# Patient Record
Sex: MALE | Race: WHITE | NOT HISPANIC OR LATINO
[De-identification: names, ages, dates, MRNs, and addresses within clinical notes are randomized per-mention and may not be internally consistent; named-entity substitution may affect disease eponyms.]

---

## 2017-02-15 ENCOUNTER — APPOINTMENT (OUTPATIENT)
Dept: VASCULAR SURGERY | Facility: CLINIC | Age: 62
End: 2017-02-15

## 2017-07-12 ENCOUNTER — APPOINTMENT (OUTPATIENT)
Dept: VASCULAR SURGERY | Facility: CLINIC | Age: 62
End: 2017-07-12

## 2017-07-12 VITALS — SYSTOLIC BLOOD PRESSURE: 125 MMHG | OXYGEN SATURATION: 93 % | HEART RATE: 81 BPM | DIASTOLIC BLOOD PRESSURE: 81 MMHG

## 2018-01-31 ENCOUNTER — APPOINTMENT (OUTPATIENT)
Dept: VASCULAR SURGERY | Facility: CLINIC | Age: 63
End: 2018-01-31
Payer: COMMERCIAL

## 2018-01-31 VITALS — SYSTOLIC BLOOD PRESSURE: 160 MMHG | OXYGEN SATURATION: 97 % | DIASTOLIC BLOOD PRESSURE: 87 MMHG | HEART RATE: 81 BPM

## 2018-01-31 PROCEDURE — 99214 OFFICE O/P EST MOD 30 MIN: CPT | Mod: 25

## 2018-01-31 PROCEDURE — 93926 LOWER EXTREMITY STUDY: CPT

## 2018-06-27 ENCOUNTER — APPOINTMENT (OUTPATIENT)
Dept: VASCULAR SURGERY | Facility: CLINIC | Age: 63
End: 2018-06-27
Payer: COMMERCIAL

## 2018-06-27 VITALS — HEART RATE: 61 BPM | TEMPERATURE: 97.4 F | DIASTOLIC BLOOD PRESSURE: 82 MMHG | SYSTOLIC BLOOD PRESSURE: 136 MMHG

## 2018-06-27 PROCEDURE — 99214 OFFICE O/P EST MOD 30 MIN: CPT | Mod: 25

## 2018-06-27 PROCEDURE — 93926 LOWER EXTREMITY STUDY: CPT

## 2018-07-05 ENCOUNTER — OTHER (OUTPATIENT)
Age: 63
End: 2018-07-05

## 2018-07-18 ENCOUNTER — RX RENEWAL (OUTPATIENT)
Age: 63
End: 2018-07-18

## 2018-07-24 ENCOUNTER — APPOINTMENT (OUTPATIENT)
Dept: VASCULAR SURGERY | Facility: HOSPITAL | Age: 63
End: 2018-07-24

## 2018-07-24 ENCOUNTER — OUTPATIENT (OUTPATIENT)
Dept: OUTPATIENT SERVICES | Facility: HOSPITAL | Age: 63
LOS: 1 days | Discharge: NURSING, REHAB OR LONG-TERM CARE | End: 2018-07-24
Payer: COMMERCIAL

## 2018-07-24 LAB
ANION GAP SERPL CALC-SCNC: 11 MMOL/L — SIGNIFICANT CHANGE UP (ref 5–17)
BUN SERPL-MCNC: 18 MG/DL — SIGNIFICANT CHANGE UP (ref 7–23)
CALCIUM SERPL-MCNC: 9.9 MG/DL — SIGNIFICANT CHANGE UP (ref 8.4–10.5)
CHLORIDE SERPL-SCNC: 103 MMOL/L — SIGNIFICANT CHANGE UP (ref 96–108)
CO2 SERPL-SCNC: 27 MMOL/L — SIGNIFICANT CHANGE UP (ref 22–31)
CREAT SERPL-MCNC: 0.87 MG/DL — SIGNIFICANT CHANGE UP (ref 0.5–1.3)
GLUCOSE SERPL-MCNC: 96 MG/DL — SIGNIFICANT CHANGE UP (ref 70–99)
POTASSIUM SERPL-MCNC: 4.7 MMOL/L — SIGNIFICANT CHANGE UP (ref 3.5–5.3)
POTASSIUM SERPL-SCNC: 4.7 MMOL/L — SIGNIFICANT CHANGE UP (ref 3.5–5.3)
SODIUM SERPL-SCNC: 141 MMOL/L — SIGNIFICANT CHANGE UP (ref 135–145)

## 2018-07-24 PROCEDURE — 36247 INS CATH ABD/L-EXT ART 3RD: CPT | Mod: RT

## 2018-07-24 PROCEDURE — 36247 INS CATH ABD/L-EXT ART 3RD: CPT | Mod: RT,GC

## 2018-07-24 PROCEDURE — C1894: CPT

## 2018-07-24 PROCEDURE — C1887: CPT

## 2018-07-24 PROCEDURE — 75710 ARTERY X-RAYS ARM/LEG: CPT | Mod: 26,GC

## 2018-07-24 PROCEDURE — 75625 CONTRAST EXAM ABDOMINL AORTA: CPT | Mod: 26,GC

## 2018-07-24 PROCEDURE — C1769: CPT

## 2018-07-24 PROCEDURE — 80048 BASIC METABOLIC PNL TOTAL CA: CPT

## 2018-07-24 RX ORDER — ONDANSETRON 8 MG/1
4 TABLET, FILM COATED ORAL ONCE
Qty: 0 | Refills: 0 | Status: DISCONTINUED | OUTPATIENT
Start: 2018-07-24 | End: 2018-07-24

## 2018-07-24 RX ORDER — SODIUM CHLORIDE 9 MG/ML
1000 INJECTION INTRAMUSCULAR; INTRAVENOUS; SUBCUTANEOUS
Qty: 0 | Refills: 0 | Status: DISCONTINUED | OUTPATIENT
Start: 2018-07-24 | End: 2018-07-24

## 2018-07-24 RX ORDER — CHLORHEXIDINE GLUCONATE 213 G/1000ML
1 SOLUTION TOPICAL ONCE
Qty: 0 | Refills: 0 | Status: DISCONTINUED | OUTPATIENT
Start: 2018-07-24 | End: 2018-07-24

## 2018-07-24 RX ORDER — OXYCODONE AND ACETAMINOPHEN 5; 325 MG/1; MG/1
1 TABLET ORAL ONCE
Qty: 0 | Refills: 0 | Status: DISCONTINUED | OUTPATIENT
Start: 2018-07-24 | End: 2018-07-24

## 2018-07-24 RX ORDER — ACETAMINOPHEN 500 MG
650 TABLET ORAL ONCE
Qty: 0 | Refills: 0 | Status: DISCONTINUED | OUTPATIENT
Start: 2018-07-24 | End: 2018-07-24

## 2018-07-24 NOTE — BRIEF OPERATIVE NOTE - POST-OP DX
PAD (peripheral artery disease)  07/24/2018  RLE h/o acute arterial occlusion  Active  Beverly Knight

## 2018-07-24 NOTE — BRIEF OPERATIVE NOTE - OPERATION/FINDINGS
No Aorto-iliac disease. Patent CFA/ PFA/SFA. Patent SFA-PT bypass with evidence of thrombus in the distal bypass and focal severe stenosis at the distal bypass.   PT outflow

## 2018-07-26 ENCOUNTER — RX RENEWAL (OUTPATIENT)
Age: 63
End: 2018-07-26

## 2018-07-27 VITALS
HEIGHT: 73 IN | DIASTOLIC BLOOD PRESSURE: 72 MMHG | WEIGHT: 171.52 LBS | HEART RATE: 49 BPM | OXYGEN SATURATION: 98 % | SYSTOLIC BLOOD PRESSURE: 119 MMHG | RESPIRATION RATE: 16 BRPM | TEMPERATURE: 96 F

## 2018-07-27 NOTE — PATIENT PROFILE ADULT. - PMH
Chronic sinusitis    Lyme disease    PVD (peripheral vascular disease) Lyme disease    PVD (peripheral vascular disease)

## 2018-07-27 NOTE — PATIENT PROFILE ADULT. - TEACHING/LEARNING LEARNING PREFERENCES
verbal instruction/individual instruction written material/individual instruction/verbal instruction

## 2018-07-30 ENCOUNTER — INPATIENT (INPATIENT)
Facility: HOSPITAL | Age: 63
LOS: 1 days | Discharge: HOME | DRG: 254 | End: 2018-08-01
Attending: SURGERY | Admitting: SURGERY
Payer: COMMERCIAL

## 2018-07-30 ENCOUNTER — APPOINTMENT (OUTPATIENT)
Dept: VASCULAR SURGERY | Facility: HOSPITAL | Age: 63
End: 2018-07-30

## 2018-07-30 DIAGNOSIS — Z41.9 ENCOUNTER FOR PROCEDURE FOR PURPOSES OTHER THAN REMEDYING HEALTH STATE, UNSPECIFIED: Chronic | ICD-10-CM

## 2018-07-30 DIAGNOSIS — Z86.79 PERSONAL HISTORY OF OTHER DISEASES OF THE CIRCULATORY SYSTEM: Chronic | ICD-10-CM

## 2018-07-30 LAB
ANION GAP SERPL CALC-SCNC: 11 MMOL/L — SIGNIFICANT CHANGE UP (ref 5–17)
APTT BLD: 158.2 SEC — CRITICAL HIGH (ref 27.5–37.4)
APTT BLD: 36.7 SEC — SIGNIFICANT CHANGE UP (ref 27.5–37.4)
APTT BLD: 49.5 SEC — HIGH (ref 27.5–37.4)
BASOPHILS NFR BLD AUTO: 0.2 % — SIGNIFICANT CHANGE UP (ref 0–2)
BUN SERPL-MCNC: 17 MG/DL — SIGNIFICANT CHANGE UP (ref 7–23)
CALCIUM SERPL-MCNC: 8.8 MG/DL — SIGNIFICANT CHANGE UP (ref 8.4–10.5)
CHLORIDE SERPL-SCNC: 103 MMOL/L — SIGNIFICANT CHANGE UP (ref 96–108)
CO2 SERPL-SCNC: 24 MMOL/L — SIGNIFICANT CHANGE UP (ref 22–31)
CREAT SERPL-MCNC: 0.85 MG/DL — SIGNIFICANT CHANGE UP (ref 0.5–1.3)
EOSINOPHIL NFR BLD AUTO: 0.8 % — SIGNIFICANT CHANGE UP (ref 0–6)
GLUCOSE SERPL-MCNC: 110 MG/DL — HIGH (ref 70–99)
HCT VFR BLD CALC: 38 % — LOW (ref 39–50)
HGB BLD-MCNC: 12.8 G/DL — LOW (ref 13–17)
INR BLD: 1.04 — SIGNIFICANT CHANGE UP (ref 0.88–1.16)
LYMPHOCYTES # BLD AUTO: 8.8 % — LOW (ref 13–44)
MAGNESIUM SERPL-MCNC: 1.9 MG/DL — SIGNIFICANT CHANGE UP (ref 1.6–2.6)
MCHC RBC-ENTMCNC: 30.5 PG — SIGNIFICANT CHANGE UP (ref 27–34)
MCHC RBC-ENTMCNC: 33.7 G/DL — SIGNIFICANT CHANGE UP (ref 32–36)
MCV RBC AUTO: 90.7 FL — SIGNIFICANT CHANGE UP (ref 80–100)
MONOCYTES NFR BLD AUTO: 2.4 % — SIGNIFICANT CHANGE UP (ref 2–14)
NEUTROPHILS NFR BLD AUTO: 87.8 % — HIGH (ref 43–77)
PHOSPHATE SERPL-MCNC: 3.1 MG/DL — SIGNIFICANT CHANGE UP (ref 2.5–4.5)
PLATELET # BLD AUTO: 221 K/UL — SIGNIFICANT CHANGE UP (ref 150–400)
POTASSIUM SERPL-MCNC: 4.3 MMOL/L — SIGNIFICANT CHANGE UP (ref 3.5–5.3)
POTASSIUM SERPL-SCNC: 4.3 MMOL/L — SIGNIFICANT CHANGE UP (ref 3.5–5.3)
PROTHROM AB SERPL-ACNC: 11.6 SEC — SIGNIFICANT CHANGE UP (ref 9.8–12.7)
RBC # BLD: 4.19 M/UL — LOW (ref 4.2–5.8)
RBC # FLD: 13.4 % — SIGNIFICANT CHANGE UP (ref 10.3–16.9)
SODIUM SERPL-SCNC: 138 MMOL/L — SIGNIFICANT CHANGE UP (ref 135–145)
WBC # BLD: 12.7 K/UL — HIGH (ref 3.8–10.5)
WBC # FLD AUTO: 12.7 K/UL — HIGH (ref 3.8–10.5)

## 2018-07-30 PROCEDURE — 36140 INTRO NDL ICATH UPR/LXTR ART: CPT | Mod: 59,GC

## 2018-07-30 PROCEDURE — 35305 RECHANNELING OF ARTERY: CPT | Mod: GC,RT

## 2018-07-30 PROCEDURE — 75710 ARTERY X-RAYS ARM/LEG: CPT | Mod: 26,59

## 2018-07-30 RX ORDER — HEPARIN SODIUM 5000 [USP'U]/ML
500 INJECTION INTRAVENOUS; SUBCUTANEOUS
Qty: 25000 | Refills: 0 | Status: DISCONTINUED | OUTPATIENT
Start: 2018-07-30 | End: 2018-07-31

## 2018-07-30 RX ORDER — ASPIRIN/CALCIUM CARB/MAGNESIUM 324 MG
81 TABLET ORAL DAILY
Qty: 0 | Refills: 0 | Status: DISCONTINUED | OUTPATIENT
Start: 2018-07-30 | End: 2018-07-30

## 2018-07-30 RX ORDER — ONDANSETRON 8 MG/1
4 TABLET, FILM COATED ORAL EVERY 6 HOURS
Qty: 0 | Refills: 0 | Status: DISCONTINUED | OUTPATIENT
Start: 2018-07-30 | End: 2018-08-01

## 2018-07-30 RX ORDER — METOCLOPRAMIDE HCL 10 MG
10 TABLET ORAL ONCE
Qty: 0 | Refills: 0 | Status: DISCONTINUED | OUTPATIENT
Start: 2018-07-30 | End: 2018-08-01

## 2018-07-30 RX ORDER — DOCUSATE SODIUM 100 MG
100 CAPSULE ORAL THREE TIMES A DAY
Qty: 0 | Refills: 0 | Status: DISCONTINUED | OUTPATIENT
Start: 2018-07-30 | End: 2018-08-01

## 2018-07-30 RX ORDER — MORPHINE SULFATE 50 MG/1
4 CAPSULE, EXTENDED RELEASE ORAL EVERY 4 HOURS
Qty: 0 | Refills: 0 | Status: DISCONTINUED | OUTPATIENT
Start: 2018-07-30 | End: 2018-07-31

## 2018-07-30 RX ORDER — WARFARIN SODIUM 2.5 MG/1
7.5 TABLET ORAL ONCE
Qty: 0 | Refills: 0 | Status: COMPLETED | OUTPATIENT
Start: 2018-07-30 | End: 2018-07-30

## 2018-07-30 RX ORDER — CEFAZOLIN SODIUM 1 G
2000 VIAL (EA) INJECTION EVERY 8 HOURS
Qty: 0 | Refills: 0 | Status: COMPLETED | OUTPATIENT
Start: 2018-07-30 | End: 2018-07-31

## 2018-07-30 RX ORDER — SENNA PLUS 8.6 MG/1
2 TABLET ORAL DAILY
Qty: 0 | Refills: 0 | Status: DISCONTINUED | OUTPATIENT
Start: 2018-07-30 | End: 2018-08-01

## 2018-07-30 RX ORDER — MORPHINE SULFATE 50 MG/1
2 CAPSULE, EXTENDED RELEASE ORAL EVERY 4 HOURS
Qty: 0 | Refills: 0 | Status: DISCONTINUED | OUTPATIENT
Start: 2018-07-30 | End: 2018-07-31

## 2018-07-30 RX ORDER — SODIUM CHLORIDE 9 MG/ML
1000 INJECTION INTRAMUSCULAR; INTRAVENOUS; SUBCUTANEOUS
Qty: 0 | Refills: 0 | Status: DISCONTINUED | OUTPATIENT
Start: 2018-07-30 | End: 2018-07-31

## 2018-07-30 RX ORDER — ASPIRIN/CALCIUM CARB/MAGNESIUM 324 MG
81 TABLET ORAL DAILY
Qty: 0 | Refills: 0 | Status: DISCONTINUED | OUTPATIENT
Start: 2018-07-30 | End: 2018-08-01

## 2018-07-30 RX ORDER — WARFARIN SODIUM 2.5 MG/1
7.5 TABLET ORAL ONCE
Qty: 0 | Refills: 0 | Status: DISCONTINUED | OUTPATIENT
Start: 2018-07-30 | End: 2018-07-30

## 2018-07-30 RX ADMIN — Medication 100 MILLIGRAM(S): at 21:07

## 2018-07-30 RX ADMIN — SODIUM CHLORIDE 65 MILLILITER(S): 9 INJECTION INTRAMUSCULAR; INTRAVENOUS; SUBCUTANEOUS at 18:13

## 2018-07-30 RX ADMIN — WARFARIN SODIUM 7.5 MILLIGRAM(S): 2.5 TABLET ORAL at 21:07

## 2018-07-30 RX ADMIN — Medication 100 MILLIGRAM(S): at 21:36

## 2018-07-30 RX ADMIN — HEPARIN SODIUM 5 UNIT(S)/HR: 5000 INJECTION INTRAVENOUS; SUBCUTANEOUS at 15:55

## 2018-07-30 NOTE — PROGRESS NOTE ADULT - SUBJECTIVE AND OBJECTIVE BOX
POST-OPERATIVE NOTE    Procedure: open revision of distal RLE bypass with graft excision, patch angionplasty, and angiongram    Diagnosis/Indication: RLE bypass graft stenosis    Surgeon:    S: Pt has no complaints and reports that his pain in controlled. Denies CP, SOB, AGUILERA, calf tenderness. Denies nausea, vomiting.    O:  T(C): 36.3 (07-30-18 @ 15:20), Max: 36.3 (07-30-18 @ 15:20)  T(F): 97.3 (07-30-18 @ 15:20), Max: 97.3 (07-30-18 @ 15:20)  HR: 46 (07-30-18 @ 17:20) (46 - 64)  BP: 107/66 (07-30-18 @ 17:20) (102/65 - 118/67)  RR: 19 (07-30-18 @ 17:20) (13 - 22)  SpO2: 99% (07-30-18 @ 17:20) (95% - 99%)  Wt(kg): --                        12.8   12.7  )-----------( 221      ( 30 Jul 2018 15:37 )             38.0     07-30    138  |  103  |  17  ----------------------------<  110<H>  4.3   |  24  |  0.85    Ca    8.8      30 Jul 2018 15:38  Phos  3.1     07-30  Mg     1.9     07-30        Gen: NAD, resting comfortably in bed  C/V: NSR  Pulm: Nonlabored breathing, no respiratory distress  Abd: soft, NT/ND  Extrem: WWP, no calf edema or tenderness. incision clean, dry ,and intact. JPx1 serosanguinous   Pulses: Right: Fem 2+, PT triphasic, DP triphasic

## 2018-07-30 NOTE — H&P PST ADULT - ASSESSMENT
63 yo M with threatened fem-PT bypass:   - Admit to vascular surgery (SDA) for revision of LLE bypass.   - Pre-op labs/ NPO.IVF  - Continue lovenox and ASA perioperatively

## 2018-07-30 NOTE — BRIEF OPERATIVE NOTE - PROCEDURE
<<-----Click on this checkbox to enter Procedure Vascular surgery procedure  07/30/2018  RLE revision of bypass (distal anastomosis) with patch angioplasty and angiogram  Active  MVISMER

## 2018-07-30 NOTE — H&P PST ADULT - HISTORY OF PRESENT ILLNESS
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 He underwent an angiogram 1 week ago again confirming presence of severe stenosis in the graft at the distal anastomosis. Today the pt presented for an open revision of the distal portion of the bypass graft.     He is very active. Travels a lot, bikes and hikes.     Was transitioned to lovenox from coumadin. Last dose yesterday.

## 2018-07-30 NOTE — H&P PST ADULT - PSH
History of femoral angiogram  LLE angiogram  Surgery, elective  Right Femoral-PT bypass with cephalic vein

## 2018-07-31 LAB
ANION GAP SERPL CALC-SCNC: 14 MMOL/L — SIGNIFICANT CHANGE UP (ref 5–17)
APTT BLD: 30.8 SEC — SIGNIFICANT CHANGE UP (ref 27.5–37.4)
BUN SERPL-MCNC: 19 MG/DL — SIGNIFICANT CHANGE UP (ref 7–23)
CALCIUM SERPL-MCNC: 9.4 MG/DL — SIGNIFICANT CHANGE UP (ref 8.4–10.5)
CHLORIDE SERPL-SCNC: 101 MMOL/L — SIGNIFICANT CHANGE UP (ref 96–108)
CO2 SERPL-SCNC: 22 MMOL/L — SIGNIFICANT CHANGE UP (ref 22–31)
CREAT SERPL-MCNC: 0.87 MG/DL — SIGNIFICANT CHANGE UP (ref 0.5–1.3)
GLUCOSE SERPL-MCNC: 119 MG/DL — HIGH (ref 70–99)
HCT VFR BLD CALC: 39.6 % — SIGNIFICANT CHANGE UP (ref 39–50)
HGB BLD-MCNC: 13.2 G/DL — SIGNIFICANT CHANGE UP (ref 13–17)
INR BLD: 0.99 — SIGNIFICANT CHANGE UP (ref 0.88–1.16)
MAGNESIUM SERPL-MCNC: 1.9 MG/DL — SIGNIFICANT CHANGE UP (ref 1.6–2.6)
MCHC RBC-ENTMCNC: 30.1 PG — SIGNIFICANT CHANGE UP (ref 27–34)
MCHC RBC-ENTMCNC: 33.3 G/DL — SIGNIFICANT CHANGE UP (ref 32–36)
MCV RBC AUTO: 90.4 FL — SIGNIFICANT CHANGE UP (ref 80–100)
PHOSPHATE SERPL-MCNC: 3.2 MG/DL — SIGNIFICANT CHANGE UP (ref 2.5–4.5)
PLATELET # BLD AUTO: 231 K/UL — SIGNIFICANT CHANGE UP (ref 150–400)
POTASSIUM SERPL-MCNC: 4.3 MMOL/L — SIGNIFICANT CHANGE UP (ref 3.5–5.3)
POTASSIUM SERPL-SCNC: 4.3 MMOL/L — SIGNIFICANT CHANGE UP (ref 3.5–5.3)
PROTHROM AB SERPL-ACNC: 11 SEC — SIGNIFICANT CHANGE UP (ref 9.8–12.7)
RBC # BLD: 4.38 M/UL — SIGNIFICANT CHANGE UP (ref 4.2–5.8)
RBC # FLD: 13.4 % — SIGNIFICANT CHANGE UP (ref 10.3–16.9)
SODIUM SERPL-SCNC: 137 MMOL/L — SIGNIFICANT CHANGE UP (ref 135–145)
WBC # BLD: 16.8 K/UL — HIGH (ref 3.8–10.5)
WBC # FLD AUTO: 16.8 K/UL — HIGH (ref 3.8–10.5)

## 2018-07-31 RX ORDER — ACETAMINOPHEN 500 MG
650 TABLET ORAL EVERY 6 HOURS
Qty: 0 | Refills: 0 | Status: DISCONTINUED | OUTPATIENT
Start: 2018-07-31 | End: 2018-08-01

## 2018-07-31 RX ORDER — HYDROGEN PEROXIDE 0.3 KG/100L
1 LIQUID TOPICAL
Qty: 0 | Refills: 0 | Status: DISCONTINUED | OUTPATIENT
Start: 2018-07-31 | End: 2018-08-01

## 2018-07-31 RX ORDER — ENOXAPARIN SODIUM 100 MG/ML
115 INJECTION SUBCUTANEOUS DAILY
Qty: 0 | Refills: 0 | Status: DISCONTINUED | OUTPATIENT
Start: 2018-07-31 | End: 2018-08-01

## 2018-07-31 RX ORDER — OXYCODONE AND ACETAMINOPHEN 5; 325 MG/1; MG/1
1 TABLET ORAL EVERY 6 HOURS
Qty: 0 | Refills: 0 | Status: DISCONTINUED | OUTPATIENT
Start: 2018-07-31 | End: 2018-08-01

## 2018-07-31 RX ORDER — ENOXAPARIN SODIUM 100 MG/ML
100 INJECTION SUBCUTANEOUS DAILY
Qty: 0 | Refills: 0 | Status: DISCONTINUED | OUTPATIENT
Start: 2018-07-31 | End: 2018-07-31

## 2018-07-31 RX ADMIN — Medication 100 MILLIGRAM(S): at 13:17

## 2018-07-31 RX ADMIN — ENOXAPARIN SODIUM 115 MILLIGRAM(S): 100 INJECTION SUBCUTANEOUS at 13:17

## 2018-07-31 RX ADMIN — Medication 81 MILLIGRAM(S): at 11:40

## 2018-07-31 RX ADMIN — Medication 100 MILLIGRAM(S): at 21:03

## 2018-07-31 RX ADMIN — Medication 100 MILLIGRAM(S): at 05:15

## 2018-07-31 RX ADMIN — Medication 100 MILLIGRAM(S): at 05:16

## 2018-07-31 NOTE — PROVIDER CONTACT NOTE (OTHER) - RECOMMENDATIONS
LASHAWN Decker to assess at bedside and change dressing if needed.
continue to monitor, continue current rate

## 2018-07-31 NOTE — PROGRESS NOTE ADULT - SUBJECTIVE AND OBJECTIVE BOX
24hr Events:  O/N: 10pm PTT 36.7, rate left unchanged, received coumadin 7.5mg  7/30: s/p open revision of distal portio of RLE bypass with patch angioplasty and angiogram. 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 Drain output 5cc,  JPremoved. OOB ambulate. Heparin and coumadin discontinued, on Lovenox SQ, will discharge tomorrow  O/N: 10pm PTT 36.7, rate left unchanged, received coumadin 7.5mg    Patient has no complaints.    ---------------------------------------------------------------    Vital Signs Last 24 Hrs  T(C): 36.2 (31 Jul 2018 10:26), Max: 36.3 (30 Jul 2018 15:20)  T(F): 97.2 (31 Jul 2018 10:26), Max: 97.3 (30 Jul 2018 15:20)  HR: 72 (31 Jul 2018 13:21) (46 - 72)  BP: 107/58 (31 Jul 2018 13:21) (97/59 - 132/80)  BP(mean): 90 (30 Jul 2018 17:50) (78 - 90)  RR: 16 (31 Jul 2018 13:21) (13 - 22)  SpO2: 99% (31 Jul 2018 13:21) (95% - 100%)    I&O's Summary    30 Jul 2018 07:01  -  31 Jul 2018 07:00  --------------------------------------------------------  IN: 1635 mL / OUT: 1904 mL / NET: -269 mL    31 Jul 2018 07:01  -  31 Jul 2018 13:23  --------------------------------------------------------  IN: 485 mL / OUT: 750 mL / NET: -265 mL        ----------------------------------------------------------------    Physical Exam:    Gen: NAD, resting comfortably in bed  C/V: NSR  Pulm: Nonlabored breathing, no respiratory distress  Abd: soft, NT/ND  Extrem: WWP, no calf edema or tenderness. incision clean, dry ,and intact. JPx1 5cc serosanguinous o/n, removed    -------------------------------------------------------------------    LABS:                        13.2   16.8  )-----------( 231      ( 31 Jul 2018 07:48 )             39.6     07-31    137  |  101  |  19  ----------------------------<  119<H>  4.3   |  22  |  0.87    Ca    9.4      31 Jul 2018 07:48  Phos  3.2     07-31  Mg     1.9     07-31      PT/INR - ( 31 Jul 2018 07:48 )   PT: 11.0 sec;   INR: 0.99          PTT - ( 31 Jul 2018 07:48 )  PTT:30.8 sec      RADIOLOGY & ADDITIONAL STUDIES:    24hr Events:  O/N: 10pm PTT 36.7, rate left unchanged, received coumadin 7.5mg  7/30: s/p open revision of distal portio of RLE bypass with patch angioplasty and angiogram. 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

## 2018-07-31 NOTE — PROGRESS NOTE ADULT - ASSESSMENT
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

## 2018-07-31 NOTE — PROVIDER CONTACT NOTE (OTHER) - ACTION/TREATMENT ORDERED:
no changes to therapy, continue Heparin gtt at current rate of 5cc/hr
LASHAWN Castro at bedside. Per PA, dressing to be left until AM and continue to monitor.

## 2018-07-31 NOTE — PROVIDER CONTACT NOTE (OTHER) - ASSESSMENT
Pt AOx4, NSR on monitor. RLE gauze soiled with serosanguineous drainage. Pt denies pain at this time.
no s/s of bleeding

## 2018-08-01 ENCOUNTER — TRANSCRIPTION ENCOUNTER (OUTPATIENT)
Age: 63
End: 2018-08-01

## 2018-08-01 VITALS
RESPIRATION RATE: 18 BRPM | SYSTOLIC BLOOD PRESSURE: 157 MMHG | TEMPERATURE: 97 F | HEART RATE: 70 BPM | DIASTOLIC BLOOD PRESSURE: 92 MMHG | OXYGEN SATURATION: 98 %

## 2018-08-01 LAB
INR BLD: 1.08 — SIGNIFICANT CHANGE UP (ref 0.88–1.16)
PROTHROM AB SERPL-ACNC: 12 SEC — SIGNIFICANT CHANGE UP (ref 9.8–12.7)

## 2018-08-01 PROCEDURE — 80048 BASIC METABOLIC PNL TOTAL CA: CPT

## 2018-08-01 PROCEDURE — 86900 BLOOD TYPING SEROLOGIC ABO: CPT

## 2018-08-01 PROCEDURE — 83735 ASSAY OF MAGNESIUM: CPT

## 2018-08-01 PROCEDURE — 85027 COMPLETE CBC AUTOMATED: CPT

## 2018-08-01 PROCEDURE — 86901 BLOOD TYPING SEROLOGIC RH(D): CPT

## 2018-08-01 PROCEDURE — C1889: CPT

## 2018-08-01 PROCEDURE — 86923 COMPATIBILITY TEST ELECTRIC: CPT

## 2018-08-01 PROCEDURE — 86850 RBC ANTIBODY SCREEN: CPT

## 2018-08-01 PROCEDURE — 85730 THROMBOPLASTIN TIME PARTIAL: CPT

## 2018-08-01 PROCEDURE — 84100 ASSAY OF PHOSPHORUS: CPT

## 2018-08-01 PROCEDURE — 85025 COMPLETE CBC W/AUTO DIFF WBC: CPT

## 2018-08-01 PROCEDURE — 85610 PROTHROMBIN TIME: CPT

## 2018-08-01 PROCEDURE — 76000 FLUOROSCOPY <1 HR PHYS/QHP: CPT

## 2018-08-01 PROCEDURE — 36415 COLL VENOUS BLD VENIPUNCTURE: CPT

## 2018-08-01 RX ORDER — ENOXAPARIN SODIUM 100 MG/ML
115 INJECTION SUBCUTANEOUS
Qty: 0 | Refills: 0 | DISCHARGE
Start: 2018-08-01 | End: 2018-08-06

## 2018-08-01 RX ORDER — ENOXAPARIN SODIUM 100 MG/ML
115 INJECTION SUBCUTANEOUS
Qty: 115 | Refills: 0
Start: 2018-08-01 | End: 2018-08-05

## 2018-08-01 RX ADMIN — Medication 100 MILLIGRAM(S): at 06:25

## 2018-08-01 RX ADMIN — Medication 81 MILLIGRAM(S): at 06:25

## 2018-08-01 RX ADMIN — ENOXAPARIN SODIUM 115 MILLIGRAM(S): 100 INJECTION SUBCUTANEOUS at 12:22

## 2018-08-01 NOTE — DISCHARGE NOTE ADULT - PATIENT PORTAL LINK FT
You can access the [facility], offered by [facility], by registering with the following website: [email or web address]

## 2018-08-01 NOTE — DISCHARGE NOTE ADULT - MEDICATION SUMMARY - MEDICATIONS TO TAKE
I will START or STAY ON the medications listed below when I get home from the hospital:    aspirin 81 mg oral tablet  -- 1 tab(s) by mouth once a day  -- Indication: For Circulation    Lovenox 120 mg/0.8 mL injectable solution  -- 115 milligram(s) subcutaneously once a day   -- It is very important that you take or use this exactly as directed.  Do not skip doses or discontinue unless directed by your doctor.    -- Indication: For GRAFT STENOSIS (new prescription)    enoxaparin  -- 115 milligram(s) subcutaneous once a day  -- Indication: For GRAFT STENOSIS     Coumadin 7.5 mg oral tablet  -- 1 tab(s) by mouth once a day  -- Indication: For GRAFT STENOSIS

## 2018-08-01 NOTE — PROGRESS NOTE ADULT - SUBJECTIVE AND OBJECTIVE BOX
24hr Events:  O/N: STEPHANIE, VSS  7/31. Drain output 5cc,  JPremoved. OOB ambulate. Heparin and coumadin discontinued, on Lovenox SQ, changed to PO pain meds, will discharge tomorrow.      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 Drain output 5cc,  JPremoved. OOB ambulate. Heparin and coumadin discontinued, on Lovenox SQ, changed to PO pain meds, will discharge tomorrow.    INTERVAL HPI/OVERNIGHT EVENTS:    STATUS POST:      POST OPERATIVE DAY #:     SUBJECTIVE:  Flatus: [ ] YES [ ] NO             Bowel Movement: [ ] YES [ ] NO  Pain (0-10):            Pain Control Adequate: [ ] YES [ ] NO  Nausea: [ ] YES [ ] NO            Vomiting: [ ] YES [ ] NO  Diarrhea: [ ] YES [ ] NO         Constipation: [ ] YES [ ] NO     Chest Pain: [ ] YES [ ] NO    SOB:  [ ] YES [ ] NO    MEDICATIONS  (STANDING):  aspirin enteric coated 81 milliGRAM(s) Oral daily  docusate sodium 100 milliGRAM(s) Oral three times a day  enoxaparin Injectable 115 milliGRAM(s) SubCutaneous daily  hydrogen peroxide 3% Solution 1 Application(s) Topical two times a day    MEDICATIONS  (PRN):  acetaminophen   Tablet. 650 milliGRAM(s) Oral every 6 hours PRN Moderate Pain (4 - 6)  metoclopramide Injectable 10 milliGRAM(s) IV Push once PRN Nausea and/or Vomiting  ondansetron Injectable 4 milliGRAM(s) IV Push every 6 hours PRN Nausea  oxyCODONE    5 mG/acetaminophen 325 mG 1 Tablet(s) Oral every 6 hours PRN Severe Pain (7 - 10)  senna 2 Tablet(s) Oral daily PRN Constipation      Vital Signs Last 24 Hrs  T(C): 35.9 (01 Aug 2018 08:30), Max: 36.6 (31 Jul 2018 13:54)  T(F): 96.7 (01 Aug 2018 08:30), Max: 97.9 (31 Jul 2018 13:54)  HR: 56 (01 Aug 2018 08:30) (52 - 72)  BP: 157/92 (01 Aug 2018 08:30) (105/62 - 157/92)  BP(mean): --  RR: 18 (01 Aug 2018 08:30) (16 - 18)  SpO2: 98% (01 Aug 2018 08:30) (98% - 99%)    PHYSICAL EXAM:      Constitutional: A&Ox3    Respiratory: non labored breathing, no respiratory distress    Cardiovascular: NSR, RRR    Gastrointestinal: soft, nontender, nondistended    Extremities: (-) edema, no calf edema or tenderness. incision clean, dry ,and intact. JPx1 with serosanguinous o/n, removed        I&O's Detail    31 Jul 2018 07:01  -  01 Aug 2018 07:00  --------------------------------------------------------  IN:    heparin Infusion: 5 mL    Oral Fluid: 900 mL  Total IN: 905 mL    OUT:    Voided: 2950 mL  Total OUT: 2950 mL    Total NET: -2045 mL      01 Aug 2018 07:01  -  01 Aug 2018 09:43  --------------------------------------------------------  IN:    Oral Fluid: 180 mL  Total IN: 180 mL    OUT:  Total OUT: 0 mL    Total NET: 180 mL          LABS:                        13.2   16.8  )-----------( 231      ( 31 Jul 2018 07:48 )             39.6     07-31    137  |  101  |  19  ----------------------------<  119<H>  4.3   |  22  |  0.87    Ca    9.4      31 Jul 2018 07:48  Phos  3.2     07-31  Mg     1.9     07-31      PT/INR - ( 31 Jul 2018 07:48 )   PT: 11.0 sec;   INR: 0.99          PTT - ( 31 Jul 2018 07:48 )  PTT:30.8 sec      RADIOLOGY & ADDITIONAL STUDIES:      Assessment/Plan:  Patient is a 61 yo M former smoker with 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

## 2018-08-01 NOTE — DISCHARGE NOTE ADULT - CARE PROVIDER_API CALL
Seamus Herrera), Vascular Surgery  [address]  [address]  [place], [place] [address]  Phone: [phone]  Fax: [phone]

## 2018-08-01 NOTE — DISCHARGE NOTE ADULT - HOSPITAL COURSE
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 He underwent an angiogram 1 week ago again confirming presence of severe stenosis in the graft at the distal anastomosis. Presented for an open revision of the distal portion of the bypass graft. On 7/30/18 patient underwent open revision of right leg distal bypass with patch angionplasty and angiongram. Post operative course unremarkable, patient remained with stable vital signs. On POD#2 patient OOB and ambulating, incisions C/D/I, tolerating diet and pain is controlled on oral medication. Patient to be discharged on Lovenox to coumadin bridge with outpatient follow up in the office in 1 week,

## 2018-08-01 NOTE — DISCHARGE NOTE ADULT - PLAN OF CARE
s/p right distal bypass revision, goal: incision healing, return to normal activity Follow up with Dr. Herrera next week for an US at the office. Call the office at  to schedule your appointment. You may shower; soap and water over incision site. Do not scrub. Pat dry when done.  Clean the incisions site with hydrogen peroxide daily.  Ambulate as tolerated, but no heavy lifting (>10lbs) or strenuous exercise. You may resume regular diet.   Call the office if you experience increasing pain, redness, swelling or drainage from incision sites/wounds, or temperature >101.4F.  Please see your PCP on Friday and get an INR checked. Once INR >2.5 you may stop taking the lovenox injections. Follow up with Dr. Herrera next week for an US at the office. Call the office at  to schedule your appointment. You may shower; soap and water over incision site. Do not scrub. Pat dry when done.  Clean the incisions site with hydrogen peroxide daily.  Ambulate as tolerated, but no heavy lifting (>10lbs) or strenuous exercise. You may resume regular diet.   Call the office if you experience increasing pain, redness, swelling or drainage from incision sites/wounds, or temperature >101.4F.  Please see your PCP on Friday and get an INR checked.  Once INR >2.5 you may stop taking the lovenox injections.

## 2018-08-01 NOTE — DISCHARGE NOTE ADULT - CARE PLAN
Principal Discharge DX:	PVD (peripheral vascular disease)  Goal:	s/p right distal bypass revision, goal: incision healing, return to normal activity  Assessment and plan of treatment:	Follow up with Dr. Herrera next week for an US at the office. Call the office at  to schedule your appointment. You may shower; soap and water over incision site. Do not scrub. Pat dry when done.  Clean the incisions site with hydrogen peroxide daily.  Ambulate as tolerated, but no heavy lifting (>10lbs) or strenuous exercise. You may resume regular diet.   Call the office if you experience increasing pain, redness, swelling or drainage from incision sites/wounds, or temperature >101.4F.  Please see your PCP on Friday and get an INR checked. Once INR >2.5 you may stop taking the lovenox injections. Principal Discharge DX:	PVD (peripheral vascular disease)  Goal:	s/p right distal bypass revision, goal: incision healing, return to normal activity  Assessment and plan of treatment:	Follow up with Dr. Herrera next week for an US at the office. Call the office at  to schedule your appointment. You may shower; soap and water over incision site. Do not scrub. Pat dry when done.  Clean the incisions site with hydrogen peroxide daily.  Ambulate as tolerated, but no heavy lifting (>10lbs) or strenuous exercise. You may resume regular diet.   Call the office if you experience increasing pain, redness, swelling or drainage from incision sites/wounds, or temperature >101.4F.  Please see your PCP on Friday and get an INR checked.  Once INR >2.5 you may stop taking the lovenox injections.

## 2018-08-02 PROBLEM — A69.20 LYME DISEASE, UNSPECIFIED: Chronic | Status: ACTIVE | Noted: 2018-07-27

## 2018-08-09 DIAGNOSIS — Z79.82 LONG TERM (CURRENT) USE OF ASPIRIN: ICD-10-CM

## 2018-08-09 DIAGNOSIS — Z79.01 LONG TERM (CURRENT) USE OF ANTICOAGULANTS: ICD-10-CM

## 2018-08-09 DIAGNOSIS — T85.858A STENOSIS DUE TO OTHER INTERNAL PROSTHETIC DEVICES, IMPLANTS AND GRAFTS, INITIAL ENCOUNTER: ICD-10-CM

## 2018-08-09 DIAGNOSIS — T82.858A STENOSIS OF OTHER VASCULAR PROSTHETIC DEVICES, IMPLANTS AND GRAFTS, INITIAL ENCOUNTER: ICD-10-CM

## 2018-08-09 DIAGNOSIS — I70.391: ICD-10-CM
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
## 2018-08-09 DIAGNOSIS — I25.2 OLD MYOCARDIAL INFARCTION: ICD-10-CM

## 2018-08-09 DIAGNOSIS — Z87.891 PERSONAL HISTORY OF NICOTINE DEPENDENCE: ICD-10-CM
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
## 2018-08-10 ENCOUNTER — APPOINTMENT (OUTPATIENT)
Dept: VASCULAR SURGERY | Facility: CLINIC | Age: 63
End: 2018-08-10
Payer: COMMERCIAL

## 2018-08-10 VITALS — DIASTOLIC BLOOD PRESSURE: 83 MMHG | HEART RATE: 71 BPM | SYSTOLIC BLOOD PRESSURE: 113 MMHG | OXYGEN SATURATION: 97 %

## 2018-08-10 PROCEDURE — 93926 LOWER EXTREMITY STUDY: CPT | Mod: 79

## 2018-08-10 PROCEDURE — 99024 POSTOP FOLLOW-UP VISIT: CPT

## 2018-08-16 ENCOUNTER — APPOINTMENT (OUTPATIENT)
Dept: VASCULAR SURGERY | Facility: CLINIC | Age: 63
End: 2018-08-16
Payer: COMMERCIAL

## 2018-08-16 PROCEDURE — 99024 POSTOP FOLLOW-UP VISIT: CPT

## 2019-02-13 ENCOUNTER — APPOINTMENT (OUTPATIENT)
Dept: VASCULAR SURGERY | Facility: CLINIC | Age: 64
End: 2019-02-13
Payer: COMMERCIAL

## 2019-02-13 PROCEDURE — 93926 LOWER EXTREMITY STUDY: CPT

## 2019-02-13 PROCEDURE — 99214 OFFICE O/P EST MOD 30 MIN: CPT

## 2019-02-18 NOTE — HISTORY OF PRESENT ILLNESS
[FreeTextEntry1] : 62 y/o M s/p right leg bypass revision on 7/30/18 here for follow-up. He is doing well. Denies any leg pain or swelling. States he is very active, playing basketball and skiing. he does report that he recently bumped his right thigh on a table and developed a bruise, now healed, no ulcer. \par \par INR is well controlled. \par \par Patient is actually going skiing later today for the weekend with his grandchildren.

## 2019-02-18 NOTE — END OF VISIT
[FreeTextEntry3] : All medical record entries made by the Scribe were at my, Dr. Herrera's direction and personally dictated by me on 2/13/2019. I have reviewed the chart and agree that the record accurately reflects my personal performance of the history, physical exam, assessment and plan. I have also personally directed, reviewed, and agreed with the chart.

## 2019-02-18 NOTE — PHYSICAL EXAM
[Respiratory Effort] : normal respiratory effort [2+] : left 2+ [No Rash or Lesion] : No rash or lesion [Alert] : alert [Calm] : calm [JVD] : no jugular venous distention  [Ankle Swelling (On Exam)] : not present [Varicose Veins Of Lower Extremities] : not present [] : not present [de-identified] : Well appearing, NAD [de-identified] : NCAT [de-identified] : FROM

## 2019-02-18 NOTE — ADDENDUM
[FreeTextEntry1] : Documented by Juan Luis Uriostegui acting as a scribe for Dr. Seamus Herrera on 2/13/2019

## 2019-02-18 NOTE — CONSULT LETTER
[Dear  ___] : Dear  [unfilled], [Sincerely,] : Sincerely, [FreeTextEntry2] : Ross Hamm M.D.\par [address] [place] [address]\[place] [address] [FreeTextEntry1] : I saw Mr Mccall back in my office today.   As you may recall, most recently he is s/p revision of the right superficial femoral to peroneal bypass graft at a valve cusp site in July 2018.  Since then, he is doing well, denies any pain or swelling. He remains very active, playing basketball and skiing with his grandkids. He reports his INR has been well controlled. \par \par On exam he has palpable pedal pulses bilaterally, no swelling or skin wounds/ulcers. \par \par Ultrasound demonstrates a patent bypass graft with no evidence of the distal gaft stenosis that had been present at the vein valve site.     \par \par Overall, I am very pleased with his progress. I will see him again in 6 months.  \par \par My complete EMR office note is below for your records.   [FreeTextEntry3] : Seamus Herrera M.D. \par , Surgical Services [facility]\par , Department of Surgery [facility]\par Professor of Surgery, [employer] at [facility]

## 2019-02-18 NOTE — ASSESSMENT
[FreeTextEntry1] : 62 y/o M s/p right leg bypass revision on 7/30/18. Doing well. Very active. Asymptomatic. F/u here in 6 months.

## 2019-07-17 ENCOUNTER — APPOINTMENT (OUTPATIENT)
Dept: VASCULAR SURGERY | Facility: CLINIC | Age: 64
End: 2019-07-17
Payer: COMMERCIAL

## 2019-07-17 VITALS — DIASTOLIC BLOOD PRESSURE: 82 MMHG | HEART RATE: 66 BPM | SYSTOLIC BLOOD PRESSURE: 129 MMHG

## 2019-07-17 PROCEDURE — 99214 OFFICE O/P EST MOD 30 MIN: CPT

## 2019-07-17 PROCEDURE — 93926 LOWER EXTREMITY STUDY: CPT

## 2019-07-25 NOTE — CONSULT LETTER
[Dear  ___] : Dear  [unfilled], [FreeTextEntry2] : Ross Hamm M.D.\par [address] [place] [address]\[place] [address]  [FreeTextEntry1] : I saw Mr Shaggy Mccall again.  I follow him regularly for the right superficial femoral to peroneal artery bypass graft.  He is doing well and remains very active. He is on Coumadin for a hypercoagulabe condition.  \par \par On exam, he has palpable pedal pulses bilaterally. No edema. Skin is intact.  \par \par Arterial ultrasound shows the bypass graft.  The vein is dilated, not unexpected for a cephalic vein bypass.    \par \par I am very pleased with Mr Mccall's status.   I will continue to see him at 6 months intervals and keep you posted.    \par \par My complete EMR office note is below for your records. [FreeTextEntry3] : Sincerely, \par \par Seamus Herrera M.D. \par , Surgical Services [facility]\par , Department of Surgery [facility]\par Professor of Surgery, [employer] at [facility]

## 2019-07-25 NOTE — END OF VISIT
[FreeTextEntry3] : All medical record entries made by the Scribe were at my, Dr. Herrera's direction and personally dictated by me on 07/17/2019 I have reviewed the chart and agree that the record accurately reflects my personal performance of the history, physical exam, assessment and plan. I have also personally directed, reviewed, and agreed with the chart.\par

## 2019-07-25 NOTE — HISTORY OF PRESENT ILLNESS
[FreeTextEntry1] : 64 y/o M PAD w/ hx of R SFA to peroneal bypass graft s/p revision on 7/30/18 here for follow-up. He is doing well. He states that he is still staying active with walking and playing basketball. His last skiing trip was in April. Currently on anticoagulant (Coumadin); he states that the INR is good and well controlled, under 3.5. Denies any leg pain, rest pain, claudication, swelling or ulcerations. \par \par s/p right fem post tib bypass with arm vein in 1993.  Re-do in 2011.  \par \par He has plans to fly to [place] to visit his daughter in the near future and will be driving to [place] this weekend to visit his grandchildren.

## 2019-07-25 NOTE — PROCEDURE
[FreeTextEntry1] : RLE doppler completed today notes some thrombosis in the bypass on right calf. Normal surrounding velocities.

## 2019-07-25 NOTE — ASSESSMENT
[FreeTextEntry1] : 62 y/o M s/p R SFA to peroneal bypass revision on 7/30/18. Patient is doing well. Advised to remain active. US today shows small thrombus in calf bypass with normal velocities.  Given that patient is currently asymptomatic, no vascular intervention is required at this time. Will elect to have patient return here in 6 months to routinely monitored with repeat US. Patient is cleared to fly to [place] and should remain on Coumadin.

## 2019-07-25 NOTE — ADDENDUM
[FreeTextEntry1] : Documented by Norma Lizarraga acting as a scribe for Dr. Seamus Herrera on 07/17/2019\par

## 2019-07-25 NOTE — PHYSICAL EXAM
[Respiratory Effort] : normal respiratory effort [No Rash or Lesion] : No rash or lesion [Alert] : alert [Calm] : calm [2+] : left 2+ [Oriented to Place] : oriented to place [Oriented to Person] : oriented to person [Oriented to Time] : oriented to time [JVD] : no jugular venous distention  [Ankle Swelling (On Exam)] : not present [Varicose Veins Of Lower Extremities] : not present [] : not present [de-identified] : Well appearing, NAD [de-identified] : NCAT [de-identified] : FROM

## 2020-01-15 ENCOUNTER — APPOINTMENT (OUTPATIENT)
Dept: VASCULAR SURGERY | Facility: CLINIC | Age: 65
End: 2020-01-15
Payer: COMMERCIAL

## 2020-01-15 PROCEDURE — 93926 LOWER EXTREMITY STUDY: CPT

## 2020-01-15 PROCEDURE — 99214 OFFICE O/P EST MOD 30 MIN: CPT

## 2020-01-31 VITALS
HEIGHT: 73 IN | DIASTOLIC BLOOD PRESSURE: 76 MMHG | WEIGHT: 177.03 LBS | HEART RATE: 58 BPM | OXYGEN SATURATION: 97 % | RESPIRATION RATE: 16 BRPM | SYSTOLIC BLOOD PRESSURE: 127 MMHG | TEMPERATURE: 98 F

## 2020-01-31 NOTE — ASU PATIENT PROFILE, ADULT - NS PRO AD PATIENT TYPE
Health Care Proxy (HCP) Health Care Proxy (HCP)/roseanna cook wife [phone] roseanna cook wife [phone]/Health Care Proxy (HCP)/Do Not Resuscitate (DNR)

## 2020-01-31 NOTE — ASU PATIENT PROFILE, ADULT - PSH
History of femoral angiogram  LLE angiogram  Surgery, elective  Right Femoral-PT bypass with cephalic vein History of femoral angiogram  RLE angiogram  S/P CABG x 1    Surgery, elective  Right Femoral-PT bypass with cephalic vein History of femoral angiogram  RLE angiogram  Surgery, elective  Right Femoral-PT bypass with cephalic vein

## 2020-01-31 NOTE — ASU PATIENT PROFILE, ADULT - PMH
Lyme disease    PVD (peripheral vascular disease) DVT (deep venous thrombosis)  femoral artery LLE  Lyme disease    MI (myocardial infarction)  93  PVD (peripheral vascular disease)

## 2020-02-03 ENCOUNTER — OUTPATIENT (OUTPATIENT)
Dept: OUTPATIENT SERVICES | Facility: HOSPITAL | Age: 65
LOS: 1 days | Discharge: HOME | End: 2020-02-03
Payer: COMMERCIAL

## 2020-02-03 ENCOUNTER — APPOINTMENT (OUTPATIENT)
Dept: VASCULAR SURGERY | Facility: HOSPITAL | Age: 65
End: 2020-02-03

## 2020-02-03 VITALS
OXYGEN SATURATION: 97 % | HEART RATE: 65 BPM | RESPIRATION RATE: 20 BRPM | DIASTOLIC BLOOD PRESSURE: 70 MMHG | SYSTOLIC BLOOD PRESSURE: 116 MMHG

## 2020-02-03 DIAGNOSIS — Z86.79 PERSONAL HISTORY OF OTHER DISEASES OF THE CIRCULATORY SYSTEM: Chronic | ICD-10-CM

## 2020-02-03 DIAGNOSIS — Z95.1 PRESENCE OF AORTOCORONARY BYPASS GRAFT: Chronic | ICD-10-CM

## 2020-02-03 DIAGNOSIS — I82.409 ACUTE EMBOLISM AND THROMBOSIS OF UNSPECIFIED DEEP VEINS OF UNSPECIFIED LOWER EXTREMITY: ICD-10-CM

## 2020-02-03 DIAGNOSIS — Z41.9 ENCOUNTER FOR PROCEDURE FOR PURPOSES OTHER THAN REMEDYING HEALTH STATE, UNSPECIFIED: Chronic | ICD-10-CM

## 2020-02-03 LAB
APTT BLD: 39.2 SEC — HIGH (ref 27.5–36.3)
INR BLD: 1.1 — SIGNIFICANT CHANGE UP (ref 0.88–1.16)
PROTHROM AB SERPL-ACNC: 12.6 SEC — SIGNIFICANT CHANGE UP (ref 10–12.9)

## 2020-02-03 PROCEDURE — 75710 ARTERY X-RAYS ARM/LEG: CPT | Mod: 26,GC,59

## 2020-02-03 PROCEDURE — 36247 INS CATH ABD/L-EXT ART 3RD: CPT | Mod: GC,59

## 2020-02-03 PROCEDURE — 36415 COLL VENOUS BLD VENIPUNCTURE: CPT

## 2020-02-03 PROCEDURE — 36245 INS CATH ABD/L-EXT ART 1ST: CPT | Mod: GC,59

## 2020-02-03 PROCEDURE — 37224: CPT | Mod: 82

## 2020-02-03 PROCEDURE — C1769: CPT

## 2020-02-03 PROCEDURE — 36246 INS CATH ABD/L-EXT ART 2ND: CPT | Mod: GC,59

## 2020-02-03 PROCEDURE — 99204 OFFICE O/P NEW MOD 45 MIN: CPT

## 2020-02-03 PROCEDURE — C1760: CPT

## 2020-02-03 PROCEDURE — 85730 THROMBOPLASTIN TIME PARTIAL: CPT

## 2020-02-03 PROCEDURE — 37228: CPT | Mod: RT

## 2020-02-03 PROCEDURE — 37224: CPT | Mod: GC

## 2020-02-03 PROCEDURE — 37224: CPT | Mod: RT

## 2020-02-03 PROCEDURE — 76000 FLUOROSCOPY <1 HR PHYS/QHP: CPT

## 2020-02-03 PROCEDURE — 37228: CPT | Mod: GC,RT

## 2020-02-03 PROCEDURE — C1889: CPT

## 2020-02-03 PROCEDURE — 85610 PROTHROMBIN TIME: CPT

## 2020-02-03 PROCEDURE — 37228: CPT | Mod: 82,RT

## 2020-02-03 PROCEDURE — C1725: CPT

## 2020-02-03 PROCEDURE — C1887: CPT

## 2020-02-03 PROCEDURE — C1894: CPT

## 2020-02-03 RX ORDER — ENOXAPARIN SODIUM 100 MG/ML
80 INJECTION SUBCUTANEOUS ONCE
Refills: 0 | Status: COMPLETED | OUTPATIENT
Start: 2020-02-03 | End: 2020-02-03

## 2020-02-03 RX ORDER — SODIUM CHLORIDE 9 MG/ML
1000 INJECTION INTRAMUSCULAR; INTRAVENOUS; SUBCUTANEOUS
Refills: 0 | Status: DISCONTINUED | OUTPATIENT
Start: 2020-02-03 | End: 2020-02-03

## 2020-02-03 RX ORDER — ACETAMINOPHEN 500 MG
650 TABLET ORAL ONCE
Refills: 0 | Status: DISCONTINUED | OUTPATIENT
Start: 2020-02-03 | End: 2020-02-03

## 2020-02-03 RX ORDER — WARFARIN SODIUM 2.5 MG/1
7.5 TABLET ORAL ONCE
Refills: 0 | Status: DISCONTINUED | OUTPATIENT
Start: 2020-02-03 | End: 2020-02-03

## 2020-02-03 RX ORDER — ASPIRIN/CALCIUM CARB/MAGNESIUM 324 MG
81 TABLET ORAL ONCE
Refills: 0 | Status: COMPLETED | OUTPATIENT
Start: 2020-02-03 | End: 2020-02-03

## 2020-02-03 RX ADMIN — Medication 81 MILLIGRAM(S): at 17:21

## 2020-02-03 RX ADMIN — ENOXAPARIN SODIUM 80 MILLIGRAM(S): 100 INJECTION SUBCUTANEOUS at 16:22

## 2020-02-03 NOTE — BRIEF OPERATIVE NOTE - NSICDXBRIEFPROCEDURE_GEN_ALL_CORE_FT
PROCEDURES:  Angiogram, lower extremity, left 03-Feb-2020 11:01:18 with angioplasty of previous bypass Khris Bowman

## 2020-02-03 NOTE — BRIEF OPERATIVE NOTE - OPERATION/FINDINGS
Angiogram shows patent infrarenal aorta and bilateral iliac arteries. Patent R CFA, profunda, and proximal SFA. SFA-peroneal bypass with multiple focal stenoses including high-grade stenosis at distal anastomosis. Distal stenosis treated with 6x20 mm and 8x40 mm [place] balloon angioplasty. Mid and proximal stenoses treated with 8x40 mm balloon angioplasty. Completion angiogram shows resolution of stenoses and improved flow to an incomplete pedal arch via peroneal collaterals. L CFA access site successfully closed with Proglide closure device x1.

## 2020-02-03 NOTE — CONSULT NOTE ADULT - SUBJECTIVE AND OBJECTIVE BOX
HPI:      PAST MEDICAL & SURGICAL HISTORY:  DVT (deep venous thrombosis): femoral artery LLE  MI (myocardial infarction): 93  Lyme disease  PVD (peripheral vascular disease)  History of femoral angiogram: RLE angiogram  Surgery, elective: Right Femoral-PT bypass with cephalic vein       Review of Systems:  · General	negative  · Skin/Breast	negative  · Ophthalmologic	negative  · ENMT	negative  · Respiratory and Thorax	negative  · Cardiovascular	negative  · Gastrointestinal	negative  · Genitourinary	negative  · Musculoskeletal Comments	negative  · Neurological	negative      MEDICATIONS  (STANDING):  aspirin enteric coated 81 milliGRAM(s) Oral once  enoxaparin Injectable 80 milliGRAM(s) SubCutaneous once  sodium chloride 0.9%. 1000 milliLiter(s) (80 mL/Hr) IV Continuous <Continuous>    MEDICATIONS  (PRN):  acetaminophen   Tablet .. 650 milliGRAM(s) Oral once PRN Mild Pain (1 - 3), Moderate Pain (4 - 6)      Allergies    No Known Allergies    Intolerances    Dilaudid (Headache; Nausea)      SOCIAL HISTORY:    FAMILY HISTORY:      Vital Signs Last 24 Hrs  T(C): 36.4 (03 Feb 2020 10:52), Max: 36.4 (03 Feb 2020 10:52)  T(F): 97.6 (03 Feb 2020 10:52), Max: 97.6 (03 Feb 2020 10:52)  HR: 58 (03 Feb 2020 12:47) (58 - 70)  BP: 116/65 (03 Feb 2020 12:47) (103/62 - 135/74)  BP(mean): 86 (03 Feb 2020 11:47) (83 - 97)  RR: 16 (03 Feb 2020 12:47) (12 - 16)  SpO2: 97% (03 Feb 2020 12:47) (96% - 98%)     Physical Exam:  · Constitutional	detailed exam  · Constitutional Details	well-developed; well-groomed  · Eyes	EOMI; PERRL; no drainage or redness  · ENMT Comments	dry mucous membranes  · Respiratory	detailed exam  · Respiratory Comments	normal breath sounds at the lung bases bilaterally  · Cardiovascular	Regular rate & rhythm, normal S1, S2; no murmurs, gallops or rubs; no S3, S4  · Abd-Soft non tender  ·Ext-no edema, clubbing or cyanosis      LABS:          PT/INR - ( 03 Feb 2020 08:38 )   PT: 12.6 sec;   INR: 1.10          PTT - ( 03 Feb 2020 08:38 )  PTT:39.2 sec      RADIOLOGY & ADDITIONAL STUDIES:

## 2020-02-03 NOTE — CONSULT NOTE ADULT - ASSESSMENT
called by Dr. Herrera to see this pt with venous and arterial thrombosis...Pt might have had a hypercoagulable w/u but results are not available...Pt has been on Coumadin , with INR adjustments by PCP...    Most recently pt's  grandson had a stroke followed by bleed and pt is now very interested in pursuing a hypercoagulable w/u...

## 2020-02-03 NOTE — PACU DISCHARGE NOTE - COMMENTS
Vital signs stable. Surgical site soft, clean dry and intact.   Palpable pulse left foot. Verbalized adequate pain management. Educated on medications, safety and follow up. Pt and spouse verbalized understanding. dIischarged home stable. Pt left unit accompanied by spouse. and attendant

## 2020-02-04 PROBLEM — I21.9 ACUTE MYOCARDIAL INFARCTION, UNSPECIFIED: Chronic | Status: ACTIVE | Noted: 2020-01-31

## 2020-02-04 PROBLEM — I82.409 ACUTE EMBOLISM AND THROMBOSIS OF UNSPECIFIED DEEP VEINS OF UNSPECIFIED LOWER EXTREMITY: Chronic | Status: ACTIVE | Noted: 2020-01-31

## 2020-02-05 NOTE — PROCEDURE
[FreeTextEntry1] : RLE arterial duplex completed today notes significant decrease in velocities since previous study. Some chronic thrombus formation noted in the proximal SFA to peroneal graft ~50%, and increase in velocities at the distal anastomosis site >75%

## 2020-02-05 NOTE — ASSESSMENT
[Arterial/Venous Disease] : arterial/venous disease [FreeTextEntry1] : 65 y/o M w/ PAD and R SFA to peroneal bypass s/p revision on 7/30/18. Patient is doing well. The RLE arterial duplex today shows significant changes in velocities compared to previous study in 7/17/2019 with non occlusive chronic thrombus formation in the graft proximally, and high grade stenosis at the distal anastomotic site 2/2 flow restrictive lesion.  Given the finding, there is likely scarring. I recommended the patient to undergo an angiogram to further investigate the areas of concern, possible angioplasty/stenting needed. The surgical approach, risks, benefits, and alternatives to the proposed procedure were discussed with the patient and all questions were answered to their satisfaction. Pre and Post-operative instructions were provided to the patient. Patient understands and agrees to undergo the proposed procedure. Will schedule for this on 2/3/2020.

## 2020-02-05 NOTE — HISTORY OF PRESENT ILLNESS
[FreeTextEntry1] : 63 y/o M PAD s/p right fem post tib bypass with arm vein in 1993, redone in 2011, now s/p R SFA to peroneal bypass graft s/p revision on 7/30/18 here for follow-up. He reports here feeling well. He has no complaints today. He continues to stay active by playing basketball. Currently on Coumadin. He states that the INR is at 3.6, checked 2 weeks ago. Denies any leg pain, rest pain, claudication, swelling or ulcerations. \par \par He states that he will be going [facility] for a week to ski, and then will  be traveling to [facility] for another week.

## 2020-02-05 NOTE — END OF VISIT
[FreeTextEntry3] : All medical record entries made by the Scribe were at my, Dr. Herrera's direction and personally dictated by me on 01/15/2020 I have reviewed the chart and agree that the record accurately reflects my personal performance of the history, physical exam, assessment and plan. I have also personally directed, reviewed, and agreed with the chart.\par

## 2020-02-05 NOTE — ADDENDUM
[FreeTextEntry1] : Documented by Norma Lizarraga acting as a scribe for Dr. Seamus Herrera on 01/15/2020\par

## 2020-02-05 NOTE — PHYSICAL EXAM
[Respiratory Effort] : normal respiratory effort [No Rash or Lesion] : No rash or lesion [Alert] : alert [Oriented to Person] : oriented to person [Oriented to Place] : oriented to place [Oriented to Time] : oriented to time [Calm] : calm [JVD] : no jugular venous distention  [Ankle Swelling (On Exam)] : not present [Varicose Veins Of Lower Extremities] : not present [de-identified] : Well appearing, NAD [] : not present [de-identified] : FROM [de-identified] : [facility]

## 2020-02-06 LAB
DNA PLOIDY SPEC FC-IMP: SIGNIFICANT CHANGE UP
MTHFR GENE INTERPRETATION: SIGNIFICANT CHANGE UP
PTR INTERPRETATION: SIGNIFICANT CHANGE UP

## 2020-02-12 ENCOUNTER — APPOINTMENT (OUTPATIENT)
Dept: VASCULAR SURGERY | Facility: CLINIC | Age: 65
End: 2020-02-12
Payer: COMMERCIAL

## 2020-02-12 ENCOUNTER — LABORATORY RESULT (OUTPATIENT)
Age: 65
End: 2020-02-12

## 2020-02-12 ENCOUNTER — APPOINTMENT (OUTPATIENT)
Dept: HEMATOLOGY ONCOLOGY | Facility: CLINIC | Age: 65
End: 2020-02-12
Payer: COMMERCIAL

## 2020-02-12 VITALS
HEIGHT: 73 IN | SYSTOLIC BLOOD PRESSURE: 133 MMHG | OXYGEN SATURATION: 95 % | WEIGHT: 176 LBS | DIASTOLIC BLOOD PRESSURE: 78 MMHG | TEMPERATURE: 98.1 F | HEART RATE: 72 BPM | BODY MASS INDEX: 23.33 KG/M2

## 2020-02-12 DIAGNOSIS — I73.9 PERIPHERAL VASCULAR DISEASE, UNSPECIFIED: ICD-10-CM

## 2020-02-12 PROCEDURE — 36415 COLL VENOUS BLD VENIPUNCTURE: CPT

## 2020-02-12 PROCEDURE — 99214 OFFICE O/P EST MOD 30 MIN: CPT

## 2020-02-12 PROCEDURE — 93926 LOWER EXTREMITY STUDY: CPT

## 2020-02-12 PROCEDURE — 99214 OFFICE O/P EST MOD 30 MIN: CPT | Mod: 25

## 2020-02-12 RX ORDER — ENOXAPARIN SODIUM 150 MG/ML
120 INJECTION SUBCUTANEOUS
Qty: 10 | Refills: 0 | Status: COMPLETED | COMMUNITY
Start: 2018-07-18 | End: 2020-02-12

## 2020-02-16 LAB
APTT 2H P 1:4 NP PPP: 36.4 SEC
APTT 2H P INC PPP: 36.7 SEC
APTT IMM NP/PRE NP PPP: 35.9 SEC
APTT INV RATIO PPP: 51.5 SEC
AT III PPP CHRO-ACNC: 128 %
B2 GLYCOPROT1 IGA SERPL IA-ACNC: <5 SAU
B2 GLYCOPROT1 IGG SER-ACNC: <5 SGU
B2 GLYCOPROT1 IGM SER-ACNC: <5 SMU
CARDIOLIPIN AB SER IA-ACNC: NEGATIVE
CONFIRM: 51.7 SEC
DRVVT IMM 1:2 NP PPP: ABNORMAL
DRVVT SCREEN TO CONFIRM RATIO: 1.23 RATIO
FACT VIII ACT/NOR PPP: 100 %
HCYS SERPL-MCNC: 11.2 UMOL/L
NPP NORMAL POOLED PLASMA: 31.9 SECS
PROT C PPP CHRO-ACNC: 55 %
PROT S AG ACT/NOR PPP IA: 22 %
SCREEN DRVVT: 70.1 SEC
SILICA CLOTTING TIME INTERPRETATION: NORMAL
SILICA CLOTTING TIME S/C: 0.94 RATIO

## 2020-02-16 NOTE — ASSESSMENT
[FreeTextEntry1] : I completed the hypercoagulable work-up for patient and found him to have low level of Lupus anticoagulants, which can explain his hypercoagulability.\par \par This of course is an acquired condition, and not likely to explain his granddaughter's condition.\par \par Will discuss with pt.

## 2020-02-16 NOTE — CONSULT LETTER
[Consult Letter:] : I had the pleasure of evaluating your patient, [unfilled]. [Dear  ___] : Dear  [unfilled], [Please see my note below.] : Please see my note below. [Consult Closing:] : Thank you very much for allowing me to participate in the care of this patient.  If you have any questions, please do not hesitate to contact me. [Sincerely,] : Sincerely, [FreeTextEntry3] : Marisol Soria MD\par

## 2020-02-16 NOTE — HISTORY OF PRESENT ILLNESS
[de-identified] : 64 years old male with recurrent femoral bypass stenosis.  Most recently patient's granddaughter had an intracerebral clot followed by hemorrhage and therefore patient wanted a hypercoagulable work-up.\par \par Partial hypercoagulable work-up was done when patient was in the hospital... He was found to have MTHFR gene mutation homozygous... Patient was given a copy of his results, and the results were discussed with him extensively.

## 2020-02-17 NOTE — ADDENDUM
[FreeTextEntry1] : Documented by Norma Lizarraga acting as a scribe for Dr. Seamus Herrera on 02/12/2020\par

## 2020-02-17 NOTE — ASSESSMENT
[Arterial/Venous Disease] : arterial/venous disease [FreeTextEntry1] : 63 y/o M w/ PAD w/ hx of R SFA to peroneal bypass graft s/p revision on 7/30/18, and recently found to have recurrent restenosis of graft, now s/p angiogram with angioplasty of R tibial artery graft, femoral artery graft, R ARNOL, R EIA, and R CFA 02/03/2020. Patient overall is doing well and on exam has palpable pedal pulses. Groin intact with no hematoma or ecchymosis. RLE arterial duplex today shows patent fem-peroneal bypass s/p plasty and one area with mild thrombus formation. He is advised to continue staying active and continue Coumadin as prescribed with Lovenox bridge until INR goal reached. He is to follow up here in 3 months.

## 2020-02-17 NOTE — END OF VISIT
[FreeTextEntry3] : All medical record entries made by the Scribe were at my, Dr. Herrera's direction and personally dictated by me on 02/12/2020 I have reviewed the chart and agree that the record accurately reflects my personal performance of the history, physical exam, assessment and plan. I have also personally directed, reviewed, and agreed with the chart.\par

## 2020-02-17 NOTE — HISTORY OF PRESENT ILLNESS
[FreeTextEntry1] : 65 y/o M w/ PAD s/p right femPT bypass with arm vein in 1993, redone in 2011, s/p R SFA to peroneal bypass graft s/p revision on 7/30/18, and recently found to have recurrent restenosis of graft, now s/p angiogram with angioplasty of R tibial artery graft, femoral artery graft, R ARNOL, R EIA, and R CFA 02/03/2020, presents here for a postop visit. He has no complaints today. He continues to stay active and went hiking for 5 miles last Wednesday and Saturday without any issues. He does state that on Sunday while wood shopping, he states that his legs felt slightly numb but resolved right after. He is otherwise doing well. Currently on Coumadin and using Lovenox for bridge; INR is at 2.2 when he checked it 2 days ago on Monday (INR goal 2.5-3.5). Denies any leg pain, rest pain, claudication, swelling or ulcerations. \par \par Pt also has a follow-up appt with Dr Soria for hematological evaluation. Blood work was sent prior to discharge on day of surgery. \par \par Patient will be traveling over the country over the next few weekends.

## 2020-02-17 NOTE — PHYSICAL EXAM
[Respiratory Effort] : normal respiratory effort [No Rash or Lesion] : No rash or lesion [Alert] : alert [Oriented to Person] : oriented to person [Oriented to Place] : oriented to place [Oriented to Time] : oriented to time [Calm] : calm [Normal Breath Sounds] : Normal breath sounds [Normal Heart Sounds] : normal heart sounds [Normal Rate and Rhythm] : normal rate and rhythm [2+] : right 2+ [JVD] : no jugular venous distention  [Ankle Swelling (On Exam)] : not present [Varicose Veins Of Lower Extremities] : not present [] : not present [Abdomen Tenderness] : ~T ~M No abdominal tenderness [de-identified] : Well appearing, NAD [de-identified] : [facility] [FreeTextEntry1] : Groin intact, no hematoma, no ecchymosis.  [de-identified] : FROM

## 2020-02-17 NOTE — PROCEDURE
[FreeTextEntry1] : RLE arterial duplex completed today shows patent fem-peroneal bypass with no restrictive lesions. Dilation with thrombus formation at the knee level of the bypass graft with increased velocities, >50% stenosis. Distal anastomosis stenosis which was critical has responded to angioplasty quite well.   patent s/p plasty.

## 2020-05-21 ENCOUNTER — APPOINTMENT (OUTPATIENT)
Dept: HEMATOLOGY ONCOLOGY | Facility: CLINIC | Age: 65
End: 2020-05-21
Payer: COMMERCIAL

## 2020-05-21 DIAGNOSIS — E72.12 METHYLENETETRAHYDROFOLATE REDUCTASE DEFICIENCY: ICD-10-CM

## 2020-05-21 PROCEDURE — 99214 OFFICE O/P EST MOD 30 MIN: CPT

## 2020-05-21 NOTE — HISTORY OF PRESENT ILLNESS
[Home] : at home, [unfilled] , at the time of the visit. [Medical Office: ([facility])___] : at the medical office located in  [Verbal consent obtained from patient] : the patient, [unfilled] [Spouse] : spouse [de-identified] : 64 years old male with recurrent femoral bypass stenosis.  Most recently patient's granddaughter had an intracerebral clot followed by hemorrhage and therefore patient wanted a hypercoagulable work-up.\par \par Partial hypercoagulable work-up was done when patient was in the hospital... He was found to have MTHFR gene mutation homozygous... Patient was given a copy of his results, and the results were discussed with him extensively.

## 2020-05-21 NOTE — CONSULT LETTER
[Dear  ___] : Dear  [unfilled], [Consult Letter:] : I had the pleasure of evaluating your patient, [unfilled]. [Please see my note below.] : Please see my note below. [Consult Closing:] : Thank you very much for allowing me to participate in the care of this patient.  If you have any questions, please do not hesitate to contact me. [Sincerely,] : Sincerely, [FreeTextEntry3] : Marisol Soria MD\par

## 2020-05-21 NOTE — ASSESSMENT
[FreeTextEntry1] : I completed the hypercoagulable work-up for patient and found him to have low level of Lupus anticoagulants, which can explain his hypercoagulability.  This was discussed in detail with patient and his wife therapy and he will remain on lifelong anticoagulation with Coumadin\par \par I also discussed with patient the homozygous MTHFR gene mutation, which is a minimal risk factor for for venous thrombosis.\par \par

## 2020-06-24 ENCOUNTER — APPOINTMENT (OUTPATIENT)
Dept: VASCULAR SURGERY | Facility: CLINIC | Age: 65
End: 2020-06-24
Payer: COMMERCIAL

## 2020-06-24 PROCEDURE — 93926 LOWER EXTREMITY STUDY: CPT

## 2020-06-24 PROCEDURE — 99214 OFFICE O/P EST MOD 30 MIN: CPT

## 2020-07-07 NOTE — HISTORY OF PRESENT ILLNESS
[FreeTextEntry1] : 65 y/o M w/ PAD s/p right femPT bypass with arm vein in 1993, redone in 2011, s/p R SFA to peroneal bypass graft s/p revision on 7/30/18, and recently found to have recurrent restenosis of graft, now s/p angiogram with angioplasty of R tibial artery graft, femoral artery graft, R ARNOL, R EIA, and R CFA 02/03/2020, presents here for a follow-up visit. Also, he has MTHFR gene mutation homozygous for which he is followed by Dr Soria and continues oral anticoagulation. He has no complaints today. He continues to stay active, hiking, walking and working in the yard without any issues. He is on Coumadin and reports INR levels have been in range (INR goal 2.5-3.5). Denies any leg pain, rest pain, claudication, swelling or ulcerations. \par \par He will be moving to [place] in the next couple of weeks but will continue to come for follow-up visits.

## 2020-07-07 NOTE — PROCEDURE
[FreeTextEntry1] : RLE arterial duplex completed today shows patent fem-peroneal bypass. Small aneurysmal dilation with non occlusive thrombus at the knee level.

## 2020-07-07 NOTE — ASSESSMENT
[FreeTextEntry1] : 63 y/o M w/ PAD w/ hx of R SFA to peroneal bypass graft s/p revision on 7/30/18, and recently found to have recurrent restenosis of graft and +MTHFR gene mutation homozygous, now s/p angiogram with angioplasty of R tibial artery graft, femoral artery graft, R ANROL, R EIA, and R CFA 02/03/2020. On exam has palpable pedal pulses, no edema and skin intact. RLE arterial duplex today shows patent fem-peroneal bypass is patent with a small area of non occlusive thrombus formation; no significant change from previous. He is advised to continue staying active and continue Coumadin as prescribed. He will f/u in 6 months.

## 2020-07-07 NOTE — PHYSICAL EXAM
[Normal Rate and Rhythm] : normal rate and rhythm [2+] : left 2+ [No Rash or Lesion] : No rash or lesion [Oriented to Place] : oriented to place [Alert] : alert [Oriented to Person] : oriented to person [Oriented to Time] : oriented to time [Calm] : calm [JVD] : no jugular venous distention  [Carotid Bruits] : no carotid bruits [Normal Breath Sounds] : Normal breath sounds [Ankle Swelling (On Exam)] : not present [Varicose Veins Of Lower Extremities] : not present [de-identified] : Well appearing, friendly, talkative [Abdomen Tenderness] : ~T ~M No abdominal tenderness [] : not present [de-identified] : Clear [de-identified] : Clear [FreeTextEntry1] : Palpable pulsation over graft, feet warm to touch, skin intact, normal capillary refill [de-identified] : FROM

## 2020-09-29 NOTE — PATIENT PROFILE ADULT. - NSTOBACCONEVERSMOKERY/N_GEN_A
Alert and oriented to person, place, time/situation. normal mood and affect. no apparent risk to self or others. No Yes

## 2020-11-16 ENCOUNTER — TRANSCRIPTION ENCOUNTER (OUTPATIENT)
Age: 65
End: 2020-11-16

## 2020-11-17 ENCOUNTER — TRANSCRIPTION ENCOUNTER (OUTPATIENT)
Age: 65
End: 2020-11-17

## 2020-11-17 ENCOUNTER — INPATIENT (INPATIENT)
Facility: HOSPITAL | Age: 65
LOS: 5 days | Discharge: HOME | DRG: 271 | End: 2020-11-23
Attending: SURGERY | Admitting: SURGERY
Payer: COMMERCIAL

## 2020-11-17 ENCOUNTER — APPOINTMENT (OUTPATIENT)
Dept: VASCULAR SURGERY | Facility: CLINIC | Age: 65
End: 2020-11-17
Payer: COMMERCIAL

## 2020-11-17 VITALS
RESPIRATION RATE: 18 BRPM | OXYGEN SATURATION: 96 % | DIASTOLIC BLOOD PRESSURE: 98 MMHG | HEART RATE: 88 BPM | WEIGHT: 177.91 LBS | HEIGHT: 73 IN | SYSTOLIC BLOOD PRESSURE: 184 MMHG | TEMPERATURE: 98 F

## 2020-11-17 DIAGNOSIS — D62 ACUTE POSTHEMORRHAGIC ANEMIA: ICD-10-CM

## 2020-11-17 DIAGNOSIS — Z86.718 PERSONAL HISTORY OF OTHER VENOUS THROMBOSIS AND EMBOLISM: ICD-10-CM

## 2020-11-17 DIAGNOSIS — Z79.01 LONG TERM (CURRENT) USE OF ANTICOAGULANTS: ICD-10-CM

## 2020-11-17 DIAGNOSIS — I70.421: ICD-10-CM

## 2020-11-17 DIAGNOSIS — Z88.5 ALLERGY STATUS TO NARCOTIC AGENT: ICD-10-CM

## 2020-11-17 DIAGNOSIS — E72.12 METHYLENETETRAHYDROFOLATE REDUCTASE DEFICIENCY: ICD-10-CM

## 2020-11-17 DIAGNOSIS — Z87.891 PERSONAL HISTORY OF NICOTINE DEPENDENCE: ICD-10-CM

## 2020-11-17 DIAGNOSIS — T82.868A THROMBOSIS DUE TO VASCULAR PROSTHETIC DEVICES, IMPLANTS AND GRAFTS, INITIAL ENCOUNTER: ICD-10-CM

## 2020-11-17 DIAGNOSIS — I25.10 ATHEROSCLEROTIC HEART DISEASE OF NATIVE CORONARY ARTERY WITHOUT ANGINA PECTORIS: ICD-10-CM

## 2020-11-17 DIAGNOSIS — D68.62 LUPUS ANTICOAGULANT SYNDROME: ICD-10-CM

## 2020-11-17 DIAGNOSIS — Z79.82 LONG TERM (CURRENT) USE OF ASPIRIN: ICD-10-CM

## 2020-11-17 DIAGNOSIS — T82.858A STENOSIS OF OTHER VASCULAR PROSTHETIC DEVICES, IMPLANTS AND GRAFTS, INITIAL ENCOUNTER: ICD-10-CM
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
## 2020-11-17 DIAGNOSIS — Z86.19 PERSONAL HISTORY OF OTHER INFECTIOUS AND PARASITIC DISEASES: ICD-10-CM
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
## 2020-11-17 DIAGNOSIS — Z86.79 PERSONAL HISTORY OF OTHER DISEASES OF THE CIRCULATORY SYSTEM: Chronic | ICD-10-CM

## 2020-11-17 DIAGNOSIS — I74.3 EMBOLISM AND THROMBOSIS OF ARTERIES OF THE LOWER EXTREMITIES: ICD-10-CM
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
## 2020-11-17 DIAGNOSIS — I25.2 OLD MYOCARDIAL INFARCTION: ICD-10-CM

## 2020-11-17 DIAGNOSIS — L76.32 POSTPROCEDURAL HEMATOMA OF SKIN AND SUBCUTANEOUS TISSUE FOLLOWING OTHER PROCEDURE: ICD-10-CM

## 2020-11-17 DIAGNOSIS — Z41.9 ENCOUNTER FOR PROCEDURE FOR PURPOSES OTHER THAN REMEDYING HEALTH STATE, UNSPECIFIED: Chronic | ICD-10-CM

## 2020-11-17 LAB
ANION GAP SERPL CALC-SCNC: 11 MMOL/L — SIGNIFICANT CHANGE UP (ref 5–17)
ANION GAP SERPL CALC-SCNC: 9 MMOL/L — SIGNIFICANT CHANGE UP (ref 5–17)
APTT BLD: 184.4 SEC — CRITICAL HIGH (ref 27.5–35.5)
APTT BLD: 57.1 SEC — HIGH (ref 27.5–35.5)
BASOPHILS # BLD AUTO: 0.06 K/UL — SIGNIFICANT CHANGE UP (ref 0–0.2)
BASOPHILS NFR BLD AUTO: 0.4 % — SIGNIFICANT CHANGE UP (ref 0–2)
BLD GP AB SCN SERPL QL: NEGATIVE — SIGNIFICANT CHANGE UP
BUN SERPL-MCNC: 10 MG/DL — SIGNIFICANT CHANGE UP (ref 7–23)
BUN SERPL-MCNC: 12 MG/DL — SIGNIFICANT CHANGE UP (ref 7–23)
CALCIUM SERPL-MCNC: 9 MG/DL — SIGNIFICANT CHANGE UP (ref 8.4–10.5)
CALCIUM SERPL-MCNC: 9.9 MG/DL — SIGNIFICANT CHANGE UP (ref 8.4–10.5)
CHLORIDE SERPL-SCNC: 100 MMOL/L — SIGNIFICANT CHANGE UP (ref 96–108)
CHLORIDE SERPL-SCNC: 106 MMOL/L — SIGNIFICANT CHANGE UP (ref 96–108)
CO2 SERPL-SCNC: 24 MMOL/L — SIGNIFICANT CHANGE UP (ref 22–31)
CO2 SERPL-SCNC: 27 MMOL/L — SIGNIFICANT CHANGE UP (ref 22–31)
CREAT SERPL-MCNC: 0.72 MG/DL — SIGNIFICANT CHANGE UP (ref 0.5–1.3)
CREAT SERPL-MCNC: 0.84 MG/DL — SIGNIFICANT CHANGE UP (ref 0.5–1.3)
EOSINOPHIL # BLD AUTO: 0.09 K/UL — SIGNIFICANT CHANGE UP (ref 0–0.5)
EOSINOPHIL NFR BLD AUTO: 0.6 % — SIGNIFICANT CHANGE UP (ref 0–6)
FIBRINOGEN PPP-MCNC: 352 MG/DL — SIGNIFICANT CHANGE UP (ref 258–438)
FIBRINOGEN PPP-MCNC: 368 MG/DL — SIGNIFICANT CHANGE UP (ref 258–438)
GLUCOSE BLDC GLUCOMTR-MCNC: 131 MG/DL — HIGH (ref 70–99)
GLUCOSE BLDC GLUCOMTR-MCNC: 93 MG/DL — SIGNIFICANT CHANGE UP (ref 70–99)
GLUCOSE SERPL-MCNC: 92 MG/DL — SIGNIFICANT CHANGE UP (ref 70–99)
GLUCOSE SERPL-MCNC: 98 MG/DL — SIGNIFICANT CHANGE UP (ref 70–99)
HCT VFR BLD CALC: 40.8 % — SIGNIFICANT CHANGE UP (ref 39–50)
HCT VFR BLD CALC: 48.6 % — SIGNIFICANT CHANGE UP (ref 39–50)
HGB BLD-MCNC: 13.5 G/DL — SIGNIFICANT CHANGE UP (ref 13–17)
HGB BLD-MCNC: 15.8 G/DL — SIGNIFICANT CHANGE UP (ref 13–17)
IMM GRANULOCYTES NFR BLD AUTO: 0.3 % — SIGNIFICANT CHANGE UP (ref 0–1.5)
INR BLD: 2.65 — HIGH (ref 0.88–1.16)
INR BLD: 3.19 — HIGH (ref 0.88–1.16)
LYMPHOCYTES # BLD AUTO: 16.8 % — SIGNIFICANT CHANGE UP (ref 13–44)
LYMPHOCYTES # BLD AUTO: 2.44 K/UL — SIGNIFICANT CHANGE UP (ref 1–3.3)
MAGNESIUM SERPL-MCNC: 1.8 MG/DL — SIGNIFICANT CHANGE UP (ref 1.6–2.6)
MCHC RBC-ENTMCNC: 30 PG — SIGNIFICANT CHANGE UP (ref 27–34)
MCHC RBC-ENTMCNC: 30.2 PG — SIGNIFICANT CHANGE UP (ref 27–34)
MCHC RBC-ENTMCNC: 32.5 GM/DL — SIGNIFICANT CHANGE UP (ref 32–36)
MCHC RBC-ENTMCNC: 33.1 GM/DL — SIGNIFICANT CHANGE UP (ref 32–36)
MCV RBC AUTO: 90.7 FL — SIGNIFICANT CHANGE UP (ref 80–100)
MCV RBC AUTO: 92.7 FL — SIGNIFICANT CHANGE UP (ref 80–100)
MONOCYTES # BLD AUTO: 1.3 K/UL — HIGH (ref 0–0.9)
MONOCYTES NFR BLD AUTO: 8.9 % — SIGNIFICANT CHANGE UP (ref 2–14)
NEUTROPHILS # BLD AUTO: 10.62 K/UL — HIGH (ref 1.8–7.4)
NEUTROPHILS NFR BLD AUTO: 73 % — SIGNIFICANT CHANGE UP (ref 43–77)
NRBC # BLD: 0 /100 WBCS — SIGNIFICANT CHANGE UP (ref 0–0)
NRBC # BLD: 0 /100 WBCS — SIGNIFICANT CHANGE UP (ref 0–0)
PHOSPHATE SERPL-MCNC: 3 MG/DL — SIGNIFICANT CHANGE UP (ref 2.5–4.5)
PLATELET # BLD AUTO: 249 K/UL — SIGNIFICANT CHANGE UP (ref 150–400)
PLATELET # BLD AUTO: 295 K/UL — SIGNIFICANT CHANGE UP (ref 150–400)
POTASSIUM SERPL-MCNC: 3.7 MMOL/L — SIGNIFICANT CHANGE UP (ref 3.5–5.3)
POTASSIUM SERPL-MCNC: 4.5 MMOL/L — SIGNIFICANT CHANGE UP (ref 3.5–5.3)
POTASSIUM SERPL-SCNC: 3.7 MMOL/L — SIGNIFICANT CHANGE UP (ref 3.5–5.3)
POTASSIUM SERPL-SCNC: 4.5 MMOL/L — SIGNIFICANT CHANGE UP (ref 3.5–5.3)
PROTHROM AB SERPL-ACNC: 30.4 SEC — HIGH (ref 10.6–13.6)
PROTHROM AB SERPL-ACNC: 36.2 SEC — HIGH (ref 10.6–13.6)
RBC # BLD: 4.5 M/UL — SIGNIFICANT CHANGE UP (ref 4.2–5.8)
RBC # BLD: 5.24 M/UL — SIGNIFICANT CHANGE UP (ref 4.2–5.8)
RBC # FLD: 12.7 % — SIGNIFICANT CHANGE UP (ref 10.3–14.5)
RBC # FLD: 12.9 % — SIGNIFICANT CHANGE UP (ref 10.3–14.5)
RH IG SCN BLD-IMP: POSITIVE — SIGNIFICANT CHANGE UP
SARS-COV-2 RNA SPEC QL NAA+PROBE: SIGNIFICANT CHANGE UP
SODIUM SERPL-SCNC: 138 MMOL/L — SIGNIFICANT CHANGE UP (ref 135–145)
SODIUM SERPL-SCNC: 139 MMOL/L — SIGNIFICANT CHANGE UP (ref 135–145)
WBC # BLD: 12.5 K/UL — HIGH (ref 3.8–10.5)
WBC # BLD: 14.56 K/UL — HIGH (ref 3.8–10.5)
WBC # FLD AUTO: 12.5 K/UL — HIGH (ref 3.8–10.5)
WBC # FLD AUTO: 14.56 K/UL — HIGH (ref 3.8–10.5)

## 2020-11-17 PROCEDURE — 99214 OFFICE O/P EST MOD 30 MIN: CPT | Mod: 57

## 2020-11-17 PROCEDURE — 99072 ADDL SUPL MATRL&STAF TM PHE: CPT

## 2020-11-17 PROCEDURE — 99233 SBSQ HOSP IP/OBS HIGH 50: CPT | Mod: GC

## 2020-11-17 PROCEDURE — 99285 EMERGENCY DEPT VISIT HI MDM: CPT

## 2020-11-17 PROCEDURE — 75625 CONTRAST EXAM ABDOMINL AORTA: CPT | Mod: 26,59

## 2020-11-17 PROCEDURE — 93926 LOWER EXTREMITY STUDY: CPT

## 2020-11-17 PROCEDURE — 36247 INS CATH ABD/L-EXT ART 3RD: CPT

## 2020-11-17 PROCEDURE — 71045 X-RAY EXAM CHEST 1 VIEW: CPT | Mod: 26

## 2020-11-17 PROCEDURE — 37211 THROMBOLYTIC ART THERAPY: CPT

## 2020-11-17 PROCEDURE — 93010 ELECTROCARDIOGRAM REPORT: CPT | Mod: NC

## 2020-11-17 PROCEDURE — 75710 ARTERY X-RAYS ARM/LEG: CPT | Mod: 26,59

## 2020-11-17 RX ORDER — GLUCAGON INJECTION, SOLUTION 0.5 MG/.1ML
1 INJECTION, SOLUTION SUBCUTANEOUS ONCE
Refills: 0 | Status: DISCONTINUED | OUTPATIENT
Start: 2020-11-17 | End: 2020-11-17

## 2020-11-17 RX ORDER — DEXTROSE 50 % IN WATER 50 %
25 SYRINGE (ML) INTRAVENOUS ONCE
Refills: 0 | Status: DISCONTINUED | OUTPATIENT
Start: 2020-11-17 | End: 2020-11-19

## 2020-11-17 RX ORDER — SODIUM CHLORIDE 9 MG/ML
1000 INJECTION, SOLUTION INTRAVENOUS
Refills: 0 | Status: DISCONTINUED | OUTPATIENT
Start: 2020-11-17 | End: 2020-11-17

## 2020-11-17 RX ORDER — SODIUM CHLORIDE 9 MG/ML
1000 INJECTION INTRAMUSCULAR; INTRAVENOUS; SUBCUTANEOUS
Refills: 0 | Status: DISCONTINUED | OUTPATIENT
Start: 2020-11-17 | End: 2020-11-20

## 2020-11-17 RX ORDER — SODIUM CHLORIDE 9 MG/ML
1000 INJECTION, SOLUTION INTRAVENOUS
Refills: 0 | Status: DISCONTINUED | OUTPATIENT
Start: 2020-11-17 | End: 2020-11-19

## 2020-11-17 RX ORDER — INFLUENZA VIRUS VACCINE 15; 15; 15; 15 UG/.5ML; UG/.5ML; UG/.5ML; UG/.5ML
0.5 SUSPENSION INTRAMUSCULAR ONCE
Refills: 0 | Status: DISCONTINUED | OUTPATIENT
Start: 2020-11-17 | End: 2020-11-23

## 2020-11-17 RX ORDER — LANOLIN ALCOHOL/MO/W.PET/CERES
3 CREAM (GRAM) TOPICAL AT BEDTIME
Refills: 0 | Status: DISCONTINUED | OUTPATIENT
Start: 2020-11-17 | End: 2020-11-23

## 2020-11-17 RX ORDER — ALTEPLASE 100 MG
0.5 KIT INTRAVENOUS
Qty: 10 | Refills: 0 | Status: DISCONTINUED | OUTPATIENT
Start: 2020-11-17 | End: 2020-11-18

## 2020-11-17 RX ORDER — DEXTROSE 50 % IN WATER 50 %
15 SYRINGE (ML) INTRAVENOUS ONCE
Refills: 0 | Status: DISCONTINUED | OUTPATIENT
Start: 2020-11-17 | End: 2020-11-17

## 2020-11-17 RX ORDER — INSULIN LISPRO 100/ML
VIAL (ML) SUBCUTANEOUS
Refills: 0 | Status: DISCONTINUED | OUTPATIENT
Start: 2020-11-17 | End: 2020-11-17

## 2020-11-17 RX ORDER — ASPIRIN/CALCIUM CARB/MAGNESIUM 324 MG
81 TABLET ORAL DAILY
Refills: 0 | Status: DISCONTINUED | OUTPATIENT
Start: 2020-11-17 | End: 2020-11-23

## 2020-11-17 RX ORDER — POTASSIUM CHLORIDE 20 MEQ
20 PACKET (EA) ORAL ONCE
Refills: 0 | Status: COMPLETED | OUTPATIENT
Start: 2020-11-17 | End: 2020-11-17

## 2020-11-17 RX ORDER — HEPARIN SODIUM 5000 [USP'U]/ML
500 INJECTION INTRAVENOUS; SUBCUTANEOUS
Qty: 25000 | Refills: 0 | Status: DISCONTINUED | OUTPATIENT
Start: 2020-11-17 | End: 2020-11-19

## 2020-11-17 RX ORDER — ALTEPLASE 100 MG
1 KIT INTRAVENOUS
Qty: 10 | Refills: 0 | Status: DISCONTINUED | OUTPATIENT
Start: 2020-11-17 | End: 2020-11-17

## 2020-11-17 RX ORDER — HEPARIN SODIUM 5000 [USP'U]/ML
6500 INJECTION INTRAVENOUS; SUBCUTANEOUS ONCE
Refills: 0 | Status: COMPLETED | OUTPATIENT
Start: 2020-11-17 | End: 2020-11-17

## 2020-11-17 RX ORDER — INSULIN LISPRO 100/ML
VIAL (ML) SUBCUTANEOUS EVERY 6 HOURS
Refills: 0 | Status: DISCONTINUED | OUTPATIENT
Start: 2020-11-17 | End: 2020-11-19

## 2020-11-17 RX ORDER — DEXTROSE 50 % IN WATER 50 %
15 SYRINGE (ML) INTRAVENOUS ONCE
Refills: 0 | Status: DISCONTINUED | OUTPATIENT
Start: 2020-11-17 | End: 2020-11-19

## 2020-11-17 RX ORDER — ACETAMINOPHEN 500 MG
1000 TABLET ORAL EVERY 6 HOURS
Refills: 0 | Status: DISCONTINUED | OUTPATIENT
Start: 2020-11-17 | End: 2020-11-19

## 2020-11-17 RX ORDER — ACETAMINOPHEN 500 MG
650 TABLET ORAL EVERY 6 HOURS
Refills: 0 | Status: DISCONTINUED | OUTPATIENT
Start: 2020-11-17 | End: 2020-11-17

## 2020-11-17 RX ORDER — NICARDIPINE HYDROCHLORIDE 30 MG/1
5 CAPSULE, EXTENDED RELEASE ORAL
Qty: 40 | Refills: 0 | Status: DISCONTINUED | OUTPATIENT
Start: 2020-11-17 | End: 2020-11-18

## 2020-11-17 RX ORDER — ASPIRIN/CALCIUM CARB/MAGNESIUM 324 MG
81 TABLET ORAL DAILY
Refills: 0 | Status: DISCONTINUED | OUTPATIENT
Start: 2020-11-17 | End: 2020-11-17

## 2020-11-17 RX ORDER — NICARDIPINE HYDROCHLORIDE 30 MG/1
5 CAPSULE, EXTENDED RELEASE ORAL
Qty: 40 | Refills: 0 | Status: DISCONTINUED | OUTPATIENT
Start: 2020-11-17 | End: 2020-11-17

## 2020-11-17 RX ORDER — HEPARIN SODIUM 5000 [USP'U]/ML
1500 INJECTION INTRAVENOUS; SUBCUTANEOUS
Qty: 25000 | Refills: 0 | Status: DISCONTINUED | OUTPATIENT
Start: 2020-11-17 | End: 2020-11-17

## 2020-11-17 RX ORDER — DEXTROSE 50 % IN WATER 50 %
25 SYRINGE (ML) INTRAVENOUS ONCE
Refills: 0 | Status: DISCONTINUED | OUTPATIENT
Start: 2020-11-17 | End: 2020-11-17

## 2020-11-17 RX ORDER — CEFAZOLIN SODIUM 1 G
2000 VIAL (EA) INJECTION EVERY 8 HOURS
Refills: 0 | Status: COMPLETED | OUTPATIENT
Start: 2020-11-17 | End: 2020-11-18

## 2020-11-17 RX ORDER — GLUCAGON INJECTION, SOLUTION 0.5 MG/.1ML
1 INJECTION, SOLUTION SUBCUTANEOUS ONCE
Refills: 0 | Status: DISCONTINUED | OUTPATIENT
Start: 2020-11-17 | End: 2020-11-23

## 2020-11-17 RX ORDER — MAGNESIUM SULFATE 500 MG/ML
2 VIAL (ML) INJECTION ONCE
Refills: 0 | Status: COMPLETED | OUTPATIENT
Start: 2020-11-17 | End: 2020-11-17

## 2020-11-17 RX ADMIN — HEPARIN SODIUM 6500 UNIT(S): 5000 INJECTION INTRAVENOUS; SUBCUTANEOUS at 12:48

## 2020-11-17 RX ADMIN — HEPARIN SODIUM 5 UNIT(S)/HR: 5000 INJECTION INTRAVENOUS; SUBCUTANEOUS at 15:45

## 2020-11-17 RX ADMIN — Medication 50 GRAM(S): at 17:00

## 2020-11-17 RX ADMIN — SODIUM CHLORIDE 110 MILLILITER(S): 9 INJECTION, SOLUTION INTRAVENOUS at 12:44

## 2020-11-17 RX ADMIN — NICARDIPINE HYDROCHLORIDE 25 MG/HR: 30 CAPSULE, EXTENDED RELEASE ORAL at 17:01

## 2020-11-17 RX ADMIN — Medication 20 MILLIEQUIVALENT(S): at 17:01

## 2020-11-17 RX ADMIN — Medication 100 MILLIGRAM(S): at 20:42

## 2020-11-17 RX ADMIN — ALTEPLASE 5 MG/HR: KIT at 16:25

## 2020-11-17 RX ADMIN — SODIUM CHLORIDE 80 MILLILITER(S): 9 INJECTION INTRAMUSCULAR; INTRAVENOUS; SUBCUTANEOUS at 15:45

## 2020-11-17 RX ADMIN — Medication 81 MILLIGRAM(S): at 17:02

## 2020-11-17 RX ADMIN — Medication 1000 MILLIGRAM(S): at 23:32

## 2020-11-17 NOTE — ED ADULT NURSE NOTE - PMH
DVT (deep venous thrombosis)  femoral artery LLE  Lupus    Lyme disease    MI (myocardial infarction)  93  PVD (peripheral vascular disease)

## 2020-11-17 NOTE — ED ADULT NURSE NOTE - PSH
History of femoral angiogram  RLE angiogram  Surgery, elective  Right Femoral-PT bypass with cephalic vein

## 2020-11-17 NOTE — ED PROVIDER NOTE - MUSCULOSKELETAL, MLM
absent pulses to R lower extremity from popliteal down, unable to obtain via doppler, R toes slightly cool to touch and purple, delayed cap refill, full ROM to all extremities

## 2020-11-17 NOTE — ED ADULT TRIAGE NOTE - OTHER COMPLAINTS
R leg pain, cold, pale, numbness/tingling. pt reports hx of blockages in R femoral artery. treated by Dr Herrera

## 2020-11-17 NOTE — H&P ADULT - NSHPLABSRESULTS_GEN_ALL_CORE
15.8   14.56 )-----------( 295      ( 17 Nov 2020 10:21 )             48.6   11-17    138  |  100  |  12  ----------------------------<  98  4.5   |  27  |  0.84    Ca    9.9      17 Nov 2020 10:10    PT/INR - ( 17 Nov 2020 10:16 )   PT: 30.4 sec;   INR: 2.65          PTT - ( 17 Nov 2020 10:16 )  PTT:57.1 sec

## 2020-11-17 NOTE — H&P ADULT - NSICDXPASTSURGICALHX_GEN_ALL_CORE_FT
PAST SURGICAL HISTORY:  History of femoral angiogram RLE angiogram    Surgery, elective Right Femoral-PT bypass with cephalic vein

## 2020-11-17 NOTE — H&P ADULT - NSHPPHYSICALEXAM_GEN_ALL_CORE
Vital Signs Last 24 Hrs  T(C): 36.4 (17 Nov 2020 09:42), Max: 36.4 (17 Nov 2020 09:42)  T(F): 97.5 (17 Nov 2020 09:42), Max: 97.5 (17 Nov 2020 09:42)  HR: 88 (17 Nov 2020 09:42) (88 - 88)  BP: 128/77 (17 Nov 2020 10:11) (128/77 - 184/98)  BP(mean): --  RR: 18 (17 Nov 2020 09:42) (18 - 18)  SpO2: 96% (17 Nov 2020 09:42) (96% - 96%)    Physical Exam:  General: NAD, resting comfortably in the bed  Pulmonary: normal respiratory effort  Cardiovascular: NSR, S1, S2 present  Abdominal: soft, NT/ND, no rebound tenderness, guarding, rigidity  Extremities: WWP, normal strength, no clubbing/cyanosis, Altered sensation in R foot, preserved motor function 5/5 Prorioception and pressure sensations, sluggish cap refill, cool to touch  LLE is normal  Neuro: AAOx3, no focal deficits, sensation normal  Pulses:   Right:                                                                  FEM [ x]2+ [ ]1+ [ ]doppler  POP [ x]2+ [ ]1+ [ ]doppler  DP [ ] 2+ [ ]1+ [ ]doppler No signals  PT[ ]2+ [ ]1+ [ ]doppler No signals    Left:  FEM [x ]2+ [ ]1+ [ ]doppler    POP [x ]2+ [ ]1+ [ ]doppler   DP [x ]2+ [ ]1+ [ ]doppler  PT [ ]2+ [ ]1+ [x ]doppler - Biphasic

## 2020-11-17 NOTE — H&P ADULT - HISTORY OF PRESENT ILLNESS
This is a 64 y/o M, PMH of MTHFR mutation gene (/ Lupus? follows kolton Soria), MI  (no intervention done) Lyme disease , PAD, PSH s/p R fem PT bypass with arm vein in , redone in . S/p R SFA to peroneal bypass graft revised on 2018, had a recurrent stenosis of the graft then angiogram, angioplasty of R tibial artery graft, femoral artery graft, R ARNOL, R EIA and R CFA on 2020, saw Dr. Herrera today in the clinic as the patient woke up yesterday with cold R foot, admits to tingling sensation but denies pain, weakness or change in color.    Was sent to ED to be admitted for emergency angiogram, thrombolysis    PMH:  MTHFR mutation gene Prothrombotic (disorder) (follows kolton Soria), PADMI  (no intervention done) Lyme disease  PSH: s/p R fem PT bypass with arm vein in , redone in . S/p R SFA to peroneal bypass graft revised on 2018, had a recurrent stenosis of the graft then angiogram, angioplasty of R tibial artery graft, femoral artery graft, R ARNOL, R EIA and R CFA on 2020  FMH: father  of brain cancer, sister  of kidney cancer  SH: Quit smoking 30 years ago, smoked for 0 years less tan 1ppd, social drinking, denies recreational drugs  Allergies: Dilaudid intolerance

## 2020-11-17 NOTE — H&P ADULT - NSICDXPASTMEDICALHX_GEN_ALL_CORE_FT
PAST MEDICAL HISTORY:  DVT (deep venous thrombosis) femoral artery LLE    Lupus     Lyme disease     MI (myocardial infarction) 93    PVD (peripheral vascular disease)

## 2020-11-17 NOTE — CONSULT NOTE ADULT - SUBJECTIVE AND OBJECTIVE BOX
HPI: 64M PAD s/p right femPT bypass with arm vein in 1993, redone in 2011, s/p R SFA to peroneal bypass graft s/p revision on 7/30/18, and recently found to have recurrent restenosis of graft, now s/p angiogram with angioplasty of R tibial artery graft, femoral artery graft, R ARNOL, R EIA, and R CFA 02/03/2020, MTHFR gene mutation homozygous and lupus anticoagulant, for which he is followed by Dr Soria, on life long coumadin, presented to ED with right leg critical limb ischemia.     PMH: as above.     MEDS: ASA 81, coumadin.   NKDA, has headache and nausea with dilaudid      ICU Vital Signs Last 24 Hrs  T(F): 97.5 (11-17-20 @ 09:42), Max: 97.5 (11-17-20 @ 09:42)  HR: 88 (11-17-20 @ 09:42) (88 - 88)  BP: 128/77 (11-17-20 @ 10:11) (128/77 - 184/98)  BP(mean): --  ABP: --  RR: 18 (11-17-20 @ 09:42) (18 - 18)  SpO2: 96% (11-17-20 @ 09:42) (96% - 96%)    PHYSICAL EXAM:   Neurological: AAOx3, CNII-XII intact,  strength 5/5 b/l  Cardiovascular: RRR  Respiratory: CTA  Gastrointestinal: soft, NT, ND, BS+  Extremities: warm, no dependent edema  Vascular: no cyanosis/erythema  LABS:    11-17    138  |  100  |  12  ----------------------------<  98  4.5   |  27  |  0.84    Ca    9.9      17 Nov 2020 10:10                          15.8   14.56 )-----------( 295      ( 17 Nov 2020 10:21 )             48.6   PT/INR - ( 17 Nov 2020 10:16 )   PT: 30.4 sec;   INR: 2.65          PTT - ( 17 Nov 2020 10:16 )  PTT:57.1 sec  CAPILLARY BLOOD GLUCOSE        Addison:	  [ ] None	[ ] Daily Addison Order Placed	   Indication:	  [ ] Strict I and O's    [ ] Obstruction     [ ] Incontinence + Stage 3 or 4 Decubitus  Central Line:  [ ] None	   [ ]  Medication / TPN Administration     [ ] No Peripheral IV     A/P:    NEURO:  CV:  PULM:   GI/FEN:  :  ENDO: ISS  ID:  PPX: SCDs   LINES: PIVs,   WOUNDS/DRAINS:            HPI: 64M PAD s/p right femPT bypass with arm vein in 1993, redone in 2011, s/p R SFA to peroneal bypass graft s/p revision on 7/30/18, and recently found to have recurrent restenosis of graft, s/p angiogram with angioplasty of R tibial artery graft, femoral artery graft, R ARNOL, R EIA, and R CFA 02/03/2020, MTHFR gene mutation homozygous and lupus anticoagulant, for which he is followed by Dr Soria, on life long coumadin, presented to ED with right leg critical limb ischemia.     PMH: as above.     MEDS: ASA 81, coumadin.   NKDA, has headache and nausea with dilaudid      ICU Vital Signs Last 24 Hrs  T(F): 97.5 (11-17-20 @ 09:42), Max: 97.5 (11-17-20 @ 09:42)  HR: 88 (11-17-20 @ 09:42) (88 - 88)  BP: 128/77 (11-17-20 @ 10:11) (128/77 - 184/98)  BP(mean): --  ABP: --  RR: 18 (11-17-20 @ 09:42) (18 - 18)  SpO2: 96% (11-17-20 @ 09:42) (96% - 96%)    PHYSICAL EXAM:   Neurological: AAOx3, CNII-XII intact,  strength 5/5 b/l  Cardiovascular: RRR  Respiratory: CTA  Gastrointestinal: soft, NT, ND, BS+  Extremities: warm, no dependent edema  Vascular: no cyanosis/erythema  LABS:    11-17    138  |  100  |  12  ----------------------------<  98  4.5   |  27  |  0.84    Ca    9.9      17 Nov 2020 10:10                          15.8   14.56 )-----------( 295      ( 17 Nov 2020 10:21 )             48.6   PT/INR - ( 17 Nov 2020 10:16 )   PT: 30.4 sec;   INR: 2.65          PTT - ( 17 Nov 2020 10:16 )  PTT:57.1 sec  CAPILLARY BLOOD GLUCOSE        Addison:	  [ ] None	[ ] Daily Addison Order Placed	   Indication:	  [ ] Strict I and O's    [ ] Obstruction     [ ] Incontinence + Stage 3 or 4 Decubitus  Central Line:  [ ] None	   [ ]  Medication / TPN Administration     [ ] No Peripheral IV                    HPI: 64M PAD s/p right femPT bypass with arm vein in 1993, redone in 2011, s/p R SFA to peroneal bypass graft s/p revision on 7/30/18, and recently found to have recurrent restenosis of graft, s/p angiogram with angioplasty of R tibial artery graft, femoral artery graft, R ARNOL, R EIA, and R CFA 02/03/2020, MTHFR gene mutation homozygous and lupus anticoagulant, for which he is followed by Dr Soria, on life long coumadin, presented to ED with right cold leg up to mid tibia, no pulses to right DP and PT, but has palpable right pop and fem. He is being admitted for right leg critical limb ischemia.     PMH: as above.     MEDS: ASA 81, coumadin.   NKDA, has headache and nausea with dilaudid      ICU Vital Signs Last 24 Hrs  T(F): 97.5 (11-17-20 @ 09:42), Max: 97.5 (11-17-20 @ 09:42)  HR: 88 (11-17-20 @ 09:42) (88 - 88)  BP: 128/77 (11-17-20 @ 10:11) (128/77 - 184/98)  BP(mean): --  ABP: --  RR: 18 (11-17-20 @ 09:42) (18 - 18)  SpO2: 96% (11-17-20 @ 09:42) (96% - 96%)    PHYSICAL EXAM:   Neurological: AAOx3, CNII-XII intact,  strength 5/5 b/l  Cardiovascular: RRR  Respiratory: CTA  Gastrointestinal: soft, NT, ND, BS+  Extremities: warm, no dependent edema  Vascular: no cyanosis/erythema  LABS:    11-17    138  |  100  |  12  ----------------------------<  98  4.5   |  27  |  0.84    Ca    9.9      17 Nov 2020 10:10                          15.8   14.56 )-----------( 295      ( 17 Nov 2020 10:21 )             48.6   PT/INR - ( 17 Nov 2020 10:16 )   PT: 30.4 sec;   INR: 2.65          PTT - ( 17 Nov 2020 10:16 )  PTT:57.1 sec  CAPILLARY BLOOD GLUCOSE        Addison:	  [ ] None	[ ] Daily Addison Order Placed	   Indication:	  [ ] Strict I and O's    [ ] Obstruction     [ ] Incontinence + Stage 3 or 4 Decubitus  Central Line:  [ ] None	   [ ]  Medication / TPN Administration     [ ] No Peripheral IV                    HPI: 64M MI 1993, PAD s/p right femPT bypass with arm vein in 1993, redone in 2011, s/p R SFA to peroneal bypass graft s/p revision on 7/30/18, and recently found to have recurrent restenosis of graft, s/p angiogram with angioplasty of R tibial artery graft, femoral artery graft, R ARNOL, R EIA, and R CFA 02/03/2020, MTHFR gene mutation homozygous and lupus anticoagulant, for which he is followed by Dr Soria, on life long coumadin, presented to ED with right cold leg up to mid tibia, no pulses to right DP and PT, but has palpable right pop and fem. He is being admitted for right leg critical limb ischemia.     PMH: as above. and lyme disease.     MEDS: ASA 81, coumadin.   NKDA, has headache and nausea with dilaudid      ICU Vital Signs Last 24 Hrs  T(F): 97.5 (11-17-20 @ 09:42), Max: 97.5 (11-17-20 @ 09:42)  HR: 88 (11-17-20 @ 09:42) (88 - 88)  BP: 128/77 (11-17-20 @ 10:11) (128/77 - 184/98)  BP(mean): --  ABP: --  RR: 18 (11-17-20 @ 09:42) (18 - 18)  SpO2: 96% (11-17-20 @ 09:42) (96% - 96%)    PHYSICAL EXAM:   Neurological: AAOx3, CNII-XII intact,  strength 5/5 b/l  Cardiovascular: RRR  Respiratory: CTA  Gastrointestinal: soft, NT, ND, BS+  Extremities: no dependent edema  Vascular: right leg cold, with no DP/PT signal, but has palpable graft. left leg warm with palpable DP/PT. no cyanosis. left groin puncture site no hematoma, no bleeding, catheter/sheath intact.       LABS:    11-17    138  |  100  |  12  ----------------------------<  98  4.5   |  27  |  0.84    Ca    9.9      17 Nov 2020 10:10                          15.8   14.56 )-----------( 295      ( 17 Nov 2020 10:21 )             48.6   PT/INR - ( 17 Nov 2020 10:16 )   PT: 30.4 sec;   INR: 2.65          PTT - ( 17 Nov 2020 10:16 )  PTT:57.1 sec  CAPILLARY BLOOD GLUCOSE        Addison:	  [x ] None	[ ] Daily Addison Order Placed	   Indication:	  [ ] Strict I and O's    [ ] Obstruction     [ ] Incontinence + Stage 3 or 4 Decubitus  Central Line:  [ x] None	   [ ]  Medication / TPN Administration     [ ] No Peripheral IV                    HPI: 64M MI 1993, PAD s/p right femPT bypass with arm vein in 1993, redone in 2011, s/p R SFA to peroneal bypass graft s/p revision on 7/30/18, and recently found to have recurrent restenosis of graft, s/p angiogram with angioplasty of R tibial artery graft, femoral artery graft, R ARNOL, R EIA, and R CFA 02/03/2020, MTHFR gene mutation homozygous and lupus anticoagulant, for which he is followed by Dr Soria, on life long coumadin, presented to ED with right cold leg up to mid tibia, no pulses to right DP and PT, but has palpable right pop and fem. He is being admitted for right leg critical limb ischemia. pt taken to OR for angio, found SFA-peroneal bypass filled with thrombus, s/p tpa bolus and temporary tpa catheter placed for continuous local tpa infusion.     PMH: as above. and lyme disease.     MEDS: ASA 81, coumadin.   NKDA, has headache and nausea with dilaudid      ICU Vital Signs Last 24 Hrs  T(F): 97.5 (11-17-20 @ 09:42), Max: 97.5 (11-17-20 @ 09:42)  HR: 88 (11-17-20 @ 09:42) (88 - 88)  BP: 128/77 (11-17-20 @ 10:11) (128/77 - 184/98)  BP(mean): --  ABP: --  RR: 18 (11-17-20 @ 09:42) (18 - 18)  SpO2: 96% (11-17-20 @ 09:42) (96% - 96%)    PHYSICAL EXAM:   Neurological: AAOx3, CNII-XII intact,  strength 5/5 b/l  Cardiovascular: RRR  Respiratory: CTA  Gastrointestinal: soft, NT, ND, BS+  Extremities: no dependent edema  Vascular: right leg cold, with no DP/PT signal, but has palpable graft. left leg warm with palpable DP/PT. no cyanosis. left groin puncture site no hematoma, no bleeding, catheter/sheath intact.       LABS:    11-17    138  |  100  |  12  ----------------------------<  98  4.5   |  27  |  0.84    Ca    9.9      17 Nov 2020 10:10                          15.8   14.56 )-----------( 295      ( 17 Nov 2020 10:21 )             48.6   PT/INR - ( 17 Nov 2020 10:16 )   PT: 30.4 sec;   INR: 2.65          PTT - ( 17 Nov 2020 10:16 )  PTT:57.1 sec  CAPILLARY BLOOD GLUCOSE        Addison:	  [x ] None	[ ] Daily Addison Order Placed	   Indication:	  [ ] Strict I and O's    [ ] Obstruction     [ ] Incontinence + Stage 3 or 4 Decubitus  Central Line:  [ x] None	   [ ]  Medication / TPN Administration     [ ] No Peripheral IV

## 2020-11-17 NOTE — ED PROVIDER NOTE - CLINICAL SUMMARY MEDICAL DECISION MAKING FREE TEXT BOX
64 y/o M w/ significant hx of PAD presents w/ 24 hours of R extremity coolness and crampy pain w/ ambulation found to have decreased pulses. Plan to check basic labs, consult vascular and assess need for revascularization procedure.

## 2020-11-17 NOTE — ED ADULT NURSE NOTE - OTHER COMPLAINTS
ANnual physical exam    History    Hardik Amin is a 73 year old male who presents for an annual exam.  The last time that I saw the patient was 2017 for evaluation of cough. Was treated symptomatically with improvement.    Patient has complaints of some lower left abdominal pain that started on  at an 8/10.  He states that he did have similar pain many years ago and was diagnosed with diverticulitis.  Reports that his pain on palpation in the office today is a 6-7/10.  Denies nausea, vomiting, or diarrhea.  Does have a history of kidney stones.      History of Heterozygous Factor V Leiden.  This was done because his sister had similar finding. No history of clotting. Maintained on aspirin.     History of BCC.  Followed by Dr. Segovia from dermatology annually.     History of hypertension.  Blood pressure well controlled on HCTZ 25 mg daily.  Stable creatinine and potassium.     History of GERD. On omeprazole 20 mg twice daily.  No dysphagia.  Underwent EGD in 2017. Negative for h pylori.  Chronic inflammation.      History of vitamin D deficiency. On vitamin D supplementation of 5000 units daily.     History of colonic polyps.  Normal colonoscopy from 2014.  Repeat in  in the absence of symptoms.     History of hypothyroidism.  Stable on levothyroxine 25 mg daily. Normal TSH.     History of nephrolithiasis.  No recurrence in many years.     Up to date on prostate cancer screening.  Normal PSA for his age.      Up to date on vaccinations. Declined flu vaccine     SMOKING:  None     DRUGS:  None.     ALCOHOL:  Social.     FAMILY HISTORY:   Father  at age 85 from skin cancer. No coronary artery disease.   Mom had esophageal cancer,  at age 85. No coronary artery disease   One brother in 70s, no coronary artery disease.   One sister in 50s, no coronary artery disease. Factor V leiden     SOCIAL HISTORY:   He works full-time at a [employer].   He lives in [place].   He is .    He has grownup children.   He is active. Walks  for 2.5 miles. Biking for 12 miles with no exertional symptoms.     PREVIOUS PROCEDURES:  EGD in 2004. GE junction biopsy showed chronic inflammation. Repeat done 3/22/17 was stable.  Stress test, nuclear scan on 09/20/2007 with no ischemia.  Colonoscopy in 2014 was normal.    SURGICAL HISTORY:  MOHs right ear 2014  Kidney stone removal  Tonsillectomy  Vasectomy    mEDICATIONS    Current Outpatient Prescriptions   Medication Sig   • Omega-3 Fatty Acids (FISH OIL PO) Taking liquid 1200 mg daily   • levothyroxine (SYNTHROID, LEVOTHROID) 50 MCG tablet TAKE ONE-HALF (1/2) TABLET DAILY   • hydrochlorothiazide (HYDRODIURIL) 25 MG tablet TAKE 1 TABLET DAILY   • Pot & Sod Cit-Cit Ac (TRICITRATES) 550-500-334 MG/5ML Solution DILUTE AND DRINK 3 TABLESPOONFULS DAILY   • omeprazole (PRILOSEC) 20 MG capsule Take 2 capsules by mouth daily. (Patient taking differently: Take 40 mg by mouth 2 times daily. )   • Multiple Vitamins-Minerals (CENTRUM SILVER ULTRA MENS) TABS Take 1 tablet by mouth daily.   • Cholecalciferol (VITAMIN D-3) 1000 UNITS CAPS Take 5,000 Units by mouth daily.   • aspirin 81 MG tablet Take 81 mg by mouth daily.     No current facility-administered medications for this visit.        aLLERGIES    ALLERGIES:  No Known Allergies    I have reviewed the patient's medications and allergies, past medical, surgical, social and family history, updating these as appropriate.  See Histories section of the electronic medical record for a display of this information.    Review of systems  SEE HPI  Constitutional:  Denies fevers, chills, weakness, fatigue, loss of appetite, abnormal weight gain or abnormal weight loss.  Eyes:  Denies blindness, blurred vision, double vision, pain, itching or burning.  HENT:  Denies facial pain, ear pain, hearing loss, tinnitus, nasal congestion, rhinorrhea, epistaxis, sinus pain, mouth lesions or sore throat.  Respiratory:  Denies shortness of  breath, cough, sputum production, hemoptysis or wheezing.  Cardiovascular:  Denies chest pains, palpitations, tachycardia, edema, cyanosis or vertigo.  No near-syncope or syncope. No exertional chest pains or shortness of breath. No orthopnea or paroxysmal nocturnal dyspnea. No leg swelling.  No exertional symptomatology. No palpitations, no syncope, no near syncope, no orthopnea no PND. could walk up 2 flights of stairs with no exertional chest pains. No history of asthma COPD or sleep apnea or chronic lung disease.  Gastrointestinal:  Complains of left lower quadrant abdominal pain, no heartburn while taking the medications, nausea, vomiting, diarrhea, constipation or blood in stool.  No hematemesis, hematochezia or rectal bleeding.  History of colonic polyps up-to-date in colonoscopy in 2014 repeat in 2019.   Musculoskeletal:  Denies back pain, joint pain, joint swelling or tenderness, muscle pains or spasms.  Denies neck pain, stiffness or swelling.  Neurologic:  Denies numbness, tingling, other sensory changes, sudden weakness in arms or legs.  Denies confusion, headache, dizziness, memory loss or tremors.  Genitourinary:  Denies urinary frequency, nocturia, urgency, incontinence, dysuria or hematuria.  No problems with impotence or libido.   No discharge or lesions of penis.  Hematologic/Lymph:  Denies easy bruising or bleeding, swollen lymph glands.  Endocrine:  Denies heat or cold intolerance, polydipsia or polyuria.  Denies changes in hair or skin texture.  Integument:  Denies new rashes or lesions, pruritus or dryness of skin.  Psychiatric:  Denies anxiety, depression, hallucinations, irritability or sleeping problems.  Allergic/Immunologic:  Denies recurrent infections, hypersensitivity.  All other Review of Systems negative.    ADVANCE DIRECTIVES   Full code status    Physical Exam    Vital Signs:    Visit Vitals  /76   Pulse 72   Temp 97.5 °F (36.4 °C) (Oral)   Resp 14   Ht 5' 10\" (1.778 m)   Wt  88.2 kg   BMI 27.91 kg/m²     Wt Readings from Last 3 Encounters:   12/12/17 88.2 kg   04/11/17 90 kg   12/26/16 89.4 kg     General:  Well developed, well nourished. In no apparent distress.  Eyes:  Pupils equal, round, reactive to light; extraocular movements intact. Conjunctivae pink. Sclerae anicteric.  HENT:  Normocephalic, atraumatic. Bilateral external ears are normal. Mucosal membranes moist. External nose is normal. Oropharynx is clear.  Hearing grossly normal to whisper exam.  Neck:  Supple. Nontender. Normal range of motion. No masses. No thyromegaly.  Trachea midline.  Respiratory:  Normal respiratory effort. No chest wall tenderness. Lungs clear to auscultation bilaterally. Symmetrical chest expansion.  Cardiovascular:  Regular rate and rhythm. No murmurs, rubs or gallops. Normal S1 and S2. No S3 or S4. No jugular venous distention. No carotid bruits. Good dorsalis pedis pulses bilaterally. No peripheral edema.  Gastrointestinal:  Soft. Nondistended. Normal bowel sounds. No pulsatile or other abdominal masses. No hepatosplenomegaly or splenomegaly.   Increased tenderness in the LLQ. No guarding no rebound.   Genitourinary:  Normal penis and scrotum. No testicular masses or tenderness.  No hernias.  Rectal exam shows normal sphincter tone and no masses.    Prostate normal in size with no nodules.  Musculoskeletal:  No clubbing or cyanosis. Full range of motion in all 4 extremities, proximal and distal. No joint swelling or tenderness in right and left shoulders, elbows, wrists and fingers. No joint swelling or tenderness in left and right knees, ankles and toes.  Neurologic:  Alert and oriented x 3. Gait is normal. Normal sensory function. No motor deficits in all 4 extremities. Cranial nerves II-XII intact.  Mental status normal with no gross cognitive impairment. Bare foot examination shows normal sensation.  Integumentary:  Warm. Dry. Pink. No rashes or lesions. No wounds.  Lymphatic:  No  R leg pain, cold, pale, numbness/tingling. pt reports hx of blockages in R femoral artery. treated by Dr Herrera lymphadenopathy in submental, submandibular or cervical chain. No supraclavicular or infraclavicular lymphadenopathy. No axillary or inguinal/groin lymphadenopathy.  Psychiatric: Cooperative. Appropriate mood and affect. Normal judgment.    Results    Pertinent labs and imaging studies reviewed.    Lab Results   Component Value Date    CHOLESTEROL 186 12/07/2017    HDL 49 12/07/2017    CALCLDL 118 12/07/2017    TRIGLYCERIDE 95 12/07/2017    CREATININE 1.23 (H) 12/07/2017    AST 20 12/07/2017    BILIRUBIN 1.2 (H) 12/07/2017    TSH 4.713 12/07/2017    WBC 6.9 12/07/2017    HGB 16.4 12/07/2017     12/07/2017    PSA 2.40 12/07/2017    VITD25 45.5 12/23/2016       Assessment & Plan    1. Acquired hypothyroidism - stable on levothyroxine.  Normal TSH   2. Basal cell cancer - followed annually by Dr Segovia in dermatology   3. Benign essential HTN - stable on hctz 25 mg daily. Normal creatinine potassium.    4. GERD without esophagitis - stable on omeprazole 20 mg twice daily.  EGD done March 2017 with Dr Holman.   5. History of nephrolithiasis - no recurrence, patient is complaining of new abdominal pain starting on Sunday, will check STAT CT today   6. Hx of colonic polyp - up to date on colonoscopy.  Normal in 2014.  Repeat in 2019 in the absence of symptoms.     7. Vitamin D deficiency - stable on vitamin D 5000 units daily.     8.      Factor V Leiden mutation, heterozygous - no thromboembolic history. Continue with aspirin.    9.      LLQ abdominal pain - STAT CBC, CMP, Lipase CT abdomen pelvis without contrast. See workup for the acute presentation below.  10.   Up-to-date on prostate cancer screening.  11. Up-to-date on vaccinations.  12. Incidental finding of mild aortoiliac atherosclerosis found on the CAT scan. Continue with aspirin. In the future we will start the patient on a statin.    Patient Instructions   Stat CBC CMP lipase  Stat CT of the pelvis without contrast history of kidney stone. Left lower  quadrant pain and possible nephrolithiasis. Possible diverticulitis.  Further recommendations will be according to the results.  See me in one year for physical earlier if needed     CT abdomen pelvis without contrast  1. Acute sigmoid colon diverticulitis.  2. No evidence for perforation or abscess.  3. Bilateral nonobstructing renal stones (largest 5 mm).  4. Tiny fat-containing umbilical hernia.    Results for PAULY CASTANON (MRN [id number]) as of 12/12/2017 17:32   Ref. Range 12/12/2017 15:27   Sodium Latest Ref Range: 135 - 145 mmol/L 143   Potassium Latest Ref Range: 3.4 - 5.1 mmol/L 3.6   Chloride Latest Ref Range: 98 - 107 mmol/L 102   CO2 Latest Ref Range: 21 - 32 mmol/L 33 (H)   ANION GAP Latest Ref Range: 10 - 20 mmol/L 12   A/G Ratio, Serum Latest Ref Range: 1.0 - 2.4  1.3   Albumin Latest Ref Range: 3.6 - 5.1 g/dL 4.0   ALK PHOSPHATASE Latest Ref Range: 45 - 117 Units/L 63   ALT/SGPT Latest Ref Range: <79 Units/L 24   AST/SGOT Latest Ref Range: <38 Units/L 18   BUN Latest Ref Range: 6 - 20 mg/dL 17   BUN/CREATININE RATIO Latest Ref Range: 7 - 25  16   CALCIUM Latest Ref Range: 8.4 - 10.2 mg/dL 9.1   Creatinine Latest Ref Range: 0.67 - 1.17 mg/dL 1.06   Fasting Status Latest Units: hrs 2   GFR Estimate,  Unknown 81   GFR Estimate, Non  Unknown 70   GLOBULIN Latest Ref Range: 2.0 - 4.0 g/dL 3.1   Glucose Latest Ref Range: 65 - 99 mg/dL 75   Lipase Latest Ref Range: 73 - 393 Units/L 171   TOTAL BILIRUBIN Latest Ref Range: 0.2 - 1.0 mg/dL 1.0   TOTAL PROTEIN Latest Ref Range: 6.4 - 8.2 g/dL 7.1   WBC Latest Ref Range: 4.2 - 11.0 K/mcL 9.1   RBC Latest Ref Range: 4.50 - 5.90 mil/mcL 4.81   HGB Latest Ref Range: 13.0 - 17.0 g/dL 15.0   HCT Latest Ref Range: 39.0 - 51.0 % 43.7   MCV Latest Ref Range: 78.0 - 100.0 fl 90.9   MCH Latest Ref Range: 26.0 - 34.0 pg 31.2   MCHC Latest Ref Range: 32.0 - 36.5 g/dL 34.3   RDW-CV Latest Ref Range: 11.0 - 15.0 % 13.0   PLT Latest Ref Range:  140 - 450 K/mcL 173   DIFFERENTIAL TYPE Unknown AUTOMATED DIFFERE...   Neutrophil Latest Units: % 64   LYMPH Latest Units: % 26   MONO Latest Units: % 8   EOSIN Latest Units: % 2   BASO Latest Units: % 0   Absolute Neutrophil Latest Ref Range: 1.8 - 7.7 K/mcL 5.9   Absolute Lymph Latest Ref Range: 1.0 - 4.0 K/mcL 2.3   Absolute Mono Latest Ref Range: 0.3 - 0.9 K/mcL 0.7   Absolute Eos Latest Ref Range: 0.1 - 0.5 K/mcL 0.2   Absolute Baso Latest Ref Range: 0.0 - 0.3 K/mcL 0.0     Plan:  Soft diet  Hydrocodone 5/325 one tablet 3 times a day as needed for pain can also take Tylenol as needed  Ciprofloxacin 500 mg twice daily for 10 days  Flagyl 500 mg 3 times a day for 10 days.  Call if any nausea vomiting rectal bleeding or fevers or worsening pain.    All the above was discussed with the patient in detail; questions were answered to the patient's satisfaction.  Patient left the office in stable condition with verbal and written instructions.    I, Analia Dumont CMA, attest that I performed the duties of a scribe for this encounter, in the presence of Dr. Arie Stout.    I, Dr. Stout, was present with Analia Dumont CMA, who acted as a scribe for me during the entire visit and agree with above.

## 2020-11-17 NOTE — H&P ADULT - ASSESSMENT
This is a 66 y/o M, PMH of MTHFR mutation gene (2/2/ Lupus? follows wit Dr. Soria), MI 1993 (no intervention done) Lyme disease , PAD, PSH s/p R fem PT bypass with arm vein in 1993, redone in 2011. S/p R SFA to peroneal bypass graft revised on 7/30/2018, had a recurrent stenosis of the graft then angiogram, angioplasty of R tibial artery graft, femoral artery graft, R ARNOL, R EIA and R CFA on 02/03/2020, saw Dr. Herrera today in the clinic as the patient woke up yesterday with cold R foot, admits to tingling sensation but denies pain, weakness or change in color.    Discussed with Dr. Herrera and Chief resident:    INR is 2.65 no need to start heparin drip  On Coumadin 7.5 (S, M, T, W, F) and 5 on (Baugh, Th)    Plan:  - Admit to SICU  - Keep NPO, IV fluids  ml/h  - For OR, angiogram of RLE, thrombolysis, possible thrombectomy possible angioplasty and stenting  - Preop labs, CXR, EKG ordered  - Resume ASA 81 but not coumadin   This is a 66 y/o M, PMH of MTHFR mutation gene (2/2/ Lupus? follows wit Dr. Soria), MI 1993 (no intervention done) Lyme disease , PAD, PSH s/p R fem PT bypass with arm vein in 1993, redone in 2011. S/p R SFA to peroneal bypass graft revised on 7/30/2018, had a recurrent stenosis of the graft then angiogram, angioplasty of R tibial artery graft, femoral artery graft, R ARNOL, R EIA and R CFA on 02/03/2020, saw Dr. Herrera today in the clinic as the patient woke up yesterday with cold R foot, admits to tingling sensation but denies pain, weakness or change in color.    Discussed with Dr. Herrera and Chief resident:    INR is 2.65   On Coumadin 7.5 (S, M, T, W, F) and 5 on (Baugh, Th)    Plan:  - Admit to SICU  - Heparin bolus and Drip  - Keep NPO, IV fluids  ml/h  - For OR, angiogram of RLE, thrombolysis, possible thrombectomy possible angioplasty and stenting  - Preop labs, CXR, EKG ordered  - Resume ASA 81 but not coumadin

## 2020-11-17 NOTE — ED ADULT NURSE NOTE - PHONE #
Patient c/o lower abd, patient had unprotected intercourse 1 week ago. Patient reports brown discharge. Patient wanting to know about stds, denies itching or burning. Patient reports having baby 1/15/19 vaginal delivery. Denies N/V/D.    [phone]

## 2020-11-17 NOTE — ED PROVIDER NOTE - OBJECTIVE STATEMENT
66 y/o M w/ significant PMHx of CAD and x6 surgeries for which he follows w/ vascular surgeon Dr. Herrera, Lupus anticoagulant on Coumadin presents to the ED reporting yesterday morning found R toes to be "block of ice" and cramping to R leg below the knee w/ bearing weight, alleviated at rest. Denies CP, SOB, fever, chills or any other acute sx. Saw Dr. Herrera this morning who referred him here.

## 2020-11-17 NOTE — ED ADULT NURSE NOTE - OBJECTIVE STATEMENT
64 y/o male here c/o RLE pain, coldness and discoloration to the toes. pt states " I have previous surgeries because of R femoral blockages in the past" pt endorses going to his vascular doctor Javier this morning, no pulse was found and sent to ED for admission and surgery. right foot noted cold, toes pale, cyanotic and delayed cap refill more than 3 sec.

## 2020-11-17 NOTE — H&P ADULT - NSHPSOCIALHISTORY_GEN_ALL_CORE
Quit smoking 30 years ago, smoked for 0 years less tan 1ppd, social drinking, denies recreational drugs

## 2020-11-17 NOTE — CONSULT NOTE ADULT - ASSESSMENT
64M PAD s/p right femPT bypass with arm vein in 1993, redone in 2011, s/p R SFA to peroneal bypass graft s/p revision on 7/30/18, and recently found to have recurrent restenosis of graft, s/p angiogram with angioplasty of R tibial artery graft, femoral artery graft, R ARNOL, R EIA, and R CFA 02/03/2020, MTHFR gene mutation homozygous and lupus anticoagulant on chronic coumadin, admitted with right leg critical limb ischemia    NEURO:  CV:  PULM:   GI/FEN:  :  ENDO: ISS  ID:  PPX: SCDs   LINES: PIVs,   WOUNDS/DRAINS: 64M PAD s/p right femPT bypass with arm vein in 1993, redone in 2011, s/p R SFA to peroneal bypass graft s/p revision on 7/30/18, and recently found to have recurrent restenosis of graft, s/p angiogram with angioplasty of R tibial artery graft, femoral artery graft, R ARNOL, R EIA, and R CFA 02/03/2020, MTHFR gene mutation homozygous and lupus anticoagulant on chronic coumadin, admitted with right leg critical limb ischemia    NEURO:  CV:  PULM: no resp distress,   GI/FEN:   :   ENDO: ISS  ID: no issues  PPX: no SCDs.   LINES: PIVs, tpa catheter/sheath.   WOUNDS/DRAINS: left groin puncture, tpa catheter/sheath. 64M MI 1993, lyme disease, PAD s/p right femPT bypass with arm vein in 1993, redone in 2011, s/p R SFA to peroneal bypass graft s/p revision on 7/30/18, and recently found to have recurrent restenosis of graft, s/p angiogram with angioplasty of R tibial artery graft, femoral artery graft, R ARNOL, R EIA, and R CFA 02/03/2020, MTHFR gene mutation homozygous and lupus anticoagulant on chronic coumadin, admitted with right leg critical limb ischemia    NEURO: tylenol PRN, neuro checks q1h, avoid sedating meds.   CV: SBP goal <150. nicardipine gtts PRN.   VASC: getting 0.5mg/hr tpa via cath, 0.5mg/hr tpa via sheath and hep 500/hr via PIV. check serial fibrinogen.   PULM: no resp distress,   GI/FEN: CLD, NPO after MN for return to OR.   : voids.   ENDO: ISS  ID: no issues  PPX: no SCDs.   LINES: PIVs, tpa catheter/sheath.   WOUNDS/DRAINS: left groin puncture, tpa catheter/sheath. 64M MI 1993, lyme disease, PAD s/p right femPT bypass with arm vein in 1993, redone in 2011, s/p R SFA to peroneal bypass graft s/p revision on 7/30/18, and recently found to have recurrent restenosis of graft, s/p angiogram with angioplasty of R tibial artery graft, femoral artery graft, R ARNOL, R EIA, and R CFA 02/03/2020, MTHFR gene mutation homozygous and lupus anticoagulant on chronic coumadin, admitted with right leg critical limb ischemia, s/p angio showing SFA-peroneal bypass filled with thrombus, s/p tpa bolus and temporary tpa catheter placed for continuous local tpa infusion. transferred to SICU post-op for close monitoring while getting continuous local tpa infusion.     NEURO: tylenol PRN, neuro checks q1h, avoid sedating meds.   CV: SBP goal <150. nicardipine gtts PRN.   VASC: getting 0.5mg/hr tpa via cath, 0.5mg/hr tpa via sheath and hep 500/hr via PIV. check serial fibrinogen.   PULM: no resp distress,   GI/FEN: CLD, NPO after MN for return to OR.   : voids.   ENDO: ISS  ID: no issues  PPX: no SCDs.   LINES: PIVs, tpa catheter/sheath.   WOUNDS/DRAINS: left groin puncture, tpa catheter/sheath.

## 2020-11-17 NOTE — ED PROVIDER NOTE - PMH
DVT (deep venous thrombosis)  femoral artery LLE  Lupus    Lyme disease    MI (myocardial infarction)  93  PVD (peripheral vascular disease)     DVT (deep venous thrombosis)  femoral artery LLE  Lupus    Lyme disease    MI (myocardial infarction)  93  PAD (peripheral artery disease)    PVD (peripheral vascular disease)

## 2020-11-18 ENCOUNTER — TRANSCRIPTION ENCOUNTER (OUTPATIENT)
Age: 65
End: 2020-11-18

## 2020-11-18 LAB
A1C WITH ESTIMATED AVERAGE GLUCOSE RESULT: 5.6 % — SIGNIFICANT CHANGE UP (ref 4–5.6)
ANION GAP SERPL CALC-SCNC: 10 MMOL/L — SIGNIFICANT CHANGE UP (ref 5–17)
BUN SERPL-MCNC: 11 MG/DL — SIGNIFICANT CHANGE UP (ref 7–23)
CALCIUM SERPL-MCNC: 8.8 MG/DL — SIGNIFICANT CHANGE UP (ref 8.4–10.5)
CHLORIDE SERPL-SCNC: 102 MMOL/L — SIGNIFICANT CHANGE UP (ref 96–108)
CO2 SERPL-SCNC: 24 MMOL/L — SIGNIFICANT CHANGE UP (ref 22–31)
CREAT SERPL-MCNC: 0.91 MG/DL — SIGNIFICANT CHANGE UP (ref 0.5–1.3)
ESTIMATED AVERAGE GLUCOSE: 114 MG/DL — SIGNIFICANT CHANGE UP (ref 68–114)
FIBRINOGEN PPP-MCNC: 130 MG/DL — LOW (ref 258–438)
FIBRINOGEN PPP-MCNC: 143 MG/DL — LOW (ref 258–438)
FIBRINOGEN PPP-MCNC: 148 MG/DL — LOW (ref 258–438)
FIBRINOGEN PPP-MCNC: 165 MG/DL — LOW (ref 258–438)
FIBRINOGEN PPP-MCNC: 200 MG/DL — LOW (ref 258–438)
FIBRINOGEN PPP-MCNC: 242 MG/DL — LOW (ref 258–438)
GLUCOSE BLDC GLUCOMTR-MCNC: 113 MG/DL — HIGH (ref 70–99)
GLUCOSE BLDC GLUCOMTR-MCNC: 96 MG/DL — SIGNIFICANT CHANGE UP (ref 70–99)
GLUCOSE BLDC GLUCOMTR-MCNC: 99 MG/DL — SIGNIFICANT CHANGE UP (ref 70–99)
GLUCOSE SERPL-MCNC: 118 MG/DL — HIGH (ref 70–99)
HCT VFR BLD CALC: 40.6 % — SIGNIFICANT CHANGE UP (ref 39–50)
HGB BLD-MCNC: 13.5 G/DL — SIGNIFICANT CHANGE UP (ref 13–17)
MAGNESIUM SERPL-MCNC: 1.9 MG/DL — SIGNIFICANT CHANGE UP (ref 1.6–2.6)
MCHC RBC-ENTMCNC: 30.3 PG — SIGNIFICANT CHANGE UP (ref 27–34)
MCHC RBC-ENTMCNC: 33.3 GM/DL — SIGNIFICANT CHANGE UP (ref 32–36)
MCV RBC AUTO: 91.2 FL — SIGNIFICANT CHANGE UP (ref 80–100)
NRBC # BLD: 0 /100 WBCS — SIGNIFICANT CHANGE UP (ref 0–0)
PHOSPHATE SERPL-MCNC: 3.4 MG/DL — SIGNIFICANT CHANGE UP (ref 2.5–4.5)
PLATELET # BLD AUTO: 189 K/UL — SIGNIFICANT CHANGE UP (ref 150–400)
POTASSIUM SERPL-MCNC: 4.3 MMOL/L — SIGNIFICANT CHANGE UP (ref 3.5–5.3)
POTASSIUM SERPL-SCNC: 4.3 MMOL/L — SIGNIFICANT CHANGE UP (ref 3.5–5.3)
RBC # BLD: 4.45 M/UL — SIGNIFICANT CHANGE UP (ref 4.2–5.8)
RBC # FLD: 12.6 % — SIGNIFICANT CHANGE UP (ref 10.3–14.5)
SARS-COV-2 IGG SERPL QL IA: NEGATIVE — SIGNIFICANT CHANGE UP
SARS-COV-2 IGM SERPL IA-ACNC: 0.08 INDEX — SIGNIFICANT CHANGE UP
SODIUM SERPL-SCNC: 136 MMOL/L — SIGNIFICANT CHANGE UP (ref 135–145)
WBC # BLD: 14.11 K/UL — HIGH (ref 3.8–10.5)
WBC # FLD AUTO: 14.11 K/UL — HIGH (ref 3.8–10.5)

## 2020-11-18 PROCEDURE — 99233 SBSQ HOSP IP/OBS HIGH 50: CPT | Mod: GC

## 2020-11-18 PROCEDURE — 37213 THROMBLYTIC ART/VEN THERAPY: CPT | Mod: GC

## 2020-11-18 RX ORDER — LIDOCAINE 4 G/100G
2 CREAM TOPICAL DAILY
Refills: 0 | Status: DISCONTINUED | OUTPATIENT
Start: 2020-11-18 | End: 2020-11-23

## 2020-11-18 RX ORDER — ALTEPLASE 100 MG
1 KIT INTRAVENOUS
Qty: 10 | Refills: 0 | Status: DISCONTINUED | OUTPATIENT
Start: 2020-11-18 | End: 2020-11-18

## 2020-11-18 RX ORDER — ALTEPLASE 100 MG
0.75 KIT INTRAVENOUS
Qty: 10 | Refills: 0 | Status: DISCONTINUED | OUTPATIENT
Start: 2020-11-18 | End: 2020-11-19

## 2020-11-18 RX ADMIN — ALTEPLASE 7.5 MG/HR: KIT at 23:06

## 2020-11-18 RX ADMIN — Medication 1000 MILLIGRAM(S): at 00:32

## 2020-11-18 RX ADMIN — Medication 81 MILLIGRAM(S): at 12:18

## 2020-11-18 RX ADMIN — Medication 1000 MILLIGRAM(S): at 05:23

## 2020-11-18 RX ADMIN — Medication 1000 MILLIGRAM(S): at 12:18

## 2020-11-18 RX ADMIN — LIDOCAINE 2 PATCH: 4 CREAM TOPICAL at 23:03

## 2020-11-18 RX ADMIN — Medication 1000 MILLIGRAM(S): at 06:25

## 2020-11-18 RX ADMIN — Medication 3 MILLIGRAM(S): at 00:00

## 2020-11-18 RX ADMIN — ALTEPLASE 10 MG/HR: KIT at 17:07

## 2020-11-18 RX ADMIN — Medication 1000 MILLIGRAM(S): at 19:15

## 2020-11-18 RX ADMIN — Medication 1000 MILLIGRAM(S): at 13:24

## 2020-11-18 RX ADMIN — Medication 3 MILLIGRAM(S): at 23:07

## 2020-11-18 RX ADMIN — Medication 1000 MILLIGRAM(S): at 18:35

## 2020-11-18 RX ADMIN — ALTEPLASE 10 MG/HR: KIT at 23:05

## 2020-11-18 RX ADMIN — ALTEPLASE 10 MG/HR: KIT at 09:45

## 2020-11-18 RX ADMIN — Medication 100 MILLIGRAM(S): at 04:42

## 2020-11-18 RX ADMIN — HEPARIN SODIUM 5 UNIT(S)/HR: 5000 INJECTION INTRAVENOUS; SUBCUTANEOUS at 09:45

## 2020-11-18 RX ADMIN — SODIUM CHLORIDE 80 MILLILITER(S): 9 INJECTION INTRAMUSCULAR; INTRAVENOUS; SUBCUTANEOUS at 23:04

## 2020-11-18 RX ADMIN — SODIUM CHLORIDE 80 MILLILITER(S): 9 INJECTION INTRAMUSCULAR; INTRAVENOUS; SUBCUTANEOUS at 10:30

## 2020-11-18 RX ADMIN — SODIUM CHLORIDE 80 MILLILITER(S): 9 INJECTION INTRAMUSCULAR; INTRAVENOUS; SUBCUTANEOUS at 04:42

## 2020-11-18 RX ADMIN — Medication 100 MILLIGRAM(S): at 12:21

## 2020-11-18 NOTE — PROVIDER CONTACT NOTE (CHANGE IN STATUS NOTIFICATION) - SITUATION
small hematoma noted at insertion site of TPA catheter; severe tenderness upon palpation; PA notified immediately

## 2020-11-18 NOTE — DIETITIAN INITIAL EVALUATION ADULT. - OTHER CALCULATIONS
%IBW=96; current body wt used for energy calculations as pt falls within % IBW, adjusted for age based on current conditions (suggest upper end of protein)

## 2020-11-18 NOTE — PROGRESS NOTE ADULT - ASSESSMENT
64M MI 1993, lyme disease, PAD s/p right femPT bypass with arm vein in 1993, redone in 2011, s/p R SFA to peroneal bypass graft s/p revision on 7/30/18, and recently found to have recurrent restenosis of graft, s/p angiogram with angioplasty of R tibial artery graft, femoral artery graft, R ARNOL, R EIA, and R CFA 02/03/2020, MTHFR gene mutation homozygous and lupus anticoagulant on chronic coumadin, admitted with right leg critical limb ischemia, s/p angio showing SFA-peroneal bypass filled with thrombus, s/p tpa bolus and temporary tpa catheter placed for continuous local tpa infusion. transferred to SICU post-op for close monitoring while getting continuous local tpa infusion.     NEURO: tylenol PRN, neuro checks q1h, avoid sedating meds.   CV: SBP goal <150. nicardipine gtts.   VASC: getting 0.5mg/hr tpa via cath, 0.5mg/hr tpa via sheath and hep 500/hr via PIV. serial fibrinogen.   PULM: no resp distress, room air  GI/FEN: NPO for return to OR today.   : voids.   ENDO: ISS  ID: no issues  PPX: no SCDs.   LINES: PIVs, tpa catheter/sheath.   WOUNDS/DRAINS: left groin puncture, tpa catheter/sheath.  flat bed rest while tpa catheter in 64M MI 1993, lyme disease, PAD s/p right femPT bypass with arm vein in 1993, redone in 2011, s/p R SFA to peroneal bypass graft s/p revision on 7/30/18, and recently found to have recurrent restenosis of graft, s/p angiogram with angioplasty of R tibial artery graft, femoral artery graft, R ARNOL, R EIA, and R CFA 02/03/2020, MTHFR gene mutation homozygous and lupus anticoagulant on chronic coumadin, admitted with right leg critical limb ischemia, s/p angio showing SFA-peroneal bypass filled with thrombus, s/p tpa bolus and temporary tpa catheter placed for continuous local tpa infusion. transferred to SICU post-op for close monitoring while getting continuous local tpa infusion. repeat angio this morning still with thrombus and no flow.    NEURO: tylenol PRN, neuro checks q1h, avoid sedating meds.   CV: SBP goal <150. nicardipine gtts.   VASC: repeat angio this morning still with thrombus and no flow. continue local tpa via catheter at 1mg/hr, with hep 500/hr via sheath. monitor serial fibrinogen. plan to return for another angio tomorrow.   PULM: no resp distress, room air  GI/FEN: CLD, NPO after MN for second repeat angio tomorrow.   : voids.   ENDO: ISS  ID: no issues  PPX: no SCDs.   LINES: PIVs, tpa catheter/sheath.   WOUNDS/DRAINS: left groin puncture, tpa catheter/sheath.  flat bed rest while tpa catheter in

## 2020-11-18 NOTE — DIETITIAN INITIAL EVALUATION ADULT. - OTHER INFO
66 y/o M, PMH of MTHFR mutation gene (2/2/ Lupus?), MI 1993, Lyme disease, PAD, PSH s/p R fem PT bypass with arm vein in 1993, redone in 2011. S/p R SFA to peroneal bypass graft revised on 7/30/2018, had a recurrent stenosis of the graft then angiogram, angioplasty of R tibial artery graft, femoral artery graft, R ARNOL, R EIA and R CFA on 02/03/2020. Pt with cold R foot, admits to tingling sensation but denies pain, weakness or change in color - sent to ED to be admitted for emergency angiogram, thrombolysis. Now s/p Angiogram, extremity, right 11/17 (+tpa). Pt transferred to SICU post-op for close monitoring while getting continuous local tpa infusion. +R Calf/leg pain. Jerrell 19. No edema. No pressure ulcers. GI: soft/nontender, +BM11/16. 64 y/o M, PMH of MTHFR mutation gene (2/2/ Lupus?), MI 1993, Lyme disease, PAD, PSH s/p R fem PT bypass with arm vein in 1993, redone in 2011. S/p R SFA to peroneal bypass graft revised on 7/30/2018, had a recurrent stenosis of the graft then angiogram, angioplasty of R tibial artery graft, femoral artery graft, R ARNOL, R EIA and R CFA on 02/03/2020. Pt with cold R foot, admits to tingling sensation but denies pain, weakness or change in color - sent to ED to be admitted for emergency angiogram, thrombolysis. Now s/p Angiogram, extremity, right 11/17 (+tpa). Pt transferred to SICU post-op for close monitoring while getting continuous local tpa infusion. +R Calf/leg pain. Jerrell 19. No edema. No pressure ulcers. GI: soft/nontender, +BM11/16.  Spoke with pt, RN and team. Pt on clear liquid diet at this time, per Team plan for clears as plan for RTOR has not yet been determined. Pt tolerating clears at this time and reports being hungry. Pt reports good PO intake PTA, on regular diet. No issues chewing/swallowing, NKFA. UBW or wt changes not stated, unable to have pt focus 2/2 continual reports of wanting to eat. Explained to be diet likely to be Pending OR.   Please see below for nutritions recommendations.

## 2020-11-18 NOTE — PROGRESS NOTE ADULT - PROVIDER SPECIALTY LIST ADULT
SICU - Criticaid and Medihoney with diaper changes  - Oxygen therapy to site  - Continue Oxycodone 0.05 mg/kg q4 ATC.  Consider tachycardia secondary to pain? increase pain regimen  - Morphine 0.1 mg IV q6 prn  - follow up w/ Dr. Haque and NICU for recommendations

## 2020-11-18 NOTE — PROVIDER CONTACT NOTE (CHANGE IN STATUS NOTIFICATION) - ACTION/TREATMENT ORDERED:
PA held manual pressure for 10 minutes; site now soft however remains tender; Tylenol given; will continue to monitor closely

## 2020-11-18 NOTE — BRIEF OPERATIVE NOTE - COMMENTS
Upon arrival to SICU, get stat labs: CBC, BMP, Mag, Phos, PT/INR, PTT, fibrinogen. Then check fibrinogen Q4H thereafter. If fibrinogen < 200, call vascular team to notify. If < 150, stop tPA in blue infusion catheter, switch heparin from PIV to blue infusion catheter at 500 U/hr (5 cc/hr), and continue tPA at 0.5 mg/hr (5 cc/hr) via side arm of black sheath. If < 100, stop tPA completely, and increase heparin drip at blue Prema infusion catheter to therapeutic goal, PTT 60-80. No narcotics, stroke checks Q1H, and doppler checks Q1H. Clear liquids okay now in reverse Trendelenburg position (legs strictly flat all night), but NPO past midnight for 2nd look angiogram tomorrow.
Upon arrival to SICU, get stat fibrinogen. Then check fibrinogen Q4H thereafter. If fibrinogen < 200, call vascular team to notify. If < 150, decrease tPA to 0.5 mg/hr (5 cc/hr). If < 100, stop tPA, and switch heparin drip to blue Prema infusion catheter and titrate to therapeutic goal, PTT 60-80. No narcotics, stroke checks Q1H, and doppler checks Q1H. Clear liquids okay now in reverse Trendelenburg position (legs strictly flat all night), but NPO past midnight for another angiogram tomorrow.

## 2020-11-18 NOTE — PROGRESS NOTE ADULT - SUBJECTIVE AND OBJECTIVE BOX
S: No new issues/events overnight, no new med c/o    O: ICU Vital Signs Last 24 Hrs  T(F): 98.9 (11-18-20 @ 06:20), Max: 99 (11-17-20 @ 20:07)  HR: 67 (11-18-20 @ 07:00) (55 - 88)  BP: 117/67 (11-18-20 @ 07:00) (103/66 - 184/98)  BP(mean): 83 (11-18-20 @ 07:00) (80 - 115)  ABP: 122/60 (11-18-20 @ 07:00)  RR: 19 (11-18-20 @ 07:00) (10 - 25)  SpO2: 96% (11-18-20 @ 07:00) (94% - 99%)    PHYSICAL EXAM:   Neurological: AAOx3, CNII-XII intact,  strength 5/5 b/l  Cardiovascular: RRR  Respiratory: CTA  Gastrointestinal: soft, NT, ND, BS+  Extremities: no dependent edema  Vascular: right leg cold, with no DP/PT signal, but has palpable graft. left leg warm with palpable DP/PT. no cyanosis. left groin puncture site no hematoma, no bleeding, catheter/sheath intact.     LABS:    11-18    136  |  102  |  11  ----------------------------<  118<H>  4.3   |  24  |  0.91    Ca    8.8      18 Nov 2020 04:15  Phos  3.4     11-18  Mg     1.9     11-18                          13.5   14.11 )-----------( 189      ( 18 Nov 2020 04:15 )             40.6   PT/INR - ( 17 Nov 2020 15:27 )   PT: 36.2 sec;   INR: 3.19          PTT - ( 17 Nov 2020 15:27 )  PTT:184.4 sec  CAPILLARY BLOOD GLUCOSE      POCT Blood Glucose.: 131 mg/dL (17 Nov 2020 23:47)  POCT Blood Glucose.: 93 mg/dL (17 Nov 2020 17:34)    MEDICATIONS  (STANDING):  alteplase    Infusion . 1 mG/Hr (10 mL/Hr) IntraCatheter.. <Continuous>  aspirin  chewable 81 milliGRAM(s) Oral daily  ceFAZolin   IVPB 2000 milliGRAM(s) IV Intermittent every 8 hours  heparin  Infusion 500 Unit(s)/Hr (5 mL/Hr) IV Continuous <Continuous>  influenza   Vaccine 0.5 milliLiter(s) IntraMuscular once  insulin lispro (ADMELOG) corrective regimen sliding scale   SubCutaneous every 6 hours  melatonin 3 milliGRAM(s) Oral at bedtime  niCARdipine Infusion 5 mG/Hr (25 mL/Hr) IV Continuous <Continuous>  sodium chloride 0.9%. 1000 milliLiter(s) (80 mL/Hr) IV Continuous <Continuous>    MEDICATIONS  (PRN):  acetaminophen   Tablet .. 1000 milliGRAM(s) Oral every 6 hours PRN Moderate Pain (4 - 6)      Addison:	  [x ] None	[ ] Daily Addison Order Placed	   Indication:	  [ ] Strict I and O's    [ ] Obstruction     [ ] Incontinence + Stage 3 or 4 Decubitus  Central Line:  [ x] None	   [ ]  Medication / TPN Administration     [ ] No Peripheral IV

## 2020-11-19 ENCOUNTER — TRANSCRIPTION ENCOUNTER (OUTPATIENT)
Age: 65
End: 2020-11-19

## 2020-11-19 LAB
ANION GAP SERPL CALC-SCNC: 10 MMOL/L — SIGNIFICANT CHANGE UP (ref 5–17)
ANION GAP SERPL CALC-SCNC: 11 MMOL/L — SIGNIFICANT CHANGE UP (ref 5–17)
ANION GAP SERPL CALC-SCNC: 12 MMOL/L — SIGNIFICANT CHANGE UP (ref 5–17)
APTT BLD: 49.2 SEC — HIGH (ref 27.5–35.5)
APTT BLD: 87.5 SEC — HIGH (ref 27.5–35.5)
APTT BLD: 89.4 SEC — HIGH (ref 27.5–35.5)
BUN SERPL-MCNC: 10 MG/DL — SIGNIFICANT CHANGE UP (ref 7–23)
BUN SERPL-MCNC: 10 MG/DL — SIGNIFICANT CHANGE UP (ref 7–23)
BUN SERPL-MCNC: 11 MG/DL — SIGNIFICANT CHANGE UP (ref 7–23)
CALCIUM SERPL-MCNC: 7.9 MG/DL — LOW (ref 8.4–10.5)
CALCIUM SERPL-MCNC: 8 MG/DL — LOW (ref 8.4–10.5)
CALCIUM SERPL-MCNC: 8.6 MG/DL — SIGNIFICANT CHANGE UP (ref 8.4–10.5)
CHLORIDE SERPL-SCNC: 102 MMOL/L — SIGNIFICANT CHANGE UP (ref 96–108)
CHLORIDE SERPL-SCNC: 103 MMOL/L — SIGNIFICANT CHANGE UP (ref 96–108)
CHLORIDE SERPL-SCNC: 104 MMOL/L — SIGNIFICANT CHANGE UP (ref 96–108)
CO2 SERPL-SCNC: 21 MMOL/L — LOW (ref 22–31)
CO2 SERPL-SCNC: 22 MMOL/L — SIGNIFICANT CHANGE UP (ref 22–31)
CO2 SERPL-SCNC: 23 MMOL/L — SIGNIFICANT CHANGE UP (ref 22–31)
CREAT SERPL-MCNC: 0.81 MG/DL — SIGNIFICANT CHANGE UP (ref 0.5–1.3)
CREAT SERPL-MCNC: 0.84 MG/DL — SIGNIFICANT CHANGE UP (ref 0.5–1.3)
CREAT SERPL-MCNC: 0.85 MG/DL — SIGNIFICANT CHANGE UP (ref 0.5–1.3)
FIBRINOGEN PPP-MCNC: 184 MG/DL — LOW (ref 258–438)
FIBRINOGEN PPP-MCNC: 212 MG/DL — LOW (ref 258–438)
FIBRINOGEN PPP-MCNC: 241 MG/DL — LOW (ref 258–438)
GLUCOSE BLDC GLUCOMTR-MCNC: 123 MG/DL — HIGH (ref 70–99)
GLUCOSE BLDC GLUCOMTR-MCNC: 131 MG/DL — HIGH (ref 70–99)
GLUCOSE BLDC GLUCOMTR-MCNC: 178 MG/DL — HIGH (ref 70–99)
GLUCOSE BLDC GLUCOMTR-MCNC: 67 MG/DL — LOW (ref 70–99)
GLUCOSE BLDC GLUCOMTR-MCNC: 95 MG/DL — SIGNIFICANT CHANGE UP (ref 70–99)
GLUCOSE SERPL-MCNC: 123 MG/DL — HIGH (ref 70–99)
GLUCOSE SERPL-MCNC: 125 MG/DL — HIGH (ref 70–99)
GLUCOSE SERPL-MCNC: 162 MG/DL — HIGH (ref 70–99)
HCT VFR BLD CALC: 24.6 % — LOW (ref 39–50)
HCT VFR BLD CALC: 26.3 % — LOW (ref 39–50)
HCT VFR BLD CALC: 33 % — LOW (ref 39–50)
HGB BLD-MCNC: 11.2 G/DL — LOW (ref 13–17)
HGB BLD-MCNC: 8.1 G/DL — LOW (ref 13–17)
HGB BLD-MCNC: 8.9 G/DL — LOW (ref 13–17)
INR BLD: 2.67 — HIGH (ref 0.88–1.16)
INR BLD: 2.69 — HIGH (ref 0.88–1.16)
MAGNESIUM SERPL-MCNC: 1.6 MG/DL — SIGNIFICANT CHANGE UP (ref 1.6–2.6)
MAGNESIUM SERPL-MCNC: 1.8 MG/DL — SIGNIFICANT CHANGE UP (ref 1.6–2.6)
MAGNESIUM SERPL-MCNC: 1.8 MG/DL — SIGNIFICANT CHANGE UP (ref 1.6–2.6)
MCHC RBC-ENTMCNC: 30.1 PG — SIGNIFICANT CHANGE UP (ref 27–34)
MCHC RBC-ENTMCNC: 30.2 PG — SIGNIFICANT CHANGE UP (ref 27–34)
MCHC RBC-ENTMCNC: 30.3 PG — SIGNIFICANT CHANGE UP (ref 27–34)
MCHC RBC-ENTMCNC: 32.9 GM/DL — SIGNIFICANT CHANGE UP (ref 32–36)
MCHC RBC-ENTMCNC: 33.8 GM/DL — SIGNIFICANT CHANGE UP (ref 32–36)
MCHC RBC-ENTMCNC: 33.9 GM/DL — SIGNIFICANT CHANGE UP (ref 32–36)
MCV RBC AUTO: 88.9 FL — SIGNIFICANT CHANGE UP (ref 80–100)
MCV RBC AUTO: 89.5 FL — SIGNIFICANT CHANGE UP (ref 80–100)
MCV RBC AUTO: 91.4 FL — SIGNIFICANT CHANGE UP (ref 80–100)
NRBC # BLD: 0 /100 WBCS — SIGNIFICANT CHANGE UP (ref 0–0)
PHOSPHATE SERPL-MCNC: 3.2 MG/DL — SIGNIFICANT CHANGE UP (ref 2.5–4.5)
PHOSPHATE SERPL-MCNC: 3.3 MG/DL — SIGNIFICANT CHANGE UP (ref 2.5–4.5)
PHOSPHATE SERPL-MCNC: 3.6 MG/DL — SIGNIFICANT CHANGE UP (ref 2.5–4.5)
PLATELET # BLD AUTO: 144 K/UL — LOW (ref 150–400)
PLATELET # BLD AUTO: 153 K/UL — SIGNIFICANT CHANGE UP (ref 150–400)
PLATELET # BLD AUTO: 171 K/UL — SIGNIFICANT CHANGE UP (ref 150–400)
POTASSIUM SERPL-MCNC: 4 MMOL/L — SIGNIFICANT CHANGE UP (ref 3.5–5.3)
POTASSIUM SERPL-MCNC: 4 MMOL/L — SIGNIFICANT CHANGE UP (ref 3.5–5.3)
POTASSIUM SERPL-MCNC: 4.1 MMOL/L — SIGNIFICANT CHANGE UP (ref 3.5–5.3)
POTASSIUM SERPL-SCNC: 4 MMOL/L — SIGNIFICANT CHANGE UP (ref 3.5–5.3)
POTASSIUM SERPL-SCNC: 4 MMOL/L — SIGNIFICANT CHANGE UP (ref 3.5–5.3)
POTASSIUM SERPL-SCNC: 4.1 MMOL/L — SIGNIFICANT CHANGE UP (ref 3.5–5.3)
PROTHROM AB SERPL-ACNC: 30.6 SEC — HIGH (ref 10.6–13.6)
PROTHROM AB SERPL-ACNC: 30.8 SEC — HIGH (ref 10.6–13.6)
RBC # BLD: 2.69 M/UL — LOW (ref 4.2–5.8)
RBC # BLD: 2.94 M/UL — LOW (ref 4.2–5.8)
RBC # BLD: 3.71 M/UL — LOW (ref 4.2–5.8)
RBC # FLD: 12.4 % — SIGNIFICANT CHANGE UP (ref 10.3–14.5)
RBC # FLD: 12.5 % — SIGNIFICANT CHANGE UP (ref 10.3–14.5)
RBC # FLD: 12.6 % — SIGNIFICANT CHANGE UP (ref 10.3–14.5)
SODIUM SERPL-SCNC: 136 MMOL/L — SIGNIFICANT CHANGE UP (ref 135–145)
WBC # BLD: 13.47 K/UL — HIGH (ref 3.8–10.5)
WBC # BLD: 17.05 K/UL — HIGH (ref 3.8–10.5)
WBC # BLD: 17.26 K/UL — HIGH (ref 3.8–10.5)
WBC # FLD AUTO: 13.47 K/UL — HIGH (ref 3.8–10.5)
WBC # FLD AUTO: 17.05 K/UL — HIGH (ref 3.8–10.5)
WBC # FLD AUTO: 17.26 K/UL — HIGH (ref 3.8–10.5)

## 2020-11-19 PROCEDURE — 37224: CPT | Mod: GC

## 2020-11-19 PROCEDURE — 37184 PRIM ART M-THRMBC 1ST VSL: CPT | Mod: GC

## 2020-11-19 PROCEDURE — 99233 SBSQ HOSP IP/OBS HIGH 50: CPT | Mod: GC

## 2020-11-19 PROCEDURE — 37214 CESSJ THERAPY CATH REMOVAL: CPT | Mod: 59,GC

## 2020-11-19 RX ORDER — DEXTROSE 50 % IN WATER 50 %
12.5 SYRINGE (ML) INTRAVENOUS ONCE
Refills: 0 | Status: DISCONTINUED | OUTPATIENT
Start: 2020-11-19 | End: 2020-11-20

## 2020-11-19 RX ORDER — ACETAMINOPHEN 500 MG
1000 TABLET ORAL ONCE
Refills: 0 | Status: COMPLETED | OUTPATIENT
Start: 2020-11-19 | End: 2020-11-19

## 2020-11-19 RX ORDER — CLOPIDOGREL BISULFATE 75 MG/1
75 TABLET, FILM COATED ORAL DAILY
Refills: 0 | Status: DISCONTINUED | OUTPATIENT
Start: 2020-11-19 | End: 2020-11-19

## 2020-11-19 RX ORDER — OXYCODONE AND ACETAMINOPHEN 5; 325 MG/1; MG/1
2 TABLET ORAL EVERY 4 HOURS
Refills: 0 | Status: DISCONTINUED | OUTPATIENT
Start: 2020-11-19 | End: 2020-11-23

## 2020-11-19 RX ORDER — DEXTROSE 50 % IN WATER 50 %
25 SYRINGE (ML) INTRAVENOUS ONCE
Refills: 0 | Status: DISCONTINUED | OUTPATIENT
Start: 2020-11-19 | End: 2020-11-20

## 2020-11-19 RX ORDER — DEXTROSE 50 % IN WATER 50 %
15 SYRINGE (ML) INTRAVENOUS ONCE
Refills: 0 | Status: DISCONTINUED | OUTPATIENT
Start: 2020-11-19 | End: 2020-11-20

## 2020-11-19 RX ORDER — SENNA PLUS 8.6 MG/1
2 TABLET ORAL AT BEDTIME
Refills: 0 | Status: DISCONTINUED | OUTPATIENT
Start: 2020-11-19 | End: 2020-11-23

## 2020-11-19 RX ORDER — CEFAZOLIN SODIUM 1 G
2000 VIAL (EA) INJECTION EVERY 8 HOURS
Refills: 0 | Status: COMPLETED | OUTPATIENT
Start: 2020-11-19 | End: 2020-11-20

## 2020-11-19 RX ORDER — SODIUM CHLORIDE 9 MG/ML
1000 INJECTION, SOLUTION INTRAVENOUS
Refills: 0 | Status: DISCONTINUED | OUTPATIENT
Start: 2020-11-19 | End: 2020-11-20

## 2020-11-19 RX ORDER — HEPARIN SODIUM 5000 [USP'U]/ML
1350 INJECTION INTRAVENOUS; SUBCUTANEOUS
Qty: 25000 | Refills: 0 | Status: DISCONTINUED | OUTPATIENT
Start: 2020-11-19 | End: 2020-11-20

## 2020-11-19 RX ORDER — INSULIN LISPRO 100/ML
VIAL (ML) SUBCUTANEOUS
Refills: 0 | Status: DISCONTINUED | OUTPATIENT
Start: 2020-11-19 | End: 2020-11-20

## 2020-11-19 RX ORDER — MAGNESIUM SULFATE 500 MG/ML
2 VIAL (ML) INJECTION ONCE
Refills: 0 | Status: COMPLETED | OUTPATIENT
Start: 2020-11-19 | End: 2020-11-19

## 2020-11-19 RX ORDER — WARFARIN SODIUM 2.5 MG/1
10 TABLET ORAL ONCE
Refills: 0 | Status: COMPLETED | OUTPATIENT
Start: 2020-11-19 | End: 2020-11-19

## 2020-11-19 RX ORDER — OXYCODONE AND ACETAMINOPHEN 5; 325 MG/1; MG/1
1 TABLET ORAL EVERY 4 HOURS
Refills: 0 | Status: DISCONTINUED | OUTPATIENT
Start: 2020-11-19 | End: 2020-11-23

## 2020-11-19 RX ADMIN — Medication 100 MILLIGRAM(S): at 23:26

## 2020-11-19 RX ADMIN — OXYCODONE AND ACETAMINOPHEN 2 TABLET(S): 5; 325 TABLET ORAL at 12:26

## 2020-11-19 RX ADMIN — OXYCODONE AND ACETAMINOPHEN 1 TABLET(S): 5; 325 TABLET ORAL at 18:39

## 2020-11-19 RX ADMIN — Medication 3 MILLIGRAM(S): at 23:26

## 2020-11-19 RX ADMIN — Medication 1000 MILLIGRAM(S): at 03:54

## 2020-11-19 RX ADMIN — WARFARIN SODIUM 10 MILLIGRAM(S): 2.5 TABLET ORAL at 23:26

## 2020-11-19 RX ADMIN — SENNA PLUS 2 TABLET(S): 8.6 TABLET ORAL at 23:26

## 2020-11-19 RX ADMIN — CLOPIDOGREL BISULFATE 75 MILLIGRAM(S): 75 TABLET, FILM COATED ORAL at 12:22

## 2020-11-19 RX ADMIN — Medication 400 MILLIGRAM(S): at 21:43

## 2020-11-19 RX ADMIN — OXYCODONE AND ACETAMINOPHEN 2 TABLET(S): 5; 325 TABLET ORAL at 13:26

## 2020-11-19 RX ADMIN — Medication 50 GRAM(S): at 05:23

## 2020-11-19 RX ADMIN — HEPARIN SODIUM 11 UNIT(S)/HR: 5000 INJECTION INTRAVENOUS; SUBCUTANEOUS at 13:44

## 2020-11-19 RX ADMIN — ALTEPLASE 7.5 MG/HR: KIT at 05:14

## 2020-11-19 RX ADMIN — LIDOCAINE 2 PATCH: 4 CREAM TOPICAL at 07:17

## 2020-11-19 RX ADMIN — HEPARIN SODIUM 14 UNIT(S)/HR: 5000 INJECTION INTRAVENOUS; SUBCUTANEOUS at 15:25

## 2020-11-19 RX ADMIN — Medication 100 MILLIGRAM(S): at 16:40

## 2020-11-19 RX ADMIN — SODIUM CHLORIDE 125 MILLILITER(S): 9 INJECTION INTRAMUSCULAR; INTRAVENOUS; SUBCUTANEOUS at 13:50

## 2020-11-19 RX ADMIN — Medication 2: at 19:05

## 2020-11-19 RX ADMIN — Medication 1000 MILLIGRAM(S): at 05:00

## 2020-11-19 RX ADMIN — Medication 81 MILLIGRAM(S): at 12:22

## 2020-11-19 RX ADMIN — Medication 1000 MILLIGRAM(S): at 22:20

## 2020-11-19 RX ADMIN — OXYCODONE AND ACETAMINOPHEN 1 TABLET(S): 5; 325 TABLET ORAL at 15:39

## 2020-11-19 NOTE — DISCHARGE NOTE PROVIDER - HOSPITAL COURSE
This is a 64 y/o M, PMH of MTHFR mutation gene (2/2/ Lupus? follows wit Dr. Soria), MI 1993 (no intervention done) Lyme disease , PAD, PSH s/p R fem PT bypass with arm vein in 1993, redone in 2011. S/p R SFA to peroneal bypass graft revised on 7/30/2018, had a recurrent stenosis of the graft then angiogram, angioplasty of R tibial artery graft, femoral artery graft, R ARNOL, R EIA and R CFA on 02/03/2020, saw Dr. Herrera today in the clinic as the patient woke up yesterday with cold R foot, admits to tingling sensation but denies pain, weakness or change in color.    Patient was admitted and started on a heparin gtt. He was taken to the OR for angiogram and underwent placement of tPA infusion catheter. He was admitted to the SICU for monitoring for neurovascular check. Post-op he was noted to have a heamtoma at the Left groin catheter site. manual pressure was held for 10minutes. Patient was taken back to OR for angiogram 11/18 with persistent thrombus in Rt Fem PT bypass. tPA infusion was continued for another 24 hours. This is a 66 y/o M, PMH of MTHFR mutation gene (2/2/ Lupus? follows wit Dr. Soria), MI 1993 (no intervention done) Lyme disease , PAD, PSH s/p R fem PT bypass with arm vein in 1993, redone in 2011. S/p R SFA to peroneal bypass graft revised on 7/30/2018, had a recurrent stenosis of the graft then angiogram, angioplasty of R tibial artery graft, femoral artery graft, R ARNOL, R EIA and R CFA on 02/03/2020, saw Dr. Herrera today in the clinic as the patient woke up yesterday with cold R foot, admits to tingling sensation but denies pain, weakness or change in color.    Patient was admitted and started on a heparin gtt. He was taken to the OR for angiogram and underwent placement of tPA infusion catheter. He was admitted to the SICU for monitoring for neurovascular check. Post-op he was noted to have a heamtoma at the Left groin catheter site. manual pressure was held for 10minutes. Patient was taken back to OR for angiogram 11/18 with persistent thrombus in Rt Fem PT bypass. tPA infusion was continued for another 24 hours. Overnight was given 1u PRBC. POD1 berman was removed passed TOV and. 1u PRBC given for hgb 7.7.  Patient was stepped down and started on coumadin 10. POD2 developed RUE hematoma.  CT a/p showed Rt upper thigh hematoma intramuscular. Hgb 7.6 1u PRBC given.     On day of discharge patient was stable to be d/c'd home INR was 3.42.  Will be discharged on coumadin 7.5 daily.

## 2020-11-19 NOTE — PROGRESS NOTE ADULT - SUBJECTIVE AND OBJECTIVE BOX
S: No new issues/events overnight, no new med c/o    O: ICU Vital Signs Last 24 Hrs  T(F): 98.7 (11-19-20 @ 06:09), Max: 98.7 (11-19-20 @ 06:09)  HR: 71 (11-19-20 @ 08:00) (53 - 85)  BP: 153/79 (11-19-20 @ 08:00) (125/79 - 155/86)  BP(mean): 108 (11-19-20 @ 08:00) (97 - 114)  ABP: 139/63 (11-19-20 @ 08:00)  RR: 17 (11-19-20 @ 08:00) (12 - 30)  SpO2: 97% (11-19-20 @ 08:00) (95% - 100%)    PHYSICAL EXAM:   Neurological: AAOx3, CNII-XII intact,  strength 5/5 b/l  Cardiovascular: RRR  Respiratory: CTA  Gastrointestinal: soft, NT, ND, BS+  Extremities: no dependent edema  Vascular: right leg cool, has PT signal, no DP signal, has palpable graft. left leg warm with palpable DP/PT. no cyanosis. left groin puncture with hematoma, mild oozing (Vascular service aware), catheter/sheath intact.     LABS:    11-19    136  |  103  |  11  ----------------------------<  123<H>  4.1   |  21<L>  |  0.85    Ca    8.6      19 Nov 2020 03:26  Phos  3.3     11-19  Mg     1.8     11-19                          11.2   13.47 )-----------( 171      ( 19 Nov 2020 03:26 )             33.0   PT/INR - ( 17 Nov 2020 15:27 )   PT: 36.2 sec;   INR: 3.19          PTT - ( 17 Nov 2020 15:27 )  PTT:184.4 sec  CAPILLARY BLOOD GLUCOSE      POCT Blood Glucose.: 95 mg/dL (19 Nov 2020 07:16)  POCT Blood Glucose.: 96 mg/dL (18 Nov 2020 23:24)  POCT Blood Glucose.: 113 mg/dL (18 Nov 2020 17:13)  POCT Blood Glucose.: 99 mg/dL (18 Nov 2020 12:48)    MEDICATIONS  (STANDING):  alteplase    Infusion . 0.75 mG/Hr (7.5 mL/Hr) IntraCatheter.. <Continuous>  aspirin  chewable 81 milliGRAM(s) Oral daily  glucagon  Injectable 1 milliGRAM(s) IntraMuscular once  heparin  Infusion 500 Unit(s)/Hr (5 mL/Hr) IV Continuous <Continuous>  influenza   Vaccine 0.5 milliLiter(s) IntraMuscular once  insulin lispro (ADMELOG) corrective regimen sliding scale   SubCutaneous every 6 hours  lidocaine   Patch 2 Patch Transdermal daily  melatonin 3 milliGRAM(s) Oral at bedtime  sodium chloride 0.9%. 1000 milliLiter(s) (80 mL/Hr) IV Continuous <Continuous>    MEDICATIONS  (PRN):  acetaminophen   Tablet .. 1000 milliGRAM(s) Oral every 6 hours PRN Moderate Pain (4 - 6)      Addison:	  [x ] None	[ ] Daily Addison Order Placed	   Indication:	  [ ] Strict I and O's    [ ] Obstruction     [ ] Incontinence + Stage 3 or 4 Decubitus  Central Line:  [x] None	   [ ]  Medication / TPN Administration     [ ] No Peripheral IV

## 2020-11-19 NOTE — DISCHARGE NOTE PROVIDER - NSDCCPTREATMENT_GEN_ALL_CORE_FT
PRINCIPAL PROCEDURE  Procedure: Angiogram, extremity, right  Findings and Treatment: with thrombolysis       PRINCIPAL PROCEDURE  Procedure: Angiogram, extremity, right  Findings and Treatment: with thrombolysis      SECONDARY PROCEDURE  Procedure: Angiogram, extremity, right  Findings and Treatment: with thrombectomy and angioplasty

## 2020-11-19 NOTE — BRIEF OPERATIVE NOTE - NSICDXBRIEFPOSTOP_GEN_ALL_CORE_FT
POST-OP DIAGNOSIS:  Limb ischemia 17-Nov-2020 15:23:21 acute Antonio Terrell  

## 2020-11-19 NOTE — BRIEF OPERATIVE NOTE - OPERATION/FINDINGS
Left CFA access with max 6F sheath. Aortogram showed patent aorta and b/l iliacs. RLE angio showed patent CFA and PFA, patent proximal SFA, SFA-peroneal bypass graft filled with thrombus. Placed Prema infusion catheter (50 cm infusion length) into bypass graft. Gave 4 mg bolus tPA. Catheter/sheath secured in place. At end of case, tPA 0.5 mg/hr (5 cc/hr) going through blue infusion catheter, tPA 0.5 mg/hr (5 cc/hr) going through side arm of black sheath, and heparin 500 U/hr (5 cc/hr) going through peripheral IV.
Right leg angiogram with thrombectomy and angioplasty (7x100 mustang) of bypass graft stenoses. At completion, patent bypass, and poor outflow to foot, but there is a branch of peroneal that fills PT distally. Left groin sheath removed, closed with proglide perclose suture. Held pressure on groin for 30 mins. Hemostasis achieved. Groin bruised but softer than pre-op.     Monophasic R PT signal at end of case.    contrast: 140 cc
RLE angiogram showed persistent thrombus in bypass graft. No outflow at all into right foot. New Prema catheter placed in distal right calf just above ankle. Gave tPA 2 mg bolus. Infusing via prema at 1 mg/hr, heparin at 500 U/hr via side arm of sheath.

## 2020-11-19 NOTE — DISCHARGE NOTE PROVIDER - NSDCFUSCHEDAPPT_GEN_ALL_CORE_FT
BIBIANA FARRELL ; 12/23/2020 ; [facility] [address] BIBIANA FARRELL ; 11/25/2020 ; [facility] [address]

## 2020-11-19 NOTE — DISCHARGE NOTE PROVIDER - NSDCMRMEDTOKEN_GEN_ALL_CORE_FT
aspirin 81 mg oral tablet: 1 tab(s) orally once a day  Coumadin 7.5 mg oral tablet: 1 tab(s) orally once a day  enoxaparin: 115 milligram(s) subcutaneous once a day  Lovenox 120 mg/0.8 mL injectable solution: 115 milligram(s) subcutaneously once a day    acetaminophen 325 mg oral tablet: 2 tab(s) orally every 6 hours, As needed, Temp greater or equal to 38C (100.4F), Mild Pain (1 - 3)  aspirin 81 mg oral tablet: 1 tab(s) orally once a day  Coumadin 7.5 mg oral tablet: 1 tab(s) orally once a day   acetaminophen 325 mg oral tablet: 2 tab(s) orally every 6 hours, As needed, Temp greater or equal to 38C (100.4F), Mild Pain (1 - 3)  aspirin 81 mg oral tablet: 1 tab(s) orally once a day  Coumadin 5 mg oral tablet: 1 tab(s) orally once a day (at bedtime)   Coumadin 7.5 mg oral tablet: 1 tab(s) orally once a day

## 2020-11-19 NOTE — PROGRESS NOTE ADULT - SUBJECTIVE AND OBJECTIVE BOX
Vascular Surgery Post-Op Note, PCN:     Pre-Op Dx: VASCULAR OCCLUSION    Limb ischemia    Critical limb ischemia with history of revascularization of same extremity    Vascular occlusion      Procedure: Angiogram, extremity, right      Surgeon: Dr. Herrera/Dr. Pineda    Subjective:  Patient is having pain in his R foot  Sensation is better      Vital Signs Last 24 Hrs  T(C): 36.7 (19 Nov 2020 17:00), Max: 37.1 (19 Nov 2020 06:09)  T(F): 98.1 (19 Nov 2020 17:00), Max: 98.7 (19 Nov 2020 06:09)  HR: 96 (19 Nov 2020 18:08) (62 - 101)  BP: 129/84 (19 Nov 2020 18:08) (118/84 - 155/86)  BP(mean): 92 (19 Nov 2020 18:08) (90 - 118)  RR: 17 (19 Nov 2020 18:08) (12 - 30)  SpO2: 98% (19 Nov 2020 18:08) (95% - 100%)    Physical Exam:  General: NAD, resting comfortably  Pulmonary: normal resp effort  Cardiovascular: NSR  Abdominal: soft, NT/ND  Extremities: WWP, normal strength, no clubbing/cyanosis/edema, left groin hematoma is stable and soft,   RLE: palpable graft pulse and PT Biphasic  Neuro: A/O x 3, no focal deficits, sensation normal    LABS:                        8.9    17.05 )-----------( 144      ( 19 Nov 2020 12:53 )             26.3     11-19    136  |  104  |  10  ----------------------------<  125<H>  4.0   |  22  |  0.84    Ca    8.0<L>      19 Nov 2020 12:53  Phos  3.2     11-19  Mg     1.8     11-19      PT/INR - ( 19 Nov 2020 12:53 )   PT: 30.8 sec;   INR: 2.69          PTT - ( 19 Nov 2020 12:53 )  PTT:49.2 sec  CAPILLARY BLOOD GLUCOSE      POCT Blood Glucose.: 123 mg/dL (19 Nov 2020 13:02)  POCT Blood Glucose.: 95 mg/dL (19 Nov 2020 07:16)  POCT Blood Glucose.: 96 mg/dL (18 Nov 2020 23:24)          Radiology and Additional Studies:    Assessment:65y Male s/p above procedure    Plan:  - Continue q1h NV and groin checks  - Rest of care is per SICU

## 2020-11-19 NOTE — BRIEF OPERATIVE NOTE - NSICDXBRIEFPROCEDURE_GEN_ALL_CORE_FT
PROCEDURES:  Angiogram, extremity, right 17-Nov-2020 15:23:59 with thrombolysis Antonio Terrell  
PROCEDURES:  Angiogram, extremity, right 17-Nov-2020 15:23:59 with thrombectomy and angioplasty Antonio Terrell  
PROCEDURES:  Angiogram, extremity, right 17-Nov-2020 15:23:59 with thrombolysis Antonio Terrell

## 2020-11-19 NOTE — PROGRESS NOTE ADULT - ASSESSMENT
A/P: 65yMale s/p RLE angio and infusion cath reposition and tPA bolus 2mg.    Going to OR again today for relook angio.  Vitals are stable, hematoma is stable Hgb 11.5  Palpable graft pulse, PT is biphasic  Plan:  - Keep NPO  - Rest of care as per SICU team

## 2020-11-19 NOTE — DISCHARGE NOTE PROVIDER - NSDCCPCAREPLAN_GEN_ALL_CORE_FT
PRINCIPAL DISCHARGE DIAGNOSIS  Diagnosis: Critical limb ischemia with history of revascularization of same extremity  Assessment and Plan of Treatment:       SECONDARY DISCHARGE DIAGNOSES  Diagnosis: MTHFR gene mutation  Assessment and Plan of Treatment:     Diagnosis: History of myocardial infarction  Assessment and Plan of Treatment:     Diagnosis: History of DVT of lower extremity  Assessment and Plan of Treatment:     Diagnosis: Lupus  Assessment and Plan of Treatment:     Diagnosis: PAD (peripheral artery disease)  Assessment and Plan of Treatment:      PRINCIPAL DISCHARGE DIAGNOSIS  Diagnosis: Critical limb ischemia with history of revascularization of same extremity  Assessment and Plan of Treatment:       SECONDARY DISCHARGE DIAGNOSES  Diagnosis: Leukocytosis  Assessment and Plan of Treatment:     Diagnosis: Postoperative anemia  Assessment and Plan of Treatment:     Diagnosis: MTHFR gene mutation  Assessment and Plan of Treatment:     Diagnosis: History of myocardial infarction  Assessment and Plan of Treatment:     Diagnosis: History of DVT of lower extremity  Assessment and Plan of Treatment:     Diagnosis: Lupus  Assessment and Plan of Treatment:     Diagnosis: PAD (peripheral artery disease)  Assessment and Plan of Treatment:

## 2020-11-19 NOTE — DISCHARGE NOTE PROVIDER - NSDCFUADDINST_GEN_ALL_CORE_FT
FOLLOW UP: ___ in 1 week. Your appointment has been made for _______. Call the office at  with any questions...WOUND CARE: You may shower; soap and water over incision sites. Do not scrub. Pat dry when done. ACTIVITY: Ambulate as tolerated, but no heavy lifting (>10lbs) or strenuous exercise....DIET:   You may resume regular diet. Call the office if you experience increasing pain, redness, swelling or drainage from incision sites/wounds, or temperature >101.4F. NEW MEDICATIONS:ADDITIONAL FOLLOW UP AFTER DISCHARGE:DISCHARGE DESTINATION:  FOLLOW UP: Dr. Herrera in 1 week. Your appointment has been made for 11/25 this Wednesday. Call the office at  with any questions.  WOUND CARE: You may shower; soap and water over incision sites. Do not scrub. Pat dry when done. Cleanse with peroxide daily. Leave incisions open to air. Do NOT bathe, swim, or hot tub until you are cleared by your doctor.   ACTIVITY: Ambulate as tolerated, but no heavy lifting (>10lbs) or strenuous exercise.  DIET: You may resume regular diet. Call the office if you experience increasing pain, redness, swelling or drainage from incision sites/wounds, or temperature >101.4F.   NEW MEDICATIONS: none  ADDITIONAL FOLLOW UP AFTER DISCHARGE: Follow up with your primary care physician  DISCHARGE DESTINATION: home no needs FOLLOW UP: Dr. Herrera in 1 week. Your appointment has been made for 11/25 this Wednesday at 10:30am. Call the office at  with any questions.  WOUND CARE: You may shower; soap and water over incision sites. Do not scrub. Pat dry when done. Cleanse with peroxide daily. Leave incisions open to air. Do NOT bathe, swim, or hot tub until you are cleared by your doctor.   ACTIVITY: Ambulate as tolerated, but no heavy lifting (>10lbs) or strenuous exercise.  DIET: You may resume regular diet. Call the office if you experience increasing pain, redness, swelling or drainage from incision sites/wounds, or temperature >101.4F.   NEW MEDICATIONS: none  ADDITIONAL FOLLOW UP AFTER DISCHARGE: Follow up with your primary care physician  DISCHARGE DESTINATION: home no needs FOLLOW UP: Dr. Herrera in 1 week. Your appointment has been made for 11/25 this Wednesday at 10:30am. Call the office at  with any questions.  WOUND CARE: You may shower; soap and water over incision sites. Do not scrub. Pat dry when done. Cleanse with peroxide daily. Leave incisions open to air. Do NOT bathe, swim, or hot tub until you are cleared by your doctor.   ACTIVITY: Ambulate as tolerated, but no heavy lifting (>10lbs) or strenuous exercise.  DIET: You may resume regular diet. Call the office if you experience increasing pain, redness, swelling or drainage from incision sites/wounds, or temperature >101.4F.   NEW MEDICATIONS: none  ADDITIONAL FOLLOW UP AFTER DISCHARGE: Follow up with your primary care physician- you will establish a new one once you have completed your move.  we want your INR to be between 3-4  DISCHARGE DESTINATION: home no needs

## 2020-11-19 NOTE — PROGRESS NOTE ADULT - SUBJECTIVE AND OBJECTIVE BOX
ON: the tpa was halved  Subjective: Pain is tolerable major issues he is NPO past midnight for relook angiogram    MEDICATIONS  (STANDING):  alteplase    Infusion . 0.75 mG/Hr (7.5 mL/Hr) IntraCatheter.. <Continuous>  aspirin  chewable 81 milliGRAM(s) Oral daily  dextrose 40% Gel 15 Gram(s) Oral once  dextrose 5%. 1000 milliLiter(s) (50 mL/Hr) IV Continuous <Continuous>  dextrose 50% Injectable 25 Gram(s) IV Push once  glucagon  Injectable 1 milliGRAM(s) IntraMuscular once  heparin  Infusion 500 Unit(s)/Hr (5 mL/Hr) IV Continuous <Continuous>  influenza   Vaccine 0.5 milliLiter(s) IntraMuscular once  insulin lispro (ADMELOG) corrective regimen sliding scale   SubCutaneous every 6 hours  lidocaine   Patch 2 Patch Transdermal daily  melatonin 3 milliGRAM(s) Oral at bedtime  sodium chloride 0.9%. 1000 milliLiter(s) (80 mL/Hr) IV Continuous <Continuous>    MEDICATIONS  (PRN):  acetaminophen   Tablet .. 1000 milliGRAM(s) Oral every 6 hours PRN Moderate Pain (4 - 6)    acetaminophen   Tablet .. 1000 milliGRAM(s) Oral every 6 hours PRN  alteplase    Infusion . 0.75 mG/Hr IntraCatheter.. <Continuous>  aspirin  chewable 81 milliGRAM(s) Oral daily  dextrose 40% Gel 15 Gram(s) Oral once  dextrose 5%. 1000 milliLiter(s) IV Continuous <Continuous>  dextrose 50% Injectable 25 Gram(s) IV Push once  glucagon  Injectable 1 milliGRAM(s) IntraMuscular once  heparin  Infusion 500 Unit(s)/Hr IV Continuous <Continuous>  influenza   Vaccine 0.5 milliLiter(s) IntraMuscular once  insulin lispro (ADMELOG) corrective regimen sliding scale   SubCutaneous every 6 hours  lidocaine   Patch 2 Patch Transdermal daily  melatonin 3 milliGRAM(s) Oral at bedtime  sodium chloride 0.9%. 1000 milliLiter(s) IV Continuous <Continuous>    Allergies    No Known Allergies    Intolerances    Dilaudid (Headache; Nausea)        Vital Signs Last 24 Hrs  T(C): 37.1 (19 Nov 2020 06:09), Max: 37.1 (19 Nov 2020 06:09)  T(F): 98.7 (19 Nov 2020 06:09), Max: 98.7 (19 Nov 2020 06:09)  HR: 71 (19 Nov 2020 08:00) (53 - 85)  BP: 153/79 (19 Nov 2020 08:00) (125/79 - 155/86)  BP(mean): 108 (19 Nov 2020 08:00) (97 - 114)  RR: 17 (19 Nov 2020 08:00) (12 - 30)  SpO2: 97% (19 Nov 2020 08:00) (95% - 100%)    I&O's Summary    18 Nov 2020 07:01  -  19 Nov 2020 07:00  --------------------------------------------------------  IN: 1847.5 mL / OUT: 1150 mL / NET: 697.5 mL        Physical Exam:  General: NAD, resting comfortably  Pulmonary: normal resp effort  Cardiovascular: NSR  Abdominal: soft, NT/ND  Extremities: WWP, normal strength, no clubbing/cyanosis/edema, left groin hematoma is stable and soft,   RLE: palpable graft pulse and PT Biphasic  Neuro: A/O x 3, no focal deficits, sensation normal  Lines/drains/tubes:    LABS:                        11.2   13.47 )-----------( 171      ( 19 Nov 2020 03:26 )             33.0     11-19    136  |  103  |  11  ----------------------------<  123<H>  4.1   |  21<L>  |  0.85    Ca    8.6      19 Nov 2020 03:26  Phos  3.3     11-19  Mg     1.8     11-19      PT/INR - ( 17 Nov 2020 15:27 )   PT: 36.2 sec;   INR: 3.19          PTT - ( 17 Nov 2020 15:27 )  PTT:184.4 sec      CAPILLARY BLOOD GLUCOSE      POCT Blood Glucose.: 95 mg/dL (19 Nov 2020 07:16)  POCT Blood Glucose.: 96 mg/dL (18 Nov 2020 23:24)  POCT Blood Glucose.: 113 mg/dL (18 Nov 2020 17:13)  POCT Blood Glucose.: 99 mg/dL (18 Nov 2020 12:48)      RADIOLOGY & ADDITIONAL TESTS:

## 2020-11-19 NOTE — PROGRESS NOTE ADULT - ASSESSMENT
64M MI 1993, lyme disease, PAD s/p right femPT bypass with arm vein in 1993, redone in 2011, s/p R SFA to peroneal bypass graft s/p revision on 7/30/18, and recently found to have recurrent restenosis of graft, s/p angiogram with angioplasty of R tibial artery graft, femoral artery graft, R ARNOL, R EIA, and R CFA 02/03/2020, MTHFR gene mutation homozygous and lupus anticoagulant on chronic coumadin, admitted with right leg critical limb ischemia, s/p angio showing SFA-peroneal bypass filled with thrombus, s/p tpa bolus and temporary tpa catheter placed for continuous local tpa infusion. transferred to SICU post-op for close monitoring while getting continuous local tpa infusion. repeat angio with persistent clots and no flow.     NEURO: tylenol PRN, neuro checks q1h, avoid sedating meds.   CV: SBP goal <150.   VASC: tpa via catheter, heparin via sheath, serial fibrinogen. plan to return for another angio today.   PULM: no resp distress, room air  GI/FEN: NPO  for second repeat angio today.   : voids.   ENDO: ISS  ID: no issues  PPX: no SCDs.   LINES: PIVs, tpa catheter/sheath.   WOUNDS/DRAINS: left groin puncture, tpa catheter/sheath.  flat bed rest while tpa catheter in

## 2020-11-19 NOTE — BRIEF OPERATIVE NOTE - NSICDXBRIEFPREOP_GEN_ALL_CORE_FT
PRE-OP DIAGNOSIS:  Limb ischemia 17-Nov-2020 15:21:55 acute Antonio Terrell  
PRE-OP DIAGNOSIS:  Limb ischemia 17-Nov-2020 15:21:55 acute Antonio Terrell  
PRE-OP DIAGNOSIS:  Limb ischemia 17-Nov-2020 15:21:55 acute Atnonio Terrell

## 2020-11-19 NOTE — DISCHARGE NOTE PROVIDER - CARE PROVIDER_API CALL
Seamus Herrera  VASCULAR SURGERY  [address] [place] [address]  Phone: [phone]  Fax: [phone]  Follow Up Time:    Seamus Herrera  VASCULAR SURGERY  [address] [place] [address]  Phone: [phone]  Fax: [phone]  Follow Up Time: 1-3 days

## 2020-11-20 LAB
ANION GAP SERPL CALC-SCNC: 11 MMOL/L — SIGNIFICANT CHANGE UP (ref 5–17)
APTT BLD: 88.9 SEC — HIGH (ref 27.5–35.5)
APTT BLD: 92.3 SEC — HIGH (ref 27.5–35.5)
BUN SERPL-MCNC: 8 MG/DL — SIGNIFICANT CHANGE UP (ref 7–23)
CALCIUM SERPL-MCNC: 8 MG/DL — LOW (ref 8.4–10.5)
CHLORIDE SERPL-SCNC: 101 MMOL/L — SIGNIFICANT CHANGE UP (ref 96–108)
CO2 SERPL-SCNC: 24 MMOL/L — SIGNIFICANT CHANGE UP (ref 22–31)
CREAT SERPL-MCNC: 0.75 MG/DL — SIGNIFICANT CHANGE UP (ref 0.5–1.3)
GLUCOSE SERPL-MCNC: 130 MG/DL — HIGH (ref 70–99)
HCT VFR BLD CALC: 22.6 % — LOW (ref 39–50)
HCT VFR BLD CALC: 23.5 % — LOW (ref 39–50)
HCT VFR BLD CALC: 23.5 % — LOW (ref 39–50)
HGB BLD-MCNC: 7.7 G/DL — LOW (ref 13–17)
HGB BLD-MCNC: 7.9 G/DL — LOW (ref 13–17)
HGB BLD-MCNC: 8 G/DL — LOW (ref 13–17)
INR BLD: 2.46 — HIGH (ref 0.88–1.16)
MAGNESIUM SERPL-MCNC: 1.7 MG/DL — SIGNIFICANT CHANGE UP (ref 1.6–2.6)
MCHC RBC-ENTMCNC: 30 PG — SIGNIFICANT CHANGE UP (ref 27–34)
MCHC RBC-ENTMCNC: 30.2 PG — SIGNIFICANT CHANGE UP (ref 27–34)
MCHC RBC-ENTMCNC: 30.4 PG — SIGNIFICANT CHANGE UP (ref 27–34)
MCHC RBC-ENTMCNC: 33.6 GM/DL — SIGNIFICANT CHANGE UP (ref 32–36)
MCHC RBC-ENTMCNC: 34 GM/DL — SIGNIFICANT CHANGE UP (ref 32–36)
MCHC RBC-ENTMCNC: 34.1 GM/DL — SIGNIFICANT CHANGE UP (ref 32–36)
MCV RBC AUTO: 88.6 FL — SIGNIFICANT CHANGE UP (ref 80–100)
MCV RBC AUTO: 89.4 FL — SIGNIFICANT CHANGE UP (ref 80–100)
MCV RBC AUTO: 89.4 FL — SIGNIFICANT CHANGE UP (ref 80–100)
NRBC # BLD: 0 /100 WBCS — SIGNIFICANT CHANGE UP (ref 0–0)
PHOSPHATE SERPL-MCNC: 2.7 MG/DL — SIGNIFICANT CHANGE UP (ref 2.5–4.5)
PLATELET # BLD AUTO: 137 K/UL — LOW (ref 150–400)
PLATELET # BLD AUTO: 147 K/UL — LOW (ref 150–400)
PLATELET # BLD AUTO: 148 K/UL — LOW (ref 150–400)
POTASSIUM SERPL-MCNC: 3.9 MMOL/L — SIGNIFICANT CHANGE UP (ref 3.5–5.3)
POTASSIUM SERPL-SCNC: 3.9 MMOL/L — SIGNIFICANT CHANGE UP (ref 3.5–5.3)
PROTHROM AB SERPL-ACNC: 28.3 SEC — HIGH (ref 10.6–13.6)
RBC # BLD: 2.55 M/UL — LOW (ref 4.2–5.8)
RBC # BLD: 2.63 M/UL — LOW (ref 4.2–5.8)
RBC # BLD: 2.63 M/UL — LOW (ref 4.2–5.8)
RBC # FLD: 12.7 % — SIGNIFICANT CHANGE UP (ref 10.3–14.5)
RBC # FLD: 12.8 % — SIGNIFICANT CHANGE UP (ref 10.3–14.5)
RBC # FLD: 13.1 % — SIGNIFICANT CHANGE UP (ref 10.3–14.5)
SODIUM SERPL-SCNC: 136 MMOL/L — SIGNIFICANT CHANGE UP (ref 135–145)
WBC # BLD: 15.39 K/UL — HIGH (ref 3.8–10.5)
WBC # BLD: 17 K/UL — HIGH (ref 3.8–10.5)
WBC # BLD: 17.62 K/UL — HIGH (ref 3.8–10.5)
WBC # FLD AUTO: 15.39 K/UL — HIGH (ref 3.8–10.5)
WBC # FLD AUTO: 17 K/UL — HIGH (ref 3.8–10.5)
WBC # FLD AUTO: 17.62 K/UL — HIGH (ref 3.8–10.5)

## 2020-11-20 PROCEDURE — 99233 SBSQ HOSP IP/OBS HIGH 50: CPT | Mod: GC

## 2020-11-20 RX ORDER — HEPARIN SODIUM 5000 [USP'U]/ML
1300 INJECTION INTRAVENOUS; SUBCUTANEOUS
Qty: 25000 | Refills: 0 | Status: DISCONTINUED | OUTPATIENT
Start: 2020-11-20 | End: 2020-11-21

## 2020-11-20 RX ORDER — WARFARIN SODIUM 2.5 MG/1
10 TABLET ORAL AT BEDTIME
Refills: 0 | Status: DISCONTINUED | OUTPATIENT
Start: 2020-11-20 | End: 2020-11-20

## 2020-11-20 RX ORDER — KETOROLAC TROMETHAMINE 30 MG/ML
30 SYRINGE (ML) INJECTION ONCE
Refills: 0 | Status: DISCONTINUED | OUTPATIENT
Start: 2020-11-20 | End: 2020-11-20

## 2020-11-20 RX ORDER — WARFARIN SODIUM 2.5 MG/1
10 TABLET ORAL ONCE
Refills: 0 | Status: COMPLETED | OUTPATIENT
Start: 2020-11-20 | End: 2020-11-20

## 2020-11-20 RX ORDER — ACETAMINOPHEN 500 MG
1000 TABLET ORAL ONCE
Refills: 0 | Status: COMPLETED | OUTPATIENT
Start: 2020-11-20 | End: 2020-11-20

## 2020-11-20 RX ADMIN — Medication 81 MILLIGRAM(S): at 12:50

## 2020-11-20 RX ADMIN — Medication 400 MILLIGRAM(S): at 05:59

## 2020-11-20 RX ADMIN — Medication 100 MILLIGRAM(S): at 07:14

## 2020-11-20 RX ADMIN — Medication 1000 MILLIGRAM(S): at 06:12

## 2020-11-20 RX ADMIN — HEPARIN SODIUM 13 UNIT(S)/HR: 5000 INJECTION INTRAVENOUS; SUBCUTANEOUS at 04:13

## 2020-11-20 RX ADMIN — Medication 30 MILLIGRAM(S): at 21:20

## 2020-11-20 RX ADMIN — LIDOCAINE 2 PATCH: 4 CREAM TOPICAL at 12:51

## 2020-11-20 RX ADMIN — Medication 3 MILLIGRAM(S): at 22:44

## 2020-11-20 RX ADMIN — Medication 30 MILLIGRAM(S): at 20:56

## 2020-11-20 RX ADMIN — WARFARIN SODIUM 10 MILLIGRAM(S): 2.5 TABLET ORAL at 22:44

## 2020-11-20 NOTE — PROGRESS NOTE ADULT - SUBJECTIVE AND OBJECTIVE BOX
s/p repeat angio 11/19 w/ thrombectomy and angioplasty of R leg bypass.     No acute issues overnight.      MEDICATIONS  (STANDING):  aspirin  chewable 81 milliGRAM(s) Oral daily  glucagon  Injectable 1 milliGRAM(s) IntraMuscular once  heparin  Infusion 1300 Unit(s)/Hr (13 mL/Hr) IV Continuous <Continuous>  influenza   Vaccine 0.5 milliLiter(s) IntraMuscular once  lidocaine   Patch 2 Patch Transdermal daily  melatonin 3 milliGRAM(s) Oral at bedtime  senna 2 Tablet(s) Oral at bedtime  warfarin 10 milliGRAM(s) Oral once    MEDICATIONS  (PRN):  oxycodone    5 mG/acetaminophen 325 mG 1 Tablet(s) Oral every 4 hours PRN Moderate Pain (4 - 6)  oxycodone    5 mG/acetaminophen 325 mG 2 Tablet(s) Oral every 4 hours PRN Severe Pain (7 - 10)    aspirin  chewable 81 milliGRAM(s) Oral daily  glucagon  Injectable 1 milliGRAM(s) IntraMuscular once  heparin  Infusion 1300 Unit(s)/Hr IV Continuous <Continuous>  influenza   Vaccine 0.5 milliLiter(s) IntraMuscular once  lidocaine   Patch 2 Patch Transdermal daily  melatonin 3 milliGRAM(s) Oral at bedtime  oxycodone    5 mG/acetaminophen 325 mG 1 Tablet(s) Oral every 4 hours PRN  oxycodone    5 mG/acetaminophen 325 mG 2 Tablet(s) Oral every 4 hours PRN  senna 2 Tablet(s) Oral at bedtime  warfarin 10 milliGRAM(s) Oral once    Allergies    No Known Allergies    Intolerances    Dilaudid (Headache; Nausea)        Vital Signs Last 24 Hrs  T(C): 37.2 (20 Nov 2020 12:00), Max: 37.2 (20 Nov 2020 12:00)  T(F): 98.9 (20 Nov 2020 12:00), Max: 98.9 (20 Nov 2020 12:00)  HR: 88 (20 Nov 2020 11:00) (71 - 101)  BP: 121/96 (20 Nov 2020 04:00) (120/73 - 149/102)  BP(mean): 105 (20 Nov 2020 04:00) (90 - 118)  RR: 20 (20 Nov 2020 11:00) (12 - 24)  SpO2: 97% (20 Nov 2020 11:00) (95% - 100%)    I&O's Summary    19 Nov 2020 07:01  -  20 Nov 2020 07:00  --------------------------------------------------------  IN: 3042.5 mL / OUT: 1940 mL / NET: 1102.5 mL    20 Nov 2020 07:01  -  20 Nov 2020 12:13  --------------------------------------------------------  IN: 476 mL / OUT: 700 mL / NET: -224 mL    Physical Exam:  General: NAD, resting comfortably  Pulmonary: normal resp effort  Cardiovascular: NSR  Abdominal: soft, NT/ND  Extremities: WWP, normal strength, no clubbing/cyanosis/edema, left groin hematoma is stable and soft,   RLE: palpable graft pulse and PT Biphasic, sensation is still decreased compared to the L side but is improving, motor function is improving with slight sweakness  Neuro: A/O x 3, no focal deficits,         Lines/drains/tubes:    LABS:                        7.7    17.62 )-----------( 147      ( 20 Nov 2020 10:16 )             22.6     11-20    136  |  101  |  8   ----------------------------<  130<H>  3.9   |  24  |  0.75    Ca    8.0<L>      20 Nov 2020 05:50  Phos  2.7     11-20  Mg     1.7     11-20      PT/INR - ( 20 Nov 2020 05:50 )   PT: 28.3 sec;   INR: 2.46          PTT - ( 20 Nov 2020 05:50 )  PTT:88.9 sec

## 2020-11-20 NOTE — PROGRESS NOTE ADULT - ASSESSMENT
64M MI 1993, lyme disease, PAD s/p right femPT bypass with arm vein in 1993, redone in 2011, s/p R SFA to peroneal bypass graft s/p revision on 7/30/18, and recently found to have recurrent restenosis of graft, s/p angiogram with angioplasty of R tibial artery graft, femoral artery graft, R ARNOL, R EIA, and R CFA 02/03/2020, MTHFR gene mutation homozygous and lupus anticoagulant on chronic coumadin, admitted with right leg critical limb ischemia, s/p angio showing SFA-peroneal bypass filled with thrombus, s/p tpa bolus and temporary tpa catheter placed for continuous local tpa infusion. transferred to SICU post-op for close monitoring while getting continuous local tpa infusion. repeat angio 11/18 with persistent clots and no flow.     s/p repeat angio 11/19 w/ thrombectomy and angioplasty of R leg bypass.    Doing well, pain is improving so is the motor function  Hgb 8, INR 2.4 - Goal of INR 3-4      Plan:  - DC Addison  - repeat CBC at 10 am  - DC iv fluid  - Give another 10 mg of Coumadin  - SDU

## 2020-11-20 NOTE — PROGRESS NOTE ADULT - SUBJECTIVE AND OBJECTIVE BOX
S: Pt reports feeling well this morning, having slept well, no HA/CP/SOB.     Vitals: ICU Vital Signs Last 24 Hrs  T(C): 37.1 (20 Nov 2020 09:00), Max: 37.1 (19 Nov 2020 12:00)  T(F): 98.8 (20 Nov 2020 09:00), Max: 98.8 (20 Nov 2020 04:08)  HR: 90 (20 Nov 2020 08:00) (71 - 101)  BP: 121/96 (20 Nov 2020 04:00) (118/84 - 149/102)  BP(mean): 105 (20 Nov 2020 04:00) (90 - 118)  ABP: 143/68 (20 Nov 2020 08:00) (117/57 - 144/64)  ABP(mean): 94 (20 Nov 2020 08:00) (74 - 94)  RR: 18 (20 Nov 2020 08:00) (12 - 24)  SpO2: 98% (20 Nov 2020 08:00) (95% - 100%)            I&O:  I&O's Detail    19 Nov 2020 07:01  -  20 Nov 2020 07:00  --------------------------------------------------------  IN:    Heparin: 96.5 mL    Heparin: 94 mL    Heparin: 52 mL    IV PiggyBack: 250 mL    PRBCs (Packed Red Blood Cells): 300 mL    sodium chloride 0.9%: 2250 mL  Total IN: 3042.5 mL    OUT:    Indwelling Catheter - Urethral (mL): 1940 mL  Total OUT: 1940 mL    Total NET: 1102.5 mL      20 Nov 2020 07:01  -  20 Nov 2020 09:04  --------------------------------------------------------  IN:    Heparin: 13 mL    sodium chloride 0.9%: 125 mL  Total IN: 138 mL    OUT:  Total OUT: 0 mL    Total NET: 138 mL            CAPILLARY BLOOD GLUCOSE      POCT Blood Glucose.: 131 mg/dL (19 Nov 2020 23:42)  POCT Blood Glucose.: 67 mg/dL (19 Nov 2020 23:39)  POCT Blood Glucose.: 178 mg/dL (19 Nov 2020 18:52)  POCT Blood Glucose.: 123 mg/dL (19 Nov 2020 13:02)      Labs:                         8.0    17.00 )-----------( 137      ( 20 Nov 2020 05:50 )             23.5   11-20    136  |  101  |  8   ----------------------------<  130<H>  3.9   |  24  |  0.75    Ca    8.0<L>      20 Nov 2020 05:50  Phos  2.7     11-20  Mg     1.7     11-20    PT/INR - ( 20 Nov 2020 05:50 )   PT: 28.3 sec;   INR: 2.46          PTT - ( 20 Nov 2020 05:50 )  PTT:88.9 sec      Electrolytes repleated to goals as follows: Potassium 4, Phosphorus 3 and Magnesium 2 where appropriate and necessary    Exam:  Neuro: A&Ox3, NAD, grossly neuro intact  HEENT: PERRL < EOMI, MMM  Neck: Supple  CV: RRR, no MRGs  Pulm: CTAB, no wheezes, rales, or rhonchi  Abd: BS (+), Soft, NT, ND  : Addison in place  Ext: No edema peripherally or centrally  Vasc: Extremities warm to palpation, L PT palpable  MSK: no joint swelling  Skin: no rash  Psych: affect appropriate

## 2020-11-20 NOTE — PROGRESS NOTE ADULT - ASSESSMENT
64M MI 1993, lyme disease, PAD s/p right femPT bypass with arm vein in 1993, redone in 2011, s/p R SFA to peroneal bypass graft s/p revision on 7/30/18, and recently found to have recurrent restenosis of graft, s/p angiogram with angioplasty of R tibial artery graft, femoral artery graft, R ARNOL, R EIA, and R CFA 02/03/2020, MTHFR gene mutation homozygous and lupus anticoagulant on chronic coumadin, admitted with right leg critical limb ischemia, s/p angio showing SFA-peroneal bypass filled with thrombus, s/p tpa bolus and temporary tpa catheter placed for continuous local tpa infusion. transferred to SICU post-op for close monitoring while getting continuous local tpa infusion. repeat angio 11/18 with persistent clots and no flow. s/p repeat angio 11/19 w/ thrombectomy and angioplasty of R leg bypass.       NEURO: percocet PRN, melatonin, lidoderm,   CV: SBP , hydralazine PRN.   VASC:  ASA, hep gtt 1400U/hr (goal ptt 60-80) coumadin 10 for tonight  PULM: no resp distress,   GI/FEN: regular. NS@125.   : adriana berman.   ENDO: ISS  ID: ancef x 24hr (11/19)   PPX: no SCDs.   LINES: PIVs, L groin puncture (perclosed),   WOUNDS/DRAINS: left groin puncture, tpa catheter/sheath.

## 2020-11-20 NOTE — CHART NOTE - NSCHARTNOTEFT_GEN_A_CORE
VASCULAR SURGERY TRANSFER NOTE     SUMMARY: 64M MI 1993, lyme disease, PAD s/p right femPT bypass with arm vein in 1993, redone in 2011, s/p R SFA to peroneal bypass graft s/p revision on 7/30/18, and recently found to have recurrent restenosis of graft, s/p angiogram with angioplasty of R tibial artery graft, femoral artery graft, R ARNOL, R EIA, and R CFA 02/03/2020, MTHFR gene mutation homozygous and lupus anticoagulant on chronic coumadin, admitted with right leg critical limb ischemia, s/p angio showing SFA-peroneal bypass filled with thrombus, s/p tpa bolus and temporary tpa catheter placed for continuous local tpa infusion. transferred to SICU post-op for close monitoring while getting continuous local tpa infusion. repeat angio 11/18 with persistent clots and no flow, s/p repeat angio 11/19 with thrombectomy and angioplasty of R leg bypass.     Today the patient's AM H/H was found to be downtrending. Repeat H/H at 10AM 7.7, 1 unit PRBCs given which finished running around 5pm. Repeat CBC to be performed at 8pm. Addison removed, passed TOV. Patient was OOB to chair today. Completed abx (ancef). Received coumadin 10mg once this AM, schedule to receive second dose 10mg tonight with goal INR 3-4mg. Home dose of coumadin 7.5mg. Heparin drip running at 13cc/hour, has 3x therapeutic aptts now to daily aptt checks until can complete bridge to coumadin. Off IVF as of this AM, tolerating regular diet.     Plan:   8pm CBC to check response to 1x unit PRBCs  AM labs to check H/H, aptt, INR, s/p 3x therapeutic aptts @13cc/hour   Bridge from hep gtt to coumadin  Encourage OOB, ambulate as tolerated, to chair    ____________________________________________________  Kina Russo, PGY1  Vascular Surgery

## 2020-11-21 LAB
ANION GAP SERPL CALC-SCNC: 10 MMOL/L — SIGNIFICANT CHANGE UP (ref 5–17)
APTT BLD: 47.4 SEC — HIGH (ref 27.5–35.5)
APTT BLD: 53.3 SEC — HIGH (ref 27.5–35.5)
APTT BLD: 79.7 SEC — HIGH (ref 27.5–35.5)
BLD GP AB SCN SERPL QL: NEGATIVE — SIGNIFICANT CHANGE UP
BUN SERPL-MCNC: 8 MG/DL — SIGNIFICANT CHANGE UP (ref 7–23)
CALCIUM SERPL-MCNC: 8.3 MG/DL — LOW (ref 8.4–10.5)
CHLORIDE SERPL-SCNC: 102 MMOL/L — SIGNIFICANT CHANGE UP (ref 96–108)
CO2 SERPL-SCNC: 26 MMOL/L — SIGNIFICANT CHANGE UP (ref 22–31)
CREAT SERPL-MCNC: 0.76 MG/DL — SIGNIFICANT CHANGE UP (ref 0.5–1.3)
GLUCOSE SERPL-MCNC: 113 MG/DL — HIGH (ref 70–99)
HCT VFR BLD CALC: 22.4 % — LOW (ref 39–50)
HCT VFR BLD CALC: 24.1 % — LOW (ref 39–50)
HGB BLD-MCNC: 7.6 G/DL — LOW (ref 13–17)
HGB BLD-MCNC: 8.1 G/DL — LOW (ref 13–17)
INR BLD: 2.54 — HIGH (ref 0.88–1.16)
INR BLD: 2.69 — HIGH (ref 0.88–1.16)
INR BLD: 2.94 — HIGH (ref 0.88–1.16)
MAGNESIUM SERPL-MCNC: 1.8 MG/DL — SIGNIFICANT CHANGE UP (ref 1.6–2.6)
MCHC RBC-ENTMCNC: 30.3 PG — SIGNIFICANT CHANGE UP (ref 27–34)
MCHC RBC-ENTMCNC: 30.4 PG — SIGNIFICANT CHANGE UP (ref 27–34)
MCHC RBC-ENTMCNC: 33.6 GM/DL — SIGNIFICANT CHANGE UP (ref 32–36)
MCHC RBC-ENTMCNC: 33.9 GM/DL — SIGNIFICANT CHANGE UP (ref 32–36)
MCV RBC AUTO: 89.6 FL — SIGNIFICANT CHANGE UP (ref 80–100)
MCV RBC AUTO: 90.3 FL — SIGNIFICANT CHANGE UP (ref 80–100)
NRBC # BLD: 0 /100 WBCS — SIGNIFICANT CHANGE UP (ref 0–0)
NRBC # BLD: 0 /100 WBCS — SIGNIFICANT CHANGE UP (ref 0–0)
PHOSPHATE SERPL-MCNC: 2.6 MG/DL — SIGNIFICANT CHANGE UP (ref 2.5–4.5)
PLATELET # BLD AUTO: 156 K/UL — SIGNIFICANT CHANGE UP (ref 150–400)
PLATELET # BLD AUTO: 176 K/UL — SIGNIFICANT CHANGE UP (ref 150–400)
POTASSIUM SERPL-MCNC: 3.9 MMOL/L — SIGNIFICANT CHANGE UP (ref 3.5–5.3)
POTASSIUM SERPL-SCNC: 3.9 MMOL/L — SIGNIFICANT CHANGE UP (ref 3.5–5.3)
PROTHROM AB SERPL-ACNC: 29.2 SEC — HIGH (ref 10.6–13.6)
PROTHROM AB SERPL-ACNC: 30.8 SEC — HIGH (ref 10.6–13.6)
PROTHROM AB SERPL-ACNC: 33.5 SEC — HIGH (ref 10.6–13.6)
RBC # BLD: 2.5 M/UL — LOW (ref 4.2–5.8)
RBC # BLD: 2.67 M/UL — LOW (ref 4.2–5.8)
RBC # FLD: 13.2 % — SIGNIFICANT CHANGE UP (ref 10.3–14.5)
RBC # FLD: 13.6 % — SIGNIFICANT CHANGE UP (ref 10.3–14.5)
RH IG SCN BLD-IMP: POSITIVE — SIGNIFICANT CHANGE UP
SODIUM SERPL-SCNC: 138 MMOL/L — SIGNIFICANT CHANGE UP (ref 135–145)
WBC # BLD: 12.91 K/UL — HIGH (ref 3.8–10.5)
WBC # BLD: 13.58 K/UL — HIGH (ref 3.8–10.5)
WBC # FLD AUTO: 12.91 K/UL — HIGH (ref 3.8–10.5)
WBC # FLD AUTO: 13.58 K/UL — HIGH (ref 3.8–10.5)

## 2020-11-21 PROCEDURE — 74176 CT ABD & PELVIS W/O CONTRAST: CPT | Mod: 26

## 2020-11-21 PROCEDURE — 99255 IP/OBS CONSLTJ NEW/EST HI 80: CPT

## 2020-11-21 RX ORDER — WARFARIN SODIUM 2.5 MG/1
10 TABLET ORAL ONCE
Refills: 0 | Status: COMPLETED | OUTPATIENT
Start: 2020-11-21 | End: 2020-11-21

## 2020-11-21 RX ORDER — POTASSIUM PHOSPHATE, MONOBASIC POTASSIUM PHOSPHATE, DIBASIC 236; 224 MG/ML; MG/ML
15 INJECTION, SOLUTION INTRAVENOUS ONCE
Refills: 0 | Status: COMPLETED | OUTPATIENT
Start: 2020-11-21 | End: 2020-11-21

## 2020-11-21 RX ORDER — HEPARIN SODIUM 5000 [USP'U]/ML
1000 INJECTION INTRAVENOUS; SUBCUTANEOUS
Qty: 25000 | Refills: 0 | Status: DISCONTINUED | OUTPATIENT
Start: 2020-11-21 | End: 2020-11-23

## 2020-11-21 RX ORDER — MAGNESIUM SULFATE 500 MG/ML
1 VIAL (ML) INJECTION ONCE
Refills: 0 | Status: COMPLETED | OUTPATIENT
Start: 2020-11-21 | End: 2020-11-21

## 2020-11-21 RX ADMIN — Medication 81 MILLIGRAM(S): at 12:51

## 2020-11-21 RX ADMIN — HEPARIN SODIUM 10 UNIT(S)/HR: 5000 INJECTION INTRAVENOUS; SUBCUTANEOUS at 10:23

## 2020-11-21 RX ADMIN — WARFARIN SODIUM 10 MILLIGRAM(S): 2.5 TABLET ORAL at 22:36

## 2020-11-21 RX ADMIN — POTASSIUM PHOSPHATE, MONOBASIC POTASSIUM PHOSPHATE, DIBASIC 62.5 MILLIMOLE(S): 236; 224 INJECTION, SOLUTION INTRAVENOUS at 12:45

## 2020-11-21 RX ADMIN — Medication 3 MILLIGRAM(S): at 22:03

## 2020-11-21 RX ADMIN — LIDOCAINE 2 PATCH: 4 CREAM TOPICAL at 00:00

## 2020-11-21 RX ADMIN — Medication 100 GRAM(S): at 11:20

## 2020-11-21 RX ADMIN — SENNA PLUS 2 TABLET(S): 8.6 TABLET ORAL at 22:02

## 2020-11-21 NOTE — PHYSICAL THERAPY INITIAL EVALUATION ADULT - WEIGHT-BEARING RESTRICTIONS: STAND/SIT, REHAB EVAL
[id number] [employer]. weight-bearing as tolerated

## 2020-11-21 NOTE — PHYSICAL THERAPY INITIAL EVALUATION ADULT - DID THE PATIENT HAVE SURGERY?
11/17/20 (R) LE angio with thrombolysis; 11/18/20 (R) LE angio with thrombolysis; 11/20/20 (R) LE angio with thrombectomy and angioplasty/yes

## 2020-11-21 NOTE — CONSULT NOTE ADULT - ASSESSMENT
In summary, this is a 66yo gentleman with a PMH of MTHFR mutation gene (on Coumadin), CAD c/b MI (1993), Lyme disease and extensive PAD s/p multiple interventions who p/w critical R-leg ischemia, went to the OR on 11/18 for angiogram and thrombolysis of his bypass graft and then again on 11/19 for thrombectomy and angioplasty of his bypass graft.  His post-op course has been c/b acute blood anemia, found to have a R-thigh hematoma on CTAP today.  Now on Heparin/Coumadin bridge.    #PAD c/b bypass graft thrombosis s/p thrombolysis and thrombectomy  -- on heparin gtt  -- c/w ASA  -- pain control w/bowel regimen     Hypercoagulable Disorder (MTHFR mutation gene)   -- heparin/coumadin bridge  -- INR goal 3-4, not at goal today    #Acute Blood Loss Anemia  -- transfuse to Hb goal >7  -- monitor thigh and groin exam     #Diet - regular   #DVT PPx - on Heparin/Coumadin bridge  #Dispo - home once INR therapeutic    Rosette Madden  Attending Hospitalist  [phone] In summary, this is a 64yo gentleman with a PMH of MTHFR mutation gene (on Coumadin), CAD c/b MI (1993), Lyme disease and extensive PAD s/p multiple interventions who p/w critical R-leg ischemia, went to the OR on 11/18 for angiogram and thrombolysis of his bypass graft and then again on 11/19 for thrombectomy and angioplasty of his bypass graft.  His post-op course has been c/b acute blood anemia, found to have a R-thigh hematoma on CTAP today.  Now on Heparin/Coumadin bridge.    #PAD c/b bypass graft thrombosis s/p thrombolysis and thrombectomy  -- s/p OR for critical leg ischemia   -- on heparin gtt  -- c/w ASA  -- pain control w/bowel regimen     #Hypercoagulable Disorder (MTHFR mutation gene)   -- heparin/coumadin bridge  -- INR goal 3-4, not at goal today    #Acute Blood Loss Anemia  -- transfuse to Hb goal >7  -- monitor thigh and groin exam     #Diet - regular   #DVT PPx - on Heparin/Coumadin bridge  #Dispo - home once INR therapeutic    Rosette Madden  Attending Hospitalist  [phone]

## 2020-11-21 NOTE — PHYSICAL THERAPY INITIAL EVALUATION ADULT - PERTINENT HX OF CURRENT PROBLEM, REHAB EVAL
65M s/p angio showing SFA-peroneal bypass filled with thrombus, s/p tpa bolus and temporary tpa catheter placed for continuous local tpa infusion. transferred to SICU post-op for close monitoring while getting continuous local tpa infusion. repeat angio 11/18 with persistent clots and no flow.

## 2020-11-21 NOTE — PHYSICAL THERAPY INITIAL EVALUATION ADULT - ADDITIONAL COMMENTS
Patient reports that he lives with his spouse in [place], reports no steps to enter. States that he has a cane at home, no rolling walker or crutches.

## 2020-11-21 NOTE — PHYSICAL THERAPY INITIAL EVALUATION ADULT - CRITERIA FOR SKILLED THERAPEUTIC INTERVENTIONS
anticipated discharge recommendation/anticipated equipment needs at discharge/rehab potential/impairments found/therapy frequency

## 2020-11-21 NOTE — PHYSICAL THERAPY INITIAL EVALUATION ADULT - ACTIVE RANGE OF MOTION EXAMINATION, REHAB EVAL
(R) ankle limited 2/2 calf tightness, unable to passively DF to neutral, lacking ~10 deg./bilateral upper extremity Active ROM was WFL (within functional limits)/bilateral  lower extremity Active ROM was WFL (within functional limits)

## 2020-11-21 NOTE — PROGRESS NOTE ADULT - SUBJECTIVE AND OBJECTIVE BOX
O/N; STEPHANIE, VSS hgb 7.7.                               64M MI 1993, lyme disease, PAD s/p right femPT bypass with arm vein in 1993, redone in 2011, s/p R SFA to peroneal bypass graft s/p revision on 7/30/18, and recently found to have recurrent restenosis of graft, s/p angiogram with angioplasty of R tibial artery graft, femoral artery graft, R ARNOL, R EIA, and R CFA 02/03/2020, MTHFR gene mutation homozygous and lupus anticoagulant on chronic coumadin, admitted with right leg critical limb ischemia, s/p angio showing SFA-peroneal bypass filled with thrombus, s/p tpa bolus and temporary tpa catheter placed for continuous local tpa infusion. transferred to SICU post-op for close monitoring while getting continuous local tpa infusion. repeat angio 11/18 with persistent clots and no flow.     s/p repeat angio 11/19 w/ thrombectomy and angioplasty of R leg bypass.    Doing well, pain is improving so is the motor function  Hgb 8, INR 2.4 - Goal of INR 3-4      Plan:  -coumadin  -f/u inr w am labs  -reg diet   VASCULAR SURGERY PROGRESS NOTE     SUMMARY:     24 HOUR EVENTS:   H/H 7.7 yesterday afternoon, s/p 1 unit PRBCs, post-transfusion H/H 7.9     Subjective:     aspirin  chewable 81  heparin  Infusion 1000        Vital Signs Last 24 Hrs  T(C): 36.8 (21 Nov 2020 09:39), Max: 37.4 (20 Nov 2020 22:18)  T(F): 98.2 (21 Nov 2020 09:39), Max: 99.3 (20 Nov 2020 22:18)  HR: 94 (21 Nov 2020 09:30) (85 - 109)  BP: 113/66 (21 Nov 2020 09:30) (113/66 - 146/78)  BP(mean): 85 (21 Nov 2020 05:16) (85 - 105)  RR: 18 (21 Nov 2020 08:36) (17 - 25)  SpO2: 97% (21 Nov 2020 09:30) (96% - 99%)  I&O's Summary    20 Nov 2020 07:01  -  21 Nov 2020 07:00  --------------------------------------------------------  IN: 1569 mL / OUT: 3450 mL / NET: -1881 mL    21 Nov 2020 07:01  -  21 Nov 2020 11:00  --------------------------------------------------------  IN: 120 mL / OUT: 0 mL / NET: 120 mL        Physical Exam:  General: NAD, resting comfortably in bed  Pulmonary: Nonlabored breathing, no respiratory distress  Cardiovascular:  Abdominal:  Extremities:  Vascular:      LABS:                        7.6    13.58 )-----------( 156      ( 21 Nov 2020 06:48 )             22.4     11-21    138  |  102  |  8   ----------------------------<  113<H>  3.9   |  26  |  0.76    Ca    8.3<L>      21 Nov 2020 06:48  Phos  2.6     11-21  Mg     1.8     11-21      PT/INR - ( 21 Nov 2020 06:48 )   PT: 29.2 sec;   INR: 2.54          PTT - ( 21 Nov 2020 06:48 )  PTT:79.7 sec          64M MI 1993, lyme disease, PAD s/p right femPT bypass with arm vein in 1993, redone in 2011, s/p R SFA to peroneal bypass graft s/p revision on 7/30/18, and recently found to have recurrent restenosis of graft, s/p angiogram with angioplasty of R tibial artery graft, femoral artery graft, R ARNOL, R EIA, and R CFA 02/03/2020, MTHFR gene mutation homozygous and lupus anticoagulant on chronic coumadin, admitted with right leg critical limb ischemia, s/p angio showing SFA-peroneal bypass filled with thrombus, s/p tpa bolus and temporary tpa catheter placed for continuous local tpa infusion. transferred to SICU post-op for close monitoring while getting continuous local tpa infusion. repeat angio 11/18 with persistent clots and no flow.     s/p repeat angio 11/19 w/ thrombectomy and angioplasty of R leg bypass.    Doing well, pain is improving so is the motor function  Hgb 8, INR 2.4 - Goal of INR 3-4      Plan:  -coumadin  -f/u inr w am labs  -reg diet      Plan discussed with attending and chief resident.   ____________________________________________________  Kina Russo, PGY1   Vascular Surgery    VASCULAR SURGERY PROGRESS NOTE     SUMMARY: 64M admitted with right leg critical limb ischemia, s/p tpa bolus and temporary tpa catheter placed for continuous local tpa infusion, s/p repeat angio 11/18 with persistent clots and no flow, s/p repeat angio 11/19 with thrombectomy and angioplasty of R leg bypass.     24 HOUR EVENTS:   H/H 7.7 yesterday afternoon, s/p 1 unit PRBCs, post-transfusion H/H 7.9, stepped down from SICU to telemetry, OOB to chair, coumadin 10 mg x2 doses given, hep gtt therapeutic @13, saige regular diet off IVF >24 hours, berman removed, passed TOV     Subjective: Patient seen and examined at bedside by chief resident. Endorses new RUE arm pain and ecchymosis, feels "tight". Denies trauma. Denies groin pain. Endorses good practiced with flexion and extension of the R ankle. Extremities feel warm to patient. Walked with PT this AM and OOB to chair. Tolerating diet. Denies CP, SOB, numbness/tingling of extremities.     aspirin  chewable 81  heparin  Infusion 1000    Vital Signs Last 24 Hrs  T(C): 36.8 (21 Nov 2020 09:39), Max: 37.4 (20 Nov 2020 22:18)  T(F): 98.2 (21 Nov 2020 09:39), Max: 99.3 (20 Nov 2020 22:18)  HR: 94 (21 Nov 2020 09:30) (85 - 109)  BP: 113/66 (21 Nov 2020 09:30) (113/66 - 146/78)  BP(mean): 85 (21 Nov 2020 05:16) (85 - 105)  RR: 18 (21 Nov 2020 08:36) (17 - 25)  SpO2: 97% (21 Nov 2020 09:30) (96% - 99%)  I&O's Summary    20 Nov 2020 07:01  -  21 Nov 2020 07:00  --------------------------------------------------------  IN: 1569 mL / OUT: 3450 mL / NET: -1881 mL    21 Nov 2020 07:01  -  21 Nov 2020 11:00  --------------------------------------------------------  IN: 120 mL / OUT: 0 mL / NET: 120 mL        Physical Exam:  General: NAD, resting comfortably in bed  Pulmonary: Nonlabored breathing, no respiratory distress  Cardiovascular:  Abdominal:  Extremities:  Vascular:      LABS:                        7.6    13.58 )-----------( 156      ( 21 Nov 2020 06:48 )             22.4     11-21    138  |  102  |  8   ----------------------------<  113<H>  3.9   |  26  |  0.76    Ca    8.3<L>      21 Nov 2020 06:48  Phos  2.6     11-21  Mg     1.8     11-21      PT/INR - ( 21 Nov 2020 06:48 )   PT: 29.2 sec;   INR: 2.54          PTT - ( 21 Nov 2020 06:48 )  PTT:79.7 sec      A/P:   64M s/p right femPT bypass with arm vein in 1993, redone in 2011, s/p R SFA to peroneal bypass graft s/p revision on 7/30/18, and recently found to have recurrent restenosis of graft, s/p angiogram with angioplasty of R tibial artery graft, femoral artery graft, R ARNOL, R EIA, and R CFA 02/03/2020, MTHFR gene mutation homozygous and lupus anticoagulant on chronic coumadin, admitted with right leg critical limb ischemia, s/p tpa bolus and temporary tpa catheter placed for continuous local tpa infusion, s/p repeat angio 11/18 with persistent clots and no flow, s/p repeat angio 11/19 with thrombectomy and angioplasty of R leg bypass. Stepped down from SICU, H/H remains low with new hematoma of RUE.       NEURO: percocet PRN, melatonin, Lidoderm  CV: SBP   VASC:  ASA, hep gtt decreased to 10, INR 2.54 this AM, repeat INR @2pm, coumadin dose tbd, goal INR 3-4   PULM: RA   GI/FEN: regular  : voiding, passed TOV 11/20   ID: completed ancef x 24hr  Vascular: groins soft, pulse checks daily   PPX: no SCDs   PT: home no needs   Dispo: Home once INR at goal     CTAP NONCONTRAST today for hematoma with low H/H  Repeat T+S this AM followed by 1 unit PRBCs   2PM Coags to check INR and PTT on decreased hep gtt rate       Plan discussed with attending and chief resident.   ____________________________________________________  Kina Russo, PGY1   Vascular Surgery

## 2020-11-21 NOTE — CONSULT NOTE ADULT - SUBJECTIVE AND OBJECTIVE BOX
Vascular Co-Management Initial Consult Note    HPI:  This is a 66yo gentleman with a PMH of MTHFR mutation gene (// Lupus? follows wit Dr. Soria), CAD c/b MI (), Lyme disease and extensive PAD s/p R fem PT bypass with arm vein (, redone in ). s/p R SFA to peroneal bypass graft revised () c/b recurrent stenosis of the graft then angiogram and then angioplasty of R tibial artery graft, femoral artery graft, R ARNOL, R EIA and R CFA (2020) who presented to Dr. Herrera with acute onset of a cold R foot associated w/tingling sensation but denied pain, weakness or change in color.  He went to the OR on  for angiogram and thrombolysis of his bypass graft and then again on  for thrombectomy and angioplasty of his bypass graft.  His post-op course has been c/b acute blood anemia, found to have a R-thigh hematoma on CTAP today.      On ROS he feels generally well but fatigued after working with PT.  He was able to walk down the singh and sit in the chair for up to an hour.  Denies any lightheadedness, CP, SOB, AGUILERA or palpitations while working with them.  Also denies any pain at this time.  Tolerating PO.  Remainder of 12-pt ROS otherwise negative.     PAST MEDICAL & SURGICAL HISTORY:  -- Lupus  -- DVT (deep venous thrombosis), femoral artery LLE  -- MI (myocardial infarction),   -- Lyme disease  -- PVD (peripheral vascular disease)  -- History of femoral angiogram, RLE angiogram  -- Surgery, elective, Right Femoral-PT bypass with cephalic vein    Home Medications:  -- aspirin 81 mg oral tablet: 1 tab(s) orally once a day (2018 09:22)  -- Coumadin 7.5 mg oral tablet: 1 tab(s) orally once a day (2018 09:22)  -- enoxaparin: 115 milligram(s) subcutaneous once a day (01 Aug 2018 10:31)    Allergies  -- No Known Allergies    Intolerances  -- Dilaudid (Headache; Nausea)    FAMILY HISTORY:  -- father  of brain cancer, sister  of kidney cancer    Social History:  -- Quit smoking 30 years ago, smoked for 0 years less tan 1ppd, social drinking, denies recreational drugs    CURRENT MEDICATIONS:   acetaminophen   Tablet .. 650 milliGRAM(s) Oral every 6 hours PRN  aspirin  chewable 81 milliGRAM(s) Oral daily  glucagon  Injectable 1 milliGRAM(s) IntraMuscular once  heparin  Infusion 1000 Unit(s)/Hr IV Continuous <Continuous>  influenza   Vaccine 0.5 milliLiter(s) IntraMuscular once  lidocaine   Patch 2 Patch Transdermal daily  melatonin 3 milliGRAM(s) Oral at bedtime  oxycodone    5 mG/acetaminophen 325 mG 1 Tablet(s) Oral every 4 hours PRN  oxycodone    5 mG/acetaminophen 325 mG 2 Tablet(s) Oral every 4 hours PRN  senna 2 Tablet(s) Oral at bedtime    VITAL SIGNS, INS/OUTS (last 24 hours):  Vital Signs Last 24 Hrs  T(C): 37.2 (2020 14:14), Max: 37.4 (2020 22:18)  T(F): 99 (2020 14:14), Max: 99.3 (2020 22:18)  HR: 100 (2020 14:43) (85 - 109)  BP: 116/58 (2020 14:43) (113/66 - 146/78)  BP(mean): 85 (2020 05:16) (85 - 105)  RR: 18 (2020 14:43) (17 - 25)  SpO2: 98% (2020 14:43) (97% - 99%)  I&O's Summary      -  2020 07:00  --------------------------------------------------------  IN: 1569 mL / OUT: 3450 mL / NET: -1881 mL    2020 07:  -  2020 16:17  --------------------------------------------------------  IN: 120 mL / OUT: 600 mL / NET: -480 mL    EXAM:  Gen: NAD, lying flat in bed   HEENT: NCAT, MMM, clear OP  CV: RRR, no m/g/r appreciated  Pulm: CTA B, no w/r/r; no increase in WOB  Abd: normoactive BS, soft, NTND  Ext: WWP, 2+ pulses x4; no c/c/e  Neuro: AOx3, nonfocal  Psych: pleasant, conversant and appropriate    BASIC LABS:                        7.6    13.58 )-----------( 156      ( 2020 06:48 )             22.4     11-21    138  |  102  |  8   ----------------------------<  113<H>  3.9   |  26  |  0.76    Ca    8.3<L>      2020 06:48  Phos  2.6     11-21  Mg     1.8     11-21    PT/INR - ( 2020 15:15 )   PT: 33.5 sec;   INR: 2.94     PTT - ( 2020 15:15 )  PTT:47.4 sec    MICRODATA:  -- No new microdata.    IMAGING:  -- CTAP (per Radiology)   1. Hematoma right upper thigh. No intrapelvic or retroperitoneal extension.  2. Patchy groundglass opacities right lower lobe. This could represent edema or be secondary to other infectious or inflammatory conditions.  3. Low-density blood within the heart is consistent with anemia.

## 2020-11-22 LAB
ANION GAP SERPL CALC-SCNC: 10 MMOL/L — SIGNIFICANT CHANGE UP (ref 5–17)
APTT BLD: 44.5 SEC — HIGH (ref 27.5–35.5)
BUN SERPL-MCNC: 10 MG/DL — SIGNIFICANT CHANGE UP (ref 7–23)
CALCIUM SERPL-MCNC: 8.9 MG/DL — SIGNIFICANT CHANGE UP (ref 8.4–10.5)
CHLORIDE SERPL-SCNC: 106 MMOL/L — SIGNIFICANT CHANGE UP (ref 96–108)
CO2 SERPL-SCNC: 26 MMOL/L — SIGNIFICANT CHANGE UP (ref 22–31)
CREAT SERPL-MCNC: 0.72 MG/DL — SIGNIFICANT CHANGE UP (ref 0.5–1.3)
GLUCOSE SERPL-MCNC: 114 MG/DL — HIGH (ref 70–99)
HCT VFR BLD CALC: 24.1 % — LOW (ref 39–50)
HGB BLD-MCNC: 8.1 G/DL — LOW (ref 13–17)
INR BLD: 2.58 — HIGH (ref 0.88–1.16)
MAGNESIUM SERPL-MCNC: 1.8 MG/DL — SIGNIFICANT CHANGE UP (ref 1.6–2.6)
MCHC RBC-ENTMCNC: 30.2 PG — SIGNIFICANT CHANGE UP (ref 27–34)
MCHC RBC-ENTMCNC: 33.6 GM/DL — SIGNIFICANT CHANGE UP (ref 32–36)
MCV RBC AUTO: 89.9 FL — SIGNIFICANT CHANGE UP (ref 80–100)
NRBC # BLD: 0 /100 WBCS — SIGNIFICANT CHANGE UP (ref 0–0)
PHOSPHATE SERPL-MCNC: 3.1 MG/DL — SIGNIFICANT CHANGE UP (ref 2.5–4.5)
PLATELET # BLD AUTO: 184 K/UL — SIGNIFICANT CHANGE UP (ref 150–400)
POTASSIUM SERPL-MCNC: 4.2 MMOL/L — SIGNIFICANT CHANGE UP (ref 3.5–5.3)
POTASSIUM SERPL-SCNC: 4.2 MMOL/L — SIGNIFICANT CHANGE UP (ref 3.5–5.3)
PROTHROM AB SERPL-ACNC: 29.6 SEC — HIGH (ref 10.6–13.6)
RBC # BLD: 2.68 M/UL — LOW (ref 4.2–5.8)
RBC # FLD: 13.2 % — SIGNIFICANT CHANGE UP (ref 10.3–14.5)
SODIUM SERPL-SCNC: 142 MMOL/L — SIGNIFICANT CHANGE UP (ref 135–145)
WBC # BLD: 13.26 K/UL — HIGH (ref 3.8–10.5)
WBC # FLD AUTO: 13.26 K/UL — HIGH (ref 3.8–10.5)

## 2020-11-22 RX ORDER — MAGNESIUM SULFATE 500 MG/ML
1 VIAL (ML) INJECTION ONCE
Refills: 0 | Status: COMPLETED | OUTPATIENT
Start: 2020-11-22 | End: 2020-11-22

## 2020-11-22 RX ORDER — WARFARIN SODIUM 2.5 MG/1
10 TABLET ORAL ONCE
Refills: 0 | Status: COMPLETED | OUTPATIENT
Start: 2020-11-22 | End: 2020-11-22

## 2020-11-22 RX ADMIN — SENNA PLUS 2 TABLET(S): 8.6 TABLET ORAL at 22:14

## 2020-11-22 RX ADMIN — Medication 100 GRAM(S): at 12:25

## 2020-11-22 RX ADMIN — HEPARIN SODIUM 10 UNIT(S)/HR: 5000 INJECTION INTRAVENOUS; SUBCUTANEOUS at 00:32

## 2020-11-22 RX ADMIN — WARFARIN SODIUM 10 MILLIGRAM(S): 2.5 TABLET ORAL at 22:14

## 2020-11-22 RX ADMIN — Medication 3 MILLIGRAM(S): at 22:14

## 2020-11-22 RX ADMIN — Medication 81 MILLIGRAM(S): at 12:00

## 2020-11-22 NOTE — PROGRESS NOTE ADULT - SUBJECTIVE AND OBJECTIVE BOX
O/N: STEPHANIE, VSS s.p 1 U post txn cbc 8.1 inr 2.5 Coumadin 10mg given                                 pA/P:   64M s/p right femPT bypass with arm vein in 1993, redone in 2011, s/p R SFA to peroneal bypass graft s/p revision on 7/30/18, and recently found to have recurrent restenosis of graft, s/p angiogram with angioplasty of R tibial artery graft, femoral artery graft, R ARNOL, R EIA, and R CFA 02/03/2020, MTHFR gene mutation homozygous and lupus anticoagulant on chronic coumadin, admitted with right leg critical limb ischemia, s/p tpa bolus and temporary tpa catheter placed for continuous local tpa infusion, s/p repeat angio 11/18 with persistent clots and no flow, s/p repeat angio 11/19 with thrombectomy and angioplasty of R leg bypass. Stepped down from SICU, H/H remains low with new hematoma of RUE.       NEURO: percocet PRN, melatonin, Lidoderm  CV: SBP   VASC:  ASA, hep gtt  coumadin dose tbd, goal INR 3-4   PULM: RA   GI/FEN: regular  : voiding, passed TOV 11/20   ID: completed ancef x 24hr  Vascular: groins soft, pulse checks daily   PPX: no SCDs   PT: home no needs   Dispo: Home once INR at goal    24 hr events: Overnight there were no significant events, this morning the patient is feeling well. He received 1 unit of PRBCs, post-transfusion Hgb was 8.1. He got Coumadin 10 mg yesterday night. This morning INR is 2.58. The patient was examined at the bedside by the chief resident. The patient denies having chest pain, SOB, nausea, vomiting, dizziness, chills.    Vital Signs Last 24 Hrs  T(C): 37.1 (22 Nov 2020 06:12), Max: 37.7 (21 Nov 2020 18:21)  T(F): 98.7 (22 Nov 2020 06:12), Max: 99.8 (21 Nov 2020 18:21)  HR: 78 (22 Nov 2020 08:35) (73 - 100)  BP: 148/79 (22 Nov 2020 08:35) (113/66 - 148/79)  BP(mean): --  RR: 16 (22 Nov 2020 08:35) (16 - 18)  SpO2: 98% (22 Nov 2020 08:35) (96% - 98%)    I&O's Summary    21 Nov 2020 07:01  -  22 Nov 2020 07:00  --------------------------------------------------------  IN: 120 mL / OUT: 1910 mL / NET: -1790 mL        Physical Exam:  General: NAD, resting comfortably in the bed  Pulmonary: normal respiratory effort  Cardiovascular: NSR, S1, S2 present  Abdominal: soft, NT/ND, no rebound tenderness, guarding, rigidity  Extremities: WWP, normal strength, no clubbing/cyanosis/edema, L groin hematoma is stable and soft, palpable R graft pulse and PT biphasic, sensation is still decreased compared to the L side but is improving, motor function is improving w/ slight weakness  Neuro: AAOx3, no focal deficits, sensation normal    PT [ ]2+ [ ]1+ [ ]doppler    Lines/drains/tubes:    LABS:                        8.1    13.26 )-----------( 184      ( 22 Nov 2020 04:50 )             24.1     11-22    142  |  106  |  10  ----------------------------<  114<H>  4.2   |  26  |  0.72    Ca    8.9      22 Nov 2020 04:50  Phos  3.1     11-22  Mg     1.8     11-22      PT/INR - ( 22 Nov 2020 04:50 )   PT: 29.6 sec;   INR: 2.58          PTT - ( 22 Nov 2020 04:50 )  PTT:44.5 sec      CAPILLARY BLOOD GLUCOSE          RADIOLOGY & ADDITIONAL TESTS:      ---------------------------------------------------------------------------  PLEASE CHECK WHEN PRESENT:     [  ]Heart Failure     [  ] Acute     [  ] Acute on Chronic     [  ] Chronic  -------------------------------------------------------------------     [  ]Diastolic [HFpEF]     [  ]Systolic [HFrEF]     [  ]Combined [HFpEF & HFrEF]  .................................................................................     [  ]Other:     [ ] Pulmonary Hypertension     [ ] Afib     [ ] Hypertensive Heart Disease  -------------------------------------------------------------------  [ ] Respiratory failure  [ ] Acute cor pulmonale  [ ] Asthma/COPD Exacerbation  [ ] Pleural effusion  [ ] Aspiration pneumonia  [ ] Obstructive Sleep Apnea  -------------------------------------------------------------------  [  ]ESPERANZA     [  ]ATN     [  ]Reneal Medullary Necrosis     [  ]Renal Cortical Necrosis     [  ]Other Pathological Lesions:    [  ]CKD 1  [  ]CKD 2  [  ]CKD 3  [  ]CKD 4  [  ]CKD 5  [  ]Other  -------------------------------------------------------------------  [  ]Diabetes  [  ] Diabetic PVD Ulcer  [  ] Neuropathic ulcer to DM  [  ] Diabetes with Nephropathy  [  ] Osteomyelitis due to diabetes  [  ] Hyperglycemia   [  ]hypoglycemia   --------------------------------------------------------------------  [  ]Malnutrition: See Nutrition Note  [  ]Cachexia  [  ]Other:   [  ]Supplement Ordered:  [  ]Morbid Obesity (BMI >=40]  ---------------------------------------------------------------------  [ ] Sepsis/severe sepsis/septic shock  [ ] Noninfectious SIRS  [ ] UTI  [ ] Pneumonia  -----------------------------------------------------------------------  [ ] Acidosis/alkalosis  [ ] Fluid overload  [ ] Hypokalemia  [ ] Hyperkalemia  [ ] Hypomagnesemia  [ ] Hypophosphatemia  [ ] Hyperphosphatemia  ------------------------------------------------------------------------  [ ] Acute blood loss anemia  [ ] Post op blood loss anemia  [ ] Iron deficiency anemia  [ ] Anemia due to chronic disease  [ ] Hypercoagulable state  [ ] Thrombocytopenia  ----------------------------------------------------------------------  [ ] Cerebral infarction  [ ] Transient ischemia attack  [ ] Encephalopathy - Toxic or Metabolic        A/P:   65 yo M s/p R femoral to PT bypass w/ arm vein in 1993, redone in 2011, s/p R SFA to peroneal bypass graft s/p revision on 7/30/18, and recently found to have recurrent restenosis of graft, s/p angiogram w/ angioplasty of R tibial artery graft, femoral artery graft, R ARNOL, R EIA, and R CFA 02/03/2020, MTHFR gene mutation homozygous and lupus anticoagulant on chronic Coumadin, admitted w/ R leg critical limb ischemia, s/p tPA bolus and temporary tPA catheter placed for continuous local tPA infusion, s/p repeat angiogram 11/18 w/ persistent clots and no flow, s/p repeat angiogram 11/19 w/ thrombectomy and angioplasty of R leg bypass. Stepped down from SICU, H/H remains low w/ new hematoma of RUE.       NEURO: Percocet PRN, Melatonin, Lidoderm  CV: SBP   VASC:  ASA, Heparin gtt., Coumadin 10 mg, goal INR 3-4   PULM: RA   GI/FEN: regular  : voiding, passed TOV 11/20   ID: completed Ancef x 24hr  Vascular: groins soft, pulse checks daily   PPX: no SCDs   PT: home no needs   Dispo: Home once INR at goal

## 2020-11-23 ENCOUNTER — TRANSCRIPTION ENCOUNTER (OUTPATIENT)
Age: 65
End: 2020-11-23

## 2020-11-23 VITALS — TEMPERATURE: 98 F

## 2020-11-23 LAB
ANION GAP SERPL CALC-SCNC: 12 MMOL/L — SIGNIFICANT CHANGE UP (ref 5–17)
APTT BLD: 56.6 SEC — HIGH (ref 27.5–35.5)
BUN SERPL-MCNC: 11 MG/DL — SIGNIFICANT CHANGE UP (ref 7–23)
CALCIUM SERPL-MCNC: 9.2 MG/DL — SIGNIFICANT CHANGE UP (ref 8.4–10.5)
CHLORIDE SERPL-SCNC: 102 MMOL/L — SIGNIFICANT CHANGE UP (ref 96–108)
CO2 SERPL-SCNC: 22 MMOL/L — SIGNIFICANT CHANGE UP (ref 22–31)
CREAT SERPL-MCNC: 0.73 MG/DL — SIGNIFICANT CHANGE UP (ref 0.5–1.3)
GLUCOSE SERPL-MCNC: 112 MG/DL — HIGH (ref 70–99)
HCT VFR BLD CALC: 24.9 % — LOW (ref 39–50)
HGB BLD-MCNC: 8.2 G/DL — LOW (ref 13–17)
INR BLD: 3.42 — HIGH (ref 0.88–1.16)
MAGNESIUM SERPL-MCNC: 1.8 MG/DL — SIGNIFICANT CHANGE UP (ref 1.6–2.6)
MCHC RBC-ENTMCNC: 30.1 PG — SIGNIFICANT CHANGE UP (ref 27–34)
MCHC RBC-ENTMCNC: 32.9 GM/DL — SIGNIFICANT CHANGE UP (ref 32–36)
MCV RBC AUTO: 91.5 FL — SIGNIFICANT CHANGE UP (ref 80–100)
NRBC # BLD: 0 /100 WBCS — SIGNIFICANT CHANGE UP (ref 0–0)
PHOSPHATE SERPL-MCNC: 3.5 MG/DL — SIGNIFICANT CHANGE UP (ref 2.5–4.5)
PLATELET # BLD AUTO: 247 K/UL — SIGNIFICANT CHANGE UP (ref 150–400)
POTASSIUM SERPL-MCNC: 4.7 MMOL/L — SIGNIFICANT CHANGE UP (ref 3.5–5.3)
POTASSIUM SERPL-SCNC: 4.7 MMOL/L — SIGNIFICANT CHANGE UP (ref 3.5–5.3)
PROTHROM AB SERPL-ACNC: 38.7 SEC — HIGH (ref 10.6–13.6)
RBC # BLD: 2.72 M/UL — LOW (ref 4.2–5.8)
RBC # FLD: 13.1 % — SIGNIFICANT CHANGE UP (ref 10.3–14.5)
SODIUM SERPL-SCNC: 136 MMOL/L — SIGNIFICANT CHANGE UP (ref 135–145)
WBC # BLD: 13.85 K/UL — HIGH (ref 3.8–10.5)
WBC # FLD AUTO: 13.85 K/UL — HIGH (ref 3.8–10.5)

## 2020-11-23 PROCEDURE — 83036 HEMOGLOBIN GLYCOSYLATED A1C: CPT

## 2020-11-23 PROCEDURE — 76000 FLUOROSCOPY <1 HR PHYS/QHP: CPT

## 2020-11-23 PROCEDURE — 36415 COLL VENOUS BLD VENIPUNCTURE: CPT

## 2020-11-23 PROCEDURE — C1725: CPT

## 2020-11-23 PROCEDURE — 86850 RBC ANTIBODY SCREEN: CPT

## 2020-11-23 PROCEDURE — 86769 SARS-COV-2 COVID-19 ANTIBODY: CPT

## 2020-11-23 PROCEDURE — 86900 BLOOD TYPING SEROLOGIC ABO: CPT

## 2020-11-23 PROCEDURE — 85384 FIBRINOGEN ACTIVITY: CPT

## 2020-11-23 PROCEDURE — 97161 PT EVAL LOW COMPLEX 20 MIN: CPT

## 2020-11-23 PROCEDURE — 85730 THROMBOPLASTIN TIME PARTIAL: CPT

## 2020-11-23 PROCEDURE — 87635 SARS-COV-2 COVID-19 AMP PRB: CPT

## 2020-11-23 PROCEDURE — 36430 TRANSFUSION BLD/BLD COMPNT: CPT

## 2020-11-23 PROCEDURE — 82962 GLUCOSE BLOOD TEST: CPT

## 2020-11-23 PROCEDURE — C1757: CPT

## 2020-11-23 PROCEDURE — 85025 COMPLETE CBC W/AUTO DIFF WBC: CPT

## 2020-11-23 PROCEDURE — 85027 COMPLETE CBC AUTOMATED: CPT

## 2020-11-23 PROCEDURE — 99233 SBSQ HOSP IP/OBS HIGH 50: CPT

## 2020-11-23 PROCEDURE — 74176 CT ABD & PELVIS W/O CONTRAST: CPT

## 2020-11-23 PROCEDURE — C1887: CPT

## 2020-11-23 PROCEDURE — 80048 BASIC METABOLIC PNL TOTAL CA: CPT

## 2020-11-23 PROCEDURE — C1769: CPT

## 2020-11-23 PROCEDURE — 99285 EMERGENCY DEPT VISIT HI MDM: CPT

## 2020-11-23 PROCEDURE — 86901 BLOOD TYPING SEROLOGIC RH(D): CPT

## 2020-11-23 PROCEDURE — 85610 PROTHROMBIN TIME: CPT

## 2020-11-23 PROCEDURE — C1751: CPT

## 2020-11-23 PROCEDURE — 84100 ASSAY OF PHOSPHORUS: CPT

## 2020-11-23 PROCEDURE — 93005 ELECTROCARDIOGRAM TRACING: CPT

## 2020-11-23 PROCEDURE — 71045 X-RAY EXAM CHEST 1 VIEW: CPT

## 2020-11-23 PROCEDURE — 83735 ASSAY OF MAGNESIUM: CPT

## 2020-11-23 PROCEDURE — 97116 GAIT TRAINING THERAPY: CPT

## 2020-11-23 PROCEDURE — P9016: CPT

## 2020-11-23 PROCEDURE — 86923 COMPATIBILITY TEST ELECTRIC: CPT

## 2020-11-23 PROCEDURE — C1894: CPT

## 2020-11-23 RX ORDER — MAGNESIUM OXIDE 400 MG ORAL TABLET 241.3 MG
400 TABLET ORAL ONCE
Refills: 0 | Status: COMPLETED | OUTPATIENT
Start: 2020-11-23 | End: 2020-11-23

## 2020-11-23 RX ORDER — ACETAMINOPHEN 500 MG
2 TABLET ORAL
Qty: 0 | Refills: 0 | DISCHARGE
Start: 2020-11-23

## 2020-11-23 RX ORDER — WARFARIN SODIUM 2.5 MG/1
1 TABLET ORAL
Qty: 90 | Refills: 0
Start: 2020-11-23 | End: 2021-02-20

## 2020-11-23 RX ADMIN — HEPARIN SODIUM 10 UNIT(S)/HR: 5000 INJECTION INTRAVENOUS; SUBCUTANEOUS at 00:14

## 2020-11-23 RX ADMIN — Medication 81 MILLIGRAM(S): at 11:45

## 2020-11-23 RX ADMIN — MAGNESIUM OXIDE 400 MG ORAL TABLET 400 MILLIGRAM(S): 241.3 TABLET ORAL at 07:56

## 2020-11-23 NOTE — PROGRESS NOTE ADULT - ASSESSMENT
In summary, this is a 64yo gentleman with a PMH of MTHFR mutation gene (on Coumadin), CAD c/b MI (1993), Lyme disease and extensive PAD s/p multiple interventions who p/w critical R-leg ischemia, went to the OR on 11/18 for angiogram and thrombolysis of his bypass graft and then again on 11/19 for thrombectomy and angioplasty of his bypass graft.  His post-op course has been c/b acute blood anemia, found to have a R-thigh hematoma on CTAP.  Completed his Heparin/Coumadin bridge today.    #PAD c/b bypass graft thrombosis s/p thrombolysis and thrombectomy  -- s/p OR for critical leg ischemia   -- c/w ASA  -- c/w Coumadin as per below  -- pain control w/bowel regimen     #Hypercoagulable Disorder (MTHFR mutation gene)   -- heparin/coumadin bridge completed  -- INR goal 3-4  -- will need script for Coumadin to bridge him over to when he establishes PMD care in [place]    #Acute Blood Loss Anemia  -- transfuse to Hb goal >7  -- monitor thigh and groin exam     #Diet - Regular   #DVT PPx - Coumadin   #Dispo - Home today; has follow-up with Dr. Herrera on 11/25    Rosette Madden  Attending Hospitalist  [phone]

## 2020-11-23 NOTE — DISCHARGE NOTE NURSING/CASE MANAGEMENT/SOCIAL WORK - PATIENT PORTAL LINK FT
You can access the FollowMyHealth Patient Portal offered by [facility] by registering at the following website: [email or web address]. By joining [facility]’s FollowMyHealth portal, you will also be able to view your health information using other applications (apps) compatible with our system.

## 2020-11-23 NOTE — DISCHARGE NOTE NURSING/CASE MANAGEMENT/SOCIAL WORK - HAS THE PATIENT USED TOBACCO IN THE PAST 30 DAYS?
[facility] sent rehab orders to [facility].     RN can call report to [facility] at [phone].  Patient will be in room 4117.    [facility] arranged transport with [facility] for pickup at 1:30 PM.     CANDIDA hernandez.     Raiza Elizalde, VASHTI  [facility]  Ext. [phone]     No

## 2020-11-23 NOTE — PROGRESS NOTE ADULT - SUBJECTIVE AND OBJECTIVE BOX
24hr Events:  O/N: STEPHANIE, recieved coumadin 10mg, VSS  11/22: STEPHANIE, VSS, INR 2.58          ---------------------------------------------------------------------------  PLEASE CHECK WHEN PRESENT:     [  ]Heart Failure     [  ] Acute     [  ] Acute on Chronic     [  ] Chronic  -------------------------------------------------------------------     [  ]Diastolic [HFpEF]     [  ]Systolic [HFrEF]     [  ]Combined [HFpEF & HFrEF]  .................................................................................     [  ]Other:     [ ] Pulmonary Hypertension     [ ] Afib     [ ] Hypertensive Heart Disease  -------------------------------------------------------------------  [ ] Respiratory failure  [ ] Acute cor pulmonale  [ ] Asthma/COPD Exacerbation  [ ] Pleural effusion  [ ] Aspiration pneumonia  [ ] Obstructive Sleep Apnea  -------------------------------------------------------------------  [  ]ESPERANZA     [  ]ATN     [  ]Reneal Medullary Necrosis     [  ]Renal Cortical Necrosis     [  ]Other Pathological Lesions:    [  ]CKD 1  [  ]CKD 2  [  ]CKD 3  [  ]CKD 4  [  ]CKD 5  [  ]Other  -------------------------------------------------------------------  [  ]Diabetes  [  ] Diabetic PVD Ulcer  [  ] Neuropathic ulcer to DM  [  ] Diabetes with Nephropathy  [  ] Osteomyelitis due to diabetes  [  ] Hyperglycemia   [  ]hypoglycemia   --------------------------------------------------------------------  [  ]Malnutrition: See Nutrition Note  [  ]Cachexia  [  ]Other:   [  ]Supplement Ordered:  [  ]Morbid Obesity (BMI >=40]  ---------------------------------------------------------------------  [ ] Sepsis/severe sepsis/septic shock  [ ] Noninfectious SIRS  [ ] UTI  [ ] Pneumonia  -----------------------------------------------------------------------  [ ] Acidosis/alkalosis  [ ] Fluid overload  [ ] Hypokalemia  [ ] Hyperkalemia  [ ] Hypomagnesemia  [ ] Hypophosphatemia  [ ] Hyperphosphatemia  ------------------------------------------------------------------------  [ ] Acute blood loss anemia  [ ] Post op blood loss anemia  [ ] Iron deficiency anemia  [ ] Anemia due to chronic disease  [ ] Hypercoagulable state  [ ] Thrombocytopenia  ----------------------------------------------------------------------  [ ] Cerebral infarction  [ ] Transient ischemia attack  [ ] Encephalopathy - Toxic or Metabolic        A/P:   63 yo M s/p R femoral to PT bypass w/ arm vein in 1993, redone in 2011, s/p R SFA to peroneal bypass graft s/p revision on 7/30/18, and recently found to have recurrent restenosis of graft, s/p angiogram w/ angioplasty of R tibial artery graft, femoral artery graft, R ARNOL, R EIA, and R CFA 02/03/2020, MTHFR gene mutation homozygous and lupus anticoagulant on chronic Coumadin, admitted w/ R leg critical limb ischemia, s/p tPA bolus and temporary tPA catheter placed for continuous local tPA infusion, s/p repeat angiogram 11/18 w/ persistent clots and no flow, s/p repeat angiogram 11/19 w/ thrombectomy and angioplasty of R leg bypass. Stepped down from SICU, H/H remains low w/ new hematoma of RUE.       NEURO: Percocet PRN, Melatonin, Lidoderm  CV: SBP   VASC:  ASA, Heparin gtt., Coumadin 10 mg, goal INR 3-4   PULM: RA   GI/FEN: regular  : voiding, passed TOV 11/20   ID: completed Ancef x 24hr  Vascular: groins soft, pulse checks daily   PPX: no SCDs   PT: home no needs   Dispo: Home once INR at goal        24hr Events:  O/N: STEPHANIE, recieved coumadin 10mg, VSS  11/22: STEPHANIE, VSS, INR 2.58    aspirin  chewable 81  heparin  Infusion 1000        Vital Signs Last 24 Hrs  T(C): 37 (23 Nov 2020 06:20), Max: 37.6 (22 Nov 2020 18:10)  T(F): 98.6 (23 Nov 2020 06:20), Max: 99.6 (22 Nov 2020 18:10)  HR: 69 (23 Nov 2020 04:51) (69 - 87)  BP: 139/67 (23 Nov 2020 04:51) (131/72 - 148/79)  BP(mean): --  RR: 18 (23 Nov 2020 04:51) (16 - 20)  SpO2: 99% (23 Nov 2020 04:51) (95% - 99%)  I&O's Summary    22 Nov 2020 07:01  -  23 Nov 2020 07:00  --------------------------------------------------------  IN: 618 mL / OUT: 1100 mL / NET: -482 mL        Physical Exam:  General: NAD, resting comfortably in bed  Pulmonary: Nonlabored breathing, no respiratory distress  Abdominal: soft, NT/ND, no rebound tenderness, guarding, rigidity  Extremities: WWP, normal strength, no clubbing/cyanosis/edema, L groin hematoma is stable and soft, palpable R graft pulse, and peroneal/PT biphasic, sensation is still decreased compared to the L side but is improving, motor function is improving w/ slight weakness  Neuro: AAOx3, no focal deficits, sensation normal      LABS:                        8.2    13.85 )-----------( 247      ( 23 Nov 2020 05:46 )             24.9     11-23    136  |  102  |  11  ----------------------------<  112<H>  4.7   |  22  |  0.73    Ca    9.2      23 Nov 2020 05:46  Phos  3.5     11-23  Mg     1.8     11-23      PT/INR - ( 23 Nov 2020 05:46 )   PT: 38.7 sec;   INR: 3.42          PTT - ( 23 Nov 2020 05:46 )  PTT:56.6 sec      ---------------------------------------------------------------------------  PLEASE CHECK WHEN PRESENT:     [  ]Heart Failure     [  ] Acute     [  ] Acute on Chronic     [  ] Chronic  -------------------------------------------------------------------     [  ]Diastolic [HFpEF]     [  ]Systolic [HFrEF]     [  ]Combined [HFpEF & HFrEF]  .................................................................................     [  ]Other:     [ ] Pulmonary Hypertension     [ ] Afib     [ ] Hypertensive Heart Disease  -------------------------------------------------------------------  [ ] Respiratory failure  [ ] Acute cor pulmonale  [ ] Asthma/COPD Exacerbation  [ ] Pleural effusion  [ ] Aspiration pneumonia  [ ] Obstructive Sleep Apnea  -------------------------------------------------------------------  [  ]ESPERANZA     [  ]ATN     [  ]Reneal Medullary Necrosis     [  ]Renal Cortical Necrosis     [  ]Other Pathological Lesions:    [  ]CKD 1  [  ]CKD 2  [  ]CKD 3  [  ]CKD 4  [  ]CKD 5  [  ]Other  -------------------------------------------------------------------  [  ]Diabetes  [  ] Diabetic PVD Ulcer  [  ] Neuropathic ulcer to DM  [  ] Diabetes with Nephropathy  [  ] Osteomyelitis due to diabetes  [  ] Hyperglycemia   [  ]hypoglycemia   --------------------------------------------------------------------  [  ]Malnutrition: See Nutrition Note  [  ]Cachexia  [  ]Other:   [  ]Supplement Ordered:  [  ]Morbid Obesity (BMI >=40]  ---------------------------------------------------------------------  [ ] Sepsis/severe sepsis/septic shock  [ ] Noninfectious SIRS  [ ] UTI  [ ] Pneumonia  -----------------------------------------------------------------------  [ ] Acidosis/alkalosis  [ ] Fluid overload  [ ] Hypokalemia  [ ] Hyperkalemia  [ ] Hypomagnesemia  [ ] Hypophosphatemia  [ ] Hyperphosphatemia  ------------------------------------------------------------------------  [ ] Acute blood loss anemia  [ X] Post op blood loss anemia  [ ] Iron deficiency anemia  [ ] Anemia due to chronic disease  [ ] Hypercoagulable state  [ ] Thrombocytopenia  [X] leukocytosis  ----------------------------------------------------------------------  [ ] Cerebral infarction  [ ] Transient ischemia attack  [ ] Encephalopathy - Toxic or Metabolic        A/P:   65 yo M s/p R femoral to PT bypass w/ arm vein in 1993, redone in 2011, s/p R SFA to peroneal bypass graft s/p revision on 7/30/18, and recently found to have recurrent restenosis of graft, s/p angiogram w/ angioplasty of R tibial artery graft, femoral artery graft, R ARNOL, R EIA, and R CFA 02/03/2020, MTHFR gene mutation homozygous and lupus anticoagulant on chronic Coumadin, admitted w/ R leg critical limb ischemia, s/p tPA bolus and temporary tPA catheter placed for continuous local tPA infusion, s/p repeat angiogram 11/18 w/ persistent clots and no flow, s/p repeat angiogram 11/19 w/ thrombectomy and angioplasty of R leg bypass. Stepped down from SICU, H/H remains low w/ new hematoma of RUE.       NEURO: Percocet/tylenol PRN, Melatonin, Lidoderm  CV: SBP   VASC:  ASA, stopped Heparin gtt am INR- 3.42, Coumadin 10 mg, goal INR 3-4   PULM: RA   GI/FEN: regular, senna  : voiding, passed TOV 11/20   ID: completed Ancef x 24hr  Vascular: groins soft, pulse checks daily   PPX: no SCDs   PT: home no needs   Dispo: Home once INR at goal

## 2020-11-23 NOTE — PROGRESS NOTE ADULT - REASON FOR ADMISSION
R foot ischemia

## 2020-11-23 NOTE — PROGRESS NOTE ADULT - SUBJECTIVE AND OBJECTIVE BOX
INTERVAL EVENTS:  -- RUE swelling overnight; unclear how he developed it  -- INR at goal this AM    SUBJECTIVE:  -- reports feeling well this AM  -- did not hit his arm or sustain any trauma to his proximal R-humerus  -- plans to return to [place]; does not have a PCP or Coumadin clinic down there   -- Review of Systems: 12 point review of systems otherwise negative    MEDICATIONS:  MEDICATIONS  (STANDING):  aspirin  chewable 81 milliGRAM(s) Oral daily  glucagon  Injectable 1 milliGRAM(s) IntraMuscular once  influenza   Vaccine 0.5 milliLiter(s) IntraMuscular once  lidocaine   Patch 2 Patch Transdermal daily  melatonin 3 milliGRAM(s) Oral at bedtime  senna 2 Tablet(s) Oral at bedtime    MEDICATIONS  (PRN):  acetaminophen   Tablet .. 650 milliGRAM(s) Oral every 6 hours PRN Temp greater or equal to 38C (100.4F), Mild Pain (1 - 3)  oxycodone    5 mG/acetaminophen 325 mG 1 Tablet(s) Oral every 4 hours PRN Moderate Pain (4 - 6)  oxycodone    5 mG/acetaminophen 325 mG 2 Tablet(s) Oral every 4 hours PRN Severe Pain (7 - 10)    Allergies: No Known Allergies    Intolerances: Dilaudid (Headache; Nausea)    OBJECTIVE:  Vital Signs Last 24 Hrs  T(C): 37.1 (23 Nov 2020 10:04), Max: 37.6 (22 Nov 2020 18:10)  T(F): 98.8 (23 Nov 2020 10:04), Max: 99.6 (22 Nov 2020 18:10)  HR: 84 (23 Nov 2020 10:53) (69 - 87)  BP: 135/71 (23 Nov 2020 10:53) (120/73 - 139/67)  BP(mean): --  RR: 18 (23 Nov 2020 09:02) (18 - 20)  SpO2: 97% (23 Nov 2020 10:36) (95% - 99%)  I&O's Summary    22 Nov 2020 07:01  -  23 Nov 2020 07:00  --------------------------------------------------------  IN: 618 mL / OUT: 1100 mL / NET: -482 mL    23 Nov 2020 07:01  -  23 Nov 2020 11:42  --------------------------------------------------------  IN: 180 mL / OUT: 300 mL / NET: -120 mL    PHYSICAL EXAM:  Gen: NAD, sitting upright in bed, speaking in full sentences   HEENT: NCAT, MMM, clear OP  CV: RRR, no m/g/r appreciated  Pulm: CTA B, no w/r/r; no increase in WOB  Abd: normoactive BS, soft, NTND  Ext: WWP, 2+ pulses x4; BLE no c/c/e; RUE +ace bandage w/bump +triceps   Neuro: AOx3, nonfocal  Psych: pleasant, conversant and appropriate    LABS:                        8.2    13.85 )-----------( 247      ( 23 Nov 2020 05:46 )             24.9     11-23    136  |  102  |  11  ----------------------------<  112<H>  4.7   |  22  |  0.73    Ca    9.2      23 Nov 2020 05:46  Phos  3.5     11-23  Mg     1.8     11-23    PT/INR - ( 23 Nov 2020 05:46 )   PT: 38.7 sec;   INR: 3.42     PTT - ( 23 Nov 2020 05:46 )  PTT:56.6 sec    MICRODATA:  -- No new microdata.     RADIOLOGY/OTHER STUDIES:  -- No new imaging.

## 2020-11-25 ENCOUNTER — APPOINTMENT (OUTPATIENT)
Dept: VASCULAR SURGERY | Facility: CLINIC | Age: 65
End: 2020-11-25
Payer: COMMERCIAL

## 2020-11-25 PROBLEM — I73.9 PERIPHERAL VASCULAR DISEASE, UNSPECIFIED: Chronic | Status: ACTIVE | Noted: 2020-11-22

## 2020-11-25 PROBLEM — M32.9 SYSTEMIC LUPUS ERYTHEMATOSUS, UNSPECIFIED: Chronic | Status: ACTIVE | Noted: 2020-11-17

## 2020-11-25 LAB
INR PPP: 4.8 RATIO
PT BLD: 53 SEC

## 2020-11-25 PROCEDURE — 99214 OFFICE O/P EST MOD 30 MIN: CPT

## 2020-11-25 PROCEDURE — 99072 ADDL SUPL MATRL&STAF TM PHE: CPT

## 2020-11-25 PROCEDURE — 93926 LOWER EXTREMITY STUDY: CPT

## 2020-11-25 PROCEDURE — 93971 EXTREMITY STUDY: CPT

## 2020-12-01 ENCOUNTER — NON-APPOINTMENT (OUTPATIENT)
Age: 65
End: 2020-12-01

## 2020-12-05 NOTE — REASON FOR VISIT
[de-identified] : RLE angiogram, thrombolysis, thrombectomy, and angioplasty of bypass.  [de-identified] : Nov 17th, 18th and 19th, 2020 [de-identified] : 64 y/o M w/ PAD s/p R fem PT bypass with arm vein in 1993, redone in 2011, s/p R SFA to peroneal bypass graft s/p revision on 7/30/18, restenosis s/p angioplasty of R tibial artery graft, femoral artery graft, R ARNOL, R EIA, and R CFA 02/03/2020, underlying MTHFR gene mutation homozygous and lupus anticoag on chronic AC therapy (Coumadin). He was recently admitted for RLE critical limb ischemia now s/p tPA bolus, RLE angio and temp catheter placement for tPA infusion on Nov 17th, s/p repeat angio, thrombolysis on Nov 18th, and s/p repeat angio, thrombectomy and angioplasty on Nov 19th with patent bypass of the SFA to the peroneal artery with peroneal runoff to the foot.\par \par Presents for post op visit, he reports experiencing mild worsening RUE pain and swelling. He also has noticed bruising over the b/l upper extremities very easily. He reports he has been staying active and walking  6 miles every day. He has an upcoming ski trip over the holidays. Denies: fevers, chills, skin changes, elevation pallor, ulcerations. He has not had an INR checked since discharge; he is taking his usual Coumadin dosing.

## 2020-12-05 NOTE — DISCUSSION/SUMMARY
[FreeTextEntry1] : 64 y/o M w/ MTHFR gene mutation homozygous and lupus anticoag on chronic AC therapy (Coumadin), and significant PAD w/ recent RLE critical limb ischemia 2/2 R SFA to peroneal bypass occlusion, now s/p tPA bolus, RLE angio and temp catheter placement for tPA infusion on Nov 17th, s/p repeat angio, thrombolysis on Nov 18th, and s/p repeat angio, thrombectomy and angioplasty on Nov 19th with patent bypass of the SFA to the peroneal artery with peroneal runoff to the foot. \par \par On exam, scattered ecchymosis over the b/l upper extremities with RUE nonpitting edema and mild pain to palpation over the medial biceps. Palpable radial pulse. No palpable pedal pulses but feet warm to touch. \par \par RLE Arterial US showed a patent bypass graft post thrombectomy. RUE arterial ultrasound was negative for any DVT/SVT but large hematoma noted.\par \par Pt advised to check INR now after visit. We will contact him with results and instructions to follow pending results. Pt advised the RUE hematoma should resolve over the course of the upcoming days; pt to elevate arm as much as possible to help with swelling. Recommended he continue to stay active but avoid any high impact or strenuous activities. He will  f/u with us here in Jan/2021. Patient aware to contact the office in case he develops any concerning symptoms.

## 2020-12-05 NOTE — ASSESSMENT
[FreeTextEntry1] : 66 y/o M w/ PAD w/ hx of R SFA to peroneal bypass graft s/p revision on 7/30/18, and recently found to have recurrent restenosis of graft and +MTHFR gene mutation homozygous, s/p angiogram with angioplasty of R tibial artery graft, femoral artery graft, R ARNOL, R EIA, and R CFA 02/03/2020, now w/ thrombosed bypass graft. On exam, R foot with signs of ischemia, cool, pale, slightly cyanotic. No edema and skin intact. RLE arterial duplex today shows fem-peroneal bypass is occluded. Pt to be admitted to the hospital for OR today asap.

## 2020-12-05 NOTE — PROCEDURE
[FreeTextEntry1] : RLE arterial duplex completed today shows fem-peroneal bypass is occluded including inflow.

## 2020-12-05 NOTE — HISTORY OF PRESENT ILLNESS
[FreeTextEntry1] : 64 y/o M w/ PAD s/p right femPT bypass with arm vein in 1993, redone in 2011, s/p R SFA to peroneal bypass graft s/p revision on 7/30/18, and recently found to have recurrent restenosis of graft, s/p angiogram with angioplasty of R tibial artery graft, femoral artery graft, R ARNOL, R EIA, and R CFA 02/03/2020, presents here for a follow-up visit. He presents with concerning symptoms since yesterday. He reports the right leg has become pale and has pain in the foot accompanied by numbness and tingling.  He continues to stay active, hiking, and walking; he was hiking on Saturday over 7 miles. Denies any leg swelling, trauma, movement limitation or ulcerations. He is on Coumadin and reports he has not checked his INR level in some time (INR goal 2.5-3.5). Also, he also has a hx of MTHFR gene mutation homozygous for which he is followed by Dr Soria and continues oral anticoagulation.\par \par He moved to [place] but was [facility] for the last 2 weeks.

## 2020-12-05 NOTE — PHYSICAL EXAM
[Respiratory Effort] : normal respiratory effort [Normal Rate and Rhythm] : normal rate and rhythm [2+] : left 2+ [1+] : right 1+ [Alert] : alert [Oriented to Person] : oriented to person [Oriented to Place] : oriented to place [Oriented to Time] : oriented to time [Calm] : calm [No Rash or Lesion] : No rash or lesion [Ankle Swelling (On Exam)] : not present [Varicose Veins Of Lower Extremities] : not present [] : not present [Abdomen Tenderness] : ~T ~M No abdominal tenderness [de-identified] : Calm, cooperative [de-identified] : NC/AT  [de-identified] : Supple  [FreeTextEntry1] : Ecchymosis over bilt UEs. RUE nonpitting edema. RLE resolving ecchymosis, warm to touch with no palpable pedal pulses.  [de-identified] : FROM, 5/5 x 4.  [de-identified] : See cardiovascular section.

## 2020-12-05 NOTE — PHYSICAL EXAM
[Respiratory Effort] : normal respiratory effort [Normal Rate and Rhythm] : normal rate and rhythm [2+] : left 2+ [1+] : right 1+ [0] : right 0 [No Rash or Lesion] : No rash or lesion [Alert] : alert [Oriented to Person] : oriented to person [Oriented to Place] : oriented to place [Oriented to Time] : oriented to time [Calm] : calm [JVD] : no jugular venous distention  [Ankle Swelling (On Exam)] : not present [Varicose Veins Of Lower Extremities] : not present [] : not present [Abdomen Tenderness] : ~T ~M No abdominal tenderness [de-identified] : Well appearing, friendly, talkative [FreeTextEntry1] : Unable to appreciate pulse over graft, R foot cool to touch, skin intact, toes and foot pale and slightly cyanotic. Normal motor movement of R foot/toes, sensation intact to touch.  [de-identified] : FROM

## 2020-12-05 NOTE — ADDENDUM
[FreeTextEntry1] : This note was written by Yola Carrion on 11/25/2020 acting as scribe for Seamus Ac M.D.\par \par I, Seamus Barber  have read and attest that all the information, medical decision making and discharge instructions within are true and accurate.

## 2021-01-06 ENCOUNTER — APPOINTMENT (OUTPATIENT)
Dept: VASCULAR SURGERY | Facility: CLINIC | Age: 66
End: 2021-01-06
Payer: COMMERCIAL

## 2021-01-06 LAB
INR PPP: 2.79 RATIO
PT BLD: 31.4 SEC

## 2021-01-06 PROCEDURE — 93926 LOWER EXTREMITY STUDY: CPT

## 2021-01-06 PROCEDURE — 99072 ADDL SUPL MATRL&STAF TM PHE: CPT

## 2021-01-06 PROCEDURE — 99214 OFFICE O/P EST MOD 30 MIN: CPT

## 2021-01-13 NOTE — PROCEDURE
[FreeTextEntry1] : RLE Arterial US: Patent bypass, some low flow noted in the proximal calf (stable from previous study)

## 2021-01-13 NOTE — PHYSICAL EXAM
[Respiratory Effort] : normal respiratory effort [Normal Rate and Rhythm] : normal rate and rhythm [2+] : left 2+ [1+] : right 1+ [No Rash or Lesion] : No rash or lesion [Alert] : alert [Oriented to Person] : oriented to person [Oriented to Place] : oriented to place [Oriented to Time] : oriented to time [Calm] : calm [Ankle Swelling (On Exam)] : not present [Varicose Veins Of Lower Extremities] : not present [] : not present [Abdomen Tenderness] : ~T ~M No abdominal tenderness [de-identified] : Calm, cooperative [de-identified] : NC/AT  [de-identified] : Supple  [FreeTextEntry1] : Feet are pink, toes are warm to touch with adequate capillary refill.  [de-identified] : FROM, 5/5 x 4.

## 2021-01-13 NOTE — ASSESSMENT
[FreeTextEntry1] : 64 y/o M w/ MTHFR gene mutation homozygous and lupus anticoag on chronic AC therapy (Warfarin), and significant PAD w/ recent RLE critical limb ischemia 2/2 R SFA to peroneal bypass occlusion, now s/p tPA bolus, RLE angio and temp catheter placement for tPA infusion on Nov 17th, s/p repeat angio, thrombolysis on Nov 18th, and s/p repeat angio, thrombectomy and angioplasty on Nov 19th with patent bypass of the SFA to the peroneal artery with peroneal runoff to the foot. Now with labile INR levels. \par \par General exam benign. RLE warm, adequate capillary refill and skin intact. RLE Arterial US: Patent bypass, some low flow noted in the proximal calf (stable from previous). \par \par Recommended pt to continue Warfarin and close monitor INR. RLE patent bypass. Patient recommended he avoid any high impact or strenuous physical activity. He will follow up with us here in 3 months. Patient to contact the office in case of having any questions, concerns or AC therapy issues. \par \par

## 2021-01-13 NOTE — HISTORY OF PRESENT ILLNESS
[FreeTextEntry1] : 64 y/o M w/ PAD s/p R fem PT bypass with arm vein in 1993, redone in 2011, s/p R SFA to peroneal bypass graft s/p revision on 7/30/18, restenosis s/p angioplasty of R tibial artery graft, femoral artery graft, R ARNOL, R EIA, and R CFA 02/03/2020, underlying MTHFR gene mutation homozygous and lupus anticoag on chronic AC therapy (Coumadin). He was admitted Nov 2020 for RLE critical limb ischemia now s/p tPA bolus, RLE angio and temp catheter placement for tPA infusion on Nov 17th, s/p repeat angio, thrombolysis on Nov 18th, and s/p repeat angio, thrombectomy and angioplasty on Nov 19th with patent bypass of the SFA to the peroneal artery with peroneal runoff to the foot.\par \par Presents for follow up evaluation, he reports he has been having issues refilling his prescription for Coumadin (branded version) as  he has been told at several pharmacies it has been discontinued and now only offer  Warfarin (generic version). Due to his medication changes his INR has been labile which happened to him in the past. He has been monitoring the INR closely. His dosing has been variant given his INR has been ranging from 2.4 to 5.6. Prior to arrival to today's consult he had blood drawn to re-check his INR with pending results. He reports he has been active daily, walking ~ 1-2 mile in the morning and in the evening. He has not been doing any strenuous physical activity given labile INR.  Denies: traumas, fevers, chills, skin changes, leg pain, leg pallor/coolness, or ulcerations. He has not changed his diet or added any other medications to affect the INR..

## 2021-01-13 NOTE — ADDENDUM
[FreeTextEntry1] : This note was written by Yola Carrion on 01/06/2021 acting as scribe for Seamus Ac M.D.\par \par I, Seamus Barber  have read and attest that all the information, medical decision making and discharge instructions within are true and accurate.

## 2021-05-26 ENCOUNTER — APPOINTMENT (OUTPATIENT)
Dept: VASCULAR SURGERY | Facility: CLINIC | Age: 66
End: 2021-05-26
Payer: COMMERCIAL

## 2021-05-26 PROCEDURE — 99214 OFFICE O/P EST MOD 30 MIN: CPT

## 2021-05-26 PROCEDURE — 99072 ADDL SUPL MATRL&STAF TM PHE: CPT

## 2021-05-27 NOTE — HISTORY OF PRESENT ILLNESS
[FreeTextEntry1] : 64 y/o M w/ PAD s/p R fem PT bypass with arm vein in 1993, redone in 2011, s/p R SFA to peroneal bypass graft s/p revision on 7/30/18, restenosis s/p angioplasty of R tibial artery graft, femoral artery graft, R ARNOL, R EIA, and R CFA 02/03/2020, underlying MTHFR gene mutation homozygous and lupus anticoag on chronic AC therapy (Coumadin). He was admitted Nov 2020 for RLE critical limb ischemia now s/p tPA bolus, RLE angio and temp catheter placement for tPA infusion on Nov 17th, s/p repeat angio, thrombolysis on Nov 18th, and s/p repeat angio, thrombectomy and angioplasty on Nov 19th with patent bypass of the SFA to the peroneal artery with peroneal runoff to the foot.\par \par Presents for follow up evaluation. Patient reports having issues maintaining INR levels. He has been monitoring it on a weekly basis (except during travels), which he notes it continue to fluctuate. His Warfarin dosing is usually 7.5mg daiyl and would take 2.5mg less or more depending on the level (~1 -2 times every other week); INR has been ranging from 2.4 to 5.6. He reports he has been active daily, walking ~ 1-2 miles, alternating with gardening. He has not been doing any strenuous physical activity given labile INR. Furthermore, patient mentions his face occasionally swells with Warfarin compared to Coumadin. Denies: traumas, fevers, chills, skin changes, leg pain, rest pain, leg pallor/coolness, or ulcerations. He has not changed his diet or added any other medications to affect the INR.\par \par Patient now lives in [place]. He mentions recently returning from [place] visiting his newly born grandson.

## 2021-05-27 NOTE — ASSESSMENT
[Arterial/Venous Disease] : arterial/venous disease [Foot care/Footwear] : foot care/footwear [Leg Bypass] : leg bypass [FreeTextEntry1] : 66 y/o M w/ MTHFR gene mutation homozygous and lupus anticoag on chronic AC therapy (Warfarin), and significant PAD w/ recent RLE critical limb ischemia 2/2 R SFA to peroneal bypass occlusion, now s/p tPA bolus, RLE angio and temp catheter placement for tPA infusion on Nov 17th, s/p repeat angio, thrombolysis on Nov 18th, and s/p repeat angio, thrombectomy and angioplasty on Nov 19th with patent bypass of the SFA to the peroneal artery with peroneal runoff to the foot. Still w/ labile INR levels given change from Coumadin to Warfarin. General exam benign. RLE warm, adequate capillary refill and skin intact. RLE Arterial US ordered/completed today shows patent bypass. \par \par Plan:\par -Patient recommended he continues Warfarin and close monitor of INR levels.\par -He is advise to stay active and avoid any high impact or strenuous physical activity.\par -No vascular surgery interventions indicated at this time. \par -Patient to follow up with us here in 6 months for RLE bypass surveillance. Should he have any questions, concerns or AC therapy issues to contact the office. \par \par  [Medication Management] : medication management

## 2021-05-27 NOTE — PROCEDURE
[FreeTextEntry1] : RLE arterial US to eval superficial femoral to peroneal artery autologous vein bypass graft demonstrates: patent bypass with no significant changes.

## 2021-05-27 NOTE — ADDENDUM
[FreeTextEntry1] : This note was written by Yola Carrion on 05/26/2021 acting as scribe for Javier Ayala M.D.\par \par I, Dr. Seamus Herrera  have read and attest that all the information, medical decision making and discharge instructions within are true and accurate. \par \par I, Dr. Seamus Herrera, personally performed the evaluation and management (E/M) services for this established patient who presents today with (a) new problem(s)/exacerbation of (an) existing condition(s).  That E/M includes conducting the examination, assessing all new/exacerbated conditions, and establishing a new plan of care.  Today, my ACP, Alta Lowe NP, was here to observe my evaluation and management services for this new problem/exacerbated condition to be followed going forward.

## 2021-05-27 NOTE — PHYSICAL EXAM
[Respiratory Effort] : normal respiratory effort [Normal Rate and Rhythm] : normal rate and rhythm [2+] : left 2+ [1+] : right 1+ [No Rash or Lesion] : No rash or lesion [Alert] : alert [Oriented to Person] : oriented to person [Oriented to Place] : oriented to place [Oriented to Time] : oriented to time [Calm] : calm [Ankle Swelling (On Exam)] : not present [Varicose Veins Of Lower Extremities] : not present [] : not present [Abdomen Tenderness] : ~T ~M No abdominal tenderness [de-identified] : Calm, cooperative [de-identified] : NC/AT  [de-identified] : Supple  [FreeTextEntry1] : Feet are pink, toes are warm to touch with adequate capillary refill.  [de-identified] : FROM, 5/5 x 4.

## 2021-10-27 ENCOUNTER — APPOINTMENT (OUTPATIENT)
Dept: VASCULAR SURGERY | Facility: CLINIC | Age: 66
End: 2021-10-27
Payer: MEDICARE

## 2021-10-27 VITALS
WEIGHT: 176 LBS | BODY MASS INDEX: 23.33 KG/M2 | HEIGHT: 73 IN | HEART RATE: 78 BPM | SYSTOLIC BLOOD PRESSURE: 132 MMHG | DIASTOLIC BLOOD PRESSURE: 83 MMHG

## 2021-10-27 PROCEDURE — 93926 LOWER EXTREMITY STUDY: CPT

## 2021-10-27 PROCEDURE — 99213 OFFICE O/P EST LOW 20 MIN: CPT

## 2021-10-29 RX ORDER — ENOXAPARIN SODIUM 100 MG/ML
40 INJECTION SUBCUTANEOUS
Qty: 6 | Refills: 0 | Status: DISCONTINUED | COMMUNITY
Start: 2020-12-01 | End: 2021-10-29

## 2021-10-29 NOTE — HISTORY OF PRESENT ILLNESS
[FreeTextEntry1] : 66 y/o M w/ PAD s/p R fem PT bypass with arm vein in 1993, redone in 2011, s/p R SFA to peroneal bypass graft s/p revision on 7/30/18, restenosis s/p angioplasty of R tibial artery graft, femoral artery graft, R ARNOL, R EIA, and R CFA 02/03/2020, c/b RLE critical limb ischemia Nov 2020 s/p tPA bolus, RLE angio and temp catheter placement for tPA infusion, repeat angiox2 s/p thrombolysis, thrombectomy and angioplasty with evidence of patent bypass of the SFA to the peroneal artery with peroneal runoff to the foot. He also has underlying MTHFR gene mutation homozygous and lupus anticoag on chronic AC therapy (Coumadin). \par \par Today, pt presents for follow up evaluation. He is feeling well overall and has been venturing to with his wife and a camper to different areas of the country. He continues to experience fluctuating INR levels with Warfarin and has been monitoring them every other week. He usually takes 7.5mg nightly and skips one dose or takes 5mg only if INR is on the higher side. He has not been playing basketball but stays active daily, walking ~ 1-2 miles. Denies any traumas, fevers, chills, skin changes, leg pain, rest pain, claudication, leg pallor/coolness, or ulcerations. He has not changed his diet or added any other medications to affect the INR.\par \par Patient now lives in [place]. He mentions recently returning from [place] visiting his newly born grandson. He will go back to [place] for [place]. He will spend a few days now [facility] with his other son before returning south.

## 2021-10-29 NOTE — PHYSICAL EXAM
[Respiratory Effort] : normal respiratory effort [Normal Rate and Rhythm] : normal rate and rhythm [No Rash or Lesion] : No rash or lesion [Alert] : alert [Oriented to Person] : oriented to person [Oriented to Place] : oriented to place [Oriented to Time] : oriented to time [Calm] : calm [1+] : right 1+ [Ankle Swelling (On Exam)] : not present [Varicose Veins Of Lower Extremities] : not present [] : not present [Abdomen Tenderness] : ~T ~M No abdominal tenderness [de-identified] : Talkative, friendly usual [de-identified] : NC/AT  [de-identified] : Supple  [FreeTextEntry1] : Feet are pink, toes are warm to touch with adequate capillary refill. Skin intact.  [de-identified] : FROM, 5/5 x 4.

## 2021-10-29 NOTE — PROCEDURE
[FreeTextEntry1] : RLE arterial US to eval  bypass graft demonstrates: patent fem to peroneal vein bypass with no significant changes. Patent runoff arteries noted.

## 2021-10-29 NOTE — ASSESSMENT
[FreeTextEntry1] : 64 y/o M w/ MTHFR gene mutation homozygous and lupus anticoag on chronic AC therapy (Warfarin), and significant PAD w/ recent RLE critical limb ischemia 2/2 R SFA to peroneal bypass occlusion, s/p tPA bolus, tPA infusion, angiox2 w/ thrombolysis, thrombectomy and angioplasty on Nov 17th-19th, 2020 with evidence of patent bypass of the SFA to the peroneal artery with peroneal runoff to the foot. Still w/ labile INR levels given change from Coumadin to Warfarin. \par \par On exam, palpable popliteal pulse over bypass area and weak pedal pulses. RLE warm, adequate capillary refill and skin intact. RLE Arterial US ordered/completed today shows patent bypass. \par \par Plan:\par -Patient recommended he continues Warfarin with close monitoring of INR levels and avoid vitamin K-rich foods.\par -He is advise to stay active and avoid any high impact or strenuous physical activity.\par -Patient to follow up with us here in 6 months for RLE bypass surveillance. Should he have any questions, concerns or AC therapy issues to contact the office.  [Arterial/Venous Disease] : arterial/venous disease [Medication Management] : medication management [Foot care/Footwear] : foot care/footwear

## 2021-10-29 NOTE — ADDENDUM
[FreeTextEntry1] : I, Dr. Seamus Herrera, personally performed the evaluation and management (E/M) services for this established patient who presents today with (a) chronic problem(s) of (an) existing condition(s).  That E/M includes conducting the examination, assessing all conditions, and establishing a plan of care. Today, my ACP, Alta Lowe NP, was here to observe my evaluation and management services for this condition(s) to be followed going forward.

## 2022-01-25 NOTE — PROGRESS NOTE ADULT - ATTENDING COMMENTS
Patient seen and examined with house-staff during bedside rounds.  Resident note read, including vitals, physical findings, laboratory data, and radiological reports.   Revisions included below.  Direct personal management at bed side and extensive interpretation of the data.  Plan was outlined and discussed in details with the housestaff.  Decision making of high complexity  Action taken for acute disease activity to reflect the level of care provided:  - medication reconciliation  - review laboratory data  He is clincially stable.  NO change in the groin hematoma.  Hb stable.  Adequate sat.  Had thrombectomy .  Continue heparin.  Follow PPT
Patient seen and examined with house-staff during bedside rounds.  Resident note read, including vitals, physical findings, laboratory data, and radiological reports.   Revisions included below.  Direct personal management at bed side and extensive interpretation of the data.  Plan was outlined and discussed in details with the housestaff.  Decision making of high complexity  Action taken for acute disease activity to reflect the level of care provided:  - medication reconciliation  - review laboratory data  The patient is clinically stable.  The patient is on anticoagulation I will start Coumadin.  A.m. at INR 3-3-1/2.  Follow hemoglobin.  Follow-up the pulse in the left lower extremities.  Advance diet.  Renal function is stable.  Patient is clinically stable for transfer
Patient seen and examined with house-staff during bedside rounds.  Resident note read, including vitals, physical findings, laboratory data, and radiological reports.   Revisions included below.  Direct personal management at bed side and extensive interpretation of the data.  Plan was outlined and discussed in details with the housestaff.  Decision making of high complexity  Action taken for acute disease activity to reflect the level of care provided:  - medication reconciliation  - review laboratory data  he is stable and the hematoma At the left femoral site is soft and did not ring increase in size.  Patient is on TPA.  The bleeding from the mouth is probably from cracked lips and was evaluated intraoperatively.  Renal function is stable.  I discussed the case with vascular surgery
0

## 2022-03-01 LAB
ANION GAP SERPL CALC-SCNC: 12 MMOL/L
APTT BLD: 35.1 SEC
BASOPHILS # BLD AUTO: 0.1 K/UL
BASOPHILS NFR BLD AUTO: 1.1 %
BUN SERPL-MCNC: 17 MG/DL
CALCIUM SERPL-MCNC: 9.6 MG/DL
CHLORIDE SERPL-SCNC: 104 MMOL/L
CO2 SERPL-SCNC: 25 MMOL/L
CREAT SERPL-MCNC: 0.97 MG/DL
EGFR: 87 ML/MIN/1.73M2
EOSINOPHIL # BLD AUTO: 0.11 K/UL
EOSINOPHIL NFR BLD AUTO: 1.2 %
GLUCOSE SERPL-MCNC: 117 MG/DL
HCT VFR BLD CALC: 33.8 %
HGB BLD-MCNC: 9.2 G/DL
IMM GRANULOCYTES NFR BLD AUTO: 0.2 %
INR PPP: 1.37 RATIO
LYMPHOCYTES # BLD AUTO: 2.3 K/UL
LYMPHOCYTES NFR BLD AUTO: 24.6 %
MAN DIFF?: NORMAL
MCHC RBC-ENTMCNC: 20.5 PG
MCHC RBC-ENTMCNC: 27.2 GM/DL
MCV RBC AUTO: 75.4 FL
MONOCYTES # BLD AUTO: 0.75 K/UL
MONOCYTES NFR BLD AUTO: 8 %
NEUTROPHILS # BLD AUTO: 6.06 K/UL
NEUTROPHILS NFR BLD AUTO: 64.9 %
PLATELET # BLD AUTO: 492 K/UL
POTASSIUM SERPL-SCNC: 4.3 MMOL/L
PT BLD: 16.1 SEC
RBC # BLD: 4.48 M/UL
RBC # FLD: 17.8 %
SODIUM SERPL-SCNC: 141 MMOL/L
WBC # FLD AUTO: 9.34 K/UL

## 2022-03-02 ENCOUNTER — APPOINTMENT (OUTPATIENT)
Dept: VASCULAR SURGERY | Facility: CLINIC | Age: 67
End: 2022-03-02
Payer: MEDICARE

## 2022-03-02 VITALS
SYSTOLIC BLOOD PRESSURE: 146 MMHG | HEIGHT: 73 IN | DIASTOLIC BLOOD PRESSURE: 88 MMHG | HEART RATE: 83 BPM | BODY MASS INDEX: 23.33 KG/M2 | WEIGHT: 176 LBS

## 2022-03-02 DIAGNOSIS — T82.898A OTHER SPECIFIED COMPLICATION OF VASCULAR PROSTHETIC DEVICES, IMPLANTS AND GRAFTS, INITIAL ENCOUNTER: ICD-10-CM

## 2022-03-02 PROCEDURE — 93926 LOWER EXTREMITY STUDY: CPT

## 2022-03-02 PROCEDURE — 99213 OFFICE O/P EST LOW 20 MIN: CPT

## 2022-03-03 ENCOUNTER — APPOINTMENT (OUTPATIENT)
Dept: HEMATOLOGY ONCOLOGY | Facility: CLINIC | Age: 67
End: 2022-03-03
Payer: MEDICARE

## 2022-03-03 DIAGNOSIS — D64.9 ANEMIA, UNSPECIFIED: ICD-10-CM

## 2022-03-03 DIAGNOSIS — T82.858A STENOSIS OF OTHER VASCULAR PROSTHETIC, INITIAL ENCOUNTER: ICD-10-CM

## 2022-03-03 LAB
INR PPP: 2.38 RATIO
PT BLD: 28.1 SEC

## 2022-03-03 PROCEDURE — 99214 OFFICE O/P EST MOD 30 MIN: CPT | Mod: 95

## 2022-03-03 RX ORDER — PANTOPRAZOLE 40 MG/1
40 TABLET, DELAYED RELEASE ORAL
Qty: 20 | Refills: 0 | Status: ACTIVE | COMMUNITY
Start: 2022-02-25

## 2022-03-03 NOTE — CONSULT LETTER
[Please see my note below.] : Please see my note below. [Consult Letter:] : I had the pleasure of evaluating your patient, [unfilled]. [Consult Closing:] : Thank you very much for allowing me to participate in the care of this patient.  If you have any questions, please do not hesitate to contact me. [Sincerely,] : Sincerely, [Dear  ___] : Dear  [unfilled], [FreeTextEntry3] : Marisol Soria MD\par  [DrArmando  ___] : Dr. LINO
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
## 2022-03-03 NOTE — HISTORY OF PRESENT ILLNESS
[Other Location: e.g. School (Enter Location, City,State)___] : at [unfilled], at the time of the visit. [Other Location: e.g. Home (Enter Location, City,State)___] : at [unfilled] [de-identified] : 64 years old male with recurrent femoral bypass stenosis.  Most recently patient's granddaughter had an intracerebral clot followed by hemorrhage and therefore patient wanted a hypercoagulable work-up.\par \par Partial hypercoagulable work-up was done when patient was in the hospital... He was found to have MTHFR gene mutation homozygous... Patient was given a copy of his results, and the results were discussed with him extensively. [de-identified] : 3-3-2022 here for fu to discuss switching to a DOAC..pt was found to have anemia...he never had a colonoscopy

## 2022-03-03 NOTE — PHYSICAL EXAM
[Respiratory Effort] : normal respiratory effort [Normal Rate and Rhythm] : normal rate and rhythm [1+] : right 1+ [No Rash or Lesion] : No rash or lesion [Alert] : alert [Oriented to Place] : oriented to place [Oriented to Person] : oriented to person [Oriented to Time] : oriented to time [Calm] : calm [Ankle Swelling (On Exam)] : not present [] : not present [Varicose Veins Of Lower Extremities] : not present [Abdomen Tenderness] : ~T ~M No abdominal tenderness [de-identified] : Talkative, friendly usual [de-identified] : NC/AT  [de-identified] : Supple  [FreeTextEntry1] : Feet are pink, toes are warm to touch with adequate capillary refill. Skin intact.  [de-identified] : FROM, 5/5 x 4.

## 2022-03-03 NOTE — ASSESSMENT
[FreeTextEntry1] : discussed with pt his anemia, and I recommended he has upper and lower endoscopy...\par \par while he is preparing the endoscopies, he will stop Coumadin, take Lovenox (pt had an episode of A. Fib)...following colonoscopy will switch pt to DOACs..\par \par All this was discussed with pt, and Dr. Harpal Lara.

## 2022-03-03 NOTE — PROCEDURE
[FreeTextEntry1] : RLE arterial US ordered to eval  bypass graft demonstrates: patent fem to peroneal vein bypass with no significant changes. Patent runoff arteries noted.

## 2022-03-03 NOTE — ADDENDUM
[FreeTextEntry1] : I, Dr. Seamus Herrera, personally performed the evaluation and management (E/M) services for this established patient who presents today with (a) new problem(s)/exacerbation of (an) existing condition(s).  That E/M includes conducting the examination, assessing all new/exacerbated conditions, and establishing a new plan of care.  Today, my ACP, Alta Lowe NP, was here to observe my evaluation and management services for this new problem/exacerbated condition to be followed going forward.

## 2022-04-20 ENCOUNTER — NON-APPOINTMENT (OUTPATIENT)
Age: 67
End: 2022-04-20

## 2022-05-04 ENCOUNTER — NON-APPOINTMENT (OUTPATIENT)
Age: 67
End: 2022-05-04

## 2022-05-04 DIAGNOSIS — D68.62 LUPUS ANTICOAGULANT SYNDROME: ICD-10-CM

## 2022-05-04 RX ORDER — BISACODYL 5 MG/1
5 TABLET ORAL
Qty: 2 | Refills: 0 | Status: ACTIVE | COMMUNITY
Start: 2022-05-04 | End: 1900-01-01

## 2022-05-04 RX ORDER — POLYETHYLENE GLYCOL 3350 AND ELECTROLYTES WITH LEMON FLAVOR 236; 22.74; 6.74; 5.86; 2.97 G/4L; G/4L; G/4L; G/4L; G/4L
236 POWDER, FOR SOLUTION ORAL
Qty: 1 | Refills: 0 | Status: ACTIVE | COMMUNITY
Start: 2022-05-04 | End: 1900-01-01

## 2022-05-05 ENCOUNTER — APPOINTMENT (OUTPATIENT)
Dept: GASTROENTEROLOGY | Facility: CLINIC | Age: 67
End: 2022-05-05
Payer: MEDICARE

## 2022-05-05 ENCOUNTER — NON-APPOINTMENT (OUTPATIENT)
Age: 67
End: 2022-05-05

## 2022-05-05 PROCEDURE — 99441: CPT | Mod: 95

## 2022-05-05 RX ORDER — ENOXAPARIN SODIUM 60 MG/.6ML
60 INJECTION, SOLUTION SUBCUTANEOUS DAILY
Qty: 5 | Refills: 0 | Status: ACTIVE | COMMUNITY
Start: 2022-05-05 | End: 1900-01-01

## 2022-05-10 ENCOUNTER — RESULT REVIEW (OUTPATIENT)
Age: 67
End: 2022-05-10

## 2022-05-10 ENCOUNTER — OUTPATIENT (OUTPATIENT)
Dept: OUTPATIENT SERVICES | Facility: HOSPITAL | Age: 67
LOS: 1 days | Discharge: HOME | End: 2022-05-10
Payer: MEDICARE

## 2022-05-10 ENCOUNTER — APPOINTMENT (OUTPATIENT)
Dept: GASTROENTEROLOGY | Facility: HOSPITAL | Age: 67
End: 2022-05-10

## 2022-05-10 DIAGNOSIS — Z86.79 PERSONAL HISTORY OF OTHER DISEASES OF THE CIRCULATORY SYSTEM: Chronic | ICD-10-CM

## 2022-05-10 DIAGNOSIS — Z41.9 ENCOUNTER FOR PROCEDURE FOR PURPOSES OTHER THAN REMEDYING HEALTH STATE, UNSPECIFIED: Chronic | ICD-10-CM

## 2022-05-10 LAB
APTT BLD: 35.2 SEC — SIGNIFICANT CHANGE UP (ref 27.5–35.5)
APTT BLD: 35.7 SEC
BASOPHILS # BLD AUTO: 0.07 K/UL
BASOPHILS NFR BLD AUTO: 0.7 %
EOSINOPHIL # BLD AUTO: 0.12 K/UL
EOSINOPHIL NFR BLD AUTO: 1.2 %
HCT VFR BLD CALC: 38.8 % — LOW (ref 39–50)
HCT VFR BLD CALC: 39.3 %
HGB BLD-MCNC: 11.1 G/DL
HGB BLD-MCNC: 11.5 G/DL — LOW (ref 13–17)
IMM GRANULOCYTES NFR BLD AUTO: 0.6 %
INR BLD: 1.15 — SIGNIFICANT CHANGE UP (ref 0.88–1.16)
INR PPP: 1.18 RATIO
LYMPHOCYTES # BLD AUTO: 1.7 K/UL
LYMPHOCYTES NFR BLD AUTO: 17.3 %
MAN DIFF?: NORMAL
MCHC RBC-ENTMCNC: 22.4 PG
MCHC RBC-ENTMCNC: 22.9 PG — LOW (ref 27–34)
MCHC RBC-ENTMCNC: 28.2 GM/DL
MCHC RBC-ENTMCNC: 29.6 GM/DL — LOW (ref 32–36)
MCV RBC AUTO: 77.1 FL — LOW (ref 80–100)
MCV RBC AUTO: 79.4 FL
MONOCYTES # BLD AUTO: 1.02 K/UL
MONOCYTES NFR BLD AUTO: 10.4 %
NEUTROPHILS # BLD AUTO: 6.86 K/UL
NEUTROPHILS NFR BLD AUTO: 69.8 %
NRBC # BLD: 0 /100 WBCS — SIGNIFICANT CHANGE UP (ref 0–0)
PLATELET # BLD AUTO: 415 K/UL — HIGH (ref 150–400)
PLATELET # BLD AUTO: 422 K/UL
PROTHROM AB SERPL-ACNC: 13.7 SEC — HIGH (ref 10.5–13.4)
PT BLD: 13.7 SEC
RBC # BLD: 4.95 M/UL
RBC # BLD: 5.03 M/UL — SIGNIFICANT CHANGE UP (ref 4.2–5.8)
RBC # FLD: 29.2 % — HIGH (ref 10.3–14.5)
RBC # FLD: 29.7 %
WBC # BLD: 9.15 K/UL — SIGNIFICANT CHANGE UP (ref 3.8–10.5)
WBC # FLD AUTO: 9.15 K/UL — SIGNIFICANT CHANGE UP (ref 3.8–10.5)
WBC # FLD AUTO: 9.83 K/UL

## 2022-05-10 PROCEDURE — C1889: CPT

## 2022-05-10 PROCEDURE — 88305 TISSUE EXAM BY PATHOLOGIST: CPT | Mod: 26

## 2022-05-10 PROCEDURE — 85730 THROMBOPLASTIN TIME PARTIAL: CPT

## 2022-05-10 PROCEDURE — 45385 COLONOSCOPY W/LESION REMOVAL: CPT

## 2022-05-10 PROCEDURE — 43239 EGD BIOPSY SINGLE/MULTIPLE: CPT

## 2022-05-10 PROCEDURE — 45380 COLONOSCOPY AND BIOPSY: CPT | Mod: XS

## 2022-05-10 PROCEDURE — 36415 COLL VENOUS BLD VENIPUNCTURE: CPT

## 2022-05-10 PROCEDURE — 85027 COMPLETE CBC AUTOMATED: CPT

## 2022-05-10 PROCEDURE — 88305 TISSUE EXAM BY PATHOLOGIST: CPT

## 2022-05-10 PROCEDURE — 45380 COLONOSCOPY AND BIOPSY: CPT | Mod: 59

## 2022-05-10 PROCEDURE — 85610 PROTHROMBIN TIME: CPT

## 2022-05-10 PROCEDURE — 45382 COLONOSCOPY W/CONTROL BLEED: CPT | Mod: 59

## 2022-05-10 PROCEDURE — 45382 COLONOSCOPY W/CONTROL BLEED: CPT | Mod: XS

## 2022-05-10 DEVICE — CLIP HEMO INSTINCT PLUS ENDOSCOPIC: Type: IMPLANTABLE DEVICE | Status: FUNCTIONAL

## 2022-05-12 LAB — SURGICAL PATHOLOGY STUDY: SIGNIFICANT CHANGE UP

## 2022-05-13 DIAGNOSIS — K55.20 ANGIODYSPLASIA OF COLON WITHOUT HEMORRHAGE: ICD-10-CM

## 2022-05-13 DIAGNOSIS — I73.9 PERIPHERAL VASCULAR DISEASE, UNSPECIFIED: ICD-10-CM

## 2022-05-13 DIAGNOSIS — D64.9 ANEMIA, UNSPECIFIED: ICD-10-CM

## 2022-05-13 DIAGNOSIS — K63.5 POLYP OF COLON: ICD-10-CM

## 2022-05-13 DIAGNOSIS — K44.9 DIAPHRAGMATIC HERNIA WITHOUT OBSTRUCTION OR GANGRENE: ICD-10-CM

## 2022-05-13 DIAGNOSIS — Z79.01 LONG TERM (CURRENT) USE OF ANTICOAGULANTS: ICD-10-CM

## 2022-05-13 DIAGNOSIS — I25.2 OLD MYOCARDIAL INFARCTION: ICD-10-CM

## 2022-05-13 DIAGNOSIS — Z79.82 LONG TERM (CURRENT) USE OF ASPIRIN: ICD-10-CM

## 2022-05-13 DIAGNOSIS — Z86.718 PERSONAL HISTORY OF OTHER VENOUS THROMBOSIS AND EMBOLISM: ICD-10-CM

## 2022-05-17 ENCOUNTER — APPOINTMENT (OUTPATIENT)
Dept: GASTROENTEROLOGY | Facility: CLINIC | Age: 67
End: 2022-05-17
Payer: MEDICARE

## 2022-05-17 PROCEDURE — 99448 NTRPROF PH1/NTRNET/EHR 21-30: CPT

## 2022-08-24 ENCOUNTER — APPOINTMENT (OUTPATIENT)
Dept: VASCULAR SURGERY | Facility: CLINIC | Age: 67
End: 2022-08-24

## 2022-08-24 VITALS
HEART RATE: 86 BPM | HEIGHT: 73 IN | WEIGHT: 176 LBS | DIASTOLIC BLOOD PRESSURE: 75 MMHG | BODY MASS INDEX: 23.33 KG/M2 | SYSTOLIC BLOOD PRESSURE: 135 MMHG

## 2022-08-24 DIAGNOSIS — Z79.01 LONG TERM (CURRENT) USE OF ANTICOAGULANTS: ICD-10-CM

## 2022-08-24 PROCEDURE — 99213 OFFICE O/P EST LOW 20 MIN: CPT

## 2022-08-24 PROCEDURE — 93926 LOWER EXTREMITY STUDY: CPT

## 2022-08-24 PROCEDURE — 93923 UPR/LXTR ART STDY 3+ LVLS: CPT
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
## 2022-08-26 NOTE — PROCEDURE
[FreeTextEntry1] : RLE arterial US ordered to eval bypass graft demonstrates:  fem to peroneal vein bypass is occluded.\par \par SANTA/PVRs ordered to eval circulation, shows: R SANTA: 0.40 and L SANTA 1.5. Blunted waveforms on right side throughout all levels, worse at metatarsal level. LLE with pulsatile PVR waveforms throughout.
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
## 2022-08-26 NOTE — PHYSICAL EXAM
[0] : right 0 [Ankle Swelling (On Exam)] : not present [Varicose Veins Of Lower Extremities] : not present [] : not present [Abdomen Tenderness] : ~T ~M No abdominal tenderness [de-identified] : Talkative, friendly usual [FreeTextEntry1] : Feet are pink, toes are warm to touch with adequate capillary refill. Skin intact.  [de-identified] : FROM, 5/5 x 4.

## 2022-08-26 NOTE — ADDENDUM
[FreeTextEntry1] : I, Dr. Seamus Herrera, personally performed the evaluation and management (E/M) services for this established patient who presents today with (a) new problem(s)/exacerbation of (an) existing condition(s).  That E/M includes conducting the examination, assessing all new/exacerbated conditions, and establishing a new plan of care.  Today, my ACP, Alta Lowe NP, was here to observe my evaluation and management services for this new problem/exacerbated condition to be followed going forward. \par \par The documentation for this encounter was entered by Sheree Marie acting as a scribe for Dr.Gary Herrera.\par

## 2022-11-01 RX ORDER — APIXABAN 5 MG/1
5 TABLET, FILM COATED ORAL
Qty: 60 | Refills: 5 | Status: ACTIVE | COMMUNITY
Start: 2022-05-04 | End: 1900-01-01

## 2022-11-30 ENCOUNTER — INPATIENT (INPATIENT)
Facility: HOSPITAL | Age: 67
LOS: 5 days | Discharge: NURSING, REHAB OR LONG-TERM CARE | DRG: 239 | End: 2022-12-06
Attending: SURGERY | Admitting: SURGERY
Payer: MEDICARE

## 2022-11-30 ENCOUNTER — APPOINTMENT (OUTPATIENT)
Dept: VASCULAR SURGERY | Facility: CLINIC | Age: 67
End: 2022-11-30

## 2022-11-30 VITALS
SYSTOLIC BLOOD PRESSURE: 149 MMHG | TEMPERATURE: 98 F | RESPIRATION RATE: 18 BRPM | HEART RATE: 86 BPM | DIASTOLIC BLOOD PRESSURE: 89 MMHG | WEIGHT: 175.05 LBS | HEIGHT: 73 IN | OXYGEN SATURATION: 96 %

## 2022-11-30 DIAGNOSIS — Z41.9 ENCOUNTER FOR PROCEDURE FOR PURPOSES OTHER THAN REMEDYING HEALTH STATE, UNSPECIFIED: Chronic | ICD-10-CM

## 2022-11-30 DIAGNOSIS — T82.898A OTHER SPECIFIED COMPLICATION OF VASCULAR PROSTHETIC DEVICES, IMPLANTS AND GRAFTS, INITIAL ENCOUNTER: ICD-10-CM

## 2022-11-30 DIAGNOSIS — Z86.79 PERSONAL HISTORY OF OTHER DISEASES OF THE CIRCULATORY SYSTEM: Chronic | ICD-10-CM

## 2022-11-30 DIAGNOSIS — I73.9 PERIPHERAL VASCULAR DISEASE, UNSPECIFIED: ICD-10-CM

## 2022-11-30 DIAGNOSIS — I70.221 ATHEROSCLEROSIS OF NATIVE ARTERIES OF EXTREMITIES WITH REST PAIN, RIGHT LEG: ICD-10-CM

## 2022-11-30 LAB
ALBUMIN SERPL ELPH-MCNC: 3.8 G/DL — SIGNIFICANT CHANGE UP (ref 3.3–5)
ALP SERPL-CCNC: 121 U/L — HIGH (ref 40–120)
ALT FLD-CCNC: 23 U/L — SIGNIFICANT CHANGE UP (ref 10–45)
ANION GAP SERPL CALC-SCNC: 12 MMOL/L — SIGNIFICANT CHANGE UP (ref 5–17)
APPEARANCE UR: CLEAR — SIGNIFICANT CHANGE UP
APTT BLD: 41.1 SEC — HIGH (ref 27.5–35.5)
AST SERPL-CCNC: 19 U/L — SIGNIFICANT CHANGE UP (ref 10–40)
BACTERIA # UR AUTO: PRESENT /HPF
BASOPHILS # BLD AUTO: 0.06 K/UL — SIGNIFICANT CHANGE UP (ref 0–0.2)
BASOPHILS NFR BLD AUTO: 0.4 % — SIGNIFICANT CHANGE UP (ref 0–2)
BILIRUB SERPL-MCNC: 0.2 MG/DL — SIGNIFICANT CHANGE UP (ref 0.2–1.2)
BILIRUB UR-MCNC: NEGATIVE — SIGNIFICANT CHANGE UP
BLD GP AB SCN SERPL QL: NEGATIVE — SIGNIFICANT CHANGE UP
BUN SERPL-MCNC: 17 MG/DL — SIGNIFICANT CHANGE UP (ref 7–23)
CALCIUM SERPL-MCNC: 9.7 MG/DL — SIGNIFICANT CHANGE UP (ref 8.4–10.5)
CHLORIDE SERPL-SCNC: 102 MMOL/L — SIGNIFICANT CHANGE UP (ref 96–108)
CO2 SERPL-SCNC: 24 MMOL/L — SIGNIFICANT CHANGE UP (ref 22–31)
COLOR SPEC: YELLOW — SIGNIFICANT CHANGE UP
CREAT SERPL-MCNC: 0.83 MG/DL — SIGNIFICANT CHANGE UP (ref 0.5–1.3)
DIFF PNL FLD: ABNORMAL
EGFR: 97 ML/MIN/1.73M2 — SIGNIFICANT CHANGE UP
EOSINOPHIL # BLD AUTO: 0.73 K/UL — HIGH (ref 0–0.5)
EOSINOPHIL NFR BLD AUTO: 5 % — SIGNIFICANT CHANGE UP (ref 0–6)
EPI CELLS # UR: SIGNIFICANT CHANGE UP /HPF (ref 0–5)
GLUCOSE SERPL-MCNC: 121 MG/DL — HIGH (ref 70–99)
GLUCOSE UR QL: NEGATIVE — SIGNIFICANT CHANGE UP
HCT VFR BLD CALC: 42.8 % — SIGNIFICANT CHANGE UP (ref 39–50)
HGB BLD-MCNC: 14 G/DL — SIGNIFICANT CHANGE UP (ref 13–17)
IMM GRANULOCYTES NFR BLD AUTO: 0.4 % — SIGNIFICANT CHANGE UP (ref 0–0.9)
INR BLD: 1.33 — HIGH (ref 0.88–1.16)
KETONES UR-MCNC: NEGATIVE — SIGNIFICANT CHANGE UP
LEUKOCYTE ESTERASE UR-ACNC: NEGATIVE — SIGNIFICANT CHANGE UP
LYMPHOCYTES # BLD AUTO: 1.91 K/UL — SIGNIFICANT CHANGE UP (ref 1–3.3)
LYMPHOCYTES # BLD AUTO: 13 % — SIGNIFICANT CHANGE UP (ref 13–44)
MCHC RBC-ENTMCNC: 29.7 PG — SIGNIFICANT CHANGE UP (ref 27–34)
MCHC RBC-ENTMCNC: 32.7 GM/DL — SIGNIFICANT CHANGE UP (ref 32–36)
MCV RBC AUTO: 90.9 FL — SIGNIFICANT CHANGE UP (ref 80–100)
MONOCYTES # BLD AUTO: 0.99 K/UL — HIGH (ref 0–0.9)
MONOCYTES NFR BLD AUTO: 6.7 % — SIGNIFICANT CHANGE UP (ref 2–14)
NEUTROPHILS # BLD AUTO: 10.97 K/UL — HIGH (ref 1.8–7.4)
NEUTROPHILS NFR BLD AUTO: 74.5 % — SIGNIFICANT CHANGE UP (ref 43–77)
NITRITE UR-MCNC: NEGATIVE — SIGNIFICANT CHANGE UP
NRBC # BLD: 0 /100 WBCS — SIGNIFICANT CHANGE UP (ref 0–0)
PH UR: 6 — SIGNIFICANT CHANGE UP (ref 5–8)
PLATELET # BLD AUTO: 476 K/UL — HIGH (ref 150–400)
POTASSIUM SERPL-MCNC: 3.8 MMOL/L — SIGNIFICANT CHANGE UP (ref 3.5–5.3)
POTASSIUM SERPL-SCNC: 3.8 MMOL/L — SIGNIFICANT CHANGE UP (ref 3.5–5.3)
PROT SERPL-MCNC: 7.8 G/DL — SIGNIFICANT CHANGE UP (ref 6–8.3)
PROT UR-MCNC: 100 MG/DL
PROTHROM AB SERPL-ACNC: 15.9 SEC — HIGH (ref 10.5–13.4)
RBC # BLD: 4.71 M/UL — SIGNIFICANT CHANGE UP (ref 4.2–5.8)
RBC # FLD: 12.3 % — SIGNIFICANT CHANGE UP (ref 10.3–14.5)
RBC CASTS # UR COMP ASSIST: > 10 /HPF
RH IG SCN BLD-IMP: POSITIVE — SIGNIFICANT CHANGE UP
SARS-COV-2 RNA SPEC QL NAA+PROBE: NEGATIVE — SIGNIFICANT CHANGE UP
SODIUM SERPL-SCNC: 138 MMOL/L — SIGNIFICANT CHANGE UP (ref 135–145)
SP GR SPEC: >=1.03 — SIGNIFICANT CHANGE UP (ref 1–1.03)
UROBILINOGEN FLD QL: 0.2 E.U./DL — SIGNIFICANT CHANGE UP
WBC # BLD: 14.72 K/UL — HIGH (ref 3.8–10.5)
WBC # FLD AUTO: 14.72 K/UL — HIGH (ref 3.8–10.5)
WBC UR QL: < 5 /HPF — SIGNIFICANT CHANGE UP

## 2022-11-30 PROCEDURE — 99213 OFFICE O/P EST LOW 20 MIN: CPT

## 2022-11-30 PROCEDURE — 93923 UPR/LXTR ART STDY 3+ LVLS: CPT

## 2022-11-30 PROCEDURE — 93010 ELECTROCARDIOGRAM REPORT: CPT

## 2022-11-30 PROCEDURE — 99285 EMERGENCY DEPT VISIT HI MDM: CPT

## 2022-11-30 PROCEDURE — 71045 X-RAY EXAM CHEST 1 VIEW: CPT | Mod: 26

## 2022-11-30 RX ORDER — APIXABAN 2.5 MG/1
1 TABLET, FILM COATED ORAL
Qty: 0 | Refills: 0 | DISCHARGE

## 2022-11-30 RX ORDER — ACETAMINOPHEN 500 MG
650 TABLET ORAL EVERY 6 HOURS
Refills: 0 | Status: DISCONTINUED | OUTPATIENT
Start: 2022-11-30 | End: 2022-12-01

## 2022-11-30 RX ORDER — MORPHINE SULFATE 50 MG/1
4 CAPSULE, EXTENDED RELEASE ORAL ONCE
Refills: 0 | Status: DISCONTINUED | OUTPATIENT
Start: 2022-11-30 | End: 2022-11-30

## 2022-11-30 RX ORDER — ASPIRIN/CALCIUM CARB/MAGNESIUM 324 MG
81 TABLET ORAL DAILY
Refills: 0 | Status: DISCONTINUED | OUTPATIENT
Start: 2022-11-30 | End: 2022-11-30

## 2022-11-30 RX ORDER — ASPIRIN/CALCIUM CARB/MAGNESIUM 324 MG
81 TABLET ORAL EVERY 24 HOURS
Refills: 0 | Status: DISCONTINUED | OUTPATIENT
Start: 2022-12-01 | End: 2022-12-02

## 2022-11-30 RX ORDER — HEPARIN SODIUM 5000 [USP'U]/ML
5000 INJECTION INTRAVENOUS; SUBCUTANEOUS EVERY 8 HOURS
Refills: 0 | Status: DISCONTINUED | OUTPATIENT
Start: 2022-11-30 | End: 2022-12-01

## 2022-11-30 RX ORDER — TRAMADOL HYDROCHLORIDE 50 MG/1
50 TABLET ORAL ONCE
Refills: 0 | Status: DISCONTINUED | OUTPATIENT
Start: 2022-11-30 | End: 2022-11-30

## 2022-11-30 RX ORDER — TRAMADOL HYDROCHLORIDE 50 MG/1
50 TABLET ORAL EVERY 6 HOURS
Refills: 0 | Status: DISCONTINUED | OUTPATIENT
Start: 2022-11-30 | End: 2022-12-01

## 2022-11-30 RX ORDER — DIPHENHYDRAMINE HCL 50 MG
25 CAPSULE ORAL ONCE
Refills: 0 | Status: COMPLETED | OUTPATIENT
Start: 2022-11-30 | End: 2022-11-30

## 2022-11-30 RX ORDER — OXYCODONE AND ACETAMINOPHEN 5; 325 MG/1; MG/1
1 TABLET ORAL EVERY 4 HOURS
Refills: 0 | Status: DISCONTINUED | OUTPATIENT
Start: 2022-11-30 | End: 2022-12-01

## 2022-11-30 RX ORDER — INFLUENZA VIRUS VACCINE 15; 15; 15; 15 UG/.5ML; UG/.5ML; UG/.5ML; UG/.5ML
0.7 SUSPENSION INTRAMUSCULAR ONCE
Refills: 0 | Status: DISCONTINUED | OUTPATIENT
Start: 2022-11-30 | End: 2022-12-06

## 2022-11-30 RX ADMIN — MORPHINE SULFATE 4 MILLIGRAM(S): 50 CAPSULE, EXTENDED RELEASE ORAL at 15:57

## 2022-11-30 RX ADMIN — Medication 25 MILLIGRAM(S): at 23:23

## 2022-11-30 RX ADMIN — TRAMADOL HYDROCHLORIDE 50 MILLIGRAM(S): 50 TABLET ORAL at 22:00

## 2022-11-30 RX ADMIN — MORPHINE SULFATE 4 MILLIGRAM(S): 50 CAPSULE, EXTENDED RELEASE ORAL at 18:26

## 2022-11-30 RX ADMIN — TRAMADOL HYDROCHLORIDE 50 MILLIGRAM(S): 50 TABLET ORAL at 21:12

## 2022-11-30 RX ADMIN — HEPARIN SODIUM 5000 UNIT(S): 5000 INJECTION INTRAVENOUS; SUBCUTANEOUS at 21:38

## 2022-11-30 RX ADMIN — Medication 25 MILLIGRAM(S): at 21:37

## 2022-11-30 NOTE — ED ADULT TRIAGE NOTE - CHIEF COMPLAINT QUOTE
Pt sent in by Dr. Herrera for R foot amputation on Friday. Discoloration noted to R foot. C/o R foot pain x6 wks. Hx of multiple DVTs

## 2022-11-30 NOTE — ED PROVIDER NOTE - OBJECTIVE STATEMENT
66 y/o Male with a PMHx of a clotting disorder (unsure which) currently on Eliquis and Aspirin 81mg, ACS, PVD with multiple procedures/bypass surgeries to RLE by vascular x20 years, PAD, Lupus, DVT, MI (1993), Lyme disease. Takes Tylenol for pain at home. Denies Hx of DM or HLD presenting to the ED sent in by Dr. Herrera for admission for critical limb ischemia. Pt states over the past 6 weeks, he has had increased pain in R-foot with cooler temperature and dusky skin. Has been followed by Dr. Herrera for collateral circulation, however, symptoms worsened and is now here today to be admitted for amputation. Pt denies fever, chills, SOB, CP, cough or congestion. Vaccinated for COVID, but not for the flu.

## 2022-11-30 NOTE — H&P ADULT - NSHPREVIEWOFSYSTEMS_GEN_ALL_CORE
HPI: Mr. Mccall is a 67M with PMH of MTHFR gene mutation, CAD s/p MI (1993), and PAD s/p R fem to PT bypass with arm vein in 1993, revision in 2011, s/p R SFA to peroneal bypass graft s/p revision on 7/30/18, s/p tPA bolus and angioplasty in 2020. The patient is being admitted to the vascular surgery service for planned BKA d/t bypass thrombosis. The patient at this time has cyanosis and is beginning to develop dejah gangrene of the foot. Patient reports that over the past 1-2 months, he has noticed gradually worsening pain in his right foot and "knew" that the graft was occluded. Patient reports a history of 15+ surgeries on the extremities and is aware that his revascularization options are limited. He reports severe 10/10 pain on the foot, relieved with elevation of the leg. He is still able to ambulate, albeit very limited by pain. He has currently been taking Eliquis and last dose was this morning.

## 2022-11-30 NOTE — H&P ADULT - ASSESSMENT
Assessment  Mr. Mccall is a 67M with PMH of MTHFR gene mutation, CAD s/p MI (1993), and PAD s/p R fem to PT bypass with arm vein in 1993, revision in 2011, s/p R SFA to peroneal bypass graft s/p revision on 7/30/18, s/p tPA bolus and angioplasty in 2020. The patient is being admitted to the vascular surgery service for planned BKA d/t bypass thrombosis.    Plan  #Vascular  - Now presenting with thrombosed RLE SFA-peroneal bypass graft.  - Will plan for admission and R BKA  - Continue ASA  - Will continue anticoagulation as an inpatient  - No antibiotics at this time, no concern for infection.

## 2022-11-30 NOTE — PATIENT PROFILE ADULT - FALL HARM RISK - HARM RISK INTERVENTIONS

## 2022-11-30 NOTE — ED ADULT NURSE NOTE - OBJECTIVE STATEMENT
Pt A&Ox4, ambulatory with steady gait, speaking in clear/full sentences, no acute distress, vital signs stable. Pt presents to the ED for r foot amputation scheduled Friday for. Pt endorses extensive hx of RLE clots and arterial grafts starting around age 36. Pt endorses hx of "heart attack" due to genetic "clotting condition." Pt on Eliquis and aspirin daily. R foot dusky in with color cap refill >2 seconds and pain on palpation. Pt states "it has been like this for 6 weeks." No pulse palpable.

## 2022-11-30 NOTE — ED ADULT NURSE NOTE - SKIN TURGOR
Subjective:      Patient ID: Terrell Trujillo is a 53 y.o. female who presents today with:  Chief Complaint   Patient presents with    3 Month Follow-Up    Diabetes     sugar has been in the 400s        HPI     Diabetes-Chronic, type 2, known hypertension and obesity. Known hyperlipidemia. Uncontrolled. Hypertension-Chronic, essential. Not compliant with low sodium diet. Known obesity. FeSex. Compliant with therapy. Chronic, improved with statin therapy. Known obesity. FeMale Sex. Compliant with therapy.      Past Medical History:   Diagnosis Date    Chronic back pain     DM2 (diabetes mellitus, type 2) ([facility] [address] 2018    Fatty liver     Hyperlipidemia     Hypertension     Hypothyroidism     MARINA (obstructive sleep apnea)      Past Surgical History:   Procedure Laterality Date    APPENDECTOMY  2009    TONSILLECTOMY AND ADENOIDECTOMY  1978    TUBAL LIGATION  2005     Social History     Socioeconomic History    Marital status:      Spouse name: Not on file    Number of children: Not on file    Years of education: Not on file    Highest education level: Not on file   Occupational History    Not on file   Social Needs    Financial resource strain: Not on file    Food insecurity     Worry: Not on file     Inability: Not on file    Transportation needs     Medical: Not on file     Non-medical: Not on file   Tobacco Use    Smoking status: Current Every Day Smoker     Packs/day: 1.00     Types: Cigarettes    Smokeless tobacco: Never Used   Substance and Sexual Activity    Alcohol use: No    Drug use: No    Sexual activity: Yes     Partners: Male     Birth control/protection: Surgical     Comment: tubal ligation   Lifestyle    Physical activity     Days per week: Not on file     Minutes per session: Not on file    Stress: Not on file   Relationships    Social connections     Talks on phone: Not on file     Gets together: Not on file     Attends Yarsanism service: Not on file Active member of club or organization: Not on file     Attends meetings of clubs or organizations: Not on file     Relationship status: Not on file    Intimate partner violence     Fear of current or ex partner: Not on file     Emotionally abused: Not on file     Physically abused: Not on file     Forced sexual activity: Not on file   Other Topics Concern    Not on file   Social History Narrative    Not on file     Allergies   Allergen Reactions    Lipitor [Atorvastatin Calcium]     Penicillins     Trulicity [Dulaglutide]      Nausea     Wellbutrin [Bupropion]      Current Outpatient Medications on File Prior to Visit   Medication Sig Dispense Refill    rosuvastatin (CRESTOR) 5 MG tablet Take 1 tablet by mouth nightly 30 tablet 1    tiZANidine (ZANAFLEX) 4 MG tablet Take 1 tablet by mouth nightly as needed (muscle spasm) 30 tablet 0    meloxicam (MOBIC) 7.5 MG tablet Take 1 tablet by mouth 2 times daily as needed for Pain 30 tablet 3    metFORMIN (GLUCOPHAGE) 1000 MG tablet Take 1 tablet by mouth 2 times daily (with meals) 180 tablet 1    Icosapent Ethyl (VASCEPA) 1 g CAPS capsule Take 2 capsules by mouth 2 times daily 360 capsule 1    blood glucose monitor strips Test bid times a day & as needed for symptoms of irregular blood glucose. 200 strip 1    glucose monitoring kit (FREESTYLE) monitoring kit 1 kit by Does not apply route daily 1 kit 0    ranitidine (ZANTAC) 150 MG tablet TAKE ONE TABLET BY MOUTH TWO TIMES A  tablet 3    empagliflozin (JARDIANCE) 10 MG tablet Take 1 tablet by mouth daily 30 tablet 2    vitamin D (ERGOCALCIFEROL) 1.25 MG (65908 UT) CAPS capsule Take 1 capsule by mouth once a week 12 capsule 0    ibuprofen (ADVIL;MOTRIN) 800 MG tablet Take 1 tablet by mouth every 8 hours as needed for Pain 30 tablet 0     No current facility-administered medications on file prior to visit.         I have personally reviewed the ROS, PMH, PFH, and social history     Review of Systems hyperglycemia ([facility] [address]     2. Essential hypertension     3. Hyperlipidemia, unspecified hyperlipidemia type     4. Hypertriglyceridemia     5. Screening for cervical cancer     6. Screening for ovarian cancer           Plan:   Vc  Should be on metformin  1000 mg bid (she is)  On vascepa (but only started one week ago)  On crestor 5 mg once daily. Increase it  Increase jardiance to 25 mg once daily. Feels better off trulicity doesn't want to try other agents. Ob/gyn referral. Last visit  Add tradjenta  Please follow up with neurology (April 2020)  Needs pap test, cost might be prohibitive, resent referral.   Stop lisinopril/hctz, do lisinopril only   Life style changes for high tg  MA visit in 5 to 7 days for bp check. Call in 2 to 3 days if you don't hear from me after the bp check. Needs to repeat sleep study, she's working on this. Patient was offered pregnancy test, she declined, she understands risks of birth defects. thyroid us, suspected goiter and possible nodule on right   No si/hi   No orders of the defined types were placed in this encounter. No orders of the defined types were placed in this encounter. Return in about 3 months (around 6/17/2020) for worsening symptoms, call ASAP for appointment, Chronic condition management/appointment. Rob Aguiar MD    If anything should change or worsen call ASAP, don't wait for next scheduled appointment. resilient/elastic

## 2022-11-30 NOTE — H&P ADULT - NSHPPHYSICALEXAM_GEN_ALL_CORE
Constitutional: Pleasant, conversational, in no acute distress.  Cardiac: NSR  Respiratory: Equal and bilateral chest rise. No respiratory distress.  Abdomen: Soft, NT, ND  Extremities: RLE with cyanosis of the distal and mid forefoot. No sensation of distal RLE at the toes. Diminished motor function of toes; normal sensory and motor at ankle. No open wounds.   Vascular: No audible signals of R DP/PT. Palpable femoral pulse. LLE with palpable pulses throughout.

## 2022-11-30 NOTE — ED ADULT NURSE NOTE - NSICDXPASTMEDICALHX_GEN_ALL_CORE_FT
PAST MEDICAL HISTORY:  DVT (deep venous thrombosis) femoral artery LLE    Lupus     Lyme disease     MI (myocardial infarction) 93    PAD (peripheral artery disease)     PVD (peripheral vascular disease)

## 2022-11-30 NOTE — ED PROVIDER NOTE - CLINICAL SUMMARY MEDICAL DECISION MAKING FREE TEXT BOX
66 y/o Male with a PMHx of a clotting disorder (unsure which) currently on Eliquis and Aspirin 81mg, ACS, PVD with multiple procedures/bypass surgeries to RLE by vascular x20 years, PAD, Lupus, DVT, MI (1993), Lyme disease. Takes Tylenol for pain at home. Denies Hx of DM or HLD presenting to the ED sent in by Dr. Herrera for admission for critical limb ischemia and is scheduled for amputation on Friday.      ED Course: Vital signs noted. Pt is afebrile, and the rest of vital signs are WNL. Will obtains pre-op work up, contact vascular surgery for admission to Dr. Herrera. 68 y/o Male with a PMHx of a clotting disorder (unsure which) currently on Eliquis and Aspirin 81mg, ACS, PVD with multiple procedures/bypass surgeries to RLE by vascular x20 years, PAD, Lupus, DVT, MI (1993), Lyme disease. Takes Tylenol for pain at home. Denies Hx of DM or HLD presenting to the ED sent in by Dr. Herrera for admission for critical limb ischemia. Pt states over the past 6 weeks, he has had increased pain in R-foot with cooler temperature and dusky skin. Has been followed by Dr. Herrera for collateral circulation, however, symptoms worsened and is now here today to be admitted for amputation. Pt denies fever, chills, SOB, CP, cough or congestion. Vaccinated for COVID, but not for the flu.    ED Course: Vital signs noted. Pt is afebrile, and the rest of vital signs are WNL. Labs/studies noted. Vascular consulted and pt admitted to Vascular surgery. Stable in ED.

## 2022-11-30 NOTE — ED PROVIDER NOTE - PHYSICAL EXAMINATION
CONSTITUTIONAL: Well appearing, awake, alert, oriented and in no apparent distress.  ENMT: Airway patent.  EYES: Clear bilaterally.  CARDIAC: Normal rate, regular rhythm.  Heart sounds S1, S2.   RESPIRATORY: Breath sounds clear and equal bilaterally.  GASTROINTESTINAL: Abdomen soft, non-tender, no guarding.  MUSCULOSKELETAL: Spine appears normal. R-foot appearing pale and dusky, cool to touch. Not tolerating pulse checks, very tender to palpation.  NEUROLOGICAL: Alert and oriented, no focal deficits, no motor or sensory deficits.  SKIN: Skin normal color for race, warm, dry and intact. No evidence of rash.  PSYCHIATRIC: Alert and oriented. normal mood and affect. no apparent risk to self or others. CONSTITUTIONAL: Well appearing, awake, alert, oriented and in no apparent distress.  ENMT: Airway patent.  EYES: Clear bilaterally.  CARDIAC: Normal rate, regular rhythm.  Heart sounds S1, S2.   RESPIRATORY: Breath sounds clear and equal bilaterally.  GASTROINTESTINAL: Abdomen soft, non-tender, no guarding.  MUSCULOSKELETAL: Spine appears normal. R-foot appears pale and dusky, cool to touch. Not tolerating pulse checks, very tender to palpation.  NEUROLOGICAL: Alert and oriented, no focal deficits   SKIN: see MSK exam  PSYCHIATRIC: Alert and oriented. normal mood and affect. no apparent risk to self or others.

## 2022-11-30 NOTE — PATIENT PROFILE ADULT - NSPRONUTRITIONRISK_GEN_A_NUR
Patient due for a repeat colonoscopy.  Writer spoke with patient on 4.2.19, patient declined this service at this time.  Patient will call office when ready to schedule.   No indicators present

## 2022-12-01 ENCOUNTER — TRANSCRIPTION ENCOUNTER (OUTPATIENT)
Age: 67
End: 2022-12-01

## 2022-12-01 LAB
ANION GAP SERPL CALC-SCNC: 12 MMOL/L — SIGNIFICANT CHANGE UP (ref 5–17)
APTT BLD: 43 SEC — HIGH (ref 27.5–35.5)
APTT BLD: 60.5 SEC — HIGH (ref 27.5–35.5)
APTT BLD: 64.2 SEC — HIGH (ref 27.5–35.5)
BUN SERPL-MCNC: 13 MG/DL — SIGNIFICANT CHANGE UP (ref 7–23)
CALCIUM SERPL-MCNC: 9.9 MG/DL — SIGNIFICANT CHANGE UP (ref 8.4–10.5)
CHLORIDE SERPL-SCNC: 98 MMOL/L — SIGNIFICANT CHANGE UP (ref 96–108)
CO2 SERPL-SCNC: 25 MMOL/L — SIGNIFICANT CHANGE UP (ref 22–31)
CREAT SERPL-MCNC: 0.72 MG/DL — SIGNIFICANT CHANGE UP (ref 0.5–1.3)
EGFR: 101 ML/MIN/1.73M2 — SIGNIFICANT CHANGE UP
GLUCOSE SERPL-MCNC: 101 MG/DL — HIGH (ref 70–99)
HCT VFR BLD CALC: 43.2 % — SIGNIFICANT CHANGE UP (ref 39–50)
HCV AB S/CO SERPL IA: 0.04 S/CO — SIGNIFICANT CHANGE UP
HCV AB SERPL-IMP: SIGNIFICANT CHANGE UP
HGB BLD-MCNC: 13.9 G/DL — SIGNIFICANT CHANGE UP (ref 13–17)
INR BLD: 1.17 — HIGH (ref 0.88–1.16)
MAGNESIUM SERPL-MCNC: 1.9 MG/DL — SIGNIFICANT CHANGE UP (ref 1.6–2.6)
MCHC RBC-ENTMCNC: 29.4 PG — SIGNIFICANT CHANGE UP (ref 27–34)
MCHC RBC-ENTMCNC: 32.2 GM/DL — SIGNIFICANT CHANGE UP (ref 32–36)
MCV RBC AUTO: 91.5 FL — SIGNIFICANT CHANGE UP (ref 80–100)
NRBC # BLD: 0 /100 WBCS — SIGNIFICANT CHANGE UP (ref 0–0)
PHOSPHATE SERPL-MCNC: 3.1 MG/DL — SIGNIFICANT CHANGE UP (ref 2.5–4.5)
PLATELET # BLD AUTO: 444 K/UL — HIGH (ref 150–400)
POTASSIUM SERPL-MCNC: 3.8 MMOL/L — SIGNIFICANT CHANGE UP (ref 3.5–5.3)
POTASSIUM SERPL-SCNC: 3.8 MMOL/L — SIGNIFICANT CHANGE UP (ref 3.5–5.3)
PROTHROM AB SERPL-ACNC: 13.9 SEC — HIGH (ref 10.5–13.4)
RBC # BLD: 4.72 M/UL — SIGNIFICANT CHANGE UP (ref 4.2–5.8)
RBC # FLD: 12.1 % — SIGNIFICANT CHANGE UP (ref 10.3–14.5)
SODIUM SERPL-SCNC: 135 MMOL/L — SIGNIFICANT CHANGE UP (ref 135–145)
WBC # BLD: 12.24 K/UL — HIGH (ref 3.8–10.5)
WBC # FLD AUTO: 12.24 K/UL — HIGH (ref 3.8–10.5)

## 2022-12-01 PROCEDURE — 99221 1ST HOSP IP/OBS SF/LOW 40: CPT

## 2022-12-01 RX ORDER — ACETAMINOPHEN 500 MG
650 TABLET ORAL EVERY 6 HOURS
Refills: 0 | Status: DISCONTINUED | OUTPATIENT
Start: 2022-12-01 | End: 2022-12-06

## 2022-12-01 RX ORDER — SODIUM CHLORIDE 9 MG/ML
1000 INJECTION INTRAMUSCULAR; INTRAVENOUS; SUBCUTANEOUS
Refills: 0 | Status: DISCONTINUED | OUTPATIENT
Start: 2022-12-01 | End: 2022-12-02

## 2022-12-01 RX ORDER — LANOLIN ALCOHOL/MO/W.PET/CERES
5 CREAM (GRAM) TOPICAL AT BEDTIME
Refills: 0 | Status: DISCONTINUED | OUTPATIENT
Start: 2022-12-01 | End: 2022-12-06

## 2022-12-01 RX ORDER — MORPHINE SULFATE 50 MG/1
2 CAPSULE, EXTENDED RELEASE ORAL EVERY 4 HOURS
Refills: 0 | Status: DISCONTINUED | OUTPATIENT
Start: 2022-12-01 | End: 2022-12-01

## 2022-12-01 RX ORDER — HEPARIN SODIUM 5000 [USP'U]/ML
3000 INJECTION INTRAVENOUS; SUBCUTANEOUS ONCE
Refills: 0 | Status: COMPLETED | OUTPATIENT
Start: 2022-12-01 | End: 2022-12-01

## 2022-12-01 RX ORDER — MORPHINE SULFATE 50 MG/1
2 CAPSULE, EXTENDED RELEASE ORAL
Refills: 0 | Status: DISCONTINUED | OUTPATIENT
Start: 2022-12-01 | End: 2022-12-05

## 2022-12-01 RX ORDER — SODIUM CHLORIDE 9 MG/ML
1000 INJECTION INTRAMUSCULAR; INTRAVENOUS; SUBCUTANEOUS
Refills: 0 | Status: DISCONTINUED | OUTPATIENT
Start: 2022-12-01 | End: 2022-12-01

## 2022-12-01 RX ORDER — HEPARIN SODIUM 5000 [USP'U]/ML
1400 INJECTION INTRAVENOUS; SUBCUTANEOUS
Qty: 25000 | Refills: 0 | Status: DISCONTINUED | OUTPATIENT
Start: 2022-12-01 | End: 2022-12-02

## 2022-12-01 RX ADMIN — Medication 650 MILLIGRAM(S): at 19:30

## 2022-12-01 RX ADMIN — HEPARIN SODIUM 5000 UNIT(S): 5000 INJECTION INTRAVENOUS; SUBCUTANEOUS at 05:16

## 2022-12-01 RX ADMIN — MORPHINE SULFATE 2 MILLIGRAM(S): 50 CAPSULE, EXTENDED RELEASE ORAL at 14:39

## 2022-12-01 RX ADMIN — MORPHINE SULFATE 2 MILLIGRAM(S): 50 CAPSULE, EXTENDED RELEASE ORAL at 21:55

## 2022-12-01 RX ADMIN — MORPHINE SULFATE 2 MILLIGRAM(S): 50 CAPSULE, EXTENDED RELEASE ORAL at 10:57

## 2022-12-01 RX ADMIN — HEPARIN SODIUM 14 UNIT(S)/HR: 5000 INJECTION INTRAVENOUS; SUBCUTANEOUS at 08:23

## 2022-12-01 RX ADMIN — MORPHINE SULFATE 2 MILLIGRAM(S): 50 CAPSULE, EXTENDED RELEASE ORAL at 10:42

## 2022-12-01 RX ADMIN — MORPHINE SULFATE 2 MILLIGRAM(S): 50 CAPSULE, EXTENDED RELEASE ORAL at 01:11

## 2022-12-01 RX ADMIN — Medication 650 MILLIGRAM(S): at 11:44

## 2022-12-01 RX ADMIN — MORPHINE SULFATE 2 MILLIGRAM(S): 50 CAPSULE, EXTENDED RELEASE ORAL at 21:39

## 2022-12-01 RX ADMIN — Medication 650 MILLIGRAM(S): at 10:45

## 2022-12-01 RX ADMIN — Medication 81 MILLIGRAM(S): at 05:17

## 2022-12-01 RX ADMIN — MORPHINE SULFATE 2 MILLIGRAM(S): 50 CAPSULE, EXTENDED RELEASE ORAL at 07:10

## 2022-12-01 RX ADMIN — Medication 25 MILLIGRAM(S): at 15:29

## 2022-12-01 RX ADMIN — HEPARIN SODIUM 3000 UNIT(S): 5000 INJECTION INTRAVENOUS; SUBCUTANEOUS at 08:09

## 2022-12-01 RX ADMIN — Medication 650 MILLIGRAM(S): at 18:29

## 2022-12-01 RX ADMIN — Medication 25 MILLIGRAM(S): at 21:39

## 2022-12-01 RX ADMIN — Medication 25 MILLIGRAM(S): at 05:16

## 2022-12-01 RX ADMIN — MORPHINE SULFATE 2 MILLIGRAM(S): 50 CAPSULE, EXTENDED RELEASE ORAL at 06:55

## 2022-12-01 RX ADMIN — Medication 5 MILLIGRAM(S): at 21:39

## 2022-12-01 RX ADMIN — MORPHINE SULFATE 2 MILLIGRAM(S): 50 CAPSULE, EXTENDED RELEASE ORAL at 01:30

## 2022-12-01 RX ADMIN — MORPHINE SULFATE 2 MILLIGRAM(S): 50 CAPSULE, EXTENDED RELEASE ORAL at 14:55

## 2022-12-01 NOTE — PROCEDURE
[FreeTextEntry1] : SANTA/PVRs ordered to eval circulation of RLE prior to surgery, shows:  Blunted waveforms on right side throughout all levels, worse at ankle and metatarsal levels. LLE with pulsatile PVR waveforms throughout. 
Omid

## 2022-12-01 NOTE — ASSESSMENT
[Arterial/Venous Disease] : arterial/venous disease [Medication Management] : medication management [Foot care/Footwear] : foot care/footwear [FreeTextEntry1] : 67 y/o M w/ MTHFR gene mutation homozygous and lupus anticoag on chronic AC therapy (Warfarin-> now Eliquis since April 2022), and significant PAD w/ RLE critical limb ischemia 2/2 R SFA to peroneal bypass occlusion, s/p tPA bolus, tPA infusion, angiox2 w/ thrombolysis, thrombectomy and angioplasty on Nov 17th-19th, 2020 with evidence of patent bypass of the SFA to the peroneal artery with peroneal runoff to the foot. Now, re occluded bypass with signs of critical limb ischemia and tissue loss. \par \par On exam, R foot with dependent rubor extending from mid foot to toes, pale/bluish appearance of 1st/2nd and 3rd toes. SANTA/PVRs completed today.\par  \par Plan:\par We discussed findings with pt/wife and now strongly recommend R BKA.  Will schedule for 12/02/22, patient will be admitted in to the hospital today. Risk, complications and alternatives were discussed, all questions were answered. Patient would like to proceed with surgery. \par \par

## 2022-12-01 NOTE — PROGRESS NOTE ADULT - SUBJECTIVE AND OBJECTIVE BOX
Pre-op Diagnosis: dry gangrene of toes   Procedure: Right below knee amputation   Surgeon: Dr. arceo     Consent: signed and placed in chart                           13.9   12.24 )-----------( 444      ( 01 Dec 2022 05:30 )             43.2     12-01    135  |  98  |  13  ----------------------------<  101<H>  3.8   |  25  |  0.72    Ca    9.9      01 Dec 2022 05:30  Phos  3.1     12-01  Mg     1.9     12-01    TPro  7.8  /  Alb  3.8  /  TBili  0.2  /  DBili  x   /  AST  19  /  ALT  23  /  AlkPhos  121<H>  11-30    PT/INR - ( 01 Dec 2022 05:30 )   PT: 13.9 sec;   INR: 1.17          PTT - ( 01 Dec 2022 15:13 )  PTT:60.5 sec  Urinalysis Basic - ( 30 Nov 2022 16:29 )    Color: Yellow / Appearance: Clear / SG: >=1.030 / pH: x  Gluc: x / Ketone: NEGATIVE  / Bili: Negative / Urobili: 0.2 E.U./dL   Blood: x / Protein: 100 mg/dL / Nitrite: NEGATIVE   Leuk Esterase: NEGATIVE / RBC: > 10 /HPF / WBC < 5 /HPF   Sq Epi: x / Non Sq Epi: 0-5 /HPF / Bacteria: Present /HPF        Type & Screen:        B +   (*With most recent within 72hrs of OR)  CXR: 11/30  EKG: in chart   UA: 11/30 no bacteria     COVID status/date: [x ]Negative/Date: 12/1  [ ]Positive/Date:     *With most recent within 48hrs of OR    Is patient on ACE/ARB? [x ]No [ ]Yes   *If yes, please hold any ACE/ARB the day of surgery    Is patient on Lantus at bedtime?  [x ]No [ ]Yes   *If yes, please half the dose the night before OR since patient will be NPO    Does patient have a contrast allergy? [x ]No [ ]Yes  *If yes, please pre-medicate per protocol    Is patient on anticoagulation? [ ]No [x ] Yes  *If yes, please discuss with team when to hold it; Hold heparin gtt at 6am    Is the patient Female and <56yo [x ]No [ ] Yes  If yes, pregnancy test must be documented in the chart    Is patient on dialysis? [x ]No [ ]Yes  *If yes, please obtain all labs including K level EARLY the day of surgery   *Also, will NOT require IVF past midnight    A/P: 67yMale pre-op for above procedure  1. NPO past midnight, except medications  2. IVF at midnight: Yes   3. [x] Blood on hold, Units: 1

## 2022-12-01 NOTE — ADDENDUM
[FreeTextEntry1] : I, Dr. Seamus Herrera, personally performed the evaluation and management (E/M) services for this established patient who presents today with (a) new problem(s)/exacerbation of (an) existing condition(s).  That E/M includes conducting the examination, assessing all new/exacerbated conditions, and establishing a new plan of care.  Today, my ACP, Alta Lowe NP, was here to observe my evaluation and management services for this new problem/exacerbated condition to be followed going forward. \par \par \par The documentation for this encounter was entered by Sheree Marie acting as a scribe for Dr.Gary Herrera.\par

## 2022-12-01 NOTE — PHYSICAL EXAM
[Respiratory Effort] : normal respiratory effort [Normal Rate and Rhythm] : normal rate and rhythm [0] : right 0 [Alert] : alert [Oriented to Person] : oriented to person [Oriented to Place] : oriented to place [Oriented to Time] : oriented to time [Calm] : calm [Ankle Swelling (On Exam)] : not present [Varicose Veins Of Lower Extremities] : not present [] : not present [Abdomen Tenderness] : ~T ~M No abdominal tenderness [de-identified] : Talkative, friendly usual [FreeTextEntry1] : LLE  toes are warm to touch with adequate capillary refill.  R foot with dependent rubor extending from mid foot to toes, pale/bluish appearance of 1st/2nd and 3rd toes.  [de-identified] : Limited ROM on his R foot with decreased sensation to the toes and distal aspect of the foot.  [de-identified] : See cardiovascular section
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
## 2022-12-01 NOTE — PROGRESS NOTE ADULT - SUBJECTIVE AND OBJECTIVE BOX
O/N: ROSA BROWN                     ---------------------------------------------------------------------------  PLEASE CHECK WHEN PRESENT:  [  ] Heart Failure  [  ] Acute  [  ] Acute on Chronic  [  ] Chronic  -------------------------------------------------------------------  [  ]Diastolic [HFpEF]  [  ]Systolic [HFrEF]  [  ]Combined [HFpEF & HFrEF]  [  ] afib  [  ] hypertensive heart disease  [  ]Other:  -------------------------------------------------------------------  [ ] Respiratory failure  [ ] Acute cor pulmonale  [ ] Asthma/COPD Exacerbation  [ ] Pleural effusion  [ ] Aspiration pneumonia  -------------------------------------------------------------------  [  ]ESPERANZA  [  ]ATN  [  ]Reneal Medullary Necrosis  [  ]Renal Cortical Necrosis  [  ]Other Pathological Lesions:    [  ]CKD 1  [  ]CKD 2  [  ]CKD 3  [  ]CKD 4  [  ]CKD 5  [  ]Other  -------------------------------------------------------------------  [  ]Diabetes  [  ] Diabetic PVD Ulcer  [  ] Neuropathic ulcer to DM  [  ] Diabetes with Nephropathy  [  ] Osteomyelitis due to diabetes  --------------------------------------------------------------------  [  ]Malnutrition: See Nutrition Note  [  ]Cachexia  [  ]Other:   [  ]Supplement Ordered:  [  ]Morbid Obesity (BMI >=40]  ---------------------------------------------------------------------  [ ] Sepsis/severe sepsis/septic shock  [ ] UTI  [ ] Pneumonia  -----------------------------------------------------------------------  [ ] Acidosis/alkalosis  [ ] Fluid overload  [ ] Hypokalemia  [ ] Hyperkalemia  [ ] Hypomagnesemia  [ ] Hypophosphatemia  [ ] Hyperphosphatemia  ------------------------------------------------------------------------  [ ] Acute blood loss anemia  [ ] Post op blood loss anemia  [ ] Iron deficiency anemia  [ ] Anemia due to chronic disease  [ ] Hypercoagulable state  ----------------------------------------------------------------------  [ ] Cerebral infarction  [ ] Transient ischemia attack  [ ] Encephalopathy                Assessment:  · Assessment	  Assessment  Mr. Mccall is a 67M with PMH of MTHFR gene mutation, CAD s/p MI (1993), and PAD s/p R fem to PT bypass with arm vein in 1993, revision in 2011, s/p R SFA to peroneal bypass graft s/p revision on 7/30/18, s/p tPA bolus and angioplasty in 2020. The patient is being admitted to the vascular surgery service for planned BKA d/t bypass thrombosis.    Plan  #Vascular  - Now presenting with thrombosed RLE SFA-peroneal bypass graft.  - Will plan for admission and R BKA  - Continue ASA  - Will continue anticoagulation as an inpatient  - No antibiotics at this time, no concern for infection.  O/N: STEPHANIE, VSS       S: Patient does not have any complaints    O: Examined in bed resting comfortably     ROS: Denies headache, blurred vision, chest pain, SOB, abdominal pain, nausea or vomiting.         aspirin enteric coated 81  heparin   Injectable 3000  heparin   Injectable 5000  heparin  Infusion 1400      Allergies    No Known Allergies    Intolerances    Dilaudid (Headache; Nausea)      Vital Signs Last 24 Hrs  T(C): 37.3 (01 Dec 2022 06:07), Max: 37.3 (01 Dec 2022 06:07)  T(F): 99.1 (01 Dec 2022 06:07), Max: 99.1 (01 Dec 2022 06:07)  HR: 72 (01 Dec 2022 06:53) (70 - 86)  BP: 135/81 (01 Dec 2022 06:53) (121/77 - 155/82)  BP(mean): --  RR: 18 (01 Dec 2022 06:53) (17 - 18)  SpO2: 95% (01 Dec 2022 06:53) (93% - 97%)    Parameters below as of 01 Dec 2022 06:53  Patient On (Oxygen Delivery Method): room air      I&O's Summary    30 Nov 2022 07:01  -  01 Dec 2022 07:00  --------------------------------------------------------  IN: 100 mL / OUT: 1450 mL / NET: -1350 mL      Physical Exam:  Constitutional: Pleasant, conversational, in no acute distress.  Cardiac: NSR  Respiratory: Equal and bilateral chest rise. No respiratory distress.  Abdomen: Soft, NT, ND  Extremities: RLE with cyanosis of the distal and mid forefoot. No sensation of distal RLE at the toes. Diminished motor function of toes; normal sensory and motor at ankle. No open wounds.   Vascular: No audible signals of R DP/PT. Palpable femoral pulse. LLE with palpable pulses throughout.      LABS:                        14.0   14.72 )-----------( 476      ( 30 Nov 2022 14:51 )             42.8     11-30    138  |  102  |  17  ----------------------------<  121<H>  3.8   |  24  |  0.83    Ca    9.7      30 Nov 2022 14:51    TPro  7.8  /  Alb  3.8  /  TBili  0.2  /  DBili  x   /  AST  19  /  ALT  23  /  AlkPhos  121<H>  11-30    PT/INR - ( 30 Nov 2022 14:51 )   PT: 15.9 sec;   INR: 1.33          PTT - ( 30 Nov 2022 14:51 )  PTT:41.1 sec    Radiology and Additional Studies:    ---------------------------------------------------------------------------  PLEASE CHECK WHEN PRESENT:  [  ] Heart Failure  [  ] Acute  [  ] Acute on Chronic  [  ] Chronic  -------------------------------------------------------------------  [  ]Diastolic [HFpEF]  [  ]Systolic [HFrEF]  [  ]Combined [HFpEF & HFrEF]  [  ] afib  [  ] hypertensive heart disease  [  ]Other:  -------------------------------------------------------------------  [ ] Respiratory failure  [ ] Acute cor pulmonale  [ ] Asthma/COPD Exacerbation  [ ] Pleural effusion  [ ] Aspiration pneumonia  -------------------------------------------------------------------  [  ]ESPERANZA  [  ]ATN  [  ]Reneal Medullary Necrosis  [  ]Renal Cortical Necrosis  [  ]Other Pathological Lesions:    [  ]CKD 1  [  ]CKD 2  [  ]CKD 3  [  ]CKD 4  [  ]CKD 5  [  ]Other  -------------------------------------------------------------------  [  ] Diabetes  [  ] Diabetic PVD Ulcer  [  ] Neuropathic ulcer to DM  [  ] Diabetes with Nephropathy  [  ] Osteomyelitis due to diabetes  --------------------------------------------------------------------  [  ]Malnutrition: See Nutrition Note  [  ]Cachexia  [  ]Other:   [  ]Supplement Ordered:  [  ]Morbid Obesity (BMI >=40]  ---------------------------------------------------------------------  [ ] Sepsis/severe sepsis/septic shock  [ ] UTI  [ ] Pneumonia  -----------------------------------------------------------------------  [ ] Acidosis/alkalosis  [ ] Fluid overload  [ ] Hypokalemia  [ ] Hyperkalemia  [ ] Hypomagnesemia  [ ] Hypophosphatemia  [ ] Hyperphosphatemia  ------------------------------------------------------------------------  [ ] Acute blood loss anemia  [ ] Post op blood loss anemia  [ ] Iron deficiency anemia  [ ] Anemia due to chronic disease  [ ] Hypercoagulable state  ----------------------------------------------------------------------  [ ] Cerebral infarction  [ ] Transient ischemia attack  [ ] Encephalopathy          Assessment:  · Assessment	  Assessment  Mr. Mccall is a 67M with PMH of MTHFR gene mutation, CAD s/p MI (1993), and PAD s/p R fem to PT bypass with arm vein in 1993, revision in 2011, s/p R SFA to peroneal bypass graft s/p revision on 7/30/18, s/p tPA bolus and angioplasty in 2020. The patient is being admitted to the vascular surgery service for planned BKA due to bypass thrombosis and ischemia of the R foot.    Plan  #Vascular  - Now presenting with thrombosed RLE SFA-peroneal bypass graft.  - Will plan for R BKA tomorrow 12/2  - Continue ASA  - Heparin gtt with bolus to start now  - Holding home Eliquis  - Pain control    Diet: Regular    ID: No antibiotics at this time, no concern for infection.     DVT ppx: heparin gtt    Dispo: OR tomorrow for right below knee amputation

## 2022-12-02 ENCOUNTER — TRANSCRIPTION ENCOUNTER (OUTPATIENT)
Age: 67
End: 2022-12-02

## 2022-12-02 ENCOUNTER — RESULT REVIEW (OUTPATIENT)
Age: 67
End: 2022-12-02

## 2022-12-02 LAB
ANION GAP SERPL CALC-SCNC: 11 MMOL/L — SIGNIFICANT CHANGE UP (ref 5–17)
ANION GAP SERPL CALC-SCNC: 11 MMOL/L — SIGNIFICANT CHANGE UP (ref 5–17)
APTT BLD: 55.1 SEC — HIGH (ref 27.5–35.5)
APTT BLD: 72.6 SEC — HIGH (ref 27.5–35.5)
BUN SERPL-MCNC: 16 MG/DL — SIGNIFICANT CHANGE UP (ref 7–23)
BUN SERPL-MCNC: 18 MG/DL — SIGNIFICANT CHANGE UP (ref 7–23)
CALCIUM SERPL-MCNC: 9 MG/DL — SIGNIFICANT CHANGE UP (ref 8.4–10.5)
CALCIUM SERPL-MCNC: 9.6 MG/DL — SIGNIFICANT CHANGE UP (ref 8.4–10.5)
CHLORIDE SERPL-SCNC: 100 MMOL/L — SIGNIFICANT CHANGE UP (ref 96–108)
CHLORIDE SERPL-SCNC: 102 MMOL/L — SIGNIFICANT CHANGE UP (ref 96–108)
CO2 SERPL-SCNC: 22 MMOL/L — SIGNIFICANT CHANGE UP (ref 22–31)
CO2 SERPL-SCNC: 24 MMOL/L — SIGNIFICANT CHANGE UP (ref 22–31)
CREAT SERPL-MCNC: 0.67 MG/DL — SIGNIFICANT CHANGE UP (ref 0.5–1.3)
CREAT SERPL-MCNC: 0.83 MG/DL — SIGNIFICANT CHANGE UP (ref 0.5–1.3)
CULTURE RESULTS: SIGNIFICANT CHANGE UP
EGFR: 103 ML/MIN/1.73M2 — SIGNIFICANT CHANGE UP
EGFR: 97 ML/MIN/1.73M2 — SIGNIFICANT CHANGE UP
GLUCOSE SERPL-MCNC: 107 MG/DL — HIGH (ref 70–99)
GLUCOSE SERPL-MCNC: 147 MG/DL — HIGH (ref 70–99)
HCT VFR BLD CALC: 38.8 % — LOW (ref 39–50)
HCT VFR BLD CALC: 42 % — SIGNIFICANT CHANGE UP (ref 39–50)
HGB BLD-MCNC: 12.7 G/DL — LOW (ref 13–17)
HGB BLD-MCNC: 13.9 G/DL — SIGNIFICANT CHANGE UP (ref 13–17)
INR BLD: 1.17 — HIGH (ref 0.88–1.16)
MAGNESIUM SERPL-MCNC: 1.7 MG/DL — SIGNIFICANT CHANGE UP (ref 1.6–2.6)
MAGNESIUM SERPL-MCNC: 1.9 MG/DL — SIGNIFICANT CHANGE UP (ref 1.6–2.6)
MCHC RBC-ENTMCNC: 29.5 PG — SIGNIFICANT CHANGE UP (ref 27–34)
MCHC RBC-ENTMCNC: 30 PG — SIGNIFICANT CHANGE UP (ref 27–34)
MCHC RBC-ENTMCNC: 32.7 GM/DL — SIGNIFICANT CHANGE UP (ref 32–36)
MCHC RBC-ENTMCNC: 33.1 GM/DL — SIGNIFICANT CHANGE UP (ref 32–36)
MCV RBC AUTO: 90.2 FL — SIGNIFICANT CHANGE UP (ref 80–100)
MCV RBC AUTO: 90.5 FL — SIGNIFICANT CHANGE UP (ref 80–100)
NRBC # BLD: 0 /100 WBCS — SIGNIFICANT CHANGE UP (ref 0–0)
NRBC # BLD: 0 /100 WBCS — SIGNIFICANT CHANGE UP (ref 0–0)
PHOSPHATE SERPL-MCNC: 3.2 MG/DL — SIGNIFICANT CHANGE UP (ref 2.5–4.5)
PHOSPHATE SERPL-MCNC: 3.5 MG/DL — SIGNIFICANT CHANGE UP (ref 2.5–4.5)
PLATELET # BLD AUTO: 385 K/UL — SIGNIFICANT CHANGE UP (ref 150–400)
PLATELET # BLD AUTO: 391 K/UL — SIGNIFICANT CHANGE UP (ref 150–400)
POTASSIUM SERPL-MCNC: 4 MMOL/L — SIGNIFICANT CHANGE UP (ref 3.5–5.3)
POTASSIUM SERPL-MCNC: SIGNIFICANT CHANGE UP MMOL/L (ref 3.5–5.3)
POTASSIUM SERPL-SCNC: 4 MMOL/L — SIGNIFICANT CHANGE UP (ref 3.5–5.3)
POTASSIUM SERPL-SCNC: SIGNIFICANT CHANGE UP MMOL/L (ref 3.5–5.3)
PROTHROM AB SERPL-ACNC: 14 SEC — HIGH (ref 10.5–13.4)
RBC # BLD: 4.3 M/UL — SIGNIFICANT CHANGE UP (ref 4.2–5.8)
RBC # BLD: 4.64 M/UL — SIGNIFICANT CHANGE UP (ref 4.2–5.8)
RBC # FLD: 12.2 % — SIGNIFICANT CHANGE UP (ref 10.3–14.5)
RBC # FLD: 12.4 % — SIGNIFICANT CHANGE UP (ref 10.3–14.5)
SODIUM SERPL-SCNC: 135 MMOL/L — SIGNIFICANT CHANGE UP (ref 135–145)
SODIUM SERPL-SCNC: 135 MMOL/L — SIGNIFICANT CHANGE UP (ref 135–145)
SPECIMEN SOURCE: SIGNIFICANT CHANGE UP
WBC # BLD: 10.75 K/UL — HIGH (ref 3.8–10.5)
WBC # BLD: 16.01 K/UL — HIGH (ref 3.8–10.5)
WBC # FLD AUTO: 10.75 K/UL — HIGH (ref 3.8–10.5)
WBC # FLD AUTO: 16.01 K/UL — HIGH (ref 3.8–10.5)

## 2022-12-02 PROCEDURE — 88307 TISSUE EXAM BY PATHOLOGIST: CPT | Mod: 26

## 2022-12-02 PROCEDURE — 99232 SBSQ HOSP IP/OBS MODERATE 35: CPT

## 2022-12-02 PROCEDURE — 27880 AMPUTATION OF LOWER LEG: CPT | Mod: GC,RT

## 2022-12-02 DEVICE — CLIP APPLIER ETHICON LIGACLIP 9 3/8" SMALL: Type: IMPLANTABLE DEVICE | Status: FUNCTIONAL

## 2022-12-02 DEVICE — ARISTA 3GR: Type: IMPLANTABLE DEVICE | Status: FUNCTIONAL

## 2022-12-02 RX ORDER — CEFAZOLIN SODIUM 1 G
2000 VIAL (EA) INJECTION EVERY 8 HOURS
Refills: 0 | Status: DISCONTINUED | OUTPATIENT
Start: 2022-12-02 | End: 2022-12-06

## 2022-12-02 RX ORDER — CEFAZOLIN SODIUM 1 G
2000 VIAL (EA) INJECTION EVERY 8 HOURS
Refills: 0 | Status: DISCONTINUED | OUTPATIENT
Start: 2022-12-02 | End: 2022-12-02

## 2022-12-02 RX ORDER — MAGNESIUM SULFATE 500 MG/ML
1 VIAL (ML) INJECTION ONCE
Refills: 0 | Status: COMPLETED | OUTPATIENT
Start: 2022-12-02 | End: 2022-12-02

## 2022-12-02 RX ADMIN — Medication 650 MILLIGRAM(S): at 07:20

## 2022-12-02 RX ADMIN — MORPHINE SULFATE 2 MILLIGRAM(S): 50 CAPSULE, EXTENDED RELEASE ORAL at 01:00

## 2022-12-02 RX ADMIN — SODIUM CHLORIDE 85 MILLILITER(S): 9 INJECTION INTRAMUSCULAR; INTRAVENOUS; SUBCUTANEOUS at 00:00

## 2022-12-02 RX ADMIN — Medication 650 MILLIGRAM(S): at 14:00

## 2022-12-02 RX ADMIN — MORPHINE SULFATE 2 MILLIGRAM(S): 50 CAPSULE, EXTENDED RELEASE ORAL at 22:15

## 2022-12-02 RX ADMIN — Medication 650 MILLIGRAM(S): at 18:47

## 2022-12-02 RX ADMIN — MORPHINE SULFATE 2 MILLIGRAM(S): 50 CAPSULE, EXTENDED RELEASE ORAL at 06:22

## 2022-12-02 RX ADMIN — Medication 650 MILLIGRAM(S): at 19:45

## 2022-12-02 RX ADMIN — Medication 650 MILLIGRAM(S): at 00:04

## 2022-12-02 RX ADMIN — Medication 100 GRAM(S): at 14:08

## 2022-12-02 RX ADMIN — Medication 650 MILLIGRAM(S): at 06:49

## 2022-12-02 RX ADMIN — Medication 81 MILLIGRAM(S): at 06:22

## 2022-12-02 RX ADMIN — HEPARIN SODIUM 14 UNIT(S)/HR: 5000 INJECTION INTRAVENOUS; SUBCUTANEOUS at 00:43

## 2022-12-02 RX ADMIN — Medication 5 MILLIGRAM(S): at 22:00

## 2022-12-02 RX ADMIN — Medication 650 MILLIGRAM(S): at 13:20

## 2022-12-02 RX ADMIN — Medication 25 MILLIGRAM(S): at 06:22

## 2022-12-02 RX ADMIN — Medication 25 MILLIGRAM(S): at 22:00

## 2022-12-02 RX ADMIN — MORPHINE SULFATE 2 MILLIGRAM(S): 50 CAPSULE, EXTENDED RELEASE ORAL at 22:01

## 2022-12-02 RX ADMIN — Medication 2000 MILLIGRAM(S): at 18:47

## 2022-12-02 RX ADMIN — Medication 650 MILLIGRAM(S): at 00:45

## 2022-12-02 RX ADMIN — MORPHINE SULFATE 2 MILLIGRAM(S): 50 CAPSULE, EXTENDED RELEASE ORAL at 06:38

## 2022-12-02 RX ADMIN — Medication 25 MILLIGRAM(S): at 14:30

## 2022-12-02 RX ADMIN — MORPHINE SULFATE 2 MILLIGRAM(S): 50 CAPSULE, EXTENDED RELEASE ORAL at 00:44

## 2022-12-02 NOTE — PROGRESS NOTE ADULT - ASSESSMENT
67-year-old male with a PMHx of MTHFR gene mutation/lupus anticoagulant (on Eliquis), DVT, CAD (s/p MI in 1993) and PAD (s/p multiple vascular interventions) who presented for planned right BKA in setting of bypass thrombosis and ischemia of right foot.      #Severe PAD    -further management, pain control and wound care as per primary team     -plan for right BKA today   -continue with ASA 81mg daily and heparin gtt     -hold Eliquis 5mg BID in darion-operative setting     -not currently on antibiotics as low concern for infection       #MTHFR Gene Mutation    #Lupus Anticoagulant     -continue with heparin gtt in darion-operative setting    -resume home Eliquis post-op when safe from surgical perspective     -outpatient hematology follow up      #CAD    -continue with ASA 81mg daily       DVT PPx: heparin gtt      Dispo: pending PT evaluation post-op

## 2022-12-02 NOTE — PROGRESS NOTE ADULT - SUBJECTIVE AND OBJECTIVE BOX
O/N: ROSA BROWN ptt 72, 64 therapeutic x3                                --------------------------------------------------------------------------  PLEASE CHECK WHEN PRESENT:  [  ] Heart Failure  [  ] Acute  [  ] Acute on Chronic  [  ] Chronic  -------------------------------------------------------------------  [  ]Diastolic [HFpEF]  [  ]Systolic [HFrEF]  [  ]Combined [HFpEF & HFrEF]  [  ] afib  [  ] hypertensive heart disease  [  ]Other:  -------------------------------------------------------------------  [ ] Respiratory failure  [ ] Acute cor pulmonale  [ ] Asthma/COPD Exacerbation  [ ] Pleural effusion  [ ] Aspiration pneumonia  -------------------------------------------------------------------  [  ]ESPERANZA  [  ]ATN  [  ]Reneal Medullary Necrosis  [  ]Renal Cortical Necrosis  [  ]Other Pathological Lesions:    [  ]CKD 1  [  ]CKD 2  [  ]CKD 3  [  ]CKD 4  [  ]CKD 5  [  ]Other  -------------------------------------------------------------------  [  ] Diabetes  [  ] Diabetic PVD Ulcer  [  ] Neuropathic ulcer to DM  [  ] Diabetes with Nephropathy  [  ] Osteomyelitis due to diabetes  --------------------------------------------------------------------  [  ]Malnutrition: See Nutrition Note  [  ]Cachexia  [  ]Other:   [  ]Supplement Ordered:  [  ]Morbid Obesity (BMI >=40]  ---------------------------------------------------------------------  [ ] Sepsis/severe sepsis/septic shock  [ ] UTI  [ ] Pneumonia  -----------------------------------------------------------------------  [ ] Acidosis/alkalosis  [ ] Fluid overload  [ ] Hypokalemia  [ ] Hyperkalemia  [ ] Hypomagnesemia  [ ] Hypophosphatemia  [ ] Hyperphosphatemia  ------------------------------------------------------------------------  [ ] Acute blood loss anemia  [ ] Post op blood loss anemia  [ ] Iron deficiency anemia  [ ] Anemia due to chronic disease  [ ] Hypercoagulable state  ----------------------------------------------------------------------  [ ] Cerebral infarction  [ ] Transient ischemia attack  [ ] Encephalopathy          Assessment:  · Assessment	  Assessment  Mr. Mccall is a 67M with PMH of MTHFR gene mutation, CAD s/p MI (1993), and PAD s/p R fem to PT bypass with arm vein in 1993, revision in 2011, s/p R SFA to peroneal bypass graft s/p revision on 7/30/18, s/p tPA bolus and angioplasty in 2020. The patient is being admitted to the vascular surgery service for planned BKA due to bypass thrombosis and ischemia of the R foot.    Plan  #Vascular  - Now presenting with thrombosed RLE SFA-peroneal bypass graft.  - Will plan for R BKA tomorrow 12/2  - Continue ASA  - Heparin gtt with bolus to start now  - Holding home Eliquis  - Pain control    Diet: Regular    ID: No antibiotics at this time, no concern for infection.     DVT ppx: heparin gtt    Dispo: OR for right below knee amputation  O/N: STEPHANIE, VSS ptt 72, 64 therapeutic x3      S: Patient does not have any complaints    O: Examined in bed resting comfortably     ROS: Denies headache, blurred vision, chest pain, SOB, abdominal pain, nausea or vomiting.         aspirin enteric coated 81      Allergies    No Known Allergies    Intolerances    Dilaudid (Headache; Nausea)      Vital Signs Last 24 Hrs  T(C): 36.7 (02 Dec 2022 06:46), Max: 37.3 (01 Dec 2022 17:35)  T(F): 98.1 (02 Dec 2022 06:46), Max: 99.1 (01 Dec 2022 17:35)  HR: 57 (02 Dec 2022 05:11) (57 - 77)  BP: 139/78 (02 Dec 2022 05:11) (126/73 - 146/79)  BP(mean): --  RR: 18 (02 Dec 2022 05:11) (17 - 19)  SpO2: 95% (02 Dec 2022 05:11) (94% - 96%)    Parameters below as of 02 Dec 2022 05:11  Patient On (Oxygen Delivery Method): room air      I&O's Summary    01 Dec 2022 07:01  -  02 Dec 2022 07:00  --------------------------------------------------------  IN: 360 mL / OUT: 1550 mL / NET: -1190 mL        Physical Exam:  Constitutional: Pleasant, conversational, in no acute distress.  Cardiac: NSR  Respiratory: Equal and bilateral chest rise. No respiratory distress.  Abdomen: Soft, NT, ND  Extremities: RLE with cyanosis of the distal and mid forefoot. No sensation of distal RLE at the toes. Diminished motor function of toes; normal sensory and motor at ankle. No open wounds.   Vascular: No audible signals of R DP/PT. Palpable femoral pulse. LLE with palpable pulses throughout.      LABS:                        13.9   10.75 )-----------( 391      ( 02 Dec 2022 05:30 )             42.0     12-01    135  |  98  |  13  ----------------------------<  101<H>  3.8   |  25  |  0.72    Ca    9.9      01 Dec 2022 05:30  Phos  3.1     12-01  Mg     1.9     12-01    TPro  7.8  /  Alb  3.8  /  TBili  0.2  /  DBili  x   /  AST  19  /  ALT  23  /  AlkPhos  121<H>  11-30    PT/INR - ( 02 Dec 2022 05:30 )   PT: 14.0 sec;   INR: 1.17          PTT - ( 02 Dec 2022 05:30 )  PTT:55.1 sec    Radiology and Additional Studies:                  --------------------------------------------------------------------------  PLEASE CHECK WHEN PRESENT:  [  ] Heart Failure  [  ] Acute  [  ] Acute on Chronic  [  ] Chronic  -------------------------------------------------------------------  [  ]Diastolic [HFpEF]  [  ]Systolic [HFrEF]  [  ]Combined [HFpEF & HFrEF]  [  ] afib  [  ] hypertensive heart disease  [  ]Other:  -------------------------------------------------------------------  [ ] Respiratory failure  [ ] Acute cor pulmonale  [ ] Asthma/COPD Exacerbation  [ ] Pleural effusion  [ ] Aspiration pneumonia  -------------------------------------------------------------------  [  ]ESPERANZA  [  ]ATN  [  ]Reneal Medullary Necrosis  [  ]Renal Cortical Necrosis  [  ]Other Pathological Lesions:    [  ]CKD 1  [  ]CKD 2  [  ]CKD 3  [  ]CKD 4  [  ]CKD 5  [  ]Other  -------------------------------------------------------------------  [  ] Diabetes  [  ] Diabetic PVD Ulcer  [  ] Neuropathic ulcer to DM  [  ] Diabetes with Nephropathy  [  ] Osteomyelitis due to diabetes  --------------------------------------------------------------------  [  ]Malnutrition: See Nutrition Note  [  ]Cachexia  [  ]Other:   [  ]Supplement Ordered:  [  ]Morbid Obesity (BMI >=40]  ---------------------------------------------------------------------  [ ] Sepsis/severe sepsis/septic shock  [ ] UTI  [ ] Pneumonia  -----------------------------------------------------------------------  [ ] Acidosis/alkalosis  [ ] Fluid overload  [ ] Hypokalemia  [ ] Hyperkalemia  [ ] Hypomagnesemia  [ ] Hypophosphatemia  [ ] Hyperphosphatemia  ------------------------------------------------------------------------  [ ] Acute blood loss anemia  [ ] Post op blood loss anemia  [ ] Iron deficiency anemia  [ ] Anemia due to chronic disease  [ ] Hypercoagulable state  ----------------------------------------------------------------------  [ ] Cerebral infarction  [ ] Transient ischemia attack  [ ] Encephalopathy          Assessment:  · Assessment	  Assessment  Mr. Mccall is a 67M with PMH of MTHFR gene mutation, CAD s/p MI (1993), and PAD s/p R fem to PT bypass with arm vein in 1993, revision in 2011, s/p R SFA to peroneal bypass graft s/p revision on 7/30/18, s/p tPA bolus and angioplasty in 2020. The patient is being admitted to the vascular surgery service for planned BKA due to bypass thrombosis and ischemia of the R foot.    Plan  #Vascular  - Thrombosed RLE SFA-peroneal bypass graft.  - Will plan for R BKA today  - Continue ASA  - Heparin gtt on hold now for OR  - Holding home Eliquis  - Pain control    Diet: Regular/NPO p mn    ID: No antibiotics at this time, no concern for infection.     DVT ppx: heparin gtt paused preop    Dispo: OR for right below knee amputation

## 2022-12-02 NOTE — PROGRESS NOTE ADULT - SUBJECTIVE AND OBJECTIVE BOX
Subjective:  No acute events overnight.  Patient is doing well today, pain is the same as previous days.  Denies HA, CP, SOB, abdominal pain, nausea, vomiting, fever, chills or diarrhea.     Objective:   Vital Signs:  T(C): 36.4 (02 Dec 2022 09:23), Max: 37.3 (01 Dec 2022 17:35)  T(F): 97.5 (02 Dec 2022 09:13), Max: 99.1 (01 Dec 2022 17:35)  HR: 62 (02 Dec 2022 09:23) (57 - 77)  BP: 141/84 (02 Dec 2022 09:23) (126/73 - 146/79)  BP(mean): --  RR: 19 (02 Dec 2022 09:23) (17 - 19)  SpO2: 95% (02 Dec 2022 09:23) (94% - 96%)    Parameters below as of 02 Dec 2022 08:48  Patient On (Oxygen Delivery Method): room air    Physical Exam:  -Gen: NAD, resting in bed, pleasant  -HEENT: EOMI, PERRL, no scleral icterus, no JVD, no bruits  -CV: normal S1 and S2, no murmurs appreciated   -Lungs: CTABL, normal respiratory effort on RA  -Ab: soft, NT, ND, normal BS  -Ext: right foot discolored and cool to the touch, no open wounds   -Neuro: no focal deficits    Labs:                        13.9   10.75 )-----------( 391      ( 02 Dec 2022 05:30 )             42.0       12-02    135  |  100  |  18  ----------------------------<  107<H>  4.0   |  24  |  0.83    Ca    9.6      02 Dec 2022 05:30  Phos  3.2     12-02  Mg     1.9     12-02    TPro  7.8  /  Alb  3.8  /  TBili  0.2  /  DBili  x   /  AST  19  /  ALT  23  /  AlkPhos  121<H>  11-30     Medications:  MEDICATIONS  (STANDING):  acetaminophen     Tablet .. 650 milliGRAM(s) Oral every 6 hours  influenza  Vaccine (HIGH DOSE) 0.7 milliLiter(s) IntraMuscular once  melatonin 5 milliGRAM(s) Oral at bedtime  pregabalin 25 milliGRAM(s) Oral three times a day    MEDICATIONS  (PRN):  morphine  - Injectable 2 milliGRAM(s) IV Push every 3 hours PRN Severe Pain (7 - 10)

## 2022-12-02 NOTE — PRE-ANESTHESIA EVALUATION ADULT - NSANTHOSAYNRD_GEN_A_CORE
No. CYN screening performed.  STOP BANG Legend: 0-2 = LOW Risk; 3-4 = INTERMEDIATE Risk; 5-8 = HIGH Risk

## 2022-12-02 NOTE — PROGRESS NOTE ADULT - ASSESSMENT
67M with PMH of MTHFR gene mutation, CAD s/p MI (1993), and PAD s/p R fem to PT bypass with arm vein in 1993, revision in 2011, s/p R SFA to peroneal bypass graft s/p revision on 7/30/18, s/p tPA bolus and angioplasty in 2020, most recently s/p R BKA d/t RLE SFA-peroneal bypass thrombosis (12/2)    Diet: Regular   IVF: none  Pain/nausea control: tylenol PO, morphine IV, pregablin PO  ID: Ancef in house  DVT ppx:  PT/OT consult: placed  Hold ASA, HSQ  Dressing to be removed 12/3, no ace wrap  Monitor drain output

## 2022-12-02 NOTE — PROGRESS NOTE ADULT - SUBJECTIVE AND OBJECTIVE BOX
Vascular Surgery Post-Op Note    Procedure: R BKA    Diagnosis/Indication: RLE SFA-peroneal bypass thrombosis    Surgeon: Seamus Herrera    S: Pt has no complaints. Denies CP, SOB, AGUILERA, calf tenderness. Denies any pain    O:  T(C): 36.3 (12-02-22 @ 13:23), Max: 36.3 (12-02-22 @ 13:23)  T(F): 97.4 (12-02-22 @ 13:23), Max: 97.4 (12-02-22 @ 13:23)  HR: 80 (12-02-22 @ 13:38) (68 - 84)  BP: 105/64 (12-02-22 @ 13:38) (99/63 - 110/66)  RR: 20 (12-02-22 @ 13:38) (12 - 20)  SpO2: 96% (12-02-22 @ 13:38) (94% - 97%)  Wt(kg): --                        12.7   16.01 )-----------( 385      ( 02 Dec 2022 12:32 )             38.8     12-02    135  |  102  |  16  ----------------------------<  147<H>  SEE NOTE   |  22  |  0.67    Ca    9.0      02 Dec 2022 12:32  Phos  3.5     12-02  Mg     1.7     12-02    TPro  7.8  /  Alb  3.8  /  TBili  0.2  /  DBili  x   /  AST  19  /  ALT  23  /  AlkPhos  121<H>  11-30      Gen: NAD, resting comfortably in bed  C/V: NSR  Pulm: Nonlabored breathing, no respiratory distress  Abd: soft, NT/ND  Extrem: WWP R Femoral, no calf edema. Dressing of BKA C/D/I.  L femoral, PT/DP palpable. HILARIO output minimal, serosanginous. x1 HILARIO

## 2022-12-03 LAB
ANION GAP SERPL CALC-SCNC: 9 MMOL/L — SIGNIFICANT CHANGE UP (ref 5–17)
APTT BLD: 32.1 SEC — SIGNIFICANT CHANGE UP (ref 27.5–35.5)
BUN SERPL-MCNC: 14 MG/DL
BUN SERPL-MCNC: 14 MG/DL — SIGNIFICANT CHANGE UP (ref 7–23)
CALCIUM SERPL-MCNC: 9.2 MG/DL — SIGNIFICANT CHANGE UP (ref 8.4–10.5)
CHLORIDE SERPL-SCNC: 102 MEQ/L
CHLORIDE SERPL-SCNC: 102 MMOL/L — SIGNIFICANT CHANGE UP (ref 96–108)
CO2 SERPL-SCNC: 27 MEQ/L
CO2 SERPL-SCNC: 27 MMOL/L — SIGNIFICANT CHANGE UP (ref 22–31)
CREAT SERPL-MCNC: 0.79 MG/DL
CREAT SERPL-MCNC: 0.79 MG/DL — SIGNIFICANT CHANGE UP (ref 0.5–1.3)
EGFR: 98 ML/MIN/1.73M2 — SIGNIFICANT CHANGE UP
EXTERNAL HEMATOCRIT: 36.3 %
EXTERNAL HEMOGLOBIN: 11.9 G/DL
EXTERNAL WBC: 14.94 G/DL
GLUCOSE SERPL-MCNC: 106 MG/DL
GLUCOSE SERPL-MCNC: 106 MG/DL — HIGH (ref 70–99)
HCT VFR BLD CALC: 36.3 % — LOW (ref 39–50)
HGB BLD-MCNC: 11.9 G/DL — LOW (ref 13–17)
MAGNESIUM SERPL-MCNC: 2 MG/DL — SIGNIFICANT CHANGE UP (ref 1.6–2.6)
MCHC RBC-ENTMCNC: 29.5 PG — SIGNIFICANT CHANGE UP (ref 27–34)
MCHC RBC-ENTMCNC: 32.8 GM/DL — SIGNIFICANT CHANGE UP (ref 32–36)
MCV RBC AUTO: 90.1 FL — SIGNIFICANT CHANGE UP (ref 80–100)
NRBC # BLD: 0 /100 WBCS — SIGNIFICANT CHANGE UP (ref 0–0)
PHOSPHATE SERPL-MCNC: 3.2 MG/DL — SIGNIFICANT CHANGE UP (ref 2.5–4.5)
PLATELET # BLD AUTO: 383 K/UL — SIGNIFICANT CHANGE UP (ref 150–400)
POTASSIUM SERPL-MCNC: 3.9 MMOL/L — SIGNIFICANT CHANGE UP (ref 3.5–5.3)
POTASSIUM SERPL-SCNC: 3.9 MEQ/L
POTASSIUM SERPL-SCNC: 3.9 MMOL/L — SIGNIFICANT CHANGE UP (ref 3.5–5.3)
RBC # BLD: 4.03 M/UL — LOW (ref 4.2–5.8)
RBC # FLD: 12.4 % — SIGNIFICANT CHANGE UP (ref 10.3–14.5)
SODIUM SERPL-SCNC: 138 MEQ/L
SODIUM SERPL-SCNC: 138 MMOL/L — SIGNIFICANT CHANGE UP (ref 135–145)
WBC # BLD: 14.94 K/UL — HIGH (ref 3.8–10.5)
WBC # FLD AUTO: 14.94 K/UL — HIGH (ref 3.8–10.5)

## 2022-12-03 PROCEDURE — 99232 SBSQ HOSP IP/OBS MODERATE 35: CPT

## 2022-12-03 RX ORDER — APIXABAN 2.5 MG/1
5 TABLET, FILM COATED ORAL EVERY 12 HOURS
Refills: 0 | Status: DISCONTINUED | OUTPATIENT
Start: 2022-12-03 | End: 2022-12-06

## 2022-12-03 RX ADMIN — MORPHINE SULFATE 2 MILLIGRAM(S): 50 CAPSULE, EXTENDED RELEASE ORAL at 22:26

## 2022-12-03 RX ADMIN — Medication 5 MILLIGRAM(S): at 21:51

## 2022-12-03 RX ADMIN — Medication 25 MILLIGRAM(S): at 06:33

## 2022-12-03 RX ADMIN — MORPHINE SULFATE 2 MILLIGRAM(S): 50 CAPSULE, EXTENDED RELEASE ORAL at 22:45

## 2022-12-03 RX ADMIN — MORPHINE SULFATE 2 MILLIGRAM(S): 50 CAPSULE, EXTENDED RELEASE ORAL at 17:57

## 2022-12-03 RX ADMIN — Medication 650 MILLIGRAM(S): at 06:33

## 2022-12-03 RX ADMIN — APIXABAN 5 MILLIGRAM(S): 2.5 TABLET, FILM COATED ORAL at 21:52

## 2022-12-03 RX ADMIN — Medication 2000 MILLIGRAM(S): at 09:07

## 2022-12-03 RX ADMIN — APIXABAN 5 MILLIGRAM(S): 2.5 TABLET, FILM COATED ORAL at 09:07

## 2022-12-03 RX ADMIN — Medication 2000 MILLIGRAM(S): at 01:05

## 2022-12-03 RX ADMIN — Medication 650 MILLIGRAM(S): at 17:57

## 2022-12-03 RX ADMIN — Medication 25 MILLIGRAM(S): at 22:05

## 2022-12-03 RX ADMIN — Medication 25 MILLIGRAM(S): at 13:39

## 2022-12-03 RX ADMIN — Medication 650 MILLIGRAM(S): at 01:20

## 2022-12-03 RX ADMIN — Medication 2000 MILLIGRAM(S): at 17:57

## 2022-12-03 RX ADMIN — MORPHINE SULFATE 2 MILLIGRAM(S): 50 CAPSULE, EXTENDED RELEASE ORAL at 18:13

## 2022-12-03 RX ADMIN — Medication 650 MILLIGRAM(S): at 07:15

## 2022-12-03 RX ADMIN — Medication 650 MILLIGRAM(S): at 18:36

## 2022-12-03 RX ADMIN — Medication 650 MILLIGRAM(S): at 12:41

## 2022-12-03 RX ADMIN — Medication 650 MILLIGRAM(S): at 00:31

## 2022-12-03 RX ADMIN — Medication 650 MILLIGRAM(S): at 13:40

## 2022-12-03 NOTE — PROGRESS NOTE ADULT - ASSESSMENT
67-year-old male with a PMHx of MTHFR gene mutation/lupus anticoagulant (on Eliquis), DVT, CAD (s/p MI in 1993) and PAD (s/p multiple vascular interventions) who presented for planned right BKA in setting of bypass thrombosis and ischemia of right foot.      #Severe PAD    -further management, pain control and wound care as per primary team     - s/p bka   -continue with ASA 81mg daily + eliquis   -not currently on antibiotics as low concern for infection       #MTHFR Gene Mutation    #Lupus Anticoagulant     -resume home Eliquis post-op when safe from surgical perspective     -outpatient hematology follow up      #CAD    -continue with ASA 81mg daily       DVT PPx: heparin gtt      Dispo: pending PT evaluation post-op

## 2022-12-03 NOTE — CONSULT NOTE ADULT - ASSESSMENT
per Vascular Surgery    67 y o M with PMH of MTHFR gene mutation, CAD s/p MI (1993), and PAD s/p R fem to PT bypass with arm vein in 1993, revision in 2011, s/p R SFA to peroneal bypass graft s/p revision on 7/30/18, s/p tPA bolus and angioplasty in 2020. The patient is being admitted to the vascular surgery service for planned BKA due to bypass thrombosis and ischemia of the R foot. Now POD1 s/p R BKA (12/2)    Plan  #Vascular  - Thrombosed RLE SFA-peroneal bypass graft.s/p R BKA 12/2.  - Holding ASA   - Hb stable. Restart home Eliquis 5 BID  - Pain control with Morphine/Tylenol    Diet: Regular    ID: Perioperative Ancef    DVT ppx: Eliquis  
67-year-old male with a PMHx of MTHFR gene mutation/lupus anticoagulant (on Eliquis), DVT, CAD (s/p MI in 1993) and PAD (s/p multiple vascular interventions) who presented for planned right BKA in setting of bypass thrombosis and ischemia of right foot.     #Severe PAD   -further management, pain control and wound care as per primary team    -plan for right BKA on 12/2   -continue with ASA 81mg daily and heparin gtt    -hold Eliquis 5mg BID in darion-operative setting    -not currently on antibiotics as low concern for infection      #MTHFR Gene Mutation   #Lupus Anticoagulant    -continue with heparin gtt in darion-operative setting   -resume home Eliquis post-op when safe from surgical perspective    -outpatient hematology follow up     #CAD   -continue with ASA 81mg daily      DVT PPx: heparin gtt     Dispo: pending OR

## 2022-12-03 NOTE — PROGRESS NOTE ADULT - ASSESSMENT
--------------------------------------------------------------------------  PLEASE CHECK WHEN PRESENT:  [  ] Heart Failure  [  ] Acute  [  ] Acute on Chronic  [  ] Chronic  -------------------------------------------------------------------  [  ]Diastolic [HFpEF]  [  ]Systolic [HFrEF]  [  ]Combined [HFpEF & HFrEF]  [  ] afib  [  ] hypertensive heart disease  [  ]Other:  -------------------------------------------------------------------  [ ] Respiratory failure  [ ] Acute cor pulmonale  [ ] Asthma/COPD Exacerbation  [ ] Pleural effusion  [ ] Aspiration pneumonia  -------------------------------------------------------------------  [  ]ESPERANZA  [  ]ATN  [  ]Reneal Medullary Necrosis  [  ]Renal Cortical Necrosis  [  ]Other Pathological Lesions:    [  ]CKD 1  [  ]CKD 2  [  ]CKD 3  [  ]CKD 4  [  ]CKD 5  [  ]Other  -------------------------------------------------------------------  [  ] Diabetes  [  ] Diabetic PVD Ulcer  [  ] Neuropathic ulcer to DM  [  ] Diabetes with Nephropathy  [  ] Osteomyelitis due to diabetes  --------------------------------------------------------------------  [  ]Malnutrition: See Nutrition Note  [  ]Cachexia  [  ]Other:   [  ]Supplement Ordered:  [  ]Morbid Obesity (BMI >=40]  ---------------------------------------------------------------------  [ ] Sepsis/severe sepsis/septic shock  [ ] UTI  [ ] Pneumonia  -----------------------------------------------------------------------  [ ] Acidosis/alkalosis  [ ] Fluid overload  [ ] Hypokalemia  [ ] Hyperkalemia  [ ] Hypomagnesemia  [ ] Hypophosphatemia  [ ] Hyperphosphatemia  ------------------------------------------------------------------------  [ ] Acute blood loss anemia  [ ] Post op blood loss anemia  [ ] Iron deficiency anemia  [ ] Anemia due to chronic disease  [ ] Hypercoagulable state  ----------------------------------------------------------------------  [ ] Cerebral infarction  [ ] Transient ischemia attack  [ ] Encephalopathy    Assessment:  · Assessment	  Assessment  Mr. Mccall is a 67M with PMH of MTHFR gene mutation, CAD s/p MI (1993), and PAD s/p R fem to PT bypass with arm vein in 1993, revision in 2011, s/p R SFA to peroneal bypass graft s/p revision on 7/30/18, s/p tPA bolus and angioplasty in 2020. The patient is being admitted to the vascular surgery service for planned BKA due to bypass thrombosis and ischemia of the R foot.    Plan  #Vascular  - Thrombosed RLE SFA-peroneal bypass graft.  - Will plan for R BKA today  - Continue ASA  - Heparin gtt on hold now for OR  - Holding home Eliquis  - Pain control    Diet: Regular/NPO p mn    ID: No antibiotics at this time, no concern for infection.     DVT ppx: heparin gtt paused preop    Dispo: OR for right below knee amputation    --------------------------------------------------------------------------  PLEASE CHECK WHEN PRESENT:  [  ] Heart Failure  [  ] Acute  [  ] Acute on Chronic  [  ] Chronic  -------------------------------------------------------------------  [  ]Diastolic [HFpEF]  [  ]Systolic [HFrEF]  [  ]Combined [HFpEF & HFrEF]  [  ] afib  [  ] hypertensive heart disease  [  ]Other:  -------------------------------------------------------------------  [ ] Respiratory failure  [ ] Acute cor pulmonale  [ ] Asthma/COPD Exacerbation  [ ] Pleural effusion  [ ] Aspiration pneumonia  -------------------------------------------------------------------  [  ]ESPERANZA  [  ]ATN  [  ]Reneal Medullary Necrosis  [  ]Renal Cortical Necrosis  [  ]Other Pathological Lesions:    [  ]CKD 1  [  ]CKD 2  [  ]CKD 3  [  ]CKD 4  [  ]CKD 5  [  ]Other  -------------------------------------------------------------------  [  ] Diabetes  [  ] Diabetic PVD Ulcer  [  ] Neuropathic ulcer to DM  [  ] Diabetes with Nephropathy  [  ] Osteomyelitis due to diabetes  --------------------------------------------------------------------  [  ]Malnutrition: See Nutrition Note  [  ]Cachexia  [  ]Other:   [  ]Supplement Ordered:  [  ]Morbid Obesity (BMI >=40]  ---------------------------------------------------------------------  [ ] Sepsis/severe sepsis/septic shock  [ ] UTI  [ ] Pneumonia  -----------------------------------------------------------------------  [ ] Acidosis/alkalosis  [ ] Fluid overload  [ ] Hypokalemia  [ ] Hyperkalemia  [ ] Hypomagnesemia  [ ] Hypophosphatemia  [ ] Hyperphosphatemia  ------------------------------------------------------------------------  [ ] Acute blood loss anemia  [ ] Post op blood loss anemia  [ ] Iron deficiency anemia  [ ] Anemia due to chronic disease  [ ] Hypercoagulable state  ----------------------------------------------------------------------  [ ] Cerebral infarction  [ ] Transient ischemia attack  [ ] Encephalopathy    Assessment:  · Assessment	  Assessment  Mr. Mccall is a 67M with PMH of MTHFR gene mutation, CAD s/p MI (1993), and PAD s/p R fem to PT bypass with arm vein in 1993, revision in 2011, s/p R SFA to peroneal bypass graft s/p revision on 7/30/18, s/p tPA bolus and angioplasty in 2020. The patient is being admitted to the vascular surgery service for planned BKA due to bypass thrombosis and ischemia of the R foot. Now POD1 s/p R BKA (12/2)    Plan  #Vascular  - Thrombosed RLE SFA-peroneal bypass graft.s/p R BKA 12/2.  - Holding ASA   - Hb stable. Restart home Eliquis 5 BID  - Pain control with Morphine/Tylenol    Diet: Regular    ID: Perioperative Ancef    DVT ppx: Eliquis    Dispo: Pending PT/OT and PM&R recs

## 2022-12-03 NOTE — OCCUPATIONAL THERAPY INITIAL EVALUATION ADULT - GENERAL OBSERVATIONS, REHAB EVAL
PT Karen present. Patient wife present. Patient received semisupine in bed +tele, +heplock IV, +HILARIO drain, +RLE ace bandage wrap C/D/I, room air, NAD. Patient c/o of 2/10 pain throughout.

## 2022-12-03 NOTE — OCCUPATIONAL THERAPY INITIAL EVALUATION ADULT - PERTINENT HX OF CURRENT PROBLEM, REHAB EVAL
67 y o M with PMH of MTHFR gene mutation, CAD s/p MI (1993), and PAD s/p R fem to PT bypass with arm vein in 1993, revision in 2011, s/p R SFA to peroneal bypass graft s/p revision on 7/30/18, s/p tPA bolus and angioplasty in 2020. The patient is being admitted to the vascular surgery service for planned BKA due to bypass thrombosis and ischemia of the R foot. Now POD1 s/p R BKA (12/2)

## 2022-12-03 NOTE — PHYSICAL THERAPY INITIAL EVALUATION ADULT - GENERAL OBSERVATIONS, REHAB EVAL
patient received supine with no acute distress. +EKG +right LE ace wrap clean/dry/intact +chris to self suction right LE +heplock

## 2022-12-03 NOTE — OCCUPATIONAL THERAPY INITIAL EVALUATION ADULT - ADDITIONAL COMMENTS
Patient reports living with his wife in a private home in [place] however has a residence in [place] and would like to go to rehab there. Patient states he was independent with all ADL's, IADL's and functional mobility with no AD prior to admission. Patient is very active and is R hand dominant.

## 2022-12-03 NOTE — OCCUPATIONAL THERAPY INITIAL EVALUATION ADULT - MANUAL MUSCLE TESTING RESULTS, REHAB EVAL
BUE and LLE at least 3+/5, RLE at least 3/5 grossly based on functional mobility assessment against gravity

## 2022-12-03 NOTE — PROGRESS NOTE ADULT - SUBJECTIVE AND OBJECTIVE BOX
O/N: STEPHANIE< VSS                               --------------------------------------------------------------------------  PLEASE CHECK WHEN PRESENT:  [  ] Heart Failure  [  ] Acute  [  ] Acute on Chronic  [  ] Chronic  -------------------------------------------------------------------  [  ]Diastolic [HFpEF]  [  ]Systolic [HFrEF]  [  ]Combined [HFpEF & HFrEF]  [  ] afib  [  ] hypertensive heart disease  [  ]Other:  -------------------------------------------------------------------  [ ] Respiratory failure  [ ] Acute cor pulmonale  [ ] Asthma/COPD Exacerbation  [ ] Pleural effusion  [ ] Aspiration pneumonia  -------------------------------------------------------------------  [  ]ESPERANZA  [  ]ATN  [  ]Reneal Medullary Necrosis  [  ]Renal Cortical Necrosis  [  ]Other Pathological Lesions:    [  ]CKD 1  [  ]CKD 2  [  ]CKD 3  [  ]CKD 4  [  ]CKD 5  [  ]Other  -------------------------------------------------------------------  [  ] Diabetes  [  ] Diabetic PVD Ulcer  [  ] Neuropathic ulcer to DM  [  ] Diabetes with Nephropathy  [  ] Osteomyelitis due to diabetes  --------------------------------------------------------------------  [  ]Malnutrition: See Nutrition Note  [  ]Cachexia  [  ]Other:   [  ]Supplement Ordered:  [  ]Morbid Obesity (BMI >=40]  ---------------------------------------------------------------------  [ ] Sepsis/severe sepsis/septic shock  [ ] UTI  [ ] Pneumonia  -----------------------------------------------------------------------  [ ] Acidosis/alkalosis  [ ] Fluid overload  [ ] Hypokalemia  [ ] Hyperkalemia  [ ] Hypomagnesemia  [ ] Hypophosphatemia  [ ] Hyperphosphatemia  ------------------------------------------------------------------------  [ ] Acute blood loss anemia  [ ] Post op blood loss anemia  [ ] Iron deficiency anemia  [ ] Anemia due to chronic disease  [ ] Hypercoagulable state  ----------------------------------------------------------------------  [ ] Cerebral infarction  [ ] Transient ischemia attack  [ ] Encephalopathy          Assessment:  · Assessment	  Assessment  Mr. Mccall is a 67M with PMH of MTHFR gene mutation, CAD s/p MI (1993), and PAD s/p R fem to PT bypass with arm vein in 1993, revision in 2011, s/p R SFA to peroneal bypass graft s/p revision on 7/30/18, s/p tPA bolus and angioplasty in 2020. The patient is being admitted to the vascular surgery service for planned BKA due to bypass thrombosis and ischemia of the R foot.    Plan  #Vascular  - Thrombosed RLE SFA-peroneal bypass graft.  - Will plan for R BKA today  - Continue ASA  - Heparin gtt on hold now for OR  - Holding home Eliquis  - Pain control    Diet: Regular/NPO p mn    ID: No antibiotics at this time, no concern for infection.     DVT ppx: heparin gtt paused preop    Dispo: OR for right below knee amputation    O/N: STEPHANIE< VSS     Subjective:  Patient seen and examined at bedside with chief. No acute events noted overnight.    ROS:   Denies Headache, blurred vision, Chest Pain, SOB, Abdominal pain, nausea or vomiting     Social   ceFAZolin  Injectable. 2000  ceFAZolin  Injectable. 2000      Allergies    No Known Allergies    Intolerances    Dilaudid (Headache; Nausea)      Vital Signs Last 24 Hrs  T(C): 36.4 (03 Dec 2022 06:00), Max: 36.8 (02 Dec 2022 22:24)  T(F): 97.5 (03 Dec 2022 06:00), Max: 98.3 (02 Dec 2022 22:24)  HR: 63 (03 Dec 2022 05:05) (62 - 84)  BP: 115/68 (03 Dec 2022 05:05) (93/62 - 141/84)  BP(mean): 80 (02 Dec 2022 13:38) (75 - 81)  RR: 16 (03 Dec 2022 05:05) (12 - 20)  SpO2: 96% (03 Dec 2022 05:05) (94% - 98%)    Parameters below as of 03 Dec 2022 05:05  Patient On (Oxygen Delivery Method): room air      I&O's Summary    02 Dec 2022 07:01  -  03 Dec 2022 07:00  --------------------------------------------------------  IN: 1148 mL / OUT: 1850 mL / NET: -702 mL        Physical Exam:  General:  Pulmonary:  Cardiovascular:  Abdominal:  Extremities:  Pulses:   Right:                                                                          Left:  FEM [ ]2+ [ ]1+ [ ]doppler                                             FEM [ ]2+ [ ]1+ [ ]doppler    POP [ ]2+ [ ]1+ [ ]doppler                                             POP [ ]2+ [ ]1+ [ ]doppler    DP [ ]2+ [ ]1+ [ ]doppler                                                DP [ ]2+ [ ]1+ [ ]doppler  PT[ ]2+ [ ]1+ [ ]doppler                                                  PT [ ]2+ [ ]1+ [ ]doppler      LABS:                        12.7   16.01 )-----------( 385      ( 02 Dec 2022 12:32 )             38.8     12-02    135  |  102  |  16  ----------------------------<  147<H>  SEE NOTE   |  22  |  0.67    Ca    9.0      02 Dec 2022 12:32  Phos  3.5     12-02  Mg     1.7     12-02      PT/INR - ( 02 Dec 2022 05:30 )   PT: 14.0 sec;   INR: 1.17          PTT - ( 02 Dec 2022 05:30 )  PTT:55.1 sec           O/N: STEPHANIE< VSS     Subjective:  Patient seen and examined at bedside with chief. No acute events noted overnight. Patient reports RLE pain has significantly decreased since before surgery. States he is tolerating diet well. No other acute complaints.    ROS:   Denies Headache, blurred vision, Chest Pain, SOB, Abdominal pain, nausea or vomiting     Social   ceFAZolin  Injectable. 2000  ceFAZolin  Injectable. 2000      Allergies    No Known Allergies    Intolerances    Dilaudid (Headache; Nausea)      Vital Signs Last 24 Hrs  T(C): 36.4 (03 Dec 2022 06:00), Max: 36.8 (02 Dec 2022 22:24)  T(F): 97.5 (03 Dec 2022 06:00), Max: 98.3 (02 Dec 2022 22:24)  HR: 63 (03 Dec 2022 05:05) (62 - 84)  BP: 115/68 (03 Dec 2022 05:05) (93/62 - 141/84)  BP(mean): 80 (02 Dec 2022 13:38) (75 - 81)  RR: 16 (03 Dec 2022 05:05) (12 - 20)  SpO2: 96% (03 Dec 2022 05:05) (94% - 98%)    Parameters below as of 03 Dec 2022 05:05  Patient On (Oxygen Delivery Method): room air      I&O's Summary    02 Dec 2022 07:01  -  03 Dec 2022 07:00  --------------------------------------------------------  IN: 1148 mL / OUT: 1850 mL / NET: -702 mL        Physical Exam:  General: Resting comfortably in bed, NAD  Pulmonary: Equal chest wall expansion b/l, no respiratory distress  Cardiovascular: NSR  Abdominal: soft, nondistended, nontender  Extremities: LLE - WWP with palpable pulses/RLE - BKA wound well healing with small lateral hematoma noted. Incision clean and intact. Re-dressed with kerlix and ACE. HILARIO noted to be dark SS with 25 cc output total since OR                        12.7   16.01 )-----------( 385      ( 02 Dec 2022 12:32 )             38.8     12-02    135  |  102  |  16  ----------------------------<  147<H>  SEE NOTE   |  22  |  0.67    Ca    9.0      02 Dec 2022 12:32  Phos  3.5     12-02  Mg     1.7     12-02      PT/INR - ( 02 Dec 2022 05:30 )   PT: 14.0 sec;   INR: 1.17          PTT - ( 02 Dec 2022 05:30 )  PTT:55.1 sec

## 2022-12-03 NOTE — CONSULT NOTE ADULT - SUBJECTIVE AND OBJECTIVE BOX
Patient is a 67y old  Male who presents with a chief complaint of RKE BKA (03 Dec 2022 11:56)        HPI:      PAST MEDICAL & SURGICAL HISTORY:  PVD (peripheral vascular disease)      Lyme disease      MI (myocardial infarction)  93      DVT (deep venous thrombosis)  femoral artery LLE      Lupus      PAD (peripheral artery disease)      Surgery, elective  Right Femoral-PT bypass with cephalic vein      History of femoral angiogram  RLE angiogram          MEDICATIONS  (STANDING):  acetaminophen     Tablet .. 650 milliGRAM(s) Oral every 6 hours  apixaban 5 milliGRAM(s) Oral every 12 hours  ceFAZolin  Injectable. 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/     Baseline Functional Level Prior to Admission :             - ADL's/ IADL's :  independent            - Ambulatory status prior to admission :   walked with a cane secondary to RLE pain       FAMILY HISTORY:    CBC Full  -  ( 03 Dec 2022 05:30 )  WBC Count : 14.94 K/uL  RBC Count : 4.03 M/uL  Hemoglobin : 11.9 g/dL  Hematocrit : 36.3 %  Platelet Count - Automated : 383 K/uL  Mean Cell Volume : 90.1 fl  Mean Cell Hemoglobin : 29.5 pg  Mean Cell Hemoglobin Concentration : 32.8 gm/dL  Auto Neutrophil # : x  Auto Lymphocyte # : x  Auto Monocyte # : x  Auto Eosinophil # : x  Auto Basophil # : x  Auto Neutrophil % : x  Auto Lymphocyte % : x  Auto Monocyte % : x  Auto Eosinophil % : x  Auto Basophil % : x      12-03    138  |  102  |  14  ----------------------------<  106<H>  3.9   |  27  |  0.79    Ca    9.2      03 Dec 2022 05:30  Phos  3.2     12-03  Mg     2.0     12-03              Radiology :     < from: Xray Chest 1 View- PORTABLE-Urgent (11.30.22 @ 14:09) >  ACC: [id number] EXAM:  XR CHEST PORTABLE URGENT 1V                          PROCEDURE DATE:  11/30/2022          INTERPRETATION:  XR CHEST URGENT dated 11/30/2022 2:09 PM    CLINICAL INFORMATION: weakness    COMPARISON: Chest x-ray 11/17/2020    FINDINGS:    Unremarkable cardiomediastinal silhouette.    The lungs are clear. No pleural effusions. No pneumothorax.    No acute abnormalities of the soft tissues and osseous structures.      IMPRESSION:  No acute pathology.                      Vital Signs Last 24 Hrs  T(C): 36.8 (03 Dec 2022 09:12), Max: 36.8 (02 Dec 2022 22:24)  T(F): 98.3 (03 Dec 2022 09:12), Max: 98.3 (02 Dec 2022 22:24)  HR: 74 (03 Dec 2022 08:28) (63 - 80)  BP: 117/77 (03 Dec 2022 08:28) (93/62 - 117/77)  BP(mean): 80 (02 Dec 2022 13:38) (75 - 80)  RR: 16 (03 Dec 2022 08:28) (13 - 20)  SpO2: 93% (03 Dec 2022 08:28) (93% - 98%)    Parameters below as of 03 Dec 2022 08:28  Patient On (Oxygen Delivery Method): room air            REVIEW OF SYSTEMS:       VASCULAR:  per hpi       Physical Exam: pleasant 67 y o gentleman lying comfortably in semi Gallardo's position , awake , alert , no current complaints , wife is present at bedside    Head : normocephalic , atraumatic    Eyes : PERRLA , EOMI , no nystagmus , sclera anicteric    ENT : nasal discharge , uvula midline , no oropharyngeal erythema / exudate    Neck : supple , negative JVD , negative carotid bruits , no thyromegaly    Chest : CTA bilaterally , neg wheeze / rhonchi / rales / crackles / egophany    Cardiovascular: regular rate and rhythm , neg murmurs / rubs / gallops    Abdomen : soft , non distended , non tender to palpation in all 4 quadrants , negative rebound / guarding , normal bowel sounds    Extremities :   L BKA : stump well wrapped with ACE compression , -20 degrees to 85 degrees flexion , HILARIO drain with 20 cc serosanguinous fluid    Neurologic Exam:    Alert and oriented  x 4      Motor Exam:          Right UE:               5/5 :    5/5 : wrist extensors/ flexors   5/5 : biceps                  5/5 : triceps  5/5 : deltoid                                  Left UE:                 5/5 :    5/5 : wrist extensors/ flexors   5/5 : biceps                  5/5 : triceps  5/5 : deltoid          Right LE:                5/5 : quadriceps  5/5 : hamstrings  5/5 : hip flexors    Left LE:                  5/5 : dorsiflexors   5/5 : plantar flexors  5/5 : quadriceps  5/5 : hamstrings  5/5 : hip flexors        Sensation:         intact to light touch x 4 extremities                          Gait :  not tested        PM&R Impression :     1) s/p R BKA on 12/2/2022       Recommendations / Plan :     1) Physical / Occupational therapy focusing on therapeutic exercises , equipment evaluation , bed mobility/transfer out of bed evaluation , progressive ambulation with assistive devices prn , preprosthetic training/ stump care ,         Disposition plan recommendation : Patient is an excellent candidate for acute rehab placement .    1) Patient has an acute neurovascular diagnosis .    2) Patient is extremely motivated to participate in PT/OT which requires ongoing multiple therapy disciplines .    3) Patient is not at baseline functional level .    4) Patient requires an intensive inpatient acute rehabilitation therapy and can tolerate > 3 hrs of combined PT and OT per day and at least 5 days per week with a reasonable expectation that the patient will make measurable functional improvement that only an acute rehab program can provide .    5) Patient requires Physiatry supervision specializing in acute inpatient rehab care .     6) Has strong family support for post acute rehab stay .            
67 year old male with a PMHx of MTHFR gene mutation/lupus anticoagulant (on Eliquis), DVT, CAD (s/p MI in 1993) and PAD (s/p multiple vascular interventions) who presented for planned right BKA in setting of bypass thrombosis and ischemia of right foot.  Medicine to follow as co-management.      Today, patient with significant pain of right foot.  Currently an 8/10 in severity, described as "frozen solid" and "throbbing", improved with morphine.  Denies HA, CP, palpitations, SOB, abdominal pain, nausea, vomiting, fever, chills or diarrhea.  Denies any Chang, orthopnea or chest pain with exertion.     PMHx/PSHx: as per HPI     FHx: non-contributory     SHx: denies alcohol, tobacco or illicit drug use    Allergies: NKDA     Home Medications:    -ASA 81mg daily   -Eliquis 5mg BID   -Pregabalin 25mg TID    Objective:  Vital Signs:  Vital Signs Last 24 Hrs  T(C): 37.2 (01 Dec 2022 14:17), Max: 37.3 (01 Dec 2022 06:07)  T(F): 99 (01 Dec 2022 14:17), Max: 99.1 (01 Dec 2022 06:07)  HR: 71 (01 Dec 2022 13:00) (69 - 79)  BP: 126/83 (01 Dec 2022 13:00) (121/77 - 155/82)  BP(mean): --  RR: 19 (01 Dec 2022 13:00) (17 - 19)  SpO2: 94% (01 Dec 2022 13:00) (93% - 97%)    Parameters below as of 01 Dec 2022 13:00  Patient On (Oxygen Delivery Method): room air    Physical Exam:  -Gen: NAD, resting in bed, pleasant  -HEENT: EOMI, PERRL, no scleral icterus, no JVD, no bruits  -CV: normal S1 and S2, no murmurs appreciated   -Lungs: CTABL, normal respiratory effort on RA  -Ab: soft, NT, ND, normal BS  -Ext: right foot discolored and cool to the touch, no open wounds   -Neuro: no focal deficits    Labs:                        13.9   12.24 )-----------( 444      ( 01 Dec 2022 05:30 )             43.2   12-01    135  |  98  |  13  ----------------------------<  101<H>  3.8   |  25  |  0.72    Ca    9.9      01 Dec 2022 05:30  Phos  3.1     12-01  Mg     1.9     12-01    TPro  7.8  /  Alb  3.8  /  TBili  0.2  /  DBili  x   /  AST  19  /  ALT  23  /  AlkPhos  121<H>  11-30    Medications:  MEDICATIONS  (STANDING):  acetaminophen     Tablet .. 650 milliGRAM(s) Oral every 6 hours  aspirin enteric coated 81 milliGRAM(s) Oral every 24 hours  heparin  Infusion 1400 Unit(s)/Hr (14 mL/Hr) IV Continuous <Continuous>  influenza  Vaccine (HIGH DOSE) 0.7 milliLiter(s) IntraMuscular once  pregabalin 25 milliGRAM(s) Oral three times a day  sodium chloride 0.9%. 1000 milliLiter(s) (85 mL/Hr) IV Continuous <Continuous>    MEDICATIONS  (PRN):  morphine  - Injectable 2 milliGRAM(s) IV Push every 3 hours PRN Severe Pain (7 - 10)

## 2022-12-03 NOTE — OCCUPATIONAL THERAPY INITIAL EVALUATION ADULT - DIAGNOSIS, OT EVAL
Patient POD #1 s/p R BKA, presents NWB to RLE, deficits with RLE ROM/strength, balance, activity tolerance, postural control impacting independence with functional activities and mobility.

## 2022-12-03 NOTE — OCCUPATIONAL THERAPY INITIAL EVALUATION ADULT - MODIFIED CLINICAL TEST OF SENSORY INTEGRATION IN BALANCE TEST
Patient functionally side stepped with LLE to the R x 5, L x 3, forward x 2, backward x 2 with CGA and RW.

## 2022-12-03 NOTE — PROGRESS NOTE ADULT - SUBJECTIVE AND OBJECTIVE BOX
INTERVAL HPI/OVERNIGHT EVENTS: No acute events overnight Feeling well this morning. Pain controlled.     VITAL SIGNS:  T(F): 98.3 (12-03-22 @ 09:12)  HR: 74 (12-03-22 @ 08:28)  BP: 117/77 (12-03-22 @ 08:28)  RR: 16 (12-03-22 @ 08:28)  SpO2: 93% (12-03-22 @ 08:28)  Wt(kg): --    PHYSICAL EXAM:      Constitutional: NAD, well-groomed, well-developed  Respiratory: CTAB  Cardiovascular: S1 and S2, RRR, no M/G/R  Gastrointestinal: BS+, soft, NT/ND  Extremities: right bka, dressing c/d/i.   Vascular: 2+ peripheral pulses  Neurological: A/O x 3, no focal deficits        MEDICATIONS  (STANDING):  acetaminophen     Tablet .. 650 milliGRAM(s) Oral every 6 hours  apixaban 5 milliGRAM(s) Oral every 12 hours  ceFAZolin  Injectable. 2000 milliGRAM(s) IV Push every 8 hours  influenza  Vaccine (HIGH DOSE) 0.7 milliLiter(s) IntraMuscular once  melatonin 5 milliGRAM(s) Oral at bedtime  pregabalin 25 milliGRAM(s) Oral three times a day    MEDICATIONS  (PRN):  morphine  - Injectable 2 milliGRAM(s) IV Push every 3 hours PRN Severe Pain (7 - 10)      Allergies    No Known Allergies    Intolerances    Dilaudid (Headache; Nausea)      LABS:                        11.9   14.94 )-----------( 383      ( 03 Dec 2022 05:30 )             36.3     12-03    138  |  102  |  14  ----------------------------<  106<H>  3.9   |  27  |  0.79    Ca    9.2      03 Dec 2022 05:30  Phos  3.2     12-03  Mg     2.0     12-03      PT/INR - ( 02 Dec 2022 05:30 )   PT: 14.0 sec;   INR: 1.17          PTT - ( 03 Dec 2022 05:30 )  PTT:32.1 sec      RADIOLOGY & ADDITIONAL TESTS:

## 2022-12-03 NOTE — PHYSICAL THERAPY INITIAL EVALUATION ADULT - PERTINENT HX OF CURRENT PROBLEM, REHAB EVAL
67 year old male admitted to the vascular surgery service for planned BKA due to bypass thrombosis and ischemia of the R foot.

## 2022-12-03 NOTE — PHYSICAL THERAPY INITIAL EVALUATION ADULT - MODALITIES TREATMENT COMMENTS
QS x 2, long arc quad x 5, marching x 5, bilateral UE reaching x 5 forward, standing squat on left LE x 10

## 2022-12-04 LAB
ANION GAP SERPL CALC-SCNC: 11 MMOL/L — SIGNIFICANT CHANGE UP (ref 5–17)
BUN SERPL-MCNC: 14 MG/DL
BUN SERPL-MCNC: 14 MG/DL — SIGNIFICANT CHANGE UP (ref 7–23)
CALCIUM SERPL-MCNC: 9.7 MG/DL — SIGNIFICANT CHANGE UP (ref 8.4–10.5)
CHLORIDE SERPL-SCNC: 99 MEQ/L
CHLORIDE SERPL-SCNC: 99 MMOL/L — SIGNIFICANT CHANGE UP (ref 96–108)
CO2 SERPL-SCNC: 26 MEQ/L
CO2 SERPL-SCNC: 26 MMOL/L — SIGNIFICANT CHANGE UP (ref 22–31)
CREAT SERPL-MCNC: 0.67 MG/DL
CREAT SERPL-MCNC: 0.67 MG/DL — SIGNIFICANT CHANGE UP (ref 0.5–1.3)
EGFR: 103 ML/MIN/1.73M2 — SIGNIFICANT CHANGE UP
EXTERNAL HEMATOCRIT: 38 %
EXTERNAL HEMOGLOBIN: 12.5 G/DL
EXTERNAL WBC: 15.36 G/DL
GLUCOSE SERPL-MCNC: 120 MG/DL
GLUCOSE SERPL-MCNC: 120 MG/DL — HIGH (ref 70–99)
HCT VFR BLD CALC: 38 % — LOW (ref 39–50)
HGB BLD-MCNC: 12.5 G/DL — LOW (ref 13–17)
MAGNESIUM SERPL-MCNC: 1.9 MG/DL — SIGNIFICANT CHANGE UP (ref 1.6–2.6)
MCHC RBC-ENTMCNC: 29.8 PG — SIGNIFICANT CHANGE UP (ref 27–34)
MCHC RBC-ENTMCNC: 32.9 GM/DL — SIGNIFICANT CHANGE UP (ref 32–36)
MCV RBC AUTO: 90.5 FL — SIGNIFICANT CHANGE UP (ref 80–100)
NRBC # BLD: 0 /100 WBCS — SIGNIFICANT CHANGE UP (ref 0–0)
PHOSPHATE SERPL-MCNC: 2.8 MG/DL — SIGNIFICANT CHANGE UP (ref 2.5–4.5)
PLATELET # BLD AUTO: 396 K/UL — SIGNIFICANT CHANGE UP (ref 150–400)
POTASSIUM SERPL-MCNC: 4.2 MMOL/L — SIGNIFICANT CHANGE UP (ref 3.5–5.3)
POTASSIUM SERPL-SCNC: 4.2 MEQ/L
POTASSIUM SERPL-SCNC: 4.2 MMOL/L — SIGNIFICANT CHANGE UP (ref 3.5–5.3)
RBC # BLD: 4.2 M/UL — SIGNIFICANT CHANGE UP (ref 4.2–5.8)
RBC # FLD: 12.2 % — SIGNIFICANT CHANGE UP (ref 10.3–14.5)
SODIUM SERPL-SCNC: 136 MEQ/L
SODIUM SERPL-SCNC: 136 MMOL/L — SIGNIFICANT CHANGE UP (ref 135–145)
WBC # BLD: 15.36 K/UL — HIGH (ref 3.8–10.5)
WBC # FLD AUTO: 15.36 K/UL — HIGH (ref 3.8–10.5)

## 2022-12-04 PROCEDURE — 99232 SBSQ HOSP IP/OBS MODERATE 35: CPT

## 2022-12-04 RX ADMIN — Medication 650 MILLIGRAM(S): at 23:24

## 2022-12-04 RX ADMIN — Medication 650 MILLIGRAM(S): at 07:00

## 2022-12-04 RX ADMIN — Medication 650 MILLIGRAM(S): at 00:12

## 2022-12-04 RX ADMIN — Medication 650 MILLIGRAM(S): at 13:48

## 2022-12-04 RX ADMIN — Medication 2000 MILLIGRAM(S): at 02:27

## 2022-12-04 RX ADMIN — Medication 25 MILLIGRAM(S): at 14:36

## 2022-12-04 RX ADMIN — Medication 2000 MILLIGRAM(S): at 17:54

## 2022-12-04 RX ADMIN — MORPHINE SULFATE 2 MILLIGRAM(S): 50 CAPSULE, EXTENDED RELEASE ORAL at 19:55

## 2022-12-04 RX ADMIN — Medication 650 MILLIGRAM(S): at 18:48

## 2022-12-04 RX ADMIN — Medication 650 MILLIGRAM(S): at 12:48

## 2022-12-04 RX ADMIN — APIXABAN 5 MILLIGRAM(S): 2.5 TABLET, FILM COATED ORAL at 10:08

## 2022-12-04 RX ADMIN — Medication 650 MILLIGRAM(S): at 01:15

## 2022-12-04 RX ADMIN — APIXABAN 5 MILLIGRAM(S): 2.5 TABLET, FILM COATED ORAL at 22:07

## 2022-12-04 RX ADMIN — Medication 650 MILLIGRAM(S): at 06:03

## 2022-12-04 RX ADMIN — MORPHINE SULFATE 2 MILLIGRAM(S): 50 CAPSULE, EXTENDED RELEASE ORAL at 19:38

## 2022-12-04 RX ADMIN — Medication 25 MILLIGRAM(S): at 05:32

## 2022-12-04 RX ADMIN — Medication 650 MILLIGRAM(S): at 17:53

## 2022-12-04 RX ADMIN — Medication 5 MILLIGRAM(S): at 22:08

## 2022-12-04 RX ADMIN — Medication 2000 MILLIGRAM(S): at 10:08

## 2022-12-04 RX ADMIN — Medication 25 MILLIGRAM(S): at 22:07

## 2022-12-04 NOTE — PROGRESS NOTE ADULT - SUBJECTIVE AND OBJECTIVE BOX
INTERVAL HPI/OVERNIGHT EVENTS: STEPHANIE o/n. No new complaints.     VITAL SIGNS:  T(F): 98.8 (12-04-22 @ 10:33)  HR: 85 (12-04-22 @ 08:29)  BP: 121/84 (12-04-22 @ 08:29)  RR: 16 (12-04-22 @ 08:29)  SpO2: 96% (12-04-22 @ 08:29)  Wt(kg): --    PHYSICAL EXAM:      Constitutional: NAD, well-groomed, well-developed  Respiratory: CTAB  Cardiovascular: S1 and S2, RRR, no M/G/R  Gastrointestinal: BS+, soft, NT/ND  Extremities: right bka, dressing c/d/i.   Vascular: 2+ peripheral pulses  Neurological: A/O x 3, no focal deficits    MEDICATIONS  (STANDING):  acetaminophen     Tablet .. 650 milliGRAM(s) Oral every 6 hours  apixaban 5 milliGRAM(s) Oral every 12 hours  ceFAZolin  Injectable. 2000 milliGRAM(s) IV Push every 8 hours  influenza  Vaccine (HIGH DOSE) 0.7 milliLiter(s) IntraMuscular once  melatonin 5 milliGRAM(s) Oral at bedtime  pregabalin 25 milliGRAM(s) Oral three times a day    MEDICATIONS  (PRN):  morphine  - Injectable 2 milliGRAM(s) IV Push every 3 hours PRN Severe Pain (7 - 10)      Allergies    No Known Allergies    Intolerances    Dilaudid (Headache; Nausea)      LABS:                        12.5   15.36 )-----------( 396      ( 04 Dec 2022 05:30 )             38.0     12-04    136  |  99  |  14  ----------------------------<  120<H>  4.2   |  26  |  0.67    Ca    9.7      04 Dec 2022 05:30  Phos  2.8     12-04  Mg     1.9     12-04      PTT - ( 03 Dec 2022 05:30 )  PTT:32.1 sec      RADIOLOGY & ADDITIONAL TESTS:

## 2022-12-04 NOTE — PROGRESS NOTE ADULT - ASSESSMENT
--------------------------------------------------------------------------  PLEASE CHECK WHEN PRESENT:  [  ] Heart Failure  [  ] Acute  [  ] Acute on Chronic  [  ] Chronic  -------------------------------------------------------------------  [  ]Diastolic [HFpEF]  [  ]Systolic [HFrEF]  [  ]Combined [HFpEF & HFrEF]  [  ] afib  [  ] hypertensive heart disease  [  ]Other:  -------------------------------------------------------------------  [ ] Respiratory failure  [ ] Acute cor pulmonale  [ ] Asthma/COPD Exacerbation  [ ] Pleural effusion  [ ] Aspiration pneumonia  -------------------------------------------------------------------  [  ]ESPERANZA  [  ]ATN  [  ]Reneal Medullary Necrosis  [  ]Renal Cortical Necrosis  [  ]Other Pathological Lesions:    [  ]CKD 1  [  ]CKD 2  [  ]CKD 3  [  ]CKD 4  [  ]CKD 5  [  ]Other  -------------------------------------------------------------------  [  ] Diabetes  [  ] Diabetic PVD Ulcer  [  ] Neuropathic ulcer to DM  [  ] Diabetes with Nephropathy  [  ] Osteomyelitis due to diabetes  --------------------------------------------------------------------  [  ]Malnutrition: See Nutrition Note  [  ]Cachexia  [  ]Other:   [  ]Supplement Ordered:  [  ]Morbid Obesity (BMI >=40]  ---------------------------------------------------------------------  [ ] Sepsis/severe sepsis/septic shock  [ ] UTI  [ ] Pneumonia  -----------------------------------------------------------------------  [ ] Acidosis/alkalosis  [ ] Fluid overload  [ ] Hypokalemia  [ ] Hyperkalemia  [ ] Hypomagnesemia  [ ] Hypophosphatemia  [ ] Hyperphosphatemia  ------------------------------------------------------------------------  [ ] Acute blood loss anemia  [ ] Post op blood loss anemia  [ ] Iron deficiency anemia  [ ] Anemia due to chronic disease  [ ] Hypercoagulable state  ----------------------------------------------------------------------  [ ] Cerebral infarction  [ ] Transient ischemia attack  [ ] Encephalopathy     Assessment:  · Assessment	  Assessment  Mr. Mccall is a 67M with PMH of MTHFR gene mutation, CAD s/p MI (1993), and PAD s/p R fem to PT bypass with arm vein in 1993, revision in 2011, s/p R SFA to peroneal bypass graft s/p revision on 7/30/18, s/p tPA bolus and angioplasty in 2020. The patient is being admitted to the vascular surgery service for planned BKA due to bypass thrombosis and ischemia of the R foot. Now POD1 s/p R BKA (12/2)    Plan  #Vascular  - Thrombosed RLE SFA-peroneal bypass graft.s/p R BKA 12/2.  - Holding ASA   - Hb stable. Restart home Eliquis 5 BID  - Pain control with Morphine/Tylenol    Diet: Regular    ID: Perioperative Ancef    DVT ppx: Eliquis    Dispo: Pending PT/OT and PM&R recs     --------------------------------------------------------------------------  PLEASE CHECK WHEN PRESENT:  [  ] Heart Failure  [  ] Acute  [  ] Acute on Chronic  [  ] Chronic  -------------------------------------------------------------------  [  ]Diastolic [HFpEF]  [  ]Systolic [HFrEF]  [  ]Combined [HFpEF & HFrEF]  [  ] afib  [  ] hypertensive heart disease  [  ]Other:  -------------------------------------------------------------------  [ ] Respiratory failure  [ ] Acute cor pulmonale  [ ] Asthma/COPD Exacerbation  [ ] Pleural effusion  [ ] Aspiration pneumonia  -------------------------------------------------------------------  [  ]ESPERANZA  [  ]ATN  [  ]Reneal Medullary Necrosis  [  ]Renal Cortical Necrosis  [  ]Other Pathological Lesions:    [  ]CKD 1  [  ]CKD 2  [  ]CKD 3  [  ]CKD 4  [  ]CKD 5  [  ]Other  -------------------------------------------------------------------  [  ] Diabetes  [  ] Diabetic PVD Ulcer  [  ] Neuropathic ulcer to DM  [  ] Diabetes with Nephropathy  [  ] Osteomyelitis due to diabetes  --------------------------------------------------------------------  [  ]Malnutrition: See Nutrition Note  [  ]Cachexia  [  ]Other:   [  ]Supplement Ordered:  [  ]Morbid Obesity (BMI >=40]  ---------------------------------------------------------------------  [ ] Sepsis/severe sepsis/septic shock  [ ] UTI  [ ] Pneumonia  -----------------------------------------------------------------------  [ ] Acidosis/alkalosis  [ ] Fluid overload  [ ] Hypokalemia  [ ] Hyperkalemia  [ ] Hypomagnesemia  [ ] Hypophosphatemia  [ ] Hyperphosphatemia  ------------------------------------------------------------------------  [ ] Acute blood loss anemia  [ ] Post op blood loss anemia  [ ] Iron deficiency anemia  [ ] Anemia due to chronic disease  [ ] Hypercoagulable state  ----------------------------------------------------------------------  [ ] Cerebral infarction  [ ] Transient ischemia attack  [ ] Encephalopathy      Assessment  Mr. Mccall is a 67M with PMH of MTHFR gene mutation, CAD s/p MI (1993), and PAD s/p R fem to PT bypass with arm vein in 1993, revision in 2011, s/p R SFA to peroneal bypass graft s/p revision on 7/30/18, s/p tPA bolus and angioplasty in 2020. The patient is being admitted to the vascular surgery service for planned BKA due to bypass thrombosis and ischemia of the R foot. Now POD1 s/p R BKA (12/2)    Plan  #Vascular  - Thrombosed RLE SFA-peroneal bypass graft.s/p R BKA 12/2.  - Holding ASA   - Hb stable. Restart home Eliquis 5 BID  - Pain control with Morphine/Tylenol    Diet: Regular    ID: Perioperative Ancef    DVT ppx: Eliquis    Dispo: [facility]

## 2022-12-04 NOTE — PROGRESS NOTE ADULT - ASSESSMENT
67-year-old male with a PMHx of MTHFR gene mutation/lupus anticoagulant (on Eliquis), DVT, CAD (s/p MI in 1993) and PAD (s/p multiple vascular interventions) who presented for planned right BKA in setting of bypass thrombosis and ischemia of right foot.      #Severe PAD    -further management, pain control and wound care as per primary team     - s/p bka   -c/w apixaban   -not currently on antibiotics as low concern for infection       #MTHFR Gene Mutation    #Lupus Anticoagulant     -c/w apixaban     #CAD    -on apixaban per primary team, holding ASA    DVT PPx: apixaban     Dispo: acute rehab

## 2022-12-04 NOTE — PROGRESS NOTE ADULT - SUBJECTIVE AND OBJECTIVE BOX
ON: Good UOP. HILARIO output decreasing. VSS.         ON: Good UOP. HILARIO output decreasing. VSS.  Subjective: Patient seen and examined on am rounds. Patient reports frequently performing exercises for knee. No new complaints.    ROS:   Denies Headache, blurred vision, Chest Pain, SOB, Abdominal pain, nausea or vomiting     Social   ceFAZolin  Injectable. 2000  apixaban 5  ceFAZolin  Injectable. 2000      Allergies    No Known Allergies    Intolerances    Dilaudid (Headache; Nausea)      Vital Signs Last 24 Hrs  T(C): 37.2 (04 Dec 2022 06:33), Max: 37.2 (04 Dec 2022 06:33)  T(F): 99 (04 Dec 2022 06:33), Max: 99 (04 Dec 2022 06:33)  HR: 85 (04 Dec 2022 08:29) (73 - 95)  BP: 121/84 (04 Dec 2022 08:29) (121/84 - 164/78)  BP(mean): --  RR: 16 (04 Dec 2022 08:29) (16 - 18)  SpO2: 96% (04 Dec 2022 08:29) (92% - 96%)    Parameters below as of 04 Dec 2022 08:29  Patient On (Oxygen Delivery Method): room air      I&O's Summary    03 Dec 2022 07:01  -  04 Dec 2022 07:00  --------------------------------------------------------  IN: 750 mL / OUT: 2142.5 mL / NET: -1392.5 mL    04 Dec 2022 07:01  -  04 Dec 2022 10:14  --------------------------------------------------------  IN: 0 mL / OUT: 200 mL / NET: -200 mL        Physical Exam:  General: nad  Pulmonary: ra  Cardiovascular: rrr  Abdominal: s nt nd  Extremities: wwp. R BKA wound cdi with small lateral hematoma. HILARIO drain with small volume ss fluid, removed. Redressed with kirlex, ace.        LABS:                        12.5   15.36 )-----------( 396      ( 04 Dec 2022 05:30 )             38.0     12-04    136  |  99  |  14  ----------------------------<  120<H>  4.2   |  26  |  0.67    Ca    9.7      04 Dec 2022 05:30  Phos  2.8     12-04  Mg     1.9     12-04      PTT - ( 03 Dec 2022 05:30 )  PTT:32.1 sec

## 2022-12-05 ENCOUNTER — BMR PREADMISSION ASSESSMENT (OUTPATIENT)
Dept: ADMISSIONS | Facility: REHABILITATION | Age: 67
End: 2022-12-05
Payer: MEDICARE

## 2022-12-05 ENCOUNTER — TRANSCRIPTION ENCOUNTER (OUTPATIENT)
Age: 67
End: 2022-12-05

## 2022-12-05 VITALS
SYSTOLIC BLOOD PRESSURE: 121 MMHG | HEIGHT: 72 IN | DIASTOLIC BLOOD PRESSURE: 84 MMHG | HEART RATE: 85 BPM | BODY MASS INDEX: 27.09 KG/M2 | TEMPERATURE: 99 F | OXYGEN SATURATION: 96 % | WEIGHT: 200 LBS

## 2022-12-05 PROBLEM — S88.119A BELOW KNEE AMPUTATION (CMS/HCC): Status: ACTIVE | Noted: 2022-12-05

## 2022-12-05 LAB
ANION GAP SERPL CALC-SCNC: 10 MMOL/L — SIGNIFICANT CHANGE UP (ref 5–17)
BUN SERPL-MCNC: 14 MG/DL — SIGNIFICANT CHANGE UP (ref 7–23)
CALCIUM SERPL-MCNC: 9.8 MG/DL — SIGNIFICANT CHANGE UP (ref 8.4–10.5)
CHLORIDE SERPL-SCNC: 100 MMOL/L — SIGNIFICANT CHANGE UP (ref 96–108)
CO2 SERPL-SCNC: 25 MMOL/L — SIGNIFICANT CHANGE UP (ref 22–31)
CREAT SERPL-MCNC: 0.63 MG/DL — SIGNIFICANT CHANGE UP (ref 0.5–1.3)
EGFR: 105 ML/MIN/1.73M2 — SIGNIFICANT CHANGE UP
GLUCOSE SERPL-MCNC: 109 MG/DL — HIGH (ref 70–99)
HCT VFR BLD CALC: 37.7 % — LOW (ref 39–50)
HGB BLD-MCNC: 12.3 G/DL — LOW (ref 13–17)
MAGNESIUM SERPL-MCNC: 1.8 MG/DL — SIGNIFICANT CHANGE UP (ref 1.6–2.6)
MCHC RBC-ENTMCNC: 29.5 PG — SIGNIFICANT CHANGE UP (ref 27–34)
MCHC RBC-ENTMCNC: 32.6 GM/DL — SIGNIFICANT CHANGE UP (ref 32–36)
MCV RBC AUTO: 90.4 FL — SIGNIFICANT CHANGE UP (ref 80–100)
NRBC # BLD: 0 /100 WBCS — SIGNIFICANT CHANGE UP (ref 0–0)
PHOSPHATE SERPL-MCNC: 2.9 MG/DL — SIGNIFICANT CHANGE UP (ref 2.5–4.5)
PLATELET # BLD AUTO: 410 K/UL — HIGH (ref 150–400)
POTASSIUM SERPL-MCNC: 4.1 MMOL/L — SIGNIFICANT CHANGE UP (ref 3.5–5.3)
POTASSIUM SERPL-SCNC: 4.1 MMOL/L — SIGNIFICANT CHANGE UP (ref 3.5–5.3)
RBC # BLD: 4.17 M/UL — LOW (ref 4.2–5.8)
RBC # FLD: 12.4 % — SIGNIFICANT CHANGE UP (ref 10.3–14.5)
SARS-COV-2 RNA SPEC QL NAA+PROBE: SIGNIFICANT CHANGE UP
SODIUM SERPL-SCNC: 135 MMOL/L — SIGNIFICANT CHANGE UP (ref 135–145)
WBC # BLD: 14.92 K/UL — HIGH (ref 3.8–10.5)
WBC # FLD AUTO: 14.92 K/UL — HIGH (ref 3.8–10.5)

## 2022-12-05 PROCEDURE — 99232 SBSQ HOSP IP/OBS MODERATE 35: CPT

## 2022-12-05 RX ORDER — MAGNESIUM SULFATE 500 MG/ML
1 VIAL (ML) INJECTION ONCE
Refills: 0 | Status: COMPLETED | OUTPATIENT
Start: 2022-12-05 | End: 2022-12-05

## 2022-12-05 RX ORDER — ASCORBIC ACID 60 MG
500 TABLET,CHEWABLE ORAL DAILY
Refills: 0 | Status: DISCONTINUED | OUTPATIENT
Start: 2022-12-05 | End: 2022-12-06

## 2022-12-05 RX ORDER — SODIUM,POTASSIUM PHOSPHATES 278-250MG
1 POWDER IN PACKET (EA) ORAL ONCE
Refills: 0 | Status: COMPLETED | OUTPATIENT
Start: 2022-12-05 | End: 2022-12-05

## 2022-12-05 RX ORDER — ACETAMINOPHEN 500 MG
2 TABLET ORAL
Qty: 0 | Refills: 0 | DISCHARGE
Start: 2022-12-05

## 2022-12-05 RX ADMIN — Medication 650 MILLIGRAM(S): at 23:24

## 2022-12-05 RX ADMIN — Medication 25 MILLIGRAM(S): at 05:45

## 2022-12-05 RX ADMIN — Medication 650 MILLIGRAM(S): at 00:24

## 2022-12-05 RX ADMIN — Medication 650 MILLIGRAM(S): at 18:34

## 2022-12-05 RX ADMIN — Medication 2000 MILLIGRAM(S): at 10:26

## 2022-12-05 RX ADMIN — Medication 100 GRAM(S): at 10:26

## 2022-12-05 RX ADMIN — Medication 2000 MILLIGRAM(S): at 17:33

## 2022-12-05 RX ADMIN — Medication 25 MILLIGRAM(S): at 21:56

## 2022-12-05 RX ADMIN — Medication 650 MILLIGRAM(S): at 17:34

## 2022-12-05 RX ADMIN — Medication 2000 MILLIGRAM(S): at 01:44

## 2022-12-05 RX ADMIN — Medication 650 MILLIGRAM(S): at 12:29

## 2022-12-05 RX ADMIN — Medication 650 MILLIGRAM(S): at 13:29

## 2022-12-05 RX ADMIN — APIXABAN 5 MILLIGRAM(S): 2.5 TABLET, FILM COATED ORAL at 21:56

## 2022-12-05 RX ADMIN — Medication 1 PACKET(S): at 10:25

## 2022-12-05 RX ADMIN — Medication 25 MILLIGRAM(S): at 14:57

## 2022-12-05 RX ADMIN — Medication 650 MILLIGRAM(S): at 05:46

## 2022-12-05 RX ADMIN — Medication 5 MILLIGRAM(S): at 21:56

## 2022-12-05 RX ADMIN — APIXABAN 5 MILLIGRAM(S): 2.5 TABLET, FILM COATED ORAL at 10:25

## 2022-12-05 RX ADMIN — Medication 650 MILLIGRAM(S): at 06:30

## 2022-12-05 ASSESSMENT — COGNITIVE AND FUNCTIONAL STATUS - GENERAL: AFFECT: WNL

## 2022-12-05 NOTE — DISCHARGE NOTE PROVIDER - HOSPITAL COURSE
66yo M with PMH of MTHFR gene mutation, CAD s/p MI (1993), and PAD s/p R fem to PT bypass with arm vein in 1993, revision in 2011, s/p R SFA to peroneal bypass graft s/p revision on 7/30/18, s/p tPA bolus and angioplasty in 2020. The patient is being admitted to the vascular surgery service for planned BKA d/t bypass thrombosis. The patient at this time has cyanosis and is beginning to develop dejah gangrene of the foot. Patient reports that over the past 1-2 months, he has noticed gradually worsening pain in his right foot and "knew" that the graft was occluded. Patient reports a history of 15+ surgeries on the extremities and is aware that his revascularization options are limited. He reports severe 10/10 pain on the foot, relieved with elevation of the leg. He is still able to ambulate, albeit very limited by pain. He has currently been taking Eliquis and last dose was this morning.     During hospital course underwent R BKA w/HILARIO drain 12/2.  His postoperative course was unremarkable with advancement of diet and pain control.  HILARIO drain removed on 12/4.    On day of discharge patient was stable to be d/c'd [facility].

## 2022-12-05 NOTE — DISCHARGE NOTE PROVIDER - PROVIDER TOKENS
PROVIDER:[TOKEN:[[id number]],FOLLOWUP:[1 week]] PROVIDER:[TOKEN:[[id number]],FOLLOWUP:[1 month]]

## 2022-12-05 NOTE — HOSPITAL COURSE
67 yr old male with PMHx of MTHFR gene mutation, CAD s/p MI 1993, and PAD s/p R Fem to PT bypass with arm vein in 1993, revision in 2011, s/p R SFA to peroneal bypass graft s/p revision on 7/30/18, s/p tPA bolus and angioplasty in 2020.  The pt was admitted to Vascular Surgery service for a planned R BKA due to bypass thrombosis and ischemia of the R foot.   He is s/p R BKA on 12/2/22.  His hgb is stable and he has been restarted on his home Eliquis.   Tolerating a regular diet.  PM&R was consulted on 12/3 and recommends acute inpt rehab for intensive therapy as he can tolerate >3 hrs of combined PT/OT per day and at least 5 days/week with a reasonable expectation that the patient will make measurable functional improvement that only an acute rehab can provide.  Dispo plan is Son's home in [place], [place].    Meds:  Acetaminophen tablet 650 mg oral every 6 hrs  Apixaban 5mg oral every 12 hrs  Cefazoliin injectable 2000 mg IV push every 8 hrs  Melatonin 5mg oral at bedtime  Pregabalin 25 mg oral three times/day

## 2022-12-05 NOTE — DIETITIAN INITIAL EVALUATION ADULT - OTHER CALCULATIONS
6'1''  pounds +-10%  ABW s/p BKA: 173 pounds   ABW for EER; adjust for age, post op needs and malnutrition

## 2022-12-05 NOTE — DISCHARGE NOTE PROVIDER - NSDCCPCAREPLAN_GEN_ALL_CORE_FT
PRINCIPAL DISCHARGE DIAGNOSIS  Diagnosis: Right leg claudication  Assessment and Plan of Treatment:       SECONDARY DISCHARGE DIAGNOSES  Diagnosis: PAD (peripheral artery disease)  Assessment and Plan of Treatment:     Diagnosis: CAD (coronary artery disease)  Assessment and Plan of Treatment:     Diagnosis: MTHFR mutation  Assessment and Plan of Treatment:

## 2022-12-05 NOTE — PROGRESS NOTE ADULT - SUBJECTIVE AND OBJECTIVE BOX
Subjective:  No acute events overnight.  Patient is doing well today - still with 7/10 pain at BKA site.  Last bowel movement yesterday.  Denies HA, CP, SOB, abdominal pain, nausea, vomiting, fever, chills or diarrhea.     Objective:   Vital Signs:  T(C): 37.1 (05 Dec 2022 06:03), Max: 37.4 (04 Dec 2022 14:27)  T(F): 98.8 (05 Dec 2022 06:03), Max: 99.3 (04 Dec 2022 14:27)  HR: 78 (05 Dec 2022 08:31) (78 - 88)  BP: 114/76 (05 Dec 2022 08:31) (112/72 - 134/85)  BP(mean): 100 (05 Dec 2022 05:23) (100 - 103)  RR: 18 (05 Dec 2022 08:31) (17 - 18)  SpO2: 94% (05 Dec 2022 08:31) (93% - 97%)    Parameters below as of 05 Dec 2022 08:31  Patient On (Oxygen Delivery Method): room air    Physical Exam:  -Gen: NAD, resting in bed, pleasant  -HEENT: EOMI, PERRL, no scleral icterus, no JVD, no bruits  -CV: normal S1 and S2, no murmurs appreciated   -Lungs: CTABL, normal respiratory effort on RA  -Ab: soft, NT, ND, normal BS  -Ext: right BKA site wrapped  -Neuro: no focal deficits    Labs:                        12.3   14.92 )-----------( 410      ( 05 Dec 2022 05:30 )             37.7       12-05    135  |  100  |  14  ----------------------------<  109<H>  4.1   |  25  |  0.63    Ca    9.8      05 Dec 2022 05:30  Phos  2.9     12-05  Mg     1.8     12-05    Medications:  MEDICATIONS  (STANDING):  acetaminophen     Tablet .. 650 milliGRAM(s) Oral every 6 hours  apixaban 5 milliGRAM(s) Oral every 12 hours  ceFAZolin  Injectable. 2000 milliGRAM(s) IV Push every 8 hours  influenza  Vaccine (HIGH DOSE) 0.7 milliLiter(s) IntraMuscular once  melatonin 5 milliGRAM(s) Oral at bedtime  pregabalin 25 milliGRAM(s) Oral three times a day    MEDICATIONS  (PRN):  morphine  - Injectable 2 milliGRAM(s) IV Push every 3 hours PRN Severe Pain (7 - 10)

## 2022-12-05 NOTE — DIETITIAN INITIAL EVALUATION ADULT - ADD RECOMMEND
1. Monitor PO intake/appetite, GI distress, diet tolerance, labs, weights.  2. Honor pt food preferences as able.  3. MVI, Vit C 500mg/day.  4. RD to remain available for additional nutrition interventions as needed.  * High Nutrition Risk.

## 2022-12-05 NOTE — PROGRESS NOTE ADULT - SUBJECTIVE AND OBJECTIVE BOX
O/N:  ROSA BROWN                                        Assessment and Plan:   · Assessment	  --------------------------------------------------------------------------  PLEASE CHECK WHEN PRESENT:  [  ] Heart Failure  [  ] Acute  [  ] Acute on Chronic  [  ] Chronic  -------------------------------------------------------------------  [  ]Diastolic [HFpEF]  [  ]Systolic [HFrEF]  [  ]Combined [HFpEF & HFrEF]  [  ] afib  [  ] hypertensive heart disease  [  ]Other:  -------------------------------------------------------------------  [ ] Respiratory failure  [ ] Acute cor pulmonale  [ ] Asthma/COPD Exacerbation  [ ] Pleural effusion  [ ] Aspiration pneumonia  -------------------------------------------------------------------  [  ]ESPERANZA  [  ]ATN  [  ]Reneal Medullary Necrosis  [  ]Renal Cortical Necrosis  [  ]Other Pathological Lesions:    [  ]CKD 1  [  ]CKD 2  [  ]CKD 3  [  ]CKD 4  [  ]CKD 5  [  ]Other  -------------------------------------------------------------------  [  ] Diabetes  [  ] Diabetic PVD Ulcer  [  ] Neuropathic ulcer to DM  [  ] Diabetes with Nephropathy  [  ] Osteomyelitis due to diabetes  --------------------------------------------------------------------  [  ]Malnutrition: See Nutrition Note  [  ]Cachexia  [  ]Other:   [  ]Supplement Ordered:  [  ]Morbid Obesity (BMI >=40]  ---------------------------------------------------------------------  [ ] Sepsis/severe sepsis/septic shock  [ ] UTI  [ ] Pneumonia  -----------------------------------------------------------------------  [ ] Acidosis/alkalosis  [ ] Fluid overload  [ ] Hypokalemia  [ ] Hyperkalemia  [ ] Hypomagnesemia  [ ] Hypophosphatemia  [ ] Hyperphosphatemia  ------------------------------------------------------------------------  [ ] Acute blood loss anemia  [ ] Post op blood loss anemia  [ ] Iron deficiency anemia  [ ] Anemia due to chronic disease  [ ] Hypercoagulable state  ----------------------------------------------------------------------  [ ] Cerebral infarction  [ ] Transient ischemia attack  [ ] Encephalopathy      Assessment  Mr. Mccall is a 67M with PMH of MTHFR gene mutation, CAD s/p MI (1993), and PAD s/p R fem to PT bypass with arm vein in 1993, revision in 2011, s/p R SFA to peroneal bypass graft s/p revision on 7/30/18, s/p tPA bolus and angioplasty in 2020. The patient is being admitted to the vascular surgery service for planned BKA due to bypass thrombosis and ischemia of the R foot. Now POD1 s/p R BKA (12/2)    Plan  #Vascular  - Thrombosed RLE SFA-peroneal bypass graft.s/p R BKA 12/2.  - Holding ASA   - Hb stable. Restart home Eliquis 5 BID  - Pain control with Morphine/Tylenol    Diet: Regular    ID: Perioperative Ancef    DVT ppx: Eliquis    Dispo: AR   O/N:  STEPHANIE, VSS. HILARIO removed yesterday, started AR placement.       Subjective: Patient seen and examined on am rounds. Patient reports frequently performing exercises for knee. Complains of some mild incisional tenderness    ROS:   Denies Headache, blurred vision, Chest Pain, SOB, Abdominal pain, nausea or vomiting         Allergies    Intolerances    Dilaudid (Headache; Nausea)    MEDICATIONS  (STANDING):  acetaminophen     Tablet .. 650 milliGRAM(s) Oral every 6 hours  apixaban 5 milliGRAM(s) Oral every 12 hours  ceFAZolin  Injectable. 2000 milliGRAM(s) IV Push every 8 hours  influenza  Vaccine (HIGH DOSE) 0.7 milliLiter(s) IntraMuscular once  melatonin 5 milliGRAM(s) Oral at bedtime  pregabalin 25 milliGRAM(s) Oral three times a day    MEDICATIONS  (PRN):  morphine  - Injectable 2 milliGRAM(s) IV Push every 3 hours PRN Severe Pain (7 - 10)        Physical Exam:  General: nad  Pulmonary: ra  Cardiovascular: rrr  Abdominal: s nt nd  Extremities: wwp. R BKA wound cdi with small lateral hematoma. Redressed with kirlex, ace.        Labs:                         12.5   15.36 )-----------( 396      ( 04 Dec 2022 05:30 )             38.0     12-04    136  |  99  |  14  ----------------------------<  120<H>  4.2   |  26  |  0.67    Ca    9.7      04 Dec 2022 05:30  Phos  2.8     12-04  Mg     1.9     12-04      CAPILLARY BLOOD GLUCOSE              COVID-19 PCR: Negative (30 Nov 2022 14:51)          Vital Signs:   Vital Signs Last 24 Hrs  T(C): 37.1 (05 Dec 2022 06:03), Max: 37.4 (04 Dec 2022 14:27)  T(F): 98.8 (05 Dec 2022 06:03), Max: 99.3 (04 Dec 2022 14:27)  HR: 84 (04 Dec 2022 23:25) (80 - 88)  BP: 132/87 (04 Dec 2022 23:25) (112/72 - 134/85)  BP(mean): 103 (04 Dec 2022 23:25) (103 - 103)  RR: 18 (04 Dec 2022 23:25) (16 - 18)  SpO2: 95% (04 Dec 2022 23:25) (95% - 97%)    Parameters below as of 04 Dec 2022 23:25  Patient On (Oxygen Delivery Method): room air          Input/Output:   I&O's Detail    03 Dec 2022 07:01  -  04 Dec 2022 07:00  --------------------------------------------------------  IN:    Oral Fluid: 750 mL  Total IN: 750 mL    OUT:    Bulb (mL): 17.5 mL    Voided (mL): 2125 mL  Total OUT: 2142.5 mL    Total NET: -1392.5 mL      04 Dec 2022 07:01  -  05 Dec 2022 06:45  --------------------------------------------------------  IN:    Oral Fluid: 480 mL  Total IN: 480 mL    OUT:    Voided (mL): 1600 mL  Total OUT: 1600 mL    Total NET: -1120 mL            Assessment and Plan:   · Assessment	  --------------------------------------------------------------------------  PLEASE CHECK WHEN PRESENT:  [  ] Heart Failure  [  ] Acute  [  ] Acute on Chronic  [  ] Chronic  -------------------------------------------------------------------  [  ]Diastolic [HFpEF]  [  ]Systolic [HFrEF]  [  ]Combined [HFpEF & HFrEF]  [  ] afib  [  ] hypertensive heart disease  [  ]Other:  -------------------------------------------------------------------  [ ] Respiratory failure  [ ] Acute cor pulmonale  [ ] Asthma/COPD Exacerbation  [ ] Pleural effusion  [ ] Aspiration pneumonia  -------------------------------------------------------------------  [  ]ESPERANZA  [  ]ATN  [  ]Reneal Medullary Necrosis  [  ]Renal Cortical Necrosis  [  ]Other Pathological Lesions:    [  ]CKD 1  [  ]CKD 2  [  ]CKD 3  [  ]CKD 4  [  ]CKD 5  [  ]Other  -------------------------------------------------------------------  [  ] Diabetes  [  ] Diabetic PVD Ulcer  [  ] Neuropathic ulcer to DM  [  ] Diabetes with Nephropathy  [  ] Osteomyelitis due to diabetes  --------------------------------------------------------------------  [  ]Malnutrition: See Nutrition Note  [  ]Cachexia  [  ]Other:   [  ]Supplement Ordered:  [  ]Morbid Obesity (BMI >=40]  ---------------------------------------------------------------------  [ ] Sepsis/severe sepsis/septic shock  [ ] UTI  [ ] Pneumonia  -----------------------------------------------------------------------  [ ] Acidosis/alkalosis  [ ] Fluid overload  [ ] Hypokalemia  [ ] Hyperkalemia  [ ] Hypomagnesemia  [ ] Hypophosphatemia  [ ] Hyperphosphatemia  ------------------------------------------------------------------------  [ ] Acute blood loss anemia  [ ] Post op blood loss anemia  [ ] Iron deficiency anemia  [ ] Anemia due to chronic disease  [ ] Hypercoagulable state  ----------------------------------------------------------------------  [ ] Cerebral infarction  [ ] Transient ischemia attack  [ ] Encephalopathy      Assessment  Mr. Mccall is a 67M with PMH of MTHFR gene mutation, CAD s/p MI (1993), and PAD s/p R fem to PT bypass with arm vein in 1993, revision in 2011, s/p R SFA to peroneal bypass graft s/p revision on 7/30/18, s/p tPA bolus and angioplasty in 2020. The patient is being admitted to the vascular surgery service for planned BKA due to bypass thrombosis and ischemia of the R foot. Now POD1 s/p R BKA (12/2)    Plan  #Vascular  - Thrombosed RLE SFA-peroneal bypass graft.s/p R BKA 12/2.  - Holding ASA   - Hb stable. Restart home Eliquis 5 BID  - Pain control with Morphine/Tylenol    #Dispo  Will touch base with Dr. Jhaveri (PM&R) for dsc plan ( likely AR)    Diet: Regular    ID: Perioperative Ancef    DVT ppx: Eliquis

## 2022-12-05 NOTE — DIETITIAN INITIAL EVALUATION ADULT - OTHER INFO
67M with PMH of MTHFR gene mutation, CAD s/p MI (), and PAD s/p R fem to PT bypass with arm vein in , revision in , s/p R SFA to peroneal bypass graft s/p revision on 18, s/p tPA bolus and angioplasty in . The Pt is being admitted to the vascular surgery service for planned BKA d/t bypass thrombosis .     Pt seen this AM on 5UR. Wife at bedside. HILARIO removed yesterday. Order for regular diet, reports  PO at this time as well as PTA. PTA has 2 meals/day. Likes things such as grilled cheese/ham, PBJ sandwiches, soup, eggs, baccon. Does not take ONS. Assume meeting<75%. Reports wt loss however has occurred over several months.  pounds. Admit wt 175 pounds. Suggest 5 pound/2/7% body wt loss. Pt feels wt loss d/t being unable to exercise and loss of muscle. Assume wt loss also in setting of decreased PO. +NFPE, Please see Below. Malnutrition note placed today. NKFA. No issues chewing/swallowing. Denies N/V/D, reports constipation 2/2 pain meds and limited movement. BM+ per flow sheets. Jerrell 17. No edema or pressure ulcers; Sx site noted.   Please see below for nutritions recommendations. Recs made with team.

## 2022-12-05 NOTE — BMR PREADMISSION NOTE
[facility]  Preadmission Assessment    Patient Name:  Harry Alvarado  YOB: 1955    Referral Date:  12/05/22  Evaluation Date:  12/05/22  Referring Facility Admission Date: 11/30/22  Referring Facility: Other - [facility]patient [facility]  Referring Provider:  Fausto Burnette    Reason for Referral: Harry Alvarado is a 67 y.o. male whose primary indication for inpatient rehabilitation is Amputation.     Pertinent History of Current Functional Problem:  67 yr old male with PMHx of MTHFR gene mutation, CAD s/p MI 1993, and PAD s/p R Fem to PT bypass with arm vein in 1993, revision in 2011, s/p R SFA to peroneal bypass graft s/p revision on 7/30/18, s/p tPA bolus and angioplasty in 2020.  The pt was admitted to Vascular Surgery service for a planned R BKA due to bypass thrombosis and ischemia of the R foot.   He is s/p R BKA on 12/2/22.  His hgb is stable and he has been restarted on his home Eliquis.   Tolerating a regular diet.  PM&R was consulted on 12/3 and recommends acute inpt rehab for intensive therapy as he can tolerate >3 hrs of combined PT/OT per day and at least 5 days/week with a reasonable expectation that the patient will make measurable functional improvement that only an acute rehab can provide.  Dispo plan is Son's home in [place], [place].    Meds:  Acetaminophen tablet 650 mg oral every 6 hrs  Apixaban 5mg oral every 12 hrs  Cefazoliin injectable 2000 mg IV push every 8 hrs  Melatonin 5mg oral at bedtime  Pregabalin 25 mg oral three times/day      Current DVT Prophylaxis:  Risk for DVT is high.  Current DVT Prophylaxis: apixaban (Eliquis)  Current DVT Prophylaxis Dose/Frequency/Route: Eliquis 5mg q12 hrs    Active Medical Conditions:  Patient Active Problem List   Diagnosis   • Below knee amputation (CMS/HCC)       Active Medications:  No medication comments found.    HISTORY:    Past Medical History:   Diagnosis Date   • Deep vein thrombosis  (CMS/HCC)    • Lupus anticoagulant affecting pregnancy in second trimester, antepartum (CMS/HCC)    • Lyme disease    • Myocardial infarction (CMS/HCC)    • PAD (peripheral artery disease) (CMS/HCC)      Past Surgical History:   Procedure Laterality Date   • FEMORAL BYPASS Right      Tobacco Use as of 12/5/2022     Smoking Status Smoking Start Date Quit Date Smoking Frequency    Never Assessed -- --     Smokeless Status Smokeless Type Smokeless Quit Date    Unknown -- --    Source    --        Allergies  No Known Allergies    Premorbid Functional Status:   Ambulation: assistive equipment  Transferring: independent  Toileting: independent  Bathing: independent  Dressing: independent  Eating: independent  Communication: understands/communicates without difficulty  Swallowing: swallows foods/liquids without difficulty  Baseline Diet/Method of Nutritional Intake: regular; thin liquids  Past History of Dysphagia: Denies  Assistive Device/Animal Currently Used at Home: cane, straight  Prior Level of Function Comment: Amb with canfredi rice [facility] ADLs    Living Environment:  People in Home: spouse  Name(s) of People in Home: Dave Alvarado - spouse  Current Living Arrangements: home  Living Environment Comment: lives with wife in [place] and recently sold their 2nd home in [place].  Dispo is at son's home in [address] with bedroom and bathroom on the first floor. pt will have support of his wife, son and dtr in law who is a NP at the [employer].  Plan is to stay at son's home  until he is fully ready to return to his home in [place]  Number of Stairs, Main Entrance: 1  Number of Stairs, Within Home, Primary: 0    TEST RESULTS:  Chemistry (Up to last 3 results from the past 720 hours)      12/03 0000 12/04 0000    Sodium       138            136         Potassium       3.9            4.2         BUN       14            14         Creatinine       0.79            0.67         Glucose       106            120         CO2        27            26         Chloride       102            99           Hepatic (Up to last 3 results from the past 720 hours)    None      Metabolic (Up to last 3 results from the past 720 hours)    None      Hematologic (Up to last 3 results from the past 720 hours)      12/03 0000 12/04 0000    WBC       14.94            15.36         Hemoglobin       11.90            12.50         Hematocrit       36.30            38.00           Other (Up to last 3 results from the past 720 hours)    None        Diagnostics: CXR  CXR Study Details: CXR  CXR Date: 11/30/22  CXR Result: No acute pathology    ASSESSMENT:  Vitals:  Temp:  [37.2 °C (99 °F)] 37.2 °C (99 °F)  Heart Rate:  [85] 85  BP: (121)/(84) 121/84    Lines/Drains/Airways:  Lines, Drains & Airways: Incision, Peripheral IV    Risk for Clinical Complications:  Constipation: High  Falls: High  Infection: High    Precautions:  Existing Precautions/Restrictions: weight bearing  Right LE Weight-Bearing Status: non weight-bearing (NWB)    Current Diet:   Diet: thin liquids, regular solids    Current Functional Status:   Preadmission Current Function     Row Name 12/05/22 1300       Cognition/Psychosocial    Affect/Mental Status (Cognition) WNL    Orientation Status (Cognition) oriented x 4    Follows Commands (Cognition) WNL       Basic Activities of Daily Living (BADLs)    Basic Activities of Daily Living upper body dressing       Upper Body Dressing    Traverse touching/steadying assist       Bed Mobility    Traverse, Roll Left minimum assist (75% or more patient effort);nonverbal cues (demo/gesture);verbal cues    Traverse, Supine to Sit minimum assist (75% or more patient effort);verbal cues;nonverbal cues (demo/gesture)       Sit to Stand Transfer    Traverse, Sit to Stand Transfer minimum assist (75% or more patient effort);2 person assist;verbal cues;nonverbal cues (demo/gesture)    Assistive Device walker, front-wheeled       Stand to Sit Transfer     [place], Stand to Sit Transfer minimum assist (75% or more patient effort)    Verbal Cues hand placement    Assistive Device walker, front-wheeled       Gait Training    [place], Gait minimum assist (75% or more patient effort)    Assistive Device walker, front-wheeled    Distance in Feet 10 feet                Support System:  Designated Primary Caregiver: Dave Alvarado spouse [phone]      Patient/Family Goals:  Patient's Goals For Discharge: return home      Educational Background:      RECOMMENDATIONS / PLAN:  Special Needs:  Is an  Needed/Used?: N  DNR is current code status at referring facility?: No      Plan:  Identified Referral Needs: medical consultative services, psychiatry/psychology services, physical therapy, occupational therapy  OT Frequency: 5-7 times per week  OT Intensity: 1.5 hours  PT Frequency: 5-7 times per week  PT Intensity: 1.5 hours    Therapy Intensity: Requires, can tolerate and will benefit from 3 hours of therapy at least 5 days per week  Projected Length of Stay (days): 7 days  Patient is willing to participate in rehab program: yes    Impairments to be addressed: mobility, motor dysfunction, safety, self-care  Medical Necessity Admission Criteria: other active medical conditions (see comments), abnormal labs, uncontrolled pain, orthostasis/unstable blood pressure (R BKA)    Expected Level of Function at Discharge:  Expected Functional Improvement: mobility; motor dysfunction; safety; self-care  Self-Care: Partial/moderate assistance  Sphincter Control: Independent  Transfers: Supervision or touching assistance  Locomotion: Supervision or touching assistance  Communication: Independent  Social Cognition: Independent      Post-Discharge Needs:  Anticipated Discharge Disposition: family member's home with services  Type of Home Care Services: home PT, home OT, nursing  Patient/Family Concerns Related to Expected Discharge Disposition: dispo plan per chart is  son's home in [place]

## 2022-12-05 NOTE — DISCHARGE NOTE PROVIDER - NSDCFUADDINST_GEN_ALL_CORE_FT
FOLLOW UP: Dr. Herrera in 1 week. Your appointment has been made for _______. Call the office at  with any questions.  WOUND CARE: You may shower; soap and water over incision sites. Do not scrub. Pat dry when done.   Right below knee amputation incision- cleanse with peroxide, then place xeroform, wrap with dry kerlix and secure with ACE wrap daily.  ACTIVITY: Ambulate as tolerated, but no heavy lifting (>10lbs) or strenuous exercise.  Call the office if you experience increasing pain, redness, swelling or drainage from incision sites/wounds, or temperature >101.4F.   NEW MEDICATIONS: STOP aspirin 81mg daily**  ADDITIONAL FOLLOW UP AFTER DISCHARGE: follow up with your primary care physician per routine  DISCHARGE DESTINATION: AR FOLLOW UP: Dr. Herrera in 1 month. Your appointment has been made for 1/4/22 at 11:15AM. Call the office at  with any questions.  WOUND CARE: You may shower; soap and water over incision sites. Do not scrub. Pat dry when done.   Right below knee amputation incision- cleanse with peroxide, then place xeroform, wrap with dry kerlix and secure with ACE wrap daily.  ACTIVITY: Ambulate as tolerated, but no heavy lifting (>10lbs) or strenuous exercise.  Call the office if you experience increasing pain, redness, swelling or drainage from incision sites/wounds, or temperature >101.4F.   NEW MEDICATIONS: STOP aspirin 81mg daily**  ADDITIONAL FOLLOW UP AFTER DISCHARGE: follow up with your primary care physician per routine  DISCHARGE DESTINATION: AR

## 2022-12-05 NOTE — PROGRESS NOTE ADULT - ASSESSMENT
67-year-old male with a PMHx of MTHFR gene mutation/lupus anticoagulant (on Eliquis), DVT, CAD (s/p MI in 1993) and PAD (s/p multiple vascular interventions) who presented for planned right BKA in setting of bypass thrombosis and ischemia of right foot.       #Severe PAD     -further management, pain control and wound care as per primary team      -s/p right BKA on 12/2    -resume ASA 81mg daily when safe from surgical perspective    -continue with Eliquis 5mg BID    -recommend discontinuation of darion-operative Ancef, no clinical signs of infection prior to OR and wound looks clean as per primary team     #MTHFR Gene Mutation     #Lupus Anticoagulant      -continue with Eliquis 5mg BID     #CAD     -continue with Eliquis 5mg BID   -resume ASA 81mg daily when safe from surgical perspective     DVT PPx: Eliquis     Dispo: acute rehab

## 2022-12-05 NOTE — DISCHARGE NOTE PROVIDER - DETAILS OF MALNUTRITION DIAGNOSIS/DIAGNOSES
This patient has been assessed with a concern for Malnutrition and was treated during this hospitalization for the following Nutrition diagnosis/diagnoses:     -  12/05/2022: Severe protein-calorie malnutrition

## 2022-12-05 NOTE — DIETITIAN INITIAL EVALUATION ADULT - PERTINENT MEDS FT
MEDICATIONS  (STANDING):  acetaminophen     Tablet .. 650 milliGRAM(s) Oral every 6 hours  apixaban 5 milliGRAM(s) Oral every 12 hours  ceFAZolin  Injectable. 2000 milliGRAM(s) IV Push every 8 hours  influenza  Vaccine (HIGH DOSE) 0.7 milliLiter(s) IntraMuscular once  melatonin 5 milliGRAM(s) Oral at bedtime  pregabalin 25 milliGRAM(s) Oral three times a day    MEDICATIONS  (PRN):  morphine  - Injectable 2 milliGRAM(s) IV Push every 3 hours PRN Severe Pain (7 - 10)

## 2022-12-05 NOTE — DIETITIAN INITIAL EVALUATION ADULT - WEIGHT (KG)
How Severe Is Your Skin Lesion?: mild Has Your Skin Lesion Been Treated?: not been treated Is This A New Presentation, Or A Follow-Up?: Skin Lesion 78.4

## 2022-12-05 NOTE — DISCHARGE NOTE PROVIDER - NSDCMRMEDTOKEN_GEN_ALL_CORE_FT
acetaminophen 325 mg oral tablet: 2 tab(s) orally every 6 hours  Eliquis 5 mg oral tablet: 1 tab(s) orally 2 times a day  pregabalin 25 mg oral capsule: 1 cap(s) orally 3 times a day

## 2022-12-05 NOTE — DIETITIAN INITIAL EVALUATION ADULT - PERTINENT LABORATORY DATA
12-05    135  |  100  |  14  ----------------------------<  109<H>  4.1   |  25  |  0.63    Ca    9.8      05 Dec 2022 05:30  Phos  2.9     12-05  Mg     1.8     12-05

## 2022-12-05 NOTE — DISCHARGE NOTE PROVIDER - CARE PROVIDER_API CALL
Seamus Herrera)  Vascular Surgery  [address] [place] [address]  Phone: [phone]  Fax: [phone]  Follow Up Time: 1 week   Seamus Herrera)  Vascular Surgery  [address]  [place], [place] [address]  Phone: [phone]  Fax: [phone]  Follow Up Time: 1 month

## 2022-12-06 ENCOUNTER — HOSPITAL ENCOUNTER (INPATIENT)
Facility: REHABILITATION | Age: 67
LOS: 12 days | Discharge: HOME HEALTH | DRG: 560 | End: 2022-12-18
Attending: PHYSICAL MEDICINE & REHABILITATION | Admitting: PHYSICAL MEDICINE & REHABILITATION
Payer: MEDICARE

## 2022-12-06 ENCOUNTER — TRANSCRIPTION ENCOUNTER (OUTPATIENT)
Age: 67
End: 2022-12-06

## 2022-12-06 VITALS — TEMPERATURE: 98 F

## 2022-12-06 DIAGNOSIS — F43.22 ADJUSTMENT DISORDER WITH ANXIETY: ICD-10-CM

## 2022-12-06 PROBLEM — Z89.511 STATUS POST BELOW KNEE AMPUTATION OF RIGHT LOWER EXTREMITY (CMS/HCC): Status: ACTIVE | Noted: 2022-12-05

## 2022-12-06 PROCEDURE — 85730 THROMBOPLASTIN TIME PARTIAL: CPT

## 2022-12-06 PROCEDURE — U0003: CPT

## 2022-12-06 PROCEDURE — 86850 RBC ANTIBODY SCREEN: CPT

## 2022-12-06 PROCEDURE — 87635 SARS-COV-2 COVID-19 AMP PRB: CPT

## 2022-12-06 PROCEDURE — 85610 PROTHROMBIN TIME: CPT

## 2022-12-06 PROCEDURE — 86901 BLOOD TYPING SEROLOGIC RH(D): CPT

## 2022-12-06 PROCEDURE — 87086 URINE CULTURE/COLONY COUNT: CPT

## 2022-12-06 PROCEDURE — 99285 EMERGENCY DEPT VISIT HI MDM: CPT

## 2022-12-06 PROCEDURE — 86803 HEPATITIS C AB TEST: CPT

## 2022-12-06 PROCEDURE — 85025 COMPLETE CBC W/AUTO DIFF WBC: CPT

## 2022-12-06 PROCEDURE — 80053 COMPREHEN METABOLIC PANEL: CPT

## 2022-12-06 PROCEDURE — U0005: CPT

## 2022-12-06 PROCEDURE — 97161 PT EVAL LOW COMPLEX 20 MIN: CPT

## 2022-12-06 PROCEDURE — 80048 BASIC METABOLIC PNL TOTAL CA: CPT

## 2022-12-06 PROCEDURE — 83735 ASSAY OF MAGNESIUM: CPT

## 2022-12-06 PROCEDURE — 36415 COLL VENOUS BLD VENIPUNCTURE: CPT

## 2022-12-06 PROCEDURE — 84100 ASSAY OF PHOSPHORUS: CPT

## 2022-12-06 PROCEDURE — 88307 TISSUE EXAM BY PATHOLOGIST: CPT

## 2022-12-06 PROCEDURE — 71045 X-RAY EXAM CHEST 1 VIEW: CPT

## 2022-12-06 PROCEDURE — 63700000 HC SELF-ADMINISTRABLE DRUG: Performed by: HOSPITALIST

## 2022-12-06 PROCEDURE — 85027 COMPLETE CBC AUTOMATED: CPT

## 2022-12-06 PROCEDURE — 81001 URINALYSIS AUTO W/SCOPE: CPT

## 2022-12-06 PROCEDURE — C1889: CPT

## 2022-12-06 PROCEDURE — 12800000 HC ROOM AND CARE SEMIPRIVATE REHAB

## 2022-12-06 PROCEDURE — 86900 BLOOD TYPING SEROLOGIC ABO: CPT

## 2022-12-06 PROCEDURE — 99232 SBSQ HOSP IP/OBS MODERATE 35: CPT

## 2022-12-06 RX ORDER — PANTOPRAZOLE SODIUM 40 MG/1
40 TABLET, DELAYED RELEASE ORAL
Status: DISCONTINUED | OUTPATIENT
Start: 2022-12-07 | End: 2022-12-18 | Stop reason: HOSPADM

## 2022-12-06 RX ORDER — ACETAMINOPHEN 325 MG/1
650 TABLET ORAL EVERY 6 HOURS
Status: DISCONTINUED | OUTPATIENT
Start: 2022-12-06 | End: 2022-12-07

## 2022-12-06 RX ORDER — ZOLPIDEM TARTRATE 10 MG/1
10 TABLET ORAL NIGHTLY PRN
Status: DISCONTINUED | OUTPATIENT
Start: 2022-12-06 | End: 2022-12-18 | Stop reason: HOSPADM

## 2022-12-06 RX ORDER — POLYETHYLENE GLYCOL 3350 17 G/17G
17 POWDER, FOR SOLUTION ORAL DAILY PRN
Status: DISCONTINUED | OUTPATIENT
Start: 2022-12-06 | End: 2022-12-07

## 2022-12-06 RX ORDER — ALUMINUM HYDROXIDE, MAGNESIUM HYDROXIDE, AND SIMETHICONE 1200; 120; 1200 MG/30ML; MG/30ML; MG/30ML
30 SUSPENSION ORAL EVERY 6 HOURS PRN
Status: DISCONTINUED | OUTPATIENT
Start: 2022-12-06 | End: 2022-12-18 | Stop reason: HOSPADM

## 2022-12-06 RX ORDER — BISACODYL 10 MG/1
10 SUPPOSITORY RECTAL DAILY PRN
Status: DISCONTINUED | OUTPATIENT
Start: 2022-12-06 | End: 2022-12-18 | Stop reason: HOSPADM

## 2022-12-06 RX ORDER — ZOLPIDEM TARTRATE 10 MG/1
10 TABLET ORAL NIGHTLY PRN
Status: ON HOLD | COMMUNITY
End: 2022-12-16 | Stop reason: SDUPTHER

## 2022-12-06 RX ORDER — PREGABALIN 25 MG/1
25 CAPSULE ORAL 3 TIMES DAILY
Status: DISCONTINUED | OUTPATIENT
Start: 2022-12-06 | End: 2022-12-12

## 2022-12-06 RX ORDER — AMOXICILLIN 250 MG
2 CAPSULE ORAL DAILY PRN
Status: DISCONTINUED | OUTPATIENT
Start: 2022-12-06 | End: 2022-12-18 | Stop reason: HOSPADM

## 2022-12-06 RX ORDER — PREGABALIN 75 MG/1
75 CAPSULE ORAL 2 TIMES DAILY
COMMUNITY
End: 2022-12-18 | Stop reason: HOSPADM

## 2022-12-06 RX ORDER — NAPROXEN SODIUM 220 MG/1
81 TABLET, FILM COATED ORAL DAILY
COMMUNITY
End: 2022-12-18 | Stop reason: HOSPADM

## 2022-12-06 RX ADMIN — Medication 2000 MILLIGRAM(S): at 02:15

## 2022-12-06 RX ADMIN — Medication 2000 MILLIGRAM(S): at 10:15

## 2022-12-06 RX ADMIN — APIXABAN 5 MILLIGRAM(S): 2.5 TABLET, FILM COATED ORAL at 10:14

## 2022-12-06 RX ADMIN — Medication 650 MILLIGRAM(S): at 06:30

## 2022-12-06 RX ADMIN — APIXABAN 5 MG: 5 TABLET, FILM COATED ORAL at 20:43

## 2022-12-06 RX ADMIN — PREGABALIN 25 MG: 25 CAPSULE ORAL at 21:36

## 2022-12-06 RX ADMIN — Medication 650 MILLIGRAM(S): at 00:30

## 2022-12-06 RX ADMIN — Medication 25 MILLIGRAM(S): at 05:33

## 2022-12-06 RX ADMIN — Medication 650 MILLIGRAM(S): at 05:32

## 2022-12-06 RX ADMIN — PREGABALIN 25 MG: 25 CAPSULE ORAL at 18:21

## 2022-12-06 RX ADMIN — ACETAMINOPHEN 650 MG: 325 TABLET ORAL at 18:20

## 2022-12-06 ASSESSMENT — PATIENT HEALTH QUESTIONNAIRE - PHQ9: SUM OF ALL RESPONSES TO PHQ9 QUESTIONS 1 & 2: 0

## 2022-12-06 NOTE — PROGRESS NOTE ADULT - REASON FOR ADMISSION
PASTORA MICHELLEA

## 2022-12-06 NOTE — PROGRESS NOTE ADULT - ASSESSMENT
67-year-old male with a PMHx of MTHFR gene mutation/lupus anticoagulant (on Eliquis), DVT, CAD (s/p MI in 1993) and PAD (s/p multiple vascular interventions) who presented for planned right BKA in setting of bypass thrombosis and ischemia of right foot.        #Severe PAD      -further management, pain control and wound care as per primary team       -s/p right BKA on 12/2    -continue with Eliquis 5mg BID     -not on statin therapy as an outpatient, will defer initiation to vascular service   -recommend discontinuation of darion-operative Ancef, no clinical signs of infection prior to OR and wound looks clean as per primary team      #MTHFR Gene Mutation      #Lupus Anticoagulant       -continue with Eliquis 5mg BID      #CAD      -continue with Eliquis 5mg BID    -resume ASA 81mg daily when safe from surgical perspective (secondary prevention in setting of remote history of MI)    DVT PPx: Eliquis      Dispo: acute rehab

## 2022-12-06 NOTE — PROGRESS NOTE ADULT - SUBJECTIVE AND OBJECTIVE BOX
Subjective:  No acute events overnight.  Patient is doing well today without new complaints. Pain is well controlled. Denies HA, CP, SOB, abdominal pain, nausea, vomiting, fever, chills or diarrhea.     Objective:   Vital Signs:  T(C): 36.8 (06 Dec 2022 09:02), Max: 37.3 (05 Dec 2022 22:09)  T(F): 98.2 (06 Dec 2022 09:02), Max: 99.1 (05 Dec 2022 22:09)  HR: 80 (06 Dec 2022 09:00) (80 - 96)  BP: 121/72 (06 Dec 2022 09:00) (112/73 - 139/73)  BP(mean): 96 (06 Dec 2022 05:09) (80 - 99)  RR: 18 (06 Dec 2022 09:00) (18 - 18)  SpO2: 97% (06 Dec 2022 09:00) (94% - 97%)    Parameters below as of 06 Dec 2022 09:00  Patient On (Oxygen Delivery Method): room air    Physical Exam:  -Gen: NAD, resting in bed, pleasant  -HEENT: EOMI, PERRL, no scleral icterus, no JVD, no bruits  -CV: normal S1 and S2, no murmurs appreciated   -Lungs: CTABL, normal respiratory effort on RA  -Ab: soft, NT, ND, normal BS  -Ext: right BKA site wrapped  -Neuro: no focal deficits    Labs:                        12.3   14.92 )-----------( 410      ( 05 Dec 2022 05:30 )             37.7       12-05    135  |  100  |  14  ----------------------------<  109<H>  4.1   |  25  |  0.63    Ca    9.8      05 Dec 2022 05:30  Phos  2.9     12-05  Mg     1.8     12-05    Medications:  MEDICATIONS  (STANDING):  acetaminophen     Tablet .. 650 milliGRAM(s) Oral every 6 hours  apixaban 5 milliGRAM(s) Oral every 12 hours  ascorbic acid 500 milliGRAM(s) Oral daily  ceFAZolin  Injectable. 2000 milliGRAM(s) IV Push every 8 hours  influenza  Vaccine (HIGH DOSE) 0.7 milliLiter(s) IntraMuscular once  melatonin 5 milliGRAM(s) Oral at bedtime  multivitamin 1 Tablet(s) Oral daily  pregabalin 25 milliGRAM(s) Oral three times a day    MEDICATIONS  (PRN):

## 2022-12-06 NOTE — PROGRESS NOTE ADULT - SUBJECTIVE AND OBJECTIVE BOX
O/N: STEPHANIE< VSS               --------------------------------------------------------------------------  PLEASE CHECK WHEN PRESENT:  [  ] Heart Failure  [  ] Acute  [  ] Acute on Chronic  [  ] Chronic  -------------------------------------------------------------------  [  ]Diastolic [HFpEF]  [  ]Systolic [HFrEF]  [  ]Combined [HFpEF & HFrEF]  [  ] afib  [  ] hypertensive heart disease  [  ]Other:  -------------------------------------------------------------------  [ ] Respiratory failure  [ ] Acute cor pulmonale  [ ] Asthma/COPD Exacerbation  [ ] Pleural effusion  [ ] Aspiration pneumonia  -------------------------------------------------------------------  [  ]ESPERANZA  [  ]ATN  [  ]Reneal Medullary Necrosis  [  ]Renal Cortical Necrosis  [  ]Other Pathological Lesions:    [  ]CKD 1  [  ]CKD 2  [  ]CKD 3  [  ]CKD 4  [  ]CKD 5  [  ]Other  -------------------------------------------------------------------  [  ] Diabetes  [  ] Diabetic PVD Ulcer  [  ] Neuropathic ulcer to DM  [  ] Diabetes with Nephropathy  [  ] Osteomyelitis due to diabetes  --------------------------------------------------------------------  [  ]Malnutrition: See Nutrition Note  [  ]Cachexia  [  ]Other:   [  ]Supplement Ordered:  [  ]Morbid Obesity (BMI >=40]  ---------------------------------------------------------------------  [ ] Sepsis/severe sepsis/septic shock  [ ] UTI  [ ] Pneumonia  -----------------------------------------------------------------------  [ ] Acidosis/alkalosis  [ ] Fluid overload  [ ] Hypokalemia  [ ] Hyperkalemia  [ ] Hypomagnesemia  [ ] Hypophosphatemia  [ ] Hyperphosphatemia  ------------------------------------------------------------------------  [ ] Acute blood loss anemia  [ ] Post op blood loss anemia  [ ] Iron deficiency anemia  [ ] Anemia due to chronic disease  [ ] Hypercoagulable state  ----------------------------------------------------------------------  [ ] Cerebral infarction  [ ] Transient ischemia attack  [ ] Encephalopathy      Assessment  Mr. Mccall is a 67M with PMH of MTHFR gene mutation, CAD s/p MI (1993), and PAD s/p R fem to PT bypass with arm vein in 1993, revision in 2011, s/p R SFA to peroneal bypass graft s/p revision on 7/30/18, s/p tPA bolus and angioplasty in 2020. The patient is being admitted to the vascular surgery service for planned BKA due to bypass thrombosis and ischemia of the R foot. Now POD1 s/p R BKA (12/2)    Plan  #Vascular  - Thrombosed RLE SFA-peroneal bypass graft.s/p R BKA 12/2.  - Holding ASA   - Hb stable. Restart home Eliquis 5 BID  - Pain control with Morphine/Tylenol    #Dispo  Will touch base with Dr. Jhaveri (PM&R) for dsc plan ( likely [facility])    Diet: Regular    ID: Perioperative Ancef    DVT ppx: Eliquis     O/N: STEPHANIE< VSS. Complains minimal incisional tenderness at BKA site, lateral>medial.       ROS:   Denies Headache, blurred vision, Chest Pain, SOB, Abdominal pain, nausea or vomiting      Vital Signs Last 24 Hrs  T(C): 36.9 (06 Dec 2022 06:40), Max: 37.3 (05 Dec 2022 22:09)  T(F): 98.4 (06 Dec 2022 06:40), Max: 99.1 (05 Dec 2022 22:09)  HR: 80 (06 Dec 2022 05:09) (78 - 96)  BP: 120/80 (06 Dec 2022 05:09) (112/73 - 139/73)  BP(mean): 96 (06 Dec 2022 05:09) (80 - 99)  ABP: --  ABP(mean): --  RR: 18 (06 Dec 2022 05:09) (18 - 18)  SpO2: 95% (06 Dec 2022 05:09) (94% - 97%)    O2 Parameters below as of 06 Dec 2022 05:09  Patient On (Oxygen Delivery Method): room air        MEDICATIONS  (STANDING):  acetaminophen     Tablet .. 650 milliGRAM(s) Oral every 6 hours  apixaban 5 milliGRAM(s) Oral every 12 hours  ascorbic acid 500 milliGRAM(s) Oral daily  ceFAZolin  Injectable. 2000 milliGRAM(s) IV Push every 8 hours  influenza  Vaccine (HIGH DOSE) 0.7 milliLiter(s) IntraMuscular once  melatonin 5 milliGRAM(s) Oral at bedtime  multivitamin 1 Tablet(s) Oral daily  pregabalin 25 milliGRAM(s) Oral three times a day    MEDICATIONS  (PRN):      Physical Exam:  General: nad  Pulmonary: ra  Cardiovascular: rrr  Abdominal: s nt nd  Extremities: wwp. R BKA wound cdi with small lateral hematoma. Redressed with kirlex, ace.  --------------------------------------------------------------------------  PLEASE CHECK WHEN PRESENT:  [  ] Heart Failure  [  ] Acute  [  ] Acute on Chronic  [  ] Chronic  -------------------------------------------------------------------  [  ]Diastolic [HFpEF]  [  ]Systolic [HFrEF]  [  ]Combined [HFpEF & HFrEF]  [  ] afib  [  ] hypertensive heart disease  [  ]Other:  -------------------------------------------------------------------  [ ] Respiratory failure  [ ] Acute cor pulmonale  [ ] Asthma/COPD Exacerbation  [ ] Pleural effusion  [ ] Aspiration pneumonia  -------------------------------------------------------------------  [  ]ESPERANZA  [  ]ATN  [  ]Reneal Medullary Necrosis  [  ]Renal Cortical Necrosis  [  ]Other Pathological Lesions:    [  ]CKD 1  [  ]CKD 2  [  ]CKD 3  [  ]CKD 4  [  ]CKD 5  [  ]Other  -------------------------------------------------------------------  [  ] Diabetes  [  ] Diabetic PVD Ulcer  [  ] Neuropathic ulcer to DM  [  ] Diabetes with Nephropathy  [  ] Osteomyelitis due to diabetes  --------------------------------------------------------------------  [  ]Malnutrition: See Nutrition Note  [  ]Cachexia  [  ]Other:   [  ]Supplement Ordered:  [  ]Morbid Obesity (BMI >=40]  ---------------------------------------------------------------------  [ ] Sepsis/severe sepsis/septic shock  [ ] UTI  [ ] Pneumonia  -----------------------------------------------------------------------  [ ] Acidosis/alkalosis  [ ] Fluid overload  [ ] Hypokalemia  [ ] Hyperkalemia  [ ] Hypomagnesemia  [ ] Hypophosphatemia  [ ] Hyperphosphatemia  ------------------------------------------------------------------------  [ ] Acute blood loss anemia  [ ] Post op blood loss anemia  [ ] Iron deficiency anemia  [ ] Anemia due to chronic disease  [ ] Hypercoagulable state  ----------------------------------------------------------------------  [ ] Cerebral infarction  [ ] Transient ischemia attack  [ ] Encephalopathy      Assessment  Mr. Mccall is a 67M with PMH of MTHFR gene mutation, CAD s/p MI (1993), and PAD s/p R fem to PT bypass with arm vein in 1993, revision in 2011, s/p R SFA to peroneal bypass graft s/p revision on 7/30/18, s/p tPA bolus and angioplasty in 2020. The patient is being admitted to the vascular surgery service for planned BKA due to bypass thrombosis and ischemia of the R foot. Now POD4 s/p R BKA (12/2)    Plan  #Vascular  - Thrombosed RLE SFA-peroneal bypass graft.s/p R BKA 12/2.  - Holding ASA, no longer will need with dsc  - Hb stable. Restart home Eliquis 5 BID  - Pain control withTylenol/lyrica     #Dispo  [facility] to [facility] at 10AM today, transport by wife    Diet: Regular, multivitamin, vitamin C, ensure pudding    ID: Perioperative Ancef d/cd, cefazol q8 IV    DVT ppx: Eliquis

## 2022-12-06 NOTE — PROGRESS NOTE ADULT - ASSESSMENT
per Vascular Surgery    67 y o M with PMH of MTHFR gene mutation, CAD s/p MI (1993), and PAD s/p R fem to PT bypass with arm vein in 1993, revision in 2011, s/p R SFA to peroneal bypass graft s/p revision on 7/30/18, s/p tPA bolus and angioplasty in 2020. The patient is being admitted to the vascular surgery service for planned BKA due to bypass thrombosis and ischemia of the R foot. Now POD4 s/p R BKA (12/2)    Plan  #Vascular  - Thrombosed RLE SFA-peroneal bypass graft.s/p R BKA 12/2.  - Holding ASA, no longer will need with dsc  - Hb stable. Restart home Eliquis 5 BID  - Pain control withTylenol/lyrica

## 2022-12-06 NOTE — PROGRESS NOTE ADULT - NUTRITIONAL ASSESSMENT
This patient has been assessed with a concern for Malnutrition and has been determined to have a diagnosis/diagnoses of Severe protein-calorie malnutrition.    This patient is being managed with:   Diet Regular-  Supplement Feeding Modality:  Oral  Ensure Pudding Cans or Servings Per Day:  1       Frequency:  Daily  Entered: Dec  5 2022 12:53PM

## 2022-12-06 NOTE — PROGRESS NOTE ADULT - PROVIDER SPECIALTY LIST ADULT
Vascular Surgery
Hospitalist
Vascular Surgery
Rehab Medicine
Hospitalist
Hospitalist

## 2022-12-06 NOTE — DISCHARGE NOTE NURSING/CASE MANAGEMENT/SOCIAL WORK - PATIENT PORTAL LINK FT
You can access the FollowMyHealth Patient Portal offered by [facility] by registering at the following website: [email or web address]. By joining [facility]’s FollowMyHealth portal, you will also be able to view your health information using other applications (apps) compatible with our system.

## 2022-12-06 NOTE — DISCHARGE INSTRUCTIONS
Please contact your surgeon or PCP immediately upon discharge to arrange refills for pain medications or other controlled medications after the intial 5 day prescription given to you at discharge from rehab.    Please follow up with your PCP to review any new medications and refills are to be prescribed by your PCP    Follow up with Vascular Surgery:      Wound Care:  Clean with hydrogen peroxide. Pat dry.  Apply Xeroform dressing along the incision line.  Apply gauze, Kerlix, secure with paper tape, then ace wrap.    Notify your surgeon of any of the following signs or symptoms of infection such as:elevated temperature >101degrees F, redness, warmth,increased swelling, increased pain or drainage.

## 2022-12-06 NOTE — PROGRESS NOTE ADULT - SUBJECTIVE AND OBJECTIVE BOX
Physical Medicine and Rehabilitation Progress Note :       Patient is a 67y old  Male who presents with a chief complaint of RKE BKA (06 Dec 2022 10:35)      HPI:                            12.3   14.92 )-----------( 410      ( 05 Dec 2022 05:30 )             37.7       12-05    135  |  100  |  14  ----------------------------<  109<H>  4.1   |  25  |  0.63    Ca    9.8      05 Dec 2022 05:30  Phos  2.9     12-05  Mg     1.8     12-05      Vital Signs Last 24 Hrs  T(C): 36.8 (06 Dec 2022 09:02), Max: 37.3 (05 Dec 2022 22:09)  T(F): 98.2 (06 Dec 2022 09:02), Max: 99.1 (05 Dec 2022 22:09)  HR: 80 (06 Dec 2022 09:00) (80 - 96)  BP: 121/72 (06 Dec 2022 09:00) (117/62 - 139/73)  BP(mean): 96 (06 Dec 2022 05:09) (80 - 99)  RR: 18 (06 Dec 2022 09:00) (18 - 18)  SpO2: 97% (06 Dec 2022 09:00) (94% - 97%)    Parameters below as of 06 Dec 2022 09:00  Patient On (Oxygen Delivery Method): room air        MEDICATIONS  (STANDING):  acetaminophen     Tablet .. 650 milliGRAM(s) Oral every 6 hours  apixaban 5 milliGRAM(s) Oral every 12 hours  ascorbic acid 500 milliGRAM(s) Oral daily  ceFAZolin  Injectable. 2000 milliGRAM(s) IV Push every 8 hours  influenza  Vaccine (HIGH DOSE) 0.7 milliLiter(s) IntraMuscular once  melatonin 5 milliGRAM(s) Oral at bedtime  multivitamin 1 Tablet(s) Oral daily  pregabalin 25 milliGRAM(s) Oral three times a day    MEDICATIONS  (PRN):       Physical Therapy Functional Status Assessment :   12/5/2022    Pain Assessment/Number Scale (0-10) Adult  Presence of Pain: complains of pain/discomfort  Body Location: right residual limb  Pain Rating (0-10): Rest: 4   Pain Rating (0-10): Activity: 7   Pain Precipitating/Aggravating Factors: activity;  movement    Cognitive/Neuro      Cognitive/Neuro/Behavioral  Cognitive/Neuro/Behavioral [WDL Definition: Alert; opens eyes spontaneously; arouses to voice or touch; oriented x 4; follows commands; speech spontaneous, logical; purposeful motor response; behavior appropriate to situation]: WDL    Language Assistance  Preferred Language to Address Healthcare Preferred Language to Address Healthcare: English    Therapeutic Interventions      Bed Mobility  Bed Mobility Training Rolling/Turning: contact guard  Bed Mobility Training Scooting: contact guard;  verbal cues  Bed Mobility Training Supine-to-Sit: contact guard  Bed Mobility Training Limitations: decreased ability to use arms for pushing/pulling;  decreased ability to use legs for bridging/pushing;  impaired ability to control trunk for mobility;  dangle with independence    Sit-Stand Transfer Training  Transfer Training Sit-to-Stand Transfer: contact guard;  verbal cues;  nonverbal cues (demo/gestures);  nonweight-bearing   right LE   rolling walker  Transfer Training Stand-to-Sit Transfer: contact guard;  nonverbal cues (demo/gestures);  verbal cues;  rolling walker;  nonweight-bearing   right LE   Sit-to-Stand Transfer Training Transfer Safety Analysis: fairly steady, no loss of balance     Gait Training  Gait Training: contact guard;  rolling walker;  30 feet x 2  Gait Analysis: fairly steady, no loss of balance, hopping left LE, 1 standing rest break, mod AGUILERA, slight c/o fatigue    Therapeutic Exercise  Therapeutic Exercise Detail: quad sets x 5 with 5 second hold, seated right LE long arc quad x 5             PM&R Impression : as above    Current Disposition Plan  :    acute rehab placement

## 2022-12-06 NOTE — CONSULTS
[facility] Internal Medicine  Consult Note    Subjective     Harry Alvarado is a 67 y.o. male who was admitted for Status post below knee amputation of right lower extremity (CMS/HCC) [Z89.511]. Patient was referred by Richard Angulo MD for medical assessment and management     CC: Status post below knee amputation of right lower extremity (CMS/HCC) [Z89.511]    HPI: Harry Alvarado is a 67 y.o. male with MTHFR gene mutation, CAD s/p MI 1993, and PAD s/p R Fem to PT bypass with arm vein in 1993, revision in 2011, s/p R SFA to peroneal bypass graft s/p revision on 7/30/18, s/p tPA bolus and angioplasty in 2020 admitted to [facility] 11/30/22 with RLE bypass thrombosis and chronic right foot ischemia/gangrene and underwent right BKA by vascular surgeon, Dr. Bowen Serrato 12/2/22. Post op he had anemia but did not require transfusion. He was restarted on his home Eliquis.  His pain was managed with Tylenol and Lyrica. Tolerating a regular diet.     The patient was evaluated by PM&R and found to have residual deficits in ambulation and ADLs due to Status post below knee amputation of right lower extremity (CMS/HCC) [Z89.511] and came to [facility] on 12/6/2022 for acute inpatient rehab.     SUBJECTIVE:  Patient interviewed and examined    RLE pain stable, moving bowels and bladder, no abdominal pain or nausea, no dysuria, no chest pain, palpitations, dyspnea or fevers      ROS: as above, otherwise noncontributory  Current meds and allergies reviewed    Outside records reviewed. Pertinent radiology results reviewed. Pertinent lab results reviewed.    Medical History:   Past Medical History:   Diagnosis Date   • Coronary artery disease     s/p MI   • Deep vein thrombosis (CMS/HCC)    • Lupus anticoagulant disorder (CMS/HCC)    • Lyme disease    • MTHFR gene mutation    • Myocardial infarction (CMS/HCC)    • PAD (peripheral artery disease) (CMS/HCC)        Surgical History:   Past Surgical History:   Procedure Laterality  Date   • BELOW KNEE LEG AMPUTATION Right 12/02/2022   • FEMORAL BYPASS Right        Allergies: Patient has no known allergies.      Current Facility-Administered Medications   Medication Dose Route Frequency   • acetaminophen  650 mg oral q6h JOSEPH   • alum-mag hydroxide-simeth  30 mL oral q6h PRN   • apixaban  5 mg oral BID   • bisacodyL  10 mg rectal Daily PRN   • [START ON 12/7/2022] pantoprazole  40 mg oral Daily before breakfast   • polyethylene glycol  17 g oral Daily PRN   • pregabalin  25 mg oral TID   • sennosides-docusate sodium  2 tablet oral Daily PRN   • zolpidem  10 mg oral Nightly PRN         Social History:   Social History     Tobacco Use   • Smoking status: Never   • Smokeless tobacco: Never   Substance and Sexual Activity   • Alcohol use: Not Currently       Family History: No family history on file.    Review of Systems    Constitutional: negative for fevers  Eyes: negative for visual disturbance  Ears, nose, mouth, throat, and face: negative for nasal congestion  Respiratory: negative for cough, dyspnea on exertion and wheezing  Cardiovascular: negative for chest pain and palpitations  Gastrointestinal: positive for constipation, negative for abdominal pain, nausea and vomiting  Genitourinary:negative for decreased stream and painful urination  Integument/breast: negative for rash and itching  Musculoskeletal: RLE pain stable  Neurological: no new numbness or weakness  Behavioral/Psych: positive for anxiety and depression        Vital signs in last 24 hours:  Temp:  [36.8 °C (98.2 °F)] 36.8 °C (98.2 °F)  Heart Rate:  [77] 77  Resp:  [18] 18  BP: (120)/(79) 120/79  Vital signs reviewed 12/06/22 5:31 PM    Objective     Physical Exam    General appearance: alert, appears stated age and cooperative  Head: normocephalic, without obvious abnormality, atraumatic  Eyes: conjunctivae clear. PERRL, EOM's intact.  Ears: normal external ear  Nose: Nares normal. Septum midline. Mucosa normal.  Throat: normal  oropharynx  Neck: no JVD, no adenopathy, no carotid bruit, supple, symmetrical, trachea midline and thyroid not enlarged, symmetric, no tenderness/mass/nodules  Lungs: clear to auscultation bilaterally  Heart: regular rate and rhythm, S1, S2 normal, no murmur, click, rub or gallop  Abdomen: soft, non-tender; bowel sounds normal; no masses, no organomegaly  Extremities: 2+ bilat LE edema; s/p right BKA  Pulses: 2+ and symmetric all four extremities  Skin: right residual limb incision dressed  Lymph nodes: Cervical and supraclavicular nodes normal.  Neurologic: Alert and oriented X 3;         Labs  I have reviewed the patient's pertinent labs.  Significant abnormals are leukocytosis.  Hematologic (Up to last 3 results from the past 720 hours)       12/03 0000 12/04 0000     WBC       14.94             15.36           Hemoglobin       11.90             12.50           Hematocrit       36.30             38.00        Chemistry (Up to last 3 results from the past 720 hours)       12/03 0000 12/04 0000     Sodium       138             136           Potassium       3.9             4.2           BUN       14             14           Creatinine       0.79             0.67           Glucose       106             120           CO2       27             26           Chloride       102             99            Imaging  Radiology reports reviewed.     11/30/22: CXR:   Result: No acute pathology    ECG/Telemetry  Cardiology reports reviewed.         Assessment/Plan   ASSESSMENT/PLAN  67 y.o. male with MTHFR gene mutation, CAD s/p MI 1993, and PAD s/p R Fem to PT bypass with arm vein in 1993, revision in 2011, s/p R SFA to peroneal bypass graft s/p revision on 7/30/18, s/p tPA bolus and angioplasty in 2020 admitted to [facility] 11/30/22 with RLE bypass thrombosis and chronic right foot ischemia/gangrene and underwent right BKA by vascular surgeon, Dr. Bowen Serrato 12/2/22    1. Vasc:  -PAD s/p failed RLE bypass with right  foot ischemia/gangrene  -s/p right BKA 12/2/22  -Tylenol and Lyrica for pain  -remains on Eliquis for hypercoagulable condition     2. Heme:  -post op anemia due to chronic blood loss, does not need iron  -leukocytosis reactive due to surgery  -hx of MTHFR gene mutation  -follow CBC     3. Renal:  -increased risk of dehydration and electrolyte changes  -follow BMP, Mg     4. Gi:  -Senokot-S as needed for bowels  -Protonix for GERD and ulcer ppx     5. Gu:  -increased risk of UTI and retention  -check UA/C+S and check PVRs     6. Cardiac:  -hx of CAD  -watch for orthostatic hypotension  -use cardiac precautions in therapy     7. Pulm:  -incentive spirometry for atelectasis     8. Derm:  -consulted [employer] for skin assessment     9. Nutrition:  -consulted Nutrition for assessment and education    10. Psych:  -consulted Psychology for support  -Ambien prn sleep    plan discussed with patient, nurse, case management and Richard Angulo MD    Orders personally entered in CPOE     I have queried the state Prescription Drug Monitoring Program [PDMP] and found no suspicious PDMP activity     Emerson Bradley MD  12/06/22  5:31 PM

## 2022-12-07 ENCOUNTER — APPOINTMENT (INPATIENT)
Dept: PHYSICAL THERAPY | Facility: REHABILITATION | Age: 67
DRG: 560 | End: 2022-12-07
Payer: MEDICARE

## 2022-12-07 ENCOUNTER — APPOINTMENT (INPATIENT)
Dept: OCCUPATIONAL THERAPY | Facility: REHABILITATION | Age: 67
DRG: 560 | End: 2022-12-07
Payer: MEDICARE

## 2022-12-07 PROBLEM — D64.9 ANEMIA: Status: ACTIVE | Noted: 2022-12-07

## 2022-12-07 LAB
ALBUMIN SERPL-MCNC: 2.9 G/DL (ref 3.4–5)
ALP SERPL-CCNC: 159 IU/L (ref 35–126)
ALT SERPL-CCNC: 82 IU/L (ref 16–63)
ANION GAP SERPL CALC-SCNC: 12 MEQ/L (ref 3–15)
AST SERPL-CCNC: 78 IU/L (ref 15–41)
BACTERIA URNS QL MICRO: ABNORMAL /HPF
BASOPHILS # BLD: 0.09 K/UL (ref 0.01–0.1)
BASOPHILS NFR BLD: 0.7 %
BILIRUB SERPL-MCNC: 0.6 MG/DL (ref 0.3–1.2)
BILIRUB UR QL STRIP.AUTO: NEGATIVE MG/DL
BUN SERPL-MCNC: 20 MG/DL (ref 8–20)
CALCIUM SERPL-MCNC: 9.3 MG/DL (ref 8.9–10.3)
CAOX CRY URNS QL MICRO: 1 /HPF
CHLORIDE SERPL-SCNC: 99 MEQ/L (ref 98–109)
CLARITY UR REFRACT.AUTO: ABNORMAL
CO2 SERPL-SCNC: 24 MEQ/L (ref 22–32)
COLOR UR AUTO: YELLOW
CREAT SERPL-MCNC: 0.6 MG/DL (ref 0.8–1.3)
DIFFERENTIAL METHOD BLD: ABNORMAL
EOSINOPHIL # BLD: 0.54 K/UL (ref 0.04–0.54)
EOSINOPHIL NFR BLD: 4 %
ERYTHROCYTE [DISTWIDTH] IN BLOOD BY AUTOMATED COUNT: 12.2 % (ref 11.6–14.4)
FERRITIN SERPL-MCNC: 160 NG/ML (ref 24–250)
FOLATE SERPL-MCNC: 6.5 NG/ML
GFR SERPL CREATININE-BSD FRML MDRD: >60 ML/MIN/1.73M*2
GLUCOSE SERPL-MCNC: 70 MG/DL (ref 70–99)
GLUCOSE UR STRIP.AUTO-MCNC: NEGATIVE MG/DL
HCT VFR BLDCO AUTO: 37.5 % (ref 40.1–51)
HGB BLD-MCNC: 12.2 G/DL (ref 13.7–17.5)
HGB UR QL STRIP.AUTO: 1
HYALINE CASTS #/AREA URNS LPF: ABNORMAL /LPF
IMM GRANULOCYTES # BLD AUTO: 0.09 K/UL (ref 0–0.08)
IMM GRANULOCYTES NFR BLD AUTO: 0.7 %
IRON SATN MFR SERPL: 14 % (ref 15–45)
IRON SERPL-MCNC: 33 UG/DL (ref 35–150)
KETONES UR STRIP.AUTO-MCNC: NEGATIVE MG/DL
LEUKOCYTE ESTERASE UR QL STRIP.AUTO: NEGATIVE
LYMPHOCYTES # BLD: 1.82 K/UL (ref 1.2–3.5)
LYMPHOCYTES NFR BLD: 13.5 %
MAGNESIUM SERPL-MCNC: 2 MG/DL (ref 1.8–2.5)
MCH RBC QN AUTO: 30.2 PG (ref 28–33.2)
MCHC RBC AUTO-ENTMCNC: 32.5 G/DL (ref 32.2–36.5)
MCV RBC AUTO: 92.8 FL (ref 83–98)
MONOCYTES # BLD: 1.12 K/UL (ref 0.3–1)
MONOCYTES NFR BLD: 8.3 %
MUCOUS THREADS URNS QL MICRO: ABNORMAL /LPF
NEUTROPHILS # BLD: 9.81 K/UL (ref 1.7–7)
NEUTS SEG NFR BLD: 72.8 %
NITRITE UR QL STRIP.AUTO: NEGATIVE
NRBC BLD-RTO: 0 %
PDW BLD AUTO: 10 FL (ref 9.4–12.4)
PH UR STRIP.AUTO: 6 [PH]
PLATELET # BLD AUTO: 462 K/UL (ref 150–350)
POTASSIUM SERPL-SCNC: 4.6 MEQ/L (ref 3.6–5.1)
PROT SERPL-MCNC: 6.8 G/DL (ref 6–8.2)
PROT UR QL STRIP.AUTO: 1
RBC # BLD AUTO: 4.04 M/UL (ref 4.5–5.8)
RBC #/AREA URNS HPF: ABNORMAL /HPF
SODIUM SERPL-SCNC: 135 MEQ/L (ref 136–144)
SP GR UR REFRACT.AUTO: 1.03
SQUAMOUS URNS QL MICRO: ABNORMAL /HPF
TIBC SERPL-MCNC: 235 UG/DL (ref 270–460)
UIBC SERPL-MCNC: 202 UG/DL (ref 180–360)
UROBILINOGEN UR STRIP-ACNC: 0.2 EU/DL
VIT B12 SERPL-MCNC: 333 PG/ML (ref 180–914)
WBC # BLD AUTO: 13.47 K/UL (ref 3.8–10.5)
WBC #/AREA URNS HPF: ABNORMAL /HPF

## 2022-12-07 PROCEDURE — 97535 SELF CARE MNGMENT TRAINING: CPT | Mod: GO

## 2022-12-07 PROCEDURE — 85025 COMPLETE CBC W/AUTO DIFF WBC: CPT | Performed by: HOSPITALIST

## 2022-12-07 PROCEDURE — 82746 ASSAY OF FOLIC ACID SERUM: CPT | Performed by: HOSPITALIST

## 2022-12-07 PROCEDURE — 63700000 HC SELF-ADMINISTRABLE DRUG: Performed by: PHYSICAL MEDICINE & REHABILITATION

## 2022-12-07 PROCEDURE — 12800000 HC ROOM AND CARE SEMIPRIVATE REHAB

## 2022-12-07 PROCEDURE — 36415 COLL VENOUS BLD VENIPUNCTURE: CPT | Performed by: HOSPITALIST

## 2022-12-07 PROCEDURE — 97166 OT EVAL MOD COMPLEX 45 MIN: CPT | Mod: GO

## 2022-12-07 PROCEDURE — 82728 ASSAY OF FERRITIN: CPT | Performed by: HOSPITALIST

## 2022-12-07 PROCEDURE — 83550 IRON BINDING TEST: CPT | Performed by: HOSPITALIST

## 2022-12-07 PROCEDURE — 63700000 HC SELF-ADMINISTRABLE DRUG: Performed by: HOSPITALIST

## 2022-12-07 PROCEDURE — 81001 URINALYSIS AUTO W/SCOPE: CPT | Performed by: HOSPITALIST

## 2022-12-07 PROCEDURE — 80053 COMPREHEN METABOLIC PANEL: CPT | Performed by: HOSPITALIST

## 2022-12-07 PROCEDURE — 82607 VITAMIN B-12: CPT | Performed by: HOSPITALIST

## 2022-12-07 PROCEDURE — 97110 THERAPEUTIC EXERCISES: CPT | Mod: GP

## 2022-12-07 PROCEDURE — 97162 PT EVAL MOD COMPLEX 30 MIN: CPT | Mod: GP

## 2022-12-07 PROCEDURE — 83735 ASSAY OF MAGNESIUM: CPT | Performed by: HOSPITALIST

## 2022-12-07 RX ORDER — TRAMADOL HYDROCHLORIDE 50 MG/1
50 TABLET ORAL EVERY 4 HOURS PRN
Status: DISCONTINUED | OUTPATIENT
Start: 2022-12-07 | End: 2022-12-09

## 2022-12-07 RX ORDER — POLYETHYLENE GLYCOL 3350 17 G/17G
17 POWDER, FOR SOLUTION ORAL DAILY
Status: DISCONTINUED | OUTPATIENT
Start: 2022-12-07 | End: 2022-12-16

## 2022-12-07 RX ORDER — ACETAMINOPHEN 325 MG/1
975 TABLET ORAL EVERY 8 HOURS
Status: DISCONTINUED | OUTPATIENT
Start: 2022-12-07 | End: 2022-12-14

## 2022-12-07 RX ADMIN — ACETAMINOPHEN 650 MG: 325 TABLET ORAL at 06:09

## 2022-12-07 RX ADMIN — PREGABALIN 25 MG: 25 CAPSULE ORAL at 21:33

## 2022-12-07 RX ADMIN — ACETAMINOPHEN 975 MG: 325 TABLET ORAL at 13:10

## 2022-12-07 RX ADMIN — ACETAMINOPHEN 650 MG: 325 TABLET ORAL at 00:03

## 2022-12-07 RX ADMIN — PANTOPRAZOLE SODIUM 40 MG: 40 TABLET, DELAYED RELEASE ORAL at 07:45

## 2022-12-07 RX ADMIN — ZOLPIDEM TARTRATE 10 MG: 10 TABLET, COATED ORAL at 01:19

## 2022-12-07 RX ADMIN — TRAMADOL HYDROCHLORIDE 50 MG: 50 TABLET, COATED ORAL at 23:38

## 2022-12-07 RX ADMIN — POLYETHYLENE GLYCOL 3350 17 G: 17 POWDER, FOR SOLUTION ORAL at 08:47

## 2022-12-07 RX ADMIN — TRAMADOL HYDROCHLORIDE 50 MG: 50 TABLET, COATED ORAL at 08:47

## 2022-12-07 RX ADMIN — PREGABALIN 25 MG: 25 CAPSULE ORAL at 16:35

## 2022-12-07 RX ADMIN — ACETAMINOPHEN 975 MG: 325 TABLET ORAL at 21:33

## 2022-12-07 RX ADMIN — PREGABALIN 25 MG: 25 CAPSULE ORAL at 07:45

## 2022-12-07 RX ADMIN — APIXABAN 5 MG: 5 TABLET, FILM COATED ORAL at 07:45

## 2022-12-07 RX ADMIN — APIXABAN 5 MG: 5 TABLET, FILM COATED ORAL at 21:33

## 2022-12-07 ASSESSMENT — COGNITIVE AND FUNCTIONAL STATUS - GENERAL: AFFECT: WFL

## 2022-12-07 NOTE — HOSPITAL COURSE
History of Present Illness  Harry is a 67 y.o. male admitted on 12/6/2022 with Status post below knee amputation of right lower extremity (CMS/[facility]) [Z89.511]. 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 The pt was admitted to Vascular Surgery service for a planned R BKA due to bypass thrombosis and ischemia of the R foot.   He is s/p R BKA on 12/2/22.  His hgb is stable and he has been restarted on his home Eliquis.   Tolerating a regular diet.  PM&R was consulted on 12/3 and recommends acute inpt rehab for intensive therapy as he can tolerate >3 hrs of combined PT/OT per day and at least 5 days/week with a reasonable expectation that the patient will make measurable functional improvement that only an acute rehab can provide.         Past Medical History  Harry has a past medical history of Coronary artery disease, Deep vein thrombosis (CMS/[facility]), Lupus anticoagulant disorder (CMS/[facility]), Lyme disease, MTHFR gene mutation, Myocardial infarction (CMS/[facility]), and PAD (peripheral artery disease) (CMS/[facility]).

## 2022-12-07 NOTE — PROGRESS NOTES
[facility] Progress Note          Patient was seen and examined.   Attestation Notes: Face to face encounter completed    Harry Alvarado is a 67 y.o. male who was admitted for Status post below knee amputation of right lower extremity (CMS/HCC) [Z89.511]. Patient was referred by Richard Angulo MD for medical assessment and management      CC: Status post below knee amputation of right lower extremity (CMS/HCC) [Z89.511]     HPI: Harry Alvarado is a 67 y.o. male with MTHFR gene mutation, CAD s/p MI 1993, and PAD s/p R Fem to PT bypass with arm vein in 1993, revision in 2011, s/p R SFA to peroneal bypass graft s/p revision on 7/30/18, s/p tPA bolus and angioplasty in 2020 admitted to [facility] 11/30/22 with RLE bypass thrombosis and chronic right foot ischemia/gangrene and underwent right BKA by vascular surgeon, Dr. Bowen Serrato 12/2/22. Post op he had anemia but did not require transfusion. He was restarted on his home Eliquis.  His pain was managed with Tylenol and Lyrica. Tolerating a regular diet.      The patient was evaluated by PM&R and found to have residual deficits in ambulation and ADLs due to Status post below knee amputation of right lower extremity (CMS/HCC) [Z89.511] and came to [facility] on 12/6/2022 for acute inpatient rehab.      SUBJECTIVE:  Patient interviewed and examined     RLE pain stable, moving bowels and bladder, no abdominal pain or nausea, no dysuria, no chest pain, palpitations, dyspnea or fevers    Review of Systems:  All other systems reviewed and negative except as noted in the HPI.    Current meds and allergies reviewed    Past Medical History:   Diagnosis Date   • Coronary artery disease     s/p MI   • Deep vein thrombosis (CMS/HCC)    • Lupus anticoagulant disorder (CMS/HCC)    • Lyme disease    • MTHFR gene mutation    • Myocardial infarction (CMS/HCC)    • PAD (peripheral artery disease) (CMS/HCC)      Past Surgical History:   Procedure  Laterality Date   • BELOW KNEE LEG AMPUTATION Right 12/02/2022   • FEMORAL BYPASS Right      Social History     Tobacco Use   • Smoking status: Never   • Smokeless tobacco: Never   Substance Use Topics   • Alcohol use: Not Currently      No family history on file.    Vital signs in last 24 hours:  Temp:  [36.7 °C (98.1 °F)-36.8 °C (98.2 °F)] 36.7 °C (98.1 °F)  Heart Rate:  [71-88] 71  Resp:  [18] 18  BP: (105-132)/(58-80) 131/74  Vital signs reviewed 12/07/22 1:28 PM    Physical Exam  Vitals and nursing note reviewed.   Constitutional:       Appearance: Normal appearance.   HENT:      Head: Normocephalic and atraumatic.      Right Ear: External ear normal.      Left Ear: External ear normal.      Nose: Nose normal.      Mouth/Throat:      Mouth: Mucous membranes are moist.      Pharynx: Oropharynx is clear.   Eyes:      Extraocular Movements: Extraocular movements intact.      Conjunctiva/sclera: Conjunctivae normal.      Pupils: Pupils are equal, round, and reactive to light.   Cardiovascular:      Rate and Rhythm: Normal rate and regular rhythm.      Pulses: Normal pulses.      Heart sounds: Normal heart sounds.   Pulmonary:      Effort: Pulmonary effort is normal.      Breath sounds: Normal breath sounds.   Abdominal:      General: Abdomen is flat. Bowel sounds are normal.      Palpations: Abdomen is soft.   Musculoskeletal:      Cervical back: Normal range of motion and neck supple.      Right lower leg: Edema present.      Left lower leg: Edema present.      Comments: S/p right BKA   Skin:     General: Skin is warm and dry.   Neurological:      General: No focal deficit present.      Mental Status: He is alert and oriented to person, place, and time.   Psychiatric:         Mood and Affect: Mood normal.         Behavior: Behavior normal.                 Objective    Labs:  I have reviewed the patient's labs.  Significant abnormals are leukocytosis, anemia, hyponatremia, elevated LFTs.  Results from last 7 days    Lab Units 12/07/22  0611   WBC K/uL 13.47*   HEMOGLOBIN g/dL 12.2*   HEMATOCRIT % 37.5*   PLATELETS K/uL 462*     Results from last 7 days   Lab Units 12/07/22  0611   SODIUM mEQ/L 135*   POTASSIUM mEQ/L 4.6   CHLORIDE mEQ/L 99   CO2 mEQ/L 24   BUN mg/dL 20   CREATININE mg/dL 0.6*   CALCIUM mg/dL 9.3   ALBUMIN g/dL 2.9*   BILIRUBIN TOTAL mg/dL 0.6   ALK PHOS IU/L 159*   ALT IU/L 82*   AST IU/L 78*   GLUCOSE mg/dL 70     Results from last 7 days   Lab Units 12/07/22  0611   MAGNESIUM mg/dL 2.0        Imaging:  Not applicable        ASSESSMENT/PLAN:    67 y.o. male with MTHFR gene mutation, CAD s/p MI 1993, and PAD s/p R Fem to PT bypass with arm vein in 1993, revision in 2011, s/p R SFA to peroneal bypass graft s/p revision on 7/30/18, s/p tPA bolus and angioplasty in 2020 admitted to [facility] 11/30/22 with RLE bypass thrombosis and chronic right foot ischemia/gangrene and underwent right BKA by vascular surgeon, Dr. Bowen Serrato 12/2/22     1. Vasc:  -PAD s/p failed RLE bypass with right foot ischemia/gangrene  -s/p right BKA 12/2/22  -Tylenol and Lyrica for pain  -remains on Eliquis for hypercoagulable condition     2. Heme:  -post op anemia due to chronic blood loss, does not need iron  -leukocytosis reactive due to surgery  -hx of MTHFR gene mutation  -follow CBC     3. Renal:  -increased risk of dehydration and electrolyte changes  -follow BMP, Mg     4. Gi:  -Senokot-S as needed for bowels  -Protonix for GERD and ulcer ppx  -elevated LFTs likely transient due to surgery, meds, follow for now     5. Gu:  -no UTI or retention     6. Cardiac:  -hx of CAD  -watch for orthostatic hypotension  -use cardiac precautions in therapy     7. Pulm:  -incentive spirometry for atelectasis     8. Derm:  -seen by [employer] for skin assessment     9. Nutrition:  -seen by Nutrition for assessment and education     10. Psych:  -seen by Psychology for support  -Ambien prn sleep     plan discussed with patient,  nurse, case management and  Richard Angulo MD Scott Sapperstein, MD  12/7/2022  1:28 PM

## 2022-12-07 NOTE — PLAN OF CARE
Pt is optimistic and satisfied with how he is adjusting to his amputation. Pt spoke about his family and their home in [place], [place], where he is anxious to return.  provided blessings and orientation to spiritual care services. Will follow up as needed.

## 2022-12-07 NOTE — PROGRESS NOTES
Patient: Harry Alvarado  Location: [facility] [address]  MRN: [id number]  Today's date: 12/7/2022    History of Present Illness  Harry is a 67 y.o. 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 Principal problem is   Status post below knee amputation of right lower extremity (CMS/[facility]). The pt was admitted to Vascular Surgery service for a planned R BKA due to bypass thrombosis and ischemia of the R foot.   He is s/p R BKA on 12/2/22.  His hgb is stable and he has been restarted on his home Eliquis.   Tolerating a regular diet.  PM&R was consulted on 12/3 and recommends acute inpt rehab for intensive therapy as he can tolerate >3 hrs of combined PT/OT per day and at least 5 days/week with a reasonable expectation that the patient will make measurable functional improvement that only an acute rehab can provide.         Past Medical History  Hrary has a past medical history of Coronary artery disease, Deep vein thrombosis (CMS/[facility]), Lupus anticoagulant disorder (CMS/[facility]), Lyme disease, MTHFR gene mutation, Myocardial infarction (CMS/[facility]), and PAD (peripheral artery disease) (CMS/[facility]).      PT Vitals    Date/Time Pulse HR Source BP BP Location BP Method Pt Position [facility]   12/07/22 1142 71 Monitor 131/74 Right upper arm Automatic Sitting AT      PT Pain    Date/Time Pain Type Side/Orientation Location Rating: Rest Interventions [facility]   12/07/22 1142 Pain Assessment right residual limb 5 care clustered AT          Prior Living Environment    Flowsheet Row Most Recent Value   People in Home spouse, child(no), adult, grandchild(no)  [Spouse, Son, DIL, 3 grandkids]   Current Living Arrangements home   Living Environment Comment Pt was living in [place] with his wife. However, after discharge, his wife and him plan to move into son's house in [place] with first floor setup and 1 YOSEF. After prosthetic training, pt plans to move back to [place].    Number of Stairs, Main Entrance 1   Location, Patient Bedroom first (main) floor   Location, Bathroom first (main) floor   Bathroom Access Comment confirmed with family stall shower with 6 inch step to enter and seat within shower          Prior Level of Function    Flowsheet Row Most Recent Value   Dominant Hand right   Ambulation assistive equipment   Transferring assistive equipment   Toileting independent   Bathing assistive person   Dressing independent   Eating independent   Prior Level of Function Comment Pt was previously I with all ADLs, only requiring assist with bathing from his wife as function decreased prior to hospital admission. Pt was ambulating with a SPC for recent 2 months. + , retired. Pt enjoys skiing and playing pickleball with his wife.   Assistive Device Currently Used at Home cane, straight  [reports brief use of RW in past]           IRF PT Evaluation and Treatment - 12/07/22 1131        PT Time Calculation    Start Time 1130     Stop Time 1200     Time Calculation (min) 30 min        Session Details    Document Type daily treatment/progress note     Mode of Treatment individual therapy;physical therapy        General Information    General Observations of Patient PT intervention following initial evaluation     Existing Precautions/Restrictions fall;cardiac;weight bearing        Weight-bearing Status    Right LE Weight-Bearing Status non weight-bearing (NWB)        Lower Extremity (Therapeutic Exercise)    Exercise Position/Type seated;supine     General Exercise right;quad sets;SAQ (short arc quad);SLR (straight leg raise);hip aBduction;hip aDduction;hip extension     Reps and Sets 10 x1     Comment introduction to LE TE program performed seated and supine in bed, instruction to perform exs throughout the day to improve strength and ROM        Daily Progress Summary (PT)    Daily Outcome Statement Initial treatment session performed following evaluation. Pt instructed on LE TE  program and importance of maintaining LE ROM and strength. Pt with good performance of exs at initial teaching. Recommend continued PT POC as able with progressions with functional mobility, strength training and wc mobility.                      Education Documentation  Fall Prevention, taught by Ginny Martins PT at 12/7/2022  2:26 PM.  Learner: Patient  Readiness: Acceptance  Method: Explanation  Response: Verbalizes Understanding, Needs Reinforcement  Comment: Pt educated on role of skilled PT goals and intervention in IP rehab setting, schedule, team meetings, 3 hour rule, call bell use, purpose of safety seatbelt/gait belt.    Call Light Use, taught by Ginny Martins PT at 12/7/2022  2:26 PM.  Learner: Patient  Readiness: Acceptance  Method: Explanation  Response: Verbalizes Understanding, Needs Reinforcement  Comment: Pt educated on role of skilled PT goals and intervention in IP rehab setting, schedule, team meetings, 3 hour rule, call bell use, purpose of safety seatbelt/gait belt.    Treatment Plan, taught by Ginny Martins PT at 12/7/2022  2:26 PM.  Learner: Patient  Readiness: Acceptance  Method: Explanation  Response: Verbalizes Understanding, Needs Reinforcement  Comment: Pt educated on role of skilled PT goals and intervention in IP rehab setting, schedule, team meetings, 3 hour rule, call bell use, purpose of safety seatbelt/gait belt.    Orientation to Care Setting, Routine, taught by Ginny Martins PT at 12/7/2022  2:26 PM.  Learner: Patient  Readiness: Acceptance  Method: Explanation  Response: Verbalizes Understanding, Needs Reinforcement  Comment: Pt educated on role of skilled PT goals and intervention in IP rehab setting, schedule, team meetings, 3 hour rule, call bell use, purpose of safety seatbelt/gait belt.    Admission, Transition of Care, taught by Ginny Martins PT at 12/7/2022  2:26 PM.  Learner: Patient  Readiness: Acceptance  Method: Explanation  Response: Verbalizes  Understanding, Needs Reinforcement  Comment: Pt educated on role of skilled PT goals and intervention in IP rehab setting, schedule, team meetings, 3 hour rule, call bell use, purpose of safety seatbelt/gait belt.          IRF PT Goals    Flowsheet Row Most Recent Value   Bed Mobility Goal 1    Activity/Assistive Device bed mobility activities, all at 12/07/2022 1034   [place] supervision required at 12/07/2022 1034   Time Frame short-term goal (STG), 1 week at 12/07/2022 1034   Progress/Outcome goal ongoing at 12/07/2022 1034   Bed Mobility Goal 2    Activity/Assistive Device bed mobility activities, all at 12/07/2022 1034   [place] modified independence at 12/07/2022 1034   Time Frame long-term goal (LTG), 2 weeks at 12/07/2022 1034   Progress/Outcome goal ongoing at 12/07/2022 1034   Transfer Goal 1    Activity/Assistive Device sit-to-stand/stand-to-sit, low pivot, stand pivot at 12/07/2022 1034   [place] supervision required at 12/07/2022 1034   Time Frame short-term goal (STG), 1 week at 12/07/2022 1034   Progress/Outcome goal ongoing at 12/07/2022 1034   Transfer Goal 2    Activity/Assistive Device sit-to-stand/stand-to-sit, low pivot, stand pivot at 12/07/2022 1034   [place] modified independence at 12/07/2022 1034   Time Frame long-term goal (LTG), 2 weeks at 12/07/2022 1034   Progress/Outcome goal ongoing at 12/07/2022 1034   Wheelchair Locomotion Goal 1    Activity wheelchair mobility skills, all at 12/07/2022 1034   Assistive Device manual, lightweight at 12/07/2022 1034   Distance 250 feet at 12/07/2022 1034   [place] modified independence at 12/07/2022 1034   Time Frame short-term goal (STG), 1 week at 12/07/2022 1034   Progress/Outcome goal ongoing at 12/07/2022 1034   Wheelchair Locomotion Goal 2    Activity wheelchair mobility skills, all at 12/07/2022 1034   Assistive Device manual, lightweight at 12/07/2022 1034   Distance 500 feet at 12/07/2022 1034   [place] modified  independence at 12/07/2022 1034   Time Frame long-term goal (LTG), 2 weeks at 12/07/2022 1034   Progress/Outcome goal ongoing at 12/07/2022 1034

## 2022-12-07 NOTE — PLAN OF CARE
Plan of Care Review  Plan of Care Reviewed With: patient  Progress: improving  Outcome Summary: Assumed care of patient at 2300, pt alert/oriented, able to make needs known. Continent of bladder, utilized urinal, no BM this shift, c/o pain in right leg, managed with tylenol. Call bell within reach, safety precautions maintained

## 2022-12-07 NOTE — PROGRESS NOTES
Patient: Harry Alvarado  Location: [facility] [address]  MRN: [id number]  Today's date: 12/7/2022    History of Present Illness  Harry is a 67 y.o. 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 Principal problem is   Status post below knee amputation of right lower extremity (CMS/[facility]). The pt was admitted to Vascular Surgery service for a planned R BKA due to bypass thrombosis and ischemia of the R foot.   He is s/p R BKA on 12/2/22.  His hgb is stable and he has been restarted on his home Eliquis.   Tolerating a regular diet.  PM&R was consulted on 12/3 and recommends acute inpt rehab for intensive therapy as he can tolerate >3 hrs of combined PT/OT per day and at least 5 days/week with a reasonable expectation that the patient will make measurable functional improvement that only an acute rehab can provide.         Past Medical History  Harry has a past medical history of Coronary artery disease, Deep vein thrombosis (CMS/[facility]), Lupus anticoagulant disorder (CMS/[facility]), Lyme disease, MTHFR gene mutation, Myocardial infarction (CMS/[facility]), and PAD (peripheral artery disease) (CMS/[facility]).      OT Vitals    Date/Time Pulse HR Source BP BP Location BP Method Pt Position [facility]   12/07/22 0733 73 Monitor 130/80 Right upper arm Automatic Sitting [place]   12/07/22 0846 88 Monitor 132/71 Left upper arm Automatic Sitting AK      OT Pain    Date/Time Pain Type Side/Orientation Location Rating: Rest Interventions [facility]   12/07/22 0733 Pain Assessment right leg 5 diversional activity provided;position adjusted AK   12/07/22 0818 Pain Reassessment right residual limb 5 pain management plan reviewed with patient/caregiver;care clustered AGL   12/07/22 0846 Pain Reassessment right leg 5 medication administered AK   12/07/22 0847 Pain Assessment -- residual limb 5 -- AGL          Prior Living Environment    Flowsheet Row Most Recent Value   People in Home  "spouse, child(no), adult, grandchild(no)  [Spouse, Son, DIL, 3 grandkids]   Current Living Arrangements home   Living Environment Comment Pt was living in [place] with his wife. However, after discharge, his wife and him plan to move into son's house in [place] with first floor setup and 1 YOSEF. After prosthetic training, pt plans to move back to [place].   Number of Stairs, Main Entrance 1   Location, Patient Bedroom first (main) floor   Location, Bathroom first (main) floor   Bathroom Access Comment confirmed with family stall shower with 6 inch step to enter and seat within shower          Prior Level of Function    Flowsheet Row Most Recent Value   Dominant Hand right   Ambulation assistive equipment   Transferring assistive equipment   Toileting independent   Bathing assistive person   Dressing independent   Eating independent   Prior Level of Function Comment Pt was previously I with all ADLs, only requiring assist with bathing from his wife as function decreased prior to hospital admission. Pt was ambulating with a SPC for recent 2 months. + , retired. Pt enjoys skiing and playing pickleball with his wife.   Assistive Device Currently Used at Home cane, straight  [reports brief use of RW in past]          Occupational Profile    Flowsheet Row Most Recent Value   Occupational History/Life Experiences +. 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   Patient Goals \"Be able to get around better, take care of myself and play with my grandkids.\" And also \"Get a prosthesis and walk around like I used too.\"           IRF OT Evaluation and Treatment - 12/07/22 0720        OT Time Calculation    Start Time 0730     Stop Time 0845     Time Calculation (min) 75 min        Session Details    Document Type initial evaluation     Mode of Treatment occupational therapy;individual therapy        General Information    General Observations of " "Patient Pt received sitting upright/long sit in bed. Pleasant and agreeable to OT evaluation.     Existing Precautions/Restrictions weight bearing;fall;cardiac        Weight-bearing Status    Right LE Weight-Bearing Status non weight-bearing (NWB)        Living Environment    Name(s) of People in Home Rufino Acosta     Primary Care Provided by self        Cognition/Psychosocial    Comment, Cognition BIMS score 15/15. Pt able to provide occupational profile and follow 2-step commands.        Brief Interview for Mental Status (BIMS)    Repetition of Three Words (First Attempt) 3     Temporal Orientation: Year Correct     Temporal Orientation: Month Accurate within 5 days     Temporal Orientation: Day Correct     Recall: \"Sock\" Yes, no cue required     Recall: \"Blue\" Yes, no cue required     Recall: \"Bed\" Yes, no cue required     BIMS Summary Score 15        Confusion Assessment Method (CAM)    Is there evidence of an acute change in mental status from the patient's baseline? No     Inattention Behavior not present     Disorganized thinking Behavior not present     Altered level of consciousness Behavior not present        Vision Assessment/Intervention    Visual Impairment/Limitations corrective lenses for distance   Pt reports no changes in vision.       Sensory Assessment    Left UE Sensory Assessment intact;light touch awareness;light touch localization;proprioception     Right UE Sensory Assessment intact;light touch awareness;light touch localization;proprioception        Range of Motion (ROM)    Left Upper Extremity (ROM) left UE ROM is WFL     Right Upper Extremity (ROM) right UE ROM is WFL        Strength (Manual Muscle Testing)    Left Upper Extremity Strength left UE strength is WFL     Shoulder, Left (Strength) 4/5     Elbow, Left (Strength) 4/5     Wrist, Left (Strength) 4/5     Hand, Left (Strength) 5/5     Right Upper Extremity Strength right UE strength is WFL     Shoulder, Right (Strength) 4/5     " Elbow, Right (Strength) 4/5     Wrist, Right (Strength) 4/5     Hand, Right (Strength) 5/5        Bed Mobility    Comment (Bed Mobility) OT: Supine to/from long sit - Mod I. Sit to/from supine - Cl S and increased time.        Transfers    Comment Mobility belt donned for all transfers.        Sit to Stand Transfer    [place], Sit to Stand Transfer touching/steadying assist     Verbal Cues safety;technique;hand placement     Assistive Device railing     Comment Pt completed pull to stand from w/c to stance with bed railing c Steadying A.        Stand to Sit Transfer    [place], Stand to Sit Transfer touching/steadying assist     Verbal Cues safety;technique;hand placement     Assistive Device railing     Comment To w/c from stance with bedrail.        Low Pivot Transfer    [place], Low Pivot Transfer touching/steadying assist     Verbal Cues hand placement;safety;technique     Assistive Device wheelchair     Comment Pt completed LPT transfer from bed to w/c c Steadying A.        Toilet Transfer    Comment Unsafe to complete without OT intervention.        Shower Transfer    Comment TBA - Most likely Steadying A for LPT transfer to tub bench.        Balance    Comment, Balance Seated EOB and on bedside commode c S.        Motor Skills    Coordination --   TBA formally. No significant coordination deficits noted during evaluation.       Bathing    Shower Provided? --   No.    Self-Performance chest;left arm;right arm;abdomen;front perineal area;buttocks;left upper leg;right upper leg;left lower leg, including foot     [place] Assistance buttocks     [place] minimum assist (75% or more patient effort)     Position supine;edge of bed sitting     Setup Assistance obtain supplies     Comment Pt completed sponge bathing while seated EOB/supine. Min A for buttocks thoroughness.        Upper Body Dressing    Tasks don;pull over garment     Self-Performance threads left arm, shirt;threads right arm,  shirt;pulls shirt over head/around back;pulls shirt down/adjusts     [place] Assistance obtains clothes     [place] close supervision     Position unsupported sitting     Adaptive Equipment none     Comment Pt dons t-shirt while seated EOB c Cl S.        Lower Body Dressing    Tasks don;underwear;pants/bottoms;socks;shoes/slippers     Self-Performance threads left leg, underpants;threads right leg, underpants;pulls underpants up or down;threads left leg, pants/shorts;threads right leg, pants/shorts;pulls pants/shorts up or down;dons/doffs left sock;dons/doffs left shoe     [place] Assistance obtains clothes     [place] close supervision     Position supine;long sitting     Adaptive Equipment none     [place], Footwear close supervision     Comment Pt able to don/doff L sock and sneaker c Cl S.        Grooming    Self-Performance oral care (brushing teeth, cleaning dentures);brushes/franco hair     [place] close supervision     Position supported sitting     Setup Assistance obtain supplies     Adaptive Equipment none     [place], Oral Hygiene close supervision     Comment While seated in w/c sinkside.        Toileting    [place] touching/steadying assist     Position supported sitting     Setup Assistance adaptive equipment setup     Adaptive Equipment commode, drop-arm     Comment Pt able to manage clothing down while seated on drop arm bedside commode c Steadying A/Cl S.        Therapy Assessment/Plan (OT)    Rehab Potential/Prognosis (OT) good, to achieve stated therapy goals     Frequency of Treatment (OT) 60-90 minutes per day;5-7 times per week     Estimated Duration of Therapy (OT) 2 weeks     Planned Therapy Interventions adaptive equipment training;BADL retraining;functional balance retraining;IADL retraining;occupation/activity based interventions;passive ROM/stretching;patient/caregiver education/training;ROM/therapeutic exercise;strengthening exercise;transfer/mobility  retraining;wheelchair assessment/training     Comment, Therapy Assessment/Plan (OT) Pt is a 68y/o male s/p planned R BKA due to bypass thrombosis and ischemia of the R foot. Pt has a PMH of  MTHFR gene mutation, CAD s/p MI 1993, and PAD s/p R Fem to PT bypass with arm vein in 1993, revision in 2011, s/p R SFA to peroneal bypass graft s/p revision on 7/30/18, s/p tPA bolus and angioplasty in 2020. PTA, pt was I with ADLs, only requiring assist from his wife for bathing as function decreased. Pt was ambulating with a SPC. +, retired. Pt enjoys being active, skiing, playing pickleball with his wife and playing with his grandkids. Pt was previously living in [place] with his wife, however upon discharge his wife and him plan to move into their son's home in [place], with first floor setup. Upon eval, pt presents with decreased strength, decreased endurance and balance deficits. Pt completed LPT transfers c Steadying A, sponge bathing c Min A, dressing c Cl S at bed level and toileting on bedside commode c Steadying A. Reccomend in OT services to optimize functional independence prior to safe d/c home.                      Education Documentation  Orientation to Care Setting, Routine, taught by Lauren Douglas OT at 12/7/2022  3:09 PM.  Learner: Patient  Readiness: Acceptance  Method: Explanation  Response: Verbalizes Understanding  Comment: Pt educated on purpose of OT, inpatient rehab setting, BKA/prosthesis timeline/process, daily therapy schedule and call bell use.          IRF OT Goals    Flowsheet Row Most Recent Value   Transfer Goal 1    Activity/Assistive Device toilet at 12/07/2022 0720   [place] supervision required at 12/07/2022 0720   Time Frame short-term goal (STG), 5 - 7 days at 12/07/2022 0720   Transfer Goal 2    Activity/Assistive Device toilet at 12/07/2022 0720   [place] modified independence at 12/07/2022 0720   Time Frame long-term goal (LTG), 14 days or less at 12/07/2022  0720   Transfer Goal 3    Activity/Assistive Device shower at 12/07/2022 0720   [place] --  [Steadying A] at 12/07/2022 0720   Time Frame short-term goal (STG), 5 - 7 days at 12/07/2022 0720   Transfer Goal 4    Activity/Assistive Device shower at 12/07/2022 0720   [place] modified independence at 12/07/2022 0720   Time Frame long-term goal (LTG) at 12/07/2022 0720   Bathing Goal 1    [place] minimum assist (75% or more patient effort)  [shower level] at 12/07/2022 0720   Time Frame short-term goal (STG), 5 - 7 days at 12/07/2022 0720   Bathing Goal 2    [place] modified independence at 12/07/2022 0720   Time Frame long-term goal (LTG), 14 days or less at 12/07/2022 0720   UB Dressing Goal 1    [place] set-up required at 12/07/2022 0720   Time Frame short-term goal (STG), 5 - 7 days at 12/07/2022 0720   UB Dressing Goal 2    [place] modified independence at 12/07/2022 0720   Time Frame long-term goal (LTG), 14 days or less at 12/07/2022 0720   LB Dressing Goal 1    [place] moderate assist (50-74% patient effort)  [While in stance with RW] at 12/07/2022 0720   Time Frame short-term goal (STG), 5 - 7 days at 12/07/2022 0720   LB Dressing Goal 2    [place] modified independence at 12/07/2022 0720   Time Frame long-term goal (LTG), 14 days or less at 12/07/2022 0720   Grooming Goal 1    [place] set-up required at 12/07/2022 0720   Time Frame short-term goal (STG), 5 - 7 days at 12/07/2022 0720   Grooming Goal 2    [place] modified independence at 12/07/2022 0720   Time Frame long-term goal (LTG), 14 days or less at 12/07/2022 0720   Toileting Goal 1    [place] supervision required at 12/07/2022 0720   Time Frame short-term goal (STG), 5 - 7 days at 12/07/2022 0720   Toileting Goal 2    [place] modified independence at 12/07/2022 0720   Time Frame long-term goal (LTG), 14 days or less at 12/07/2022 0720

## 2022-12-07 NOTE — ASSESSMENT & PLAN NOTE
Harry Alvarado is a 67 y.o. male who presents with the following chronic medical problems including MTHFR gene mutation, coronary artery disease with prior myocardial infarction 1993, peripheral artery disease with multiple bypass procedures in the past most recent with tPA bolus and angioplasty in 2020.  The patient admitted to [facility] on November 30, 2022 to the vascular surgery service for planned BKA due to bypass thrombosis.  At the time of admission the patient had cyanosis and was beginning to develop thanh gangrene of the foot.  Patient had reported over the last 1 to 2 months worsening pain of the right foot and that he knew the graft was occluded.  Patient has a history of 15+ surgeries of the extremities and understood that his revascularization options were limited.  He had severe 10 out of 10 pain of the foot relieved with elevation of the leg.  He was still able to ambulate but limited by pain.  Under the care of Bowen Serrato MD of vascular surgery he underwent right transtibial amputation on December 2, 2022.  Postoperative course unremarkable with advancement of diet and pain control.  ESTUARDO drain removed on December 4.  For postoperative pain control he was treated with morphine.  On discharge prescribed Tylenol and low-dose Lyrica.  Lyrica started prior by his PCP for neuropathic pain.  He was restarted on his home Eliquis.  He required min assist for bed mobility, transfers and ambulation.  Vascular surgery wants follow-up in 1 month.  Patient is now appropriate for acute inpatient rehabilitation.    Comprehensive inpatient rehabilitation program status post right transtibial amputation.  Goal is for the patient to reach a modified independent level with bed mobility, transfers and ambulation.  Ambulation with hopping short distances using a rolling walker.  Also to become modified independent with elevations.  Preprosthetic training.  Ongoing pain control.    For pain control continue  low-dose Lyrica and consider increasing dose if the patient has ongoing phantom pain.  Continue around-the-clock Tylenol.  Trial tramadol for pain control postoperatively.  Patient has not tolerated Percocet in the past.  Could consider hydrocodone will low-dose morphine.    Monitor incision closely and continue incisional and stump care.

## 2022-12-07 NOTE — PROGRESS NOTES
Patient: Harry Alvarado  Location: [facility] [address]  MRN: [id number]  Today's date: 12/7/2022    History of Present Illness  Harry is a 67 y.o. 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 Principal problem is   Status post below knee amputation of right lower extremity (CMS/[facility]). The pt was admitted to Vascular Surgery service for a planned R BKA due to bypass thrombosis and ischemia of the R foot.   He is s/p R BKA on 12/2/22.  His hgb is stable and he has been restarted on his home Eliquis.   Tolerating a regular diet.  PM&R was consulted on 12/3 and recommends acute inpt rehab for intensive therapy as he can tolerate >3 hrs of combined PT/OT per day and at least 5 days/week with a reasonable expectation that the patient will make measurable functional improvement that only an acute rehab can provide.         Past Medical History  Harry has a past medical history of Coronary artery disease, Deep vein thrombosis (CMS/[facility]), Lupus anticoagulant disorder (CMS/[facility]), Lyme disease, MTHFR gene mutation, Myocardial infarction (CMS/[facility]), and PAD (peripheral artery disease) (CMS/[facility]).      PT Vitals    Date/Time Pulse HR Source BP BP Location BP Method Pt Position [facility]   12/07/22 1034 76 Monitor 121/78 Right upper arm Automatic Sitting AT   12/07/22 1110 74 Monitor 124/77 Right upper arm Automatic Sitting AT      PT Pain    Date/Time Pain Type Side/Orientation Location Rating: Rest Interventions [facility]   12/07/22 1034 Pain Assessment right residual limb 4 premedicated for activity;care clustered AT   12/07/22 1110 Pain Assessment right residual limb 4 -- AT          Prior Living Environment    Flowsheet Row Most Recent Value   People in Home spouse, child(no), adult, grandchild(no)  [Spouse, Son, DIL, 3 grandkids]   Current Living Arrangements home   Living Environment Comment Pt was living in [place] with his wife. However, after  discharge, his wife and him plan to move into son's house in [place] with first floor setup and 1 YOSEF. After prosthetic training, pt plans to move back to [place].   Number of Stairs, Main Entrance 1   Location, Patient Bedroom first (main) floor   Location, Bathroom first (main) floor   Bathroom Access Comment confirmed with family stall shower with 6 inch step to enter and seat within shower          Prior Level of Function    Flowsheet Row Most Recent Value   Dominant Hand right   Ambulation assistive equipment   Transferring assistive equipment   Toileting independent   Bathing assistive person   Dressing independent   Eating independent   Prior Level of Function Comment Pt was previously I with all ADLs, only requiring assist with bathing from his wife as function decreased prior to hospital admission. Pt was ambulating with a SPC for recent 2 months. + , retired. Pt enjoys skiing and playing pickleball with his wife.   Assistive Device Currently Used at Home cane, straight  [reports brief use of RW in past]           IRF PT Evaluation and Treatment - 12/07/22 1034        PT Time Calculation    Start Time 1030     Stop Time 1130     Time Calculation (min) 60 min        Session Details    Document Type initial evaluation     Mode of Treatment individual therapy;physical therapy        General Information    General Observations of Patient Pt received sitting upright in wc, agreeable to PT session     Existing Precautions/Restrictions fall;cardiac;weight bearing        Weight-bearing Status    Right LE Weight-Bearing Status non weight-bearing (NWB)         Services    Do You Speak a Language Other Than English at Home? no     Is an  Needed/Used? No        Living Environment    Name(s) of People in Home Wife - Dave     Primary Care Provided by self        Cognition/Psychosocial    Affect/Mental Status (Cognition) WFL     Orientation Status (Cognition) oriented x 4        Sensory  Assessment (Somatosensory)    Left LE Sensory Assessment light touch awareness;light touch localization;proprioception;intact   proprioception at ankle    Right LE Sensory Assessment light touch awareness;light touch localization;intact     Sensory Subjective Reports tingling   R residual limb       Range of Motion (ROM)    Left Lower Extremity (ROM) left LE ROM is WFL     Right Lower Extremity (ROM) right LE ROM is WFL except;ankle   R BKA       Strength (Manual Muscle Testing)    Hip, Left (Strength) 4/5 all motions     Knee, Left (Strength) 4/5 all motions     Ankle, Left (Strength) 4/5 all motions     Hip, Right (Strength) 4/5 flexion, extension, 4-/5 abduction, adduction     Knee, Right (Strength) 4-/5 flexion, extension     Ankle, Right (Strength) R BKA        Bed Mobility    [place], Roll Left touching/steadying assist     [place], Roll Right not tested     [place], Supine to Sit close supervision     [place], Sit to Supine close supervision     Assistive Device none     Comment (Bed Mobility) without bed rails, increased time to complete. OOB to L side. unable to roll to R side due to pain        Transfers    Transfers low pivot transfer     Comment gait belt donned for all mobility        Sit to Stand Transfer    [place], Sit to Stand Transfer touching/steadying assist     Verbal Cues safety;technique;hand placement;preparatory posture     Assistive Device mobility belt;walker, front-wheeled     Comment wc to stance with RW, assist for balance and safety. report of increased residual limb pain in stance        Stand to Sit Transfer    [place], Stand to Sit Transfer touching/steadying assist     Verbal Cues safety;technique;hand placement;preparatory posture     Assistive Device mobility belt;walker, front-wheeled     Comment to wc and mat surface, assist for balance and controlled descent        Low Pivot Transfer    [place], Low Pivot Transfer close supervision     Verbal  Cues hand placement;safety;technique;preparatory posture     Assistive Device none;mobility belt     Comment LPT to R and L side, wc to/from bed and mat surface. touchA for safety and balance        Stand Pivot Transfer    [place], Stand Pivot/Stand Step Transfer touching/steadying assist     Verbal Cues hand placement;safety;technique;preparatory posture     Assistive Device none;mobility belt     Comment modified SPT wc to/from bed surface, pt coming to 50% stand and pivoting on LLE. assist for balance and safety        Car Transfer    Transfer Technique stand pivot     [place], Car Transfer minimum assist (75% or more patient effort)     Verbal Cues hand placement;safety;technique;preparatory posture     Assistive Device walker, front-wheeled;mobility belt     Comment SPT to mock car, vc for technique and safety. assist for balance with turn and sit to/from stand transfer        Gait Training    [place], Gait minimum assist (75% or more patient effort)     Assistive Device walker, front-wheeled;mobility belt     Distance in Feet 10 feet   x2    Pattern (Gait) swing-to     Deviations/Abnormal Patterns (Gait) gait speed decreased;step length decreased;left sided deviations     Left Sided Gait Deviations heel strike decreased     Maintains Weight-bearing Status (Gait) able to maintain     [place], Picking Up Object unable to assess     Comment (Gait/Stairs) pt performing hopping on LLE with swing to of RLE residual limb. assist at pelvis for balance and safety        Curb Negotiation    [place] unable to assess     Comment unable to assess at time of initial evaluation prior to PT intervention.        Rough/Uneven Surface Gait Skills    [place] unable to assess     Comment unable to assess at time of initial evaluation prior to PT intervention.        Stairs Training    [place], Stairs unable to assess     Comment unable to assess at time of initial evaluation prior to PT  intervention.        Wheelchair Mobility/Management    Method of Locomotion bimanual (upper extremity) propulsion     Wheelchair Type manual, lightweight     Functional Mobility Training forward propulsion;backward propulsion;steering;stopping;turning;doorway navigation     [place], Forward Propulsion close supervision     [place], Backward Propulsion close supervision     [place], Steering Activities close supervision     [place], Stopping Activities close supervision     [place], Turning Activities close supervision     [place], Doorway Navigation close supervision     Distance Propelled in Feet 250 feet   x2    Activity Tolerance able to tolerate functional household activity distances (less than 150 feet)     Comment, Wheelchair Mobility pt able to propel from ID [place] to/from [place]. with vc for technique, safety and object avoidance        Safety Issues, Functional Mobility    Safety Issues Affecting Function (Mobility) awareness of need for assistance;insight into deficits/self-awareness;positioning of assistive device;safety precaution awareness     Impairments Affecting Function (Mobility) balance;endurance/activity tolerance;pain;sensation/sensory awareness;strength        Balance    Static Sitting Balance WFL;sitting, edge of bed     Dynamic Sitting Balance mild impairment;sitting, edge of bed     Static Standing Balance mild impairment;supported;standing     Dynamic Standing Balance moderate impairment;supported;standing     Comment, Balance standing with RW        Motor Skills    Motor Skills functional endurance;coordination     Coordination WFL;bilateral;lower extremity     Functional Endurance fair +, requiring intermittent breaks between bouts of activity        Postural Deviations    Postural Deviations head and neck;shoulder;low back;pelvis     Head and Neck forward head     Shoulder left shoulder forward;right shoulder forward;left shoulder elevation     Low  Back flattened;lordosis     Pelvis posterior pelvic tilt        Wheelchair Locomotion Goal 1    Assistive Device manual, lightweight     Distance 250 feet        Wheelchair Locomotion Goal 2    Assistive Device manual, lightweight     Distance 500 feet        Patient/Family Goals    Patient's Goals For Discharge return home        Therapy Assessment/Plan (PT)    Functional Level at Time of Evaluation (PT) largely touchA-Sandra     PT Diagnosis (PT) gait/ambulatory dysfunction     Rehab Potential/Prognosis (PT) good, to achieve stated therapy goals     Frequency of Treatment (PT) 5-7 times per week;60-90 minutes per day     Estimated Duration of Therapy (PT) 2 weeks;1 week     Problem List (PT) balance;mobility;range of motion (ROM);strength;pain     Activity Limitations Related to Problem List unable to ambulate safely;unable to transfer safely     Planned Therapy Interventions balance training;bed mobility training;gait training;home exercise program;manual therapy techniques;neuromuscular re-education;patient/family education;ROM (range of motion);stair training;strengthening;transfer training;wheelchair management/propulsion training     Comment, Therapy Assessment/Plan (PT) Harry Alvarado is a 67 yr old male with PMHx of MTHFR gene mutation, CAD s/p MI 1993, PAD s/p R Fem to PT bypass with arm vein in 1993, revision in 2011, s/p R SFA to peroneal bypass graft s/p revision on 7/30/18, s/p tPA bolus and angioplasty in 2020. He was admitted to Vascular Surgery for a planned R BKA on 12/2/22 due to bypass thrombosis and ischemia of the R foot.  Pt was [facility] for inpatient skilled therapy 12/6/2022. At time of initial evaluation, he requires close supervision for bed mobility, touchA for sit to stand with RW, touchA-Sandra for SPT, Sandra short distance ambulation with RW, close sup-touchA LPT, and close sup for wc mobility up to 250 ft. Pt is a good candidate for PT skilled services to address current  limitations in mobility, maximize safety and improve independence prior to discharge home with wife to sons home. ELOS to be determined at initial team meeting. Anticipate pt will require wc for discharge home and possible ramp entry to sons home due to 1 YOSEF.                      Education Documentation  Fall Prevention, taught by Ginny Martins PT at 12/7/2022  2:26 PM.  Learner: Patient  Readiness: Acceptance  Method: Explanation  Response: Verbalizes Understanding, Needs Reinforcement  Comment: Pt educated on role of skilled PT goals and intervention in IP rehab setting, schedule, team meetings, 3 hour rule, call bell use, purpose of safety seatbelt/gait belt.    Call Light Use, taught by Ginny Martins PT at 12/7/2022  2:26 PM.  Learner: Patient  Readiness: Acceptance  Method: Explanation  Response: Verbalizes Understanding, Needs Reinforcement  Comment: Pt educated on role of skilled PT goals and intervention in IP rehab setting, schedule, team meetings, 3 hour rule, call bell use, purpose of safety seatbelt/gait belt.    Treatment Plan, taught by Ginny Martins PT at 12/7/2022  2:26 PM.  Learner: Patient  Readiness: Acceptance  Method: Explanation  Response: Verbalizes Understanding, Needs Reinforcement  Comment: Pt educated on role of skilled PT goals and intervention in IP rehab setting, schedule, team meetings, 3 hour rule, call bell use, purpose of safety seatbelt/gait belt.    Orientation to Care Setting, Routine, taught by Ginny Martins PT at 12/7/2022  2:26 PM.  Learner: Patient  Readiness: Acceptance  Method: Explanation  Response: Verbalizes Understanding, Needs Reinforcement  Comment: Pt educated on role of skilled PT goals and intervention in IP rehab setting, schedule, team meetings, 3 hour rule, call bell use, purpose of safety seatbelt/gait belt.    Admission, Transition of Care, taught by Ginny Martins PT at 12/7/2022  2:26 PM.  Learner: Patient  Readiness: Acceptance  Method:  Explanation  Response: Verbalizes Understanding, Needs Reinforcement  Comment: Pt educated on role of skilled PT goals and intervention in IP rehab setting, schedule, team meetings, 3 hour rule, call bell use, purpose of safety seatbelt/gait belt.          IRF PT Goals    Flowsheet Row Most Recent Value   Bed Mobility Goal 1    Activity/Assistive Device bed mobility activities, all at 12/07/2022 1034   [place] supervision required at 12/07/2022 1034   Time Frame short-term goal (STG), 1 week at 12/07/2022 1034   Progress/Outcome goal ongoing at 12/07/2022 1034   Bed Mobility Goal 2    Activity/Assistive Device bed mobility activities, all at 12/07/2022 1034   [place] modified independence at 12/07/2022 1034   Time Frame long-term goal (LTG), 2 weeks at 12/07/2022 1034   Progress/Outcome goal ongoing at 12/07/2022 1034   Transfer Goal 1    Activity/Assistive Device sit-to-stand/stand-to-sit, low pivot, stand pivot at 12/07/2022 1034   [place] supervision required at 12/07/2022 1034   Time Frame short-term goal (STG), 1 week at 12/07/2022 1034   Progress/Outcome goal ongoing at 12/07/2022 1034   Transfer Goal 2    Activity/Assistive Device sit-to-stand/stand-to-sit, low pivot, stand pivot at 12/07/2022 1034   [place] modified independence at 12/07/2022 1034   Time Frame long-term goal (LTG), 2 weeks at 12/07/2022 1034   Progress/Outcome goal ongoing at 12/07/2022 1034   Wheelchair Locomotion Goal 1    Activity wheelchair mobility skills, all at 12/07/2022 1034   Assistive Device manual, lightweight at 12/07/2022 1034   Distance 250 feet at 12/07/2022 1034   [place] modified independence at 12/07/2022 1034   Time Frame short-term goal (STG), 1 week at 12/07/2022 1034   Progress/Outcome goal ongoing at 12/07/2022 1034   Wheelchair Locomotion Goal 2    Activity wheelchair mobility skills, all at 12/07/2022 1034   Assistive Device manual, lightweight at 12/07/2022 1034   Distance 500 feet at  12/07/2022 1034   [place] modified independence at 12/07/2022 1034   Time Frame long-term goal (LTG), 2 weeks at 12/07/2022 1034   Progress/Outcome goal ongoing at 12/07/2022 1034

## 2022-12-07 NOTE — H&P
History & Physical    Subjective/Objective:   PMR H&P  Admitting Diagnosis: Status post below knee amputation of right lower extremity (CMS/[facility]) [Z89.511]  HPI     Harry Alvarado is a 67 y.o. male who presents with the following chronic medical problems including MTHFR gene mutation, coronary artery disease with prior myocardial infarction 1993, peripheral artery disease with multiple bypass procedures in the past most recent with tPA bolus and angioplasty in 2020.  The patient admitted to [facility] on November 30, 2022 to the vascular surgery service for planned BKA due to bypass thrombosis.  At the time of admission the patient had cyanosis and was beginning to develop thanh gangrene of the foot.  Patient had reported over the last 1 to 2 months worsening pain of the right foot and that he knew the graft was occluded.  Patient has a history of 15+ surgeries of the extremities and understood that his revascularization options were limited.  He had severe 10 out of 10 pain of the foot relieved with elevation of the leg.  He was still able to ambulate but limited by pain.  Under the care of Bowen Serrato MD of vascular surgery he underwent right transtibial amputation on December 2, 2022.  Postoperative course unremarkable with advancement of diet and pain control.  ESTUARDO drain removed on December 4.  For postoperative pain control he was treated with morphine.  On discharge prescribed Tylenol and low-dose Lyrica.  Lyrica started prior by his PCP for neuropathic pain.  He was restarted on his home Eliquis.  He required min assist for bed mobility, transfers and ambulation.  Vascular surgery wants follow-up in 1 month.  Patient is now appropriate for acute inpatient rehabilitation.        Medical History:   Past Medical History:   Diagnosis Date   • Coronary artery disease     s/p MI   • Deep vein thrombosis (CMS/HCC)    • Lupus anticoagulant disorder (CMS/HCC)    • Lyme disease    • MTHFR gene mutation    •  Myocardial infarction (CMS/[facility])    • PAD (peripheral artery disease) (CMS/[facility])        Surgical History:   Past Surgical History:   Procedure Laterality Date   • BELOW KNEE LEG AMPUTATION Right 12/02/2022   • FEMORAL BYPASS Right        Social History:   Social History     Social History Narrative   • Not on file     Prior Living Arrangements: RETS18 Living Environment  Living Environment Comment: Pt was living in [place] with his wife. However, after discharge, his wife and him plan to move into son's house in [place] with first floor setup.  Prior Function Level: Prior Level of Function  Dominant Hand: right  Transferring: assistive equipment  Toileting: independent  Bathing: assistive person  Dressing: independent  Eating: independent    Family History: No family history on file.    Allergies: Patient has no known allergies.       Medication List        ASK your doctor about these medications      apixaban 5 mg tablet  Commonly known as: ELIQUIS  Take 5 mg by mouth 2 (two) times a day.  Dose: 5 mg     aspirin 81 mg chewable tablet  Take 81 mg by mouth daily.  Dose: 81 mg     pregabalin 75 mg capsule  Commonly known as: LYRICA  Take 75 mg by mouth 2 (two) times a day.  Dose: 75 mg     zolpidem 10 mg tablet  Commonly known as: AMBIEN  Take 10 mg by mouth nightly as needed for sleep.  Dose: 10 mg            Review of Systems  All other systems reviewed and negative except as noted in the HPI.  Patient having postoperative stump pain.  Also having phantom sensation and some some discomfort as well.  Denies chest pain or shortness of breath.  Mildly constipated.  Urinating well.  Review of systems otherwise negative.    Objective     Vital Signs for the last 24 hours:  Temp:  [36.7 °C (98.1 °F)-36.8 °C (98.2 °F)] 36.7 °C (98.1 °F)  Heart Rate:  [73-80] 73  Resp:  [18] 18  BP: (105-130)/(58-80) 130/80      Current Functional Status - use FLOs from Treatment summary/Preadm assessment  RETS18 Bed  Mobility/Transfers  Extremity Weight-Bearing Status: right lower extremity  Right LE Weight-Bearing Status: non weight-bearing (NWB)        Labs       Results from last 7 days   Lab Units 12/04/22  0000   SODIUM mEQ/L 136   POTASSIUM mEQ/L 4.2   CHLORIDE mEQ/L 99   CO2 mEQ/L 26   BUN mg/dL 14   CREATININE mg/dL 0.67   GLUCOSE mg/dL 120       Physical Exam  General      Alert, cooperative, no distress, appears stated age.   Head:    Normocephalic, without obvious abnormality, atraumatic.   Eyes:    PERRL, conjunctiva/corneas clear, EOM's intact.        Nose:   Nares normal, septum midline, mucosa normal, no drainage or            sinus tenderness.   Throat:   Lips, mucosa, and tongue normal.    Neck:   Supple, symmetrical, trachea midline.    Back:     Symmetric, no curvature.   Lungs:     Clear to auscultation bilaterally, respirations unlabored.   Chest wall:    No tenderness or deformity.   Heart:    Regular rate and rhythm, S1 and S2 normal.   Abdomen:     Soft, non-tender, bowel sounds active all four quadrants,     no masses, no organomegaly.   Extremities:  Musculoskeletal: Moderate stump edema.    Right transtibial amputation.         Skin: Incision intact with minimal drainage.   Neurologic:          Behavior/  Emotional:  CNII-XII intact.  Alert and oriented ×3.  Motor exam good strength throughout without focal weakness.  Sensory exam intact.  Reflexes without hyperreflexia.      Appropriate, cooperative          Assessment/Plan:  * Status post below knee amputation of right lower extremity (CMS/HCC)  Assessment & Plan  Harry Alvarado is a 67 y.o. male who presents with the following chronic medical problems including MTHFR gene mutation, coronary artery disease with prior myocardial infarction 1993, peripheral artery disease with multiple bypass procedures in the past most recent with tPA bolus and angioplasty in 2020.  The patient admitted to [facility] on November 30, 2022 to the vascular  surgery service for planned BKA due to bypass thrombosis.  At the time of admission the patient had cyanosis and was beginning to develop thanh gangrene of the foot.  Patient had reported over the last 1 to 2 months worsening pain of the right foot and that he knew the graft was occluded.  Patient has a history of 15+ surgeries of the extremities and understood that his revascularization options were limited.  He had severe 10 out of 10 pain of the foot relieved with elevation of the leg.  He was still able to ambulate but limited by pain.  Under the care of Bowen Serrato MD of vascular surgery he underwent right transtibial amputation on December 2, 2022.  Postoperative course unremarkable with advancement of diet and pain control.  ESTUARDO drain removed on December 4.  For postoperative pain control he was treated with morphine.  On discharge prescribed Tylenol and low-dose Lyrica.  Lyrica started prior by his PCP for neuropathic pain.  He was restarted on his home Eliquis.  He required min assist for bed mobility, transfers and ambulation.  Vascular surgery wants follow-up in 1 month.  Patient is now appropriate for acute inpatient rehabilitation.    Comprehensive inpatient rehabilitation program status post right transtibial amputation.  Goal is for the patient to reach a modified independent level with bed mobility, transfers and ambulation.  Ambulation with hopping short distances using a rolling walker.  Also to become modified independent with elevations.  Preprosthetic training.  Ongoing pain control.    For pain control continue low-dose Lyrica and consider increasing dose if the patient has ongoing phantom pain.  Continue around-the-clock Tylenol.  Trial tramadol for pain control postoperatively.  Patient has not tolerated Percocet in the past.  Could consider hydrocodone will low-dose morphine.    Monitor incision closely and continue incisional and stump care.    Anemia  Assessment & Plan  Postoperative  anemia.  Monitor hemoglobin levels    Lupus anticoagulant disorder (CMS/HCC)  Assessment & Plan  Continue Eliquis.    Deep vein thrombosis (CMS/HCC)  Assessment & Plan  Continue Eliquis.    Coronary artery disease  Assessment & Plan  Cardiac precautions.  Continue Eliquis.        Code Status: Full Code  Estimated discharge date:      Post Admission Physician Evaluation    Harry Alvarado is admitted to [facility] for comprehensive inpatient rehabilitation for Amputation with functional deficits in mobility; motor dysfunction; safety; self-care. Patient is receiving the following services: medical consultative services; psychiatry/psychology services; physical therapy; occupational therapy.    Active medical management is required for  Patient Active Problem List   Diagnosis   • Status post below knee amputation of right lower extremity (CMS/[facility])   • Coronary artery disease   • Deep vein thrombosis (CMS/HCC)   • Lupus anticoagulant disorder (CMS/[facility])   • MTHFR gene mutation   • PAD (peripheral artery disease) (CMS/[facility])   • Anemia       Premorbid Function  Dominant Hand: right  Ambulation: assistive equipment  Transferring: assistive equipment  Toileting: independent  Bathing: assistive person  Dressing: independent  Eating: independent  Communication: understands/communicates without difficulty  Swallowing: swallows foods/liquids without difficulty  Baseline Diet/Method of Nutritional Intake: regular; thin liquids  Past History of Dysphagia: Denies  Assistive Device/Animal Currently Used at Home: cane, straight  Prior Level of Function Comment: Pt was previously I with all ADLs, only requiring assist with bathing from his wife as function decreased prior to hospital admission. Pt was ambulating with a SPC.  +, retired. Pt enjoys skiing and playing pickleball with his wife.      Current Function  Gait  [place], Gait: minimum assist (75% or more patient effort)  Assistive Device: walker,  front-wheeled    Stairs     Wheelchair     Transfers  [place], Roll Left: minimum assist (75% or more patient effort); nonverbal cues (demo/gesture); verbal cues  [place], Supine to Sit: minimum assist (75% or more patient effort); verbal cues; nonverbal cues (demo/gesture)  Comment (Bed Mobility): OT: Supine to/from long sit - Mod I. Sit to/from supine - Cl S and increased time.  Comment: Mobility belt donned for all transfers.  [place], Sit to Stand Transfer: minimum assist (75% or more patient effort); 2 person assist; verbal cues; nonverbal cues (demo/gesture)  Assistive Device: walker, front-wheeled  [place], Stand to Sit Transfer: minimum assist (75% or more patient effort)  Verbal Cues: hand placement  Assistive Device: walker, front-wheeled    Comment: Unsafe to complete without OT intervention.    Self Care     Cognition  Affect/Mental Status (Cognition): WNL  Orientation Status (Cognition): oriented x 4  Follows Commands (Cognition): WNL    Communication     Swallow       Risk for Complications  Constipation: High  Falls: High  Infection: High      Expected Level of Function  Expected Functional Improvement: mobility; motor dysfunction; safety; self-care  Self-Care: Partial/moderate assistance  Sphincter Control: Independent  Transfers: Supervision or touching assistance  Locomotion: Supervision or touching assistance  Communication: Independent  Social Cognition: Independent      Anticipated Discharge Plan  family member's home with services    I have reviewed the pre-admission screening and there are no relevant changes.    Expected length of stay: 7 days

## 2022-12-07 NOTE — SUBJECTIVE & OBJECTIVE
PMR H&P  Admitting Diagnosis: Status post below knee amputation of right lower extremity (CMS/[facility]) [Z89.511]  HPI     Harry Alvarado is a 67 y.o. male who presents with the following chronic medical problems including MTHFR gene mutation, coronary artery disease with prior myocardial infarction 1993, peripheral artery disease with multiple bypass procedures in the past most recent with tPA bolus and angioplasty in 2020.  The patient admitted to [facility] on November 30, 2022 to the vascular surgery service for planned BKA due to bypass thrombosis.  At the time of admission the patient had cyanosis and was beginning to develop thanh gangrene of the foot.  Patient had reported over the last 1 to 2 months worsening pain of the right foot and that he knew the graft was occluded.  Patient has a history of 15+ surgeries of the extremities and understood that his revascularization options were limited.  He had severe 10 out of 10 pain of the foot relieved with elevation of the leg.  He was still able to ambulate but limited by pain.  Under the care of Bowen Serrato MD of vascular surgery he underwent right transtibial amputation on December 2, 2022.  Postoperative course unremarkable with advancement of diet and pain control.  ESTUARDO drain removed on December 4.  For postoperative pain control he was treated with morphine.  On discharge prescribed Tylenol and low-dose Lyrica.  Lyrica started prior by his PCP for neuropathic pain.  He was restarted on his home Eliquis.  He required min assist for bed mobility, transfers and ambulation.  Vascular surgery wants follow-up in 1 month.  Patient is now appropriate for acute inpatient rehabilitation.        Medical History:   Past Medical History:   Diagnosis Date    Coronary artery disease     s/p MI    Deep vein thrombosis (CMS/[facility])     Lupus anticoagulant disorder (CMS/[facility])     Lyme disease     MTHFR gene mutation     Myocardial infarction (CMS/[facility])     PAD (peripheral  artery disease) (CMS/[facility])        Surgical History:   Past Surgical History:   Procedure Laterality Date    BELOW KNEE LEG AMPUTATION Right 12/02/2022    FEMORAL BYPASS Right        Social History:   Social History     Social History Narrative    Not on file     Prior Living Arrangements: RETS18 Living Environment  Living Environment Comment: Pt was living in [place] with [place] wife. However, after discharge, his wife and him plan to move into son's house in [place] with first floor setup.  Prior Function Level: Prior Level of Function  Dominant Hand: right  Transferring: assistive equipment  Toileting: independent  Bathing: assistive person  Dressing: independent  Eating: independent    Family History: No family history on file.    Allergies: Patient has no known allergies.       Medication List        ASK your doctor about these medications      apixaban 5 mg tablet  Commonly known as: ELIQUIS  Take 5 mg by mouth 2 (two) times a day.  Dose: 5 mg     aspirin 81 mg chewable tablet  Take 81 mg by mouth daily.  Dose: 81 mg     pregabalin 75 mg capsule  Commonly known as: LYRICA  Take 75 mg by mouth 2 (two) times a day.  Dose: 75 mg     zolpidem 10 mg tablet  Commonly known as: AMBIEN  Take 10 mg by mouth nightly as needed for sleep.  Dose: 10 mg            Review of Systems  All other systems reviewed and negative except as noted in the HPI.  Patient having postoperative stump pain.  Also having phantom sensation and some some discomfort as well.  Denies chest pain or shortness of breath.  Mildly constipated.  Urinating well.  Review of systems otherwise negative.    Objective     Vital Signs for the last 24 hours:  Temp:  [36.7 °C (98.1 °F)-36.8 °C (98.2 °F)] 36.7 °C (98.1 °F)  Heart Rate:  [73-80] 73  Resp:  [18] 18  BP: (105-130)/(58-80) 130/80      Current Functional Status - use FLOs from Treatment summary/Preadm assessment  RETS18 Bed Mobility/Transfers  Extremity Weight-Bearing Status: right lower extremity  Right  LE Weight-Bearing Status: non weight-bearing (NWB)        Labs       Results from last 7 days   Lab Units 12/04/22  0000   SODIUM mEQ/L 136   POTASSIUM mEQ/L 4.2   CHLORIDE mEQ/L 99   CO2 mEQ/L 26   BUN mg/dL 14   CREATININE mg/dL 0.67   GLUCOSE mg/dL 120       Physical Exam  General      Alert, cooperative, no distress, appears stated age.   Head:    Normocephalic, without obvious abnormality, atraumatic.   Eyes:    PERRL, conjunctiva/corneas clear, EOM's intact.        Nose:   Nares normal, septum midline, mucosa normal, no drainage or            sinus tenderness.   Throat:   Lips, mucosa, and tongue normal.    Neck:   Supple, symmetrical, trachea midline.    Back:     Symmetric, no curvature.   Lungs:     Clear to auscultation bilaterally, respirations unlabored.   Chest wall:    No tenderness or deformity.   Heart:    Regular rate and rhythm, S1 and S2 normal.   Abdomen:     Soft, non-tender, bowel sounds active all four quadrants,     no masses, no organomegaly.   Extremities:  Musculoskeletal: Moderate stump edema.    Right transtibial amputation.         Skin: Incision intact with minimal drainage.   Neurologic:          Behavior/  Emotional:  CNII-XII intact.  Alert and oriented ×3.  Motor exam good strength throughout without focal weakness.  Sensory exam intact.  Reflexes without hyperreflexia.      Appropriate, cooperative

## 2022-12-07 NOTE — PLAN OF CARE
Problem: Rehabilitation (IRF) Plan of Care  Goal: Plan of Care Review  Outcome: Progressing  Flowsheets (Taken 12/7/2022 1456)  Progress: improving  Plan of Care Reviewed With: patient  Outcome Summary: PT IE completed     Problem: Mobility Impairment  Goal: Optimal Mobility [place] and Safety  Outcome: Progressing     Problem: Rehabilitation (IRF) Plan of Care  Goal: Patient-Specific Goal (Individualized)  Flowsheets (Taken 12/7/2022 1456)  Patient-Specific Goals (Include Timeframe): to get around by myself, to prepare myself for the prosthesis  Individualized Care Needs: wants to be included in POC and goals  Anxieties, Fears or Concerns: none noted

## 2022-12-07 NOTE — PLAN OF CARE
"  Problem: Rehabilitation (IRF) Plan of Care  Goal: Plan of Care Review  Outcome: Progressing  Flowsheets (Taken 12/7/2022 0859)  Progress: improving  Plan of Care Reviewed With: patient  Outcome Summary: OT eval completed. POC established.     Problem: Rehabilitation (IRF) Plan of Care  Goal: Patient-Specific Goal (Individualized)  Flowsheets (Taken 12/7/2022 0859)  Patient-Specific Goals (Include Timeframe): \"To be able to get around better, take care of myself and play with my grandkids.\" And \"To get a prosthesis and walk.\"     "

## 2022-12-07 NOTE — CONSULTS
"Wound Ostomy Continence Note    Subjective    HPI Patient is a 67 y.o. male who was admitted on 12/6/2022 with a diagnosis of Status post below knee amputation of right lower extremity (CMS/HCC) [Z89.511].    Problem list Problem List:   Patient Active Problem List   Diagnosis   • Status post below knee amputation of right lower extremity (CMS/HCC)   • Coronary artery disease   • Deep vein thrombosis (CMS/HCC)   • Lupus anticoagulant disorder (CMS/HCC)   • MTHFR gene mutation   • PAD (peripheral artery disease) (CMS/HCC)   • Anemia      PMH/PSH Medical History:   Past Medical History:   Diagnosis Date   • Coronary artery disease     s/p MI   • Deep vein thrombosis (CMS/HCC)    • Lupus anticoagulant disorder (CMS/HCC)    • Lyme disease    • MTHFR gene mutation    • Myocardial infarction (CMS/HCC)    • PAD (peripheral artery disease) (CMS/HCC)      Surgical History:   Past Surgical History:   Procedure Laterality Date   • BELOW KNEE LEG AMPUTATION Right 12/02/2022   • FEMORAL BYPASS Right       Assessment and Recommendation         Consulted for R BKA site. Pt seen and assessed. Incision to R residual limb is well approximated, there is no drainage, no odor, no surrounding erythema, no induration, and no warmth noted. Sutures in place. Pt reports \"4/10 discomfort\" to RLE.     Remainder of skin intact, without pressure injuries.     Patient's Javier score at last bedside nursing assessment  = 18. Pt is at risk for the development of pressure injuries due to decreased activity and mobility. Discussed this risk with patient and reviewed pressure injury prevention interventions- pt encouraged pt to turn and reposition in bed every 2 hours and PRN and to weight shift while in a chair every 30 minutes. Pt engaged in conversation and expressed understanding.     Recommendations:   - Incision care: Cleanse with peroxide, pat dry. Apply xeroform , wrap with kerlix and secure with ACE wrap daily. (per sending facilities " "discharge paperwork)  - Monitor for signs/symptoms of infection.   - Maintain Pressure Injury PREVENT Bundle, where applicable.     Wound Prevention Bundle                                                            Positioning- If clinically able:  Reposition at a minimum of 2 hours.  Adjust to avoid lying on medical devices. Use positioning devices to offload bony prominences. When out of bed, use chair cushions. Monitor time OOB and encourage repositioning. Use transfer aids to reduce friction and shear. Use \"turn assist.\"                  Risk Assessment:   Utilize risk assessment scale per hospital guidelines.  Identify additional risk factors, for example, medications and comorbidities. Match interventions to subscales of hospital's Risk assessment scale.  Monitor laboratory values. communicate risk to interdisciplinary healthcare team.                  Evaluate Surface/Pressure Redistribution:   Consider specialty sleep surface according to patient need.   Utilize \"less is best\" with linen usage.                Vigilant Skin Inspection:   Inspect skin from head to toe, including areas under removable medical devices and dressings. Communicate skin issues to healthcare team and consult resources as needed.                   Evaluate incontinence/moisture/temperature:   Offer toileting when appropriate. Keep skin clean and dry (microclimate). Use moisture barrier products for incontinence care. Diapers/briefs for out of bed or off unit. Use absorbent under pads that wick away and hold moisture. Intervene for fecal and/or urinary incontinence (consider external containment devices. Use skin emollients (dry skin).                  Nutrition:   Consult dietician for at risk patients. Assist with menu preferences and feeding. Encourage snacks, fluid and supplements with rounding. Accurately record all intake where indicated.           Teaching Patients and Families:   Encourage patients and families to partner with " staff in preventing pressure injuries. Give patients and families pressure injury education, assess their understanding of education given, and document education.    Dicussed plan of care with patient and patient's nurse, Loyd. Wound care to sign off. Please re-consult for any further needs.        Date: 12/07/22  Signature: Ratna Devlin RN

## 2022-12-07 NOTE — CONSULTS
"144/144D    Clinical Course: Patient is a 67 y.o. male who was admitted on 12/6/2022 with a diagnosis of Status post below knee amputation of right lower extremity (CMS/HCC) [Z89.511].     Nutrition Interventions/Recommendations:   1. Continue current Regular diet order  2. Provided Regular alternate food list  3. Discussed need for adequate protein and calorie intake for healing and strength; recommended eating a protein food at every meal  4. Encouraged Pt to add double portions to daily menus as desired  5. Added HS Snack of oatmeal raisin cookie and whole milk per PT preference  6. Please weigh Pt weekly and update Epic    Nutrition Risk Level 1    Past Medical History:   Diagnosis Date   • Coronary artery disease     s/p MI   • Deep vein thrombosis (CMS/HCC)    • Lupus anticoagulant disorder (CMS/HCC)    • Lyme disease    • MTHFR gene mutation    • Myocardial infarction (CMS/HCC)    • PAD (peripheral artery disease) (CMS/HCC)      Past Surgical History:   Procedure Laterality Date   • BELOW KNEE LEG AMPUTATION Right 12/02/2022   • FEMORAL BYPASS Right         Adult Nutrition Assessment - 12/07/22 1500        Charting Type    Nutrition Charting Type Nutrition Brief Assessment     Nutrition Status Classification Mild nutritional compromise     Time Spent (Minutes) 45        Reason for Assessment    Reason For Assessment physician consult        Nutrition/Diet History    Typical Food/Fluid Intake Pt states no dietary restrictions at home     Food Preferences Dislikes yogurt, cottage cheese; likes to drink whole milk at meals     Meal/Snack Patterns 2-3 daily     Supplemental Drinks/Foods/Additives HOme: none     Vitamin/Mineral/Herbal Supplements Currently none     Food Allergies NKFA     Factors Affecting Nutritional Intake --   Currently none       Usual Body Weight (UBW)    Usual Body Weight 73.5 kg (162 lb)   Pt states \"2 months ago\"    % of Usual Body Weight Assessment 85-95% - mild deficit     Weight Loss " unintentional   s/p BKA    Time Frame 3 Months        Body Mass Index (BMI)    BMI Assessment --   Adjusted BMI 21 = Normal       Labs/Procedures/Meds    Lab Results Reviewed reviewed, pertinent     Lab Results Comments 12/7: Na 135 L, Creat 0.6 L, elevated LFT's, Hgb 12.2 L, Hct 37.5 L        Medications    Pertinent Medications Reviewed reviewed, pertinent     Pertinent Medications Comments pantoprazole, Miralax, tramadol        Physical Findings    Overall Physical Appearance amputee   PT appears thin, mild muscle mass loss to temples, clavicles    Gastrointestinal constipation     Last Bowel Movement --   Pt states 12/7    Skin surgical incision        Nutrition Order    Nutrition Order meets nutritional requirements     Nutrition Order Comments Regular/ Thins        PES Statement    Nutritional Needs Met? Yes                 CMP Results       12/07/22 12/04/22 12/03/22     0611       136 138    K 4.6 4.2 3.9    Cl 99 99 102    CO2 24 26 27    Glucose 70 120 106    BUN 20 14 14    Creatinine 0.6 0.67 0.79    Calcium 9.3 -- --    Anion Gap 12 -- --    AST 78 -- --    ALT 82 -- --    Albumin 2.9 -- --    EGFR >60.0 -- --        Lab Results   Component Value Date    ALT 82 (H) 12/07/2022    AST 78 (H) 12/07/2022    ALKPHOS 159 (H) 12/07/2022    BILITOT 0.6 12/07/2022     No results found for: [id number]  Lab Results   Component Value Date    WBC 13.47 (H) 12/07/2022    HGB 12.2 (L) 12/07/2022    HCT 37.5 (L) 12/07/2022    MCV 92.8 12/07/2022     (H) 12/07/2022     No results found for: CHOL  No results found for: HDL  No results found for: LDLCALC  No results found for: TRIG  No results found for: CHOLHDL  Lab Results   Component Value Date    CALCIUM 9.3 12/07/2022     Glucose Results    No lab values to display.       • acetaminophen  975 mg oral q8h JOSEPH   • apixaban  5 mg oral BID   • pantoprazole  40 mg oral Daily before breakfast   • polyethylene glycol  17 g oral Daily   • pregabalin  25 mg oral  TID          Dietary Orders   (From admission, onward)             Start     Ordered    12/06/22 1639  Adult Diet Regular; Thin Liquids  Diet effective now        References:    IDDSI Diet reference   Question Answer Comment   Diet Texture Regular    Fluid Consistency: Thin Liquids        12/06/22 1638              Wt Readings from Last 3 Encounters:   12/07/22 70.1 kg (154 lb 9.6 oz)   12/05/22 90.7 kg (200 lb)       Weights (last 7 days)     Date/Time Weight    12/07/22 1313 70.1 kg (154 lb 9.6 oz)    12/06/22 1600 69.1 kg (152 lb 6.4 oz)          Clinical Comments:  Consulted for assessment; Pt with weight loss s/p R BKA. PT also reports additional weight loss since the summer d/t reduced activity/mobility. Pt is eating 100% of meals and would like double portions plus an HS snack.       Date: 12/07/22  Signature: JARROD Wilson

## 2022-12-07 NOTE — PROGRESS NOTES
Patient: Harry Alvarado  Location: [facility] Unit 144D  MRN: [id number]  Today's date: 12/7/2022    History of Present Illness  Harry is a 67 y.o. male admitted on 12/6/2022 with Status post below knee amputation of right lower extremity (CMS/[facility]) [Z89.511]. Principal problem is   Status post below knee amputation of right lower extremity (CMS/[facility]). The pt was admitted to Vascular Surgery service for a planned R BKA due to bypass thrombosis and ischemia of the R foot.   He is s/p R BKA on 12/2/22.  His hgb is stable and he has been restarted on his home Eliquis.   Tolerating a regular diet.  PM&R was consulted on 12/3 and recommends acute inpt rehab for intensive therapy as he can tolerate >3 hrs of combined PT/OT per day and at least 5 days/week with a reasonable expectation that the patient will make measurable functional improvement that only an acute rehab can provide.         Past Medical History  Harry has a past medical history of Coronary artery disease, Deep vein thrombosis (CMS/[facility]), Lupus anticoagulant disorder (CMS/[facility]), Lyme disease, MTHFR gene mutation, Myocardial infarction (CMS/[facility]), and PAD (peripheral artery disease) (CMS/[facility]).      OT Vitals    Date/Time Pulse HR Source BP BP Location BP Method Pt Position [facility]   12/07/22 0846 88 Monitor 132/71 Left upper arm Automatic Sitting AK      OT Pain    Date/Time Pain Type Side/Orientation Location Rating: Rest Interventions [facility]   12/07/22 0846 Pain Reassessment right leg 5 medication administered AK   12/07/22 0847 Pain Assessment -- residual limb 5 -- AGL          Prior Living Environment    Flowsheet Row Most Recent Value   People in Home spouse, child(no), adult, grandchild(no)  [Spouse, Son, DIL, 3 grandkids]   Current Living Arrangements home   Living Environment Comment Pt was living in [place] with his wife. However, after discharge, his wife and him plan to move into son's house in [place] with first floor setup and 1 YOSEF.  "After prosthetic training, pt plans to move back to [place].   Number of Stairs, Main Entrance 1   Location, Patient Bedroom first (main) floor   Location, Bathroom first (main) floor   Bathroom Access Comment confirmed with family stall shower with 6 inch step to enter and seat within shower          Prior Level of Function    Flowsheet Row Most Recent Value   Dominant Hand right   Ambulation assistive equipment   Transferring assistive equipment   Toileting independent   Bathing assistive person   Dressing independent   Eating independent   Prior Level of Function Comment Pt was previously I with all ADLs, only requiring assist with bathing from his wife as function decreased prior to hospital admission. Pt was ambulating with a SPC for recent 2 months. + , retired. Pt enjoys skiing and playing pickleball with his wife.   Assistive Device Currently Used at Home cane, straight  [reports brief use of RW in past]          Occupational Profile    Flowsheet Row Most Recent Value   Occupational History/Life Experiences +. 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   Patient Goals \"Be able to get around better, take care of myself and play with my grandkids.\" And also \"Get a prosthesis and walk around like I used too.\"           IRF OT Evaluation and Treatment - 12/07/22 0845        OT Time Calculation    Start Time 0845     Stop Time 0900     Time Calculation (min) 15 min        Session Details    Document Type daily treatment/progress note     Mode of Treatment occupational therapy;individual therapy        General Information    General Observations of Patient OT intervention necessary for safe toilet transfer.        Transfers    Comment Mobility belt donned t/o.        Toilet Transfer    Transfer Technique low pivot     [place], Toilet Transfer touching/steadying assist     Verbal Cues hand placement;safety;technique     Assistive " Device commode, 3-in-1   Drop arm    Comment Pt completed LPT transfer from bed to/from drop arm bedside commode c Steadying A.        Daily Progress Summary (OT)    Daily Outcome Statement OT intervention necessary for safe toilet transfer.                           IRF OT Goals    Flowsheet Row Most Recent Value   Transfer Goal 1    Activity/Assistive Device toilet at 12/07/2022 0720   [place] supervision required at 12/07/2022 0720   Time Frame short-term goal (STG), 5 - 7 days at 12/07/2022 0720   Transfer Goal 2    Activity/Assistive Device toilet at 12/07/2022 0720   [place] modified independence at 12/07/2022 0720   Time Frame long-term goal (LTG), 14 days or less at 12/07/2022 0720   Transfer Goal 3    Activity/Assistive Device shower at 12/07/2022 0720   [place] --  [Steadying A] at 12/07/2022 0720   Time Frame short-term goal (STG), 5 - 7 days at 12/07/2022 0720   Transfer Goal 4    Activity/Assistive Device shower at 12/07/2022 0720   [place] modified independence at 12/07/2022 0720   Time Frame long-term goal (LTG) at 12/07/2022 0720   Bathing Goal 1    [place] minimum assist (75% or more patient effort)  [shower level] at 12/07/2022 0720   Time Frame short-term goal (STG), 5 - 7 days at 12/07/2022 0720   Bathing Goal 2    [place] modified independence at 12/07/2022 0720   Time Frame long-term goal (LTG), 14 days or less at 12/07/2022 0720   UB Dressing Goal 1    [place] set-up required at 12/07/2022 0720   Time Frame short-term goal (STG), 5 - 7 days at 12/07/2022 0720   UB Dressing Goal 2    [place] modified independence at 12/07/2022 0720   Time Frame long-term goal (LTG), 14 days or less at 12/07/2022 0720   LB Dressing Goal 1    [place] moderate assist (50-74% patient effort)  [While in stance with RW] at 12/07/2022 0720   Time Frame short-term goal (STG), 5 - 7 days at 12/07/2022 0720   LB Dressing Goal 2    [place] modified independence at  12/07/2022 0720   Time Frame long-term goal (LTG), 14 days or less at 12/07/2022 0720   Grooming Goal 1    [place] set-up required at 12/07/2022 0720   Time Frame short-term goal (STG), 5 - 7 days at 12/07/2022 0720   Grooming Goal 2    [place] modified independence at 12/07/2022 0720   Time Frame long-term goal (LTG), 14 days or less at 12/07/2022 0720   Toileting Goal 1    [place] supervision required at 12/07/2022 0720   Time Frame short-term goal (STG), 5 - 7 days at 12/07/2022 0720   Toileting Goal 2    [place] modified independence at 12/07/2022 0720   Time Frame long-term goal (LTG), 14 days or less at 12/07/2022 0720

## 2022-12-07 NOTE — PLAN OF CARE
Plan of Care Review  Plan of Care Reviewed With: patient  Progress: improving  Outcome Summary: verbalizes needs, uses call light, fall precautions reinforced. pain managed by scheduled tylenol,  lyrica and PRN tramadol.  Residual limb incision dressing change done by WOCN. continent of bowel/bladder.

## 2022-12-08 ENCOUNTER — APPOINTMENT (INPATIENT)
Dept: OCCUPATIONAL THERAPY | Facility: REHABILITATION | Age: 67
DRG: 560 | End: 2022-12-08
Payer: MEDICARE

## 2022-12-08 ENCOUNTER — APPOINTMENT (INPATIENT)
Dept: PHYSICAL THERAPY | Facility: REHABILITATION | Age: 67
DRG: 560 | End: 2022-12-08
Payer: MEDICARE

## 2022-12-08 ENCOUNTER — APPOINTMENT (OUTPATIENT)
Dept: PSYCHOLOGY | Facility: CLINIC | Age: 67
End: 2022-12-08
Attending: HOSPITALIST
Payer: MEDICARE

## 2022-12-08 PROCEDURE — 97110 THERAPEUTIC EXERCISES: CPT | Mod: GP,CQ

## 2022-12-08 PROCEDURE — 90791 PSYCH DIAGNOSTIC EVALUATION: CPT | Performed by: PSYCHOLOGIST

## 2022-12-08 PROCEDURE — 63700000 HC SELF-ADMINISTRABLE DRUG: Performed by: PHYSICAL MEDICINE & REHABILITATION

## 2022-12-08 PROCEDURE — 63700000 HC SELF-ADMINISTRABLE DRUG: Performed by: HOSPITALIST

## 2022-12-08 PROCEDURE — 97116 GAIT TRAINING THERAPY: CPT | Mod: GP,CQ

## 2022-12-08 PROCEDURE — 12800000 HC ROOM AND CARE SEMIPRIVATE REHAB

## 2022-12-08 PROCEDURE — 97530 THERAPEUTIC ACTIVITIES: CPT | Mod: GO,CO

## 2022-12-08 PROCEDURE — 97542 WHEELCHAIR MNGMENT TRAINING: CPT | Mod: GP

## 2022-12-08 PROCEDURE — 97110 THERAPEUTIC EXERCISES: CPT | Mod: GO

## 2022-12-08 PROCEDURE — 97530 THERAPEUTIC ACTIVITIES: CPT | Mod: GP,CQ

## 2022-12-08 PROCEDURE — 97530 THERAPEUTIC ACTIVITIES: CPT | Mod: GO

## 2022-12-08 PROCEDURE — 97110 THERAPEUTIC EXERCISES: CPT | Mod: GP

## 2022-12-08 RX ADMIN — PREGABALIN 25 MG: 25 CAPSULE ORAL at 08:19

## 2022-12-08 RX ADMIN — TRAMADOL HYDROCHLORIDE 50 MG: 50 TABLET, COATED ORAL at 13:43

## 2022-12-08 RX ADMIN — TRAMADOL HYDROCHLORIDE 50 MG: 50 TABLET, COATED ORAL at 08:19

## 2022-12-08 RX ADMIN — PREGABALIN 25 MG: 25 CAPSULE ORAL at 17:22

## 2022-12-08 RX ADMIN — APIXABAN 5 MG: 5 TABLET, FILM COATED ORAL at 20:34

## 2022-12-08 RX ADMIN — PANTOPRAZOLE SODIUM 40 MG: 40 TABLET, DELAYED RELEASE ORAL at 08:19

## 2022-12-08 RX ADMIN — TRAMADOL HYDROCHLORIDE 50 MG: 50 TABLET, COATED ORAL at 20:33

## 2022-12-08 RX ADMIN — ACETAMINOPHEN 975 MG: 325 TABLET ORAL at 05:34

## 2022-12-08 RX ADMIN — APIXABAN 5 MG: 5 TABLET, FILM COATED ORAL at 08:19

## 2022-12-08 RX ADMIN — PREGABALIN 25 MG: 25 CAPSULE ORAL at 20:33

## 2022-12-08 RX ADMIN — ACETAMINOPHEN 975 MG: 325 TABLET ORAL at 13:43

## 2022-12-08 RX ADMIN — ACETAMINOPHEN 975 MG: 325 TABLET ORAL at 20:34

## 2022-12-08 ASSESSMENT — COGNITIVE AND FUNCTIONAL STATUS - GENERAL
SLEEP_WAKE_CYCLE: DECREASED
AFFECT: FULL RANGE
EYE_CONTACT: WNL
MOOD: MOTIVATED;HOPEFUL
INSIGHT: AWARE OF PHYSICAL LIMITATIONS;AWARE OF IMPACT ON FUNCTIONING;INTACT
CONCENTRATION: WNL
THOUGHT_CONTENT: APPROPRIATE
AROUSAL LEVEL: ATTENTIVE
SPEECH: REGULAR
IMPULSE CONTROL: INTACT
REMOTE MEMORY: WNL
ORIENTATION: FULLY ORIENTED
APPEARANCE: WELL GROOMED
DELUSIONS: NONE OR AGE APPROPRIATE
THOUGHT_PROCESS: WNL
ATTENTION: WNL
RECENT MEMORY: WNL
PSYCHOMOTOR FUNCTIONING: WNL
EST. PREMORBID INTELLIGENCE: ABOVE AVERAGE
APPETITE: NO CHANGE

## 2022-12-08 NOTE — PROGRESS NOTES
[facility] Progress Note          Patient was seen and examined.   Attestation Notes: Face to face encounter completed    Harry Alvarado is a 67 y.o. male who was admitted for Status post below knee amputation of right lower extremity (CMS/HCC) [Z89.511]. Patient was referred by Richard Angulo MD for medical assessment and management      CC: Status post below knee amputation of right lower extremity (CMS/HCC) [Z89.511]     HPI: Harry Alvarado is a 67 y.o. male with MTHFR gene mutation, CAD s/p MI 1993, and PAD s/p R Fem to PT bypass with arm vein in 1993, revision in 2011, s/p R SFA to peroneal bypass graft s/p revision on 7/30/18, s/p tPA bolus and angioplasty in 2020 admitted to [facility] 11/30/22 with RLE bypass thrombosis and chronic right foot ischemia/gangrene and underwent right BKA by vascular surgeon, Dr. Bowen Serrato 12/2/22. Post op he had anemia but did not require transfusion. He was restarted on his home Eliquis.  His pain was managed with Tylenol and Lyrica. Tolerating a regular diet.      The patient was evaluated by PM&R and found to have residual deficits in ambulation and ADLs due to Status post below knee amputation of right lower extremity (CMS/HCC) [Z89.511] and came to [facility] on 12/6/2022 for acute inpatient rehab.      SUBJECTIVE:  Patient interviewed and examined     No acute complaints.     RLE pain stable, moving bowels and bladder, no abdominal pain or nausea, no dysuria, no chest pain, palpitations, dyspnea or fevers    Review of Systems:  All other systems reviewed and negative except as noted in the HPI.    Current meds and allergies reviewed    Past Medical History:   Diagnosis Date   • Coronary artery disease     s/p MI   • Deep vein thrombosis (CMS/HCC)    • Lupus anticoagulant disorder (CMS/HCC)    • Lyme disease    • MTHFR gene mutation    • Myocardial infarction (CMS/HCC)    • PAD (peripheral artery disease) (CMS/HCC)      Past Surgical  History:   Procedure Laterality Date   • BELOW KNEE LEG AMPUTATION Right 12/02/2022   • FEMORAL BYPASS Right      Social History     Tobacco Use   • Smoking status: Never   • Smokeless tobacco: Never   Substance Use Topics   • Alcohol use: Not Currently      No family history on file.    Vital signs in last 24 hours:  Temp:  [36.6 °C (97.9 °F)] 36.6 °C (97.9 °F)  Heart Rate:  [72-79] 77  Resp:  [14-20] 14  BP: (118-141)/(70-89) 141/75  Vital signs reviewed 12/08/22 3:09 PM    Physical Exam  Vitals and nursing note reviewed.   Constitutional:       Appearance: Normal appearance.   HENT:      Head: Normocephalic and atraumatic.      Right Ear: External ear normal.      Left Ear: External ear normal.      Nose: Nose normal.      Mouth/Throat:      Mouth: Mucous membranes are moist.      Pharynx: Oropharynx is clear.   Eyes:      Extraocular Movements: Extraocular movements intact.      Conjunctiva/sclera: Conjunctivae normal.      Pupils: Pupils are equal, round, and reactive to light.   Cardiovascular:      Rate and Rhythm: Normal rate and regular rhythm.      Pulses: Normal pulses.      Heart sounds: Normal heart sounds.   Pulmonary:      Effort: Pulmonary effort is normal.      Breath sounds: Normal breath sounds.   Abdominal:      General: Abdomen is flat. Bowel sounds are normal.      Palpations: Abdomen is soft.   Musculoskeletal:      Cervical back: Normal range of motion and neck supple.      Right lower leg: Edema present.      Left lower leg: Edema present.      Comments: S/p right BKA   Skin:     General: Skin is warm and dry.   Neurological:      General: No focal deficit present.      Mental Status: He is alert and oriented to person, place, and time.   Psychiatric:         Mood and Affect: Mood normal.         Behavior: Behavior normal.                 Objective    Labs:  I have reviewed the patient's labs.  Significant abnormals are leukocytosis, anemia, hyponatremia, elevated LFTs.  Results from last 7  days   Lab Units 12/07/22  0611   WBC K/uL 13.47*   HEMOGLOBIN g/dL 12.2*   HEMATOCRIT % 37.5*   PLATELETS K/uL 462*     Results from last 7 days   Lab Units 12/07/22  0611   SODIUM mEQ/L 135*   POTASSIUM mEQ/L 4.6   CHLORIDE mEQ/L 99   CO2 mEQ/L 24   BUN mg/dL 20   CREATININE mg/dL 0.6*   CALCIUM mg/dL 9.3   ALBUMIN g/dL 2.9*   BILIRUBIN TOTAL mg/dL 0.6   ALK PHOS IU/L 159*   ALT IU/L 82*   AST IU/L 78*   GLUCOSE mg/dL 70     Results from last 7 days   Lab Units 12/07/22  0611   MAGNESIUM mg/dL 2.0        Imaging:  Not applicable        ASSESSMENT/PLAN:    67 y.o. male with MTHFR gene mutation, CAD s/p MI 1993, and PAD s/p R Fem to PT bypass with arm vein in 1993, revision in 2011, s/p R SFA to peroneal bypass graft s/p revision on 7/30/18, s/p tPA bolus and angioplasty in 2020 admitted to [facility] 11/30/22 with RLE bypass thrombosis and chronic right foot ischemia/gangrene and underwent right BKA by vascular surgeon, Dr. Bowen Serrato 12/2/22     1. Vasc:  -PAD s/p failed RLE bypass with right foot ischemia/gangrene  -s/p right BKA 12/2/22  -Tylenol and Lyrica for pain  -remains on Eliquis for hypercoagulable condition     2. Heme:  -post op anemia due to chronic blood loss, does not need iron  -leukocytosis reactive due to surgery  -hx of MTHFR gene mutation  -follow CBC     3. Renal:  -increased risk of dehydration and electrolyte changes  -follow BMP, Mg     4. Gi:  -Senokot-S as needed for bowels  -Protonix for GERD and ulcer ppx  -elevated LFTs likely transient due to surgery, meds, follow for now     5. Gu:  -no UTI or retention     6. Cardiac:  -hx of CAD  -watch for orthostatic hypotension  -use cardiac precautions in therapy     7. Pulm:  -incentive spirometry for atelectasis     8. Derm:  -seen by [employer] for skin assessment     9. Nutrition:  -seen by Nutrition for assessment and education     10. Psych:  -seen by Psychology for support  -Ambien prn sleep     plan discussed with  patient, nurse, case management and  Richard Angulo MD Scott Sapperstein, MD  12/8/2022  3:09 PM

## 2022-12-08 NOTE — ASSESSMENT & PLAN NOTE
Postoperative anemia.  Monitor hemoglobin levels.  Hemoglobin 12.2.  Mild iron deficiency and consider iron replacement.

## 2022-12-08 NOTE — PROGRESS NOTES
Patient: Harry Alvarado  Location: [facility] [address]  MRN: [id number]  Today's date: 12/8/2022    History of Present Illness  Harry is a 67 y.o. male admitted on 12/6/2022 with Status post below knee amputation of right lower extremity (CMS/[facility]) [Z89.511]. Principal problem is   Status post below knee amputation of right lower extremity (CMS/[facility]). The pt was admitted to Vascular Surgery service for a planned R BKA due to bypass thrombosis and ischemia of the R foot.   He is s/p R BKA on 12/2/22.  His hgb is stable and he has been restarted on his home Eliquis.   Tolerating a regular diet.  PM&R was consulted on 12/3 and recommends acute inpt rehab for intensive therapy as he can tolerate >3 hrs of combined PT/OT per day and at least 5 days/week with a reasonable expectation that the patient will make measurable functional improvement that only an acute rehab can provide.         Past Medical History  Harry has a past medical history of Coronary artery disease, Deep vein thrombosis (CMS/[facility]), Lupus anticoagulant disorder (CMS/[facility]), Lyme disease, MTHFR gene mutation, Myocardial infarction (CMS/[facility]), and PAD (peripheral artery disease) (CMS/[facility]).      PT Vitals    Date/Time Pulse HR Source BP BP Location BP Method Pt Position [facility]   12/08/22 1004 77 Monitor 126/76 Right upper arm Automatic Sitting [place]      PT Pain    Date/Time Pain Type Side/Orientation Location Rating: Rest Rating: Activity Description Interventions [facility]   12/08/22 1004 Pain Assessment right residual limb 3 4 phantom premedicated for activity [place]   12/08/22 1055 Post Activity right residual limb 4 5 incisional diversional activity provided [place]          Prior Living Environment    Flowsheet Row Most Recent Value   People in Home spouse, child(no), adult, grandchild(no)  [Spouse, Son, DIL, 3 grandkids]   Current Living Arrangements home   Living Environment Comment Pt was living in [place] with his wife. However, after discharge, his  wife and him plan to move into son's house in [place] with first floor setup and 1 YOSEF. After prosthetic training, pt plans to move back to [place].   Number of Stairs, Main Entrance 1   Location, Patient Bedroom first (main) floor   Location, Bathroom first (main) floor   Bathroom Access Comment confirmed with family stall shower with 6 inch step to enter and seat within shower          Prior Level of Function    Flowsheet Row Most Recent Value   Dominant Hand right   Ambulation assistive equipment   Transferring assistive equipment   Toileting independent   Bathing assistive person   Dressing independent   Eating independent   Prior Level of Function Comment Pt was previously I with all ADLs, only requiring assist with bathing from his wife as function decreased prior to hospital admission. Pt was ambulating with a SPC for recent 2 months. + , retired. Pt enjoys skiing and playing pickleball with his wife.   Assistive Device Currently Used at Home cane, straight  [reports brief use of RW in past]           IRF PT Evaluation and Treatment - 12/08/22 1001        PT Time Calculation    Start Time 1000     Stop Time 1100     Time Calculation (min) 60 min        Session Details    Document Type daily treatment/progress note     Mode of Treatment physical therapy;individual therapy        General Information    General Observations of Patient Pt received sitting upright in w/c, agreeable to participate        Transfers    Transfers low pivot transfer     Comment gait belt donned        Low Pivot Transfer    [place], Low Pivot Transfer touching/steadying assist     Verbal Cues safety;technique;preparatory posture     Assistive Device mobility belt     Comment LPT between w/c<>EOM with Touch A for stability. VCs for technique and positioning of w/c and body prior to tx        Wheelchair Mobility/Management    Method of Locomotion bimanual (upper extremity) propulsion     Wheelchair Type manual, lightweight      Functional Mobility Training forward propulsion;steering;stopping;turning;doorway navigation;sloped surface navigation;weight shifting     [place], Forward Propulsion supervision     [place], Steering Activities supervision     [place], Stopping Activities supervision     [place], Turning Activities supervision     [place], Doorway Navigation supervision     [place], Sloped Surface Navigation minimum assist (75% or more patient effort)     [place], Weight Shifting supervision;verbal cues     Distance Propelled in Feet 300 feet     Comment, Wheelchair Mobility 1) Reviewed education on weight shifting technique and parameters when sitting in w/c; pt able to demo lateral WS each direction and w/c pushup with S 2) Reviewed w/c component mgmt including armrest, legrests and brakes. Pt able to manage all aspects with S and VCs after initial demonstration of appropriate technique 3) Pt self-propelling within facility over level tiled surface with S for safety up to 300' (x2). W/C gloves provided for improved  and comfort.     Management Activities armrest management;legrest management;wheel locks/brakes management     Arm Rest Management supervision;verbal cues     Leg Rest Management supervision;verbal cues     Wheel Locks/Brake Management supervision;verbal cues        Lower Extremity (Therapeutic Exercise)    Exercise Position/Type seated     General Exercise right     Reps and Sets 2x10     Comment 1) knee ext 2) marches        LE Residual Limb Therapeutic Activity    Desensitization Techniques light rubbing;tapping     Phantom Pain/Sensation patient reports phantom pain;patient reports phantom sensation     Comment, Phantom Pain/Sensation Pt education provided on role of desensitization techniques in helping to manage phantom pain/sensation. Educated to avoid performing directly over incision     Comment, Management Pt performing desensitization techniques (light tapping/rubbing)  "x5 mins. Pt reports phantom sensation during this task, but reports \"it feels good to do it\".        Daily Progress Summary (PT)    Daily Outcome Statement Session focused on w/c level mobility and review of education on desensitization/weight shifting and pressure relief/wheelchair component management. Pt performs LPT with Touch A, able to self-propel a w/c short community distances with S. Pt will benefit from reinforcement of education on topics discussed this session to ensure carryover. Continue to progress as tolerated with emphasis on LE [place] to promote maintenance of mobility/strength and contracture prevention.                      Education Documentation  Desensitization, taught by Olga Duval PTA at 12/8/2022 10:50 AM.  Learner: Patient  Readiness: Acceptance  Method: Explanation  Response: Verbalizes Understanding    Pressure Injury, taught by Olga Duval PTA at 12/8/2022 10:49 AM.  Learner: Patient  Readiness: Acceptance  Method: Explanation, Demonstration  Response: Verbalizes Understanding, Demonstrated Understanding  Comment: Reviewed weight shifting technique/parameters while sitting in w/c          IRF PT Goals    Flowsheet Row Most Recent Value   Bed Mobility Goal 1    Activity/Assistive Device bed mobility activities, all at 12/07/2022 1034   [place] supervision required at 12/07/2022 1034   Time Frame short-term goal (STG), 1 week at 12/07/2022 1034   Progress/Outcome goal ongoing at 12/07/2022 1034   Bed Mobility Goal 2    Activity/Assistive Device bed mobility activities, all at 12/07/2022 1034   [place] modified independence at 12/07/2022 1034   Time Frame long-term goal (LTG), 2 weeks at 12/07/2022 1034   Progress/Outcome goal ongoing at 12/07/2022 1034   Transfer Goal 1    Activity/Assistive Device sit-to-stand/stand-to-sit, low pivot, stand pivot at 12/07/2022 1034   [place] supervision required at 12/07/2022 1034   Time Frame short-term goal (STG), 1 week at " 12/07/2022 1034   Progress/Outcome goal ongoing at 12/07/2022 1034   Transfer Goal 2    Activity/Assistive Device sit-to-stand/stand-to-sit, low pivot, stand pivot at 12/07/2022 1034   [place] modified independence at 12/07/2022 1034   Time Frame long-term goal (LTG), 2 weeks at 12/07/2022 1034   Progress/Outcome goal ongoing at 12/07/2022 1034   Wheelchair Locomotion Goal 1    Activity wheelchair mobility skills, all at 12/07/2022 1034   Assistive Device manual, lightweight at 12/07/2022 1034   Distance 250 feet at 12/07/2022 1034   [place] modified independence at 12/07/2022 1034   Time Frame short-term goal (STG), 1 week at 12/07/2022 1034   Progress/Outcome goal ongoing at 12/07/2022 1034   Wheelchair Locomotion Goal 2    Activity wheelchair mobility skills, all at 12/07/2022 1034   Assistive Device manual, lightweight at 12/07/2022 1034   Distance 500 feet at 12/07/2022 1034   [place] modified independence at 12/07/2022 1034   Time Frame long-term goal (LTG), 2 weeks at 12/07/2022 1034   Progress/Outcome goal ongoing at 12/07/2022 1034

## 2022-12-08 NOTE — ASSESSMENT & PLAN NOTE
Harry Alvarado is a 67 y.o. male who presents with the following chronic medical problems including MTHFR gene mutation, coronary artery disease with prior myocardial infarction 1993, peripheral artery disease with multiple bypass procedures in the past most recent with tPA bolus and angioplasty in 2020.  The patient admitted to [facility] on November 30, 2022 to the vascular surgery service for planned BKA due to bypass thrombosis.  At the time of admission the patient had cyanosis and was beginning to develop thanh gangrene of the foot.  Patient had reported over the last 1 to 2 months worsening pain of the right foot and that he knew the graft was occluded.  Patient has a history of 15+ surgeries of the extremities and understood that his revascularization options were limited.  He had severe 10 out of 10 pain of the foot relieved with elevation of the leg.  He was still able to ambulate but limited by pain.  Under the care of Bowen Serrato MD of vascular surgery he underwent right transtibial amputation on December 2, 2022.  Postoperative course unremarkable with advancement of diet and pain control.  ESTUARDO drain removed on December 4.  For postoperative pain control he was treated with morphine.  On discharge prescribed Tylenol and low-dose Lyrica.  Lyrica started prior by his PCP for neuropathic pain.  He was restarted on his home Eliquis.  He required min assist for bed mobility, transfers and ambulation.  Vascular surgery wants follow-up in 1 month.  Patient is now appropriate for acute inpatient rehabilitation.    Comprehensive inpatient rehabilitation program status post right transtibial amputation.  Goal is for the patient to reach a modified independent level with bed mobility, transfers and ambulation.  Ambulation with hopping short distances using a rolling walker.  Also to become modified independent with elevations.  Preprosthetic training.  Ongoing pain control.    For pain control continue  low-dose Lyrica and consider increasing dose if the patient has ongoing phantom pain.  Continue around-the-clock Tylenol.  Continue tramadol.  If needed adjust dose.    Monitor incision closely and continue incisional and stump care.    Patient with fair pain control.  Tramadol helps.  Does not feel Tylenol helps that much.  Touch assist with transfers.  Min assist with hopping 10 feet with a rolling walker.

## 2022-12-08 NOTE — PROGRESS NOTES
Patient: Harry Alvarado  Location: [facility] [address]  MRN: [id number]  Today's date: 12/8/2022    History of Present Illness  Harry is a 67 y.o. male admitted on 12/6/2022 with Status post below knee amputation of right lower extremity (CMS/[facility]) [Z89.511]. Principal problem is   Status post below knee amputation of right lower extremity (CMS/[facility]). The pt was admitted to Vascular Surgery service for a planned R BKA due to bypass thrombosis and ischemia of the R foot.   He is s/p R BKA on 12/2/22.  His hgb is stable and he has been restarted on his home Eliquis.   Tolerating a regular diet.  PM&R was consulted on 12/3 and recommends acute inpt rehab for intensive therapy as he can tolerate >3 hrs of combined PT/OT per day and at least 5 days/week with a reasonable expectation that the patient will make measurable functional improvement that only an acute rehab can provide.         Past Medical History  Harry has a past medical history of Coronary artery disease, Deep vein thrombosis (CMS/[facility]), Lupus anticoagulant disorder (CMS/[facility]), Lyme disease, MTHFR gene mutation, Myocardial infarction (CMS/[facility]), and PAD (peripheral artery disease) (CMS/[facility]).      PT Vitals    Date/Time Pulse HR Source BP BP Location BP Method Pt Position [facility]   12/08/22 1440 80 Monitor 118/68 Left upper arm Manual Sitting BD      PT Pain    Date/Time Pain Type Side/Orientation Location Rating: Rest Interventions [facility]   12/08/22 1440 Pain Assessment right residual limb 5 position adjusted;premedicated for activity [facility] [address] [facility]   12/08/22 1505 Pain Reassessment right residual limb 6 position adjusted;premedicated for activity [facility] [address]          Prior Living Environment    Flowsheet Row Most Recent Value   People in Home spouse, child(no), adult, grandchild(no)  [Spouse, Son, DIL, 3 grandkids]   Current Living Arrangements home   Living Environment Comment Pt was living in [place] with [place] wife. However, after discharge, his wife and him plan to  move into son's house in [place] with first floor setup and 1 YOSEF. After prosthetic training, pt plans to move back to [place].   Number of Stairs, Main Entrance 1   Location, Patient Bedroom first (main) floor   Location, Bathroom first (main) floor   Bathroom Access Comment confirmed with family stall shower with 6 inch step to enter and seat within shower          Prior Level of Function    Flowsheet Row Most Recent Value   Dominant Hand right   Ambulation assistive equipment   Transferring assistive equipment   Toileting independent   Bathing assistive person   Dressing independent   Eating independent   Prior Level of Function Comment Pt was previously I with all ADLs, only requiring assist with bathing from his wife as function decreased prior to hospital admission. Pt was ambulating with a SPC for recent 2 months. + , retired. Pt enjoys skiing and playing pickleball with his wife.   Assistive Device Currently Used at Home cane, straight  [reports brief use of RW in past]           IRF PT Evaluation and Treatment - 12/08/22 1440        PT Time Calculation    Start Time 1440     Stop Time 1510     Time Calculation (min) 30 min        Session Details    Document Type daily treatment/progress note     Mode of Treatment individual therapy;physical therapy        General Information    Patient Profile Reviewed yes     General Observations of Patient pt received in gym seated in wc, agreeable to session        Bed Mobility    [place], Supine to Sit supervision     [place], Sit to Supine supervision     Comment (Bed Mobility) sit<>supine to/from mat surface        Transfers    Comment gait belt donned during session.        Low Pivot Transfer    [place], Low Pivot Transfer touching/steadying assist     Verbal Cues safety;technique     Assistive Device wheelchair     Comment LPT wc<>mat. Steadying assist for safety. Min verbal cues for appopriate wc set-up closer to mat surface prior to tx.         Wheelchair Mobility/Management    Method of Locomotion bimanual (upper extremity) propulsion     Wheelchair Type manual, lightweight     [place], Forward Propulsion supervision     [place], Steering Activities supervision     [place], Stopping Activities supervision     [place], Turning Activities supervision     [place], Doorway Navigation supervision     Distance Propelled in Feet 200 feet     Comment, Wheelchair Mobility Up/down [facility] hallway across smooth/level surfaces. Min verbal cues for approriate wc set-up closer to mat surface prior to LPT from wc.     Arm Rest Management supervision     Leg Rest Management supervision     Wheel Locks/Brake Management supervision        Lower Extremity (Therapeutic Exercise)    Exercise Position/Type supine;AROM (active range of motion);static isometric contraction     General Exercise quad sets;gluteal sets;SAQ (short arc quad);right;SLR (straight leg raise)     Reps and Sets 2 x 10     Comment 1) supine TE - quad sets, glut sets, SAQs, R SLR        Daily Progress Summary (PT)    Daily Outcome Statement Focus of session on wheelchair propulsion and supine TE for BLE strengthening. Pt required Min verbal cues for wc set-up closer to mat surface prior to LPT. Plan to continue to progress functional mobility, strength, and balance per pt's tolerance.                      Education Documentation  Mobility, taught by Shawn Soares, PT at 12/8/2022  3:59 PM.  Learner: Patient  Readiness: Eager  Method: Explanation, Demonstration  Response: Verbalizes Understanding, Demonstrated Understanding  Comment: Provided education regarding appropriate wc set-up prior to execution of LPT.          IRF PT Goals    Flowsheet Row Most Recent Value   Bed Mobility Goal 1    Activity/Assistive Device bed mobility activities, all at 12/07/2022 1034   [place] supervision required at 12/07/2022 1034   Time Frame short-term goal (STG), 1 week at 12/07/2022 1034    Progress/Outcome goal ongoing at 12/07/2022 1034   Bed Mobility Goal 2    Activity/Assistive Device bed mobility activities, all at 12/07/2022 1034   [place] modified independence at 12/07/2022 1034   Time Frame long-term goal (LTG), 2 weeks at 12/07/2022 1034   Progress/Outcome goal ongoing at 12/07/2022 1034   Transfer Goal 1    Activity/Assistive Device sit-to-stand/stand-to-sit, low pivot, stand pivot at 12/07/2022 1034   [place] supervision required at 12/07/2022 1034   Time Frame short-term goal (STG), 1 week at 12/07/2022 1034   Progress/Outcome goal ongoing at 12/07/2022 1034   Transfer Goal 2    Activity/Assistive Device sit-to-stand/stand-to-sit, low pivot, stand pivot at 12/07/2022 1034   [place] modified independence at 12/07/2022 1034   Time Frame long-term goal (LTG), 2 weeks at 12/07/2022 1034   Progress/Outcome goal ongoing at 12/07/2022 1034   Wheelchair Locomotion Goal 1    Activity wheelchair mobility skills, all at 12/07/2022 1034   Assistive Device manual, lightweight at 12/07/2022 1034   Distance 250 feet at 12/07/2022 1034   [place] modified independence at 12/07/2022 1034   Time Frame short-term goal (STG), 1 week at 12/07/2022 1034   Progress/Outcome goal ongoing at 12/07/2022 1034   Wheelchair Locomotion Goal 2    Activity wheelchair mobility skills, all at 12/07/2022 1034   Assistive Device manual, lightweight at 12/07/2022 1034   Distance 500 feet at 12/07/2022 1034   [place] modified independence at 12/07/2022 1034   Time Frame long-term goal (LTG), 2 weeks at 12/07/2022 1034   Progress/Outcome goal ongoing at 12/07/2022 1034

## 2022-12-08 NOTE — PROGRESS NOTES
Patient: Harry Alvarado  Location: [facility] [address]  MRN: [id number]  Today's date: 12/8/2022    History of Present Illness  Harry is a 67 y.o. male admitted on 12/6/2022 with Status post below knee amputation of right lower extremity (CMS/[facility]) [Z89.511]. Principal problem is   Status post below knee amputation of right lower extremity (CMS/[facility]). The pt was admitted to Vascular Surgery service for a planned R BKA due to bypass thrombosis and ischemia of the R foot.   He is s/p R BKA on 12/2/22.  His hgb is stable and he has been restarted on his home Eliquis.   Tolerating a regular diet.  PM&R was consulted on 12/3 and recommends acute inpt rehab for intensive therapy as he can tolerate >3 hrs of combined PT/OT per day and at least 5 days/week with a reasonable expectation that the patient will make measurable functional improvement that only an acute rehab can provide.         Past Medical History  Harry has a past medical history of Coronary artery disease, Deep vein thrombosis (CMS/[facility]), Lupus anticoagulant disorder (CMS/[facility]), Lyme disease, MTHFR gene mutation, Myocardial infarction (CMS/[facility]), and PAD (peripheral artery disease) (CMS/[facility]).      OT Vitals    Date/Time Pulse HR Source BP BP Location BP Method Pt Position [facility]   12/08/22 0919 72 Monitor 134/89 Right upper arm Automatic Sitting [place]      OT Pain    Date/Time Pain Type Side/Orientation Location Rating: Rest Description Interventions [facility]   12/08/22 0904 Pain Assessment right residual limb 4 -- premedicated for activity [facility]T   12/08/22 0959 Pain Reassessment right residual limb 4 phantom premedicated for activity [facility]T          Prior Living Environment    Flowsheet Row Most Recent Value   People in Home spouse, child(no), adult, grandchild(no)  [Spouse, Son, DIL, 3 grandkids]   Current Living Arrangements home   Living Environment Comment Pt was living in [place] with his wife. However, after discharge, his wife and him plan to move  "into son's house in [place] with first floor setup and 1 YOSEF. After prosthetic training, pt plans to move back to [place].   Number of Stairs, Main Entrance 1   Location, Patient Bedroom first (main) floor   Location, Bathroom first (main) floor   Bathroom Access Comment confirmed with family stall shower with 6 inch step to enter and seat within shower          Prior Level of Function    Flowsheet Row Most Recent Value   Dominant Hand right   Ambulation assistive equipment   Transferring assistive equipment   Toileting independent   Bathing assistive person   Dressing independent   Eating independent   Prior Level of Function Comment Pt was previously I with all ADLs, only requiring assist with bathing from his wife as function decreased prior to hospital admission. Pt was ambulating with a SPC for recent 2 months. + , retired. Pt enjoys skiing and playing pickleball with his wife.   Assistive Device Currently Used at Home cane, straight  [reports brief use of RW in past]          Occupational Profile    Flowsheet Row Most Recent Value   Occupational History/Life Experiences +. 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   Patient Goals \"Be able to get around better, take care of myself and play with my grandkids.\" And also \"Get a prosthesis and walk around like I used too.\"           IRF OT Evaluation and Treatment - 12/08/22 0904        OT Time Calculation    Start Time 0900     Stop Time 1000     Time Calculation (min) 60 min        Session Details    Document Type daily treatment/progress note     Mode of Treatment occupational therapy;individual therapy        General Information    General Observations of Patient Pt received in bed, pleasant and agreeable to OT session        Bed Mobility    Comment (Bed Mobility) OT: Cl S for bed mobility in hospital bed and flat mat for supine to prone transition        Transfers    Comment " gait belt donned for transfers        Low Pivot Transfer    [place], Low Pivot Transfer close supervision     Verbal Cues safety;technique     Assistive Device none     Comment LPT from EOB to WC and to/from EOB to/from WC, cues for safe technique required 2* pt initially attempting to half stand and transfer anterior reaching for mat vs LPT technique. Pt verbalize and demo safe technique on following transfers        Balance    Comment, Balance OT: pt demo good sitting balance EOM, supervision provided for safety        Therapeutic Interventions    Comment, Therapeutic Intervention pt lying in prone on flat mat for 5 minutes for hip flexor stretch while OT educate pt on pre-prosthetic training process        Core Strength (Therapeutic Exercise)    Core Strength, Position seated     Core Strength abdominal bracing     Reps and Sets 2x15     Comment 1. pt sitting EOM with support placed under R residual limb. Pt using 7# hand weight while engaging core for completion of over head press, chest press, abdominal twists, and diagonals for increased core strength. Circut completed 15-20x each, 2x completed. 2. modified sit ups c 7# hand weight in hand, 2x10 completed c rest break in between.        Lower Body Dressing    [place], Footwear close supervision     Comment pt sitting EOB for donning shoe prior to transfer c Cl S        Grooming    Self-Performance oral care (brushing teeth, cleaning dentures)     [place], Oral Hygiene supervision     Comment pt sitting in WC at sink for completion of oral care prior to gym session this AM        LE Residual Limb Therapeutic Activity    Comment, Management 1. OT initiated LH mirror and engaged pt in thorough skin inspection technique and educated on daily skin inspection importance, signs to look for, and reasoning in prep for prosthesis. 2. OT initiated reacher and demo use for safe reaching outside of base of support and edcuation regarding change in center of  gravity and increased risk for falls. 3. Desensitization techniques including rubbing and tapping to residual limb for reduced phantom limb pain/sensation and prep for prosthesis as well.        Daily Progress Summary (OT)    Daily Outcome Statement OT session c focus on education on pre-prosthetic training process, initiated of AE, core strength, safe LPT technique, and prone positioning. Pt would benefit from continued transfer training, fall prevantion, UE/core strength, and modification of tasks to WC level practice in follow up session. Continue OT per POC.                      Education Documentation  Desensitization, taught by Alayna Lara OT at 12/8/2022 11:31 AM.  Learner: Patient  Readiness: Acceptance  Method: Explanation, Demonstration  Response: Verbalizes Understanding, Demonstrated Understanding  Comment: OT educated on AE use, skin inspection, desensitization, and contracture management positioning. Continue to reinforce to emphasis importance.    Contracture Prevention, taught by Alayna Lara OT at 12/8/2022 11:31 AM.  Learner: Patient  Readiness: Acceptance  Method: Explanation, Demonstration  Response: Verbalizes Understanding, Demonstrated Understanding  Comment: OT educated on AE use, skin inspection, desensitization, and contracture management positioning. Continue to reinforce to emphasis importance.    Residual Limb Management, taught by Alayna Lara OT at 12/8/2022 11:31 AM.  Learner: Patient  Readiness: Acceptance  Method: Explanation, Demonstration  Response: Verbalizes Understanding, Demonstrated Understanding  Comment: OT educated on AE use, skin inspection, desensitization, and contracture management positioning. Continue to reinforce to emphasis importance.    Adaptive Equipment Use, taught by Alayna Lara OT at 12/8/2022 11:31 AM.  Learner: Patient  Readiness: Acceptance  Method: Explanation, Demonstration  Response: Verbalizes  Understanding, Demonstrated Understanding  Comment: OT educated on AE use, skin inspection, desensitization, and contracture management positioning. Continue to reinforce to emphasis importance.          IRF OT Goals    Flowsheet Row Most Recent Value   Transfer Goal 1    Activity/Assistive Device toilet at 12/07/2022 0720   [place] supervision required at 12/07/2022 0720   Time Frame short-term goal (STG), 5 - 7 days at 12/07/2022 0720   Transfer Goal 2    Activity/Assistive Device toilet at 12/07/2022 0720   [place] modified independence at 12/07/2022 0720   Time Frame long-term goal (LTG), 14 days or less at 12/07/2022 0720   Transfer Goal 3    Activity/Assistive Device shower at 12/07/2022 0720   [place] --  [Steadying A] at 12/07/2022 0720   Time Frame short-term goal (STG), 5 - 7 days at 12/07/2022 0720   Transfer Goal 4    Activity/Assistive Device shower at 12/07/2022 0720   [place] modified independence at 12/07/2022 0720   Time Frame long-term goal (LTG) at 12/07/2022 0720   Bathing Goal 1    [place] minimum assist (75% or more patient effort)  [shower level] at 12/07/2022 0720   Time Frame short-term goal (STG), 5 - 7 days at 12/07/2022 0720   Bathing Goal 2    [place] modified independence at 12/07/2022 0720   Time Frame long-term goal (LTG), 14 days or less at 12/07/2022 0720   UB Dressing Goal 1    [place] set-up required at 12/07/2022 0720   Time Frame short-term goal (STG), 5 - 7 days at 12/07/2022 0720   UB Dressing Goal 2    [place] modified independence at 12/07/2022 0720   Time Frame long-term goal (LTG), 14 days or less at 12/07/2022 0720   LB Dressing Goal 1    [place] moderate assist (50-74% patient effort)  [While in stance with RW] at 12/07/2022 0720   Time Frame short-term goal (STG), 5 - 7 days at 12/07/2022 0720   LB Dressing Goal 2    [place] modified independence at 12/07/2022 0720   Time Frame long-term goal (LTG), 14 days or less at  12/07/2022 0720   Grooming Goal 1    [place] set-up required at 12/07/2022 0720   Time Frame short-term goal (STG), 5 - 7 days at 12/07/2022 0720   Grooming Goal 2    [place] modified independence at 12/07/2022 0720   Time Frame long-term goal (LTG), 14 days or less at 12/07/2022 0720   Toileting Goal 1    [place] supervision required at 12/07/2022 0720   Time Frame short-term goal (STG), 5 - 7 days at 12/07/2022 0720   Toileting Goal 2    [place] modified independence at 12/07/2022 0720   Time Frame long-term goal (LTG), 14 days or less at 12/07/2022 0720

## 2022-12-08 NOTE — PROGRESS NOTES
Patient: Harry lAvarado  Location: [facility] [address]  MRN: [id number]  Today's date: 12/8/2022    History of Present Illness  Harry is a 67 y.o. male admitted on 12/6/2022 with Status post below knee amputation of right lower extremity (CMS/[facility]) [Z89.511]. Principal problem is   Status post below knee amputation of right lower extremity (CMS/[facility]). The pt was admitted to Vascular Surgery service for a planned R BKA due to bypass thrombosis and ischemia of the R foot.   He is s/p R BKA on 12/2/22.  His hgb is stable and he has been restarted on his home Eliquis.   Tolerating a regular diet.  PM&R was consulted on 12/3 and recommends acute inpt rehab for intensive therapy as he can tolerate >3 hrs of combined PT/OT per day and at least 5 days/week with a reasonable expectation that the patient will make measurable functional improvement that only an acute rehab can provide.         Past Medical History  Harry has a past medical history of Coronary artery disease, Deep vein thrombosis (CMS/[facility]), Lupus anticoagulant disorder (CMS/[facility]), Lyme disease, MTHFR gene mutation, Myocardial infarction (CMS/[facility]), and PAD (peripheral artery disease) (CMS/[facility]).      OT Vitals    Date/Time Pulse HR Source Pt Activity O2 Therapy BP BP Location BP Method Pt Position [facility]   12/08/22 1301 77 Monitor At rest None (Room air) 141/75 Right upper arm Automatic Sitting CS      OT Pain    Date/Time Pain Type Side/Orientation Location Rating: Rest Description Interventions [facility]   12/08/22 1301 Pain Assessment right residual limb 4 incisional diversional activity provided CS   12/08/22 1328 Pain Reassessment right residual limb 4 incisional position adjusted CS          Prior Living Environment    Flowsheet Row Most Recent Value   People in Home spouse, child(no), adult, grandchild(no)  [Spouse, Son, DIL, 3 grandkids]   Current Living Arrangements home   Living Environment Comment Pt was living in [place] wife. However,  "after discharge, his wife and him plan to move into son's house in [place] with first floor setup and 1 YOSEF. After prosthetic training, pt plans to move back to [place].   Number of Stairs, Main Entrance 1   Location, Patient Bedroom first (main) floor   Location, Bathroom first (main) floor   Bathroom Access Comment confirmed with family stall shower with 6 inch step to enter and seat within shower          Prior Level of Function    Flowsheet Row Most Recent Value   Dominant Hand right   Ambulation assistive equipment   Transferring assistive equipment   Toileting independent   Bathing assistive person   Dressing independent   Eating independent   Prior Level of Function Comment Pt was previously I with all ADLs, only requiring assist with bathing from his wife as function decreased prior to hospital admission. Pt was ambulating with a SPC for recent 2 months. + , retired. Pt enjoys skiing and playing pickleball with his wife.   Assistive Device Currently Used at Home cane, straight  [reports brief use of RW in past]          Occupational Profile    Flowsheet Row Most Recent Value   Occupational History/Life Experiences +. 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   Patient Goals \"Be able to get around better, take care of myself and play with my grandkids.\" And also \"Get a prosthesis and walk around like I used too.\"  [PSFS completed 12/8, pt reported: Walking/hiking 0/10, Pickleball 0/10, Bathing 5/10, Playing with grandkids 5/10 score 10/40 0.25.]           IRF OT Evaluation and Treatment - 12/08/22 1302        OT Time Calculation    Start Time 1300     Stop Time 1330     Time Calculation (min) 30 min        Session Details    Document Type daily treatment/progress note     Mode of Treatment occupational therapy;individual therapy        General Information    Patient Profile Reviewed yes     General Observations of Patient Pt " received seated in w/c, agreeable to session.        Bed Mobility    Comment (Bed Mobility) OT: Sitting EOB to supine with CL S.        Transfers    Transfers low pivot transfer     Comment mobility belt donned for all transfers.        Low Pivot Transfer    [place], Low Pivot Transfer touching/steadying assist     Verbal Cues safety;technique     Assistive Device mobility belt     Comment LPT w/c to/from EOB with steadying A. VCs for safe technique and positioning of w/c prior to transfer.        Therapeutic Interventions    Therapeutic Interventions Weight Shifting Techniques (Row)     Weight Shifting Techniques push-up weight shift techniques;lateral weight shift techniques     Comment, Therapeutic Intervention Educated pt on the important of weight shifting every 30 minutes for pressure relief. Educated pt on w/c push ups and lateral weight shifting techniques to complete during the day while seated in w/c. Pt reported understanding. Pt completed 5 w/c push ups with a 5 second hold.        Daily Progress Summary (OT)    Daily Outcome Statement Pt tolerated session well, session focused on weight shifting techniques and transfers. PSFS completed this session. Pt steadying A for LPT. Pt returned to bed at end of session per request. Pt would benefit from continued transfer training and strengthening Cont with OT POC.                      Education Documentation  Pressure Relief Techniques, taught by Scowden, Christina, COTA at 12/8/2022  1:54 PM.  Learner: Patient  Readiness: Acceptance  Method: Explanation  Response: Verbalizes Understanding  Comment: OT educated on weight shifting techniques for pressure relief. Continue to reinforce to emphasis importance.          IRF OT Goals    Flowsheet Row Most Recent Value   Transfer Goal 1    Activity/Assistive Device toilet at 12/07/2022 0720   [place] supervision required at 12/07/2022 0720   Time Frame short-term goal (STG), 5 - 7 days at 12/07/2022 0720    Transfer Goal 2    Activity/Assistive Device toilet at 12/07/2022 0720   [place] modified independence at 12/07/2022 0720   Time Frame long-term goal (LTG), 14 days or less at 12/07/2022 0720   Transfer Goal 3    Activity/Assistive Device shower at 12/07/2022 0720   [place] --  [Steadying A] at 12/07/2022 0720   Time Frame short-term goal (STG), 5 - 7 days at 12/07/2022 0720   Transfer Goal 4    Activity/Assistive Device shower at 12/07/2022 0720   [place] modified independence at 12/07/2022 0720   Time Frame long-term goal (LTG) at 12/07/2022 0720   Bathing Goal 1    [place] minimum assist (75% or more patient effort)  [shower level] at 12/07/2022 0720   Time Frame short-term goal (STG), 5 - 7 days at 12/07/2022 0720   Bathing Goal 2    [place] modified independence at 12/07/2022 0720   Time Frame long-term goal (LTG), 14 days or less at 12/07/2022 0720   UB Dressing Goal 1    [place] set-up required at 12/07/2022 0720   Time Frame short-term goal (STG), 5 - 7 days at 12/07/2022 0720   UB Dressing Goal 2    [place] modified independence at 12/07/2022 0720   Time Frame long-term goal (LTG), 14 days or less at 12/07/2022 0720   LB Dressing Goal 1    [place] moderate assist (50-74% patient effort)  [While in stance with RW] at 12/07/2022 0720   Time Frame short-term goal (STG), 5 - 7 days at 12/07/2022 0720   LB Dressing Goal 2    [place] modified independence at 12/07/2022 0720   Time Frame long-term goal (LTG), 14 days or less at 12/07/2022 0720   Grooming Goal 1    [place] set-up required at 12/07/2022 0720   Time Frame short-term goal (STG), 5 - 7 days at 12/07/2022 0720   Grooming Goal 2    [place] modified independence at 12/07/2022 0720   Time Frame long-term goal (LTG), 14 days or less at 12/07/2022 0720   Toileting Goal 1    [place] supervision required at 12/07/2022 0720   Time Frame short-term goal (STG), 5 - 7 days at 12/07/2022 0720   Toileting  Goal 2    [place] modified independence at 12/07/2022 0720   Time Frame long-term goal (LTG), 14 days or less at 12/07/2022 0720

## 2022-12-08 NOTE — SUBJECTIVE & OBJECTIVE
" Patient was seen and examined.   Attestation Notes: Face to face encounter completed    Subjective    Patient with fair pain control.  Tramadol helps.  Does not feel Tylenol helps that much.  Touch assist with transfers.  Min assist with hopping 10 feet with a rolling walker.  Objective     Visit Vitals  /77   Pulse 75   Temp 36.6 °C (97.9 °F)   Resp 14   Ht 1.854 m (6' 1\")   Wt 70.1 kg (154 lb 9.6 oz)   SpO2 98%   BMI 20.40 kg/m²       Review of Systems:  Pertinent items are noted in HPI.  Denies chest pain and shortness of breath.  Review of systems otherwise negative.    Labs     Results from last 7 days   Lab Units 12/07/22  0611   WBC K/uL 13.47*   HEMOGLOBIN g/dL 12.2*   HEMATOCRIT % 37.5*   PLATELETS K/uL 462*     Results from last 7 days   Lab Units 12/07/22  0611   SODIUM mEQ/L 135*   POTASSIUM mEQ/L 4.6   CHLORIDE mEQ/L 99   CO2 mEQ/L 24   BUN mg/dL 20   CREATININE mg/dL 0.6*   CALCIUM mg/dL 9.3   ALBUMIN g/dL 2.9*   BILIRUBIN TOTAL mg/dL 0.6   ALK PHOS IU/L 159*   ALT IU/L 82*   AST IU/L 78*   GLUCOSE mg/dL 70     Full Code    Physical Exam  General      Alert, cooperative, no distress, appears stated age.   Head:    Normocephalic, without obvious abnormality, atraumatic.   Eyes:    PERRL, conjunctiva/corneas clear, EOM's intact.        Nose:   Nares normal, septum midline, mucosa normal, no drainage or            sinus tenderness.   Throat:   Lips, mucosa, and tongue normal.    Neck:   Supple, symmetrical, trachea midline.    Back:     Symmetric, no curvature.   Lungs:     Clear to auscultation bilaterally, respirations unlabored.   Chest wall:    No tenderness or deformity.   Heart:    Regular rate and rhythm, S1 and S2 normal.   Abdomen:     Soft, non-tender, bowel sounds active all four quadrants,     no masses, no organomegaly.   Extremities:  Musculoskeletal: Moderate stump edema.  Right transtibial amputation.   Pulses:   1+ and symmetric all extremities.   Skin: Skin intact with mild " drainage.   Neurologic:          Behavior/  Emotional:  CNII-XII intact.  Alert and oriented ×3.  Motor exam intact.  Sensory exam intact.  Reflexes stable decreased reflexes.      Appropriate, cooperative           Plan of care was discussed with patient

## 2022-12-08 NOTE — PROGRESS NOTES
"Daily Progress Note     Patient was seen and examined.   Attestation Notes: Face to face encounter completed    Subjective    Patient with fair pain control.  Tramadol helps.  Does not feel Tylenol helps that much.  Touch assist with transfers.  Min assist with hopping 10 feet with a rolling walker.  Objective     Visit Vitals  /77   Pulse 75   Temp 36.6 °C (97.9 °F)   Resp 14   Ht 1.854 m (6' 1\")   Wt 70.1 kg (154 lb 9.6 oz)   SpO2 98%   BMI 20.40 kg/m²       Review of Systems:  Pertinent items are noted in HPI.  Denies chest pain and shortness of breath.  Review of systems otherwise negative.    Labs     Results from last 7 days   Lab Units 12/07/22  0611   WBC K/uL 13.47*   HEMOGLOBIN g/dL 12.2*   HEMATOCRIT % 37.5*   PLATELETS K/uL 462*     Results from last 7 days   Lab Units 12/07/22  0611   SODIUM mEQ/L 135*   POTASSIUM mEQ/L 4.6   CHLORIDE mEQ/L 99   CO2 mEQ/L 24   BUN mg/dL 20   CREATININE mg/dL 0.6*   CALCIUM mg/dL 9.3   ALBUMIN g/dL 2.9*   BILIRUBIN TOTAL mg/dL 0.6   ALK PHOS IU/L 159*   ALT IU/L 82*   AST IU/L 78*   GLUCOSE mg/dL 70     Full Code    Physical Exam  General      Alert, cooperative, no distress, appears stated age.   Head:    Normocephalic, without obvious abnormality, atraumatic.   Eyes:    PERRL, conjunctiva/corneas clear, EOM's intact.        Nose:   Nares normal, septum midline, mucosa normal, no drainage or            sinus tenderness.   Throat:   Lips, mucosa, and tongue normal.    Neck:   Supple, symmetrical, trachea midline.    Back:     Symmetric, no curvature.   Lungs:     Clear to auscultation bilaterally, respirations unlabored.   Chest wall:    No tenderness or deformity.   Heart:    Regular rate and rhythm, S1 and S2 normal.   Abdomen:     Soft, non-tender, bowel sounds active all four quadrants,     no masses, no organomegaly.   Extremities:  Musculoskeletal: Moderate stump edema.  Right transtibial amputation.   Pulses:   1+ and symmetric all extremities.   Skin: Skin " intact with mild drainage.   Neurologic:          Behavior/  Emotional:  CNII-XII intact.  Alert and oriented ×3.  Motor exam intact.  Sensory exam intact.  Reflexes stable decreased reflexes.      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 male who presents with the following chronic medical problems including MTHFR gene mutation, coronary artery disease with prior myocardial infarction 1993, peripheral artery disease with multiple bypass procedures in the past most recent with tPA bolus and angioplasty in 2020.  The patient admitted to [facility] on November 30, 2022 to the vascular surgery service for planned BKA due to bypass thrombosis.  At the time of admission the patient had cyanosis and was beginning to develop thanh gangrene of the foot.  Patient had reported over the last 1 to 2 months worsening pain of the right foot and that he knew the graft was occluded.  Patient has a history of 15+ surgeries of the extremities and understood that his revascularization options were limited.  He had severe 10 out of 10 pain of the foot relieved with elevation of the leg.  He was still able to ambulate but limited by pain.  Under the care of Bowen Serrato MD of vascular surgery he underwent right transtibial amputation on December 2, 2022.  Postoperative course unremarkable with advancement of diet and pain control.  ESTUARDO drain removed on December 4.  For postoperative pain control he was treated with morphine.  On discharge prescribed Tylenol and low-dose Lyrica.  Lyrica started prior by his PCP for neuropathic pain.  He was restarted on his home Eliquis.  He required min assist for bed mobility, transfers and ambulation.  Vascular surgery wants follow-up in 1 month.  Patient is now appropriate for acute inpatient  rehabilitation.    Comprehensive inpatient rehabilitation program status post right transtibial amputation.  Goal is for the patient to reach a modified independent level with bed mobility, transfers and ambulation.  Ambulation with hopping short distances using a rolling walker.  Also to become modified independent with elevations.  Preprosthetic training.  Ongoing pain control.    For pain control continue low-dose Lyrica and consider increasing dose if the patient has ongoing phantom pain.  Continue around-the-clock Tylenol.  Continue tramadol.  If needed adjust dose.    Monitor incision closely and continue incisional and stump care.    Patient with fair pain control.  Tramadol helps.  Does not feel Tylenol helps that much.  Touch assist with transfers.  Min assist with hopping 10 feet with a rolling walker.    Anemia  Assessment & Plan  Postoperative anemia.  Monitor hemoglobin levels.  Hemoglobin 12.2.  Mild iron deficiency and consider iron replacement.    Lupus anticoagulant disorder (CMS/[facility])  Assessment & Plan  Continue Eliquis.    Deep vein thrombosis (CMS/[facility])  Assessment & Plan  Continue Eliquis.    Coronary artery disease  Assessment & Plan  Cardiac precautions.  Continue Eliquis.        Expected Discharge Date:    Individualized Plan of Care    Harry Alvarado is admitted to [facility] for comprehensive inpatient rehabilitation for Amputation with functional deficits in mobility; motor dysfunction; safety; self-care. Patient is receiving the following services: medical consultative services; psychiatry/psychology services; physical therapy; occupational therapy.    Frequency of Treatment (OT): 60-90 minutes per day, 5-7 times per week    Frequency of Treatment (PT): 5-7 times per week, 60-90 minutes per day           Active medical management is required for  Patient Active Problem List   Diagnosis   • Status post below knee amputation of right lower extremity (CMS/[facility])   •  Coronary artery disease   • Deep vein thrombosis (CMS/HCC)   • Lupus anticoagulant disorder (CMS/HCC)   • MTHFR gene mutation   • PAD (peripheral artery disease) (CMS/HCC)   • Anemia       Risk for Complications  Constipation: High  Falls: High  Infection: High      The patient's medical prognosis is good to achieve the stated goals below.    Expected Level of Function  Expected Functional Improvement: mobility; motor dysfunction; safety; self-care  Self-Care: Partial/moderate assistance  Sphincter Control: Independent  Transfers: Supervision or touching assistance  Locomotion: Supervision or touching assistance  Communication: Independent  Social Cognition: Independent      Goals  IRF PT Goals    Flowsheet Row Most Recent Value   Bed Mobility Goal 1    Activity/Assistive Device bed mobility activities, all at 12/07/2022 1034   [place] supervision required at 12/07/2022 1034   Time Frame short-term goal (STG), 1 week at 12/07/2022 1034   Progress/Outcome goal ongoing at 12/07/2022 1034   Bed Mobility Goal 2    Activity/Assistive Device bed mobility activities, all at 12/07/2022 1034   [place] modified independence at 12/07/2022 1034   Time Frame long-term goal (LTG), 2 weeks at 12/07/2022 1034   Progress/Outcome goal ongoing at 12/07/2022 1034   Transfer Goal 1    Activity/Assistive Device sit-to-stand/stand-to-sit, low pivot, stand pivot at 12/07/2022 1034   [place] supervision required at 12/07/2022 1034   Time Frame short-term goal (STG), 1 week at 12/07/2022 1034   Progress/Outcome goal ongoing at 12/07/2022 1034   Transfer Goal 2    Activity/Assistive Device sit-to-stand/stand-to-sit, low pivot, stand pivot at 12/07/2022 1034   [place] modified independence at 12/07/2022 1034   Time Frame long-term goal (LTG), 2 weeks at 12/07/2022 1034   Progress/Outcome goal ongoing at 12/07/2022 1034   Wheelchair Locomotion Goal 1    Activity wheelchair mobility skills, all at 12/07/2022 1034   Assistive  Device manual, lightweight at 12/07/2022 1034   Distance 250 feet at 12/07/2022 1034   [place] modified independence at 12/07/2022 1034   Time Frame short-term goal (STG), 1 week at 12/07/2022 1034   Progress/Outcome goal ongoing at 12/07/2022 1034   Wheelchair Locomotion Goal 2    Activity wheelchair mobility skills, all at 12/07/2022 1034   Assistive Device manual, lightweight at 12/07/2022 1034   Distance 500 feet at 12/07/2022 1034   [place] modified independence at 12/07/2022 1034   Time Frame long-term goal (LTG), 2 weeks at 12/07/2022 1034   Progress/Outcome goal ongoing at 12/07/2022 1034        IRF OT Goals    Flowsheet Row Most Recent Value   Transfer Goal 1    Activity/Assistive Device toilet at 12/07/2022 0720   [place] supervision required at 12/07/2022 0720   Time Frame short-term goal (STG), 5 - 7 days at 12/07/2022 0720   Transfer Goal 2    Activity/Assistive Device toilet at 12/07/2022 0720   [place] modified independence at 12/07/2022 0720   Time Frame long-term goal (LTG), 14 days or less at 12/07/2022 0720   Transfer Goal 3    Activity/Assistive Device shower at 12/07/2022 0720   [place] --  [Steadying A] at 12/07/2022 0720   Time Frame short-term goal (STG), 5 - 7 days at 12/07/2022 0720   Transfer Goal 4    Activity/Assistive Device shower at 12/07/2022 0720   [place] modified independence at 12/07/2022 0720   Time Frame long-term goal (LTG) at 12/07/2022 0720   Bathing Goal 1    [place] minimum assist (75% or more patient effort)  [shower level] at 12/07/2022 0720   Time Frame short-term goal (STG), 5 - 7 days at 12/07/2022 0720   Bathing Goal 2    [place] modified independence at 12/07/2022 0720   Time Frame long-term goal (LTG), 14 days or less at 12/07/2022 0720   UB Dressing Goal 1    [place] set-up required at 12/07/2022 0720   Time Frame short-term goal (STG), 5 - 7 days at 12/07/2022 0720   UB Dressing Goal 2    [place] modified  independence at 12/07/2022 0720   Time Frame long-term goal (LTG), 14 days or less at 12/07/2022 0720   LB Dressing Goal 1    [place] moderate assist (50-74% patient effort)  [While in stance with RW] at 12/07/2022 0720   Time Frame short-term goal (STG), 5 - 7 days at 12/07/2022 0720   LB Dressing Goal 2    [place] modified independence at 12/07/2022 0720   Time Frame long-term goal (LTG), 14 days or less at 12/07/2022 0720   Grooming Goal 1    [place] set-up required at 12/07/2022 0720   Time Frame short-term goal (STG), 5 - 7 days at 12/07/2022 0720   Grooming Goal 2    [place] modified independence at 12/07/2022 0720   Time Frame long-term goal (LTG), 14 days or less at 12/07/2022 0720   Toileting Goal 1    [place] supervision required at 12/07/2022 0720   Time Frame short-term goal (STG), 5 - 7 days at 12/07/2022 0720   Toileting Goal 2    [place] modified independence at 12/07/2022 0720   Time Frame long-term goal (LTG), 14 days or less at 12/07/2022 0720          Anticipated Discharge Plan  family member's home with services    Expected length of stay: 7 days

## 2022-12-08 NOTE — PLAN OF CARE
Problem: Rehabilitation (IRF) Plan of Care  Goal: Plan of Care Review  Outcome: Progressing  Flowsheets (Taken 12/8/2022 0424)  Progress: improving  Plan of Care Reviewed With: patient  Outcome Summary: Assumed care of patient at 2300.  Right residual limb pain controlled with Tramadol 50 mg.  Continent of bladder.  Pt sleeping well.  Safety measures maintained.   Plan of Care Review  Plan of Care Reviewed With: patient  Progress: improving  Outcome Summary: Assumed care of patient at 2300.  Right residual limb pain controlled with Tramadol 50 mg.  Continent of bladder.  Pt sleeping well.  Safety measures maintained.

## 2022-12-08 NOTE — PROGRESS NOTES
Inpatient Psychology Initial Intake    Duration:  1 hour    Harry Alvarado, : 1955, a 67 y.o. male, for initial evaluation visit to discuss Adjustment to Disability.    HPI: Harry Alvarado is a 67 y.o. male who presents with the following chronic medical problems including MTHFR gene mutation, coronary artery disease with prior myocardial infarction , peripheral artery disease with multiple bypass procedures in the past most recent with tPA bolus and angioplasty in .  The patient admitted to [facility] on 2022 to the vascular surgery service for planned BKA due to bypass thrombosis.  At the time of admission the patient had cyanosis and was beginning to develop thanh gangrene of the foot.  Patient had reported over the last 1 to 2 months worsening pain of the right foot and that he knew the graft was occluded.  Patient has a history of 15+ surgeries of the extremities and understood that his revascularization options were limited.  He had severe 10 out of 10 pain of the foot relieved with elevation of the leg.  He was still able to ambulate but limited by pain.  Under the care of Bowen Serrato MD of vascular surgery he underwent right transtibial amputation on 2022.  Postoperative course unremarkable with advancement of diet and pain control.  ESTUARDO drain removed on .  For postoperative pain control he was treated with morphine.  On discharge prescribed Tylenol and low-dose Lyrica.  Lyrica started prior by his PCP for neuropathic pain.  He was restarted on his home Eliquis.  He required min assist for bed mobility, transfers and ambulation.  Vascular surgery wants follow-up in 1 month.  Patient is now appropriate for acute inpatient rehabilitation. Patient having postoperative stump pain.  Also having phantom sensation and some some discomfort as well.  Denies chest pain or shortness of breath.         Past Medical History:   Diagnosis Date   • Coronary artery  disease     s/p MI   • Deep vein thrombosis (CMS/HCC)    • Lupus anticoagulant disorder (CMS/HCC)    • Lyme disease    • MTHFR gene mutation    • Myocardial infarction (CMS/HCC)    • PAD (peripheral artery disease) (CMS/HCC)      Past Surgical History:   Procedure Laterality Date   • BELOW KNEE LEG AMPUTATION Right 12/02/2022   • FEMORAL BYPASS Right      No family history on file.  Social History     Socioeconomic History   • Marital status:      Spouse name: None   • Number of children: None   • Years of education: None   • Highest education level: None   Tobacco Use   • Smoking status: Never   • Smokeless tobacco: Never   Substance and Sexual Activity   • Alcohol use: Not Currently         Previous Mental Health History:   Previous Mental Health Treatment:  N/A  Previous Suicidal Behavior:  N/A  Previous Self-Injurious Behavior:  N/A  Previous Homicidal Behavior:  N/A  Previous Substance Abuse Treatment: N/A    Current Facility-Administered Medications   Medication Dose Route Frequency Provider Last Rate Last Admin   • acetaminophen (TYLENOL) tablet 975 mg  975 mg oral q8h [facility] Richard Angulo MD   975 mg at 12/08/22 1343   • alum-mag hydroxide-simeth (MAALOX) 200-200-20 mg/5 mL suspension 30 mL  30 mL oral q6h PRN Emerson Bradley MD       • apixaban (ELIQUIS) tablet 5 mg  5 mg oral BID Emerson Bradley MD   5 mg at 12/08/22 0819   • bisacodyL (DULCOLAX) 10 mg suppository 10 mg  10 mg rectal Daily PRN Emerson Bradley MD       • flu vaccine qs 2022-23 (65 yrs&up)(PF) (FLUZONE HIGH-DOSE) injection 240 mcg  0.7 mL intramuscular During hospitalization Emerson Bradley MD       • pantoprazole (PROTONIX) tablet,delayed release (DR/EC) 40 mg  40 mg oral Daily before breakfast Emerson Bradley MD   40 mg at 12/08/22 0819   • polyethylene glycol (MIRALAX) 17 gram packet 17 g  17 g oral Daily Richard Angulo MD   17 g at 12/07/22 0847   • pregabalin (LYRICA) capsule 25 mg  25 mg oral TID  Emerson Bradley MD   25 mg at 12/08/22 0819   • sennosides-docusate sodium (SENOKOT-S) 8.6-50 mg per tablet 2 tablet  2 tablet oral Daily PRN Richard Angulo MD       • traMADoL (ULTRAM) tablet 50 mg  50 mg oral q4h PRN Richard Angulo MD   50 mg at 12/08/22 1343   • zolpidem (AMBIEN) tablet 10 mg  10 mg oral Nightly PRN Emerson Bradley MD   10 mg at 12/07/22 0119       Current Evaluation:   Mental Status Exam:  Arousal Level: Attentive  Appearance: Well Groomed  Speech: Regular  Psychomotor Functioning: WNL  Eye Contact: WNL  Est. Premorbid Intelligence: Above average  Orientation: Fully oriented  Attention: WNL  Concentration: WNL  Recent Memory: WNL  Remote Memory: WNL  Thought Content: Appropriate  Thought Process: WNL  Insight: Aware of physical limitations, Aware of impact on functioning, Intact  Perceptual Function: Other:, Pain, Visual impairment (glasses. Has phantom limb syndrome)  Delusions: None or age appropriate  Sleeping: Decreased  Appetite: No Change  Affect: Full Range  Mood: Motivated, Hopeful    Assessments done this visit:     [place] Suicide Severity Rating Scale:  Done today      [place] Suicide Severity Rating Scale  1. Within the past month, have you wished you were dead or wished you could go to sleep and not wake up?: No  2. Within the past month, have you actually had any thoughts of killing yourself?: No  6. Have you ever done anything, started to do anything, or prepared to do anything to end your life?: No    Safe-T Assessment:  Not indicated        Comments:     Risk Assessment:   Suicidal Ideation: Based on [place] Suicide Screen and current clinical assessment, patient is determined Low Risk.  Self Injurious Behavior:  Not Present  Irritability:  Not Present  Homicidal Behavior: Not Present  Estimate of Risk:  None  If risk identified have suicide precautions been implemented? No     Goals Addressed                 This Visit's Progress    • Increase adjustment  "to rehabilitation facility.       • Maximize adjustment and acceptance of limitations             Interventions  Acceptance & Adjustment  Goal Setting  Psychoeducation  Safety Management  Other:  intake assessment    Psychoeducation provided on:  Amputation and Recovery and Other: rehab information     Recommendations:      Individual Therapy  30 minutesweekly      Visit Diagnosis:     1. Adjustment disorder with anxiety        Diagnostic Impression:   Weekly Progress Summary: other (see comments)  Weekly Outcome Statement: Harry Alvarado, who goes by Zhen, was seen for an initial evaluation for psychology services on 12/08/2022. He was sitting in his wheelchair bedside upon first meeting. He was open and engaged with conversation with psychologist throughout the evaluation. He was OX4. He scored 30/30 on the MMSE.  He had solid memory for his personal medical history and was able to give information about his multiple surgeries with detail. He is from [place] with one son that lives in [facility] [place], one daughter in [facility], one Daughter in [facility], and one daughter in [facility]. He reported that he previously lived in [place] and was how he linked to vascular surgeon at [facility] in [facility]. He has been with this surgeon for 30 years and has \"peace of mind\" with him, having had every surgery there. He was aware of amputation likelihood for the past 10 years and feels he was well prepared for the loss of his limb.. Family chose [facility] for rehab due to son living close. [place]. He reported that plan is to stay in the area until he is able to solidly walk on the prothesis and be able to engage in activities. He is an avid outdoor enthusiast - skiing, hiking, tennis, bicycle riding. He reported that his mood over this is relatively calm and motivated to improve. He did note that pain kept him from having good sleep for past 3 months and that all " hospital beds are too soft so sleep impacted. He noted that appetite is fine. He reported continued experience of pain and phantom limb, which includes feeling his foot and a desire to wiggle toes, as well as numbness and tingling. Despite the above, he has hopes for improved quality of life.  Zhen has the support of his family. He denied any suicidal ideation or history and denied any anger or violence history.  He denied any anxiety or depression. He denied a history of seeking psychotherapy or psychiatry services. Psychology to follow rehab progress.           CHIQUIS Godoy.D @ 5:03 PM

## 2022-12-09 ENCOUNTER — APPOINTMENT (INPATIENT)
Dept: PHYSICAL THERAPY | Facility: REHABILITATION | Age: 67
DRG: 560 | End: 2022-12-09
Payer: MEDICARE

## 2022-12-09 ENCOUNTER — APPOINTMENT (INPATIENT)
Dept: OCCUPATIONAL THERAPY | Facility: REHABILITATION | Age: 67
DRG: 560 | End: 2022-12-09
Payer: MEDICARE

## 2022-12-09 DIAGNOSIS — I25.2 OLD MYOCARDIAL INFARCTION: ICD-10-CM

## 2022-12-09 DIAGNOSIS — Z88.8 ALLERGY STATUS TO OTHER DRUGS, MEDICAMENTS AND BIOLOGICAL SUBSTANCES STATUS: ICD-10-CM

## 2022-12-09 DIAGNOSIS — I24.9 ACUTE ISCHEMIC HEART DISEASE, UNSPECIFIED: ICD-10-CM

## 2022-12-09 DIAGNOSIS — I25.10 ATHEROSCLEROTIC HEART DISEASE OF NATIVE CORONARY ARTERY WITHOUT ANGINA PECTORIS: ICD-10-CM
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
## 2022-12-09 DIAGNOSIS — I70.461: ICD-10-CM
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
## 2022-12-09 DIAGNOSIS — I73.9 PERIPHERAL VASCULAR DISEASE, UNSPECIFIED: ICD-10-CM

## 2022-12-09 DIAGNOSIS — Z79.82 LONG TERM (CURRENT) USE OF ASPIRIN: ICD-10-CM

## 2022-12-09 DIAGNOSIS — E43 UNSPECIFIED SEVERE PROTEIN-CALORIE MALNUTRITION: ICD-10-CM

## 2022-12-09 DIAGNOSIS — Z86.718 PERSONAL HISTORY OF OTHER VENOUS THROMBOSIS AND EMBOLISM: ICD-10-CM

## 2022-12-09 DIAGNOSIS — M32.9 SYSTEMIC LUPUS ERYTHEMATOSUS, UNSPECIFIED: ICD-10-CM

## 2022-12-09 DIAGNOSIS — E72.12 METHYLENETETRAHYDROFOLATE REDUCTASE DEFICIENCY: ICD-10-CM

## 2022-12-09 DIAGNOSIS — Z79.01 LONG TERM (CURRENT) USE OF ANTICOAGULANTS: ICD-10-CM

## 2022-12-09 DIAGNOSIS — Z95.820 PERIPHERAL VASCULAR ANGIOPLASTY STATUS WITH IMPLANTS AND GRAFTS: ICD-10-CM

## 2022-12-09 DIAGNOSIS — T82.868A THROMBOSIS DUE TO VASCULAR PROSTHETIC DEVICES, IMPLANTS AND GRAFTS, INITIAL ENCOUNTER: ICD-10-CM

## 2022-12-09 LAB — SURGICAL PATHOLOGY STUDY: SIGNIFICANT CHANGE UP

## 2022-12-09 PROCEDURE — 97110 THERAPEUTIC EXERCISES: CPT | Mod: GP

## 2022-12-09 PROCEDURE — 97116 GAIT TRAINING THERAPY: CPT | Mod: GP

## 2022-12-09 PROCEDURE — 12800000 HC ROOM AND CARE SEMIPRIVATE REHAB

## 2022-12-09 PROCEDURE — 63700000 HC SELF-ADMINISTRABLE DRUG: Performed by: HOSPITALIST

## 2022-12-09 PROCEDURE — 97535 SELF CARE MNGMENT TRAINING: CPT | Mod: GO

## 2022-12-09 PROCEDURE — 63700000 HC SELF-ADMINISTRABLE DRUG: Performed by: PHYSICAL MEDICINE & REHABILITATION

## 2022-12-09 PROCEDURE — 97542 WHEELCHAIR MNGMENT TRAINING: CPT | Mod: GP

## 2022-12-09 RX ORDER — PREGABALIN 25 MG/1
25 CAPSULE ORAL NIGHTLY
Status: DISCONTINUED | OUTPATIENT
Start: 2022-12-09 | End: 2022-12-12

## 2022-12-09 RX ORDER — TRAMADOL HYDROCHLORIDE 50 MG/1
100 TABLET ORAL EVERY 6 HOURS PRN
Status: DISCONTINUED | OUTPATIENT
Start: 2022-12-09 | End: 2022-12-14

## 2022-12-09 RX ADMIN — TRAMADOL HYDROCHLORIDE 100 MG: 50 TABLET, COATED ORAL at 14:51

## 2022-12-09 RX ADMIN — TRAMADOL HYDROCHLORIDE 50 MG: 50 TABLET, COATED ORAL at 08:14

## 2022-12-09 RX ADMIN — APIXABAN 5 MG: 5 TABLET, FILM COATED ORAL at 08:14

## 2022-12-09 RX ADMIN — PREGABALIN 25 MG: 25 CAPSULE ORAL at 08:14

## 2022-12-09 RX ADMIN — ACETAMINOPHEN 975 MG: 325 TABLET ORAL at 21:37

## 2022-12-09 RX ADMIN — PREGABALIN 25 MG: 25 CAPSULE ORAL at 17:43

## 2022-12-09 RX ADMIN — ACETAMINOPHEN 975 MG: 325 TABLET ORAL at 14:51

## 2022-12-09 RX ADMIN — TRAMADOL HYDROCHLORIDE 50 MG: 50 TABLET, COATED ORAL at 00:54

## 2022-12-09 RX ADMIN — PREGABALIN 25 MG: 25 CAPSULE ORAL at 20:45

## 2022-12-09 RX ADMIN — APIXABAN 5 MG: 5 TABLET, FILM COATED ORAL at 20:45

## 2022-12-09 RX ADMIN — PANTOPRAZOLE SODIUM 40 MG: 40 TABLET, DELAYED RELEASE ORAL at 08:14

## 2022-12-09 NOTE — PROGRESS NOTES
"Daily Progress Note     Patient was seen and examined.   Attestation Notes: Face to face encounter completed    Subjective    Patient complains of significant pain.  This is both mechanical and phantom.  States this is worse at night.  Interrupt sleep.  Tolerating therapies touch assist with transfers and min assist but hopping 10 feet with a rolling walker.  Objective     Visit Vitals  /75 (BP Location: Right upper arm, Patient Position: Lying)   Pulse 73   Temp 36.6 °C (97.9 °F) (Oral)   Resp 18   Ht 1.854 m (6' 1\")   Wt 70.1 kg (154 lb 9.6 oz)   SpO2 98%   BMI 20.40 kg/m²       Review of Systems:  Pertinent items are noted in HPI.  Denies chest pain and shortness of breath.  Review of systems otherwise negative.    Labs     Results from last 7 days   Lab Units 12/07/22  0611   WBC K/uL 13.47*   HEMOGLOBIN g/dL 12.2*   HEMATOCRIT % 37.5*   PLATELETS K/uL 462*     Results from last 7 days   Lab Units 12/07/22  0611   SODIUM mEQ/L 135*   POTASSIUM mEQ/L 4.6   CHLORIDE mEQ/L 99   CO2 mEQ/L 24   BUN mg/dL 20   CREATININE mg/dL 0.6*   CALCIUM mg/dL 9.3   ALBUMIN g/dL 2.9*   BILIRUBIN TOTAL mg/dL 0.6   ALK PHOS IU/L 159*   ALT IU/L 82*   AST IU/L 78*   GLUCOSE mg/dL 70     Full Code    Physical Exam  General      Alert, cooperative, no distress, appears stated age.   Head:    Normocephalic, without obvious abnormality, atraumatic.   Eyes:    PERRL, conjunctiva/corneas clear, EOM's intact.        Nose:   Nares normal, septum midline, mucosa normal, no drainage or            sinus tenderness.   Throat:   Lips, mucosa, and tongue normal.    Neck:   Supple, symmetrical, trachea midline.    Back:     Symmetric, no curvature.   Lungs:     Clear to auscultation bilaterally, respirations unlabored.   Chest wall:    No tenderness or deformity.   Heart:    Regular rate and rhythm, S1 and S2 normal.   Abdomen:     Soft, non-tender, bowel sounds active all four quadrants,     no masses, no organomegaly. "   Extremities:  Musculoskeletal: Moderate stump edema.  Right transtibial amputation.      Pulses:   1+ and symmetric all extremities.   Skin: Incision intact.   Neurologic:          Behavior/  Emotional:  CNII-XII intact.  Alert and oriented ×3.  Motor exam intact.  Sensory exam intact.  Reflexes stable decreased reflexes.      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 male who presents with the following chronic medical problems including MTHFR gene mutation, coronary artery disease with prior myocardial infarction 1993, peripheral artery disease with multiple bypass procedures in the past most recent with tPA bolus and angioplasty in 2020.  The patient admitted to [facility] on November 30, 2022 to the vascular surgery service for planned BKA due to bypass thrombosis.  At the time of admission the patient had cyanosis and was beginning to develop thanh gangrene of the foot.  Patient had reported over the last 1 to 2 months worsening pain of the right foot and that he knew the graft was occluded.  Patient has a history of 15+ surgeries of the extremities and understood that his revascularization options were limited.  He had severe 10 out of 10 pain of the foot relieved with elevation of the leg.  He was still able to ambulate but limited by pain.  Under the care of Bowen Serrato MD of vascular surgery he underwent right transtibial amputation on December 2, 2022.  Postoperative course unremarkable with advancement of diet and pain control.  ESTUARDO drain removed on December 4.  For postoperative pain control he was treated with morphine.  On discharge prescribed Tylenol and low-dose Lyrica.  Lyrica started prior by his PCP for neuropathic pain.  He was restarted on his home Eliquis.  He required min assist for bed mobility, transfers  and ambulation.  Vascular surgery wants follow-up in 1 month.  Patient is now appropriate for acute inpatient rehabilitation.    Comprehensive inpatient rehabilitation program status post right transtibial amputation.  Goal is for the patient to reach a modified independent level with bed mobility, transfers and ambulation.  Ambulation with hopping short distances using a rolling walker.  Also to become modified independent with elevations.  Preprosthetic training.  Ongoing pain control.    For pain control continue low-dose Lyrica and consider increasing dose if the patient has ongoing phantom pain.  Continue around-the-clock Tylenol.  Continue tramadol.  If needed adjust dose.    Monitor incision closely and continue incisional and stump care.    Patient complains of significant pain.  This is both mechanical and phantom.  States this is worse at night.  Interrupt sleep.  Will increase tramadol to 100 mg every 6 hours as needed.  Will increase nighttime dose of Lyrica.  Adjust medications as needed.    Tolerating therapies touch assist with transfers and min assist but hopping 10 feet with a rolling walker.    Anemia  Assessment & Plan  Postoperative anemia.  Monitor hemoglobin levels.  Hemoglobin 12.2.  Mild iron deficiency and consider iron replacement.    Lupus anticoagulant disorder (CMS/HCC)  Assessment & Plan  Continue Eliquis.    Deep vein thrombosis (CMS/HCC)  Assessment & Plan  Continue Eliquis.    Coronary artery disease  Assessment & Plan  Cardiac precautions.  Continue Eliquis.        Expected Discharge Date:

## 2022-12-09 NOTE — PROGRESS NOTES
[facility] Progress Note          Patient was seen and examined.   Attestation Notes: Face to face encounter completed    Harry Alvarado is a 67 y.o. male who was admitted for Status post below knee amputation of right lower extremity (CMS/HCC) [Z89.511]. Patient was referred by Richard Angulo MD for medical assessment and management      CC: Status post below knee amputation of right lower extremity (CMS/HCC) [Z89.511]     HPI: Harry Alvarado is a 67 y.o. male with MTHFR gene mutation, CAD s/p MI 1993, and PAD s/p R Fem to PT bypass with arm vein in 1993, revision in 2011, s/p R SFA to peroneal bypass graft s/p revision on 7/30/18, s/p tPA bolus and angioplasty in 2020 admitted to [facility] 11/30/22 with RLE bypass thrombosis and chronic right foot ischemia/gangrene and underwent right BKA by vascular surgeon, Dr. Bowen Serrato 12/2/22. Post op he had anemia but did not require transfusion. He was restarted on his home Eliquis.  His pain was managed with Tylenol and Lyrica. Tolerating a regular diet.      The patient was evaluated by PM&R and found to have residual deficits in ambulation and ADLs due to Status post below knee amputation of right lower extremity (CMS/HCC) [Z89.511] and came to [facility] on 12/6/2022 for acute inpatient rehab.      He participated in therapy.     SUBJECTIVE:  Patient interviewed and examined     He remains without acute complaints.     RLE pain stable, moving bowels and bladder, no abdominal pain or nausea, no dysuria, no chest pain, palpitations, dyspnea or fevers    Review of Systems:  All other systems reviewed and negative except as noted in the HPI.    Current meds and allergies reviewed    Past Medical History:   Diagnosis Date   • Coronary artery disease     s/p MI   • Deep vein thrombosis (CMS/HCC)    • Lupus anticoagulant disorder (CMS/HCC)    • Lyme disease    • MTHFR gene mutation    • Myocardial infarction (CMS/HCC)    • PAD (peripheral  artery disease) (CMS/HCC)      Past Surgical History:   Procedure Laterality Date   • BELOW KNEE LEG AMPUTATION Right 12/02/2022   • FEMORAL BYPASS Right      Social History     Tobacco Use   • Smoking status: Never   • Smokeless tobacco: Never   Substance Use Topics   • Alcohol use: Not Currently      No family history on file.    Vital signs in last 24 hours:  Temp:  [36.6 °C (97.9 °F)-36.8 °C (98.3 °F)] 36.6 °C (97.9 °F)  Heart Rate:  [73-83] 78  Resp:  [16-18] 18  BP: (123-127)/(70-80) 127/80  Vital signs reviewed 12/09/22 2:59 PM    Physical Exam  Vitals and nursing note reviewed.   Constitutional:       Appearance: Normal appearance.   HENT:      Head: Normocephalic and atraumatic.      Right Ear: External ear normal.      Left Ear: External ear normal.      Nose: Nose normal.      Mouth/Throat:      Mouth: Mucous membranes are moist.      Pharynx: Oropharynx is clear.   Eyes:      Extraocular Movements: Extraocular movements intact.      Conjunctiva/sclera: Conjunctivae normal.      Pupils: Pupils are equal, round, and reactive to light.   Cardiovascular:      Rate and Rhythm: Normal rate and regular rhythm.      Pulses: Normal pulses.      Heart sounds: Normal heart sounds.   Pulmonary:      Effort: Pulmonary effort is normal.      Breath sounds: Normal breath sounds.   Abdominal:      General: Abdomen is flat. Bowel sounds are normal.      Palpations: Abdomen is soft.   Musculoskeletal:      Cervical back: Normal range of motion and neck supple.      Right lower leg: Edema present.      Left lower leg: Edema present.      Comments: S/p right BKA   Skin:     General: Skin is warm and dry.   Neurological:      General: No focal deficit present.      Mental Status: He is alert and oriented to person, place, and time.   Psychiatric:         Mood and Affect: Mood normal.         Behavior: Behavior normal.                 Objective    Labs:  I have reviewed the patient's labs.  Significant abnormals are  leukocytosis, anemia, hyponatremia, elevated LFTs.  Results from last 7 days   Lab Units 12/07/22  0611   WBC K/uL 13.47*   HEMOGLOBIN g/dL 12.2*   HEMATOCRIT % 37.5*   PLATELETS K/uL 462*     Results from last 7 days   Lab Units 12/07/22  0611   SODIUM mEQ/L 135*   POTASSIUM mEQ/L 4.6   CHLORIDE mEQ/L 99   CO2 mEQ/L 24   BUN mg/dL 20   CREATININE mg/dL 0.6*   CALCIUM mg/dL 9.3   ALBUMIN g/dL 2.9*   BILIRUBIN TOTAL mg/dL 0.6   ALK PHOS IU/L 159*   ALT IU/L 82*   AST IU/L 78*   GLUCOSE mg/dL 70     Results from last 7 days   Lab Units 12/07/22  0611   MAGNESIUM mg/dL 2.0        Imaging:  Not applicable        ASSESSMENT/PLAN:    67 y.o. male with MTHFR gene mutation, CAD s/p MI 1993, and PAD s/p R Fem to PT bypass with arm vein in 1993, revision in 2011, s/p R SFA to peroneal bypass graft s/p revision on 7/30/18, s/p tPA bolus and angioplasty in 2020 admitted to [facility] 11/30/22 with RLE bypass thrombosis and chronic right foot ischemia/gangrene and underwent right BKA by vascular surgeon, Dr. Bowen Serrato 12/2/22     1. Vasc:  -PAD s/p failed RLE bypass with right foot ischemia/gangrene  -s/p right BKA 12/2/22  -Tylenol and Lyrica for pain  -remains on Eliquis for hypercoagulable condition     2. Heme:  -post op anemia due to chronic blood loss, does not need iron  -leukocytosis reactive due to surgery  -hx of MTHFR gene mutation  -follow CBC     3. Renal:  -increased risk of dehydration and electrolyte changes  -follow BMP, Mg     4. Gi:  -Senokot-S as needed for bowels  -Protonix for GERD and ulcer ppx  -elevated LFTs likely transient due to surgery, meds, follow for now     5. Gu:  -no UTI or retention     6. Cardiac:  -hx of CAD  -watch for orthostatic hypotension  -use cardiac precautions in therapy     7. Pulm:  -incentive spirometry for atelectasis     8. Derm:  -seen by [employer] for skin assessment     9. Nutrition:  -seen by Nutrition for assessment and education     10. Psych:  -seen by  Psychology for support  -Ambien prn sleep     plan discussed with patient, nurse, case management and  Richard Angulo MD Scott Sapperstein, MD  12/9/2022  2:59 PM

## 2022-12-09 NOTE — ASSESSMENT & PLAN NOTE
Harry Alvarado is a 67 y.o. male who presents with the following chronic medical problems including MTHFR gene mutation, coronary artery disease with prior myocardial infarction 1993, peripheral artery disease with multiple bypass procedures in the past most recent with tPA bolus and angioplasty in 2020.  The patient admitted to [facility] on November 30, 2022 to the vascular surgery service for planned BKA due to bypass thrombosis.  At the time of admission the patient had cyanosis and was beginning to develop thanh gangrene of the foot.  Patient had reported over the last 1 to 2 months worsening pain of the right foot and that he knew the graft was occluded.  Patient has a history of 15+ surgeries of the extremities and understood that his revascularization options were limited.  He had severe 10 out of 10 pain of the foot relieved with elevation of the leg.  He was still able to ambulate but limited by pain.  Under the care of Bowen Serrato MD of vascular surgery he underwent right transtibial amputation on December 2, 2022.  Postoperative course unremarkable with advancement of diet and pain control.  ESTUARDO drain removed on December 4.  For postoperative pain control he was treated with morphine.  On discharge prescribed Tylenol and low-dose Lyrica.  Lyrica started prior by his PCP for neuropathic pain.  He was restarted on his home Eliquis.  He required min assist for bed mobility, transfers and ambulation.  Vascular surgery wants follow-up in 1 month.  Patient is now appropriate for acute inpatient rehabilitation.    Comprehensive inpatient rehabilitation program status post right transtibial amputation.  Goal is for the patient to reach a modified independent level with bed mobility, transfers and ambulation.  Ambulation with hopping short distances using a rolling walker.  Also to become modified independent with elevations.  Preprosthetic training.  Ongoing pain control.    For pain control continue  low-dose Lyrica and consider increasing dose if the patient has ongoing phantom pain.  Continue around-the-clock Tylenol.  Continue tramadol.  If needed adjust dose.    Monitor incision closely and continue incisional and stump care.    Patient complains of significant pain.  This is both mechanical and phantom.  States this is worse at night.  Interrupt sleep.  Will increase tramadol to 100 mg every 6 hours as needed.  Will increase nighttime dose of Lyrica.  Adjust medications as needed.    Tolerating therapies touch assist with transfers and min assist but hopping 10 feet with a rolling walker.

## 2022-12-09 NOTE — PROGRESS NOTES
Patient: Harry Alvarado  Location: [facility] [address]  MRN: [id number]  Today's date: 12/9/2022    History of Present Illness  Harry is a 67 y.o. 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 Principal problem is   Status post below knee amputation of right lower extremity (CMS/[facility]). The pt was admitted to Vascular Surgery service for a planned R BKA due to bypass thrombosis and ischemia of the R foot.   He is s/p R BKA on 12/2/22.  His hgb is stable and he has been restarted on his home Eliquis.   Tolerating a regular diet.  PM&R was consulted on 12/3 and recommends acute inpt rehab for intensive therapy as he can tolerate >3 hrs of combined PT/OT per day and at least 5 days/week with a reasonable expectation that the patient will make measurable functional improvement that only an acute rehab can provide.         Past Medical History  Harry has a past medical history of Coronary artery disease, Deep vein thrombosis (CMS/[facility]), Lupus anticoagulant disorder (CMS/[facility]), Lyme disease, MTHFR gene mutation, Myocardial infarction (CMS/[facility]), and PAD (peripheral artery disease) (CMS/[facility]).      PT Vitals    Date/Time Pulse HR Source BP BP Location BP Method Pt Position [facility]   12/09/22 1435 78 Monitor 127/80 Left upper arm Automatic Sitting BD      PT Pain    Date/Time Pain Type Acceptable Pain Level Side/Orientation Location Interventions [facility]   12/09/22 1435 Pain Assessment 5 right residual limb position adjusted [facility]   12/09/22 1555 Pain Reassessment 6 right residual limb position adjusted;premedicated for activity BD          Prior Living Environment    Flowsheet Row Most Recent Value   People in Home child(no), adult, spouse   Current Living Arrangements home   Home Accessibility stairs to enter home (Group), stairs within home (Group)   Living Environment Comment Pt was living in [place] with his wife. However, after discharge, his wife  and him plan to move into son's house in [place] with first floor setup and 1 YOSEF. After prosthetic training, pt plans to move back to [place].   Number of Stairs, Main Entrance 1   Location, Patient Bedroom first (main) floor   Location, Bathroom first (main) floor   Bathroom Access Comment confirmed with family stall shower with 6 inch step to enter and seat within shower          Prior Level of Function    Flowsheet Row Most Recent Value   Dominant Hand right   Ambulation assistive equipment   Transferring assistive equipment   Toileting independent   Bathing assistive person   Dressing independent   Eating independent   Prior Level of Function Comment Pt was previously I with all ADLs, only requiring assist with bathing from his wife as function decreased prior to hospital admission. Pt was ambulating with a SPC for recent 2 months. + , retired. Pt enjoys skiing and playing pickleball with his wife.   Assistive Device Currently Used at Home cane, straight  [reports brief use of RW in past]           IRF PT Evaluation and Treatment - 12/09/22 1430        PT Time Calculation    Start Time 1430     Stop Time 1600     Time Calculation (min) 90 min        Session Details    Document Type daily treatment/progress note     Mode of Treatment individual therapy;physical therapy        General Information    General Observations of Patient pt received in gym, agreeable to session.        Bed Mobility    [place], Supine to Sit supervision     [place], Sit to Supine supervision     Comment (Bed Mobility) sit <> supine on mat surface.        Transfers    Comment gait belt donned during session.        Sit to Stand Transfer    [place], Sit to Stand Transfer touching/steadying assist     Verbal Cues safety;technique;hand placement     Assistive Device walker, front-wheeled     Comment steadying assist via gait belt for safety.        Stand to Sit Transfer    [place], Stand to Sit Transfer  touching/steadying assist     Verbal Cues safety;technique;hand placement     Assistive Device walker, front-wheeled     Comment steadying assist via gait belt for safety.        Stand Pivot Transfer    [place], Stand Pivot/Stand Step Transfer minimum assist (75% or more patient effort)     Verbal Cues safety;technique;proper use of assistive device     Assistive Device walker, front-wheeled     Comment via amb approach. Min A at pelvis for balance wc to mat surface.        Gait Training    [place], Gait minimum assist (75% or more patient effort)     Assistive Device walker, front-wheeled     Distance in Feet 15 feet   15'x2    Pattern (Gait) other (see comments)   via unipedal approach.    Deviations/Abnormal Patterns (Gait) antalgic;base of support, narrow;gait speed decreased;step length decreased;weight shifting decreased     Comment (Gait/Stairs) Min A at pelvis for balance and safety.        Wheelchair Mobility/Management    Method of Locomotion bimanual (upper extremity) propulsion     Wheelchair Type manual, lightweight     Wing, Forward Propulsion modified independence     Wing, Backward Propulsion modified independence     Wing, Steering Activities modified independence     Wing, Stopping Activities modified independence     Wing, Turning Activities modified independence     Wing, Doorway Navigation modified independence     Distance Propelled in Feet 500 feet     Comment, Wheelchair Mobility Provided education regarding wheelchair license. Pt oriented to 1st floor of [facility] and able to negotiate from [employer] to [facility] then back to [employer] unit without issue. Pt aware to sign in/out at [facility]. Deferring patio due to decreased temperature and pt in agreement. Pt aware to remain in wc AAT. Provided wheelchair license and [facility] made aware.        Balance    Balance Test Results Five Times Sit to Stand (FTSTS)     Five Times to Sit to Stand (FTSTS) 46.45  seconds   Pt requires 46 sec for completion of the FTSTS on 12/9 which is > norm of 16 sec indicating fall risk/mobility deficit.       Lower Extremity (Therapeutic Exercise)    Exercise Position/Type prone;AROM (active range of motion);static stretching;seated     General Exercise right;knee flexion;hip extension;left;ankle pumps;LAQ (long arc quad);marching while seated     Reps and Sets 3 x 10     Comment 1) prone lying stretching x10 min. 2) prone TE - R hip extension, R knee flexion. 3) seated TE with LLE - ankle pumps, LAQs, marching        Daily Progress Summary (PT)    Daily Outcome Statement Focus of session on wc propulsion training and prone stretching/TE which pt tolerated well. Pt demonstrates appropriate wc propulsion skills and issued wc license. Pt requires 46 sec for completion of the FTSTS  which is > norm of 16 sec indicating fall risk/mobility deficit.Plan to continue to progress functional mobility, balance, strength, and endurance per pt's tolerance.                      Education Documentation  Mobility, taught by Shawn Soares, PT at 12/9/2022  4:20 PM.  Learner: Patient  Readiness: Eager  Method: Explanation, Demonstration  Response: Verbalizes Understanding, Demonstrated Understanding  Comment: Provided education regarding wheelchair license including orientation to 1st floor of hospital, to remain in wc AAT, and to sign in/out at [facility] desk.          IRF PT Goals    Flowsheet Row Most Recent Value   Bed Mobility Goal 1    Activity/Assistive Device bed mobility activities, all at 12/07/2022 1034   [place] supervision required at 12/07/2022 1034   Time Frame short-term goal (STG), 1 week at 12/07/2022 1034   Progress/Outcome goal ongoing at 12/07/2022 1034   Bed Mobility Goal 2    Activity/Assistive Device bed mobility activities, all at 12/07/2022 1034   [place] modified independence at 12/07/2022 1034   Time Frame long-term goal (LTG), 2 weeks at 12/07/2022 1034    Progress/Outcome goal ongoing at 12/07/2022 1034   Transfer Goal 1    Activity/Assistive Device sit-to-stand/stand-to-sit, low pivot, stand pivot at 12/07/2022 1034   [place] supervision required at 12/07/2022 1034   Time Frame short-term goal (STG), 1 week at 12/07/2022 1034   Progress/Outcome goal ongoing at 12/07/2022 1034   Transfer Goal 2    Activity/Assistive Device sit-to-stand/stand-to-sit, low pivot, stand pivot at 12/07/2022 1034   [place] modified independence at 12/07/2022 1034   Time Frame long-term goal (LTG), 2 weeks at 12/07/2022 1034   Progress/Outcome goal ongoing at 12/07/2022 1034   Wheelchair Locomotion Goal 1    Activity wheelchair mobility skills, all at 12/07/2022 1034   Assistive Device manual, lightweight at 12/07/2022 1034   Distance 250 feet at 12/07/2022 1034   [place] modified independence at 12/07/2022 1034   Time Frame short-term goal (STG), 1 week at 12/07/2022 1034   Progress/Outcome goal ongoing at 12/07/2022 1034   Wheelchair Locomotion Goal 2    Activity wheelchair mobility skills, all at 12/07/2022 1034   Assistive Device manual, lightweight at 12/07/2022 1034   Distance 500 feet at 12/07/2022 1034   [place] modified independence at 12/07/2022 1034   Time Frame long-term goal (LTG), 2 weeks at 12/07/2022 1034   Progress/Outcome goal ongoing at 12/07/2022 1034

## 2022-12-09 NOTE — PLAN OF CARE
Problem: Rehabilitation (IR[facility]) Plan of Care  Goal: Plan of Care Review  Outcome: Progressing  Flowsheets (Taken 12/8/2022 2031)  Progress: improving  Plan of Care Reviewed With: patient  Outcome Summary: R residual limb dressing CDI. Pain plan discussed. Callbell in reach.   Plan of Care Review  Plan of Care Reviewed With: patient  Progress: improving  Outcome Summary: R residual limb dressing CDI. Pain plan discussed. Jaspreet in reach.

## 2022-12-09 NOTE — PROGRESS NOTES
12/09/22 1704   [employer] Vis[facility] [employer]   Visitor Comments Wife will be in for amputee education in patient room from 2-3pm on Monday 12/12/22.

## 2022-12-09 NOTE — PROGRESS NOTES
12/09/22 1300   General Information   Preferred Language eng   Referring Facility Type acute care facility    Used During This Interaction no   Contact Information   Permission Granted to Share Info With family/designee   Contact Information Comments Dave - Wife - [phone]   Advance Directives (for Healthcare)   Does patient have advance directive? No   Patient does not have Advance Directive Patient/Family declines further information   Living Environment   Current Living Arrangements home   Home Accessibility stairs to enter home (Group);stairs within home (Group)   People in Home child(no), adult;spouse   Name(s) of People in Home Dave   Primary Care Provided by self   Living Environment   Provides Primary Care For no one   Family Caregiver if Needed spouse;child(no), adult   Quality of Family Relationships supportive;involved   Able to Return to Prior Arrangements yes   Living Arrangement Comments Reside at son's [place] in [place].  1 YOSEF.  1 floor living.   Employment/   Employment Status retired   Current or Previous Occupation professional   Financial/Legal   Source of Income social security   Values/Beliefs   Spiritual, Cultural Beliefs, Religion Practices, Values that Affect Care no   Coping/Stress   Major Change/Loss/Stressor denies   Patient Personal Strengths courageous;self-reliant;resourceful;resilient   Sources of Support adult child(no);spouse   Reaction to Health Status adjusting   Understanding of Condition and Treatment adequate understanding of medical condition   Discharge Needs Assessment   Concerns to be Addressed basic needs;care coordination/care conferences   Patient/Family Anticipates Transition to home with family   Patient/Family Anticipated Services at Transition home health care   Transportation Concerns car, none   Transportation Anticipated car, drives self;family or friend will provide   Anticipated Changes Related to Illness none    Outpatient/Agency/Support Group Needs homecare agency   Anticipated Discharge Disposition home with home health   Discharge Planning   Type of Home Care Services home PT;nursing   Discharge Transportation   Does the patient need discharge transport arranged? No   Plan   Plan Admit [facility] participate therapy and coordinate DC needs.   Patient/Family in Agreement with Plan yes   Discharge Review for Designated Lay Caregiver Designated Lay Caregiver available   Team Conference   Next Team Conference Date 12/15/22

## 2022-12-09 NOTE — SUBJECTIVE & OBJECTIVE
" Patient was seen and examined.   Attestation Notes: Face to face encounter completed    Subjective    Patient complains of significant pain.  This is both mechanical and phantom.  States this is worse at night.  Interrupt sleep.  Tolerating therapies touch assist with transfers and min assist but hopping 10 feet with a rolling walker.  Objective     Visit Vitals  /75 (BP Location: Right upper arm, Patient Position: Lying)   Pulse 73   Temp 36.6 °C (97.9 °F) (Oral)   Resp 18   Ht 1.854 m (6' 1\")   Wt 70.1 kg (154 lb 9.6 oz)   SpO2 98%   BMI 20.40 kg/m²       Review of Systems:  Pertinent items are noted in HPI.  Denies chest pain and shortness of breath.  Review of systems otherwise negative.    Labs     Results from last 7 days   Lab Units 12/07/22  0611   WBC K/uL 13.47*   HEMOGLOBIN g/dL 12.2*   HEMATOCRIT % 37.5*   PLATELETS K/uL 462*     Results from last 7 days   Lab Units 12/07/22  0611   SODIUM mEQ/L 135*   POTASSIUM mEQ/L 4.6   CHLORIDE mEQ/L 99   CO2 mEQ/L 24   BUN mg/dL 20   CREATININE mg/dL 0.6*   CALCIUM mg/dL 9.3   ALBUMIN g/dL 2.9*   BILIRUBIN TOTAL mg/dL 0.6   ALK PHOS IU/L 159*   ALT IU/L 82*   AST IU/L 78*   GLUCOSE mg/dL 70     Full Code    Physical Exam  General      Alert, cooperative, no distress, appears stated age.   Head:    Normocephalic, without obvious abnormality, atraumatic.   Eyes:    PERRL, conjunctiva/corneas clear, EOM's intact.        Nose:   Nares normal, septum midline, mucosa normal, no drainage or            sinus tenderness.   Throat:   Lips, mucosa, and tongue normal.    Neck:   Supple, symmetrical, trachea midline.    Back:     Symmetric, no curvature.   Lungs:     Clear to auscultation bilaterally, respirations unlabored.   Chest wall:    No tenderness or deformity.   Heart:    Regular rate and rhythm, S1 and S2 normal.   Abdomen:     Soft, non-tender, bowel sounds active all four quadrants,     no masses, no organomegaly.   Extremities:  Musculoskeletal: Moderate " stump edema.  Right transtibial amputation.      Pulses:   1+ and symmetric all extremities.   Skin: Incision intact.   Neurologic:          Behavior/  Emotional:  CNII-XII intact.  Alert and oriented ×3.  Motor exam intact.  Sensory exam intact.  Reflexes stable decreased reflexes.      Appropriate, cooperative           Plan of care was discussed with patient

## 2022-12-10 ENCOUNTER — APPOINTMENT (INPATIENT)
Dept: PHYSICAL THERAPY | Facility: REHABILITATION | Age: 67
DRG: 560 | End: 2022-12-10
Payer: MEDICARE

## 2022-12-10 ENCOUNTER — APPOINTMENT (INPATIENT)
Dept: OCCUPATIONAL THERAPY | Facility: REHABILITATION | Age: 67
DRG: 560 | End: 2022-12-10
Payer: MEDICARE

## 2022-12-10 PROCEDURE — 12800000 HC ROOM AND CARE SEMIPRIVATE REHAB

## 2022-12-10 PROCEDURE — 63700000 HC SELF-ADMINISTRABLE DRUG: Performed by: HOSPITALIST

## 2022-12-10 PROCEDURE — 97116 GAIT TRAINING THERAPY: CPT | Mod: GP

## 2022-12-10 PROCEDURE — 97530 THERAPEUTIC ACTIVITIES: CPT | Mod: GO

## 2022-12-10 PROCEDURE — 97110 THERAPEUTIC EXERCISES: CPT | Mod: GP

## 2022-12-10 PROCEDURE — 63700000 HC SELF-ADMINISTRABLE DRUG: Performed by: PHYSICAL MEDICINE & REHABILITATION

## 2022-12-10 PROCEDURE — 97110 THERAPEUTIC EXERCISES: CPT | Mod: GO

## 2022-12-10 RX ADMIN — ACETAMINOPHEN 975 MG: 325 TABLET ORAL at 14:25

## 2022-12-10 RX ADMIN — PREGABALIN 25 MG: 25 CAPSULE ORAL at 20:38

## 2022-12-10 RX ADMIN — TRAMADOL HYDROCHLORIDE 100 MG: 50 TABLET, COATED ORAL at 10:04

## 2022-12-10 RX ADMIN — TRAMADOL HYDROCHLORIDE 100 MG: 50 TABLET, COATED ORAL at 16:08

## 2022-12-10 RX ADMIN — ACETAMINOPHEN 975 MG: 325 TABLET ORAL at 05:55

## 2022-12-10 RX ADMIN — PREGABALIN 25 MG: 25 CAPSULE ORAL at 16:08

## 2022-12-10 RX ADMIN — TRAMADOL HYDROCHLORIDE 100 MG: 50 TABLET, COATED ORAL at 22:11

## 2022-12-10 RX ADMIN — PANTOPRAZOLE SODIUM 40 MG: 40 TABLET, DELAYED RELEASE ORAL at 08:14

## 2022-12-10 RX ADMIN — APIXABAN 5 MG: 5 TABLET, FILM COATED ORAL at 08:15

## 2022-12-10 RX ADMIN — TRAMADOL HYDROCHLORIDE 100 MG: 50 TABLET, COATED ORAL at 00:56

## 2022-12-10 RX ADMIN — APIXABAN 5 MG: 5 TABLET, FILM COATED ORAL at 20:38

## 2022-12-10 RX ADMIN — ACETAMINOPHEN 975 MG: 325 TABLET ORAL at 22:13

## 2022-12-10 RX ADMIN — PREGABALIN 25 MG: 25 CAPSULE ORAL at 08:15

## 2022-12-10 NOTE — PLAN OF CARE
Plan of Care Review  Plan of Care Reviewed With: patient  Progress: improving  Outcome Summary: AOx4, verbalizes needs. pain managed by scheduled lyrica and tylenol, requests tramadol PRN. incision free from signs of infection, sutures intact. min assist with transfers.

## 2022-12-10 NOTE — SUBJECTIVE & OBJECTIVE
" Patient was seen and examined.   Attestation Notes: Face to face encounter completed    Subjective    Patient has better pain control.  Slept better.  Tolerated increase in Lyrica and tramadol.  Progressing in therapies touch assist to min assist with transfers and ambulation with rolling walker.  Objective     Visit Vitals  /84 (BP Location: Right upper arm, Patient Position: Lying)   Pulse 72   Temp 36.7 °C (98 °F) (Oral)   Resp 20   Ht 1.854 m (6' 1\")   Wt 70.1 kg (154 lb 9.6 oz)   SpO2 97%   BMI 20.40 kg/m²       Review of Systems:  Pertinent items are noted in HPI.  Denies chest pain and shortness of breath.  Review of systems otherwise negative.    Labs     Results from last 7 days   Lab Units 12/07/22  0611   WBC K/uL 13.47*   HEMOGLOBIN g/dL 12.2*   HEMATOCRIT % 37.5*   PLATELETS K/uL 462*     Results from last 7 days   Lab Units 12/07/22  0611   SODIUM mEQ/L 135*   POTASSIUM mEQ/L 4.6   CHLORIDE mEQ/L 99   CO2 mEQ/L 24   BUN mg/dL 20   CREATININE mg/dL 0.6*   CALCIUM mg/dL 9.3   ALBUMIN g/dL 2.9*   BILIRUBIN TOTAL mg/dL 0.6   ALK PHOS IU/L 159*   ALT IU/L 82*   AST IU/L 78*   GLUCOSE mg/dL 70        Full Code    Physical Exam  General      Alert, cooperative, no distress, appears stated age.   Head:    Normocephalic, without obvious abnormality, atraumatic.   Eyes:    PERRL, conjunctiva/corneas clear, EOM's intact.        Nose:   Nares normal, septum midline, mucosa normal, no drainage or            sinus tenderness.   Throat:   Lips, mucosa, and tongue normal.    Neck:   Supple, symmetrical, trachea midline.    Back:     Symmetric, no curvature.   Lungs:     Clear to auscultation bilaterally, respirations unlabored.   Chest wall:    No tenderness or deformity.   Heart:    Regular rate and rhythm, S1 and S2 normal.   Abdomen:     Soft, non-tender, bowel sounds active all four quadrants,     no masses, no organomegaly.   Extremities:  Musculoskeletal: .  Stable edema residual limb.  Right transtibial " amputation.   Pulses:   1+ and symmetric all extremities.   Skin: Incision intact with sutures.  Mild drainage.   Neurologic:          Behavior/  Emotional:  CNII-XII intact.  Alert and oriented ×3.  Motor exam intact.  Sensory exam intact.  Reflexes stable decreased reflexes.      Appropriate, cooperative           Plan of care was discussed with patient

## 2022-12-10 NOTE — ASSESSMENT & PLAN NOTE
Harry Alvarado is a 67 y.o. male who presents with the following chronic medical problems including MTHFR gene mutation, coronary artery disease with prior myocardial infarction 1993, peripheral artery disease with multiple bypass procedures in the past most recent with tPA bolus and angioplasty in 2020.  The patient admitted to [facility] on November 30, 2022 to the vascular surgery service for planned BKA due to bypass thrombosis.  At the time of admission the patient had cyanosis and was beginning to develop thanh gangrene of the foot.  Patient had reported over the last 1 to 2 months worsening pain of the right foot and that he knew the graft was occluded.  Patient has a history of 15+ surgeries of the extremities and understood that his revascularization options were limited.  He had severe 10 out of 10 pain of the foot relieved with elevation of the leg.  He was still able to ambulate but limited by pain.  Under the care of Bowen Serrato MD of vascular surgery he underwent right transtibial amputation on December 2, 2022.  Postoperative course unremarkable with advancement of diet and pain control.  ESTUARDO drain removed on December 4.  For postoperative pain control he was treated with morphine.  On discharge prescribed Tylenol and low-dose Lyrica.  Lyrica started prior by his PCP for neuropathic pain.  He was restarted on his home Eliquis.  He required min assist for bed mobility, transfers and ambulation.  Vascular surgery wants follow-up in 1 month.  Patient is now appropriate for acute inpatient rehabilitation.    Comprehensive inpatient rehabilitation program status post right transtibial amputation.  Goal is for the patient to reach a modified independent level with bed mobility, transfers and ambulation.  Ambulation with hopping short distances using a rolling walker.  Also to become modified independent with elevations.  Preprosthetic training.  Ongoing pain control.    For pain control continue  low-dose Lyrica and consider increasing dose if the patient has ongoing phantom pain.  Continue around-the-clock Tylenol.  Continue tramadol.  If needed adjust dose.    Monitor incision closely and continue incisional and stump care.    Patient complains of significant pain.  This is both mechanical and phantom.  States this is worse at night.  Interrupt sleep.    Patient has better pain control.  Slept better.  Tolerated increase in Lyrica and tramadol.  Progressing in therapies touch assist to min assist with transfers and ambulation with rolling walker.

## 2022-12-10 NOTE — PROGRESS NOTES
Patient: Harry Alvarado  Location: [facility] [address]  MRN: [id number]  Today's date: 12/9/2022    History of Present Illness  Harry is a 67 y.o. 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 The pt was admitted to Vascular Surgery service for a planned R BKA due to bypass thrombosis and ischemia of the R foot.   He is s/p R BKA on 12/2/22.  His hgb is stable and he has been restarted on his home Eliquis.   Tolerating a regular diet.  PM&R was consulted on 12/3 and recommends acute inpt rehab for intensive therapy as he can tolerate >3 hrs of combined PT/OT per day and at least 5 days/week with a reasonable expectation that the patient will make measurable functional improvement that only an acute rehab can provide.         Past Medical History  Harry has a past medical history of Coronary artery disease, Deep vein thrombosis (CMS/[facility]), Lupus anticoagulant disorder (CMS/[facility]), Lyme disease, MTHFR gene mutation, Myocardial infarction (CMS/[facility]), and PAD (peripheral artery disease) (CMS/[facility]).      OT Pain    Date/Time Acceptable Pain Level Who   12/09/22 0951 3 RE          Prior Living Environment    Flowsheet Row Most Recent Value   People in Home child(no), adult, spouse   Current Living Arrangements home   Home Accessibility stairs to enter home (Group), stairs within home (Group)   Living Environment Comment Pt was living in [place] with his wife. However, after discharge, his wife and him plan to move into son's house in [place] with first floor setup and 1 YOSEF. After prosthetic training, pt plans to move back to [place].   Number of Stairs, Main Entrance 1   Location, Patient Bedroom first (main) floor   Location, Bathroom first (main) floor   Bathroom Access Comment confirmed with family stall shower with 6 inch step to enter and seat  "within shower          Prior Level of Function    Flowsheet Row Most Recent Value   Dominant Hand right   Ambulation assistive equipment   Transferring assistive equipment   Toileting independent   Bathing assistive person   Dressing independent   Eating independent   Prior Level of Function Comment Pt was previously I with all ADLs, only requiring assist with bathing from his wife as function decreased prior to hospital admission. Pt was ambulating with a SPC for recent 2 months. + , retired. Pt enjoys skiing and playing pickleball with his wife.   Assistive Device Currently Used at Home cane, straight  [reports brief use of RW in past]          Occupational Profile    Flowsheet Row Most Recent Value   Occupational History/Life Experiences +. 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   Patient Goals \"Be able to get around better, take care of myself and play with my grandkids.\" And also \"Get a prosthesis and walk around like I used too.\"  [PSFS completed 12/8, pt reported: Walking/hiking 0/10, Pickleball 0/10, Bathing 5/10, Playing with grandkids 5/10 score 10/40 0.25.]         12/09/22 0905   OT Time Calculation   Start Time 0905   Stop Time 1035   Time Calculation (min) 90 min   Session Details   Document Type daily treatment/progress note   Mode of Treatment occupational therapy;individual therapy   General Information   General Observations of Patient Pt was seated in w/c at start of session, requesting to shower   Occupational Profile   Occupational History/Life Experiences +. 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, also enjoys hiking,   Environmental Supports and Barriers Pt is interested in learning about return to driving, plans to purchase a new vehicle.  Recommend arranging Chirag Eaton from the [facility] to speak with pt.  Pt lives in " [place] and plans on staying in [place] with son until he returns for prosthetic training and is able to return to [place].  Initially wanted to drive home.  Discussed potentially purchasing and adapting a vehicle during his stay in [place].  Recommend arranging for pt to view adapted vehicle.   Primary Bathroom Access   Bathroom Access Comment per PT confirmed with family stall shower with 6 inch step to enter and seat within shower however pt does have a shower door that swings open, recommending temporary curtain and extended bench   Safety Awareness/Health Promotion   Fall Prevention other (see comments)  (reminders for locking wheel locks and prevent leaning while in w/c and utilizing reacher)   Home Safety other (see comments)  (initiating discussion for home safety and potential barriers related to doorway width)   Contralateral Limb Protection other (see comments)  (reviewed importance of inspecting contralateral as well as residual limb and fit for ace wrap to secure dressing)   Skin Management Techniques other (see comments)  (identified need for consistent monitoring of residual limb and use of inspection mirror)   Safety Awareness/Health Promotion Fall Prevention (Row);Safety Awareness (Row);Skin Management Techniques (Row);Contralateral Limb Protection (Row)   Coping/Community Reintegration   Observed Emotional State anxious;other (see comments)  (with regard to the process and timeline related to obtaining a prosthesis)   Verbalized Emotional State acceptance;other (see comments)  (asking appropriate questions and therapist providing insight into process and healing time and additional factors such as returning to driving)   Coping Strategies   Trust Relationship/Rapport care explained;choices provided;empathic listening provided;questions answered;questions encouraged;reassurance provided;thoughts/feelings acknowledged   Edema Assessment & Management   Additional Documentation  Edema Symptoms/Interventions (Group);Location (Edema) (Row)   Edema Symptoms/Interventions   Treatment Interventions (Edema) elevation;other (see comments)  (transferred to bed after showering)   Associated Symptoms (Edema) pain   Description (Edema) diffuse;other (see comments)  (residual limb after ace wrap removed)   Bed Mobility   Comment (Bed Mobility) Cl S transition from sit to supine with vc's to monitor residual limb and protect from accidentally banging on bed rail, wheel lock extension   Transfers   Comment mobility belt donned for all transfers except for lateral transition shower chair to w/c as pt demonstrated safe transfer and did not stand to complete transfer, recommend extended bench for future showers to increase safety   Chair to Bed Transfer   [place], Chair to Bed touching/steadying assist;verbal cues   Verbal Cues hand placement;safety;technique  (removing wheel lock extension prior to completing transfer)   Assistive Device other (see comments)  (bed rail)   Comment w/c to bed toward L side   Toilet Transfer   Comment no opportunity during session, anticipate standard ht toilet with toilet safety frame on discharge as pt is staying with son initially prior to returning back to [place], may need elevated toilet once pt receives his prosthesis   Shower Transfer   Transfer Technique low pivot   [place], Shower Transfer touching/steadying assist;verbal cues   Verbal Cues hand placement;safety;technique   Assistive Device shower chair;grab bars/tub rail   Comment older version of carex shower chair with back, next shower recommend extended tub bench for increased safety   Balance   Comment, Balance OT: pt was able to safely sit t/o shower on oversized shower chair with use of grab bars and S only from therapist to ensure safety during dynamic sitting balance to wash self, no LOB noted however extended bench recommended   Therapeutic Exercise   Therapeutic Exercise aerobic    Aerobic Exercise   Comment spirometer x 5 reps   Bathing   Shower Provided? Yes   Self-Performance chest;left arm;right arm;abdomen;front perineal area;buttocks;left upper leg;right upper leg;left lower leg, including foot;other (see comments)  (R residual limb)   [place] close supervision   Position supported sitting;other (see comments)  (oversized shower chair)   Setup Assistance adjust water temperature/flow;obtain supplies   Adaptive Equipment hand-held shower spray hose;grab bar/tub rail;shower chair;other (see comments)  (with back)   Comment Dressing removed with saline, informed nursing and dressing reapplied by nursing after shower   Upper Body Dressing   Tasks don;pull over garment   Self-Performance don;threads left arm, shirt;threads right arm, shirt;pulls shirt over head/around back;pulls shirt down/adjusts   [place] Assistance obtains clothes   Edgar set up   Position supported sitting   Adaptive Equipment none   Comment seated in w/c   Lower Body Dressing   Tasks don;underwear;socks;pants/bottoms;shoes/slippers   Self-Performance don;threads left leg, underpants;threads right leg, underpants;pulls underpants up or down;threads left leg, pants/shorts;threads right leg, pants/shorts;pulls pants/shorts up or down;dons/doffs left sock;dons/doffs left shoe;shoelaces, left   [place] Assistance obtains clothes   [place] touching/steadying assist   Position supported standing   Adaptive Equipment none   [place], Footwear close supervision   Comment holding w/c for partial stand for clothing adjustment   Grooming   Self-Performance washes, rinses and dries face;washes, rinses and dries hands;brushes/franco hair;oral care (brushing teeth, cleaning dentures)   Edgar set up   Position supported sitting   [place], Oral Hygiene set up   Comment seated in w/c, plan for D/C to perform seated for safety until prosthesis obtained   Toileting   Comment no needs during session   Daily  Progress Summary (OT)   Daily Outcome Statement Multiple discussion for future activities after discharge re: returning to driving, DME needs, future prosthetic training.  Currently pt requires staedying assist to perform most tasks and education re: safety and hand placement.  Continue OT to maximize overall level of I with transfers, mobility and ADL tasks.            Education Documentation    Patient  Acceptance, Explanation, Verbalizes Understanding/?Needs Leonora Chen, OT at 12/9/2022 0905  Complete review of amputation care and process for prosthetic training when able, answered quesitions and opened discussion for returning to driving and ADL safety and transfers and w/c mobility        IRF OT Goals    Flowsheet Row Most Recent Value   Transfer Goal 1    Activity/Assistive Device toilet at 12/07/2022 0720   [place] supervision required at 12/07/2022 0720   Time Frame short-term goal (STG), 5 - 7 days at 12/07/2022 0720   Transfer Goal 2    Activity/Assistive Device toilet at 12/07/2022 0720   [place] modified independence at 12/07/2022 0720   Time Frame long-term goal (LTG), 14 days or less at 12/07/2022 0720   Transfer Goal 3    Activity/Assistive Device shower at 12/07/2022 0720   [place] --  [Steadying A] at 12/07/2022 0720   Time Frame short-term goal (STG), 5 - 7 days at 12/07/2022 0720   Transfer Goal 4    Activity/Assistive Device shower at 12/07/2022 0720   [place] modified independence at 12/07/2022 0720   Time Frame long-term goal (LTG) at 12/07/2022 0720   Bathing Goal 1    [place] minimum assist (75% or more patient effort)  [shower level] at 12/07/2022 0720   Time Frame short-term goal (STG), 5 - 7 days at 12/07/2022 0720   Bathing Goal 2    [place] modified independence at 12/07/2022 0720   Time Frame long-term goal (LTG), 14 days or less at 12/07/2022 0720   UB Dressing Goal 1    [place] set-up required at 12/07/2022 0720   Time Frame  short-term goal (STG), 5 - 7 days at 12/07/2022 0720   UB Dressing Goal 2    [place] modified independence at 12/07/2022 0720   Time Frame long-term goal (LTG), 14 days or less at 12/07/2022 0720   LB Dressing Goal 1    [place] moderate assist (50-74% patient effort)  [While in stance with RW] at 12/07/2022 0720   Time Frame short-term goal (STG), 5 - 7 days at 12/07/2022 0720   LB Dressing Goal 2    [place] modified independence at 12/07/2022 0720   Time Frame long-term goal (LTG), 14 days or less at 12/07/2022 0720   Grooming Goal 1    [place] set-up required at 12/07/2022 0720   Time Frame short-term goal (STG), 5 - 7 days at 12/07/2022 0720   Grooming Goal 2    [place] modified independence at 12/07/2022 0720   Time Frame long-term goal (LTG), 14 days or less at 12/07/2022 0720   Toileting Goal 1    [place] supervision required at 12/07/2022 0720   Time Frame short-term goal (STG), 5 - 7 days at 12/07/2022 0720   Toileting Goal 2    [place] modified independence at 12/07/2022 0720   Time Frame long-term goal (LTG), 14 days or less at 12/07/2022 0720

## 2022-12-10 NOTE — PROGRESS NOTES
Patient: Harry Alvarado  Location: [facility] [address]  MRN: [id number]  Today's date: 12/10/2022    History of Present Illness  Harry is a 67 y.o. male admitted on 12/6/2022 with Status post below knee amputation of right lower extremity (CMS/[facility]) [Z89.511]. Principal problem is   Status post below knee amputation of right lower extremity (CMS/[facility]). The pt was admitted to Vascular Surgery service for a planned R BKA due to bypass thrombosis and ischemia of the R foot.   He is s/p R BKA on 12/2/22.  His hgb is stable and he has been restarted on his home Eliquis.   Tolerating a regular diet.  PM&R was consulted on 12/3 and recommends acute inpt rehab for intensive therapy as he can tolerate >3 hrs of combined PT/OT per day and at least 5 days/week with a reasonable expectation that the patient will make measurable functional improvement that only an acute rehab can provide.         Past Medical History  Harry has a past medical history of Coronary artery disease, Deep vein thrombosis (CMS/[facility]), Lupus anticoagulant disorder (CMS/[facility]), Lyme disease, MTHFR gene mutation, Myocardial infarction (CMS/[facility]), and PAD (peripheral artery disease) (CMS/[facility]).      PT Vitals    Date/Time Pulse HR Source Pt Activity BP MAP BP Location BP Method Pt Position [facility]   12/10/22 1440 72 Monitor At rest 122/67 86 mmHg Right upper arm Automatic Sitting [facility]      PT Pain    Date/Time Pain Type Side/Orientation Location Rating: Rest Rating: Activity Interventions [facility]   12/10/22 1440 Pain Assessment right;generalized;distal residual limb 5 -- premedicated for activity [facility]   12/10/22 1558 Pain Reassessment;Post Activity right;generalized;distal residual limb -- 3 position adjusted S[facility]          Prior Living Environment    Flowsheet Row Most Recent Value   People in Home child(no), adult, spouse   Current Living Arrangements home   Home Accessibility stairs to enter home (Group), stairs within home (Group)   Living  Environment Comment Pt was living in [place] with his wife. However, after discharge, his wife and him plan to move into son's house in [place] with first floor setup and 1 YOSEF. After prosthetic training, pt plans to move back to [place].   Number of Stairs, Main Entrance 1   Location, Patient Bedroom first (main) floor   Location, Bathroom first (main) floor   Bathroom Access Comment per PT confirmed with family stall shower with 6 inch step to enter and seat within shower however pt does have a shower door that swings open, recommending temporary curtain and extended bench          Prior Level of Function    Flowsheet Row Most Recent Value   Dominant Hand right   Ambulation assistive equipment   Transferring assistive equipment   Toileting independent   Bathing assistive person   Dressing independent   Eating independent   Prior Level of Function Comment Pt was previously I with all ADLs, only requiring assist with bathing from his wife as function decreased prior to hospital admission. Pt was ambulating with a SPC for recent 2 months. + , retired. Pt enjoys skiing and playing pickleball with his wife.   Assistive Device Currently Used at Home cane, straight  [reports brief use of RW in past]           IRF PT Evaluation and Treatment - 12/10/22 1441        PT Time Calculation    Start Time 1430     Stop Time 1600     Time Calculation (min) 90 min        Session Details    Document Type daily treatment/progress note     Mode of Treatment individual therapy;physical therapy        General Information    Patient Profile Reviewed yes     Existing Precautions/Restrictions cardiac;fall;weight bearing        Weight-bearing Status    Right LE Weight-Bearing Status non weight-bearing (NWB)        Bed Mobility    [place], Roll Left supervision     [place], Roll Right supervision     [place], Supine to Sit supervision     [place], Sit to Supine supervision     Assistive Device none     Comment (Bed  Mobility) Supine<>prone w/ S and increased time, performed on flat mat surface        Sit to Stand Transfer    [place], Sit to Stand Transfer touching/steadying assist     Verbal Cues hand placement;safety;technique     Assistive Device mobility belt;walker, front-wheeled     Comment with touch assist for safety provided via gait belt        Stand to Sit Transfer    [place], Stand to Sit Transfer touching/steadying assist     Verbal Cues hand placement;safety;technique     Assistive Device mobility belt;walker, front-wheeled     Comment with touch assist for safety provided via gait belt        Low Pivot Transfer    [place], Low Pivot Transfer set up;supervision     Verbal Cues safety     Assistive Device wheelchair     Comment Completed Bed>>WC, WC>>EOM,  WC<>NuStep.  PT providing VCs for appropriate WC set up and positioning prior to transferring        Stand Pivot Transfer    [place], Stand Pivot/Stand Step Transfer touching/steadying assist     Verbal Cues hand placement;safety;technique     Assistive Device mobility belt;walker, front-wheeled     Comment via amb approach with touch assist for safety provided via gait belt        Gait Training    [place], Gait touching/steadying assist     Assistive Device mobility belt;walker, front-wheeled     Distance in Feet 20 feet   x3 trials (within completion of TUG test)    Comment (Gait/Stairs) Unipedal amb LLE with touch assist for safety provided via gait belt        Balance    Balance Test Results Timed Up and Go Test (TUG)        Timed Up and Go Test    Trial One: Timed Up and Go Test 29.56     Trial Two: Timed Up and Go Test 32.35     Trial Three: Timed Up and Go Test 20.45     Mean of 3 Trials: Timed Up and Go Test [id number]     Comment, Timed Up and Go Test With Touch assist using RW     Results, Timed Up and Go Test (Balance) Patient requires an average of 27 seconds to complete TUG test over three trials, which is greater  than the norm of 14 seconds, indicating impaired balance and mobility deficit        Lower Extremity (Therapeutic Exercise)    Exercise Position/Type prone;static stretching;resistive exercises     Reps and Sets 3 set x 10 reps each     Comment Prone lying stretch x 10:00 with towel under distal residual limb for comfort and increased stretch, pillow under chest and one under head/shoulders. Prone Finesse as follows: hamstring curls, hip extension with 3# cuff weight to LLE. Total time in prone approx. 30 minutes.        Aerobic Exercise    Type recumbent elliptical      Time Performed 10     Comment NuStep L5 with BUEs and LLE for CV endurance        Daily Progress Summary (PT)    Daily Outcome Statement PT and patient discussing DME recommendations following d/c. Patient in agreement to pursue rental MWC via [facility]. Measurements taken and DME order initiated this date. Patient provided education regarding self-acquisition of RW and patient in agreement. Patient provided with pictures and [employer] link for ordering RW.  Patient demo good technique and appropriate set up of WC for completion of LPT. Patient participated in prone stretching and Finesse with good ministerio.  Patient able to ministerio 3 bouts of short distance amb with RW and touch/steadying assist within this PT session.                           IRF PT Goals    Flowsheet Row Most Recent Value   Bed Mobility Goal 1    Activity/Assistive Device bed mobility activities, all at 12/07/2022 1034   [place] supervision required at 12/07/2022 1034   Time Frame short-term goal (STG), 1 week at 12/07/2022 1034   Progress/Outcome goal ongoing at 12/07/2022 1034   Bed Mobility Goal 2    Activity/Assistive Device bed mobility activities, all at 12/07/2022 1034   [place] modified independence at 12/07/2022 1034   Time Frame long-term goal (LTG), 2 weeks at 12/07/2022 1034   Progress/Outcome goal ongoing at 12/07/2022 1034   Transfer Goal 1    Activity/Assistive  Device sit-to-stand/stand-to-sit, low pivot, stand pivot at 12/07/2022 1034   [place] supervision required at 12/07/2022 1034   Time Frame short-term goal (STG), 1 week at 12/07/2022 1034   Progress/Outcome goal ongoing at 12/07/2022 1034   Transfer Goal 2    Activity/Assistive Device sit-to-stand/stand-to-sit, low pivot, stand pivot at 12/07/2022 1034   [place] modified independence at 12/07/2022 1034   Time Frame long-term goal (LTG), 2 weeks at 12/07/2022 1034   Progress/Outcome goal ongoing at 12/07/2022 1034   Wheelchair Locomotion Goal 1    Activity wheelchair mobility skills, all at 12/07/2022 1034   Assistive Device manual, lightweight at 12/07/2022 1034   Distance 250 feet at 12/07/2022 1034   [place] modified independence at 12/07/2022 1034   Time Frame short-term goal (STG), 1 week at 12/07/2022 1034   Progress/Outcome goal ongoing at 12/07/2022 1034   Wheelchair Locomotion Goal 2    Activity wheelchair mobility skills, all at 12/07/2022 1034   Assistive Device manual, lightweight at 12/07/2022 1034   Distance 500 feet at 12/07/2022 1034   [place] modified independence at 12/07/2022 1034   Time Frame long-term goal (LTG), 2 weeks at 12/07/2022 1034   Progress/Outcome goal ongoing at 12/07/2022 1034

## 2022-12-10 NOTE — PROGRESS NOTES
Patient: Harry Alvarado  Location: [facility] [address]  MRN: [id number]  Today's date: 12/10/2022    History of Present Illness  Harry is a 67 y.o. male admitted on 12/6/2022 with Status post below knee amputation of right lower extremity (CMS/[facility]) [Z89.511]. Principal problem is   Status post below knee amputation of right lower extremity (CMS/[facility]). The pt was admitted to Vascular Surgery service for a planned R BKA due to bypass thrombosis and ischemia of the R foot.   He is s/p R BKA on 12/2/22.  His hgb is stable and he has been restarted on his home Eliquis.   Tolerating a regular diet.  PM&R was consulted on 12/3 and recommends acute inpt rehab for intensive therapy as he can tolerate >3 hrs of combined PT/OT per day and at least 5 days/week with a reasonable expectation that the patient will make measurable functional improvement that only an acute rehab can provide.         Past Medical History  Harry has a past medical history of Coronary artery disease, Deep vein thrombosis (CMS/[facility]), Lupus anticoagulant disorder (CMS/[facility]), Lyme disease, MTHFR gene mutation, Myocardial infarction (CMS/[facility]), and PAD (peripheral artery disease) (CMS/[facility]).      OT Vitals    Date/Time Pulse HR Source BP BP Location BP Method Pt Position [facility]   12/10/22 1036 69 Monitor 124/75 Left upper arm Automatic Sitting [facility]      OT Pain    Date/Time Pain Type Location Rating: Rest Rating: Activity Interventions [facility]   12/10/22 1036 Pain Assessment residual limb 4 4 position adjusted [facility]   12/10/22 1158 Pain Reassessment residual limb 4 4 premedicated for activity desensitization techniques [facility]          Prior Living Environment    Flowsheet Row Most Recent Value   People in Home child(no), adult, spouse   Current Living Arrangements home   Home Accessibility stairs to enter home (Group), stairs within home (Group)   Living Environment Comment Pt was living in [place] with [place] wife. However, after discharge, his wife and him  "plan to move into son's house in [place] with first floor setup and 1 YOSEF. After prosthetic training, pt plans to move back to [place].   Number of Stairs, Main Entrance 1   Location, Patient Bedroom first (main) floor   Location, Bathroom first (main) floor   Bathroom Access Comment per PT confirmed with family stall shower with 6 inch step to enter and seat within shower however pt does have a shower door that swings open, recommending temporary curtain and extended bench          Prior Level of Function    Flowsheet Row Most Recent Value   Dominant Hand right   Ambulation assistive equipment   Transferring assistive equipment   Toileting independent   Bathing assistive person   Dressing independent   Eating independent   Prior Level of Function Comment Pt was previously I with all ADLs, only requiring assist with bathing from his wife as function decreased prior to hospital admission. Pt was ambulating with a SPC for recent 2 months. + , retired. Pt enjoys skiing and playing pickleball with his wife.   Assistive Device Currently Used at Home cane, straight  [reports brief use of RW in past]          Occupational Profile    Flowsheet Row Most Recent Value   Occupational History/Life Experiences +. 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, also enjoys hiking,   Environmental Supports and Barriers Pt is interested in learning about return to driving, plans to purchase a new vehicle.  Recommend arranging Chirag Eaton from [employer] to speak with pt.  Pt lives in [place] and plans on staying in [place] with son until he returns for prosthetic training and is able to return to [place].  Initially wanted to drive home.  Discussed potentially purchasing and adapting a vehicle during his stay in [place].  Recommend arranging for pt to view adapted vehicle.   Patient Goals \"Be able to get around better, " "take care of myself and play with my grandkids.\" And also \"Get a prosthesis and walk around like I used too.\"  [PSFS completed 12/8, pt reported: Walking/hiking 0/10, Pickleball 0/10, Bathing 5/10, Playing with grandkids 5/10 score 10/40 0.25.]           IRF OT Evaluation and Treatment - 12/10/22 1031        OT Time Calculation    Start Time 1030     Stop Time 1200     Time Calculation (min) 90 min        Session Details    Document Type daily treatment/progress note     Mode of Treatment occupational therapy;individual therapy        General Information    General Observations of Patient Pt received in w/c in room. Pleasant and agreeable to therapy.        Transfers    Transfers low pivot transfer;stand pivot transfer        Sit to Stand Transfer    [place], Sit to Stand Transfer touching/steadying assist     Verbal Cues hand placement;safety;technique     Assistive Device mobility belt;walker, front-wheeled     Comment From w/c and EOM to RW.        Stand to Sit Transfer    [place], Stand to Sit Transfer touching/steadying assist     Verbal Cues hand placement;safety;technique     Assistive Device mobility belt;walker, front-wheeled     Comment RW to EOM and w/c.        Low Pivot Transfer    [place], Low Pivot Transfer close supervision     Verbal Cues safety;technique     Assistive Device wheelchair     Comment To/from w/c to EOM. Vc's for improved w/c set up.        Stand Pivot Transfer    [place], Stand Pivot/Stand Step Transfer touching/steadying assist     Verbal Cues hand placement;safety;technique     Assistive Device mobility belt;walker, front-wheeled     Comment via amb approach to EOM.        Wheelchair Mobility/Management    Comment, Wheelchair Mobility OT: Engaged in w/c functional mobility for item retrieval using reacher at mod I level.        Safety Issues, Functional Mobility    Comment, Safety Issues/Impairments (Mobility) OT: Touching A with RW for short distance mobility " with RW.        Balance    Comment, Balance 1. Sitting EOM with distant S for UE and core exercises. 2. Engaged in block practice STS transfers from EOM to RW x10 with touching A.        Therapeutic Interventions    Therapeutic Interventions Functional Position Change (Row)     Comment, Therapeutic Intervention Completed prone lying stretch for x7 minutes.        Upper Extremity (Therapeutic Exercise)    Exercise Position/Type seated     General Exercise bilateral     Range of Motion Exercises shoulder flexion/extension;elbow flexion/extension;other (see comments)   chest press, [place] tricep extension    Weight/Resistance 5 pounds     Reps and Sets 3x10     Comment Completed UE exercises unilaterally seated EOM.        Core Strength (Therapeutic Exercise)    Core Strength, Position seated     Core Strength abdominal bracing     Reps and Sets 3x10     Comment Sitting EOM, completed modified sit ups with 5lb dumbell and twists with 5lb dumbell.        LE Residual Limb Therapeutic Activity    Comment, Management Provided education on prosthetic training process, use of  and purpose, and driving rehab.        Daily Progress Summary (OT)    Daily Outcome Statement Session focused on UE strengthening, core, transfers and w/c mobility. Cl S for LPT, touching A for SPT with the RW. Tolerated exercises well. Continue with transfer training and balance.                           IRF OT Goals    Flowsheet Row Most Recent Value   Transfer Goal 1    Activity/Assistive Device toilet at 12/07/2022 0720   [place] supervision required at 12/07/2022 0720   Time Frame short-term goal (STG), 5 - 7 days at 12/07/2022 0720   Transfer Goal 2    Activity/Assistive Device toilet at 12/07/2022 0720   [place] modified independence at 12/07/2022 0720   Time Frame long-term goal (LTG), 14 days or less at 12/07/2022 0720   Transfer Goal 3    Activity/Assistive Device shower at 12/07/2022 0720   [place] --  [Steadying A] at  12/07/2022 0720   Time Frame short-term goal (STG), 5 - 7 days at 12/07/2022 0720   Transfer Goal 4    Activity/Assistive Device shower at 12/07/2022 0720   [place] modified independence at 12/07/2022 0720   Time Frame long-term goal (LTG) at 12/07/2022 0720   Bathing Goal 1    [place] minimum assist (75% or more patient effort)  [shower level] at 12/07/2022 0720   Time Frame short-term goal (STG), 5 - 7 days at 12/07/2022 0720   Bathing Goal 2    [place] modified independence at 12/07/2022 0720   Time Frame long-term goal (LTG), 14 days or less at 12/07/2022 0720   UB Dressing Goal 1    [place] set-up required at 12/07/2022 0720   Time Frame short-term goal (STG), 5 - 7 days at 12/07/2022 0720   UB Dressing Goal 2    [place] modified independence at 12/07/2022 0720   Time Frame long-term goal (LTG), 14 days or less at 12/07/2022 0720   LB Dressing Goal 1    [place] moderate assist (50-74% patient effort)  [While in stance with RW] at 12/07/2022 0720   Time Frame short-term goal (STG), 5 - 7 days at 12/07/2022 0720   LB Dressing Goal 2    [place] modified independence at 12/07/2022 0720   Time Frame long-term goal (LTG), 14 days or less at 12/07/2022 0720   Grooming Goal 1    [place] set-up required at 12/07/2022 0720   Time Frame short-term goal (STG), 5 - 7 days at 12/07/2022 0720   Grooming Goal 2    [place] modified independence at 12/07/2022 0720   Time Frame long-term goal (LTG), 14 days or less at 12/07/2022 0720   Toileting Goal 1    [place] supervision required at 12/07/2022 0720   Time Frame short-term goal (STG), 5 - 7 days at 12/07/2022 0720   Toileting Goal 2    [place] modified independence at 12/07/2022 0720   Time Frame long-term goal (LTG), 14 days or less at 12/07/2022 0720

## 2022-12-10 NOTE — PLAN OF CARE
Plan of Care Review  Plan of Care Reviewed With: patient  Progress: improving  Outcome Summary: Assumed care of patient at 2300, pt alert/oriented, able to make needs known. Continent of bladder, utilized urinal, no BM this shift, c/o residual limb pain , managed with 10mg tramadol. Call bell within reach, safety precautions maintained

## 2022-12-10 NOTE — PROGRESS NOTES
"Daily Progress Note     Patient was seen and examined.   Attestation Notes: Face to face encounter completed    Subjective    Patient has better pain control.  Slept better.  Tolerated increase in Lyrica and tramadol.  Progressing in therapies touch assist to min assist with transfers and ambulation with rolling walker.  Objective     Visit Vitals  /84 (BP Location: Right upper arm, Patient Position: Lying)   Pulse 72   Temp 36.7 °C (98 °F) (Oral)   Resp 20   Ht 1.854 m (6' 1\")   Wt 70.1 kg (154 lb 9.6 oz)   SpO2 97%   BMI 20.40 kg/m²       Review of Systems:  Pertinent items are noted in HPI.  Denies chest pain and shortness of breath.  Review of systems otherwise negative.    Labs     Results from last 7 days   Lab Units 12/07/22  0611   WBC K/uL 13.47*   HEMOGLOBIN g/dL 12.2*   HEMATOCRIT % 37.5*   PLATELETS K/uL 462*     Results from last 7 days   Lab Units 12/07/22  0611   SODIUM mEQ/L 135*   POTASSIUM mEQ/L 4.6   CHLORIDE mEQ/L 99   CO2 mEQ/L 24   BUN mg/dL 20   CREATININE mg/dL 0.6*   CALCIUM mg/dL 9.3   ALBUMIN g/dL 2.9*   BILIRUBIN TOTAL mg/dL 0.6   ALK PHOS IU/L 159*   ALT IU/L 82*   AST IU/L 78*   GLUCOSE mg/dL 70        Full Code    Physical Exam  General      Alert, cooperative, no distress, appears stated age.   Head:    Normocephalic, without obvious abnormality, atraumatic.   Eyes:    PERRL, conjunctiva/corneas clear, EOM's intact.        Nose:   Nares normal, septum midline, mucosa normal, no drainage or            sinus tenderness.   Throat:   Lips, mucosa, and tongue normal.    Neck:   Supple, symmetrical, trachea midline.    Back:     Symmetric, no curvature.   Lungs:     Clear to auscultation bilaterally, respirations unlabored.   Chest wall:    No tenderness or deformity.   Heart:    Regular rate and rhythm, S1 and S2 normal.   Abdomen:     Soft, non-tender, bowel sounds active all four quadrants,     no masses, no organomegaly.   Extremities:  Musculoskeletal: .  Stable edema residual " limb.  Right transtibial amputation.   Pulses:   1+ and symmetric all extremities.   Skin: Incision intact with sutures.  Mild drainage.   Neurologic:          Behavior/  Emotional:  CNII-XII intact.  Alert and oriented ×3.  Motor exam intact.  Sensory exam intact.  Reflexes stable decreased reflexes.      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 male who presents with the following chronic medical problems including MTHFR gene mutation, coronary artery disease with prior myocardial infarction 1993, peripheral artery disease with multiple bypass procedures in the past most recent with tPA bolus and angioplasty in 2020.  The patient admitted [facility] on November 30, 2022 to the vascular surgery service for planned BKA due to bypass thrombosis.  At the time of admission the patient had cyanosis and was beginning to develop thanh gangrene of the foot.  Patient had reported over the last 1 to 2 months worsening pain of the right foot and that he knew the graft was occluded.  Patient has a history of 15+ surgeries of the extremities and understood that his revascularization options were limited.  He had severe 10 out of 10 pain of the foot relieved with elevation of the leg.  He was still able to ambulate but limited by pain.  Under the care of Bowen Serrato MD of vascular surgery he underwent right transtibial amputation on December 2, 2022.  Postoperative course unremarkable with advancement of diet and pain control.  ESTUARDO drain removed on December 4.  For postoperative pain control he was treated with morphine.  On discharge prescribed Tylenol and low-dose Lyrica.  Lyrica started prior by his PCP for neuropathic pain.  He was restarted on his home Eliquis.  He required min assist for bed mobility, transfers and ambulation.  Vascular  surgery wants follow-up in 1 month.  Patient is now appropriate for acute inpatient rehabilitation.    Comprehensive inpatient rehabilitation program status post right transtibial amputation.  Goal is for the patient to reach a modified independent level with bed mobility, transfers and ambulation.  Ambulation with hopping short distances using a rolling walker.  Also to become modified independent with elevations.  Preprosthetic training.  Ongoing pain control.    For pain control continue low-dose Lyrica and consider increasing dose if the patient has ongoing phantom pain.  Continue around-the-clock Tylenol.  Continue tramadol.  If needed adjust dose.    Monitor incision closely and continue incisional and stump care.    Patient complains of significant pain.  This is both mechanical and phantom.  States this is worse at night.  Interrupt sleep.    Patient has better pain control.  Slept better.  Tolerated increase in Lyrica and tramadol.  Progressing in therapies touch assist to min assist with transfers and ambulation with rolling walker.    Anemia  Assessment & Plan  Postoperative anemia.  Monitor hemoglobin levels.  Hemoglobin 12.2.  Mild iron deficiency and consider iron replacement.    Lupus anticoagulant disorder (CMS/HCC)  Assessment & Plan  Continue Eliquis.    Deep vein thrombosis (CMS/HCC)  Assessment & Plan  Continue Eliquis.    Coronary artery disease  Assessment & Plan  Cardiac precautions.  Continue Eliquis.        Expected Discharge Date:

## 2022-12-11 ENCOUNTER — APPOINTMENT (INPATIENT)
Dept: OCCUPATIONAL THERAPY | Facility: REHABILITATION | Age: 67
DRG: 560 | End: 2022-12-11
Payer: MEDICARE

## 2022-12-11 PROCEDURE — 12800000 HC ROOM AND CARE SEMIPRIVATE REHAB

## 2022-12-11 PROCEDURE — 63700000 HC SELF-ADMINISTRABLE DRUG: Performed by: HOSPITALIST

## 2022-12-11 PROCEDURE — 63700000 HC SELF-ADMINISTRABLE DRUG: Performed by: PHYSICAL MEDICINE & REHABILITATION

## 2022-12-11 PROCEDURE — 63700000 HC SELF-ADMINISTRABLE DRUG: Performed by: STUDENT IN AN ORGANIZED HEALTH CARE EDUCATION/TRAINING PROGRAM

## 2022-12-11 PROCEDURE — 97530 THERAPEUTIC ACTIVITIES: CPT | Mod: GO

## 2022-12-11 PROCEDURE — 97110 THERAPEUTIC EXERCISES: CPT | Mod: GO

## 2022-12-11 RX ADMIN — APIXABAN 5 MG: 5 TABLET, FILM COATED ORAL at 07:42

## 2022-12-11 RX ADMIN — PREGABALIN 25 MG: 25 CAPSULE ORAL at 16:21

## 2022-12-11 RX ADMIN — TRAMADOL HYDROCHLORIDE 100 MG: 50 TABLET, COATED ORAL at 22:13

## 2022-12-11 RX ADMIN — PREGABALIN 25 MG: 25 CAPSULE ORAL at 20:41

## 2022-12-11 RX ADMIN — ACETAMINOPHEN 975 MG: 325 TABLET ORAL at 13:47

## 2022-12-11 RX ADMIN — TRAZODONE HYDROCHLORIDE 25 MG: 50 TABLET, FILM COATED ORAL at 20:41

## 2022-12-11 RX ADMIN — ACETAMINOPHEN 975 MG: 325 TABLET ORAL at 21:32

## 2022-12-11 RX ADMIN — TRAMADOL HYDROCHLORIDE 100 MG: 50 TABLET, COATED ORAL at 10:18

## 2022-12-11 RX ADMIN — TRAMADOL HYDROCHLORIDE 100 MG: 50 TABLET, COATED ORAL at 16:20

## 2022-12-11 RX ADMIN — PREGABALIN 25 MG: 25 CAPSULE ORAL at 07:42

## 2022-12-11 RX ADMIN — PANTOPRAZOLE SODIUM 40 MG: 40 TABLET, DELAYED RELEASE ORAL at 07:42

## 2022-12-11 RX ADMIN — TRAMADOL HYDROCHLORIDE 100 MG: 50 TABLET, COATED ORAL at 04:13

## 2022-12-11 RX ADMIN — APIXABAN 5 MG: 5 TABLET, FILM COATED ORAL at 20:41

## 2022-12-11 RX ADMIN — ACETAMINOPHEN 975 MG: 325 TABLET ORAL at 05:35

## 2022-12-11 NOTE — SIGNIFICANT EVENT
Nurse states patient requesting something other than Ambien for sleep tonight, states that he has not used Ambien since last week as it only puts him to sleep for 1 to 2 hours.  He states that melatonin does not work for him.  We will trial 25 mg trazodone tonight.  Order placed for one-time dose.

## 2022-12-11 NOTE — PROGRESS NOTES
Patient: Harry Alvarado  Location: [facility] [address]  MRN: [id number]  Today's date: 12/11/2022    History of Present Illness  Harry is a 67 y.o. male admitted on 12/6/2022 with Status post below knee amputation of right lower extremity (CMS/[facility]) [Z89.511]. Principal problem is   Status post below knee amputation of right lower extremity (CMS/[facility]). The pt was admitted to Vascular Surgery service for a planned R BKA due to bypass thrombosis and ischemia of the R foot.   He is s/p R BKA on 12/2/22.  His hgb is stable and he has been restarted on his home Eliquis.   Tolerating a regular diet.  PM&R was consulted on 12/3 and recommends acute inpt rehab for intensive therapy as he can tolerate >3 hrs of combined PT/OT per day and at least 5 days/week with a reasonable expectation that the patient will make measurable functional improvement that only an acute rehab can provide.         Past Medical History  Harry has a past medical history of Coronary artery disease, Deep vein thrombosis (CMS/[facility]), Lupus anticoagulant disorder (CMS/[facility]), Lyme disease, MTHFR gene mutation, Myocardial infarction (CMS/[facility]), and PAD (peripheral artery disease) (CMS/[facility]).      OT Vitals    Date/Time Pulse HR Source SpO2 Pt Activity O2 Therapy BP BP Location BP Method Pt Position [facility]   12/11/22 1301 74 Monitor 96 % At rest None (Room air) 133/74 Right upper arm Automatic Sitting TAE      OT Pain    Date/Time Pain Type Side/Orientation Location Rating: Rest Rating: Activity [facility]   12/11/22 1301 Pain Assessment right residual limb 3 3 TAE   12/11/22 1329 Pain Reassessment right residual limb 3 3 TAE          Prior Living Environment    Flowsheet Row Most Recent Value   People in Home child(no), adult, spouse   Current Living Arrangements home   Home Accessibility stairs to enter home (Group), stairs within home (Group)   Living Environment Comment Pt was living in [place] with his wife. However, after discharge, his wife and  "him plan to move into son's house in [place] with first floor setup and 1 YOSEF. After prosthetic training, pt plans to move back to [place].   Number of Stairs, Main Entrance 1   Location, Patient Bedroom first (main) floor   Location, Bathroom first (main) floor   Bathroom Access Comment per PT confirmed with family stall shower with 6 inch step to enter and seat within shower however pt does have a shower door that swings open, recommending temporary curtain and extended bench          Prior Level of Function    Flowsheet Row Most Recent Value   Dominant Hand right   Ambulation assistive equipment   Transferring assistive equipment   Toileting independent   Bathing assistive person   Dressing independent   Eating independent   Prior Level of Function Comment Pt was previously I with all ADLs, only requiring assist with bathing from his wife as function decreased prior to hospital admission. Pt was ambulating with a SPC for recent 2 months. + , retired. Pt enjoys skiing and playing pickleball with his wife.   Assistive Device Currently Used at Home cane, straight  [reports brief use of RW in past]          Occupational Profile    Flowsheet Row Most Recent Value   Occupational History/Life Experiences +. 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, also enjoys hiking,   Environmental Supports and Barriers Pt is interested in learning about return to driving, plans to purchase a new vehicle.  Recommend arranging Chirag Eaton from [facility] [employer] to speak with pt.  Pt lives in [place] and plans on staying in [place] with son until he returns for prosthetic training and is able to return to [place].  Initially wanted to drive home.  Discussed potentially purchasing and adapting a vehicle during his stay in [place].  Recommend arranging for pt to view adapted vehicle.   Patient Goals \"Be able to get around better, " "take care of myself and play with my grandkids.\" And also \"Get a prosthesis and walk around like I used too.\"  [PSFS completed 12/8, pt reported: Walking/hiking 0/10, Pickleball 0/10, Bathing 5/10, Playing with grandkids 5/10 score 10/40 0.25.]           IRF OT Evaluation and Treatment - 12/11/22 1302        OT Time Calculation    Start Time 1300     Stop Time 1330     Time Calculation (min) 30 min        Session Details    Document Type daily treatment/progress note     Mode of Treatment occupational therapy;individual therapy        General Information    General Observations of Patient Pt received in bed, pleasant and agreeable to therapy        Transfers    Comment gait belt donned for transfers        Bed to Chair Transfer    [place], Bed to Chair touching/steadying assist     Verbal Cues safety;technique     Comment LPT steadying A for safety        Sit to Stand Transfer    [place], Sit to Stand Transfer touching/steadying assist     Verbal Cues safety;technique     Assistive Device walker, front-wheeled        Stand to Sit Transfer    [place], Stand to Sit Transfer touching/steadying assist     Verbal Cues safety;technique     Assistive Device walker, front-wheeled        Low Pivot Transfer    [place], Low Pivot Transfer supervision     Verbal Cues safety     Assistive Device wheelchair     Comment LPT to/from EOM; pt educated on proper set up, needs reinforcement        Balance    Comment, Balance OT: Cl S sitting unsupported EOM; supported standing c RW steadying A for balance while pt engage in constructing pipe, 1LOB  when attempting to deconstruct c bimanually, 3.5 mins x 3trials        Upper Extremity (Therapeutic Exercise)    Exercise Position/Type seated     General Exercise bilateral     Range of Motion Exercises shoulder flexion/extension;shoulder abduction/adduction;shoulder horizontal abduction/adduction;shoulder external/internal rotation;elbow flexion/extension;forearm " supination/pronation;wrist flexion/extension     Weight/Resistance 5 pounds;green;resistance band     Reps and Sets 2x15     Comment completed seated EOM c rest breaks between        Daily Progress Summary (OT)    Daily Outcome Statement Session c focus on balance, functional transfers, and strengthening. Pt c good balance during standing tasks. Cont to address limitations c balance                           IRF OT Goals    Flowsheet Row Most Recent Value   Transfer Goal 1    Activity/Assistive Device toilet at 12/07/2022 0720   [place] supervision required at 12/07/2022 0720   Time Frame short-term goal (STG), 5 - 7 days at 12/07/2022 0720   Transfer Goal 2    Activity/Assistive Device toilet at 12/07/2022 0720   [place] modified independence at 12/07/2022 0720   Time Frame long-term goal (LTG), 14 days or less at 12/07/2022 0720   Transfer Goal 3    Activity/Assistive Device shower at 12/07/2022 0720   [place] --  [Steadying A] at 12/07/2022 0720   Time Frame short-term goal (STG), 5 - 7 days at 12/07/2022 0720   Transfer Goal 4    Activity/Assistive Device shower at 12/07/2022 0720   [place] modified independence at 12/07/2022 0720   Time Frame long-term goal (LTG) at 12/07/2022 0720   Bathing Goal 1    [place] minimum assist (75% or more patient effort)  [shower level] at 12/07/2022 0720   Time Frame short-term goal (STG), 5 - 7 days at 12/07/2022 0720   Bathing Goal 2    [place] modified independence at 12/07/2022 0720   Time Frame long-term goal (LTG), 14 days or less at 12/07/2022 0720   UB Dressing Goal 1    [place] set-up required at 12/07/2022 0720   Time Frame short-term goal (STG), 5 - 7 days at 12/07/2022 0720   UB Dressing Goal 2    [place] modified independence at 12/07/2022 0720   Time Frame long-term goal (LTG), 14 days or less at 12/07/2022 0720   LB Dressing Goal 1    [place] moderate assist (50-74% patient effort)  [While in stance with RW] at  12/07/2022 0720   Time Frame short-term goal (STG), 5 - 7 days at 12/07/2022 0720   LB Dressing Goal 2    [place] modified independence at 12/07/2022 0720   Time Frame long-term goal (LTG), 14 days or less at 12/07/2022 0720   Grooming Goal 1    [place] set-up required at 12/07/2022 0720   Time Frame short-term goal (STG), 5 - 7 days at 12/07/2022 0720   Grooming Goal 2    [place] modified independence at 12/07/2022 0720   Time Frame long-term goal (LTG), 14 days or less at 12/07/2022 0720   Toileting Goal 1    [place] supervision required at 12/07/2022 0720   Time Frame short-term goal (STG), 5 - 7 days at 12/07/2022 0720   Toileting Goal 2    [place] modified independence at 12/07/2022 0720   Time Frame long-term goal (LTG), 14 days or less at 12/07/2022 0720

## 2022-12-11 NOTE — PLAN OF CARE
Plan of Care Review  Plan of Care Reviewed With: patient  Progress: no change  Outcome Summary: pt. slept fair with aromatherapy. Pain managed with Lyrica, Tylenol, and Tramadol. Dressing dry and clean. Cont. Used urinal. Used call bell for assistance.

## 2022-12-11 NOTE — PLAN OF CARE
Plan of Care Review  Plan of Care Reviewed With: patient  Progress: improving  Outcome Summary: verbalizes needs. pleasant, cooperative. min assist with transfers. R residual limb incision well approximated, scant serousanguineous drainage. sutures intact. pain controlled by PRN tramadol and scheduled tylenol and lyrica. states he has not had good sleep in a while. states ambien only helps with sleep for 2 hours, hasn't used since 12/7. Roderick JONES made aware.

## 2022-12-12 ENCOUNTER — APPOINTMENT (INPATIENT)
Dept: OCCUPATIONAL THERAPY | Facility: REHABILITATION | Age: 67
DRG: 560 | End: 2022-12-12
Payer: MEDICARE

## 2022-12-12 ENCOUNTER — APPOINTMENT (INPATIENT)
Dept: PHYSICAL THERAPY | Facility: REHABILITATION | Age: 67
DRG: 560 | End: 2022-12-12
Payer: MEDICARE

## 2022-12-12 LAB
ALBUMIN SERPL-MCNC: 2.9 G/DL (ref 3.4–5)
ALP SERPL-CCNC: 140 IU/L (ref 35–126)
ALT SERPL-CCNC: 42 IU/L (ref 16–63)
ANION GAP SERPL CALC-SCNC: 9 MEQ/L (ref 3–15)
AST SERPL-CCNC: 29 IU/L (ref 15–41)
BILIRUB SERPL-MCNC: 0.2 MG/DL (ref 0.3–1.2)
BUN SERPL-MCNC: 18 MG/DL (ref 8–20)
CALCIUM SERPL-MCNC: 9.8 MG/DL (ref 8.9–10.3)
CHLORIDE SERPL-SCNC: 99 MEQ/L (ref 98–109)
CO2 SERPL-SCNC: 27 MEQ/L (ref 22–32)
CREAT SERPL-MCNC: 0.7 MG/DL (ref 0.8–1.3)
ERYTHROCYTE [DISTWIDTH] IN BLOOD BY AUTOMATED COUNT: 12.1 % (ref 11.6–14.4)
GFR SERPL CREATININE-BSD FRML MDRD: >60 ML/MIN/1.73M*2
GLUCOSE SERPL-MCNC: 85 MG/DL (ref 70–99)
HCT VFR BLDCO AUTO: 38.3 % (ref 40.1–51)
HGB BLD-MCNC: 12.1 G/DL (ref 13.7–17.5)
MAGNESIUM SERPL-MCNC: 1.9 MG/DL (ref 1.8–2.5)
MCH RBC QN AUTO: 29.7 PG (ref 28–33.2)
MCHC RBC AUTO-ENTMCNC: 31.6 G/DL (ref 32.2–36.5)
MCV RBC AUTO: 94.1 FL (ref 83–98)
PDW BLD AUTO: 9.6 FL (ref 9.4–12.4)
PLATELET # BLD AUTO: 534 K/UL (ref 150–350)
POTASSIUM SERPL-SCNC: 5 MEQ/L (ref 3.6–5.1)
PROT SERPL-MCNC: 6.5 G/DL (ref 6–8.2)
RBC # BLD AUTO: 4.07 M/UL (ref 4.5–5.8)
SODIUM SERPL-SCNC: 135 MEQ/L (ref 136–144)
WBC # BLD AUTO: 12.83 K/UL (ref 3.8–10.5)

## 2022-12-12 PROCEDURE — 85027 COMPLETE CBC AUTOMATED: CPT | Performed by: HOSPITALIST

## 2022-12-12 PROCEDURE — 97530 THERAPEUTIC ACTIVITIES: CPT | Mod: GP | Performed by: PHYSICAL THERAPIST

## 2022-12-12 PROCEDURE — 36415 COLL VENOUS BLD VENIPUNCTURE: CPT | Performed by: HOSPITALIST

## 2022-12-12 PROCEDURE — 80053 COMPREHEN METABOLIC PANEL: CPT | Performed by: HOSPITALIST

## 2022-12-12 PROCEDURE — 63700000 HC SELF-ADMINISTRABLE DRUG: Performed by: HOSPITALIST

## 2022-12-12 PROCEDURE — 97110 THERAPEUTIC EXERCISES: CPT | Mod: GP | Performed by: PHYSICAL THERAPIST

## 2022-12-12 PROCEDURE — 97535 SELF CARE MNGMENT TRAINING: CPT | Mod: GO

## 2022-12-12 PROCEDURE — 97530 THERAPEUTIC ACTIVITIES: CPT | Mod: GP

## 2022-12-12 PROCEDURE — 97530 THERAPEUTIC ACTIVITIES: CPT | Mod: GO

## 2022-12-12 PROCEDURE — 12800000 HC ROOM AND CARE SEMIPRIVATE REHAB

## 2022-12-12 PROCEDURE — 83735 ASSAY OF MAGNESIUM: CPT | Performed by: HOSPITALIST

## 2022-12-12 PROCEDURE — 63700000 HC SELF-ADMINISTRABLE DRUG: Performed by: PHYSICAL MEDICINE & REHABILITATION

## 2022-12-12 RX ORDER — PREGABALIN 50 MG/1
50 CAPSULE ORAL DAILY
Status: DISCONTINUED | OUTPATIENT
Start: 2022-12-12 | End: 2022-12-12

## 2022-12-12 RX ORDER — PREGABALIN 50 MG/1
50 CAPSULE ORAL
Status: DISCONTINUED | OUTPATIENT
Start: 2022-12-12 | End: 2022-12-14

## 2022-12-12 RX ORDER — PREGABALIN 50 MG/1
50 CAPSULE ORAL NIGHTLY
Status: DISCONTINUED | OUTPATIENT
Start: 2022-12-12 | End: 2022-12-14

## 2022-12-12 RX ORDER — PREGABALIN 25 MG/1
25 CAPSULE ORAL DAILY
Status: DISCONTINUED | OUTPATIENT
Start: 2022-12-12 | End: 2022-12-12

## 2022-12-12 RX ORDER — PREGABALIN 25 MG/1
25 CAPSULE ORAL DAILY
Status: DISCONTINUED | OUTPATIENT
Start: 2022-12-13 | End: 2022-12-14

## 2022-12-12 RX ADMIN — PREGABALIN 25 MG: 25 CAPSULE ORAL at 08:04

## 2022-12-12 RX ADMIN — ACETAMINOPHEN 975 MG: 325 TABLET ORAL at 07:07

## 2022-12-12 RX ADMIN — PANTOPRAZOLE SODIUM 40 MG: 40 TABLET, DELAYED RELEASE ORAL at 08:04

## 2022-12-12 RX ADMIN — PREGABALIN 50 MG: 50 CAPSULE ORAL at 20:36

## 2022-12-12 RX ADMIN — ZOLPIDEM TARTRATE 10 MG: 10 TABLET, COATED ORAL at 00:41

## 2022-12-12 RX ADMIN — ACETAMINOPHEN 975 MG: 325 TABLET ORAL at 12:59

## 2022-12-12 RX ADMIN — APIXABAN 5 MG: 5 TABLET, FILM COATED ORAL at 08:04

## 2022-12-12 RX ADMIN — TRAMADOL HYDROCHLORIDE 100 MG: 50 TABLET, COATED ORAL at 11:09

## 2022-12-12 RX ADMIN — PREGABALIN 50 MG: 50 CAPSULE ORAL at 12:59

## 2022-12-12 RX ADMIN — TRAMADOL HYDROCHLORIDE 100 MG: 50 TABLET, COATED ORAL at 21:42

## 2022-12-12 RX ADMIN — TRAMADOL HYDROCHLORIDE 100 MG: 50 TABLET, COATED ORAL at 04:20

## 2022-12-12 RX ADMIN — ACETAMINOPHEN 975 MG: 325 TABLET ORAL at 20:36

## 2022-12-12 RX ADMIN — APIXABAN 5 MG: 5 TABLET, FILM COATED ORAL at 20:36

## 2022-12-12 NOTE — PROGRESS NOTES
Patient: Harry Alvarado  Location: [facility] [address]  MRN: [id number]  Today's date: 12/12/2022    History of Present Illness  Harry is a 67 y.o. 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 Principal problem is   Status post below knee amputation of right lower extremity (CMS/[facility]). The pt was admitted to Vascular Surgery service for a planned R BKA due to bypass thrombosis and ischemia of the R foot.   He is s/p R BKA on 12/2/22.  His hgb is stable and he has been restarted on his home Eliquis.   Tolerating a regular diet.  PM&R was consulted on 12/3 and recommends acute inpt rehab for intensive therapy as he can tolerate >3 hrs of combined PT/OT per day and at least 5 days/week with a reasonable expectation that the patient will make measurable functional improvement that only an acute rehab can provide.         Past Medical History  Harry has a past medical history of Coronary artery disease, Deep vein thrombosis (CMS/[facility]), Lupus anticoagulant disorder (CMS/[facility]), Lyme disease, MTHFR gene mutation, Myocardial infarction (CMS/[facility]), and PAD (peripheral artery disease) (CMS/[facility]).         12/12/22 0838 12/12/22 0926   Pain/Comfort/Sleep   Pain Charting Type Pain Assessment Pain Assessment   Preferred Pain Scale number (Numeric Rating Pain Scale) number (Numeric Rating Pain Scale)   (0-10) Pain Rating: Rest 5 4   Pain Side/Orientation right right   Pain Body Location residual limb residual limb   Pain Description phantom;incisional incisional   Pain Management Interventions premedicated for activity premedicated for activity   Vitals   Heart Rate 72  --    Heart Rate Source Monitor  --    /82  --    BP Location Left upper arm  --    BP Method Automatic  --    Patient Position Sitting  --        Prior Living Environment    Flowsheet Row Most Recent Value   People in Home child(no), adult, spouse   Current Living  Arrangements home   Home Accessibility stairs to enter home (Group), stairs within home (Group)   Living Environment Comment Pt was living in [place] with his wife. However, after discharge, his wife and him plan to move into son's house in [place] with first floor setup and 1 YOSEF. After prosthetic training, pt plans to move back to [place].   Number of Stairs, Main Entrance 1   Location, Patient Bedroom first (main) floor   Location, Bathroom first (main) floor   Bathroom Access Comment per PT confirmed with family stall shower with 6 inch step to enter and seat within shower however pt does have a shower door that swings open, recommending temporary curtain and extended bench          Prior Level of Function    Flowsheet Row Most Recent Value   Dominant Hand right   Ambulation assistive equipment   Transferring assistive equipment   Toileting independent   Bathing assistive person   Dressing independent   Eating independent   Prior Level of Function Comment Pt was previously I with all ADLs, only requiring assist with bathing from his wife as function decreased prior to hospital admission. Pt was ambulating with a SPC for recent 2 months. + , retired. Pt enjoys skiing and playing pickleball with his wife.   Assistive Device Currently Used at Home cane, straight  [reports brief use of RW in past]          Occupational Profile    Flowsheet Row Most Recent Value   Occupational History/Life Experiences +. 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, also enjoys hiking,   Environmental Supports and Barriers Pt is interested in learning about return to driving, plans to purchase a new vehicle.  Recommend arranging Chirag Eaton from [facility] [employer] to speak with pt.  Pt lives in [place] and plans on staying in [place] with son until he returns for prosthetic training and is able to return to [place].  Initially wanted to  "drive home.  Discussed potentially purchasing and adapting a vehicle during his stay in [place].  Recommend arranging for pt to view adapted vehicle.   Patient Goals \"Be able to get around better, take care of myself and play with my grandkids.\" And also \"Get a prosthesis and walk around like I used too.\"  [PSFS completed 12/8, pt reported: Walking/hiking 0/10, Pickleball 0/10, Bathing 5/10, Playing with grandkids 5/10 score 10/40 0.25.]             12/12/22 0838   OT Time Calculation   Start Time 0830   Stop Time 0930   Time Calculation (min) 60 min   Session Details   Document Type daily treatment/progress note   Mode of Treatment occupational therapy;individual therapy   General Information   General Observations of Patient Pt received in bed, pleasant and agreeable to OT session   Bed Mobility   Comment (Bed Mobility) OT: supervision for bed mobility   Transfers   Comment gait belt donned all transfers   Stand Pivot Transfer   [place], Stand Pivot/Stand Step Transfer touching/steadying assist   Verbal Cues proper use of assistive device;safety;technique   Assistive Device mobility belt;walker, front-wheeled   Comment via amb appraoch c RW using hopping technique from EOB to WC   Toilet Transfer   Transfer Technique stand pivot   [place], Toilet Transfer touching/steadying assist   Verbal Cues hand placement;safety;technique   Assistive Device mobility belt;grab bars/safety frame;walker, front-wheeled   Comment ILU transfers: via amb approach c RW utilizing hopping technique to low toilet c toilet safety frame. Pt stating sink on right side to utilize for support as needed for sitting/standing c RW. Education on toilet safety frame option provided   Shower Transfer   Transfer Technique step over, right entry   [place], Shower Transfer touching/steadying assist   Verbal Cues hand placement;proper use of assistive device;safety;technique   Assistive Device grab bars/tub rail;shower chair;walker, " front-wheeled   Comment ILU transfers: dry transfer to small barrier shower stall using RW and B grab bar support to shower chair and hopping technique. OT demo tub bench placement in barrier free shower for transfer from WC based on home description for increased ease but unsure at this time of size of bathroom for WC access. To be further assessed. Handout on tub bench and shower chair provided.   Safety Issues, Functional Mobility   Comment, Safety Issues/Impairments (Mobility) OT: steady/touch A for functional mobility c RW using hopping technique HHD in room/bathroom and simulated living environment.   Therapeutic Interventions   Comment, Therapeutic Intervention 1. OT provided handouts for raised toielt seat 2* versatility and pt preferance after trialing toilet safety rails. OT demo use of LPT from WC to tub bench in shower vs PST c RW to shower chair with addition of grab bars for shower transfer. OT discussed prosthesis donning/doffing for showering. 2. WC pushups c 5 second holds completed 5x   Bathing   Shower Provided? No, patient refused   Comment Decline full wet shower this session, nurse present for redressing and application of bandage at start of session post thorough skin inspection using LH mirror. Cl S showering per last performance.   Upper Body Dressing   Tasks don;pull over garment   Self-Performance threads left arm, shirt;threads right arm, shirt;pulls shirt over head/around back;pulls shirt down/adjusts   [place] set up   Position sitting up in bed   Adaptive Equipment none   Comment Setup of clothing provided prior to OT arrival, pt donning pullover shirt sitting up in bed   Lower Body Dressing   Tasks don;underwear;pants/bottoms;socks   Self-Performance threads left leg, underpants;threads right leg, underpants;pulls underpants up or down;threads left leg, pants/shorts;threads right leg, pants/shorts;pulls pants/shorts up or down;dons/doffs left sock;dons/doffs left shoe    [place] supervision   Position sitting up in bed   Adaptive Equipment none   [place], Footwear supervision   Comment pt completing LB dressing bed level, lateral leaning and bridging technique for pulling up over hips, supervision provided. Pt's wife setting out clothing prior to OT arrival and setup at bedside.   Grooming   Self-Performance oral care (brushing teeth, cleaning dentures)   Position supported standing   [place], Oral Hygiene touching/steadying assist   Comment pt in stance c RW support at sinkside, touch A for safety while pt completing oral care, Pt utilzing leaning technique for increased balance on one leg during task   Toileting   Comment No toileting needs this session. Steady/touch A per last report.   UE Residual Limb Therapeutic Activity   Comment, Management Pt completing thorough skin inspection c LH mirror while dressing removed, good technique demo. Pt then completing tapping and rubbing of residual limb for desensitization   Daily Progress Summary (OT)   Daily Outcome Statement OT session c focus on ADL completion and ILU transfers for assessment of home setup and DME needs. OT provided handouts for raised toilet seat, tub bench, and shower chair. Further assessment of D/C environment required for final decision on DME and continued trasnfer training recommended at WC and RW level for increased safety/independence. Continue OT per POC.            Education Documentation  Safe ADL Techniques, taught by Alayna Lara, OT at 12/12/2022 11:32 AM.  Learner: Patient  Readiness: Acceptance  Method: Explanation, Handout, Demonstration  Response: Verbalizes Understanding  Comment: OT edcuated on functional transfer options c DME and provided handouts to pt          IRF OT Goals    Flowsheet Row Most Recent Value   Transfer Goal 1    Activity/Assistive Device toilet at 12/07/2022 0720   [place] supervision required at 12/07/2022 0720   Time Frame short-term goal  (STG), 5 - 7 days at 12/07/2022 0720   Transfer Goal 2    Activity/Assistive Device toilet at 12/07/2022 0720   [place] modified independence at 12/07/2022 0720   Time Frame long-term goal (LTG), 14 days or less at 12/07/2022 0720   Transfer Goal 3    Activity/Assistive Device shower at 12/07/2022 0720   [place] --  [Steadying A] at 12/07/2022 0720   Time Frame short-term goal (STG), 5 - 7 days at 12/07/2022 0720   Transfer Goal 4    Activity/Assistive Device shower at 12/07/2022 0720   [place] modified independence at 12/07/2022 0720   Time Frame long-term goal (LTG) at 12/07/2022 0720   Bathing Goal 1    [place] minimum assist (75% or more patient effort)  [shower level] at 12/07/2022 0720   Time Frame short-term goal (STG), 5 - 7 days at 12/07/2022 0720   Bathing Goal 2    [place] modified independence at 12/07/2022 0720   Time Frame long-term goal (LTG), 14 days or less at 12/07/2022 0720   UB Dressing Goal 1    [place] set-up required at 12/07/2022 0720   Time Frame short-term goal (STG), 5 - 7 days at 12/07/2022 0720   UB Dressing Goal 2    [place] modified independence at 12/07/2022 0720   Time Frame long-term goal (LTG), 14 days or less at 12/07/2022 0720   LB Dressing Goal 1    [place] moderate assist (50-74% patient effort)  [While in stance with RW] at 12/07/2022 0720   Time Frame short-term goal (STG), 5 - 7 days at 12/07/2022 0720   LB Dressing Goal 2    [place] modified independence at 12/07/2022 0720   Time Frame long-term goal (LTG), 14 days or less at 12/07/2022 0720   Grooming Goal 1    [place] set-up required at 12/07/2022 0720   Time Frame short-term goal (STG), 5 - 7 days at 12/07/2022 0720   Grooming Goal 2    [place] modified independence at 12/07/2022 0720   Time Frame long-term goal (LTG), 14 days or less at 12/07/2022 0720   Toileting Goal 1    [place] supervision required at 12/07/2022 0720   Time Frame short-term goal (STG), 5 -  7 days at 12/07/2022 0720   Toileting Goal 2    [place] modified independence at 12/07/2022 0720   Time Frame long-term goal (LTG), 14 days or less at 12/07/2022 0720

## 2022-12-12 NOTE — PROGRESS NOTES
"Daily Progress Note     Patient was seen and examined.   Attestation Notes: Face to face encounter completed    Subjective    Patient with ongoing end of day/night right stump pain.  Interrupted sleep last night.  Progressing in therapies touch assist to min assist with transfers.  Hopping with rolling walker.  Objective     Visit Vitals  /82 (BP Location: Left upper arm, Patient Position: Sitting)   Pulse 72   Temp 36.4 °C (97.6 °F) (Oral)   Resp 18   Ht 1.854 m (6' 1\")   Wt 70.1 kg (154 lb 9.6 oz)   SpO2 96%   BMI 20.40 kg/m²       Review of Systems:  Pertinent items are noted in HPI.  Denies chest pain and shortness of breath.  Review of systems otherwise negative.    Labs     Results from last 7 days   Lab Units 12/07/22  0611   WBC K/uL 13.47*   HEMOGLOBIN g/dL 12.2*   HEMATOCRIT % 37.5*   PLATELETS K/uL 462*     Results from last 7 days   Lab Units 12/07/22  0611   SODIUM mEQ/L 135*   POTASSIUM mEQ/L 4.6   CHLORIDE mEQ/L 99   CO2 mEQ/L 24   BUN mg/dL 20   CREATININE mg/dL 0.6*   CALCIUM mg/dL 9.3   ALBUMIN g/dL 2.9*   BILIRUBIN TOTAL mg/dL 0.6   ALK PHOS IU/L 159*   ALT IU/L 82*   AST IU/L 78*   GLUCOSE mg/dL 70       Full Code    Physical Exam  General      Alert, cooperative, no distress, appears stated age.   Head:    Normocephalic, without obvious abnormality, atraumatic.   Eyes:    PERRL, conjunctiva/corneas clear, EOM's intact.        Nose:   Nares normal, septum midline, mucosa normal, no drainage or            sinus tenderness.   Throat:   Lips, mucosa, and tongue normal.    Neck:   Supple, symmetrical, trachea midline.    Back:     Symmetric, no curvature.   Lungs:     Clear to auscultation bilaterally, respirations unlabored.   Chest wall:    No tenderness or deformity.   Heart:    Regular rate and rhythm, S1 and S2 normal.   Abdomen:     Soft, non-tender, bowel sounds active all four quadrants,     no masses, no organomegaly.   Extremities:  Musculoskeletal: Stable stump edema.  Right " transtibial amputation.   Pulses:   1+ and symmetric all extremities.   Skin: Incision intact with sutures.  No drainage.   Neurologic:          Behavior/  Emotional:  CNII-XII intact.  Alert and oriented ×3.  Motor exam intact.  Sensory exam intact.  Reflexes stable decreased reflexes.      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 male who presents with the following chronic medical problems including MTHFR gene mutation, coronary artery disease with prior myocardial infarction 1993, peripheral artery disease with multiple bypass procedures in the past most recent with tPA bolus and angioplasty in 2020.  The patient admitted to [facility] on November 30, 2022 to the vascular surgery service for planned BKA due to bypass thrombosis.  At the time of admission the patient had cyanosis and was beginning to develop thanh gangrene of the foot.  Patient had reported over the last 1 to 2 months worsening pain of the right foot and that he knew the graft was occluded.  Patient has a history of 15+ surgeries of the extremities and understood that his revascularization options were limited.  He had severe 10 out of 10 pain of the foot relieved with elevation of the leg.  He was still able to ambulate but limited by pain.  Under the care of Bowen Serrato MD of vascular surgery he underwent right transtibial amputation on December 2, 2022.  Postoperative course unremarkable with advancement of diet and pain control.  ESTUARDO drain removed on December 4.  For postoperative pain control he was treated with morphine.  On discharge prescribed Tylenol and low-dose Lyrica.  Lyrica started prior by his PCP for neuropathic pain.  He was restarted on his home Eliquis.  He required min assist for bed mobility, transfers and ambulation.  Vascular surgery wants  follow-up in 1 month.  Patient is now appropriate for acute inpatient rehabilitation.    Comprehensive inpatient rehabilitation program status post right transtibial amputation.  Goal is for the patient to reach a modified independent level with bed mobility, transfers and ambulation.  Ambulation with hopping short distances using a rolling walker.  Also to become modified independent with elevations.  Preprosthetic training.  Ongoing pain control.    For pain control continue low-dose Lyrica and consider increasing dose if the patient has ongoing phantom pain.  Continue around-the-clock Tylenol.  Continue tramadol.  If needed adjust dose.    Monitor incision closely and continue incisional and stump care.    Patient with ongoing end of day/night right stump pain.  Interrupted sleep last night.  Increase Lyrica late afternoon dose.  Continue higher dose at night.  Continue tramadol higher dose.  Tylenol around-the-clock.  Adjust medications as needed.    Progressing in therapies touch assist to min assist with transfers.  Hopping with rolling walker.    Anemia  Assessment & Plan  Postoperative anemia.  Monitor hemoglobin levels.  Hemoglobin 12.2.  Mild iron deficiency and consider iron replacement.    Lupus anticoagulant disorder (CMS/HCC)  Assessment & Plan  Continue Eliquis.    Deep vein thrombosis (CMS/HCC)  Assessment & Plan  Continue Eliquis.    Coronary artery disease  Assessment & Plan  Cardiac precautions.  Continue Eliquis.        Expected Discharge Date:

## 2022-12-12 NOTE — PLAN OF CARE
Plan of Care Review  Plan of Care Reviewed With: patient  Progress: no change  Outcome Summary: Pt. slept better with Trazodone,Ambien and aromatherapy. C/o phantom pain, tramadol q 6 hrs given. Cont. used urinal. Dressing dry and clean. Used call bell for assistance.

## 2022-12-12 NOTE — ASSESSMENT & PLAN NOTE
Harry Alvarado is a 67 y.o. male who presents with the following chronic medical problems including MTHFR gene mutation, coronary artery disease with prior myocardial infarction 1993, peripheral artery disease with multiple bypass procedures in the past most recent with tPA bolus and angioplasty in 2020.  The patient admitted to [facility] on November 30, 2022 to the vascular surgery service for planned BKA due to bypass thrombosis.  At the time of admission the patient had cyanosis and was beginning to develop thanh gangrene of the foot.  Patient had reported over the last 1 to 2 months worsening pain of the right foot and that he knew the graft was occluded.  Patient has a history of 15+ surgeries of the extremities and understood that his revascularization options were limited.  He had severe 10 out of 10 pain of the foot relieved with elevation of the leg.  He was still able to ambulate but limited by pain.  Under the care of Bowen Serrato MD of vascular surgery he underwent right transtibial amputation on December 2, 2022.  Postoperative course unremarkable with advancement of diet and pain control.  ESTUARDO drain removed on December 4.  For postoperative pain control he was treated with morphine.  On discharge prescribed Tylenol and low-dose Lyrica.  Lyrica started prior by his PCP for neuropathic pain.  He was restarted on his home Eliquis.  He required min assist for bed mobility, transfers and ambulation.  Vascular surgery wants follow-up in 1 month.  Patient is now appropriate for acute inpatient rehabilitation.    Comprehensive inpatient rehabilitation program status post right transtibial amputation.  Goal is for the patient to reach a modified independent level with bed mobility, transfers and ambulation.  Ambulation with hopping short distances using a rolling walker.  Also to become modified independent with elevations.  Preprosthetic training.  Ongoing pain control.    For pain control continue  low-dose Lyrica and consider increasing dose if the patient has ongoing phantom pain.  Continue around-the-clock Tylenol.  Continue tramadol.  If needed adjust dose.    Monitor incision closely and continue incisional and stump care.    Patient with ongoing end of day/night right stump pain.  Interrupted sleep last night.  Increase Lyrica late afternoon dose.  Continue higher dose at night.  Continue tramadol higher dose.  Tylenol around-the-clock.  Adjust medications as needed.    Progressing in therapies touch assist to min assist with transfers.  Hopping with rolling walker.

## 2022-12-12 NOTE — SUBJECTIVE & OBJECTIVE
" Patient was seen and examined.   Attestation Notes: Face to face encounter completed    Subjective    Patient with ongoing end of day/night right stump pain.  Interrupted sleep last night.  Progressing in therapies touch assist to min assist with transfers.  Hopping with rolling walker.  Objective     Visit Vitals  /82 (BP Location: Left upper arm, Patient Position: Sitting)   Pulse 72   Temp 36.4 °C (97.6 °F) (Oral)   Resp 18   Ht 1.854 m (6' 1\")   Wt 70.1 kg (154 lb 9.6 oz)   SpO2 96%   BMI 20.40 kg/m²       Review of Systems:  Pertinent items are noted in HPI.  Denies chest pain and shortness of breath.  Review of systems otherwise negative.    Labs     Results from last 7 days   Lab Units 12/07/22  0611   WBC K/uL 13.47*   HEMOGLOBIN g/dL 12.2*   HEMATOCRIT % 37.5*   PLATELETS K/uL 462*     Results from last 7 days   Lab Units 12/07/22  0611   SODIUM mEQ/L 135*   POTASSIUM mEQ/L 4.6   CHLORIDE mEQ/L 99   CO2 mEQ/L 24   BUN mg/dL 20   CREATININE mg/dL 0.6*   CALCIUM mg/dL 9.3   ALBUMIN g/dL 2.9*   BILIRUBIN TOTAL mg/dL 0.6   ALK PHOS IU/L 159*   ALT IU/L 82*   AST IU/L 78*   GLUCOSE mg/dL 70       Full Code    Physical Exam  General      Alert, cooperative, no distress, appears stated age.   Head:    Normocephalic, without obvious abnormality, atraumatic.   Eyes:    PERRL, conjunctiva/corneas clear, EOM's intact.        Nose:   Nares normal, septum midline, mucosa normal, no drainage or            sinus tenderness.   Throat:   Lips, mucosa, and tongue normal.    Neck:   Supple, symmetrical, trachea midline.    Back:     Symmetric, no curvature.   Lungs:     Clear to auscultation bilaterally, respirations unlabored.   Chest wall:    No tenderness or deformity.   Heart:    Regular rate and rhythm, S1 and S2 normal.   Abdomen:     Soft, non-tender, bowel sounds active all four quadrants,     no masses, no organomegaly.   Extremities:  Musculoskeletal: Stable stump edema.  Right transtibial amputation. "   Pulses:   1+ and symmetric all extremities.   Skin: Incision intact with sutures.  No drainage.   Neurologic:          Behavior/  Emotional:  CNII-XII intact.  Alert and oriented ×3.  Motor exam intact.  Sensory exam intact.  Reflexes stable decreased reflexes.      Appropriate, cooperative           Plan of care was discussed with patient

## 2022-12-12 NOTE — PROGRESS NOTES
[facility] Progress Note          Patient was seen and examined.   Attestation Notes: Face to face encounter completed    Harry Alvarado is a 67 y.o. male who was admitted for Status post below knee amputation of right lower extremity (CMS/HCC) [Z89.511]. Patient was referred by Richard Angulo MD for medical assessment and management      CC: Status post below knee amputation of right lower extremity (CMS/HCC) [Z89.511]     HPI: Harry Alvarado is a 67 y.o. male with MTHFR gene mutation, CAD s/p MI 1993, and PAD s/p R Fem to PT bypass with arm vein in 1993, revision in 2011, s/p R SFA to peroneal bypass graft s/p revision on 7/30/18, s/p tPA bolus and angioplasty in 2020 admitted to [facility] 11/30/22 with RLE bypass thrombosis and chronic right foot ischemia/gangrene and underwent right BKA by vascular surgeon, Dr. Bowen Serrato 12/2/22. Post op he had anemia but did not require transfusion. He was restarted on his home Eliquis.  His pain was managed with Tylenol and Lyrica. Tolerating a regular diet.      The patient was evaluated by PM&R and found to have residual deficits in ambulation and ADLs due to Status post below knee amputation of right lower extremity (CMS/HCC) [Z89.511] and came to [facility] on 12/6/2022 for acute inpatient rehab.      He participated in therapy.     SUBJECTIVE:  Patient interviewed and examined     He remains without acute complaints.     RLE pain stable, moving bowels and bladder, no abdominal pain or nausea, no dysuria, no chest pain, palpitations, dyspnea or fevers    Review of Systems:  All other systems reviewed and negative except as noted in the HPI.    Current meds and allergies reviewed    Past Medical History:   Diagnosis Date   • Coronary artery disease     s/p MI   • Deep vein thrombosis (CMS/HCC)    • Lupus anticoagulant disorder (CMS/HCC)    • Lyme disease    • MTHFR gene mutation    • Myocardial infarction (CMS/HCC)    • PAD (peripheral  artery disease) (CMS/HCC)      Past Surgical History:   Procedure Laterality Date   • BELOW KNEE LEG AMPUTATION Right 12/02/2022   • FEMORAL BYPASS Right      Social History     Tobacco Use   • Smoking status: Never   • Smokeless tobacco: Never   Substance Use Topics   • Alcohol use: Not Currently      No family history on file.    Vital signs in last 24 hours:  Temp:  [36.4 °C (97.6 °F)-37.2 °C (98.9 °F)] 36.4 °C (97.6 °F)  Heart Rate:  [63-81] 71  Resp:  [18] 18  BP: (111-136)/(67-82) 111/72  Vital signs reviewed 12/12/22 5:41 PM    Physical Exam  Vitals and nursing note reviewed.   Constitutional:       Appearance: Normal appearance.   HENT:      Head: Normocephalic and atraumatic.      Right Ear: External ear normal.      Left Ear: External ear normal.      Nose: Nose normal.      Mouth/Throat:      Mouth: Mucous membranes are moist.      Pharynx: Oropharynx is clear.   Eyes:      Extraocular Movements: Extraocular movements intact.      Conjunctiva/sclera: Conjunctivae normal.      Pupils: Pupils are equal, round, and reactive to light.   Cardiovascular:      Rate and Rhythm: Normal rate and regular rhythm.      Pulses: Normal pulses.      Heart sounds: Normal heart sounds.   Pulmonary:      Effort: Pulmonary effort is normal.      Breath sounds: Normal breath sounds.   Abdominal:      General: Abdomen is flat. Bowel sounds are normal.      Palpations: Abdomen is soft.   Musculoskeletal:      Cervical back: Normal range of motion and neck supple.      Right lower leg: Edema present.      Left lower leg: Edema present.      Comments: S/p right BKA   Skin:     General: Skin is warm and dry.   Neurological:      General: No focal deficit present.      Mental Status: He is alert and oriented to person, place, and time.   Psychiatric:         Mood and Affect: Mood normal.         Behavior: Behavior normal.                 Objective    Labs:  I have reviewed the patient's labs.  Significant abnormals are  leukocytosis.  Results from last 7 days   Lab Units 12/12/22  0749   WBC K/uL 12.83*   HEMOGLOBIN g/dL 12.1*   HEMATOCRIT % 38.3*   PLATELETS K/uL 534*     Results from last 7 days   Lab Units 12/12/22  0702   SODIUM mEQ/L 135*   POTASSIUM mEQ/L 5.0   CHLORIDE mEQ/L 99   CO2 mEQ/L 27   BUN mg/dL 18   CREATININE mg/dL 0.7*   CALCIUM mg/dL 9.8   ALBUMIN g/dL 2.9*   BILIRUBIN TOTAL mg/dL 0.2*   ALK PHOS IU/L 140*   ALT IU/L 42   AST IU/L 29   GLUCOSE mg/dL 85     Results from last 7 days   Lab Units 12/12/22  0702   MAGNESIUM mg/dL 1.9        Imaging:  Not applicable        ASSESSMENT/PLAN:    67 y.o. male with MTHFR gene mutation, CAD s/p MI 1993, and PAD s/p R Fem to PT bypass with arm vein in 1993, revision in 2011, s/p R SFA to peroneal bypass graft s/p revision on 7/30/18, s/p tPA bolus and angioplasty in 2020 admitted to [facility] 11/30/22 with RLE bypass thrombosis and chronic right foot ischemia/gangrene and underwent right BKA by vascular surgeon, Dr. Bowen Serrato 12/2/22     1. Vasc:  -PAD s/p failed RLE bypass with right foot ischemia/gangrene  -s/p right BKA 12/2/22  -Tylenol and Lyrica for pain  -remains on Eliquis for hypercoagulable condition     2. Heme:  -post op anemia due to chronic blood loss, does not need iron  -leukocytosis reactive due to surgery  -hx of MTHFR gene mutation  -follow CBC     3. Renal:  -increased risk of dehydration and electrolyte changes  -follow BMP, Mg     4. Gi:  -Senokot-S as needed for bowels  -Protonix for GERD and ulcer ppx  -elevated LFTs likely transient due to surgery, meds, follow for now     5. Gu:  -no UTI or retention     6. Cardiac:  -hx of CAD  -watch for orthostatic hypotension  -use cardiac precautions in therapy     7. Pulm:  -incentive spirometry for atelectasis     8. Derm:  -seen by [employer] for skin assessment     9. Nutrition:  -seen by Nutrition for assessment and education     10. Psych:  -seen by Psychology for support  -Ambien prn  sleep     plan discussed with patient, nurse, case management and  Richard Angulo MD Scott Sapperstein, MD  12/12/2022  5:41 PM

## 2022-12-12 NOTE — PROGRESS NOTES
Patient: Harry Alvarado  Location: [facility] [address]  MRN: [id number]  Today's date: 12/12/2022    History of Present Illness  Harry is a 67 y.o. male admitted on 12/6/2022 with Status post below knee amputation of right lower extremity (CMS/[facility]) [Z89.511]. Principal problem is   Status post below knee amputation of right lower extremity (CMS/[facility]). The pt was admitted to Vascular Surgery service for a planned R BKA due to bypass thrombosis and ischemia of the R foot.   He is s/p R BKA on 12/2/22.  His hgb is stable and he has been restarted on his home Eliquis.   Tolerating a regular diet.  PM&R was consulted on 12/3 and recommends acute inpt rehab for intensive therapy as he can tolerate >3 hrs of combined PT/OT per day and at least 5 days/week with a reasonable expectation that the patient will make measurable functional improvement that only an acute rehab can provide.         Past Medical History  Harry has a past medical history of Coronary artery disease, Deep vein thrombosis (CMS/[facility]), Lupus anticoagulant disorder (CMS/[facility]), Lyme disease, MTHFR gene mutation, Myocardial infarction (CMS/[facility]), and PAD (peripheral artery disease) (CMS/[facility]).      PT Vitals    Date/Time Pulse HR Source SpO2 Pt Activity O2 Therapy BP BP Location BP Method Pt Position Observations [facility]   12/12/22 1005 76 Monitor 97 % At rest None (Room air) 135/80 Left upper arm Automatic Sitting -- DTW   12/12/22 1029 71 Monitor -- -- -- 111/72 Left upper arm Automatic Sitting VS after LE TE's. DTW      PT Pain    Date/Time Pain Type Side/Orientation Location Rating: Rest Rating: Activity Description Interventions [facility]   12/12/22 1005 Pain Assessment right residual limb 4 5 incisional;phantom premedicated for activity DTW   12/12/22 1029 Post Activity right residual limb 4 5 incisional;phantom position adjusted DTW          Prior Living Environment    Flowsheet Row Most Recent Value   People in Home child(no), adult, spouse    Current Living Arrangements home   Home Accessibility stairs to enter home (Group), stairs within home (Group)   Living Environment Comment Pt was living in [place] with his wife. However, after discharge, his wife and him plan to move into son's house in [place] with first floor setup and 1 YOSEF. After prosthetic training, pt plans to move back to [place].   Number of Stairs, Main Entrance 1   Location, Patient Bedroom first (main) floor   Location, Bathroom first (main) floor   Bathroom Access Comment per PT confirmed with family stall shower with 6 inch step to enter and seat within shower however pt does have a shower door that swings open, recommending temporary curtain and extended bench          Prior Level of Function    Flowsheet Row Most Recent Value   Dominant Hand right   Ambulation assistive equipment   Transferring assistive equipment   Toileting independent   Bathing assistive person   Dressing independent   Eating independent   Prior Level of Function Comment Pt was previously I with all ADLs, only requiring assist with bathing from his wife as function decreased prior to hospital admission. Pt was ambulating with a SPC for recent 2 months. + , retired. Pt enjoys skiing and playing pickleball with his wife.   Assistive Device Currently Used at Home cane, straight  [reports brief use of RW in past]           IRF PT Evaluation and Treatment - 12/12/22 1005        PT Time Calculation    Start Time 1000     Stop Time 1030     Time Calculation (min) 30 min        Session Details    Document Type daily treatment/progress note     Mode of Treatment physical therapy;individual therapy        General Information    Patient Profile Reviewed yes     General Observations of Patient Pt. pleasant and agreeable to tx.     Existing Precautions/Restrictions cardiac;fall;weight bearing        Bed Mobility    Bed Mobility other (see comments)     [place], Roll Left modified independence     [place], Roll  "Right modified independence     [place], Supine to Sit supervision     [place], Sit to Supine supervision     Assistive Device none     Comment (Bed Mobility) supine<>prone with close S for safety.  Performed on mat.        Transfers    Transfers low pivot transfer     Maintains Weight-Bearing Status (Transfers) able to maintain     Comment mobility belt donned for EOM activities, transfers.        Low Pivot Transfer    [place], Low Pivot Transfer supervision     Verbal Cues technique;safety     Assistive Device wheelchair;mobility belt     Comment LPT w/c<>hi/low mat, even hts.        Wheelchair Mobility/Management    Wheelchair Type manual, lightweight     Comment, Wheelchair Mobility manual w/c changed to an 18\"x 18\" lightwt. manual w/c for better pt. fit.        Lower Extremity (Therapeutic Exercise)    Exercise Position/Type seated;supine;prone;side lying     General Exercise right;knee flexion;SLR (straight leg raise);gluteal sets;hip aBduction;hip extension;marching while seated;left;ankle pumps;gastroc stretch;bilateral;hamstring stretch   knee flexion in prone, R hip ext in L sidelying.    Reps and Sets 3 x 10 each, 3 x 30 sec hold for stretches        Core Strength (Therapeutic Exercise)    Core Strength, Position supine     Core Strength bridging, single lower extremity     Reps and Sets 30x's each, L LE bridging, NWB R LE.        Daily Progress Summary (PT)    Symptoms Noted During/After Treatment none     Progress Toward Functional Goals (PT) progressing toward functional goals as expected     Daily Outcome Statement Treatment this session focused upon LE and core strengthening and ROM as well as switching pt's present loaner w/c to a smaller one, closer to his size.  Pt tolerating TE's well, performing 30 repetitions w/o assistance.     Recommendations (PT) Continue per PT plan of care.                      Education Documentation  Mobility Aids/Assistive Devices, taught by Zahra Hollis " T, PT at 12/12/2022 10:52 AM.  Learner: Patient  Readiness: Acceptance  Method: Explanation, Demonstration  Response: Needs Reinforcement  Comment: Reviewed safe technique for LPT w/c<>mat.  Also reviewed supine, sidelying and seated LE and core TE's.          IRF PT Goals    Flowsheet Row Most Recent Value   Bed Mobility Goal 1    Activity/Assistive Device bed mobility activities, all at 12/07/2022 1034   [place] supervision required at 12/07/2022 1034   Time Frame short-term goal (STG), 1 week at 12/07/2022 1034   Progress/Outcome goal ongoing at 12/07/2022 1034   Bed Mobility Goal 2    Activity/Assistive Device bed mobility activities, all at 12/07/2022 1034   [place] modified independence at 12/07/2022 1034   Time Frame long-term goal (LTG), 2 weeks at 12/07/2022 1034   Progress/Outcome goal ongoing at 12/07/2022 1034   Transfer Goal 1    Activity/Assistive Device sit-to-stand/stand-to-sit, low pivot, stand pivot at 12/07/2022 1034   [place] supervision required at 12/07/2022 1034   Time Frame short-term goal (STG), 1 week at 12/07/2022 1034   Progress/Outcome goal ongoing at 12/07/2022 1034   Transfer Goal 2    Activity/Assistive Device sit-to-stand/stand-to-sit, low pivot, stand pivot at 12/07/2022 1034   [place] modified independence at 12/07/2022 1034   Time Frame long-term goal (LTG), 2 weeks at 12/07/2022 1034   Progress/Outcome goal ongoing at 12/07/2022 1034   Wheelchair Locomotion Goal 1    Activity wheelchair mobility skills, all at 12/07/2022 1034   Assistive Device manual, lightweight at 12/07/2022 1034   Distance 250 feet at 12/07/2022 1034   [place] modified independence at 12/07/2022 1034   Time Frame short-term goal (STG), 1 week at 12/07/2022 1034   Progress/Outcome goal ongoing at 12/07/2022 1034   Wheelchair Locomotion Goal 2    Activity wheelchair mobility skills, all at 12/07/2022 1034   Assistive Device manual, lightweight at 12/07/2022 1034   Distance 500 feet at  12/07/2022 1034   [place] modified independence at 12/07/2022 1034   Time Frame long-term goal (LTG), 2 weeks at 12/07/2022 1034   Progress/Outcome goal ongoing at 12/07/2022 1034

## 2022-12-12 NOTE — PROGRESS NOTES
Patient: Harry Alvarado  Location: [facility] [address]  MRN: [id number]  Today's date: 12/12/2022    History of Present Illness  Harry is a 67 y.o. male admitted on 12/6/2022 with Status post below knee amputation of right lower extremity (CMS/[facility]) [Z89.511]. Principal problem is   Status post below knee amputation of right lower extremity (CMS/[facility]). The pt was admitted to Vascular Surgery service for a planned R BKA due to bypass thrombosis and ischemia of the R foot.   He is s/p R BKA on 12/2/22.  His hgb is stable and he has been restarted on his home Eliquis.   Tolerating a regular diet.  PM&R was consulted on 12/3 and recommends acute inpt rehab for intensive therapy as he can tolerate >3 hrs of combined PT/OT per day and at least 5 days/week with a reasonable expectation that the patient will make measurable functional improvement that only an acute rehab can provide.         Past Medical History  Harry has a past medical history of Coronary artery disease, Deep vein thrombosis (CMS/[facility]), Lupus anticoagulant disorder (CMS/[facility]), Lyme disease, MTHFR gene mutation, Myocardial infarction (CMS/[facility]), and PAD (peripheral artery disease) (CMS/[facility]).      OT Vitals    Date/Time Pulse BP BP Location BP Method Pt Position [facility]   12/12/22 1032 71 111/72 Left upper arm Automatic Sitting [facility]      OT Pain    Date/Time Pain Type Side/Orientation Location Rating: Rest Description Interventions [facility]   12/12/22 1032 Pain Assessment right residual limb 5 phantom;incisional diversional activity provided [facility]   12/12/22 1100 Pain Reassessment right residual limb 5 -- diversional activity provided [facility]          Prior Living Environment    Flowsheet Row Most Recent Value   People in Home child(no), adult, spouse   Current Living Arrangements home   Home Accessibility stairs to enter home (Group), stairs within home (Group)   Living Environment Comment [place] living in [place]. However, after discharge, his  "wife and him plan to move into son's house in [place] with first floor setup and 1 YOSEF. After prosthetic training, pt plans to move back to [place].   Number of Stairs, Main Entrance 1   Location, Patient Bedroom first (main) floor   Location, Bathroom first (main) floor   Bathroom Access Comment per PT confirmed with family stall shower with 6 inch step to enter and seat within shower however pt does have a shower door that swings open, recommending temporary curtain and extended bench          Prior Level of Function    Flowsheet Row Most Recent Value   Dominant Hand right   Ambulation assistive equipment   Transferring assistive equipment   Toileting independent   Bathing assistive person   Dressing independent   Eating independent   Prior Level of Function Comment Pt was previously I with all ADLs, only requiring assist with bathing from his wife as function decreased prior to hospital admission. Pt was ambulating with a SPC for recent 2 months. + , retired. Pt enjoys skiing and playing pickleball with his wife.   Assistive Device Currently Used at Home cane, straight  [reports brief use of RW in past]          Occupational Profile    Flowsheet Row Most Recent Value   Occupational History/Life Experiences +. 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, also enjoys hiking,   Environmental Supports and Barriers Pt is interested in learning about return to driving, plans to purchase a new vehicle.  Recommend arranging Chirag Eaton from [employer] to speak with pt.  Pt lives in [place] and plans on staying in [place] with son until he returns for prosthetic training and is able to return to [place].  Initially wanted to drive home.  Discussed potentially purchasing and adapting a vehicle during his stay in [place].  Recommend arranging for pt to view adapted vehicle.   Patient Goals \"Be able to get " "around better, take care of myself and play with my grandkids.\" And also \"Get a prosthesis and walk around like I used too.\"  [PSFS completed 12/8, pt reported: Walking/hiking 0/10, Pickleball 0/10, Bathing 5/10, Playing with grandkids 5/10 score 10/40 0.25.]           IRF OT Evaluation and Treatment - 12/12/22 1032        OT Time Calculation    Start Time 1030     Stop Time 1100     Time Calculation (min) 30 min        Session Details    Document Type daily treatment/progress note     Mode of Treatment occupational therapy;individual therapy        General Information    General Observations of Patient LLE w/c leg rest adjusted for fit        Wheelchair Mobility/Management    Comment, Wheelchair Mobility pt propels manual w/c >300 ft c good safety and obstacle navigation including turns and doorway navigation.        LE Residual Limb Therapeutic Activity    Desensitization Techniques light rubbing;tapping   thumper    Phantom Pain/Sensation patient reports phantom pain;patient reports phantom sensation     Comment, Management Reviewed phantom sensation, neuroplasticity via desnsitization to help with phantom pain/sensations. Issued thumper for desensitization and reviewed use. Pt demonstrates appropriate desensitization via rubbing, tapping, finger crawling, and thumper while seated in w/c. Brief review of goals of desensitization in prep for prosthetic wearing tolerance.        Daily Progress Summary (OT)    Daily Outcome Statement Pt educated on desensitization and thumper issued. Recommend ongoing amputee education which is scheduled with PT for this PM.                      Education Documentation  Residual Limb Management, taught by Sin Pretty, OT at 12/12/2022 11:11 AM.  Learner: Patient  Readiness: Acceptance  Method: Explanation, Demonstration  Response: Verbalizes Understanding, Demonstrated Understanding  Comment: Educated on desnsitization, thumper, and goals of preparing residual limb for " weightbaring in prosthesis.    Pain Management, taught by Sin Pretty OT at 12/12/2022 11:11 AM.  Learner: Patient  Readiness: Acceptance  Method: Explanation, Demonstration  Response: Verbalizes Understanding, Demonstrated Understanding  Comment: Educated on desnsitization, thumper, and goals of preparing residual limb for weightbaring in prosthesis.          IRF OT Goals    Flowsheet Row Most Recent Value   Transfer Goal 1    Activity/Assistive Device toilet at 12/07/2022 0720   [place] supervision required at 12/07/2022 0720   Time Frame short-term goal (STG), 5 - 7 days at 12/07/2022 0720   Transfer Goal 2    Activity/Assistive Device toilet at 12/07/2022 0720   [place] modified independence at 12/07/2022 0720   Time Frame long-term goal (LTG), 14 days or less at 12/07/2022 0720   Transfer Goal 3    Activity/Assistive Device shower at 12/07/2022 0720   [place] --  [Steadying A] at 12/07/2022 0720   Time Frame short-term goal (STG), 5 - 7 days at 12/07/2022 0720   Transfer Goal 4    Activity/Assistive Device shower at 12/07/2022 0720   [place] modified independence at 12/07/2022 0720   Time Frame long-term goal (LTG) at 12/07/2022 0720   Bathing Goal 1    [place] minimum assist (75% or more patient effort)  [shower level] at 12/07/2022 0720   Time Frame short-term goal (STG), 5 - 7 days at 12/07/2022 0720   Bathing Goal 2    [place] modified independence at 12/07/2022 0720   Time Frame long-term goal (LTG), 14 days or less at 12/07/2022 0720   UB Dressing Goal 1    [place] set-up required at 12/07/2022 0720   Time Frame short-term goal (STG), 5 - 7 days at 12/07/2022 0720   UB Dressing Goal 2    [place] modified independence at 12/07/2022 0720   Time Frame long-term goal (LTG), 14 days or less at 12/07/2022 0720   LB Dressing Goal 1    [place] moderate assist (50-74% patient effort)  [While in stance with RW] at 12/07/2022 0720   Time Frame short-term  goal (STG), 5 - 7 days at 12/07/2022 0720   LB Dressing Goal 2    [place] modified independence at 12/07/2022 0720   Time Frame long-term goal (LTG), 14 days or less at 12/07/2022 0720   Grooming Goal 1    [place] set-up required at 12/07/2022 0720   Time Frame short-term goal (STG), 5 - 7 days at 12/07/2022 0720   Grooming Goal 2    [place] modified independence at 12/07/2022 0720   Time Frame long-term goal (LTG), 14 days or less at 12/07/2022 0720   Toileting Goal 1    [place] supervision required at 12/07/2022 0720   Time Frame short-term goal (STG), 5 - 7 days at 12/07/2022 0720   Toileting Goal 2    [place] modified independence at 12/07/2022 0720   Time Frame long-term goal (LTG), 14 days or less at 12/07/2022 0720

## 2022-12-12 NOTE — PROGRESS NOTES
Patient: Harry Alvarado  Location: [facility] [address]  MRN: [id number]  Today's date: 12/12/2022    History of Present Illness  Harry is a 67 y.o. male admitted on 12/6/2022 with Status post below knee amputation of right lower extremity (CMS/[facility]) [Z89.511]. 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 The pt was admitted to Vascular Surgery service for a planned R BKA due to bypass thrombosis and ischemia of the R foot.   He is s/p R BKA on 12/2/22.  His hgb is stable and he has been restarted on his home Eliquis.   Tolerating a regular diet.  PM&R was consulted on 12/3 and recommends acute inpt rehab for intensive therapy as he can tolerate >3 hrs of combined PT/OT per day and at least 5 days/week with a reasonable expectation that the patient will make measurable functional improvement that only an acute rehab can provide.         Past Medical History  Harry has a past medical history of Coronary artery disease, Deep vein thrombosis (CMS/[facility]), Lupus anticoagulant disorder (CMS/[facility]), Lyme disease, MTHFR gene mutation, Myocardial infarction (CMS/[facility]), and PAD (peripheral artery disease) (CMS/[facility]).          Prior Living Environment    Flowsheet Row Most Recent Value   People in Home child(no), adult, spouse   Current Living Arrangements home   Home Accessibility stairs to enter home (Group), stairs within home (Group)   Living Environment Comment Pt was living in [place] with his wife. However, after discharge, his wife and him plan to move into son's house in [place] with first floor setup and 1 YOSEF. After prosthetic training, pt plans to move back to [place].   Number of Stairs, Main Entrance 1   Location, Patient Bedroom first (main) floor   Location, Bathroom first (main) floor   Bathroom Access Comment per PT confirmed with family stall shower with 6 inch step to enter and seat within shower however pt does have a shower door that swings open,  recommending temporary curtain and extended bench          Prior Level of Function    Flowsheet Row Most Recent Value   Dominant Hand right   Ambulation assistive equipment   Transferring assistive equipment   Toileting independent   Bathing assistive person   Dressing independent   Eating independent   Prior Level of Function Comment Pt was previously I with all ADLs, only requiring assist with bathing from his wife as function decreased prior to hospital admission. Pt was ambulating with a SPC for recent 2 months. + , retired. Pt enjoys skiing and playing pickleball with his wife.   Assistive Device Currently Used at Home cane, straight  [reports brief use of RW in past]           IRF PT Evaluation and Treatment - 12/12/22 1400        PT Time Calculation    Start Time 1405     Stop Time 1505     Time Calculation (min) 60 min        Session Details    Document Type daily treatment/progress note     Mode of Treatment individual therapy;physical therapy        General Information    Patient/Family/Caregiver Comments/Observations Wife present for scheduled Amputee Education        Daily Progress Summary (PT)    Daily Outcome Statement Amputee Eduaction Series completed with pt and wife.  Education provided on role of [facility]ee [facility], wound and contracture prevention, Amputee resources, prosthetist selectionp process and sequence of events to receiving a prosthesis.  Wife and pt both very engaged and asked relevant questions.  PT to set up peer mentor meeting as well as meetings with [employer] and  per pt request.                      Education Documentation  Role of the Amputee Clinic, taught by Ginny Palomino PT at 12/12/2022  3:25 PM.  Learner: Significant Other, Patient  Readiness: Acceptance  Method: Explanation, Handout  Response: Verbalizes Understanding    Selecting a Prosthetist, taught by Ginny Palomino PT at 12/12/2022  3:25 PM.  Learner: Significant Other, Patient  Readiness:  Acceptance  Method: Explanation, Handout  Response: Verbalizes Understanding    Desensitization, taught by Ginny Palomino PT at 12/12/2022  3:25 PM.  Learner: Significant Other, Patient  Readiness: Acceptance  Method: Explanation, Handout  Response: Verbalizes Understanding    Contracture Prevention, taught by Ginny Palomino PT at 12/12/2022  3:25 PM.  Learner: Significant Other, Patient  Readiness: Acceptance  Method: Explanation, Handout  Response: Verbalizes Understanding    Wound Healing/Prevention, taught by Ginny Palomino PT at 12/12/2022  3:25 PM.  Learner: Significant Other, Patient  Readiness: Acceptance  Method: Explanation, Handout  Response: Verbalizes Understanding          IRF PT Goals    Flowsheet Row Most Recent Value   Bed Mobility Goal 1    Activity/Assistive Device bed mobility activities, all at 12/07/2022 1034   [place] supervision required at 12/07/2022 1034   Time Frame short-term goal (STG), 1 week at 12/07/2022 1034   Progress/Outcome goal ongoing at 12/07/2022 1034   Bed Mobility Goal 2    Activity/Assistive Device bed mobility activities, all at 12/07/2022 1034   [place] modified independence at 12/07/2022 1034   Time Frame long-term goal (LTG), 2 weeks at 12/07/2022 1034   Progress/Outcome goal ongoing at 12/07/2022 1034   Transfer Goal 1    Activity/Assistive Device sit-to-stand/stand-to-sit, low pivot, stand pivot at 12/07/2022 1034   [place] supervision required at 12/07/2022 1034   Time Frame short-term goal (STG), 1 week at 12/07/2022 1034   Progress/Outcome goal ongoing at 12/07/2022 1034   Transfer Goal 2    Activity/Assistive Device sit-to-stand/stand-to-sit, low pivot, stand pivot at 12/07/2022 1034   [place] modified independence at 12/07/2022 1034   Time Frame long-term goal (LTG), 2 weeks at 12/07/2022 1034   Progress/Outcome goal ongoing at 12/07/2022 1034   Wheelchair Locomotion Goal 1    Activity wheelchair mobility skills, all at 12/07/2022 1034    Assistive Device manual, lightweight at 12/07/2022 1034   Distance 250 feet at 12/07/2022 1034   [place] modified independence at 12/07/2022 1034   Time Frame short-term goal (STG), 1 week at 12/07/2022 1034   Progress/Outcome goal ongoing at 12/07/2022 1034   Wheelchair Locomotion Goal 2    Activity wheelchair mobility skills, all at 12/07/2022 1034   Assistive Device manual, lightweight at 12/07/2022 1034   Distance 500 feet at 12/07/2022 1034   [place] modified independence at 12/07/2022 1034   Time Frame long-term goal (LTG), 2 weeks at 12/07/2022 1034   Progress/Outcome goal ongoing at 12/07/2022 1034

## 2022-12-13 ENCOUNTER — APPOINTMENT (INPATIENT)
Dept: PHYSICAL THERAPY | Facility: REHABILITATION | Age: 67
DRG: 560 | End: 2022-12-13
Payer: MEDICARE

## 2022-12-13 ENCOUNTER — APPOINTMENT (INPATIENT)
Dept: OCCUPATIONAL THERAPY | Facility: REHABILITATION | Age: 67
DRG: 560 | End: 2022-12-13
Payer: MEDICARE

## 2022-12-13 PROCEDURE — 12800000 HC ROOM AND CARE SEMIPRIVATE REHAB

## 2022-12-13 PROCEDURE — 97535 SELF CARE MNGMENT TRAINING: CPT | Mod: GO,CO

## 2022-12-13 PROCEDURE — 63700000 HC SELF-ADMINISTRABLE DRUG: Performed by: HOSPITALIST

## 2022-12-13 PROCEDURE — 97530 THERAPEUTIC ACTIVITIES: CPT | Mod: GO

## 2022-12-13 PROCEDURE — 97530 THERAPEUTIC ACTIVITIES: CPT | Mod: GO,CO

## 2022-12-13 PROCEDURE — 97110 THERAPEUTIC EXERCISES: CPT | Mod: GO,CO

## 2022-12-13 PROCEDURE — 63700000 HC SELF-ADMINISTRABLE DRUG: Performed by: PHYSICAL MEDICINE & REHABILITATION

## 2022-12-13 PROCEDURE — 97110 THERAPEUTIC EXERCISES: CPT | Mod: GP

## 2022-12-13 PROCEDURE — 97116 GAIT TRAINING THERAPY: CPT | Mod: GP

## 2022-12-13 PROCEDURE — 97110 THERAPEUTIC EXERCISES: CPT | Mod: GO

## 2022-12-13 RX ORDER — ASPIRIN 81 MG/1
81 TABLET ORAL DAILY
Status: DISCONTINUED | OUTPATIENT
Start: 2022-12-13 | End: 2022-12-18 | Stop reason: HOSPADM

## 2022-12-13 RX ADMIN — PREGABALIN 50 MG: 50 CAPSULE ORAL at 20:18

## 2022-12-13 RX ADMIN — ASPIRIN 81 MG: 81 TABLET, COATED ORAL at 15:33

## 2022-12-13 RX ADMIN — PANTOPRAZOLE SODIUM 40 MG: 40 TABLET, DELAYED RELEASE ORAL at 08:18

## 2022-12-13 RX ADMIN — TRAMADOL HYDROCHLORIDE 100 MG: 50 TABLET, COATED ORAL at 20:17

## 2022-12-13 RX ADMIN — ACETAMINOPHEN 975 MG: 325 TABLET ORAL at 20:18

## 2022-12-13 RX ADMIN — ACETAMINOPHEN 975 MG: 325 TABLET ORAL at 13:03

## 2022-12-13 RX ADMIN — TRAMADOL HYDROCHLORIDE 100 MG: 50 TABLET, COATED ORAL at 09:56

## 2022-12-13 RX ADMIN — PREGABALIN 50 MG: 50 CAPSULE ORAL at 13:02

## 2022-12-13 RX ADMIN — PREGABALIN 25 MG: 25 CAPSULE ORAL at 08:18

## 2022-12-13 RX ADMIN — ZOLPIDEM TARTRATE 10 MG: 10 TABLET, COATED ORAL at 01:12

## 2022-12-13 RX ADMIN — APIXABAN 5 MG: 5 TABLET, FILM COATED ORAL at 08:18

## 2022-12-13 RX ADMIN — APIXABAN 5 MG: 5 TABLET, FILM COATED ORAL at 20:19

## 2022-12-13 NOTE — PROGRESS NOTES
[facility] Progress Note          Patient was seen and examined.   Attestation Notes: Face to face encounter completed    Harry Alvarado is a 67 y.o. male who was admitted for Status post below knee amputation of right lower extremity (CMS/[facility]) [Z89.511]. Patient was referred by Richard Angulo MD for medical assessment and management      CC: Status post below knee amputation of right lower extremity (CMS/[facility]) [Z89.511]     HPI: Harry Alvarado is a 67 y.o. male with MTHFR gene mutation, CAD s/p MI 1993, and PAD s/p R Fem to PT bypass with arm vein in 1993, revision in 2011, s/p R SFA to peroneal bypass graft s/p revision on 7/30/18, s/p tPA bolus and angioplasty in 2020 admitted to [facility] 11/30/22 with RLE bypass thrombosis and chronic right foot ischemia/gangrene and underwent right BKA by vascular surgeon, Dr. Bowen Serrato 12/2/22. Post op he had anemia but did not require transfusion. He was restarted on his home Eliquis.  His pain was managed with Tylenol and Lyrica. Tolerating a regular diet.      The patient was evaluated by PM&R and found to have residual deficits in ambulation and ADLs due to Status post below knee amputation of right lower extremity (CMS/[facility]) [Z89.511] and came to [facility] on 12/6/2022 for acute inpatient rehab.      He participated in therapy.     SUBJECTIVE:  Patient interviewed and examined     He noted some bruising on his right tibial tuberosity. He was concerned because he has not been on his daily 81 mg Aspirin since surgery but he does remain on full dose Eliquis.  He was restarted on his 81 mg Aspirin at his request but it is unrelated to the bruising on his residual limb which is likely just post op changes with underlying resolving post op hematoma.    RLE pain stable, moving bowels and bladder, no abdominal pain or nausea, no dysuria, no chest pain, palpitations, dyspnea or fevers    Review of Systems:  All other systems reviewed and  negative except as noted in the HPI.    Current meds and allergies reviewed    Past Medical History:   Diagnosis Date   • Coronary artery disease     s/p MI   • Deep vein thrombosis (CMS/HCC)    • Lupus anticoagulant disorder (CMS/HCC)    • Lyme disease    • MTHFR gene mutation    • Myocardial infarction (CMS/HCC)    • PAD (peripheral artery disease) (CMS/HCC)      Past Surgical History:   Procedure Laterality Date   • BELOW KNEE LEG AMPUTATION Right 12/02/2022   • FEMORAL BYPASS Right      Social History     Tobacco Use   • Smoking status: Never   • Smokeless tobacco: Never   Substance Use Topics   • Alcohol use: Not Currently      No family history on file.    Vital signs in last 24 hours:  Temp:  [36.5 °C (97.7 °F)-36.8 °C (98.2 °F)] 36.5 °C (97.7 °F)  Heart Rate:  [71-80] 76  Resp:  [16-18] 18  BP: (113-135)/(72-83) 132/73  Vital signs reviewed 12/13/22 3:20 PM    Physical Exam  Vitals and nursing note reviewed.   Constitutional:       Appearance: Normal appearance.   HENT:      Head: Normocephalic and atraumatic.      Right Ear: External ear normal.      Left Ear: External ear normal.      Nose: Nose normal.      Mouth/Throat:      Mouth: Mucous membranes are moist.      Pharynx: Oropharynx is clear.   Eyes:      Extraocular Movements: Extraocular movements intact.      Conjunctiva/sclera: Conjunctivae normal.      Pupils: Pupils are equal, round, and reactive to light.   Cardiovascular:      Rate and Rhythm: Normal rate and regular rhythm.      Pulses: Normal pulses.      Heart sounds: Normal heart sounds.   Pulmonary:      Effort: Pulmonary effort is normal.      Breath sounds: Normal breath sounds.   Abdominal:      General: Abdomen is flat. Bowel sounds are normal.      Palpations: Abdomen is soft.   Musculoskeletal:      Cervical back: Normal range of motion and neck supple.      Right lower leg: Edema present.      Left lower leg: Edema present.      Comments: S/p right BKA   Skin:     General: Skin is  warm and dry.   Neurological:      General: No focal deficit present.      Mental Status: He is alert and oriented to person, place, and time.   Psychiatric:         Mood and Affect: Mood normal.         Behavior: Behavior normal.                 Objective    Labs:  I have reviewed the patient's labs.  Significant abnormals are leukocytosis.  Results from last 7 days   Lab Units 12/12/22  0749   WBC K/uL 12.83*   HEMOGLOBIN g/dL 12.1*   HEMATOCRIT % 38.3*   PLATELETS K/uL 534*     Results from last 7 days   Lab Units 12/12/22  0702   SODIUM mEQ/L 135*   POTASSIUM mEQ/L 5.0   CHLORIDE mEQ/L 99   CO2 mEQ/L 27   BUN mg/dL 18   CREATININE mg/dL 0.7*   CALCIUM mg/dL 9.8   ALBUMIN g/dL 2.9*   BILIRUBIN TOTAL mg/dL 0.2*   ALK PHOS IU/L 140*   ALT IU/L 42   AST IU/L 29   GLUCOSE mg/dL 85     Results from last 7 days   Lab Units 12/12/22  0702   MAGNESIUM mg/dL 1.9        Imaging:  Not applicable        ASSESSMENT/PLAN:    67 y.o. male with MTHFR gene mutation, CAD s/p MI 1993, and PAD s/p R Fem to PT bypass with arm vein in 1993, revision in 2011, s/p R SFA to peroneal bypass graft s/p revision on 7/30/18, s/p tPA bolus and angioplasty in 2020 admitted to [facility] 11/30/22 with RLE bypass thrombosis and chronic right foot ischemia/gangrene and underwent right BKA by vascular surgeon, Dr. Bowen Serrato 12/2/22     1. Vasc:  -PAD s/p failed RLE bypass with right foot ischemia/gangrene  -s/p right BKA 12/2/22  -restarted Aspirin 81 mg daily for PAD per patient request  -Tylenol and Lyrica for pain  -remains on Eliquis for hypercoagulable condition     2. Heme:  -post op anemia due to chronic blood loss, does not need iron  -leukocytosis reactive due to surgery  -hx of MTHFR gene mutation  -follow CBC     3. Renal:  -increased risk of dehydration and electrolyte changes  -follow BMP, Mg     4. Gi:  -Senokot-S as needed for bowels  -Protonix for GERD and ulcer ppx  -elevated LFTs likely transient due to surgery, meds,  follow for now     5. Gu:  -no UTI or retention     6. Cardiac:  -hx of CAD  -watch for orthostatic hypotension  -use cardiac precautions in therapy     7. Pulm:  -incentive spirometry for atelectasis     8. Derm:  -seen by [employer] for skin assessment  -right tibial tuberosity bruising, normal post op changes, follow for now     9. Nutrition:  -seen by Nutrition for assessment and education     10. Psych:  -seen by Psychology for support  -Ambien prn sleep    11. Dispo:  -Expected discharge date 12/18/2022         plan discussed with patient, nurse, case management and  Richard Angulo MD Scott Sapperstein, MD  12/13/2022  3:20 PM

## 2022-12-13 NOTE — PROGRESS NOTES
12/13/22 1330   [facility] Visitor Management   Visitor Status Standard visition guidelines for department   Visitor Comments Dave (Wife) is approved on 12/15 @ 12:30 to go to patient room to meet with Miguelina Quezada.

## 2022-12-13 NOTE — PROGRESS NOTES
Patient: Harry Alvarado  Location: [facility] [address]  MRN: [id number]  Today's date: 12/13/2022    History of Present Illness  Harry is a 67 y.o. male admitted on 12/6/2022 with Status post below knee amputation of right lower extremity (CMS/[facility]) [Z89.511]. Principal problem is   Status post below knee amputation of right lower extremity (CMS/[facility]). The pt was admitted to Vascular Surgery service for a planned R BKA due to bypass thrombosis and ischemia of the R foot.   He is s/p R BKA on 12/2/22.  His hgb is stable and he has been restarted on his home Eliquis.   Tolerating a regular diet.  PM&R was consulted on 12/3 and recommends acute inpt rehab for intensive therapy as he can tolerate >3 hrs of combined PT/OT per day and at least 5 days/week with a reasonable expectation that the patient will make measurable functional improvement that only an acute rehab can provide.         Past Medical History  Harry has a past medical history of Coronary artery disease, Deep vein thrombosis (CMS/[facility]), Lupus anticoagulant disorder (CMS/[facility]), Lyme disease, MTHFR gene mutation, Myocardial infarction (CMS/[facility]), and PAD (peripheral artery disease) (CMS/[facility]).         12/13/22 0832   Pain/Comfort/Sleep   Pain Charting Type Pain Assessment   Preferred Pain Scale number (Numeric Rating Pain Scale)   (0-10) Pain Rating: Rest 5   Pain Body Location residual limb   Vitals   Heart Rate 71   Heart Rate Source Monitor   Patient Activity At rest   Oxygen Therapy None (Room air)   /72   BP Location Left upper arm   BP Method Automatic   Patient Position Lying        12/13/22 0927   Pain/Comfort/Sleep   Pain Charting Type Pain Reassessment   Preferred Pain Scale number (Numeric Rating Pain Scale)   (0-10) Pain Rating: Rest 5   Pain Body Location residual limb       Prior Living Environment    Flowsheet Row Most Recent Value   People in Home child(no), adult, spouse   Current Living Arrangements home   Home  Accessibility stairs to enter home (Group), stairs within home (Group)   Living Environment Comment [place] living in [place]. However, after discharge, his wife and him plan to move into son's house in [place] with first floor setup and 1 YOSEF. After prosthetic training, pt plans to move back to [place].   Number of Stairs, Main Entrance 1   Location, Patient Bedroom first (main) floor   Location, Bathroom first (main) floor   Bathroom Access Comment per PT confirmed with family stall shower with 6 inch step to enter and seat within shower however pt does have a shower door that swings open, recommending temporary curtain and extended bench          Prior Level of Function    Flowsheet Row Most Recent Value   Dominant Hand right   Ambulation assistive equipment   Transferring assistive equipment   Toileting independent   Bathing assistive person   Dressing independent   Eating independent   Prior Level of Function Comment Pt was previously I with all ADLs, only requiring assist with bathing from his wife as function decreased prior to hospital admission. Pt was ambulating with a SPC for recent 2 months. + , retired. Pt enjoys skiing and playing pickleball with his wife.   Assistive Device Currently Used at Home cane, straight  [reports brief use of RW in past]          Occupational Profile    Flowsheet Row Most Recent Value   Occupational History/Life Experiences +. 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, also enjoys hiking,   Environmental Supports and Barriers Pt is interested in learning about return to driving, plans to purchase a new vehicle.  Recommend arranging Chirag Eaton from the [employer] to speak with pt.  Pt lives in [place] and plans on staying in [place] with son until he returns for prosthetic training and is able to return to [place].  Initially wanted to drive home.  Discussed  "potentially purchasing and adapting a vehicle during his stay in [place].  Recommend arranging for pt to view adapted vehicle.   Patient Goals \"Be able to get around better, take care of myself and play with my grandkids.\" And also \"Get a prosthesis and walk around like I used too.\"  [PSFS completed 12/8, pt reported: Walking/hiking 0/10, Pickleball 0/10, Bathing 5/10, Playing with grandkids 5/10 score 10/40 0.25.]             12/13/22 0834   OT Time Calculation   Start Time 0830   Stop Time 0930   Time Calculation (min) 60 min   Session Details   Document Type daily treatment/progress note   Mode of Treatment occupational therapy;individual therapy   General Information   Patient Profile Reviewed yes   General Observations of Patient Pt received supine in bed, agreeable to session.   Bed Mobility   Comment (Bed Mobility) OT: supine to sitting EOB with CL S   Transfers   Transfers low pivot transfer   Comment mobility belt donned for all transfers.   Sit to Stand Transfer   [place], Sit to Stand Transfer touching/steadying assist   Verbal Cues safety;technique   Assistive Device walker, front-wheeled   Comment from EOM to stance with RW, stedaying A for balance   Stand to Sit Transfer   [place], Stand to Sit Transfer touching/steadying assist   Verbal Cues safety;technique   Assistive Device walker, front-wheeled   Comment to EOM from stance with RW, steadying A for balance.   Low Pivot Transfer   [place], Low Pivot Transfer supervision   Verbal Cues safety;technique   Assistive Device wheelchair   Comment LPT EOB to w/c, supervision for safety.   Balance   Comment, Balance OT: 1. Sitting EOM with distant S for UE exercises. 2. Engaged in block practice STS transfers from EOM to RW x8 with touching A. 3. In stance with RW, pt sorted cards with steadying A. Pt tolerated standing for 2 minutes and 40 seconds.   Therapeutic Exercise   Therapeutic Exercise upper extremity   Upper Extremity " (Therapeutic Exercise)   Exercise Position/Type seated   General Exercise bilateral   Range of Motion Exercises shoulder flexion/extension;elbow flexion/extension  (chest press, overhead press.)   Weight/Resistance 5 pounds   Reps and Sets 2 x 15   Comment Pt completed seated on EOM. Rest break between sets.   Basic Activities of Daily Living (BADLs)   Basic Activities of Daily Living lower body dressing;grooming   Lower Body Dressing   Tasks shoes/slippers   Self-Performance dons/doffs left sock;dons/doffs left shoe   [place] Assistance obtains clothes   Position sitting up in bed   [place], Footwear supervision   Comment Pt able to don L sock and shoe with supervision.   Grooming   Self-Performance washes, rinses and dries face;washes, rinses and dries hands;oral care (brushing teeth, cleaning dentures)   [place] touching/steadying assist   Position supported standing   Setup Assistance obtain supplies   Adaptive Equipment none   [place], Oral Hygiene touching/steadying assist   Comment Pt completed grooming tasks in stance with RW, steadying A for balance.   Daily Progress Summary (OT)   Daily Outcome Statement Pt tolerated session well, session focused on balance, transfers, and strengthening. Pt with good balance during standing tasks. Pt supervision for LPT and steadying A for STS with RW. Cont with OT POC.                 IRF OT Goals    Flowsheet Row Most Recent Value   Transfer Goal 1    Activity/Assistive Device toilet at 12/07/2022 0720   [place] supervision required at 12/07/2022 0720   Time Frame short-term goal (STG), 1 week at 12/13/2022 0650   Progress/Outcome goal ongoing at 12/13/2022 0650   Transfer Goal 2    Activity/Assistive Device toilet at 12/07/2022 0720   [place] modified independence at 12/07/2022 0720   Time Frame long-term goal (LTG), 14 days or less at 12/07/2022 0720   Progress/Outcome goal ongoing at 12/13/2022 0650   Transfer Goal 3    Activity/Assistive  Device shower at 12/07/2022 0720   [place] other (see comments)  [Cl S] at 12/13/2022 0650   Time Frame short-term goal (STG), 1 week at 12/13/2022 0650   Progress/Outcome goal met, goal revised this date at 12/13/2022 0650   Transfer Goal 4    Activity/Assistive Device shower at 12/07/2022 0720   [place] modified independence at 12/07/2022 0720   Time Frame long-term goal (LTG) at 12/07/2022 0720   Progress/Outcome goal ongoing at 12/13/2022 0650   Bathing Goal 1    [place] supervision required at 12/13/2022 0650   Time Frame short-term goal (STG), 1 week at 12/13/2022 0650   Progress/Outcome goal met, goal revised this date at 12/13/2022 0650   Bathing Goal 2    [place] modified independence at 12/07/2022 0720   Time Frame long-term goal (LTG), 14 days or less at 12/07/2022 0720   Progress/Outcome goal ongoing at 12/13/2022 0650   UB Dressing Goal 1    [place] modified independence at 12/13/2022 0650   Time Frame short-term goal (STG), 1 week at 12/13/2022 0650   Progress/Outcome goal met, goal revised this date at 12/13/2022 0650   UB Dressing Goal 2    [place] modified independence at 12/07/2022 0720   Time Frame long-term goal (LTG), 14 days or less at 12/07/2022 0720   Progress/Outcome goal ongoing at 12/13/2022 0650   LB Dressing Goal 1    [place] other (see comments)  [Cl S] at 12/13/2022 0650   Time Frame short-term goal (STG), 1 week at 12/13/2022 0650   Progress/Outcome goal met, goal revised this date at 12/13/2022 0650   LB Dressing Goal 2    [place] modified independence at 12/07/2022 0720   Time Frame long-term goal (LTG), 14 days or less at 12/07/2022 0720   Progress/Outcome goal ongoing at 12/13/2022 0650   Grooming Goal 1    [place] set-up required at 12/07/2022 0720   Time Frame short-term goal (STG), 1 week at 12/13/2022 0650   Progress/Outcome goal ongoing at 12/13/2022 0650   Grooming Goal 2    [place] modified independence at 12/07/2022 0720    Time Frame long-term goal (LTG), 14 days or less at 12/07/2022 0720   Progress/Outcome goal ongoing at 12/13/2022 0650   Toileting Goal 1    [place] supervision required at 12/07/2022 0720   Time Frame short-term goal (STG), 1 week at 12/13/2022 0650   Progress/Outcome goal ongoing at 12/13/2022 0650   Toileting Goal 2    [place] modified independence at 12/07/2022 0720   Time Frame long-term goal (LTG), 14 days or less at 12/07/2022 0720   Progress/Outcome goal ongoing at 12/13/2022 0650

## 2022-12-13 NOTE — PROGRESS NOTES
Patient: Harry Alavrado  Location: [facility] [address]  MRN: [id number]  Today's date: 12/13/2022    History of Present Illness  Harry is a 67 y.o. male admitted on 12/6/2022 with Status post below knee amputation of right lower extremity (CMS/formerly [facility]) [Z89.511]. 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 The pt was admitted to Vascular Surgery service for a planned R BKA due to bypass thrombosis and ischemia of the R foot.   He is s/p R BKA on 12/2/22.  His hgb is stable and he has been restarted on his home Eliquis.   Tolerating a regular diet.  PM&R was consulted on 12/3 and recommends acute inpt rehab for intensive therapy as he can tolerate >3 hrs of combined PT/OT per day and at least 5 days/week with a reasonable expectation that the patient will make measurable functional improvement that only an acute rehab can provide.         Past Medical History  Harry has a past medical history of Coronary artery disease, Deep vein thrombosis (CMS/formerly [facility]), Lupus anticoagulant disorder (CMS/formerly [facility]), Lyme disease, MTHFR gene mutation, Myocardial infarction (CMS/formerly [facility]), and PAD (peripheral artery disease) (CMS/formerly [facility]).      OT Vitals    Date/Time Pulse HR Source BP BP Location BP Method Pt Position [facility]   12/13/22 1002 80 Monitor 113/80 Right upper arm Automatic Sitting BRANDT      OT Pain    Date/Time Pain Type Side/Orientation Location Rating: Rest Interventions [facility]   12/13/22 1002 Pain Assessment right residual limb 6 premedicated for activity BRANDT   12/13/22 1029 Pain Reassessment right residual limb 6 premedicated for activity BRANDT          Prior Living Environment    Flowsheet Row Most Recent Value   People in Home child(no), adult, spouse   Current Living Arrangements home   Home Accessibility stairs to enter home (Group), stairs within home (Group)   Living Environment Comment Pt was living in [place] with his wife. However, after discharge, his wife and him plan to  "move into son's house in [place] with first floor setup and 1 YOSEF. After prosthetic training, pt plans to move back to [place].   Number of Stairs, Main Entrance 1   Location, Patient Bedroom first (main) floor   Location, Bathroom first (main) floor   Bathroom Access Comment per PT confirmed with family stall shower with 6 inch step to enter and seat within shower however pt does have a shower door that swings open, recommending temporary curtain and extended bench          Prior Level of Function    Flowsheet Row Most Recent Value   Dominant Hand right   Ambulation assistive equipment   Transferring assistive equipment   Toileting independent   Bathing assistive person   Dressing independent   Eating independent   Prior Level of Function Comment Pt was previously I with all ADLs, only requiring assist with bathing from his wife as function decreased prior to hospital admission. Pt was ambulating with a SPC for recent 2 months. + , retired. Pt enjoys skiing and playing pickleball with his wife.   Assistive Device Currently Used at Home cane, straight  [reports brief use of RW in past]          Occupational Profile    Flowsheet Row Most Recent Value   Occupational History/Life Experiences +. 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, also enjoys hiking,   Environmental Supports and Barriers Pt is interested in learning about return to driving, plans to purchase a new vehicle.  Recommend arranging Chirag Eaton from [employer] to speak with pt.  Pt lives in [place] and plans on staying in [place] with son until he returns for prosthetic training and is able to return to [place].  Initially wanted to drive home.  Discussed potentially purchasing and adapting a vehicle during his stay in [place].  Recommend arranging for pt to view adapted vehicle.   Patient Goals \"Be able to get around better, take care " "of myself and play with my grandkids.\" And also \"Get a prosthesis and walk around like I used too.\"  [PSFS completed 12/8, pt reported: Walking/hiking 0/10, Pickleball 0/10, Bathing 5/10, Playing with grandkids 5/10 score 10/40 0.25.]           IRF OT Evaluation and Treatment - 12/13/22 1001        OT Time Calculation    Start Time 1000     Stop Time 1030     Time Calculation (min) 30 min        Session Details    Document Type daily treatment/progress note     Mode of Treatment occupational therapy;individual therapy        General Information    General Observations of Patient Pt received in room, pleasant and agreeable to OT session        Transfers    Comment gait belt donned for transfers        Sit to Stand Transfer    [place], Sit to Stand Transfer touching/steadying assist     Verbal Cues safety;technique     Assistive Device railing     Comment from WC to stance at hand rail, multiple times completed        Stand to Sit Transfer    [place], Stand to Sit Transfer touching/steadying assist     Verbal Cues safety;technique     Assistive Device railing     Comment from stance at hand rail to WC        Toilet Transfer    Transfer Technique low pivot     [place], Toilet Transfer supervision;close supervision     Verbal Cues safety;technique     Assistive Device commode, 3-in-1;grab bars/safety frame;wheelchair     Comment OT discussing WC level for D/C for increased safety, OT demo pt's Modified SPT to/from WC to/from commode over toilet c grab bars including pt setting up of WC, pt initally using an anterior approach requiring increased pivoting on L LE and CL S. OT demo safe technique c changing hand positioning and WC arm rest for increase safety, pt demo safe technique and receptive to utilize technique for all follow up toilet transfers, supervision recommended to ensure safe technique utilized.        Balance    Comment, Balance OT: pt completing varying levels of difficulty for sit to stand " from WC using hand rail anterior, initally c B UE support on hand rail, then progressing to 2 finger support, and then to no UE support and steady A from OT. Pt completing mini squats 2x5 c B UE placed on B thighs for standing and then OT emphasizing controlled descent without UE support for additional challenge. Pt then engaged in R hip AROM therex including hip extension, flexion, and abduction with B UE on hand rail anterior and touch A for safety and balance. 2x10 each completed.        Therapeutic Interventions    Comment, Therapeutic Intervention COntinued discussion in prep for upcoming D/C home to son's house. OT explaining W/C level for all tasks as able for increase safety and reviewed ADL techniques including item gathering, transfers, and ADLs at WC level vs ambulatory hopping technique at this time. Pt agreeable and stating understanding. OT discussing possible use of hopping for small bathroom access only if WC unable to fit at son's home and placement of WC at doorway for increased safety. Pt c questions regarding adaptations to car and driving process, OT to follow up c driving specialist to discuss process c pt. Pt receptive to staying in [place] for completion of prosthetic training at [facility] and OT discussing process.        Daily Progress Summary (OT)    Daily Outcome Statement OT session c focus on review of expected WC level for D/C, D/C plan, toilet transfers, balance and strength interventions. Pt would benefit from all interventions to progress safety and independence to achieve mod I WC level including item gathering c reacher, WC obstacle course, modified SPTs from WC level, and continued education on pre-prosthetic training process. Continue OT per POC. See additional recommendations below.     Recommendations (OT) trial toilet transfer from WC to toilet c rasied toilet seat to assess if appropriate for pt to purchase for home use.                      Education Documentation  Safe ADL  Techniques, taught by Alayna Lara, OT at 12/13/2022 11:37 AM.  Learner: Patient  Readiness: Acceptance  Method: Explanation  Response: Verbalizes Understanding  Comment: OT reviewed safety related to D/C plan including safest method for ADLs at WC level including transfers at this time while residual limb healing    Fall Prevention, taught by Alayna Lara, OT at 12/13/2022 11:37 AM.  Learner: Patient  Readiness: Acceptance  Method: Explanation  Response: Verbalizes Understanding  Comment: OT reviewed safety related to D/C plan including safest method for ADLs at WC level including transfers at this time while residual limb healing          IRF OT Goals    Flowsheet Row Most Recent Value   Transfer Goal 1    Activity/Assistive Device toilet at 12/07/2022 0720   [place] supervision required at 12/07/2022 0720   Time Frame short-term goal (STG), 1 week at 12/13/2022 0650   Progress/Outcome goal ongoing at 12/13/2022 0650   Transfer Goal 2    Activity/Assistive Device toilet at 12/07/2022 0720   [place] modified independence at 12/07/2022 0720   Time Frame long-term goal (LTG), 14 days or less at 12/07/2022 0720   Progress/Outcome goal ongoing at 12/13/2022 0650   Transfer Goal 3    Activity/Assistive Device shower at 12/07/2022 0720   [place] other (see comments)  [Cl S] at 12/13/2022 0650   Time Frame short-term goal (STG), 1 week at 12/13/2022 0650   Progress/Outcome goal met, goal revised this date at 12/13/2022 0650   Transfer Goal 4    Activity/Assistive Device shower at 12/07/2022 0720   [place] modified independence at 12/07/2022 0720   Time Frame long-term goal (LTG) at 12/07/2022 0720   Progress/Outcome goal ongoing at 12/13/2022 0650   Bathing Goal 1    [place] supervision required at 12/13/2022 0650   Time Frame short-term goal (STG), 1 week at 12/13/2022 0650   Progress/Outcome goal met, goal revised this date at 12/13/2022 0650   Bathing Goal 2     [place] modified independence at 12/07/2022 0720   Time Frame long-term goal (LTG), 14 days or less at 12/07/2022 0720   Progress/Outcome goal ongoing at 12/13/2022 0650   UB Dressing Goal 1    [place] modified independence at 12/13/2022 0650   Time Frame short-term goal (STG), 1 week at 12/13/2022 0650   Progress/Outcome goal met, goal revised this date at 12/13/2022 0650   UB Dressing Goal 2    [place] modified independence at 12/07/2022 0720   Time Frame long-term goal (LTG), 14 days or less at 12/07/2022 0720   Progress/Outcome goal ongoing at 12/13/2022 0650   LB Dressing Goal 1    [place] other (see comments)  [Cl S] at 12/13/2022 0650   Time Frame short-term goal (STG), 1 week at 12/13/2022 0650   Progress/Outcome goal met, goal revised this date at 12/13/2022 0650   LB Dressing Goal 2    [place] modified independence at 12/07/2022 0720   Time Frame long-term goal (LTG), 14 days or less at 12/07/2022 0720   Progress/Outcome goal ongoing at 12/13/2022 0650   Grooming Goal 1    [place] set-up required at 12/07/2022 0720   Time Frame short-term goal (STG), 1 week at 12/13/2022 0650   Progress/Outcome goal ongoing at 12/13/2022 0650   Grooming Goal 2    [place] modified independence at 12/07/2022 0720   Time Frame long-term goal (LTG), 14 days or less at 12/07/2022 0720   Progress/Outcome goal ongoing at 12/13/2022 0650   Toileting Goal 1    [place] supervision required at 12/07/2022 0720   Time Frame short-term goal (STG), 1 week at 12/13/2022 0650   Progress/Outcome goal ongoing at 12/13/2022 0650   Toileting Goal 2    [place] modified independence at 12/07/2022 0720   Time Frame long-term goal (LTG), 14 days or less at 12/07/2022 0720   Progress/Outcome goal ongoing at 12/13/2022 0650

## 2022-12-13 NOTE — PROGRESS NOTES
Patient: Harry Alvarado  Location: [facility] [address]  MRN: [id number]  Today's date: 12/13/2022    History of Present Illness  Harry is a 67 y.o. male admitted on 12/6/2022 with Status post below knee amputation of right lower extremity (CMS/[facility]) [Z89.511]. Principal problem is   Status post below knee amputation of right lower extremity (CMS/[facility]). The pt was admitted to Vascular Surgery service for a planned R BKA due to bypass thrombosis and ischemia of the R foot.   He is s/p R BKA on 12/2/22.  His hgb is stable and he has been restarted on his home Eliquis.   Tolerating a regular diet.  PM&R was consulted on 12/3 and recommends acute inpt rehab for intensive therapy as he can tolerate >3 hrs of combined PT/OT per day and at least 5 days/week with a reasonable expectation that the patient will make measurable functional improvement that only an acute rehab can provide.         Past Medical History  Harry has a past medical history of Coronary artery disease, Deep vein thrombosis (CMS/[facility]), Lupus anticoagulant disorder (CMS/[facility]), Lyme disease, MTHFR gene mutation, Myocardial infarction (CMS/[facility]), and PAD (peripheral artery disease) (CMS/[facility]).      PT Vitals    Date/Time Pulse HR Source BP BP Location BP Method Pt Position [facility]   12/13/22 1103 77 Monitor 133/83 Right upper arm Automatic Sitting YOKO      PT Pain    Date/Time Pain Type Side/Orientation Location Rating: Rest Rating: Activity Interventions [facility]   12/13/22 1103 Pain Assessment right residual limb 6 -- premedicated for activity;position adjusted YOKO   12/13/22 1129 Pain Assessment right residual limb 6 6 premedicated for activity;position adjusted YOKO          Prior Living Environment    Flowsheet Row Most Recent Value   People in Home child(no), adult, spouse   Current Living Arrangements home   Home Accessibility stairs to enter home (Group), stairs within home (Group)   Living Environment Comment Pt was living in [place] with [place] wife.  However, after discharge, his wife and him plan to move into son's house in [place] with first floor setup and 1 YOSEF. After prosthetic training, pt plans to move back to [place].   Number of Stairs, Main Entrance 1   Location, Patient Bedroom first (main) floor   Location, Bathroom first (main) floor   Bathroom Access Comment per PT confirmed with family stall shower with 6 inch step to enter and seat within shower however pt does have a shower door that swings open, recommending temporary curtain and extended bench          Prior Level of Function    Flowsheet Row Most Recent Value   Dominant Hand right   Ambulation assistive equipment   Transferring assistive equipment   Toileting independent   Bathing assistive person   Dressing independent   Eating independent   Prior Level of Function Comment Pt was previously I with all ADLs, only requiring assist with bathing from his wife as function decreased prior to hospital admission. Pt was ambulating with a SPC for recent 2 months. + , retired. Pt enjoys skiing and playing pickleball with his wife.   Assistive Device Currently Used at Home cane, straight  [reports brief use of RW in past]           IRF PT Evaluation and Treatment - 12/13/22 1103        PT Time Calculation    Start Time 1100     Stop Time 1130     Time Calculation (min) 30 min        Session Details    Document Type daily treatment/progress note     Mode of Treatment physical therapy;individual therapy        General Information    Patient Profile Reviewed yes     General Observations of Patient Pt received seated upright in w/c in therapy gym; pleasant and noting pain to R residual limb prior to session        Weight-bearing Status    Right LE Weight-Bearing Status non weight-bearing (NWB)        Bed Mobility    [place], Roll Left modified independence     [place], Roll Right modified independence     [place], Supine to Sit supervision     [place], Sit to Supine supervision      Comment (Bed Mobility) Performed on mat table in therapy gym        Transfers    Transfers stand pivot transfer     Comment Mobility belt donned t/o duration of session        Stand Pivot Transfer    [place], Stand Pivot/Stand Step Transfer touching/steadying assist     Verbal Cues safety;technique;preparatory posture     Assistive Device mobility belt     Comment Modified SPT between w/c and EOM; steadying assist for balance + VCs for set up prior to transfer        Lower Extremity (Therapeutic Exercise)    Comment Supine: 1) LAQ 2) SLR; L sidelyin) R Hip ABDuction; Prone: 1) R hip extension; 2) Prone lying with RLE extended for hip flexor and hamstring stretch; 3) LLE hamstring curls with TB        Daily Progress Summary (PT)    Daily Outcome Statement PT session focused on supine/sidelying ther-ex and and prone stretching all performed on the mat table. Pt reporting no increase in residual limb pain with mobility but reports prone lying is a comfortable position for both residual limb and low back. Pt would continue to benefit from exercises targeting LE strengtheing, stretching, and functional transfers.                           IRF PT Goals    Flowsheet Row Most Recent Value   Bed Mobility Goal 1    Activity/Assistive Device bed mobility activities, all at 2022 1034   [place] supervision required at 2022 1034   Time Frame short-term goal (STG), 1 week at 2022 1034   Progress/Outcome goal met at 2022 1630   Bed Mobility Goal 2    Activity/Assistive Device bed mobility activities, all at 2022 1034   [place] modified independence at 2022 1034   Time Frame long-term goal (LTG), 14 days or less at 2022 1630   Progress/Outcome goal ongoing at 2022 1630   Transfer Goal 1    Activity/Assistive Device sit-to-stand/stand-to-sit, low pivot, stand pivot at 2022 1034   [place] supervision required at 2022 1034   Time Frame short-term  goal (STG), 2-3 days at 12/12/2022 1630   Progress/Outcome goal ongoing at 12/12/2022 1630   Transfer Goal 2    Activity/Assistive Device sit-to-stand/stand-to-sit, low pivot, stand pivot at 12/07/2022 1034   [place] modified independence at 12/07/2022 1034   Time Frame long-term goal (LTG), 14 days or less at 12/12/2022 1630   Progress/Outcome goal ongoing at 12/12/2022 1630   Gait/Walking Locomotion Goal 1    Activity/Assistive Device gait (walking locomotion) at 12/12/2022 1630   Distance 20 feet at 12/12/2022 1630   [place] other (see comments)  [steadying assist] at 12/12/2022 1630   Time Frame short-term goal (STG), 2-3 days at 12/12/2022 1630   Progress/Outcome goal met at 12/12/2022 1630   Gait/Walking Locomotion Goal 2    Activity/Assistive Device gait (walking locomotion) at 12/12/2022 1630   Distance 20 feet at 12/12/2022 1630   [place] supervision required at 12/12/2022 1630   Time Frame long-term goal (LTG), 14 days or less at 12/12/2022 1630   Progress/Outcome goal ongoing at 12/12/2022 1630   Wheelchair Locomotion Goal 1    Activity wheelchair mobility skills, all at 12/07/2022 1034   Assistive Device manual, lightweight at 12/07/2022 1034   Distance 250 feet at 12/07/2022 1034   [place] modified independence at 12/07/2022 1034   Time Frame short-term goal (STG), 1 week at 12/07/2022 1034   Progress/Outcome goal met at 12/12/2022 1630   Wheelchair Locomotion Goal 2    Activity wheelchair mobility skills, all at 12/07/2022 1034   Assistive Device manual, lightweight at 12/07/2022 1034   Distance 500 feet at 12/07/2022 1034   [place] modified independence at 12/07/2022 1034   Time Frame long-term goal (LTG), 2 weeks at 12/07/2022 1034   Progress/Outcome goal met at 12/12/2022 1630

## 2022-12-13 NOTE — PROGRESS NOTES
"Daily Progress Note     Patient was seen and examined.   Attestation Notes: Face to face encounter completed    Subjective    Patient with some bruising of his tibial tuberosity on the right side.  Pain with fair control.  Tolerating therapies touch assist to min assist with transfers and hopping 20 feet with rolling walker.  Mod I with wheelchair propulsion.  Objective     Visit Vitals  /72 (BP Location: Left upper arm, Patient Position: Lying)   Pulse 71   Temp 36.5 °C (97.7 °F) (Oral)   Resp 18   Ht 1.854 m (6' 1\")   Wt 70.1 kg (154 lb 9.6 oz)   SpO2 100%   BMI 20.40 kg/m²     Review of Systems:  Pertinent items are noted in HPI.  Denies chest pain and shortness of breath.  Review of systems otherwise negative.    Labs     Results from last 7 days   Lab Units 12/12/22  0749   WBC K/uL 12.83*   HEMOGLOBIN g/dL 12.1*   HEMATOCRIT % 38.3*   PLATELETS K/uL 534*     Results from last 7 days   Lab Units 12/12/22  0702   SODIUM mEQ/L 135*   POTASSIUM mEQ/L 5.0   CHLORIDE mEQ/L 99   CO2 mEQ/L 27   BUN mg/dL 18   CREATININE mg/dL 0.7*   CALCIUM mg/dL 9.8   ALBUMIN g/dL 2.9*   BILIRUBIN TOTAL mg/dL 0.2*   ALK PHOS IU/L 140*   ALT IU/L 42   AST IU/L 29   GLUCOSE mg/dL 85     Full Code    Physical Exam  General      Alert, cooperative, no distress, appears stated age.   Head:    Normocephalic, without obvious abnormality, atraumatic.   Eyes:    PERRL, conjunctiva/corneas clear, EOM's intact.        Nose:   Nares normal, septum midline, mucosa normal, no drainage or            sinus tenderness.   Throat:   Lips, mucosa, and tongue normal.    Neck:   Supple, symmetrical, trachea midline.    Back:     Symmetric, no curvature.   Lungs:     Clear to auscultation bilaterally, respirations unlabored.   Chest wall:    No tenderness or deformity.   Heart:    Regular rate and rhythm, S1 and S2 normal.   Abdomen:     Soft, non-tender, bowel sounds active all four quadrants,     no masses, no organomegaly. "   Extremities:  Musculoskeletal: Stable stump edema.  Right transtibial amputation.   Pulses:   1+ and symmetric all extremities.   Skin: Pressure injury over tibial tuberosity skin intact.  Incision line intact with sutures.  Minimal to no significant drainage.   Neurologic:          Behavior/  Emotional:  CNII-XII intact.  Alert and oriented ×3.  Motor exam intact.  Sensory exam intact.  Reflexes stable decreased reflexes.      Appropriate, cooperative           Plan of care was discussed with patient     Assessment & Plan  * Status post below knee amputation of right lower extremity (CMS/[facility])  Assessment & Plan  Harry Alvarado is a 67 y.o. male who presents with the following chronic medical problems including MTHFR gene mutation, coronary artery disease with prior myocardial infarction 1993, peripheral artery disease with multiple bypass procedures in the past most recent with tPA bolus and angioplasty in 2020.  The patient admitted to [facility] on November 30, 2022 to the vascular surgery service for planned BKA due to bypass thrombosis.  At the time of admission the patient had cyanosis and was beginning to develop thanh gangrene of the foot.  Patient had reported over the last 1 to 2 months worsening pain of the right foot and that he knew the graft was occluded.  Patient has a history of 15+ surgeries of the extremities and understood that his revascularization options were limited.  He had severe 10 out of 10 pain of the foot relieved with elevation of the leg.  He was still able to ambulate but limited by pain.  Under the care of Bowen Serrato MD of vascular surgery he underwent right transtibial amputation on December 2, 2022.  Postoperative course unremarkable with advancement of diet and pain control.  ESTUARDO drain removed on December 4.  For postoperative pain control he was treated with morphine.  On discharge prescribed Tylenol and low-dose Lyrica.  Lyrica started prior by his PCP for  neuropathic pain.  He was restarted on his home Eliquis.  He required min assist for bed mobility, transfers and ambulation.  Vascular surgery wants follow-up in 1 month.  Patient is now appropriate for acute inpatient rehabilitation.    Comprehensive inpatient rehabilitation program status post right transtibial amputation.  Goal is for the patient to reach a modified independent level with bed mobility, transfers and ambulation.  Ambulation with hopping short distances using a rolling walker.  Also to become modified independent with elevations.  Preprosthetic training.  Ongoing pain control.    For pain control continue low-dose Lyrica and consider increasing dose if the patient has ongoing phantom pain.  Continue around-the-clock Tylenol.  Continue tramadol.  If needed adjust dose.    Monitor incision closely and continue incisional and stump care.    Patient with ongoing end of day/night right stump pain.  Interrupted sleep last night.  Increase Lyrica late afternoon dose.  Continue higher dose at night.  Continue tramadol higher dose.  Tylenol around-the-clock.  Adjust medications as needed.    Progressing in thPatient with some bruising of his tibial tuberosity on the right side.  Pain with fair control.  Tolerating therapies touch assist to min assist with transfers and hopping 20 feet with rolling walker.  Mod I with wheelchair propulsion.erapies touch assist to min assist with transfers.  Hopping with rolling walker.    Anemia  Assessment & Plan  Postoperative anemia.  Monitor hemoglobin levels.  Hemoglobin 12.2.  Mild iron deficiency and consider iron replacement.    Lupus anticoagulant disorder (CMS/HCC)  Assessment & Plan  Continue Eliquis.    Deep vein thrombosis (CMS/HCC)  Assessment & Plan  Continue Eliquis.    Coronary artery disease  Assessment & Plan  Cardiac precautions.  Continue Eliquis.        Expected Discharge Date:

## 2022-12-13 NOTE — SUBJECTIVE & OBJECTIVE
" Patient was seen and examined.   Attestation Notes: Face to face encounter completed    Subjective    Patient with some bruising of his tibial tuberosity on the right side.  Pain with fair control.  Tolerating therapies touch assist to min assist with transfers and hopping 20 feet with rolling walker.  Mod I with wheelchair propulsion.  Objective     Visit Vitals  /72 (BP Location: Left upper arm, Patient Position: Lying)   Pulse 71   Temp 36.5 °C (97.7 °F) (Oral)   Resp 18   Ht 1.854 m (6' 1\")   Wt 70.1 kg (154 lb 9.6 oz)   SpO2 100%   BMI 20.40 kg/m²     Review of Systems:  Pertinent items are noted in HPI.  Denies chest pain and shortness of breath.  Review of systems otherwise negative.    Labs     Results from last 7 days   Lab Units 12/12/22  0749   WBC K/uL 12.83*   HEMOGLOBIN g/dL 12.1*   HEMATOCRIT % 38.3*   PLATELETS K/uL 534*     Results from last 7 days   Lab Units 12/12/22  0702   SODIUM mEQ/L 135*   POTASSIUM mEQ/L 5.0   CHLORIDE mEQ/L 99   CO2 mEQ/L 27   BUN mg/dL 18   CREATININE mg/dL 0.7*   CALCIUM mg/dL 9.8   ALBUMIN g/dL 2.9*   BILIRUBIN TOTAL mg/dL 0.2*   ALK PHOS IU/L 140*   ALT IU/L 42   AST IU/L 29   GLUCOSE mg/dL 85     Full Code    Physical Exam  General      Alert, cooperative, no distress, appears stated age.   Head:    Normocephalic, without obvious abnormality, atraumatic.   Eyes:    PERRL, conjunctiva/corneas clear, EOM's intact.        Nose:   Nares normal, septum midline, mucosa normal, no drainage or            sinus tenderness.   Throat:   Lips, mucosa, and tongue normal.    Neck:   Supple, symmetrical, trachea midline.    Back:     Symmetric, no curvature.   Lungs:     Clear to auscultation bilaterally, respirations unlabored.   Chest wall:    No tenderness or deformity.   Heart:    Regular rate and rhythm, S1 and S2 normal.   Abdomen:     Soft, non-tender, bowel sounds active all four quadrants,     no masses, no organomegaly.   Extremities:  Musculoskeletal: Stable stump " edema.  Right transtibial amputation.   Pulses:   1+ and symmetric all extremities.   Skin: Pressure injury over tibial tuberosity skin intact.  Incision line intact with sutures.  Minimal to no significant drainage.   Neurologic:          Behavior/  Emotional:  CNII-XII intact.  Alert and oriented ×3.  Motor exam intact.  Sensory exam intact.  Reflexes stable decreased reflexes.      Appropriate, cooperative           Plan of care was discussed with patient

## 2022-12-13 NOTE — ASSESSMENT & PLAN NOTE
Harry Alvarado is a 67 y.o. male who presents with the following chronic medical problems including MTHFR gene mutation, coronary artery disease with prior myocardial infarction 1993, peripheral artery disease with multiple bypass procedures in the past most recent with tPA bolus and angioplasty in 2020.  The patient admitted to [facility] on November 30, 2022 to the vascular surgery service for planned BKA due to bypass thrombosis.  At the time of admission the patient had cyanosis and was beginning to develop thanh gangrene of the foot.  Patient had reported over the last 1 to 2 months worsening pain of the right foot and that he knew the graft was occluded.  Patient has a history of 15+ surgeries of the extremities and understood that his revascularization options were limited.  He had severe 10 out of 10 pain of the foot relieved with elevation of the leg.  He was still able to ambulate but limited by pain.  Under the care of Bowen Serrato MD of vascular surgery he underwent right transtibial amputation on December 2, 2022.  Postoperative course unremarkable with advancement of diet and pain control.  ESTUARDO drain removed on December 4.  For postoperative pain control he was treated with morphine.  On discharge prescribed Tylenol and low-dose Lyrica.  Lyrica started prior by his PCP for neuropathic pain.  He was restarted on his home Eliquis.  He required min assist for bed mobility, transfers and ambulation.  Vascular surgery wants follow-up in 1 month.  Patient is now appropriate for acute inpatient rehabilitation.    Comprehensive inpatient rehabilitation program status post right transtibial amputation.  Goal is for the patient to reach a modified independent level with bed mobility, transfers and ambulation.  Ambulation with hopping short distances using a rolling walker.  Also to become modified independent with elevations.  Preprosthetic training.  Ongoing pain control.    For pain control continue  low-dose Lyrica and consider increasing dose if the patient has ongoing phantom pain.  Continue around-the-clock Tylenol.  Continue tramadol.  If needed adjust dose.    Monitor incision closely and continue incisional and stump care.    Patient with ongoing end of day/night right stump pain.  Interrupted sleep last night.  Increase Lyrica late afternoon dose.  Continue higher dose at night.  Continue tramadol higher dose.  Tylenol around-the-clock.  Adjust medications as needed.    Progressing in thPatient with some bruising of his tibial tuberosity on the right side.  Pain with fair control.  Tolerating therapies touch assist to min assist with transfers and hopping 20 feet with rolling walker.  Mod I with wheelchair propulsion.erapies touch assist to min assist with transfers.  Hopping with rolling walker.

## 2022-12-13 NOTE — PLAN OF CARE
Spoke with Dave on this date.  Reviewed team update.  Discussed DC for 12/18.  Reviewed FTR for 12/16 @ 10:30.   Discussed on 12/15 @ 12:30 to come in and meet with Miguelina boogie [facility].  Reviewed PCP line to coordinate a [phone].   Reviewed importance of this to ensure a following physician, continuation of medications, and support care while here in the area.   We reviewed the recommendation os home care for PT/OT/RN to come into the home.  Challenge would be having a PCP to follow for initiation of home care.  We reviewed the vascular surgeon Dr. Song out of [place], which family did not want to pursue.   Dave is also reaching out to their PCP, Dr. Walekr to support following of care.   We will connect about finalizing this component.  We also will work to finalize DME.   Will follow-up as needed.

## 2022-12-13 NOTE — PATIENT CARE CONFERENCE
Inpatient Rehabilitation Team Conference Note  Date: 12/13/2022  Time: 8:55 AM       Patient Name: Harry Alvarado     Medical Record Number: [id number]   YOB: 1955  Sex: Male      Room/Bed: 144/144D  Payor Info: Payor: MEDICARE / Plan: MEDICARE PART A & B / Product Type: Medicare /     Admitting Diagnosis: Status post below knee amputation of right lower extremity (CMS/[facility]) [Z89.511]   Admit Date/Time: 12/6/2022  4:32 PM  Admission Comments: No comment available     Primary Diagnosis: Status post below knee amputation of right lower extremity (CMS/[facility])  Principal Problem: Status post below knee amputation of right lower extremity (CMS/[facility])    Patient Active Problem List    Diagnosis Date Noted   • Anemia 12/07/2022   • Coronary artery disease    • Deep vein thrombosis (CMS/[facility])    • Lupus anticoagulant disorder (CMS/[facility])    • MTHFR gene mutation    • PAD (peripheral artery disease) (CMS/[facility])    • Status post below knee amputation of right lower extremity (CMS/[facility]) 12/05/2022       Premorbid Status:  Dominant Hand: right  Ambulation: assistive equipment  Transferring: assistive equipment  Toileting: independent  Bathing: assistive person  Dressing: independent  Eating: independent  Communication: understands/communicates without difficulty  Swallowing: swallows foods/liquids without difficulty  Baseline Diet/Method of Nutritional Intake: regular; thin liquids  Past History of Dysphagia: Denies  Assistive Device/Animal Currently Used at Home: cane, straight (reports brief use of RW in past)  Prior Level of Function Comment: Pt was previously I with all ADLs, only requiring assist with bathing from his wife as function decreased prior to hospital admission. Pt was ambulating with a SPC for recent 2 months. + , retired. Pt enjoys skiing and playing pickleball with his wife.      Current Functional Status:  Mobility  Gait  [place], Gait: touching/steadying assist  Assistive Device: mobility  belt; walker, front-wheeled  Comment (Gait/Stairs): Unipedal amb LLE with touch assist for safety provided via gait belt    Stairs  [place], Stairs: unable to assess  Comment: unable to assess at time of initial evaluation prior to PT intervention.    Wheelchair  Method of Locomotion: bimanual (upper extremity) propulsion  Functional Mobility Training: forward propulsion; steering; stopping; turning; doorway navigation; sloped surface navigation; weight shifting  [place], Forward Propulsion: modified independence  [place], Backward Propulsion: modified independence  [place], Steering Activities: modified independence  [place], Stopping Activities: modified independence  [place], Turning Activities: modified independence  [place], Doorway Navigation: modified independence  [place], Sloped Surface Navigation: minimum assist (75% or more patient effort)  [place], Weight Shifting: supervision; verbal cues  Distance Propelled in Feet: 500 feet  Activity Tolerance: able to tolerate functional household activity distances (less than 150 feet)  Comment, Wheelchair Mobility: pt propels manual w/c >300 ft c good safety and obstacle navigation including turns and doorway navigation.  Leg Rest Management: supervision  Wheel Locks/Brake Management: supervision  Arm Rest Management: supervision  Management Activities: armrest management; legrest management; wheel locks/brakes management    Transfers  [place], Roll Left: modified independence  [place], Roll Right: modified independence  [place], Supine to Sit: supervision  [place], Sit to Supine: supervision  Assistive Device: none  Comment (Bed Mobility): OT: supine to sitting EOB with CL S  Maintains Weight-Bearing Status (Transfers): able to maintain  Comment: mobility belt donned for all transfers.  [place], Bed to Chair: touching/steadying assist  Verbal Cues: safety; technique  Comment: LPT steadying A for  safety  [place], Chair to Bed: touching/steadying assist; verbal cues  Verbal Cues: hand placement; safety; technique (removing wheel lock extension prior to completing transfer)  Assistive Device: other (see comments) (bed rail)  Comment: w/c to bed toward L side  [place], Sit to Stand Transfer: touching/steadying assist  Verbal Cues: safety; technique  Assistive Device: walker, front-wheeled  Comment: with touch assist for safety provided via gait belt  [place], Stand to Sit Transfer: touching/steadying assist  Verbal Cues: safety; technique  Assistive Device: walker, front-wheeled  Comment: with touch assist for safety provided via gait belt  [place], Low Pivot Transfer: supervision  Verbal Cues: safety; technique  Assistive Device: wheelchair  Comment: LPT EOB to w/c, supervision for safety.  [place], Stand Pivot/Stand Step Transfer: touching/steadying assist  Verbal Cues: proper use of assistive device; safety; technique  Assistive Device: mobility belt; walker, front-wheeled  Comment: via amb appraoch c RW using hopping technique from EOB to WC    Transfer Technique: stand pivot  [place], Toilet Transfer: touching/steadying assist  Verbal Cues: hand placement; safety; technique  Assistive Device: mobility belt; grab bars/safety frame; walker, front-wheeled  Comment: ILU transfers: via amb approach c RW utilizing hopping technique to low toilet c toilet safety frame. Pt stating sink on right side to utilize for support as needed for sitting/standing c RW. Education on toilet safety frame option provided  Transfer Technique: step over, right entry  [place], Shower Transfer: touching/steadying assist  Verbal Cues: hand placement; proper use of assistive device; safety; technique  Assistive Device: grab bars/tub rail; shower chair; walker, front-wheeled  Comment: ILU transfers: dry transfer to small barrier shower stall using RW and B grab bar support to shower chair and hopping  technique. OT demo tub bench placement in barrier free shower for transfer from  based on home description for increased ease but unsure at this time of size of bathroom for  access. To be further assessed. Handout on tub bench and shower chair provided.      Self Care  Self-Performance: threads left arm, shirt; threads right arm, shirt; pulls shirt over head/around back; pulls shirt down/adjusts  [place] Assistance: obtains clothes  Adaptive Equipment: none  Comment: Setup of clothing provided prior to OT arrival, pt donning pullover shirt sitting up in bed  Tasks: shoes/slippers  Self-Performance: dons/doffs left sock; dons/doffs left shoe  [place] Assistance: obtains clothes  Adaptive Equipment: none  [place]: supervision  Comment: Pt able to don L sock and shoe with supervision.  Self-Performance: chest; left arm; right arm; abdomen; front perineal area; buttocks; left upper leg; right upper leg; left lower leg, including foot; other (see comments) (R residual limb)  Adaptive Equipment: hand-held shower spray hose; grab bar/tub rail; shower chair; other (see comments) (with back)  Setup Assistance: adjust water temperature/flow; obtain supplies  Comment: Decline full wet shower this session, nurse present for redressing and application of bandage at start of session post thorough skin inspection using LH mirror. Cl S showering per last performance.  [place]: touching/steadying assist  Adaptive Equipment: commode, drop-arm  Setup Assistance: adaptive equipment setup  Comment: No toileting needs this session. Steady/touch A per last report.  Self-Performance: washes, rinses and dries face; washes, rinses and dries hands; oral care (brushing teeth, cleaning dentures)  Adaptive Equipment: none  Setup Assistance: obtain supplies  [place]: touching/steadying assist  Comment: pt in stance c RW support at sinkside, touch A for safety while pt completing oral care, Pt utilzing leaning technique for  increased balance on one leg during task    Cognition  Affect/Mental Status (Cognition): WFL  Orientation Status (Cognition): oriented x 4  Follows Commands (Cognition): WNL  Comment, Cognition: BIMS score 15/15. Pt able to provide occupational profile and follow 2-step commands.    Communication       Frequency of Treatment (OT): 60-90 minutes per day, 5-7 times per week  Frequency of Treatment (PT): 5-7 times per week, 60-90 minutes per day    Weekly Outcome Summaries:  Weekly Progress Summary (PT)  Progress Toward Functional Goals (PT): progressing toward functional goals as expected  Weekly Outcome Statement: Harry Alvarado is a 67 yr old male with PMHx of MTHFR gene mutation, CAD s/p MI 1993, PAD s/p R Fem to PT bypass with arm vein in 1993, revision in 2011, s/p R SFA to peroneal bypass graft s/p revision on 7/30/18, s/p tPA bolus and angioplasty in 2020. He was admitted to Vascular Surgery for a planned R BKA on 12/2/22 due to bypass thrombosis and ischemia of the R foot.  Pt was [facility] to [facility] for inpatient skilled therapy 12/6/2022. Current functional status as follows; S sit<>supine, steadying assist sit<>stand/stand-pivot with use of the RW, S via LPT, steadying assist amb x20-ft with use of the RW via unipedal approach, and Mod I wc propulsion in lightweight manual wc over smooth/level surfaces. Elevations unsafe at this time due to balance deficits. Patient requires an average of 27 seconds to complete TUG test on 12/10 which is greater than the norm of 14 seconds, indicating impaired balance and mobility deficit. Pt requires 46 sec for completion of the FTSTS on 12/9 which is > norm of 16 sec indicating fall risk/mobility deficit. Pt and his wife completed amputee education on 12/12. Pt is currently limited by pain, impaired RLE strength, impaired static/dynamic standing balance, and decreased endurance affecting his functional mobility and safety. Pt will benefit from skilled IP PT services to  address his above-mentioned deficits to maximize his functional mobility and safety prior to d/c to home.  Impairments Continuing to Limit Function: impaired balance, impaired postural control, pain, decreased strength, transtibial lower extremity amputation  Recommendations: Pt will benefit from skilled IP PT services 60-90min/day 5-7days/week.  Weekly Progress Summary (OT)  Progress Toward Functional Goals (OT): progressing toward functional goals as expected  Weekly Outcome Statement: Pt demo functional progress since IE on 12/7/2022 as follows: Cl S for bathing, Setup for UB dressing, Supervision for LB dressing bed level and touch A for LB dressing when using standing to pull up over hips method, Touch A for grooming in stance and toileting, Supervision LPT and touch A SPT c RW. Pt highly motivated. DME recommendations pending based on setup of son's house. Pt would countine to benefit from skilled inpatient occupaitonal therapy services.  Recommendations: Continue IP OT services 5-7days/week for 60-90 mins/day    Problem Resolution:  Coping/Stress  Major Change/Loss/Stressor: denies  Patient Personal Strengths: courageous, self-reliant, resourceful, resilient  Sources of Support: adult child(no), spouse  Reaction to Health Status: adjusting  Understanding of Condition and Treatment: adequate understanding of medical condition Home Safety: other (see comments) (initiating discussion for home safety and potential barriers related to doorway width)  Identified Problems & Impairments: (not recorded)  Comment, Identified Problems & Impairments: (not recorded)  Resolved Problems and Impairments: (not recorded)  Comment, Resolved Problems & Impairments: (not recorded)     Team Weekly Outcome Statement: no sign infection.   wakes up off and on with pain.  PT supervision sit to supine.  Steady asssist rolling waker.  Suprvison low pivot wc.  20 ft rolling walker.  Mod I wheel chair propulsion.  amputee education  yesterday.  From [place], identified 2 docs to follow.  Had per mentor visit.   OT - close supervsiion baithing.  Set up for upper body dressing.  Starting to use walker and would be touch assist if standing.   Touch assist toileting.   Steady for stand pivot with rolling walker.   Reviewed DME options for showering and toilet.  Will review home set-up for son's home in  and in [place].  Discussed driving modifications.   Psychology - c/o of phantom limb pain. (12/13/22 4095)        Goals/Support System:  Patient/Family Goals  Patient's Goals For Discharge: return home  Family/Support System  Family/Support System Care: self-care encouraged    IRF PT Goals    Flowsheet Row Most Recent Value   Bed Mobility Goal 1    Activity/Assistive Device bed mobility activities, all at 12/07/2022 1034   [place] supervision required at 12/07/2022 1034   Time Frame short-term goal (STG), 1 week at 12/07/2022 1034   Progress/Outcome goal met at 12/12/2022 1630   Bed Mobility Goal 2    Activity/Assistive Device bed mobility activities, all at 12/07/2022 1034   [place] modified independence at 12/07/2022 1034   Time Frame long-term goal (LTG), 14 days or less at 12/12/2022 1630   Progress/Outcome goal ongoing at 12/12/2022 1630   Transfer Goal 1    Activity/Assistive Device sit-to-stand/stand-to-sit, low pivot, stand pivot at 12/07/2022 1034   [place] supervision required at 12/07/2022 1034   Time Frame short-term goal (STG), 2-3 days at 12/12/2022 1630   Progress/Outcome goal ongoing at 12/12/2022 1630   Transfer Goal 2    Activity/Assistive Device sit-to-stand/stand-to-sit, low pivot, stand pivot at 12/07/2022 1034   [place] modified independence at 12/07/2022 1034   Time Frame long-term goal (LTG), 14 days or less at 12/12/2022 1630   Progress/Outcome goal ongoing at 12/12/2022 1630   Gait/Walking Locomotion Goal 1    Activity/Assistive Device gait (walking locomotion) at 12/12/2022 1630   Distance 20 feet at 12/12/2022  1630   [place] other (see comments) (Comment) [steadying assist] at 12/12/2022 1630   Time Frame short-term goal (STG), 2-3 days at 12/12/2022 1630   Progress/Outcome goal met at 12/12/2022 1630   Gait/Walking Locomotion Goal 2    Activity/Assistive Device gait (walking locomotion) at 12/12/2022 1630   Distance 20 feet at 12/12/2022 1630   [place] supervision required at 12/12/2022 1630   Time Frame long-term goal (LTG), 14 days or less at 12/12/2022 1630   Progress/Outcome goal ongoing at 12/12/2022 1630   Wheelchair Locomotion Goal 1    Activity wheelchair mobility skills, all at 12/07/2022 1034   Assistive Device manual, lightweight at 12/07/2022 1034   Distance 250 feet at 12/07/2022 1034   [place] modified independence at 12/07/2022 1034   Time Frame short-term goal (STG), 1 week at 12/07/2022 1034   Progress/Outcome goal met at 12/12/2022 1630   Wheelchair Locomotion Goal 2    Activity wheelchair mobility skills, all at 12/07/2022 1034   Assistive Device manual, lightweight at 12/07/2022 1034   Distance 500 feet at 12/07/2022 1034   [place] modified independence at 12/07/2022 1034   Time Frame long-term goal (LTG), 2 weeks at 12/07/2022 1034   Progress/Outcome goal met at 12/12/2022 1630        IRF OT Goals    Flowsheet Row Most Recent Value   Transfer Goal 1    Activity/Assistive Device toilet at 12/07/2022 0720   [place] supervision required at 12/07/2022 0720   Time Frame short-term goal (STG), 1 week at 12/13/2022 0650   Progress/Outcome goal ongoing at 12/13/2022 0650   Transfer Goal 2    Activity/Assistive Device toilet at 12/07/2022 0720   [place] modified independence at 12/07/2022 0720   Time Frame long-term goal (LTG), 14 days or less at 12/07/2022 0720   Progress/Outcome goal ongoing at 12/13/2022 0650   Transfer Goal 3    Activity/Assistive Device shower at 12/07/2022 0720   [place] other (see comments) (Comment) [Cl S] at 12/13/2022 0650   Time Frame short-term  goal (STG), 1 week at 12/13/2022 0650   Progress/Outcome goal met, goal revised this date at 12/13/2022 0650   Transfer Goal 4    Activity/Assistive Device shower at 12/07/2022 0720   [place] modified independence at 12/07/2022 0720   Time Frame long-term goal (LTG) at 12/07/2022 0720   Progress/Outcome goal ongoing at 12/13/2022 0650   Bathing Goal 1    [place] supervision required at 12/13/2022 0650   Time Frame short-term goal (STG), 1 week at 12/13/2022 0650   Progress/Outcome goal met, goal revised this date at 12/13/2022 0650   Bathing Goal 2    [place] modified independence at 12/07/2022 0720   Time Frame long-term goal (LTG), 14 days or less at 12/07/2022 0720   Progress/Outcome goal ongoing at 12/13/2022 0650   UB Dressing Goal 1    [place] modified independence at 12/13/2022 0650   Time Frame short-term goal (STG), 1 week at 12/13/2022 0650   Progress/Outcome goal met, goal revised this date at 12/13/2022 0650   UB Dressing Goal 2    [place] modified independence at 12/07/2022 0720   Time Frame long-term goal (LTG), 14 days or less at 12/07/2022 0720   Progress/Outcome goal ongoing at 12/13/2022 0650   LB Dressing Goal 1    [place] other (see comments) (Comment) [Cl S] at 12/13/2022 0650   Time Frame short-term goal (STG), 1 week at 12/13/2022 0650   Progress/Outcome goal met, goal revised this date at 12/13/2022 0650   LB Dressing Goal 2    [place] modified independence at 12/07/2022 0720   Time Frame long-term goal (LTG), 14 days or less at 12/07/2022 0720   Progress/Outcome goal ongoing at 12/13/2022 0650   Grooming Goal 1    [place] set-up required at 12/07/2022 0720   Time Frame short-term goal (STG), 1 week at 12/13/2022 0650   Progress/Outcome goal ongoing at 12/13/2022 0650   Grooming Goal 2    [place] modified independence at 12/07/2022 0720   Time Frame long-term goal (LTG), 14 days or less at 12/07/2022 0720   Progress/Outcome goal ongoing at  12/13/2022 0650   Toileting Goal 1    [place] supervision required at 12/07/2022 0720   Time Frame short-term goal (STG), 1 week at 12/13/2022 0650   Progress/Outcome goal ongoing at 12/13/2022 0650   Toileting Goal 2    [place] modified independence at 12/07/2022 0720   Time Frame long-term goal (LTG), 14 days or less at 12/07/2022 0720   Progress/Outcome goal ongoing at 12/13/2022 0650          Risk for Complications  Constipation: High  Falls: High  Infection: High      The patient's medical prognosis is good to achieve the stated goals below.    Expected Level of Function  Expected Functional Improvement: mobility; motor dysfunction; safety; self-care  Self-Care: Independent  Sph[place]er Control: Independent  Transfers: Independent  Locomotion: Supervision or touching assistance  Communication: Independent  Social Cognition: Independent       Discharge Planning:  Anticipated Discharge Disposition: home with home health  Type of Home Care Services: home PT; nursing    Equipment/Device Needs at Discharge  Assistive Device/Animal Currently Used at Home: cane, straight (reports brief use of RW in past)  Discharge Planning  Does the patient need discharge transport arranged?: No    Anticipated Discharge Date: 12/18/2022    Needs Identified:       Team Members Present:     Type Value    Rehab Attending Present:  Richard Angulo MD    Care Coordinator Present:  Jorge Pena LSW    Nurse Present:  Mary Ann Mendoza RN    OT Present:  Alayna Lara OT    PT Present:  Shawn Soares, PT    Psychologist Present:  Mesfin Crews PSY.D        Next Team Conference Date: 12/20/22

## 2022-12-13 NOTE — PLAN OF CARE
Plan of Care Review  Plan of Care Reviewed With: patient  Progress: improving  Outcome Summary: difficulty sleeping at night, complaints of phantom limb pain. tramadol and tylenol given for pain; ambien given for sleep; call bell in reach; urinal in reach; bed alarm set.

## 2022-12-14 ENCOUNTER — APPOINTMENT (OUTPATIENT)
Dept: PSYCHOLOGY | Facility: CLINIC | Age: 67
End: 2022-12-14
Payer: MEDICARE

## 2022-12-14 ENCOUNTER — APPOINTMENT (INPATIENT)
Dept: OCCUPATIONAL THERAPY | Facility: REHABILITATION | Age: 67
DRG: 560 | End: 2022-12-14
Payer: MEDICARE

## 2022-12-14 ENCOUNTER — APPOINTMENT (INPATIENT)
Dept: PHYSICAL THERAPY | Facility: REHABILITATION | Age: 67
DRG: 560 | End: 2022-12-14
Payer: MEDICARE

## 2022-12-14 PROCEDURE — 97530 THERAPEUTIC ACTIVITIES: CPT | Mod: GO

## 2022-12-14 PROCEDURE — 63700000 HC SELF-ADMINISTRABLE DRUG: Performed by: PHYSICAL MEDICINE & REHABILITATION

## 2022-12-14 PROCEDURE — 97110 THERAPEUTIC EXERCISES: CPT | Mod: GP

## 2022-12-14 PROCEDURE — 12800000 HC ROOM AND CARE SEMIPRIVATE REHAB

## 2022-12-14 PROCEDURE — 97530 THERAPEUTIC ACTIVITIES: CPT | Mod: GP

## 2022-12-14 PROCEDURE — 90853 GROUP PSYCHOTHERAPY: CPT | Performed by: PSYCHOLOGIST

## 2022-12-14 PROCEDURE — 63700000 HC SELF-ADMINISTRABLE DRUG: Performed by: HOSPITALIST

## 2022-12-14 RX ORDER — HYDROCODONE BITARTRATE AND ACETAMINOPHEN 5; 325 MG/1; MG/1
1.5 TABLET ORAL EVERY 4 HOURS PRN
Status: DISCONTINUED | OUTPATIENT
Start: 2022-12-14 | End: 2022-12-18 | Stop reason: HOSPADM

## 2022-12-14 RX ORDER — HYDROCODONE BITARTRATE AND ACETAMINOPHEN 5; 325 MG/1; MG/1
1 TABLET ORAL EVERY 4 HOURS PRN
Status: DISCONTINUED | OUTPATIENT
Start: 2022-12-14 | End: 2022-12-18 | Stop reason: HOSPADM

## 2022-12-14 RX ORDER — PREGABALIN 50 MG/1
50 CAPSULE ORAL 3 TIMES DAILY
Status: DISCONTINUED | OUTPATIENT
Start: 2022-12-14 | End: 2022-12-18 | Stop reason: HOSPADM

## 2022-12-14 RX ADMIN — APIXABAN 5 MG: 5 TABLET, FILM COATED ORAL at 08:24

## 2022-12-14 RX ADMIN — PREGABALIN 50 MG: 50 CAPSULE ORAL at 17:47

## 2022-12-14 RX ADMIN — HYDROCODONE BITARTRATE AND ACETAMINOPHEN 1.5 TABLET: 5; 325 TABLET ORAL at 19:41

## 2022-12-14 RX ADMIN — HYDROCODONE BITARTRATE AND ACETAMINOPHEN 1.5 TABLET: 5; 325 TABLET ORAL at 23:42

## 2022-12-14 RX ADMIN — ZOLPIDEM TARTRATE 10 MG: 10 TABLET, COATED ORAL at 00:12

## 2022-12-14 RX ADMIN — PREGABALIN 25 MG: 25 CAPSULE ORAL at 08:24

## 2022-12-14 RX ADMIN — TRAMADOL HYDROCHLORIDE 100 MG: 50 TABLET, COATED ORAL at 05:20

## 2022-12-14 RX ADMIN — HYDROCODONE BITARTRATE AND ACETAMINOPHEN 1 TABLET: 5; 325 TABLET ORAL at 15:23

## 2022-12-14 RX ADMIN — PREGABALIN 50 MG: 50 CAPSULE ORAL at 19:41

## 2022-12-14 RX ADMIN — APIXABAN 5 MG: 5 TABLET, FILM COATED ORAL at 19:41

## 2022-12-14 RX ADMIN — ZOLPIDEM TARTRATE 10 MG: 10 TABLET, COATED ORAL at 23:43

## 2022-12-14 RX ADMIN — HYDROCODONE BITARTRATE AND ACETAMINOPHEN 1 TABLET: 5; 325 TABLET ORAL at 10:41

## 2022-12-14 RX ADMIN — PANTOPRAZOLE SODIUM 40 MG: 40 TABLET, DELAYED RELEASE ORAL at 08:24

## 2022-12-14 RX ADMIN — ACETAMINOPHEN 975 MG: 325 TABLET ORAL at 05:20

## 2022-12-14 RX ADMIN — ASPIRIN 81 MG: 81 TABLET, COATED ORAL at 08:24

## 2022-12-14 NOTE — PROGRESS NOTES
Patient: Harry Alvarado  Location: [facility] [address]  MRN: [id number]  Today's date: 12/13/2022    History of Present Illness  Harry is a 67 y.o. male admitted on 12/6/2022 with Status post below knee amputation of right lower extremity (CMS/[facility]) [Z89.511]. Principal problem is   Status post below knee amputation of right lower extremity (CMS/[facility]). The pt was admitted to Vascular Surgery service for a planned R BKA due to bypass thrombosis and ischemia of the R foot.   He is s/p R BKA on 12/2/22.  His hgb is stable and he has been restarted on his home Eliquis.   Tolerating a regular diet.  PM&R was consulted on 12/3 and recommends acute inpt rehab for intensive therapy as he can tolerate >3 hrs of combined PT/OT per day and at least 5 days/week with a reasonable expectation that the patient will make measurable functional improvement that only an acute rehab can provide.         Past Medical History  Harry has a past medical history of Coronary artery disease, Deep vein thrombosis (CMS/[facility]), Lupus anticoagulant disorder (CMS/[facility]), Lyme disease, MTHFR gene mutation, Myocardial infarction (CMS/[facility]), and PAD (peripheral artery disease) (CMS/[facility]).      PT Vitals    Date/Time Pulse HR Source BP BP Location BP Method Pt Position [facility]   12/13/22 1407 76 Monitor 132/73 Left upper arm Automatic Sitting [facility]      PT Pain    Date/Time Pain Type Side/Orientation Location Rating: Rest Interventions [facility]   12/13/22 1407 Pain Assessment right residual limb 6 premedicated for activity;position adjusted [facility]   12/13/22 1455 Pain Reassessment right residual limb 5 position adjusted;premedicated for activity BD          Prior Living Environment    Flowsheet Row Most Recent Value   People in Home child(no), adult, spouse   Current Living Arrangements home   Home Accessibility stairs to enter home (Group), stairs within home (Group)   Living Environment Comment Pt was living in [place] with his wife. However, after  discharge, his wife and him plan to move into son's house in [place] with first floor setup and 1 YOSEF. After prosthetic training, pt plans to move back to [place].   Number of Stairs, Main Entrance 1   Location, Patient Bedroom first (main) floor   Location, Bathroom first (main) floor   Bathroom Access Comment per PT confirmed with family stall shower with 6 inch step to enter and seat within shower however pt does have a shower door that swings open, recommending temporary curtain and extended bench          Prior Level of Function    Flowsheet Row Most Recent Value   Dominant Hand right   Ambulation assistive equipment   Transferring assistive equipment   Toileting independent   Bathing assistive person   Dressing independent   Eating independent   Prior Level of Function Comment Pt was previously I with all ADLs, only requiring assist with bathing from his wife as function decreased prior to hospital admission. Pt was ambulating with a SPC for recent 2 months. + , retired. Pt enjoys skiing and playing pickleball with his wife.   Assistive Device Currently Used at Home cane, straight  [reports brief use of RW in past]           IRF PT Evaluation and Treatment - 12/13/22 1405        PT Time Calculation    Start Time 1400     Stop Time 1500     Time Calculation (min) 60 min        Session Details    Document Type daily treatment/progress note     Mode of Treatment individual therapy;physical therapy        General Information    General Observations of Patient Pt received in gym, seated in wc, Pt reporting R residual limb pain which would be alleviated with transitioning to supine on mat then to prone.        Bed Mobility    [place], Roll Left modified independence     [place], Roll Right modified independence     [place], Supine to Sit modified independence     [place], Sit to Supine modified independence     Comment (Bed Mobility) to/from mat surface. Then pt returning to bed surface  post-tx for rest break with needs in reach.        Transfers    Comment 1)gait belt donned during session. 2)Provided education regarding self-acquiring RW as insurance covers wc. Pt reports his wife will be acquiring adult RW in preparation for d/c. Provided education where to acquire and offered handout, pt deferred as his daughter is a nurse and will be able to acquire.3) Provided education regarding upcoming appiontments on Thursday 12/15 as follows; 1030 - meet with amp peer mentor; 1230 - meet with prosthetist Miguelina from [employer] with wife to be present; 1600 - meet with  with wife to be present. CM updated and aware and pt to also notify his wife.        Sit to Stand Transfer    [place], Sit to Stand Transfer close supervision     Verbal Cues safety;technique     Assistive Device walker, front-wheeled     Comment Cl S for safety        Stand to Sit Transfer    [place], Stand to Sit Transfer close supervision     Verbal Cues safety;technique     Assistive Device walker, front-wheeled     Comment Cl S for safety        Low Pivot Transfer    [place], Low Pivot Transfer supervision     Verbal Cues safety;technique     Assistive Device wheelchair     Comment Modified LPT wc<>mat and wc to bed. Pt set-ups wheelchair appropriately next to mat and manages wc parts prior to tx.        Stand Pivot Transfer    [place], Stand Pivot/Stand Step Transfer touching/steadying assist     Verbal Cues safety;technique     Assistive Device walker, front-wheeled     Comment via amb approach. Steadying assist provided at pelvis for safety and balance.        Gait Training    [place], Gait touching/steadying assist     Assistive Device walker, front-wheeled     Distance in Feet 20 feet   20'x2 trials    Pattern (Gait) other (see comments)   via unipedal approach    Deviations/Abnormal Patterns (Gait) antalgic;base of support, narrow;gait speed decreased;step length decreased;weight shifting decreased      "Comment (Gait/Stairs) Steading assist via pelvis/gait belt for safety.        Curb Negotiation    [place] dependent (less than 25% patient effort);1 person assist     Assistive Device walker, front-wheeled     Curb Height 6 inches   6\" curb step + 4\" block for practice prior to curb step practice.    Comment 1) Min A up/down 4\" block via backward approach to ascend and forward approach to descend in preparation for 6\" curb step. Min A at pelvis for safety provided and Min verbal cues for technique. 2) up/down 6\" curb step x2 trials, initially with Mod A for technique then lifting RW to/from floor to curb step and back then progressing to Min A on subsequent bout. SBA of 2nd for safety due to new learing. 3) Also, PT performed 1-person wc bump via forward approach to ascend and backwards approach to descend. Pt has 1 step then sidewalk then 2nd step into 1st floor in-law suite. Pt currenty perferring hopping technique. PT recommend wc bump. Pt to ask his wife for pictures via his mobile phone to firm up home set-up to enter/exit.        Wheelchair Mobility/Management    Comment, Wheelchair Mobility 1) Adjusted length of R residual limb support per pt's request to provide more room on pad for residual limb for safety. Pt also reporting limb support does not click into place on wc. Contacted wc technique and no other amp support available at this time. Made some adjustments and able to click residual limb support into place via wiggling leg rest, pt receptive and pleased however was unable to perform himself. Pt aware to maintain reisdual limb support into place if it moves. 2) Provided education regarding submitting wc order to [employer] for 16\"x18\" lightweight manual wheelchiar in preparation for home. Provided education [employer] will reach out to collect credit card information to have on file despite no co-pay. Pt demonstrated understanding and contact info provided to [employer] including pt and his wife phone " numbers. Aniticipated wc delivery on 12/15 or 12/16.        Lower Extremity (Therapeutic Exercise)    Exercise Position/Type prone;static stretching;AROM (active range of motion)     General Exercise right;knee flexion;hip extension     Reps and Sets 2 x 10     Comment 1) Prone lying stretch, x5 min initially then progressed to small towel roll under R distal femur for increased hip flexor/hamstring stretching x additional 5 minutes. 2) Prone lying TE - R knee flexion, R hip extension        Daily Progress Summary (PT)    Daily Outcome Statement Pt reporting decrease in R residual limb pain following transitioning to prone at start of session. Initiated curb step-hopping via backward approach to ascend and forward approach to descend. Pt progressing from initial Mod A to Min A with SBA of 2nd for safety. Pt reporting he feels he will be preferring this method to enter his home vs wc bump. Pt to have his wife provide pictures of entrance for simulated home set-up. Plan to continue to progress functional mobility, balance, strength, and endurance per pt's tolerance.                           IRF PT Goals    Flowsheet Row Most Recent Value   Bed Mobility Goal 1    Activity/Assistive Device bed mobility activities, all at 12/07/2022 1034   [place] supervision required at 12/07/2022 1034   Time Frame short-term goal (STG), 1 week at 12/07/2022 1034   Progress/Outcome goal met at 12/12/2022 1630   Bed Mobility Goal 2    Activity/Assistive Device bed mobility activities, all at 12/07/2022 1034   [place] modified independence at 12/07/2022 1034   Time Frame long-term goal (LTG), 14 days or less at 12/12/2022 1630   Progress/Outcome goal ongoing at 12/12/2022 1630   Transfer Goal 1    Activity/Assistive Device sit-to-stand/stand-to-sit, low pivot, stand pivot at 12/07/2022 1034   [place] supervision required at 12/07/2022 1034   Time Frame short-term goal (STG), 2-3 days at 12/12/2022 1630   Progress/Outcome  goal ongoing at 12/12/2022 1630   Transfer Goal 2    Activity/Assistive Device sit-to-stand/stand-to-sit, low pivot, stand pivot at 12/07/2022 1034   [place] modified independence at 12/07/2022 1034   Time Frame long-term goal (LTG), 14 days or less at 12/12/2022 1630   Progress/Outcome goal ongoing at 12/12/2022 1630   Gait/Walking Locomotion Goal 1    Activity/Assistive Device gait (walking locomotion) at 12/12/2022 1630   Distance 20 feet at 12/12/2022 1630   [place] other (see comments)  [steadying assist] at 12/12/2022 1630   Time Frame short-term goal (STG), 2-3 days at 12/12/2022 1630   Progress/Outcome goal met at 12/12/2022 1630   Gait/Walking Locomotion Goal 2    Activity/Assistive Device gait (walking locomotion) at 12/12/2022 1630   Distance 20 feet at 12/12/2022 1630   [place] supervision required at 12/12/2022 1630   Time Frame long-term goal (LTG), 14 days or less at 12/12/2022 1630   Progress/Outcome goal ongoing at 12/12/2022 1630   Wheelchair Locomotion Goal 1    Activity wheelchair mobility skills, all at 12/07/2022 1034   Assistive Device manual, lightweight at 12/07/2022 1034   Distance 250 feet at 12/07/2022 1034   [place] modified independence at 12/07/2022 1034   Time Frame short-term goal (STG), 1 week at 12/07/2022 1034   Progress/Outcome goal met at 12/12/2022 1630   Wheelchair Locomotion Goal 2    Activity wheelchair mobility skills, all at 12/07/2022 1034   Assistive Device manual, lightweight at 12/07/2022 1034   Distance 500 feet at 12/07/2022 1034   [place] modified independence at 12/07/2022 1034   Time Frame long-term goal (LTG), 2 weeks at 12/07/2022 1034   Progress/Outcome goal met at 12/12/2022 1630

## 2022-12-14 NOTE — PROGRESS NOTES
Patient: Harry Alvarado  Location: [facility] [address]  MRN: [id number]  Today's date: 12/14/2022    History of Present Illness  Harry is a 67 y.o. male admitted on 12/6/2022 with Status post below knee amputation of right lower extremity (CMS/[facility]) [Z89.511]. Principal problem is   Status post below knee amputation of right lower extremity (CMS/[facility]). The pt was admitted to Vascular Surgery service for a planned R BKA due to bypass thrombosis and ischemia of the R foot.   He is s/p R BKA on 12/2/22.  His hgb is stable and he has been restarted on his home Eliquis.   Tolerating a regular diet.  PM&R was consulted on 12/3 and recommends acute inpt rehab for intensive therapy as he can tolerate >3 hrs of combined PT/OT per day and at least 5 days/week with a reasonable expectation that the patient will make measurable functional improvement that only an acute rehab can provide.         Past Medical History  Harry has a past medical history of Coronary artery disease, Deep vein thrombosis (CMS/[facility]), Lupus anticoagulant disorder (CMS/[facility]), Lyme disease, MTHFR gene mutation, Myocardial infarction (CMS/[facility]), and PAD (peripheral artery disease) (CMS/[facility]).      OT Vitals    Date/Time Pulse HR Source BP BP Location BP Method Pt Position [facility]   12/14/22 1131 71 Monitor 123/83 Right upper arm Automatic Sitting [facility]      OT Pain    Date/Time Pain Type Side/Orientation Location Rating: Rest Rating: Activity Interventions [facility]   12/14/22 1131 Pain Assessment right residual limb and phantom pain 5 5 position adjusted [facility]   12/14/22 1229 Pain Reassessment right residual limb 5 5 position adjusted [facility]          Prior Living Environment    Flowsheet Row Most Recent Value   People in Home child(no), adult, spouse   Current Living Arrangements home   Home Accessibility stairs to enter home (Group), stairs within home (Group)   Living Environment Comment Pt was living in [place] wife. However, after discharge, his  "wife and him plan to move into son's house in [place] with first floor setup and 1 YOSEF. After prosthetic training, pt plans to move back to [place].   Number of Stairs, Main Entrance 1   Location, Patient Bedroom first (main) floor   Location, Bathroom first (main) floor   Bathroom Access Comment per PT confirmed with family stall shower with 6 inch step to enter and seat within shower however pt does have a shower door that swings open, recommending temporary curtain and extended bench          Prior Level of Function    Flowsheet Row Most Recent Value   Dominant Hand right   Ambulation assistive equipment   Transferring assistive equipment   Toileting independent   Bathing assistive person   Dressing independent   Eating independent   Prior Level of Function Comment Pt was previously I with all ADLs, only requiring assist with bathing from his wife as function decreased prior to hospital admission. Pt was ambulating with a SPC for recent 2 months. + , retired. Pt enjoys skiing and playing pickleball with his wife.   Assistive Device Currently Used at Home cane, straight  [reports brief use of RW in past]          Occupational Profile    Flowsheet Row Most Recent Value   Occupational History/Life Experiences +. 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, also enjoys hiking,   Environmental Supports and Barriers Pt is interested in learning about return to driving, plans to purchase a new vehicle.  Recommend arranging Chirag Eaton from [employer] to speak with pt.  Pt lives in [place] and plans on staying in [place] with son until he returns for prosthetic training and is able to return to [place].  Initially wanted to drive home.  Discussed potentially purchasing and adapting a vehicle during his stay in [place].  Recommend arranging for pt to view adapted vehicle.   Patient Goals \"Be able to get " "around better, take care of myself and play with my grandkids.\" And also \"Get a prosthesis and walk around like I used too.\"  [PSFS completed 12/8, pt reported: Walking/hiking 0/10, Pickleball 0/10, Bathing 5/10, Playing with grandkids 5/10 score 10/40 0.25.]           IRF OT Evaluation and Treatment - 12/14/22 1131        OT Time Calculation    Start Time 1130     Stop Time 1230     Time Calculation (min) 60 min        Session Details    Document Type daily treatment/progress note     Mode of Treatment occupational therapy;individual therapy        General Information    General Observations of Patient recieved sitting up in w/c in therapy gym. Agreeable to therapy        Cognition/Psychosocial    Comment, Cognition pleasant and cooperative, good outlook on life and hospital course/POC. Pt highly motivated for progress. Recpetive to cues for safety and actively participates in discussion for planning adaptive techniques for prep for d/c home. pt c good ideas regarding activity adaptations at son's house and at home        Sit to Stand Transfer    [place], Sit to Stand Transfer close supervision     Verbal Cues safety;technique     Assistive Device none     Comment from w/c        Stand to Sit Transfer    [place], Stand to Sit Transfer close supervision     Verbal Cues safety;technique     Assistive Device none     Comment to w/c        Toilet Transfer    Transfer Technique stand pivot     [place], Toilet Transfer supervision     Verbal Cues safety;technique     Assistive Device raised toilet seat;grab bars/safety frame     Comment modified SPT to/from elevated toilet seat and w/c. Completed x3 trials. increased time discussing home set up and plans for DME for home. Pt reports trialing commode over toilet in current pt bathroom. pt trialed elevated toilet seat attachment c safety frame c pt reporting increased comfort and prefering elevated toilet seat c safety frame instead of commode. Rec primary " therapist further discussing c pt throughout hospital stay and issue handout c preferred/safe DME recs.        Shower Transfer    Transfer Technique sit and swing;stand pivot     [place], Shower Transfer close supervision     Verbal Cues safety;technique;hand placement     Assistive Device grab bars/tub rail;tub bench     Comment lengthy discussion about shower pt will be using at d/c. Pt has pictures on phine of shower pt will be using at d/c. Trialed multiple techniques and discussed multiple adaptive techniques due to pt's shower door opening being somewhat narrow. Pt could attempt to put tub bench half inside/half outside of shower and performing sit and swing with swinging BLE up towards chest and bumping into shower along extended tub bench. However therapust unsure if pt BLE will fit through doorway. Trailed c pt wheeling w/c close to shower stall door and holding onto grab bar c LLE in shower and then SPT onto bench in shower. Pt completed c ClS. Also discussed pt holding onto stable part of shower stall and placing LLE into shower and then modified SPT into shower onto shower chair or stool for improved fit in shower.  Rec continuing to address for most safe/functional reommendation.        Safety Issues, Functional Mobility    Comment, Safety Issues/Impairments (Mobility) OT: long distance household mobility via w/c propulsion c Mod I-S. Observed x1 instance c pt running w/c into obstacle requiring S to recover, nom injuries or falls.        Motor Skills    Functional Endurance fair+, tolerates increased activity well.        Therapeutic Interventions    Comment, Therapeutic Intervention increased time for therapeutic discussino about home set up and discussing ideas for home set up/safety at home. pt actively participates in conversation and has great ideas. Pt c great collaboration c therapist for adaptive techniques        Daily Progress Summary (OT)    Symptoms Noted During/After Treatment none      Daily Outcome Statement Tolerated session well, focus of session on trialing DME for functional transfers and adative techniques for safety at son's home at d/c. Pt very collaborative and actively participates in conversation about prep for d/c home. Rec continuing to address shower set up/shower stall transfers. Rec issuing handout for elevated toilet seat c safety frame for pt to get in prep for d/c home                      Education Documentation  Safe ADL Techniques, taught by Madeleine Orlando, OT at 12/14/2022  1:10 PM.  Learner: Patient  Readiness: Acceptance  Method: Explanation, Demonstration  Response: Verbalizes Understanding, Needs Reinforcement  Comment: edu on functional transfers and DME          IRF OT Goals    Flowsheet Row Most Recent Value   Transfer Goal 1    Activity/Assistive Device toilet at 12/07/2022 0720   [place] supervision required at 12/07/2022 0720   Time Frame short-term goal (STG), 1 week at 12/13/2022 0650   Progress/Outcome goal ongoing at 12/13/2022 0650   Transfer Goal 2    Activity/Assistive Device toilet at 12/07/2022 0720   [place] modified independence at 12/07/2022 0720   Time Frame long-term goal (LTG), 14 days or less at 12/07/2022 0720   Progress/Outcome goal ongoing at 12/13/2022 0650   Transfer Goal 3    Activity/Assistive Device shower at 12/07/2022 0720   [place] other (see comments)  [Cl S] at 12/13/2022 0650   Time Frame short-term goal (STG), 1 week at 12/13/2022 0650   Progress/Outcome goal met, goal revised this date at 12/13/2022 0650   Transfer Goal 4    Activity/Assistive Device shower at 12/07/2022 0720   [place] modified independence at 12/07/2022 0720   Time Frame long-term goal (LTG) at 12/07/2022 0720   Progress/Outcome goal ongoing at 12/13/2022 0650   Bathing Goal 1    [place] supervision required at 12/13/2022 0650   Time Frame short-term goal (STG), 1 week at 12/13/2022 0650   Progress/Outcome goal met, goal revised  this date at 12/13/2022 0650   Bathing Goal 2    [place] modified independence at 12/07/2022 0720   Time Frame long-term goal (LTG), 14 days or less at 12/07/2022 0720   Progress/Outcome goal ongoing at 12/13/2022 0650   UB Dressing Goal 1    [place] modified independence at 12/13/2022 0650   Time Frame short-term goal (STG), 1 week at 12/13/2022 0650   Progress/Outcome goal met, goal revised this date at 12/13/2022 0650   UB Dressing Goal 2    [place] modified independence at 12/07/2022 0720   Time Frame long-term goal (LTG), 14 days or less at 12/07/2022 0720   Progress/Outcome goal ongoing at 12/13/2022 0650   LB Dressing Goal 1    [place] other (see comments)  [Cl S] at 12/13/2022 0650   Time Frame short-term goal (STG), 1 week at 12/13/2022 0650   Progress/Outcome goal met, goal revised this date at 12/13/2022 0650   LB Dressing Goal 2    [place] modified independence at 12/07/2022 0720   Time Frame long-term goal (LTG), 14 days or less at 12/07/2022 0720   Progress/Outcome goal ongoing at 12/13/2022 0650   Grooming Goal 1    [place] set-up required at 12/07/2022 0720   Time Frame short-term goal (STG), 1 week at 12/13/2022 0650   Progress/Outcome goal ongoing at 12/13/2022 0650   Grooming Goal 2    [place] modified independence at 12/07/2022 0720   Time Frame long-term goal (LTG), 14 days or less at 12/07/2022 0720   Progress/Outcome goal ongoing at 12/13/2022 0650   Toileting Goal 1    [place] supervision required at 12/07/2022 0720   Time Frame short-term goal (STG), 1 week at 12/13/2022 0650   Progress/Outcome goal ongoing at 12/13/2022 0650   Toileting Goal 2    [place] modified independence at 12/07/2022 0720   Time Frame long-term goal (LTG), 14 days or less at 12/07/2022 0720   Progress/Outcome goal ongoing at 12/13/2022 0650

## 2022-12-14 NOTE — PROGRESS NOTES
Patient: Harry Alvarado  Location: [facility] [address]  MRN: [id number]  Today's date: 12/14/2022    History of Present Illness  Harry is a 67 y.o. 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 The pt was admitted to Vascular Surgery service for a planned R BKA due to bypass thrombosis and ischemia of the R foot.   He is s/p R BKA on 12/2/22.  His hgb is stable and he has been restarted on his home Eliquis.   Tolerating a regular diet.  PM&R was consulted on 12/3 and recommends acute inpt rehab for intensive therapy as he can tolerate >3 hrs of combined PT/OT per day and at least 5 days/week with a reasonable expectation that the patient will make measurable functional improvement that only an acute rehab can provide.         Past Medical History  Harry has a past medical history of Coronary artery disease, Deep vein thrombosis (CMS/[facility]), Lupus anticoagulant disorder (CMS/[facility]), Lyme disease, MTHFR gene mutation, Myocardial infarction (CMS/[facility]), and PAD (peripheral artery disease) (CMS/[facility]).      OT Vitals    Date/Time Pulse HR Source BP BP Location BP Method Pt Position [facility]   12/14/22 0721 66 Monitor 134/66 Right upper arm Automatic Lying AMS      OT Pain    Date/Time Pain Type Side/Orientation Location Rating: Rest [facility]   12/14/22 0721 Pain Assessment right residual limb 8 - severe pain AMS          Prior Living Environment    Flowsheet Row Most Recent Value   People in Home child(no), adult, spouse   Current Living Arrangements home   Home Accessibility stairs to enter home (Group), stairs within home (Group)   Living Environment Comment Pt was living in [place] with his wife. However, after discharge, his wife and him plan to move into son's house in [place] with first floor setup and 1 YOSEF. After prosthetic training, pt plans to move  "back to [place].   Number of Stairs, Main Entrance 1   Location, Patient Bedroom first (main) floor   Location, Bathroom first (main) floor   Bathroom Access Comment per PT confirmed with family stall shower with 6 inch step to enter and seat within shower however pt does have a shower door that swings open, recommending temporary curtain and extended bench          Prior Level of Function    Flowsheet Row Most Recent Value   Dominant Hand right   Ambulation assistive equipment   Transferring assistive equipment   Toileting independent   Bathing assistive person   Dressing independent   Eating independent   Prior Level of Function Comment Pt was previously I with all ADLs, only requiring assist with bathing from his wife as function decreased prior to hospital admission. Pt was ambulating with a SPC for recent 2 months. + , retired. Pt enjoys skiing and playing pickleball with his wife.   Assistive Device Currently Used at Home cane, straight  [reports brief use of RW in past]          Occupational Profile    Flowsheet Row Most Recent Value   Occupational History/Life Experiences +. 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, also enjoys hiking,   Environmental Supports and Barriers Pt is interested in learning about return to driving, plans to purchase a new vehicle.  Recommend arranging Chirag Angelito from the [employer] to speak with pt.  Pt lives in [place] and plans on staying in [place] with son until he returns for prosthetic training and is able to return to [place].  Initially wanted to drive home.  Discussed potentially purchasing and adapting a vehicle during his stay in [place].  Recommend arranging for pt to view adapted vehicle.   Patient Goals \"Be able to get around better, take care of myself and play with my grandkids.\" And also \"Get a prosthesis and walk around like I used too.\"  [PSFS completed " 12/8, pt reported: Walking/hiking 0/10, Pickleball 0/10, Bathing 5/10, Playing with grandkids 5/10 score 10/40 0.25.]           IRF OT Evaluation and Treatment - 12/14/22 0716        OT Time Calculation    Start Time 0715     Stop Time 0745     Time Calculation (min) 30 min        Session Details    Document Type daily treatment/progress note     Mode of Treatment occupational therapy;individual therapy        General Information    General Observations of Patient pt in bed, reports awake since 3 am, unable to get comfortable, ambien only lasting for a few hours, encouraged to discuss with physiatrist to obtain better pain control and sleeping habits        Coping/Community Reintegration    Observed Emotional State frustrated     Verbalized Emotional State frustration;other (see comments)   with pain and lack of sleep       Coping Strategies    Trust Relationship/Rapport care explained;choices provided;emotional support provided;empathic listening provided;questions answered;questions encouraged;reassurance provided;thoughts/feelings acknowledged        LE Residual Limb Therapeutic Activity    Desensitization Techniques light rubbing;massage;tapping     Phantom Pain/Sensation patient reports phantom pain     Comment, Phantom Pain/Sensation Ongoing education for strategies for positiong and pain management     Comment, Management positioned R LE with hip flexed and on pillow with hip extended to attempt positiong and desensitization program        Daily Progress Summary (OT)    Daily Outcome Statement Focus of session on pain management strategies and desensitization program.  Encouraging pt to preform every 30-60 minutes t/o the day.  Recommend continued OT to address functional mobility, transfers, ADL's, return to driving education and desensitization program and DME recommendations for safe return to son's home for healing prior to prosthetic training.                      Education Documentation  Residual Limb  Management, taught by Leonora Wynn, OT at 12/14/2022  7:16 AM.  Learner: Patient  Readiness: Acceptance  Method: Explanation  Response: Needs Reinforcement, Verbalizes Understanding  Comment: plan for ongoing education for desensitization and pain management strategies    Pain Management, taught by Leonora Wynn, OT at 12/14/2022  7:16 AM.  Learner: Patient  Readiness: Acceptance  Method: Explanation  Response: Needs Reinforcement, Verbalizes Understanding  Comment: plan for ongoing education for desensitization and pain management strategies          IRF OT Goals    Flowsheet Row Most Recent Value   Transfer Goal 1    Activity/Assistive Device toilet at 12/07/2022 0720   [place] supervision required at 12/07/2022 0720   Time Frame short-term goal (STG), 1 week at 12/13/2022 0650   Progress/Outcome goal ongoing at 12/13/2022 0650   Transfer Goal 2    Activity/Assistive Device toilet at 12/07/2022 0720   [place] modified independence at 12/07/2022 0720   Time Frame long-term goal (LTG), 14 days or less at 12/07/2022 0720   Progress/Outcome goal ongoing at 12/13/2022 0650   Transfer Goal 3    Activity/Assistive Device shower at 12/07/2022 0720   [place] other (see comments)  [Cl S] at 12/13/2022 0650   Time Frame short-term goal (STG), 1 week at 12/13/2022 0650   Progress/Outcome goal met, goal revised this date at 12/13/2022 0650   Transfer Goal 4    Activity/Assistive Device shower at 12/07/2022 0720   [place] modified independence at 12/07/2022 0720   Time Frame long-term goal (LTG) at 12/07/2022 0720   Progress/Outcome goal ongoing at 12/13/2022 0650   Bathing Goal 1    [place] supervision required at 12/13/2022 0650   Time Frame short-term goal (STG), 1 week at 12/13/2022 0650   Progress/Outcome goal met, goal revised this date at 12/13/2022 0650   Bathing Goal 2    [place] modified independence at 12/07/2022 0720   Time Frame long-term goal (LTG), 14 days or less at 12/07/2022  0720   Progress/Outcome goal ongoing at 12/13/2022 0650   UB Dressing Goal 1    [place] modified independence at 12/13/2022 0650   Time Frame short-term goal (STG), 1 week at 12/13/2022 0650   Progress/Outcome goal met, goal revised this date at 12/13/2022 0650   UB Dressing Goal 2    [place] modified independence at 12/07/2022 0720   Time Frame long-term goal (LTG), 14 days or less at 12/07/2022 0720   Progress/Outcome goal ongoing at 12/13/2022 0650   LB Dressing Goal 1    [place] other (see comments)  [Cl S] at 12/13/2022 0650   Time Frame short-term goal (STG), 1 week at 12/13/2022 0650   Progress/Outcome goal met, goal revised this date at 12/13/2022 0650   LB Dressing Goal 2    [place] modified independence at 12/07/2022 0720   Time Frame long-term goal (LTG), 14 days or less at 12/07/2022 0720   Progress/Outcome goal ongoing at 12/13/2022 0650   Grooming Goal 1    [place] set-up required at 12/07/2022 0720   Time Frame short-term goal (STG), 1 week at 12/13/2022 0650   Progress/Outcome goal ongoing at 12/13/2022 0650   Grooming Goal 2    [place] modified independence at 12/07/2022 0720   Time Frame long-term goal (LTG), 14 days or less at 12/07/2022 0720   Progress/Outcome goal ongoing at 12/13/2022 0650   Toileting Goal 1    [place] supervision required at 12/07/2022 0720   Time Frame short-term goal (STG), 1 week at 12/13/2022 0650   Progress/Outcome goal ongoing at 12/13/2022 0650   Toileting Goal 2    [place] modified independence at 12/07/2022 0720   Time Frame long-term goal (LTG), 14 days or less at 12/07/2022 0720   Progress/Outcome goal ongoing at 12/13/2022 0650

## 2022-12-14 NOTE — SUBJECTIVE & OBJECTIVE
" Patient was seen and examined.   Attestation Notes: Face to face encounter completed    Subjective    The patient with ongoing residual stump pain.  Also has phantom pain but postsurgical pain more significant.  Worsens at nighttime.    Tolerating therapies touch assist to min assist with transfers and hopping 20 feet with a rolling walker.  Objective     Visit Vitals  /66 (BP Location: Right upper arm, Patient Position: Lying)   Pulse 90   Temp 36.4 °C (97.6 °F) (Oral)   Resp 18   Ht 1.854 m (6' 1\")   Wt 70.1 kg (154 lb 9.6 oz)   SpO2 97%   BMI 20.40 kg/m²     Review of Systems:  Pertinent items are noted in HPI.  Denies chest pain and shortness of breath.  Review of systems otherwise negative.    Labs     Results from last 7 days   Lab Units 12/12/22  0749   WBC K/uL 12.83*   HEMOGLOBIN g/dL 12.1*   HEMATOCRIT % 38.3*   PLATELETS K/uL 534*     Results from last 7 days   Lab Units 12/12/22  0702   SODIUM mEQ/L 135*   POTASSIUM mEQ/L 5.0   CHLORIDE mEQ/L 99   CO2 mEQ/L 27   BUN mg/dL 18   CREATININE mg/dL 0.7*   CALCIUM mg/dL 9.8   ALBUMIN g/dL 2.9*   BILIRUBIN TOTAL mg/dL 0.2*   ALK PHOS IU/L 140*   ALT IU/L 42   AST IU/L 29   GLUCOSE mg/dL 85     Full Code    Physical Exam  General      Alert, cooperative, no distress, appears stated age.   Head:    Normocephalic, without obvious abnormality, atraumatic.   Eyes:    PERRL, conjunctiva/corneas clear, EOM's intact.        Nose:   Nares normal, septum midline, mucosa normal, no drainage or            sinus tenderness.   Throat:   Lips, mucosa, and tongue normal.    Neck:   Supple, symmetrical, trachea midline.    Back:     Symmetric, no curvature.   Lungs:     Clear to auscultation bilaterally, respirations unlabored.   Chest wall:    No tenderness or deformity.   Heart:    Regular rate and rhythm, S1 and S2 normal.   Abdomen:     Soft, non-tender, bowel sounds active all four quadrants,     no masses, no organomegaly.   Extremities:  Musculoskeletal: Stable " right stump edema.  Right transtibial amputation.   Pulses:   1+ and symmetric all extremities.   Skin: Incision intact.  Pressure injury over we will tuberosity stable.   Neurologic:          Behavior/  Emotional:  CNII-XII intact.  Alert and oriented ×3.  Motor exam intact.  Sensory exam intact.  Reflexes stable decreased reflexes.      Appropriate, cooperative           Plan of care was discussed with patient

## 2022-12-14 NOTE — ASSESSMENT & PLAN NOTE
Postoperative anemia.  Monitor hemoglobin levels.  Hemoglobin 12.1.  Mild iron deficiency and consider iron replacement.

## 2022-12-14 NOTE — PROGRESS NOTES
"Daily Progress Note     Patient was seen and examined.   Attestation Notes: Face to face encounter completed    Subjective    The patient with ongoing residual stump pain.  Also has phantom pain but postsurgical pain more significant.  Worsens at nighttime.    Tolerating therapies touch assist to min assist with transfers and hopping 20 feet with a rolling walker.  Objective     Visit Vitals  /66 (BP Location: Right upper arm, Patient Position: Lying)   Pulse 90   Temp 36.4 °C (97.6 °F) (Oral)   Resp 18   Ht 1.854 m (6' 1\")   Wt 70.1 kg (154 lb 9.6 oz)   SpO2 97%   BMI 20.40 kg/m²     Review of Systems:  Pertinent items are noted in HPI.  Denies chest pain and shortness of breath.  Review of systems otherwise negative.    Labs     Results from last 7 days   Lab Units 12/12/22  0749   WBC K/uL 12.83*   HEMOGLOBIN g/dL 12.1*   HEMATOCRIT % 38.3*   PLATELETS K/uL 534*     Results from last 7 days   Lab Units 12/12/22  0702   SODIUM mEQ/L 135*   POTASSIUM mEQ/L 5.0   CHLORIDE mEQ/L 99   CO2 mEQ/L 27   BUN mg/dL 18   CREATININE mg/dL 0.7*   CALCIUM mg/dL 9.8   ALBUMIN g/dL 2.9*   BILIRUBIN TOTAL mg/dL 0.2*   ALK PHOS IU/L 140*   ALT IU/L 42   AST IU/L 29   GLUCOSE mg/dL 85     Full Code    Physical Exam  General      Alert, cooperative, no distress, appears stated age.   Head:    Normocephalic, without obvious abnormality, atraumatic.   Eyes:    PERRL, conjunctiva/corneas clear, EOM's intact.        Nose:   Nares normal, septum midline, mucosa normal, no drainage or            sinus tenderness.   Throat:   Lips, mucosa, and tongue normal.    Neck:   Supple, symmetrical, trachea midline.    Back:     Symmetric, no curvature.   Lungs:     Clear to auscultation bilaterally, respirations unlabored.   Chest wall:    No tenderness or deformity.   Heart:    Regular rate and rhythm, S1 and S2 normal.   Abdomen:     Soft, non-tender, bowel sounds active all four quadrants,     no masses, no organomegaly. "   Extremities:  Musculoskeletal: Stable right stump edema.  Right transtibial amputation.   Pulses:   1+ and symmetric all extremities.   Skin: Incision intact.  Pressure injury over we will tuberosity stable.   Neurologic:          Behavior/  Emotional:  CNII-XII intact.  Alert and oriented ×3.  Motor exam intact.  Sensory exam intact.  Reflexes stable decreased reflexes.      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 male who presents with the following chronic medical problems including MTHFR gene mutation, coronary artery disease with prior myocardial infarction 1993, peripheral artery disease with multiple bypass procedures in the past most recent with tPA bolus and angioplasty in 2020.  The patient admitted to [facility] on November 30, 2022 to the vascular surgery service for planned BKA due to bypass thrombosis.  At the time of admission the patient had cyanosis and was beginning to develop thanh gangrene of the foot.  Patient had reported over the last 1 to 2 months worsening pain of the right foot and that he knew the graft was occluded.  Patient has a history of 15+ surgeries of the extremities and understood that his revascularization options were limited.  He had severe 10 out of 10 pain of the foot relieved with elevation of the leg.  He was still able to ambulate but limited by pain.  Under the care of Bowen Serrato MD of vascular surgery he underwent right transtibial amputation on December 2, 2022.  Postoperative course unremarkable with advancement of diet and pain control.  ESTUARDO drain removed on December 4.  For postoperative pain control he was treated with morphine.  On discharge prescribed Tylenol and low-dose Lyrica.  Lyrica started prior by his PCP for neuropathic pain.  He was restarted on his home Eliquis.  He  required min assist for bed mobility, transfers and ambulation.  Vascular surgery wants follow-up in 1 month.  Patient is now appropriate for acute inpatient rehabilitation.    Comprehensive inpatient rehabilitation program status post right transtibial amputation.  Goal is for the patient to reach a modified independent level with bed mobility, transfers and ambulation.  Ambulation with hopping short distances using a rolling walker.  Also to become modified independent with elevations.  Preprosthetic training.  Ongoing pain control.    For pain control continue low-dose Lyrica and consider increasing dose if the patient has ongoing phantom pain.  Continue around-the-clock Tylenol.  Continue tramadol.  If needed adjust dose.    Monitor incision closely and continue incisional and stump care.  Pressure injury over the tibial tuberosity.  Stable.  Continue to monitor.  Pressure-relief over area.    The patient with ongoing residual stump pain.  Also has phantom pain but postsurgical pain more significant.  Worsens at nighttime.  Will discontinue Tylenol and tramadol.  Begin hydrocodone.  Change Lyrica to 50 mg 3 times daily.    Tolerating therapies touch assist to min assist with transfers and hopping 20 feet with a rolling walker.    Anemia  Assessment & Plan  Postoperative anemia.  Monitor hemoglobin levels.  Hemoglobin 12.1.  Mild iron deficiency and consider iron replacement.    Lupus anticoagulant disorder (CMS/HCC)  Assessment & Plan  Continue Eliquis.    Deep vein thrombosis (CMS/HCC)  Assessment & Plan  Continue Eliquis.    Coronary artery disease  Assessment & Plan  Cardiac precautions.  Continue Eliquis.        Expected Discharge Date:  12/18/2022

## 2022-12-14 NOTE — PROGRESS NOTES
[facility] Progress Note          Patient was seen and examined.   Attestation Notes: Face to face encounter completed    Harry Alvarado is a 67 y.o. male who was admitted for Status post below knee amputation of right lower extremity (CMS/HCC) [Z89.511]. Patient was referred by Richard Angulo MD for medical assessment and management      CC: Status post below knee amputation of right lower extremity (CMS/HCC) [Z89.511]     HPI: Harry Alvarado is a 67 y.o. male with MTHFR gene mutation, CAD s/p MI 1993, and PAD s/p R Fem to PT bypass with arm vein in 1993, revision in 2011, s/p R SFA to peroneal bypass graft s/p revision on 7/30/18, s/p tPA bolus and angioplasty in 2020 admitted to [facility] 11/30/22 with RLE bypass thrombosis and chronic right foot ischemia/gangrene and underwent right BKA by vascular surgeon, Dr. Bowen Serrato 12/2/22. Post op he had anemia but did not require transfusion. He was restarted on his home Eliquis.  His pain was managed with Tylenol and Lyrica. Tolerating a regular diet.      The patient was evaluated by PM&R and found to have residual deficits in ambulation and ADLs due to Status post below knee amputation of right lower extremity (CMS/HCC) [Z89.511] and came to [facility] on 12/6/2022 for acute inpatient rehab.      He participated in therapy.     SUBJECTIVE:  Patient interviewed and examined    No new complaints.     Still with RLE surgical site pain and phantom pain, moving bowels and bladder, no abdominal pain or nausea, no dysuria, no chest pain, palpitations, dyspnea or fevers    Review of Systems:  All other systems reviewed and negative except as noted in the HPI.    Current meds and allergies reviewed    Past Medical History:   Diagnosis Date   • Coronary artery disease     s/p MI   • Deep vein thrombosis (CMS/HCC)    • Lupus anticoagulant disorder (CMS/HCC)    • Lyme disease    • MTHFR gene mutation    • Myocardial infarction (CMS/HCC)    •  PAD (peripheral artery disease) (CMS/[facility])      Past Surgical History:   Procedure Laterality Date   • BELOW KNEE LEG AMPUTATION Right 12/02/2022   • FEMORAL BYPASS Right      Social History     Tobacco Use   • Smoking status: Never   • Smokeless tobacco: Never   Substance Use Topics   • Alcohol use: Not Currently      No family history on file.    Vital signs in last 24 hours:  Temp:  [36.4 °C (97.6 °F)-36.6 °C (97.9 °F)] 36.4 °C (97.6 °F)  Heart Rate:  [66-98] 84  Resp:  [18] 18  BP: (103-140)/(62-83) 135/66  Vital signs reviewed 12/14/22 3:16 PM    Physical Exam  Vitals and nursing note reviewed.   Constitutional:       Appearance: Normal appearance.   HENT:      Head: Normocephalic and atraumatic.      Right Ear: External ear normal.      Left Ear: External ear normal.      Nose: Nose normal.      Mouth/Throat:      Mouth: Mucous membranes are moist.      Pharynx: Oropharynx is clear.   Eyes:      Extraocular Movements: Extraocular movements intact.      Conjunctiva/sclera: Conjunctivae normal.      Pupils: Pupils are equal, round, and reactive to light.   Cardiovascular:      Rate and Rhythm: Normal rate and regular rhythm.      Pulses: Normal pulses.      Heart sounds: Normal heart sounds.   Pulmonary:      Effort: Pulmonary effort is normal.      Breath sounds: Normal breath sounds.   Abdominal:      General: Abdomen is flat. Bowel sounds are normal.      Palpations: Abdomen is soft.   Musculoskeletal:      Cervical back: Normal range of motion and neck supple.      Right lower leg: Edema present.      Left lower leg: Edema present.      Comments: S/p right BKA   Skin:     General: Skin is warm and dry.   Neurological:      General: No focal deficit present.      Mental Status: He is alert and oriented to person, place, and time.   Psychiatric:         Mood and Affect: Mood normal.         Behavior: Behavior normal.                 Objective    Labs:  I have reviewed the patient's labs.  Significant abnormals  are leukocytosis.  Results from last 7 days   Lab Units 12/12/22  0749   WBC K/uL 12.83*   HEMOGLOBIN g/dL 12.1*   HEMATOCRIT % 38.3*   PLATELETS K/uL 534*     Results from last 7 days   Lab Units 12/12/22  0702   SODIUM mEQ/L 135*   POTASSIUM mEQ/L 5.0   CHLORIDE mEQ/L 99   CO2 mEQ/L 27   BUN mg/dL 18   CREATININE mg/dL 0.7*   CALCIUM mg/dL 9.8   ALBUMIN g/dL 2.9*   BILIRUBIN TOTAL mg/dL 0.2*   ALK PHOS IU/L 140*   ALT IU/L 42   AST IU/L 29   GLUCOSE mg/dL 85     Results from last 7 days   Lab Units 12/12/22  0702   MAGNESIUM mg/dL 1.9        Imaging:  Not applicable        ASSESSMENT/PLAN:    67 y.o. male with MTHFR gene mutation, CAD s/p MI 1993, and PAD s/p R Fem to PT bypass with arm vein in 1993, revision in 2011, s/p R SFA to peroneal bypass graft s/p revision on 7/30/18, s/p tPA bolus and angioplasty in 2020 admitted to [facility] 11/30/22 with RLE bypass thrombosis and chronic right foot ischemia/gangrene and underwent right BKA by vascular surgeon, Dr. Bowen Serrato 12/2/22     1. Vasc:  -PAD s/p failed RLE bypass with right foot ischemia/gangrene  -s/p right BKA 12/2/22  -restarted Aspirin 81 mg daily for PAD per patient request  -Tylenol and Lyrica for pain  -remains on Eliquis for hypercoagulable condition     2. Heme:  -post op anemia due to chronic blood loss, does not need iron  -leukocytosis reactive due to surgery  -hx of MTHFR gene mutation  -follow CBC     3. Renal:  -increased risk of dehydration and electrolyte changes  -follow BMP, Mg     4. Gi:  -Senokot-S as needed for bowels  -Protonix for GERD and ulcer ppx  -elevated LFTs likely transient due to surgery, meds, follow for now     5. Gu:  -no UTI or retention     6. Cardiac:  -hx of CAD  -watch for orthostatic hypotension  -use cardiac precautions in therapy     7. Pulm:  -incentive spirometry for atelectasis     8. Derm:  -seen by [facility] [facility] for skin assessment  -right tibial tuberosity bruising, normal post op changes, follow  for now     9. Nutrition:  -seen by Nutrition for assessment and education     10. Psych:  -seen by Psychology for support  -Ambien prn sleep    11. Dispo:  -Expected discharge date 12/18/2022         plan discussed with patient, nurse, case management and  Richard Angulo MD Scott Sapperstein, MD  12/14/2022  3:16 PM

## 2022-12-14 NOTE — ASSESSMENT & PLAN NOTE
Harry Alvarado is a 67 y.o. male who presents with the following chronic medical problems including MTHFR gene mutation, coronary artery disease with prior myocardial infarction 1993, peripheral artery disease with multiple bypass procedures in the past most recent with tPA bolus and angioplasty in 2020.  The patient admitted to [facility] on November 30, 2022 to the vascular surgery service for planned BKA due to bypass thrombosis.  At the time of admission the patient had cyanosis and was beginning to develop thanh gangrene of the foot.  Patient had reported over the last 1 to 2 months worsening pain of the right foot and that he knew the graft was occluded.  Patient has a history of 15+ surgeries of the extremities and understood that his revascularization options were limited.  He had severe 10 out of 10 pain of the foot relieved with elevation of the leg.  He was still able to ambulate but limited by pain.  Under the care of Bowen Serrato MD of vascular surgery he underwent right transtibial amputation on December 2, 2022.  Postoperative course unremarkable with advancement of diet and pain control.  ESTUARDO drain removed on December 4.  For postoperative pain control he was treated with morphine.  On discharge prescribed Tylenol and low-dose Lyrica.  Lyrica started prior by his PCP for neuropathic pain.  He was restarted on his home Eliquis.  He required min assist for bed mobility, transfers and ambulation.  Vascular surgery wants follow-up in 1 month.  Patient is now appropriate for acute inpatient rehabilitation.    Comprehensive inpatient rehabilitation program status post right transtibial amputation.  Goal is for the patient to reach a modified independent level with bed mobility, transfers and ambulation.  Ambulation with hopping short distances using a rolling walker.  Also to become modified independent with elevations.  Preprosthetic training.  Ongoing pain control.    For pain control continue  low-dose Lyrica and consider increasing dose if the patient has ongoing phantom pain.  Continue around-the-clock Tylenol.  Continue tramadol.  If needed adjust dose.    Monitor incision closely and continue incisional and stump care.  Pressure injury over the tibial tuberosity.  Stable.  Continue to monitor.  Pressure-relief over area.    The patient with ongoing residual stump pain.  Also has phantom pain but postsurgical pain more significant.  Worsens at nighttime.  Will discontinue Tylenol and tramadol.  Begin hydrocodone.  Change Lyrica to 50 mg 3 times daily.    Tolerating therapies touch assist to min assist with transfers and hopping 20 feet with a rolling walker.

## 2022-12-14 NOTE — PROGRESS NOTES
Patient: Harry Alvarado  Location: [facility] [address]  MRN: [id number]  Today's date: 12/14/2022    History of Present Illness  Harry is a 67 y.o. 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 Principal problem is   Status post below knee amputation of right lower extremity (CMS/[facility]). The pt was admitted to Vascular Surgery service for a planned R BKA due to bypass thrombosis and ischemia of the R foot.   He is s/p R BKA on 12/2/22.  His hgb is stable and he has been restarted on his home Eliquis.   Tolerating a regular diet.  PM&R was consulted on 12/3 and recommends acute inpt rehab for intensive therapy as he can tolerate >3 hrs of combined PT/OT per day and at least 5 days/week with a reasonable expectation that the patient will make measurable functional improvement that only an acute rehab can provide.         Past Medical History  Harry has a past medical history of Coronary artery disease, Deep vein thrombosis (CMS/[facility]), Lupus anticoagulant disorder (CMS/[facility]), Lyme disease, MTHFR gene mutation, Myocardial infarction (CMS/[facility]), and PAD (peripheral artery disease) (CMS/[facility]).      PT Vitals    Date/Time Pulse HR Source BP BP Location BP Method Pt Position [facility]   12/14/22 1305 84 Monitor 135/66 Left upper arm Automatic Sitting [facility]      PT Pain    Date/Time Pain Type Side/Orientation Location Rating: Rest Rating: Activity Interventions [facility]   12/14/22 1305 Pain Assessment right residual limb 4 4 position adjusted [facility]   12/14/22 1355 Pain Reassessment right residual limb 4 4 position adjusted [facility]          Prior Living Environment    Flowsheet Row Most Recent Value   People in Home child(no), adult, spouse   Current Living Arrangements home   Home Accessibility stairs to enter home (Group), stairs within home (Group)   Living Environment Comment Pt was living in [place] with his wife. However, after discharge, his wife and him plan  to move into son's house in [place] with first floor setup and 1 YOSEF. After prosthetic training, pt plans to move back to [place].   Number of Stairs, Main Entrance 1   Location, Patient Bedroom first (main) floor   Location, Bathroom first (main) floor   Bathroom Access Comment per PT confirmed with family stall shower with 6 inch step to enter and seat within shower however pt does have a shower door that swings open, recommending temporary curtain and extended bench          Prior Level of Function    Flowsheet Row Most Recent Value   Dominant Hand right   Ambulation assistive equipment   Transferring assistive equipment   Toileting independent   Bathing assistive person   Dressing independent   Eating independent   Prior Level of Function Comment Pt was previously I with all ADLs, only requiring assist with bathing from his wife as function decreased prior to hospital admission. Pt was ambulating with a SPC for recent 2 months. + , retired. Pt enjoys skiing and playing pickleball with his wife.   Assistive Device Currently Used at Home cane, straight  [reports brief use of RW in past]           IRF PT Evaluation and Treatment - 12/14/22 1310        PT Time Calculation    Start Time 1300     Stop Time 1400     Time Calculation (min) 60 min        Session Details    Document Type daily treatment/progress note     Mode of Treatment physical therapy;individual therapy        General Information    Patient Profile Reviewed yes     General Observations of Patient Recevied in gym in WC ready for session     Existing Precautions/Restrictions cardiac;fall;weight bearing        Weight-bearing Status    Right LE Weight-Bearing Status non weight-bearing (NWB)        Transfers    Comment gait belt donned during transfers        Low Pivot Transfer    [place], Low Pivot Transfer supervision     Verbal Cues safety     Assistive Device mobility belt;wheelchair     Comment Modified LTP WC <. Mat        Lower Extremity  (Therapeutic Exercise)    Exercise Position/Type prone;side lying     Reps and Sets 10 x 3     Comment Prone lying x 10 min, Prone hip ext, prone knee flexion, QS, SLR, hip circles CW / CCW, [place] off bolster, S/L hip ABD; Sitting EOM unilat long sit HS stretch 30 sec x 4        Core Strength (Therapeutic Exercise)    Core Strength, Position seated     Reps and Sets 10 x 3     Comment [place] twist 10# DB, Reclined chest press EOM 10# DB        Daily Progress Summary (PT)    Symptoms Noted During/After Treatment none     Progress Toward Functional Goals (PT) progressing toward functional goals as expected     Daily Outcome Statement Good exercise tolerance to stretching / strengthening of RLE and core.  Pt will well motivated and has potential to be a high level prosthetic user.                           IRF PT Goals    Flowsheet Row Most Recent Value   Bed Mobility Goal 1    Activity/Assistive Device bed mobility activities, all at 12/07/2022 1034   [place] supervision required at 12/07/2022 1034   Time Frame short-term goal (STG), 1 week at 12/07/2022 1034   Progress/Outcome goal met at 12/12/2022 1630   Bed Mobility Goal 2    Activity/Assistive Device bed mobility activities, all at 12/07/2022 1034   [place] modified independence at 12/07/2022 1034   Time Frame long-term goal (LTG), 14 days or less at 12/12/2022 1630   Progress/Outcome goal ongoing at 12/12/2022 1630   Transfer Goal 1    Activity/Assistive Device sit-to-stand/stand-to-sit, low pivot, stand pivot at 12/07/2022 1034   [place] supervision required at 12/07/2022 1034   Time Frame short-term goal (STG), 2-3 days at 12/12/2022 1630   Progress/Outcome goal ongoing at 12/12/2022 1630   Transfer Goal 2    Activity/Assistive Device sit-to-stand/stand-to-sit, low pivot, stand pivot at 12/07/2022 1034   [place] modified independence at 12/07/2022 1034   Time Frame long-term goal (LTG), 14 days or less at 12/12/2022 1630    Progress/Outcome goal ongoing at 12/12/2022 1630   Gait/Walking Locomotion Goal 1    Activity/Assistive Device gait (walking locomotion) at 12/12/2022 1630   Distance 20 feet at 12/12/2022 1630   [place] other (see comments)  [steadying assist] at 12/12/2022 1630   Time Frame short-term goal (STG), 2-3 days at 12/12/2022 1630   Progress/Outcome goal met at 12/12/2022 1630   Gait/Walking Locomotion Goal 2    Activity/Assistive Device gait (walking locomotion) at 12/12/2022 1630   Distance 20 feet at 12/12/2022 1630   [place] supervision required at 12/12/2022 1630   Time Frame long-term goal (LTG), 14 days or less at 12/12/2022 1630   Progress/Outcome goal ongoing at 12/12/2022 1630   Wheelchair Locomotion Goal 1    Activity wheelchair mobility skills, all at 12/07/2022 1034   Assistive Device manual, lightweight at 12/07/2022 1034   Distance 250 feet at 12/07/2022 1034   [place] modified independence at 12/07/2022 1034   Time Frame short-term goal (STG), 1 week at 12/07/2022 1034   Progress/Outcome goal met at 12/12/2022 1630   Wheelchair Locomotion Goal 2    Activity wheelchair mobility skills, all at 12/07/2022 1034   Assistive Device manual, lightweight at 12/07/2022 1034   Distance 500 feet at 12/07/2022 1034   [place] modified independence at 12/07/2022 1034   Time Frame long-term goal (LTG), 2 weeks at 12/07/2022 1034   Progress/Outcome goal met at 12/12/2022 1630

## 2022-12-14 NOTE — PLAN OF CARE
Problem: Rehabilitation (IRF) Plan of Care  Goal: Plan of Care Review  Outcome: Progressing  Flowsheets (Taken 12/13/2022 2105)  Progress: improving  Plan of Care Reviewed With: patient  Outcome Summary: Medicated with Tramadol for 6/10 residual limb pain. [place] in reach. Dressing to BKA CDI.   Plan of Care Review  Plan of Care Reviewed With: patient  Progress: improving  Outcome Summary: Medicated with Tramadol for 6/10 residual limb pain. [place] in reach. Dressing to BKA CDI.

## 2022-12-14 NOTE — PROGRESS NOTES
Patient: Harry Alvarado  Location: [facility] [address]  MRN: [id number]  Today's date: 12/14/2022    History of Present Illness  Harry is a 67 y.o. male admitted on 12/6/2022 with Status post below knee amputation of right lower extremity (CMS/[facility]) [Z89.511]. Principal problem is   Status post below knee amputation of right lower extremity (CMS/[facility]). The pt was admitted to Vascular Surgery service for a planned R BKA due to bypass thrombosis and ischemia of the R foot.   He is s/p R BKA on 12/2/22.  His hgb is stable and he has been restarted on his home Eliquis.   Tolerating a regular diet.  PM&R was consulted on 12/3 and recommends acute inpt rehab for intensive therapy as he can tolerate >3 hrs of combined PT/OT per day and at least 5 days/week with a reasonable expectation that the patient will make measurable functional improvement that only an acute rehab can provide.         Past Medical History  Harry has a past medical history of Coronary artery disease, Deep vein thrombosis (CMS/[facility]), Lupus anticoagulant disorder (CMS/[facility]), Lyme disease, MTHFR gene mutation, Myocardial infarction (CMS/[facility]), and PAD (peripheral artery disease) (CMS/[facility]).      PT Vitals    Date/Time Pulse HR Source SpO2 Pt Activity BP [facility]   12/14/22 1003 98 Monitor 98 % At rest 140/62 LDR      PT Pain    Date/Time Pain Type Side/Orientation Location Rating: Rest Rating: Activity Interventions [facility]   12/14/22 1003 Pain Assessment right residual limb 5 -- premedicated for activity;position adjusted LDR   12/14/22 1029 Pain Reassessment;Post Activity right residual limb -- 5 premedicated for activity;position adjusted LDR          Prior Living Environment    Flowsheet Row Most Recent Value   People in Home child(no), adult, spouse   Current Living Arrangements home   Home Accessibility stairs to enter home (Group), stairs within home (Group)   Living Environment Comment Pt [place] living in [place]. However, after  discharge, his wife and him plan to move into son's house in [place] with first floor setup and 1 YOSEF. After prosthetic training, pt plans to move back to [place].   Number of Stairs, Main Entrance 1   Location, Patient Bedroom first (main) floor   Location, Bathroom first (main) floor   Bathroom Access Comment per PT confirmed with family stall shower with 6 inch step to enter and seat within shower however pt does have a shower door that swings open, recommending temporary curtain and extended bench          Prior Level of Function    Flowsheet Row Most Recent Value   Dominant Hand right   Ambulation assistive equipment   Transferring assistive equipment   Toileting independent   Bathing assistive person   Dressing independent   Eating independent   Prior Level of Function Comment Pt was previously I with all ADLs, only requiring assist with bathing from his wife as function decreased prior to hospital admission. Pt was ambulating with a SPC for recent 2 months. + , retired. Pt enjoys skiing and playing pickleball with his wife.   Assistive Device Currently Used at Home cane, straight  [reports brief use of RW in past]           IRF PT Evaluation and Treatment - 12/14/22 1000        PT Time Calculation    Start Time 1000     Stop Time 1030     Time Calculation (min) 30 min        Session Details    Document Type daily treatment/progress note     Mode of Treatment physical therapy;individual therapy        General Information    Patient Profile Reviewed yes     General Observations of Patient Received pt in room. Pleasant, cooperative, agreeable to participate with PT.     Existing Precautions/Restrictions cardiac;fall;weight bearing        Weight-bearing Status    Right LE Weight-Bearing Status non weight-bearing (NWB)        Bed to Chair Transfer    [place], Bed to Chair close supervision   for safety    Verbal Cues hand placement;safety;technique     Assistive Device mobility belt;wheelchair      Comment LPT bed > w/c        Low Pivot Transfer    [place], Low Pivot Transfer supervision   for safety    Verbal Cues safety     Assistive Device mobility belt;wheelchair     Comment modified LPT w/c <> mat        Lower Extremity (Therapeutic Exercise)    Exercise Position/Type prone;side lying   L side lying    General Exercise right;hip aBduction;hip aDduction;hip extension     Reps and Sets 10 reps x 2 sets R hip abd and hip ext     Comment prone lying x 10 minutes        Daily Progress Summary (PT)    Symptoms Noted During/After Treatment none     Progress Toward Functional Goals (PT) progressing toward functional goals as expected     Daily Outcome Statement Pt tolerating prone lying x 10 minutes and progressing with R LE therex without c/o increased R residual limb pain.  Continue with POC.     Recommendations (PT) Continue with POC.                          IRF PT Goals    Flowsheet Row Most Recent Value   Bed Mobility Goal 1    Activity/Assistive Device bed mobility activities, all at 12/07/2022 1034   [place] supervision required at 12/07/2022 1034   Time Frame short-term goal (STG), 1 week at 12/07/2022 1034   Progress/Outcome goal met at 12/12/2022 1630   Bed Mobility Goal 2    Activity/Assistive Device bed mobility activities, all at 12/07/2022 1034   [place] modified independence at 12/07/2022 1034   Time Frame long-term goal (LTG), 14 days or less at 12/12/2022 1630   Progress/Outcome goal ongoing at 12/12/2022 1630   Transfer Goal 1    Activity/Assistive Device sit-to-stand/stand-to-sit, low pivot, stand pivot at 12/07/2022 1034   [place] supervision required at 12/07/2022 1034   Time Frame short-term goal (STG), 2-3 days at 12/12/2022 1630   Progress/Outcome goal ongoing at 12/12/2022 1630   Transfer Goal 2    Activity/Assistive Device sit-to-stand/stand-to-sit, low pivot, stand pivot at 12/07/2022 1034   [place] modified independence at 12/07/2022 1034   Time Frame  long-term goal (LTG), 14 days or less at 12/12/2022 1630   Progress/Outcome goal ongoing at 12/12/2022 1630   Gait/Walking Locomotion Goal 1    Activity/Assistive Device gait (walking locomotion) at 12/12/2022 1630   Distance 20 feet at 12/12/2022 1630   [place] other (see comments)  [steadying assist] at 12/12/2022 1630   Time Frame short-term goal (STG), 2-3 days at 12/12/2022 1630   Progress/Outcome goal met at 12/12/2022 1630   Gait/Walking Locomotion Goal 2    Activity/Assistive Device gait (walking locomotion) at 12/12/2022 1630   Distance 20 feet at 12/12/2022 1630   [place] supervision required at 12/12/2022 1630   Time Frame long-term goal (LTG), 14 days or less at 12/12/2022 1630   Progress/Outcome goal ongoing at 12/12/2022 1630   Wheelchair Locomotion Goal 1    Activity wheelchair mobility skills, all at 12/07/2022 1034   Assistive Device manual, lightweight at 12/07/2022 1034   Distance 250 feet at 12/07/2022 1034   [place] modified independence at 12/07/2022 1034   Time Frame short-term goal (STG), 1 week at 12/07/2022 1034   Progress/Outcome goal met at 12/12/2022 1630   Wheelchair Locomotion Goal 2    Activity wheelchair mobility skills, all at 12/07/2022 1034   Assistive Device manual, lightweight at 12/07/2022 1034   Distance 500 feet at 12/07/2022 1034   [place] modified independence at 12/07/2022 1034   Time Frame long-term goal (LTG), 2 weeks at 12/07/2022 1034   Progress/Outcome goal met at 12/12/2022 1630

## 2022-12-14 NOTE — PROGRESS NOTES
12/14/22 0928   Food Insecurity   Within the past 12 months, you worried that your food would run out before you got the money to buy more. Never true   Within the past 12 months, the food you bought just didn't last and you didn't have money to get more. Never true   Financial Resource Strain   How hard is it for you to pay for the very basics like food, housing, medical care, and heating? Not hard   Housing Stability   In the last 12 months, was there a time when you were not able to pay the mortgage or rent on time? N   In the last 12 months, was there a time when you did not have a steady place to sleep or slept in a shelter (including now)? N   Transportation Needs   In the past 12 months, has lack of transportation kept you from medical appointments or from getting medications? no   In the past 12 months, has lack of transportation kept you from meetings, work, or from getting things needed for daily living? No

## 2022-12-15 ENCOUNTER — APPOINTMENT (OUTPATIENT)
Dept: PSYCHOLOGY | Facility: CLINIC | Age: 67
End: 2022-12-15
Payer: MEDICARE

## 2022-12-15 ENCOUNTER — APPOINTMENT (INPATIENT)
Dept: PHYSICAL THERAPY | Facility: REHABILITATION | Age: 67
DRG: 560 | End: 2022-12-15
Payer: MEDICARE

## 2022-12-15 ENCOUNTER — APPOINTMENT (INPATIENT)
Dept: OCCUPATIONAL THERAPY | Facility: REHABILITATION | Age: 67
DRG: 560 | End: 2022-12-15
Payer: MEDICARE

## 2022-12-15 PROCEDURE — 97530 THERAPEUTIC ACTIVITIES: CPT | Mod: GP

## 2022-12-15 PROCEDURE — 97535 SELF CARE MNGMENT TRAINING: CPT | Mod: GO

## 2022-12-15 PROCEDURE — 97116 GAIT TRAINING THERAPY: CPT | Mod: GP

## 2022-12-15 PROCEDURE — 90832 PSYTX W PT 30 MINUTES: CPT | Performed by: PSYCHOLOGIST

## 2022-12-15 PROCEDURE — 97110 THERAPEUTIC EXERCISES: CPT | Mod: GP

## 2022-12-15 PROCEDURE — 97530 THERAPEUTIC ACTIVITIES: CPT | Mod: GO

## 2022-12-15 PROCEDURE — 63700000 HC SELF-ADMINISTRABLE DRUG: Performed by: PHYSICAL MEDICINE & REHABILITATION

## 2022-12-15 PROCEDURE — 12800000 HC ROOM AND CARE SEMIPRIVATE REHAB

## 2022-12-15 PROCEDURE — 63700000 HC SELF-ADMINISTRABLE DRUG: Performed by: HOSPITALIST

## 2022-12-15 RX ORDER — IBUPROFEN/PSEUDOEPHEDRINE HCL 200MG-30MG
3 TABLET ORAL NIGHTLY
Status: DISCONTINUED | OUTPATIENT
Start: 2022-12-15 | End: 2022-12-18 | Stop reason: HOSPADM

## 2022-12-15 RX ADMIN — APIXABAN 5 MG: 5 TABLET, FILM COATED ORAL at 07:37

## 2022-12-15 RX ADMIN — HYDROCODONE BITARTRATE AND ACETAMINOPHEN 1.5 TABLET: 5; 325 TABLET ORAL at 18:06

## 2022-12-15 RX ADMIN — PREGABALIN 50 MG: 50 CAPSULE ORAL at 07:37

## 2022-12-15 RX ADMIN — TRAZODONE HYDROCHLORIDE 25 MG: 50 TABLET, FILM COATED ORAL at 22:33

## 2022-12-15 RX ADMIN — HYDROCODONE BITARTRATE AND ACETAMINOPHEN 1.5 TABLET: 5; 325 TABLET ORAL at 13:17

## 2022-12-15 RX ADMIN — APIXABAN 5 MG: 5 TABLET, FILM COATED ORAL at 19:38

## 2022-12-15 RX ADMIN — ASPIRIN 81 MG: 81 TABLET, COATED ORAL at 07:37

## 2022-12-15 RX ADMIN — Medication 3 MG: at 22:33

## 2022-12-15 RX ADMIN — PANTOPRAZOLE SODIUM 40 MG: 40 TABLET, DELAYED RELEASE ORAL at 07:37

## 2022-12-15 RX ADMIN — HYDROCODONE BITARTRATE AND ACETAMINOPHEN 1.5 TABLET: 5; 325 TABLET ORAL at 07:36

## 2022-12-15 RX ADMIN — HYDROCODONE BITARTRATE AND ACETAMINOPHEN 1.5 TABLET: 5; 325 TABLET ORAL at 22:35

## 2022-12-15 RX ADMIN — PREGABALIN 50 MG: 50 CAPSULE ORAL at 19:38

## 2022-12-15 RX ADMIN — PREGABALIN 50 MG: 50 CAPSULE ORAL at 17:17

## 2022-12-15 ASSESSMENT — COGNITIVE AND FUNCTIONAL STATUS - GENERAL
AROUSAL LEVEL: ATTENTIVE
APPEARANCE: WELL GROOMED
REMOTE MEMORY: WNL
SLEEP_WAKE_CYCLE: DECREASED
DELUSIONS: NONE OR AGE APPROPRIATE
AFFECT: FULL RANGE
ORIENTATION: FULLY ORIENTED
EST. PREMORBID INTELLIGENCE: ABOVE AVERAGE
RECENT MEMORY: WNL
EYE_CONTACT: WNL
SPEECH: REGULAR
THOUGHT_CONTENT: APPROPRIATE
CONCENTRATION: WNL
ATTENTION: WNL
APPETITE: NO CHANGE
THOUGHT_PROCESS: WNL
INSIGHT: AWARE OF PHYSICAL LIMITATIONS;AWARE OF IMPACT ON FUNCTIONING;INTACT
MOOD: MOTIVATED;HOPEFUL
IMPULSE CONTROL: INTACT
PSYCHOMOTOR FUNCTIONING: WNL

## 2022-12-15 NOTE — PROGRESS NOTES
Patient: Harry Alvarado  Location: [facility] [address]  MRN: [id number]  Today's date: 12/15/2022    History of Present Illness  Harry is a 67 y.o. 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 Principal problem is   Status post below knee amputation of right lower extremity (CMS/[facility]). The pt was admitted to Vascular Surgery service for a planned R BKA due to bypass thrombosis and ischemia of the R foot.   He is s/p R BKA on 12/2/22.  His hgb is stable and he has been restarted on his home Eliquis.   Tolerating a regular diet.  PM&R was consulted on 12/3 and recommends acute inpt rehab for intensive therapy as he can tolerate >3 hrs of combined PT/OT per day and at least 5 days/week with a reasonable expectation that the patient will make measurable functional improvement that only an acute rehab can provide.         Past Medical History  Harry has a past medical history of Coronary artery disease, Deep vein thrombosis (CMS/[facility]), Lupus anticoagulant disorder (CMS/[facility]), Lyme disease, MTHFR gene mutation, Myocardial infarction (CMS/[facility]), and PAD (peripheral artery disease) (CMS/[facility]).      PT Vitals    Date/Time Pulse HR Source BP BP Location BP Method Pt Position [facility]   12/15/22 1322 74 Monitor 113/73 Left upper arm Automatic Sitting [facility]      PT Pain    Date/Time Pain Type Side/Orientation Location Rating: Rest Rating: Rest Rating: Activity Description Interventions [facility]   12/15/22 1301 Pain Assessment right residual limb 4 -- -- phantom -- [facility]   12/15/22 1317 Pain Assessment -- residual limb -- 6 - moderate-severe pain 8 - severe pain -- -- RE   12/15/22 1359 Pain Reassessment;Post Activity right residual limb 4 -- -- phantom position adjusted [facility]          Prior Living Environment    Flowsheet Row Most Recent Value   People in Home child(no), adult, spouse   Current Living Arrangements home   Home Accessibility stairs to enter home  (Group), stairs within home (Group)   Living Environment Comment Pt was living in [place] with his wife. However, after discharge, his wife and him plan to move into son's house in [place] with first floor setup and 1 YOSEF. After prosthetic training, pt plans to move back to [place].   Number of Stairs, Main Entrance 1   Location, Patient Bedroom first (main) floor   Location, Bathroom first (main) floor   Bathroom Access Comment per PT confirmed with family stall shower with 6 inch step to enter and seat within shower however pt does have a shower door that swings open, recommending temporary curtain and extended bench          Prior Level of Function    Flowsheet Row Most Recent Value   Dominant Hand right   Ambulation assistive equipment   Transferring assistive equipment   Toileting independent   Bathing assistive person   Dressing independent   Eating independent   Prior Level of Function Comment Pt was previously I with all ADLs, only requiring assist with bathing from his wife as function decreased prior to hospital admission. Pt was ambulating with a SPC for recent 2 months. + , retired. Pt enjoys skiing and playing pickleball with his wife.   Assistive Device Currently Used at Home cane, straight  [reports brief use of RW in past]           IRF PT Evaluation and Treatment - 12/15/22 1301        PT Time Calculation    Start Time 1300     Stop Time 1400     Time Calculation (min) 60 min        Session Details    Document Type daily treatment/progress note     Mode of Treatment individual therapy;physical therapy        General Information    Patient/Family/Caregiver Comments/Observations Pt's spouse at bedside at [facility] session. Education by PT provided regarding plan for family training tomorrow. This PT reviewed primary's PT recommendation to utilize w/c bumping to enter/exit home upon D/C - plan to trial with pt's spouse in tomorrow's scheduled family training. Communication regarding discussion  provided to PT completing training tomorrow 12/16.     General Observations of Patient Pt received semi-supine in bed. Spouse present at bedside.        Bed Mobility    [employer], Roll Left modified independence     [employer], Roll Right modified independence     [employer], Supine to Sit modified independence     [employer], Sit to Supine modified independence     Comment (Bed Mobility) Supine to sit completed on hospital bed. Supine to/from sit and rolling to facilitate prone lying with [employer].        Transfers    Comment mobility belt donned for functional mobility        Bed to Chair Transfer    [employer], Bed to Chair close supervision     Verbal Cues safety     Assistive Device mobility belt     Comment low-pivot transfer bed to w/c        Sit to Stand Transfer    [employer], Sit to Stand Transfer supervision     Verbal Cues safety     Assistive Device mobility belt;walker, front-wheeled     Comment completed from w/c        Stand to Sit Transfer    [employer], Stand to Sit Transfer supervision     Verbal Cues safety     Assistive Device mobility belt;walker, front-wheeled     Comment completed to EOM and w/c        Low Pivot Transfer    [employer], Low Pivot Transfer supervision     Verbal Cues safety     Assistive Device mobility belt;walker, front-wheeled     Comment completed as part of bed to w/c transfer        Stand Pivot Transfer    [employer], Stand Pivot/Stand Step Transfer touching/steadying assist     Verbal Cues safety     Assistive Device mobility belt;walker, front-wheeled     Comment completed via amb approach to EOM        Gait Training    [employer], Gait touching/steadying assist     Assistive Device mobility belt;walker, front-wheeled     Distance in Feet 30 feet     Pattern (Gait) --   hop-to pattern due to R BKA    Deviations/Abnormal Patterns (Gait) gait speed decreased;step length decreased     Left Sided Gait Deviations heel strike decreased     Maintains  "Weight-bearing Status (Gait) able to maintain     Comment (Gait/Stairs) steadying assist with RW; good safety awareness noted        Curb Negotiation    Brule dependent (less than 25% patient effort);2 person assist     Assistive Device mobility belt;walker, front-wheeled     Curb Height --   6\", 8\"    Comment Sandra to backwards ascend and forwards ascend 1x6\" curb step and 2x8\" curb step. 2nd person present only from standby assist        Wheelchair Mobility/Management    Method of Locomotion bimanual (upper extremity) propulsion     Wheelchair Type manual, lightweight     Brule, Forward Propulsion modified independence     Brule, Backward Propulsion modified independence     Brule, Steering Activities modified independence     Brule, Stopping Activities modified independence     Brule, Turning Activities modified independence     Brule, Doorway Navigation modified independence     Distance Propelled in Feet 100 feet     Arm Rest Management supervision     Leg Rest Management supervision     Wheel Locks/Brake Management supervision        Lower Extremity (Therapeutic Exercise)    Exercise Position/Type prone;side lying     General Exercise right;hip extension;hip aBduction     Reps and Sets 3x10     Comment Prone Lying x10 minutes as static stretch and as part of prone extensions. Hip abduction completed in side-lying        Daily Progress Summary (PT)    Daily Outcome Statement Education completed at beginning of session with pt and spouse (spouse present prior to start of session for meeting with prosthetist). Re-iterated primary PT\"s recommendation for w/c bumping to enter/exit home. Curb training continued this date on 6\" and 8\" step with 2nd person providing standby assist and Sandra providing Sandra at pelvis. if pt opts to use hopping strategy to enter front door,  anticipate pt would be safest with 2-person assist to facilitate anterior and posterior guarding. Pt " continues to demo good tolerance to prone lying. PT to continue to progress safety with functional mobility in anticipation of D/C home this week.                      Education Documentation  Home Safety, taught by Pricilla Riojas, PT at 12/15/2022  2:30 PM.  Learner: Family, Patient  Readiness: Acceptance  Method: Explanation  Response: Verbalizes Understanding  Comment: Discussed home mobility safety including exiting/entering home, w/c bumping, potential for temp ramp          IRF PT Goals    Flowsheet Row Most Recent Value   Bed Mobility Goal 1    Activity/Assistive Device bed mobility activities, all at 12/07/2022 1034   Starr supervision required at 12/07/2022 1034   Time Frame short-term goal (STG), 1 week at 12/07/2022 1034   Progress/Outcome goal met at 12/12/2022 1630   Bed Mobility Goal 2    Activity/Assistive Device bed mobility activities, all at 12/07/2022 1034   Starr modified independence at 12/07/2022 1034   Time Frame long-term goal (LTG), 14 days or less at 12/12/2022 1630   Progress/Outcome goal ongoing at 12/12/2022 1630   Transfer Goal 1    Activity/Assistive Device sit-to-stand/stand-to-sit, low pivot, stand pivot at 12/07/2022 1034   Starr supervision required at 12/07/2022 1034   Time Frame short-term goal (STG), 2-3 days at 12/12/2022 1630   Progress/Outcome goal ongoing at 12/12/2022 1630   Transfer Goal 2    Activity/Assistive Device sit-to-stand/stand-to-sit, low pivot, stand pivot at 12/07/2022 1034   Starr modified independence at 12/07/2022 1034   Time Frame long-term goal (LTG), 14 days or less at 12/12/2022 1630   Progress/Outcome goal ongoing at 12/12/2022 1630   Gait/Walking Locomotion Goal 1    Activity/Assistive Device gait (walking locomotion) at 12/12/2022 1630   Distance 20 feet at 12/12/2022 1630   Starr other (see comments)  [steadying assist] at 12/12/2022 1630   Time Frame short-term goal (STG), 2-3 days at 12/12/2022 1630    Progress/Outcome goal met at 12/12/2022 1630   Gait/Walking Locomotion Goal 2    Activity/Assistive Device gait (walking locomotion) at 12/12/2022 1630   Distance 20 feet at 12/12/2022 1630   [place] supervision required at 12/12/2022 1630   Time Frame long-term goal (LTG), 14 days or less at 12/12/2022 1630   Progress/Outcome goal ongoing at 12/12/2022 1630   Wheelchair Locomotion Goal 1    Activity wheelchair mobility skills, all at 12/07/2022 1034   Assistive Device manual, lightweight at 12/07/2022 1034   Distance 250 feet at 12/07/2022 1034   [place] modified independence at 12/07/2022 1034   Time Frame short-term goal (STG), 1 week at 12/07/2022 1034   Progress/Outcome goal met at 12/12/2022 1630   Wheelchair Locomotion Goal 2    Activity wheelchair mobility skills, all at 12/07/2022 1034   Assistive Device manual, lightweight at 12/07/2022 1034   Distance 500 feet at 12/07/2022 1034   [place] modified independence at 12/07/2022 1034   Time Frame long-term goal (LTG), 2 weeks at 12/07/2022 1034   Progress/Outcome goal met at 12/12/2022 1630

## 2022-12-15 NOTE — PROGRESS NOTES
"Inpatient Psychology Progress Note    Duration: 30 minutes  Harry Alvarado : 1955, a 67 y.o. male, for follow up visit.  Reason:  He has been experiencing adjustment to disbaility.    HPI: right BKA    Current Evaluation:     Mental Status Evaluation:  Arousal Level: Attentive  Appearance: Well Groomed  Speech: Regular  Psychomotor Functioning: WNL  Eye Contact: WNL  Est. Premorbid Intelligence: Above average  Orientation: Fully oriented  Attention: WNL  Concentration: WNL  Recent Memory: WNL  Remote Memory: WNL  Thought Content: Appropriate  Thought Process: WNL  Insight: Aware of physical limitations, Aware of impact on functioning, Intact  Perceptual Function: Other:, Pain, Visual impairment (glasses. Has phantom limb syndrome)  Delusions: None or age appropriate  Sleeping: Decreased (related to use of hospital bed mattress softness)  Appetite: No Change  Affect: Full Range  Mood: Motivated, Hopeful      Additional Assessments done this visit:     [place] Suicide Severity Rating Scale:  Not indicated           Safe-T Assessment:  Not indicated        Session Summary:   Psych met w/ Harry in his room. He was attentive, focused. IN wheelchair. He stated he feels that therapy is going fine. Feels ready to do more with his therapies, feels motivated. Stated he always has some pain- constant. Doing ok with handling it. Challenges he currently is facing are \"Not unexpected - able to handle it\", sees it as \"another chapter\". Seeing more positivity as getting more information from prosthetist. No change in sleep or appetite. Visitors daily and on the past weekend. Has been down to the greenhouse. Did do the group therapy for amputee. Couple of people dominated. Able to get something from it. Looking forward to discharge, being home, then getting fitted and coming back to [facility] for training.      Goals Addressed                 This Visit's Progress    • Increase adjustment to rehabilitation facility.   On track    " "• Maximize adjustment and acceptance of limitations   On track          Interventions  Acceptance & Adjustment  Monitoring of Symptoms    Psychoeducation provided on:  Other: n/a     Recommendations:      No Further sessions; Patient pending discharge        Visit Diagnosis:     1. Adjustment disorder with anxiety        Diagnostic Impression:   Weekly Progress Summary: progressing toward goals as expected  Weekly Outcome Statement: Psych met w/ Harry in his room. He was attentive, focused. IN wheelchair. He stated he feels that therapy is going fine. Feels ready to do more with his therapies, feels motivated. Stated he always has some pain- constant. Doing ok with handling it. Challenges he currently is facing are \"Not unexpected - able to handle it\", sees it as \"another chapter\". Seeing more positivity as getting more information from prosthetist. No change in sleep or appetite. Visitors daily and on the past weekend. Has been down to the greenhouse. Did do the group therapy for amputee. Couple of people dominated. Able to get something from it. Looking forward to discharge, being home, then getting fitted and coming back to [facility] for training.    Psychological condition is generally improving       CHIQUIS Godoy.D @ 4:39 PM  "

## 2022-12-15 NOTE — PROGRESS NOTES
"Daily Progress Note     Patient was seen and examined.   Attestation Notes: Face to face encounter completed    Subjective    The patient having better pain control with change in medications.  Hydrocodone helping more than tramadol.  No side effects.  Still continues to have sleep dysfunction.  Patient min assist to touch assist with transfers.  Improving with hopping using a rolling walker.  Objective     Visit Vitals  /73 (BP Location: Left upper arm, Patient Position: Sitting)   Pulse 68   Temp 36.7 °C (98 °F) (Oral)   Resp 18   Ht 1.854 m (6' 1\")   Wt 69.4 kg (153 lb)   SpO2 97%   BMI 20.19 kg/m²     Review of Systems:  Pertinent items are noted in HPI.  Denies chest pain and shortness of breath.  Review of systems otherwise negative.    Labs     Results from last 7 days   Lab Units 12/12/22  0749   WBC K/uL 12.83*   HEMOGLOBIN g/dL 12.1*   HEMATOCRIT % 38.3*   PLATELETS K/uL 534*     Results from last 7 days   Lab Units 12/12/22  0702   SODIUM mEQ/L 135*   POTASSIUM mEQ/L 5.0   CHLORIDE mEQ/L 99   CO2 mEQ/L 27   BUN mg/dL 18   CREATININE mg/dL 0.7*   CALCIUM mg/dL 9.8   ALBUMIN g/dL 2.9*   BILIRUBIN TOTAL mg/dL 0.2*   ALK PHOS IU/L 140*   ALT IU/L 42   AST IU/L 29   GLUCOSE mg/dL 85     Full Code    Physical Exam  General      Alert, cooperative, no distress, appears stated age.   Head:    Normocephalic, without obvious abnormality, atraumatic.   Eyes:    PERRL, conjunctiva/corneas clear, EOM's intact.        Nose:   Nares normal, septum midline, mucosa normal, no drainage or            sinus tenderness.   Throat:   Lips, mucosa, and tongue normal.    Neck:   Supple, symmetrical, trachea midline.    Back:     Symmetric, no curvature.   Lungs:     Clear to auscultation bilaterally, respirations unlabored.   Chest wall:    No tenderness or deformity.   Heart:    Regular rate and rhythm, S1 and S2 normal.   Abdomen:     Soft, non-tender, bowel sounds active all four quadrants,     no masses, no " organomegaly.   Extremities:  Musculoskeletal:  Stable stump edema.  Right transtibial amputation.     Pulses:   1+ and symmetric all extremities.   Skin:  Stable pressure injury over tibial tuberosity.  Incision intact.   Neurologic:          Behavior/  Emotional:  CNII-XII intact.  Alert and oriented ×3.  Motor exam intact.  Sensory exam intact.  Reflexes stable decreased reflexes.      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 male who presents with the following chronic medical problems including MTHFR gene mutation, coronary artery disease with prior myocardial infarction 1993, peripheral artery disease with multiple bypass procedures in the past most recent with tPA bolus and angioplasty in 2020.  The patient admitted to [facility] on November 30, 2022 to the vascular surgery service for planned BKA due to bypass thrombosis.  At the time of admission the patient had cyanosis and was beginning to develop thanh gangrene of the foot.  Patient had reported over the last 1 to 2 months worsening pain of the right foot and that he knew the graft was occluded.  Patient has a history of 15+ surgeries of the extremities and understood that his revascularization options were limited.  He had severe 10 out of 10 pain of the foot relieved with elevation of the leg.  He was still able to ambulate but limited by pain.  Under the care of Bowen Serrato MD of vascular surgery he underwent right transtibial amputation on December 2, 2022.  Postoperative course unremarkable with advancement of diet and pain control.  ESTUARDO drain removed on December 4.  For postoperative pain control he was treated with morphine.  On discharge prescribed Tylenol and low-dose Lyrica.  Lyrica started prior by his PCP for neuropathic pain.  He was restarted on his home  Eliquis.  He required min assist for bed mobility, transfers and ambulation.  Vascular surgery wants follow-up in 1 month.  Patient is now appropriate for acute inpatient rehabilitation.    Comprehensive inpatient rehabilitation program status post right transtibial amputation.  Goal is for the patient to reach a modified independent level with bed mobility, transfers and ambulation.  Ambulation with hopping short distances using a rolling walker.  Also to become modified independent with elevations.  Preprosthetic training.  Ongoing pain control.    For pain control continue low-dose Lyrica and consider increasing dose if the patient has ongoing phantom pain.  Continue around-the-clock Tylenol.  Continue tramadol.  If needed adjust dose.    Monitor incision closely and continue incisional and stump care.  Pressure injury over the tibial tuberosity.  Stable.  Continue to monitor.  Pressure-relief over area.    The patient with ongoing residual stump pain.  Also has phantom pain but postsurgical pain more significant.  Worsens at nighttime.  Will discontinue Tylenol and tramadol.  Begin hydrocodone.  Change Lyrica to 50 mg 3 times daily.    The patient having better pain control with change in medications.  Hydrocodone helping more than tramadol.  No side effects.      Still continues to have sleep dysfunction.  We will trial low-dose melatonin and trazodone.  Try to avoid Ambien.  Patient min assist to touch assist with transfers.  Improving with hopping using a rolling walker.    Anemia  Assessment & Plan  Postoperative anemia.  Monitor hemoglobin levels.  Hemoglobin 12.1.  Mild iron deficiency and consider iron replacement.    Lupus anticoagulant disorder (CMS/HCC)  Assessment & Plan  Continue Eliquis.    Deep vein thrombosis (CMS/HCC)  Assessment & Plan  Continue Eliquis.    Coronary artery disease  Assessment & Plan  Cardiac precautions.  Continue Eliquis.        Expected Discharge Date:  12/18/2022

## 2022-12-15 NOTE — PROGRESS NOTES
[facility] Progress Note          Patient was seen and examined.   Attestation Notes: Face to face encounter completed    Harry Alvarado is a 67 y.o. male who was admitted for Status post below knee amputation of right lower extremity (CMS/HCC) [Z89.511]. Patient was referred by Richard Angulo MD for medical assessment and management      CC: Status post below knee amputation of right lower extremity (CMS/HCC) [Z89.511]     HPI: Harry Alvarado is a 67 y.o. male with MTHFR gene mutation, CAD s/p MI 1993, and PAD s/p R Fem to PT bypass with arm vein in 1993, revision in 2011, s/p R SFA to peroneal bypass graft s/p revision on 7/30/18, s/p tPA bolus and angioplasty in 2020 admitted to  11/30/22 with RLE bypass thrombosis and chronic right foot ischemia/gangrene and underwent right BKA by vascular surgeon, Dr. Bowen Serrato 12/2/22. Post op he had anemia but did not require transfusion. He was restarted on his home Eliquis.  His pain was managed with Tylenol and Lyrica. Tolerating a regular diet.      The patient was evaluated by PM&R and found to have residual deficits in ambulation and ADLs due to Status post below knee amputation of right lower extremity (CMS/HCC) [Z89.511] and came to [facility] on 12/6/2022 for acute inpatient rehab.      He participated in therapy.     SUBJECTIVE:  Patient interviewed and examined    He remains without new complaints.     Still with RLE surgical site pain and phantom pain, moving bowels and bladder, no abdominal pain or nausea, no dysuria, no chest pain, palpitations, dyspnea or fevers    Review of Systems:  All other systems reviewed and negative except as noted in the HPI.    Current meds and allergies reviewed    Past Medical History:   Diagnosis Date   • Coronary artery disease     s/p MI   • Deep vein thrombosis (CMS/HCC)    • Lupus anticoagulant disorder (CMS/HCC)    • Lyme disease    • MTHFR gene mutation    • Myocardial infarction  (CMS/[facility])    • PAD (peripheral artery disease) (CMS/[facility])      Past Surgical History:   Procedure Laterality Date   • BELOW KNEE LEG AMPUTATION Right 12/02/2022   • FEMORAL BYPASS Right      Social History     Tobacco Use   • Smoking status: Never   • Smokeless tobacco: Never   Substance Use Topics   • Alcohol use: Not Currently      No family history on file.    Vital signs in last 24 hours:  Temp:  [36.2 °C (97.1 °F)-36.7 °C (98 °F)] 36.7 °C (98 °F)  Heart Rate:  [64-74] 68  Resp:  [16-18] 18  BP: (113-134)/(70-80) 134/73  Vital signs reviewed 12/15/22 4:28 PM    Physical Exam  Vitals and nursing note reviewed.   Constitutional:       Appearance: Normal appearance.   HENT:      Head: Normocephalic and atraumatic.      Right Ear: External ear normal.      Left Ear: External ear normal.      Nose: Nose normal.      Mouth/Throat:      Mouth: Mucous membranes are moist.      Pharynx: Oropharynx is clear.   Eyes:      Extraocular Movements: Extraocular movements intact.      Conjunctiva/sclera: Conjunctivae normal.      Pupils: Pupils are equal, round, and reactive to light.   Cardiovascular:      Rate and Rhythm: Normal rate and regular rhythm.      Pulses: Normal pulses.      Heart sounds: Normal heart sounds.   Pulmonary:      Effort: Pulmonary effort is normal.      Breath sounds: Normal breath sounds.   Abdominal:      General: Abdomen is flat. Bowel sounds are normal.      Palpations: Abdomen is soft.   Musculoskeletal:      Cervical back: Normal range of motion and neck supple.      Right lower leg: Edema present.      Left lower leg: Edema present.      Comments: S/p right BKA   Skin:     General: Skin is warm and dry.   Neurological:      General: No focal deficit present.      Mental Status: He is alert and oriented to person, place, and time.   Psychiatric:         Mood and Affect: Mood normal.         Behavior: Behavior normal.                 Objective    Labs:  I have reviewed the patient's labs.   Significant abnormals are leukocytosis.  Results from last 7 days   Lab Units 12/12/22  0749   WBC K/uL 12.83*   HEMOGLOBIN g/dL 12.1*   HEMATOCRIT % 38.3*   PLATELETS K/uL 534*     Results from last 7 days   Lab Units 12/12/22  0702   SODIUM mEQ/L 135*   POTASSIUM mEQ/L 5.0   CHLORIDE mEQ/L 99   CO2 mEQ/L 27   BUN mg/dL 18   CREATININE mg/dL 0.7*   CALCIUM mg/dL 9.8   ALBUMIN g/dL 2.9*   BILIRUBIN TOTAL mg/dL 0.2*   ALK PHOS IU/L 140*   ALT IU/L 42   AST IU/L 29   GLUCOSE mg/dL 85     Results from last 7 days   Lab Units 12/12/22  0702   MAGNESIUM mg/dL 1.9        Imaging:  Not applicable        ASSESSMENT/PLAN:    67 y.o. male with MTHFR gene mutation, CAD s/p MI 1993, and PAD s/p R Fem to PT bypass with arm vein in 1993, revision in 2011, s/p R SFA to peroneal bypass graft s/p revision on 7/30/18, s/p tPA bolus and angioplasty in 2020 admitted to [facility] 11/30/22 with RLE bypass thrombosis and chronic right foot ischemia/gangrene and underwent right BKA by vascular surgeon, Dr. Bowen Serrato 12/2/22     1. Vasc:  -PAD s/p failed RLE bypass with right foot ischemia/gangrene  -s/p right BKA 12/2/22  -restarted Aspirin 81 mg daily for PAD per patient request  -Tylenol and Lyrica for pain  -remains on Eliquis for hypercoagulable condition     2. Heme:  -post op anemia due to chronic blood loss, does not need iron  -leukocytosis reactive due to surgery  -hx of MTHFR gene mutation  -follow CBC     3. Renal:  -increased risk of dehydration and electrolyte changes  -follow BMP, Mg     4. Gi:  -Senokot-S as needed for bowels  -Protonix for GERD and ulcer ppx  -elevated LFTs likely transient due to surgery, meds, follow for now     5. Gu:  -no UTI or retention     6. Cardiac:  -hx of CAD  -watch for orthostatic hypotension  -use cardiac precautions in therapy     7. Pulm:  -incentive spirometry for atelectasis     8. Derm:  -seen by [employer] for skin assessment  -right tibial tuberosity bruising, normal  post op changes, follow for now     9. Nutrition:  -seen by Nutrition for assessment and education     10. Psych:  -seen by Psychology for support  -Ambien prn sleep    11. Dispo:  -Expected discharge date 12/18/2022         plan discussed with patient, nurse, case management and  Richard Angulo MD Scott Sapperstein, MD  12/15/2022  4:28 PM

## 2022-12-15 NOTE — DISCHARGE INSTR - ACTIVITY
Occupational Therapy:    Toilet Transfers: Zhen is modified independent for transferring from the wheelchair to the toilet using the toilet safety frame with hand rails. Zhen can use his walker to access the bathroom if needed.     Shower/Tub Transfers: Zhen requires supervision to transfer from the wheelchair to the shower chair or using his walker and hopping technique to the shower based on size of bathroom. Use a gripper sock on the L foot for increased safety with the transfer. Practice this transfer dry prior to showering for best technique to utilize.     Upper and Lower Body Dressing: Zhen is modified independent for dressing tasks. Use the wheelchair to retrieve clothing from closet and dresser.     Bathing: Zhen is modified independent for bathing seated on the shower chair. Recommend installation of hand held shower head. Grab bars can be installed for increased safety once at home in [place].    Toileting: Zhen is modified independent for all toileting tasks seated on the raised toilet seat with hand rails.    Grooming: Zhen is modified independent for grooming tasks seated in the wheelchair at the sink.     Household Mobility/Household Activity: modification of  level household mobility and activities for increased safety.    Driving: Driving is unsafe and not recommended at this time. Consult your  rehab program for evaluation for approval before resuming driving once returned to [place]. Handicap Placard paperwork provided and to be sent to [place] DM[employer].    Entered by: Alayna Alfred MS, OTR/L on: 12/16/2022      Physical Therapy:      Bed mobility: modified independent      Transfers: modified independent sit<>Stand, stand-pivot transfer with rolling walker, modified independent low pivot transfer from wheelchair level.      Ambulation: modified independent with rolling walker up to 50 feet      Elevations: dependent bump in wheelchair for steps to enter home     Wheelchair Mobility: modified  independent with bilateral upper extremities, >500 feet       Entered by: Anna Antonio PT, DPT  on: 12/16/22       Additional:     Weight-Bearing Status: non-weightbearing to your right leg      Durable Medical Equipment: Patient was issued a manual wheelchair from [employer].  Please call [phone] with any issues or questions.  Patient and family to acquire a rolling walker.      Home Exercise Program: complete once daily in bed with use of handout.

## 2022-12-15 NOTE — PLAN OF CARE
Problem: Rehabilitation (IRF) Plan of Care  Goal: Plan of Care Review  Outcome: Progressing  Flowsheets (Taken 12/14/2022 2014)  Progress: improving  Plan of Care Reviewed With: patient  Outcome Summary: Medicated with Norco as ordered for pain management. Dressing to residual limb CDI. OOB to WC at this time.   Plan of Care Review  Plan of Care Reviewed With: patient  Progress: improving  Outcome Summary: Medicated with Norco as ordered for pain management. Dressing to residual limb CDI. OOB to WC at this time.

## 2022-12-15 NOTE — PROGRESS NOTES
Patient: Harry Alvarado  Location: [facility] [address]  MRN: [id number]  Today's date: 12/15/2022    History of Present Illness  Harry is a 67 y.o. male admitted on 12/6/2022 with Status post below knee amputation of right lower extremity (CMS/[facility]) [Z89.511]. 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 The pt was admitted to Vascular Surgery service for a planned R BKA due to bypass thrombosis and ischemia of the R foot.   He is s/p R BKA on 12/2/22.  His hgb is stable and he has been restarted on his home Eliquis.   Tolerating a regular diet.  PM&R was consulted on 12/3 and recommends acute inpt rehab for intensive therapy as he can tolerate >3 hrs of combined PT/OT per day and at least 5 days/week with a reasonable expectation that the patient will make measurable functional improvement that only an acute rehab can provide.         Past Medical History  Harry has a past medical history of Coronary artery disease, Deep vein thrombosis (CMS/[facility]), Lupus anticoagulant disorder (CMS/[facility]), Lyme disease, MTHFR gene mutation, Myocardial infarction (CMS/[facility]), and PAD (peripheral artery disease) (CMS/[facility]).      OT Vitals    Date/Time Pulse HR Source BP BP Location BP Method Pt Position [facility]   12/15/22 0911 73 Monitor 130/70 Left upper arm Automatic Sitting BRANDT      OT Pain    Date/Time Pain Type Side/Orientation Location Rating: Rest Description Interventions [facility]   12/15/22 0911 Pain Assessment right residual limb 3 incisional;phantom -- desensitization techniques utilized BRANDT   12/15/22 1029 Pain Reassessment right residual limb 3 phantom -- desensitization techniques utilized BRANDT          Prior Living Environment    Flowsheet Row Most Recent Value   People in Home child(no), adult, spouse   Current Living Arrangements home   Home Accessibility stairs to enter home (Group), stairs within home (Group)   Living Environment Comment Pt was living in [place] with  his wife. However, after discharge, his wife and him plan to move into son's house in [place] with first floor setup and 1 YOSEF. After prosthetic training, pt plans to move back to [place].   Number of Stairs, Main Entrance 1   Location, Patient Bedroom first (main) floor   Location, Bathroom first (main) floor   Bathroom Access Comment per PT confirmed with family stall shower with 6 inch step to enter and seat within shower however pt does have a shower door that swings open, recommending temporary curtain and extended bench          Prior Level of Function    Flowsheet Row Most Recent Value   Dominant Hand right   Ambulation assistive equipment   Transferring assistive equipment   Toileting independent   Bathing assistive person   Dressing independent   Eating independent   Prior Level of Function Comment Pt was previously I with all ADLs, only requiring assist with bathing from his wife as function decreased prior to hospital admission. Pt was ambulating with a SPC for recent 2 months. + , retired. Pt enjoys skiing and playing pickleball with his wife.   Assistive Device Currently Used at Home cane, straight  [reports brief use of RW in past]          Occupational Profile    Flowsheet Row Most Recent Value   Occupational History/Life Experiences +. 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, also enjoys hiking,   Environmental Supports and Barriers Pt is interested in learning about return to driving, plans to purchase a new vehicle.  Recommend arranging Chirag Eaton from the D[employer] to speak with pt.  Pt lives in [place] and plans on staying in [place] with son until he returns for prosthetic training and is able to return to [place].  Initially wanted to drive home.  Discussed potentially purchasing and adapting a vehicle during his stay in [place].  Recommend arranging for pt to view adapted  "vehicle.   Patient Goals \"Be able to get around better, take care of myself and play with my grandkids.\" And also \"Get a prosthesis and walk around like I used too.\"  [PSFS completed 12/8, pt reported: Walking/hiking 0/10, Pickleball 0/10, Bathing 5/10, Playing with grandkids 5/10 score 10/40 0.25.]           IRF OT Evaluation and Treatment - 12/15/22 0910        OT Time Calculation    Start Time 0900     Stop Time 1030     Time Calculation (min) 90 min        Session Details    Document Type daily treatment/progress note     Mode of Treatment occupational therapy;individual therapy        General Information    General Observations of Patient Pt received sitting EOB, questions reguarding meetings scheduled for the day, agreeable to OT session for ADL and D/C prep activities        Transfers    Comment gait belt donned for transfers        Sit to Stand Transfer    [place], Sit to Stand Transfer supervision     Verbal Cues safety     Assistive Device --   grab bars    Comment half stand for LB dressing in shower stall c grab bars        Stand to Sit Transfer    [place], Stand to Sit Transfer supervision     Verbal Cues safety     Assistive Device --   grab bars    Comment from half stand c grab bars in shower stall to shower chair        Low Pivot Transfer    [place], Low Pivot Transfer supervision     Verbal Cues safety     Assistive Device mobility belt;wheelchair     Comment modified LPT EOB to WC        Toilet Transfer    Transfer Technique low pivot     [place], Toilet Transfer supervision     Verbal Cues hand placement;safety;technique     Assistive Device raised toilet seat     Comment simulated practice in ILU, modified LPT to/from WC to/from raised toilet seat on commode to simulate D/C environment, cues for proper WC placement for increased safety.        Shower Transfer    Transfer Technique low pivot;stand pivot     [place], Shower Transfer supervision     Verbal Cues safety     " "Assistive Device grab bars/tub rail;shower chair     Comment 1. modified LPT to/from WC to/from shower chair c grab bars in barrier free shower stall, pt did not utilized grab bar support but able to place hand on shower head and demo safe technique. 2. Simulated practice in ILU based on D/C setup c shower stall c glass doors and small opening, minimal barrier, shower chair, Close supervision c review of technique of hand placement on WC arm rest and steadying using \"door\" for half stand pivot to/from shower chair placed closely in shower stall. In depth review of setup based on son's home and personal home pictures. Recommendation of supervision from wife for transfer safety anddry practice initally at D/C for increased comfort c transfer in home environment, pt agreeable.        Safety Issues, Functional Mobility    Comment, Safety Issues/Impairments (Mobility) OT: WC level clothing retrieval completed c supervision this session, cues for technique and positioning of WC only.        Therapeutic Interventions    Comment, Therapeutic Intervention 1. OT and pt discuss narrowing down DME needs based on pictures of both bathroom environments (sons home and pt's [place] home). Raised toilet seat c hand rails and shower chair recommended and pt's wife planning to purchase. Hand held shower head to be installed as well. Pt c shower stall frame for support at son's home and half wall for support with modified SPT c supervision from wife recommended for shower transfer. 2. [place] handicap placard process started, OT provided paperwork for pt to complete and will have MD sign tomorrow for pt to have wife mail to [facility] c payment for placard. Review of utilizing curb drop off initally for appointments until placard recieved.        Bathing    Shower Provided? Yes     Self-Performance chest;left arm;right arm;abdomen;front perineal area;buttocks;left upper leg;right upper leg;left lower leg, including foot;right lower leg, " including foot     [place] supervision     Position supported sitting     Setup Assistance obtain supplies     Adaptive Equipment grab bar/tub rail;hand-held shower spray hose;shower chair     Comment full wet shower completed, all tasks seated on shower chair. Pt using lateral lean technique for buttocks. Pt's R LE open to air, soap and water, no scrubbing. OT discussed installation of hand held shower head, pt verablize going to install        Upper Body Dressing    Tasks don;pull over garment     Self-Performance threads left arm, shirt;threads right arm, shirt;pulls shirt over head/around back;pulls shirt down/adjusts;obtains clothes     [place] supervision     Position supported sitting     Adaptive Equipment none     Comment Supervision seated on shower chair post showering. Clothing retrieval WC level completed.        Lower Body Dressing    Tasks don;underwear;pants/bottoms;socks;shoes/slippers     Self-Performance obtains clothes;threads left leg, underpants;threads right leg, underpants;pulls underpants up or down;threads left leg, pants/shorts;threads right leg, pants/shorts;pulls pants/shorts up or down;dons/doffs left sock;dons/doffs left shoe;shoelaces, left     [place] supervision     Position supported sitting     Adaptive Equipment none     [place], Footwear supervision     Comment pt completing LB dresisng in shower post showering, utilizing grab bars for half standing for pulling up over hips. Pt donning sock and shoe WC level, Supervision provided. Nurse present for redressing R LE post showering. WC level clothing retrieval completed.        Grooming    Self-Performance washes, rinses and dries face;washes, rinses and dries hands;oral care (brushing teeth, cleaning dentures)     [place] supervision     Position supported sitting     Adaptive Equipment none     [place], Oral Hygiene supervision     Comment pt completing grooming tasks WC level at sink         Toileting    Comment no toileting needs this session, Supervision anticipated based on ADL completion        Daily Progress Summary (OT)    Daily Outcome Statement OT session c focus on ADL routine at WC level, supervision for all tasks and transfers demo, finalizing DME needs for pt's wife to purchase (shower chair c back rest and raised toilet seat c hand rails), and start of [place] handicap parking placard process. OT encouraged pt to begin figure 8 ace wrapping c therapist and nurse for review of technique for self completion at home. Family training scheduled c wife tomorrow. Continue OT per POC to prep for upcoming D/C this weekend.                     IRF OT Goals    Flowsheet Row Most Recent Value   Transfer Goal 1    Activity/Assistive Device toilet at 12/07/2022 0720   [place] supervision required at 12/07/2022 0720   Time Frame short-term goal (STG), 1 week at 12/13/2022 0650   Progress/Outcome goal ongoing at 12/13/2022 0650   Transfer Goal 2    Activity/Assistive Device toilet at 12/07/2022 0720   [place] modified independence at 12/07/2022 0720   Time Frame long-term goal (LTG), 14 days or less at 12/07/2022 0720   Progress/Outcome goal ongoing at 12/13/2022 0650   Transfer Goal 3    Activity/Assistive Device shower at 12/07/2022 0720   [place] other (see comments)  [Cl S] at 12/13/2022 0650   Time Frame short-term goal (STG), 1 week at 12/13/2022 0650   Progress/Outcome goal met, goal revised this date at 12/13/2022 0650   Transfer Goal 4    Activity/Assistive Device shower at 12/07/2022 0720   [place] modified independence at 12/07/2022 0720   Time Frame long-term goal (LTG) at 12/07/2022 0720   Progress/Outcome goal ongoing at 12/13/2022 0650   Bathing Goal 1    [place] supervision required at 12/13/2022 0650   Time Frame short-term goal (STG), 1 week at 12/13/2022 0650   Progress/Outcome goal met, goal revised this date at 12/13/2022 0650   Bathing Goal 2    [place]  modified independence at 12/07/2022 0720   Time Frame long-term goal (LTG), 14 days or less at 12/07/2022 0720   Progress/Outcome goal ongoing at 12/13/2022 0650   UB Dressing Goal 1    [place] modified independence at 12/13/2022 0650   Time Frame short-term goal (STG), 1 week at 12/13/2022 0650   Progress/Outcome goal met, goal revised this date at 12/13/2022 0650   UB Dressing Goal 2    [place] modified independence at 12/07/2022 0720   Time Frame long-term goal (LTG), 14 days or less at 12/07/2022 0720   Progress/Outcome goal ongoing at 12/13/2022 0650   LB Dressing Goal 1    [place] other (see comments)  [Cl S] at 12/13/2022 0650   Time Frame short-term goal (STG), 1 week at 12/13/2022 0650   Progress/Outcome goal met, goal revised this date at 12/13/2022 0650   LB Dressing Goal 2    [place] modified independence at 12/07/2022 0720   Time Frame long-term goal (LTG), 14 days or less at 12/07/2022 0720   Progress/Outcome goal ongoing at 12/13/2022 0650   Grooming Goal 1    [place] set-up required at 12/07/2022 0720   Time Frame short-term goal (STG), 1 week at 12/13/2022 0650   Progress/Outcome goal ongoing at 12/13/2022 0650   Grooming Goal 2    [place] modified independence at 12/07/2022 0720   Time Frame long-term goal (LTG), 14 days or less at 12/07/2022 0720   Progress/Outcome goal ongoing at 12/13/2022 0650   Toileting Goal 1    [place] supervision required at 12/07/2022 0720   Time Frame short-term goal (STG), 1 week at 12/13/2022 0650   Progress/Outcome goal ongoing at 12/13/2022 0650   Toileting Goal 2    [place] modified independence at 12/07/2022 0720   Time Frame long-term goal (LTG), 14 days or less at 12/07/2022 0720   Progress/Outcome goal ongoing at 12/13/2022 0650

## 2022-12-15 NOTE — PROGRESS NOTES
Patient: Harry Alvarado  Location: [facility] [address]  MRN: [id number]  Today's date: 12/15/2022    History of Present Illness  Harry is a 67 y.o. male admitted on 12/6/2022 with Status post below knee amputation of right lower extremity (CMS/[facility]) [Z89.511]. Principal problem is   Status post below knee amputation of right lower extremity (CMS/[facility]). The pt was admitted to Vascular Surgery service for a planned R BKA due to bypass thrombosis and ischemia of the R foot.   He is s/p R BKA on 12/2/22.  His hgb is stable and he has been restarted on his home Eliquis.   Tolerating a regular diet.  PM&R was consulted on 12/3 and recommends acute inpt rehab for intensive therapy as he can tolerate >3 hrs of combined PT/OT per day and at least 5 days/week with a reasonable expectation that the patient will make measurable functional improvement that only an acute rehab can provide.         Past Medical History  Harry has a past medical history of Coronary artery disease, Deep vein thrombosis (CMS/[facility]), Lupus anticoagulant disorder (CMS/[facility]), Lyme disease, MTHFR gene mutation, Myocardial infarction (CMS/[facility]), and PAD (peripheral artery disease) (CMS/[facility]).      PT Vitals    Date/Time Pulse HR Source BP BP Location BP Method Pt Position [facility]   12/15/22 1535 68 Monitor 134/73 Left upper arm Automatic Sitting RC      PT Pain    Date/Time Pain Type Side/Orientation Location Rating: Rest Rating: Activity Description Interventions [facility]   12/15/22 1535 Pain Assessment right residual limb 4 -- phantom position adjusted    12/15/22 1558 Pain Reassessment right residual limb -- 5 phantom position adjusted RC          Prior Living Environment    Flowsheet Row Most Recent Value   People in Home child(no), adult, spouse   Current Living Arrangements home   Home Accessibility stairs to enter home (Group), stairs within home (Group)   Living Environment Comment Pt was living in [place] with his wife. However, after  discharge, his wife and him plan to move into son's house in [place] with first floor setup and 1 YOSEF. After prosthetic training, pt plans to move back to [place].   Number of Stairs, Main Entrance 1   Location, Patient Bedroom first (main) floor   Location, Bathroom first (main) floor   Bathroom Access Comment per PT confirmed with family stall shower with 6 inch step to enter and seat within shower however pt does have a shower door that swings open, recommending temporary curtain and extended bench          Prior Level of Function    Flowsheet Row Most Recent Value   Dominant Hand right   Ambulation assistive equipment   Transferring assistive equipment   Toileting independent   Bathing assistive person   Dressing independent   Eating independent   Prior Level of Function Comment Pt was previously I with all ADLs, only requiring assist with bathing from his wife as function decreased prior to hospital admission. Pt was ambulating with a SPC for recent 2 months. + , retired. Pt enjoys skiing and playing pickleball with his wife.   Assistive Device Currently Used at Home cane, straight  [reports brief use of RW in past]           IRF PT Evaluation and Treatment - 12/15/22 1535        PT Time Calculation    Start Time 1530     Stop Time 1600     Time Calculation (min) 30 min        Session Details    Document Type daily treatment/progress note     Mode of Treatment physical therapy;individual therapy        General Information    Patient Profile Reviewed yes        Transfers    Transfers stand pivot transfer     Maintains Weight-Bearing Status (Transfers) able to maintain     Comment mobility belt donned for all transfers and ambulation, doffed at end of session        Sit to Stand Transfer    [place], Sit to Stand Transfer supervision     Verbal Cues safety     Assistive Device walker, front-wheeled;mobility belt     Comment multiple bouts from w/c; supervision for balance deficits        Stand to Sit  Transfer    [place], Stand to Sit Transfer supervision     Verbal Cues safety     Assistive Device walker, front-wheeled;mobility belt     Comment multiple bouts to w/c; supervision for balance deficits        Low Pivot Transfer    [place], Low Pivot Transfer supervision     Verbal Cues safety     Assistive Device mobility belt;walker, front-wheeled     Comment SPT w/c <> EOB        Stand Pivot Transfer    [place], Stand Pivot/Stand Step Transfer touching/steadying assist     Verbal Cues safety     Assistive Device mobility belt;walker, front-wheeled     Comment via ambulatory approach to w/c        Gait Training    [place], Gait touching/steadying assist     Assistive Device walker, front-wheeled;mobility belt     Distance in Feet 86 feet     Deviations/Abnormal Patterns (Gait) gait speed decreased;step length decreased;base of support, narrow     Left Sided Gait Deviations heel strike decreased     Maintains Weight-bearing Status (Gait) able to maintain     Comment (Gait/Stairs) 86' x1 with closeS for safety and balance        Balance    Balance Interventions standing     Comment, Balance standing at RW reached for bean bags outside of ATUL with unilateral UE support and closeS for safety; then tossing bean bags anteriorly into bucket        Lower Extremity (Therapeutic Exercise)    Exercise Position/Type seated;AROM (active range of motion)     General Exercise bilateral     Reps and Sets 2-3 sets x 10 reps each     Comment seated: performed hip flexion, glute sets, quad sets and LAQ's        Daily Progress Summary (PT)    Symptoms Noted During/After Treatment none     Daily Outcome Statement Patient session focused on functional mobility and balance activities. Patient able to progress ambulation distance via unipedal technique to 86' this session. Patient would continue to beneift from LE strengthening and standing endurance activities.                           IRF PT Goals    Flowsheet Row  Most Recent Value   Bed Mobility Goal 1    Activity/Assistive Device bed mobility activities, all at 12/07/2022 1034   [place] supervision required at 12/07/2022 1034   Time Frame short-term goal (STG), 1 week at 12/07/2022 1034   Progress/Outcome goal met at 12/12/2022 1630   Bed Mobility Goal 2    Activity/Assistive Device bed mobility activities, all at 12/07/2022 1034   [place] modified independence at 12/07/2022 1034   Time Frame long-term goal (LTG), 14 days or less at 12/12/2022 1630   Progress/Outcome goal ongoing at 12/12/2022 1630   Transfer Goal 1    Activity/Assistive Device sit-to-stand/stand-to-sit, low pivot, stand pivot at 12/07/2022 1034   [place] supervision required at 12/07/2022 1034   Time Frame short-term goal (STG), 2-3 days at 12/12/2022 1630   Progress/Outcome goal ongoing at 12/12/2022 1630   Transfer Goal 2    Activity/Assistive Device sit-to-stand/stand-to-sit, low pivot, stand pivot at 12/07/2022 1034   [place] modified independence at 12/07/2022 1034   Time Frame long-term goal (LTG), 14 days or less at 12/12/2022 1630   Progress/Outcome goal ongoing at 12/12/2022 1630   Gait/Walking Locomotion Goal 1    Activity/Assistive Device gait (walking locomotion) at 12/12/2022 1630   Distance 20 feet at 12/12/2022 1630   [place] other (see comments)  [steadying assist] at 12/12/2022 1630   Time Frame short-term goal (STG), 2-3 days at 12/12/2022 1630   Progress/Outcome goal met at 12/12/2022 1630   Gait/Walking Locomotion Goal 2    Activity/Assistive Device gait (walking locomotion) at 12/12/2022 1630   Distance 20 feet at 12/12/2022 1630   [place] supervision required at 12/12/2022 1630   Time Frame long-term goal (LTG), 14 days or less at 12/12/2022 1630   Progress/Outcome goal ongoing at 12/12/2022 1630   Wheelchair Locomotion Goal 1    Activity wheelchair mobility skills, all at 12/07/2022 1034   Assistive Device manual, lightweight at 12/07/2022 1038    Distance 250 feet at 12/07/2022 1034   [place] modified independence at 12/07/2022 1034   Time Frame short-term goal (STG), 1 week at 12/07/2022 1034   Progress/Outcome goal met at 12/12/2022 1630   Wheelchair Locomotion Goal 2    Activity wheelchair mobility skills, all at 12/07/2022 1034   Assistive Device manual, lightweight at 12/07/2022 1034   Distance 500 feet at 12/07/2022 1034   [place] modified independence at 12/07/2022 1034   Time Frame long-term goal (LTG), 2 weeks at 12/07/2022 1034   Progress/Outcome goal met at 12/12/2022 1630

## 2022-12-15 NOTE — GROUP NOTE
Date: 12/14/2022  Start Time:  1535   End Time:  1605    Group Type:  Amputee Group        Harry Alvarado, YOB: 1955,  was an active group participant.    Group Therapy    Summary of group:  The focus of today's session was Limb Loss  Psychoeducation and support was provided on Limb Loss  Patients were provided resources on Limb Loss       encourages discussion on coping, progress, and discharge.  Group consists 2  prosthetic trainers and  3 pre prosthetic trainers.  Group share their progress and all member report that they have made gains.  Topics discussed include coping with phantom pain which has been an issue for several members.   Group member share their experiences and offer each other support.      He reports that he is eager to meet with prosthetcist but is concerned about finding a provider clsoer to his home in [place].  He reports that he is makign gians and gfeels stronger.  Reports some struggle with phantom pain.          He did share information regarding progress.    He did provide support to others in the group.    Visit Diagnosis:    1. Adjustment disorder with anxiety

## 2022-12-15 NOTE — ASSESSMENT & PLAN NOTE
Harry Alvarado is a 67 y.o. male who presents with the following chronic medical problems including MTHFR gene mutation, coronary artery disease with prior myocardial infarction 1993, peripheral artery disease with multiple bypass procedures in the past most recent with tPA bolus and angioplasty in 2020.  The patient admitted to [facility] on November 30, 2022 to the vascular surgery service for planned BKA due to bypass thrombosis.  At the time of admission the patient had cyanosis and was beginning to develop thanh gangrene of the foot.  Patient had reported over the last 1 to 2 months worsening pain of the right foot and that he knew the graft was occluded.  Patient has a history of 15+ surgeries of the extremities and understood that his revascularization options were limited.  He had severe 10 out of 10 pain of the foot relieved with elevation of the leg.  He was still able to ambulate but limited by pain.  Under the care of Bowen Serrato MD of vascular surgery he underwent right transtibial amputation on December 2, 2022.  Postoperative course unremarkable with advancement of diet and pain control.  ESTUARDO drain removed on December 4.  For postoperative pain control he was treated with morphine.  On discharge prescribed Tylenol and low-dose Lyrica.  Lyrica started prior by his PCP for neuropathic pain.  He was restarted on his home Eliquis.  He required min assist for bed mobility, transfers and ambulation.  Vascular surgery wants follow-up in 1 month.  Patient is now appropriate for acute inpatient rehabilitation.    Comprehensive inpatient rehabilitation program status post right transtibial amputation.  Goal is for the patient to reach a modified independent level with bed mobility, transfers and ambulation.  Ambulation with hopping short distances using a rolling walker.  Also to become modified independent with elevations.  Preprosthetic training.  Ongoing pain control.    For pain control continue  low-dose Lyrica and consider increasing dose if the patient has ongoing phantom pain.  Continue around-the-clock Tylenol.  Continue tramadol.  If needed adjust dose.    Monitor incision closely and continue incisional and stump care.  Pressure injury over the tibial tuberosity.  Stable.  Continue to monitor.  Pressure-relief over area.    The patient with ongoing residual stump pain.  Also has phantom pain but postsurgical pain more significant.  Worsens at nighttime.  Will discontinue Tylenol and tramadol.  Begin hydrocodone.  Change Lyrica to 50 mg 3 times daily.    The patient having better pain control with change in medications.  Hydrocodone helping more than tramadol.  No side effects.      Still continues to have sleep dysfunction.  We will trial low-dose melatonin and trazodone.  Try to avoid Ambien.  Patient min assist to touch assist with transfers.  Improving with hopping using a rolling walker.

## 2022-12-16 ENCOUNTER — APPOINTMENT (INPATIENT)
Dept: OCCUPATIONAL THERAPY | Facility: REHABILITATION | Age: 67
DRG: 560 | End: 2022-12-16
Payer: MEDICARE

## 2022-12-16 ENCOUNTER — APPOINTMENT (INPATIENT)
Dept: PHYSICAL THERAPY | Facility: REHABILITATION | Age: 67
DRG: 560 | End: 2022-12-16
Payer: MEDICARE

## 2022-12-16 PROCEDURE — 63700000 HC SELF-ADMINISTRABLE DRUG: Performed by: HOSPITALIST

## 2022-12-16 PROCEDURE — 97530 THERAPEUTIC ACTIVITIES: CPT | Mod: GO,CO

## 2022-12-16 PROCEDURE — 97530 THERAPEUTIC ACTIVITIES: CPT | Mod: GP

## 2022-12-16 PROCEDURE — 97110 THERAPEUTIC EXERCISES: CPT | Mod: GP

## 2022-12-16 PROCEDURE — 97542 WHEELCHAIR MNGMENT TRAINING: CPT | Mod: GP

## 2022-12-16 PROCEDURE — 97530 THERAPEUTIC ACTIVITIES: CPT | Mod: GO

## 2022-12-16 PROCEDURE — 63700000 HC SELF-ADMINISTRABLE DRUG: Performed by: PHYSICAL MEDICINE & REHABILITATION

## 2022-12-16 PROCEDURE — 97110 THERAPEUTIC EXERCISES: CPT | Mod: GO,CO

## 2022-12-16 PROCEDURE — 12800000 HC ROOM AND CARE SEMIPRIVATE REHAB

## 2022-12-16 RX ORDER — HYDROCODONE BITARTRATE AND ACETAMINOPHEN 5; 325 MG/1; MG/1
1 TABLET ORAL EVERY 4 HOURS PRN
Qty: 30 TABLET | Refills: 0 | Status: SHIPPED | OUTPATIENT
Start: 2022-12-16 | End: 2022-12-21

## 2022-12-16 RX ORDER — TRAZODONE HYDROCHLORIDE 50 MG/1
50 TABLET ORAL NIGHTLY
Status: DISCONTINUED | OUTPATIENT
Start: 2022-12-16 | End: 2022-12-17

## 2022-12-16 RX ORDER — ASPIRIN 81 MG/1
81 TABLET ORAL DAILY
Qty: 30 TABLET | Refills: 0 | COMMUNITY
Start: 2022-12-17 | End: 2023-03-18 | Stop reason: HOSPADM

## 2022-12-16 RX ORDER — ZOLPIDEM TARTRATE 10 MG/1
10 TABLET ORAL NIGHTLY PRN
Qty: 30 TABLET | Refills: 0 | Status: SHIPPED | OUTPATIENT
Start: 2022-12-16 | End: 2023-03-18 | Stop reason: HOSPADM

## 2022-12-16 RX ORDER — TRAZODONE HYDROCHLORIDE 50 MG/1
25 TABLET ORAL NIGHTLY
Qty: 15 TABLET | Refills: 0 | Status: SHIPPED | OUTPATIENT
Start: 2022-12-16 | End: 2022-12-17 | Stop reason: HOSPADM

## 2022-12-16 RX ORDER — PREGABALIN 50 MG/1
50 CAPSULE ORAL 3 TIMES DAILY
Qty: 90 CAPSULE | Refills: 0 | Status: SHIPPED | OUTPATIENT
Start: 2022-12-16 | End: 2023-03-18 | Stop reason: HOSPADM

## 2022-12-16 RX ORDER — IBUPROFEN/PSEUDOEPHEDRINE HCL 200MG-30MG
3 TABLET ORAL NIGHTLY
Qty: 30 TABLET | Refills: 0 | COMMUNITY
Start: 2022-12-16 | End: 2023-03-18 | Stop reason: HOSPADM

## 2022-12-16 RX ADMIN — ZOLPIDEM TARTRATE 10 MG: 10 TABLET, COATED ORAL at 02:33

## 2022-12-16 RX ADMIN — PREGABALIN 50 MG: 50 CAPSULE ORAL at 15:17

## 2022-12-16 RX ADMIN — PREGABALIN 50 MG: 50 CAPSULE ORAL at 20:45

## 2022-12-16 RX ADMIN — HYDROCODONE BITARTRATE AND ACETAMINOPHEN 1.5 TABLET: 5; 325 TABLET ORAL at 15:17

## 2022-12-16 RX ADMIN — Medication 3 MG: at 20:45

## 2022-12-16 RX ADMIN — APIXABAN 5 MG: 5 TABLET, FILM COATED ORAL at 20:45

## 2022-12-16 RX ADMIN — PREGABALIN 50 MG: 50 CAPSULE ORAL at 07:47

## 2022-12-16 RX ADMIN — ZOLPIDEM TARTRATE 10 MG: 10 TABLET, COATED ORAL at 23:30

## 2022-12-16 RX ADMIN — HYDROCODONE BITARTRATE AND ACETAMINOPHEN 1.5 TABLET: 5; 325 TABLET ORAL at 07:48

## 2022-12-16 RX ADMIN — PANTOPRAZOLE SODIUM 40 MG: 40 TABLET, DELAYED RELEASE ORAL at 07:47

## 2022-12-16 RX ADMIN — APIXABAN 5 MG: 5 TABLET, FILM COATED ORAL at 07:47

## 2022-12-16 RX ADMIN — ASPIRIN 81 MG: 81 TABLET, COATED ORAL at 07:47

## 2022-12-16 RX ADMIN — TRAZODONE HYDROCHLORIDE 50 MG: 50 TABLET ORAL at 20:45

## 2022-12-16 RX ADMIN — HYDROCODONE BITARTRATE AND ACETAMINOPHEN 1 TABLET: 5; 325 TABLET ORAL at 20:45

## 2022-12-16 NOTE — PLAN OF CARE
Spoke to Stella.  Reviewed DC plan for 12/18.  Discussed  10am.  [facility].   Discussed family will reside at [facility] [address] [facility] [place] [address] with son till they can go back to [place].   DC final, and will prepare for DC.

## 2022-12-16 NOTE — PROGRESS NOTES
12/16/22 1400   Discharge Needs Assessment   Anticipated Discharge Disposition home with home health   Discharge Planning   Type of Home Care Services home OT;home PT;nursing   Discharge Transportation   Does the patient need discharge transport arranged? Yes   Has discharge transport been arranged? Yes   Discharge Transportation Vendor other (see comment)   Type of Transportation   (Family)   What day is the transport expected? 12/18/22   What time is the transport expected? 1000   Plan   Plan DC home to son's home on Sunday, continue [facility] and Pain mangement.   Patient/Family in Agreement with Plan yes   Discharge Review for Designated Lay Caregiver Designated Lay Caregiver available   Team Conference   Next Team Conference Date 12/20/22    Service   Is an  Needed/Used? N

## 2022-12-16 NOTE — PROGRESS NOTES
[facility] Progress Note          Patient was seen and examined.   Attestation Notes: Face to face encounter completed    Harry Alvarado is a 67 y.o. male who was admitted for Status post below knee amputation of right lower extremity (CMS/[facility]) [Z89.511]. Patient was referred by Richard Angulo MD for medical assessment and management      CC: Status post below knee amputation of right lower extremity (CMS/[facility]) [Z89.511]     HPI: Harry Alvarado is a 67 y.o. male with MTHFR gene mutation, CAD s/p MI 1993, and PAD s/p R Fem to PT bypass with arm vein in 1993, revision in 2011, s/p R SFA to peroneal bypass graft s/p revision on 7/30/18, s/p tPA bolus and angioplasty in 2020 admitted to [facility] 11/30/22 with RLE bypass thrombosis and chronic right foot ischemia/gangrene and underwent right BKA by vascular surgeon, Dr. Bowen Serrato 12/2/22. Post op he had anemia but did not require transfusion. He was restarted on his home Eliquis.  His pain was managed with Tylenol and Lyrica. Tolerating a regular diet.      The patient was evaluated by PM&R and found to have residual deficits in ambulation and ADLs due to Status post below knee amputation of right lower extremity (CMS/HCC) [Z89.511] and came to [facility] on 12/6/2022 for acute inpatient rehab.      He participated in therapy.     The patient has steadily improved in therapy and is for discharge home 12/18/2022       SUBJECTIVE:  Patient interviewed and examined    He remains without new complaints and feels ready for discharge home on Sunday.    Still with RLE surgical site pain and phantom pain, moving bowels and bladder, no abdominal pain or nausea, no dysuria, no chest pain, palpitations, dyspnea or fevers    Review of Systems:  All other systems reviewed and negative except as noted in the HPI.    Current meds and allergies reviewed    Past Medical History:   Diagnosis Date   • Coronary artery disease     s/p MI   • Deep vein  thrombosis (CMS/HCC)    • Lupus anticoagulant disorder (CMS/HCC)    • Lyme disease    • MTHFR gene mutation    • Myocardial infarction (CMS/HCC)    • PAD (peripheral artery disease) (CMS/HCC)      Past Surgical History:   Procedure Laterality Date   • BELOW KNEE LEG AMPUTATION Right 12/02/2022   • FEMORAL BYPASS Right      Social History     Tobacco Use   • Smoking status: Never   • Smokeless tobacco: Never   Substance Use Topics   • Alcohol use: Not Currently      No family history on file.    Vital signs in last 24 hours:  Temp:  [36.3 °C (97.3 °F)-36.7 °C (98 °F)] 36.3 °C (97.3 °F)  Heart Rate:  [66-78] 68  Resp:  [16-18] 18  BP: (113-143)/(53-76) 143/76  Vital signs reviewed 12/16/22 12:53 PM    Physical Exam  Vitals and nursing note reviewed.   Constitutional:       Appearance: Normal appearance.   HENT:      Head: Normocephalic and atraumatic.      Right Ear: External ear normal.      Left Ear: External ear normal.      Nose: Nose normal.      Mouth/Throat:      Mouth: Mucous membranes are moist.      Pharynx: Oropharynx is clear.   Eyes:      Extraocular Movements: Extraocular movements intact.      Conjunctiva/sclera: Conjunctivae normal.      Pupils: Pupils are equal, round, and reactive to light.   Cardiovascular:      Rate and Rhythm: Normal rate and regular rhythm.      Pulses: Normal pulses.      Heart sounds: Normal heart sounds.   Pulmonary:      Effort: Pulmonary effort is normal.      Breath sounds: Normal breath sounds.   Abdominal:      General: Abdomen is flat. Bowel sounds are normal.      Palpations: Abdomen is soft.   Musculoskeletal:      Cervical back: Normal range of motion and neck supple.      Right lower leg: Edema present.      Left lower leg: Edema present.      Comments: S/p right BKA   Skin:     General: Skin is warm and dry.   Neurological:      General: No focal deficit present.      Mental Status: He is alert and oriented to person, place, and time.   Psychiatric:         Mood and  Affect: Mood normal.         Behavior: Behavior normal.                 Objective    Labs:  I have reviewed the patient's labs.  Significant abnormals are leukocytosis.  Results from last 7 days   Lab Units 12/12/22  0749   WBC K/uL 12.83*   HEMOGLOBIN g/dL 12.1*   HEMATOCRIT % 38.3*   PLATELETS K/uL 534*     Results from last 7 days   Lab Units 12/12/22  0702   SODIUM mEQ/L 135*   POTASSIUM mEQ/L 5.0   CHLORIDE mEQ/L 99   CO2 mEQ/L 27   BUN mg/dL 18   CREATININE mg/dL 0.7*   CALCIUM mg/dL 9.8   ALBUMIN g/dL 2.9*   BILIRUBIN TOTAL mg/dL 0.2*   ALK PHOS IU/L 140*   ALT IU/L 42   AST IU/L 29   GLUCOSE mg/dL 85     Results from last 7 days   Lab Units 12/12/22  0702   MAGNESIUM mg/dL 1.9        Imaging:  Not applicable        ASSESSMENT/PLAN:    67 y.o. male with MTHFR gene mutation, CAD s/p MI 1993, and PAD s/p R Fem to PT bypass with arm vein in 1993, revision in 2011, s/p R SFA to peroneal bypass graft s/p revision on 7/30/18, s/p tPA bolus and angioplasty in 2020 admitted to [facility] 11/30/22 with RLE bypass thrombosis and chronic right foot ischemia/gangrene and underwent right BKA by vascular surgeon, Dr. Bowen Serrato 12/2/22     1. Vasc:  -PAD s/p failed RLE bypass with right foot ischemia/gangrene  -s/p right BKA 12/2/22  -restarted Aspirin 81 mg daily for PAD per patient request  -Norco and Lyrica for pain  -remains on Eliquis for hypercoagulable condition     2. Heme:  -post op anemia due to chronic blood loss, does not need iron  -leukocytosis reactive due to surgery  -hx of MTHFR gene mutation  -follow CBC     3. Renal:  -increased risk of dehydration and electrolyte changes  -follow BMP, Mg     4. Gi:  -Senokot-S as needed for bowels  -Protonix for GERD and ulcer ppx  -elevated LFTs likely transient due to surgery, meds, follow for now     5. Gu:  -no UTI or retention     6. Cardiac:  -hx of CAD  -watch for orthostatic hypotension  -use cardiac precautions in therapy     7. Pulm:  -incentive  spirometry for atelectasis     8. Derm:  -seen by [facility] for skin assessment  -right tibial tuberosity bruising, normal post op changes, follow for now     9. Nutrition:  -seen by Nutrition for assessment and education     10. Psych:  -seen by Psychology for support  -Trazodone and prn Ambien for sleep    11. Dispo:  -Expected discharge date 12/18/2022  -to home  -Reviewed chart and meds  -Completed discharge documentation and wrote scripts as needed         plan discussed with patient, nurse, case management and  Richard Angulo MD Scott Sapperstein, MD  12/16/2022  12:53 PM

## 2022-12-16 NOTE — PROGRESS NOTES
Patient: Harry Alvarado  Location: [facility] [address]  MRN: [id number]  Today's date: 12/16/2022    History of Present Illness  Harry is a 67 y.o. male admitted on 12/6/2022 with Status post below knee amputation of right lower extremity (CMS/[facility]) [Z89.511]. Principal problem is   Status post below knee amputation of right lower extremity (CMS/[facility]). The pt was admitted to Vascular Surgery service for a planned R BKA due to bypass thrombosis and ischemia of the R foot.   He is s/p R BKA on 12/2/22.  His hgb is stable and he has been restarted on his home Eliquis.   Tolerating a regular diet.  PM&R was consulted on 12/3 and recommends acute inpt rehab for intensive therapy as he can tolerate >3 hrs of combined PT/OT per day and at least 5 days/week with a reasonable expectation that the patient will make measurable functional improvement that only an acute rehab can provide.         Past Medical History  Harry has a past medical history of Coronary artery disease, Deep vein thrombosis (CMS/[facility]), Lupus anticoagulant disorder (CMS/[facility]), Lyme disease, MTHFR gene mutation, Myocardial infarction (CMS/[facility]), and PAD (peripheral artery disease) (CMS/[facility]).       12/16/22 0901   Pain/Comfort/Sleep   Pain Charting Type Pain Assessment   Preferred Pain Scale number (Numeric Rating Pain Scale)   (0-10) Pain Rating: Rest 4   Pain Body Location residual limb   Vitals   Heart Rate 74   Heart Rate Source Monitor   Patient Activity At rest   Oxygen Therapy None (Room air)   /76   BP Location Left upper arm   BP Method Automatic   Patient Position Sitting        12/16/22 0928   Pain/Comfort/Sleep   Pain Charting Type Pain Reassessment   Preferred Pain Scale number (Numeric Rating Pain Scale)   (0-10) Pain Rating: Rest 4   Pain Body Location residual limb       Prior Living Environment    Flowsheet Row Most Recent Value   People in Home child(no), adult, spouse   Current Living Arrangements home   Home  Accessibility stairs to enter home (Group), stairs within home (Group)   Living Environment Comment Pt was living in [place] with his wife. However, after discharge, his wife and him plan to move into son's house in [place] with first floor setup and 1 YOSEF. After prosthetic training, pt plans to move back to [place].   Number of Stairs, Main Entrance 1   Location, Patient Bedroom first (main) floor   Location, Bathroom first (main) floor   Bathroom Access Comment per PT confirmed with family stall shower with 6 inch step to enter and seat within shower however pt does have a shower door that swings open, recommending temporary curtain and extended bench          Prior Level of Function    Flowsheet Row Most Recent Value   Dominant Hand right   Ambulation assistive equipment   Transferring assistive equipment   Toileting independent   Bathing assistive person   Dressing independent   Eating independent   Prior Level of Function Comment Pt was previously I with all ADLs, only requiring assist with bathing from his wife as function decreased prior to hospital admission. Pt was ambulating with a SPC for recent 2 months. + , retired. Pt enjoys skiing and playing pickleball with his wife.   Assistive Device Currently Used at Home cane, straight  [reports brief use of RW in past]          Occupational Profile    Flowsheet Row Most Recent Value   Occupational History/Life Experiences +. 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, also enjoys hiking,   Environmental Supports and Barriers Pt is interested in learning about return to driving, plans to purchase a new vehicle.  Recommend arranging Chirag Eaton from [employer] to speak with pt.  Pt lives in [place] and plans on staying in [place] with son until he returns for prosthetic training and is able to return to [place].  Initially wanted to drive home.  Discussed  "potentially purchasing and adapting a vehicle during his stay in [place].  Recommend arranging for pt to view adapted vehicle.   Patient Goals \"Be able to get around better, take care of myself and play with my grandkids.\" And also \"Get a prosthesis and walk around like I used too.\"  [PSFS reassessed 12/16, pt reported: Walking/hiking 4/10, Pickleball 0/10, Bathing 7/10, Playing with grandkids 5/10 score 16/40 0.40.]             12/16/22 0910   OT Time Calculation   Start Time 0900   Stop Time 0930   Time Calculation (min) 30 min   Session Details   Document Type daily treatment/progress note   Mode of Treatment occupational therapy;individual therapy   General Information   Patient Profile Reviewed yes   General Observations of Patient Pt received seated in w/c, agreeable to session.   Occupational Profile   Patient Goals \"Be able to get around better, take care of myself and play with my grandkids.\" And also \"Get a prosthesis and walk around like I used too.\"  (PSFS reassessed 12/16, pt reported: Walking/hiking 4/10, Pickleball 0/10, Bathing 7/10, Playing with grandkids 5/10 score 16/40 0.40.)   Wheelchair Mobility/Management   Comment, Wheelchair Mobility pt propels manual w/c on unit with good safety and obstacle navigation including turns and doorway navigation.   Therapeutic Interventions   Therapeutic Interventions Weight Shifting Techniques (Row)   Weight Shifting Techniques push-up weight shift techniques   Comment, Therapeutic Intervention Pt completed w/c push ups x5 for weight shifting  in w/c   Therapeutic Exercise   Therapeutic Exercise upper extremity;aerobic   Upper Extremity (Therapeutic Exercise)   Exercise Position/Type seated   General Exercise bilateral   Range of Motion Exercises shoulder flexion/extension;shoulder external/internal rotation  (chest press, overhead press, tricep extension)   Weight/Resistance 5 pounds   Reps and Sets 2 x15   Comment Pt completed seated in w/c. Rest breaks " between sets.   Aerobic Exercise   Type arm bike   Comment 5 minutes forward and 5 minutes backwards.   Time Performed 10   Daily Progress Summary (OT)   Daily Outcome Statement Good participation in session, session focused on UE strengthening, core and w/c mobility. PSFS reassessed this session. Tolerated exercises well. Continue with OT POC.                 IRF OT Goals    Flowsheet Row Most Recent Value   Transfer Goal 1    Activity/Assistive Device toilet at 12/07/2022 0720   [place] supervision required at 12/07/2022 0720   Time Frame short-term goal (STG), 1 week at 12/13/2022 0650   Progress/Outcome goal ongoing at 12/13/2022 0650   Transfer Goal 2    Activity/Assistive Device toilet at 12/07/2022 0720   [place] modified independence at 12/07/2022 0720   Time Frame long-term goal (LTG), 14 days or less at 12/07/2022 0720   Progress/Outcome goal ongoing at 12/13/2022 0650   Transfer Goal 3    Activity/Assistive Device shower at 12/07/2022 0720   Routt other (see comments)  [Cl S] at 12/13/2022 0650   Time Frame short-term goal (STG), 1 week at 12/13/2022 0650   Progress/Outcome goal met, goal revised this date at 12/13/2022 0650   Transfer Goal 4    Activity/Assistive Device shower at 12/07/2022 0720   [place] modified independence at 12/07/2022 0720   Time Frame long-term goal (LTG) at 12/07/2022 0720   Progress/Outcome goal ongoing at 12/13/2022 0650   Bathing Goal 1    Routt supervision required at 12/13/2022 0650   Time Frame short-term goal (STG), 1 week at 12/13/2022 0650   Progress/Outcome goal met, goal revised this date at 12/13/2022 0650   Bathing Goal 2    [place] modified independence at 12/07/2022 0720   Time Frame long-term goal (LTG), 14 days or less at 12/07/2022 0720   Progress/Outcome goal ongoing at 12/13/2022 0650   UB Dressing Goal 1    [place] modified independence at 12/13/2022 0650   Time Frame short-term goal (STG), 1 week at 12/13/2022 0650    Progress/Outcome goal met, goal revised this date at 12/13/2022 0650   UB Dressing Goal 2    [place] modified independence at 12/07/2022 0720   Time Frame long-term goal (LTG), 14 days or less at 12/07/2022 0720   Progress/Outcome goal ongoing at 12/13/2022 0650   LB Dressing Goal 1    [place] other (see comments)  [Cl S] at 12/13/2022 0650   Time Frame short-term goal (STG), 1 week at 12/13/2022 0650   Progress/Outcome goal met, goal revised this date at 12/13/2022 0650   LB Dressing Goal 2    [place] modified independence at 12/07/2022 0720   Time Frame long-term goal (LTG), 14 days or less at 12/07/2022 0720   Progress/Outcome goal ongoing at 12/13/2022 0650   Grooming Goal 1    [place] set-up required at 12/07/2022 0720   Time Frame short-term goal (STG), 1 week at 12/13/2022 0650   Progress/Outcome goal ongoing at 12/13/2022 0650   Grooming Goal 2    [place] modified independence at 12/07/2022 0720   Time Frame long-term goal (LTG), 14 days or less at 12/07/2022 0720   Progress/Outcome goal ongoing at 12/13/2022 0650   Toileting Goal 1    [place] supervision required at 12/07/2022 0720   Time Frame short-term goal (STG), 1 week at 12/13/2022 0650   Progress/Outcome goal ongoing at 12/13/2022 0650   Toileting Goal 2    [place] modified independence at 12/07/2022 0720   Time Frame long-term goal (LTG), 14 days or less at 12/07/2022 0720   Progress/Outcome goal ongoing at 12/13/2022 0650

## 2022-12-16 NOTE — PROGRESS NOTES
Patient: Harry Alvarado  Location: [facility] [address]  MRN: [id number]  Today's date: 12/16/2022    History of Present Illness  Harry is a 67 y.o. male admitted on 12/6/2022 with Status post below knee amputation of right lower extremity (CMS/[facility]) [Z89.511]. Principal problem is   Status post below knee amputation of right lower extremity (CMS/[facility]). The pt was admitted to Vascular Surgery service for a planned R BKA due to bypass thrombosis and ischemia of the R foot.   He is s/p R BKA on 12/2/22.  His hgb is stable and he has been restarted on his home Eliquis.   Tolerating a regular diet.  PM&R was consulted on 12/3 and recommends acute inpt rehab for intensive therapy as he can tolerate >3 hrs of combined PT/OT per day and at least 5 days/week with a reasonable expectation that the patient will make measurable functional improvement that only an acute rehab can provide.         Past Medical History  Harry has a past medical history of Coronary artery disease, Deep vein thrombosis (CMS/[facility]), Lupus anticoagulant disorder (CMS/[facility]), Lyme disease, MTHFR gene mutation, Myocardial infarction (CMS/[facility]), and PAD (peripheral artery disease) (CMS/[facility]).      PT Vitals    Date/Time Pulse HR Source Pt Activity BP MAP BP Location BP Method Pt Position [facility]   12/16/22 1108 68 Monitor At rest 143/76 102 mmHg Right upper arm Automatic Sitting SJM      PT Pain    Date/Time Pain Type Side/Orientation Location Rating: Rest Rating: Activity Interventions [facility]   12/16/22 1108 Pain Assessment right;generalized;distal residual limb 4 -- premedicated for activity [facility]   12/16/22 1222 Pain Reassessment;Post Activity right;generalized;distal residual limb -- 3 position adjusted SJM          Prior Living Environment    Flowsheet Row Most Recent Value   People in Home child(no), adult, spouse   Current Living Arrangements home   Home Accessibility stairs to enter home (Group), stairs within home (Group)   Living  Environment Comment Pt was living in [place] wife. However, after discharge, his wife and him plan to move into son's house in [place] with first floor setup and 1 YOSEF. After prosthetic training, pt plans to move back to [place].   Number of Stairs, Main Entrance 1   Location, Patient Bedroom first (main) floor   Location, Bathroom first (main) floor   Bathroom Access Comment per PT confirmed with family stall shower with 6 inch step to enter and seat within shower however pt does have a shower door that swings open, recommending temporary curtain and extended bench          Prior Level of Function    Flowsheet Row Most Recent Value   Dominant Hand right   Ambulation assistive equipment   Transferring assistive equipment   Toileting independent   Bathing assistive person   Dressing independent   Eating independent   Prior Level of Function Comment Pt was previously I with all ADLs, only requiring assist with bathing from his wife as function decreased prior to hospital admission. Pt was ambulating with a SPC for recent 2 months. + , retired. Pt enjoys skiing and playing pickleball with his wife.   Assistive Device Currently Used at Home cane, straight  [reports brief use of RW in past]           IRF PT Evaluation and Treatment - 12/16/22 1111        PT Time Calculation    Start Time 1100     Stop Time 1230     Time Calculation (min) 90 min        Session Details    Document Type daily treatment/progress note     Mode of Treatment individual therapy;physical therapy        General Information    General Observations of Patient PAtient/family education with patient's wife Dave        Bed Mobility    [place], Roll Left modified independence     [place], Roll Right modified independence     [place], Supine to Sit modified independence     [place], Sit to Supine modified independence     Assistive Device none     Comment (Bed Mobility) supine<>prone on flat mat surface        Bed to Chair  Transfer    [place], Bed to Chair modified independence     Assistive Device wheelchair        Chair to Bed Transfer    [place], Chair to Bed modified independence     Assistive Device wheelchair        Low Pivot Transfer    [place], Low Pivot Transfer modified independence     Assistive Device wheelchair        Curb Negotiation    [place] dependent (less than 25% patient effort)     Assistive Device wheelchair     Curb Height 8 inches     Comment DEP seated bumping in WC performed by PT x 1 and patient's wife x 1, forward to ascend and backwards to descend per simulated home entrance.        Wheelchair Mobility/Management    Method of Locomotion bimanual (upper extremity) propulsion     Wheelchair Type manual, lightweight     [place], Forward Propulsion modified independence     [place], Backward Propulsion modified independence     [place], Steering Activities modified independence     [place], Stopping Activities modified independence     [place], Turning Activities modified independence     [place], Doorway Navigation modified independence     Distance Propelled in Feet 500 feet     Comment, Wheelchair Mobility over level surfaces, seated in [employer] MWC via [employer]. Leg rests adjusted appropriately for patient's size and comfort.        Lower Extremity (Therapeutic Exercise)    Comment Issued and reviewed HEP, completed as follows: Prone: hamstring curls, hip extension, prone press up. Prone lying stretch x 20:00.  Side lying: Hip ABDuction. Supine: SLR, single LE bridges. All [place]ines[place] completed 3 sets x 10 reps each. Additional exercises in HEP including: supine isometric hip ADD, hip IR/ER, pelvic tilt, seated at EOM hamstring,  reviewed verbally and with handout d/t time constraints. Patient verbalized understanding of HEP provided. Hard copy of HEP placed in patient’s EMR.        Daily Progress Summary (PT)    Daily Outcome Statement Patient/family education  completed this date with patient’s wife with focus being on dep curb step bumping to enter home, patient’s wife demo competence to complete following instruction. Patient’s wife deferred need to observe and/or practice SPT, amb or vehicle transfer. Patient’s wife provided with education regarding how to fold WC for transport into her car and verbalized understanding. Patient provided w/ HEP w/ good understanding. Patient’s wife without additional questions at this time.  D/C planned and appropriate for home w/ spouse and son on 12/18/22.                      Education Documentation  Mobility, taught by Anna Antonio, PT at 12/16/2022 12:20 PM.  Learner: Patient  Readiness: Acceptance  Method: Explanation  Response: Verbalizes Understanding  Comment: education provided to patient's wife regarding safety, technique for dep WC bump into and out of home entrance.          IRF PT Goals    Flowsheet Row Most Recent Value   Bed Mobility Goal 1    Activity/Assistive Device bed mobility activities, all at 12/07/2022 1034   [place] supervision required at 12/07/2022 1034   Time Frame short-term goal (STG), 1 week at 12/07/2022 1034   Progress/Outcome goal met at 12/12/2022 1630   Bed Mobility Goal 2    Activity/Assistive Device bed mobility activities, all at 12/07/2022 1034   [place] modified independence at 12/07/2022 1034   Time Frame long-term goal (LTG), 14 days or less at 12/12/2022 1630   Progress/Outcome goal met at 12/16/2022 1111   Transfer Goal 1    Activity/Assistive Device sit-to-stand/stand-to-sit, low pivot, stand pivot at 12/07/2022 1034   [place] supervision required at 12/07/2022 1034   Time Frame short-term goal (STG), 2-3 days at 12/12/2022 1630   Progress/Outcome goal met at 12/16/2022 1111   Transfer Goal 2    Activity/Assistive Device sit-to-stand/stand-to-sit, low pivot, stand pivot at 12/07/2022 1034   [place] modified independence at 12/07/2022 1034   Time Frame long-term goal  (LTG), 14 days or less at 12/12/2022 1630   Progress/Outcome goal ongoing at 12/12/2022 1630   Gait/Walking Locomotion Goal 1    Activity/Assistive Device gait (walking locomotion) at 12/12/2022 1630   Distance 20 feet at 12/12/2022 1630   [place] other (see comments)  [steadying assist] at 12/12/2022 1630   Time Frame short-term goal (STG), 2-3 days at 12/12/2022 1630   Progress/Outcome goal met at 12/12/2022 1630   Gait/Walking Locomotion Goal 2    Activity/Assistive Device gait (walking locomotion) at 12/12/2022 1630   Distance 20 feet at 12/12/2022 1630   [place] supervision required at 12/12/2022 1630   Time Frame long-term goal (LTG), 14 days or less at 12/12/2022 1630   Progress/Outcome goal ongoing at 12/12/2022 1630   Wheelchair Locomotion Goal 1    Activity wheelchair mobility skills, all at 12/07/2022 1034   Assistive Device manual, lightweight at 12/07/2022 1034   Distance 250 feet at 12/07/2022 1034   [place] modified independence at 12/07/2022 1034   Time Frame short-term goal (STG), 1 week at 12/07/2022 1034   Progress/Outcome goal met at 12/12/2022 1630   Wheelchair Locomotion Goal 2    Activity wheelchair mobility skills, all at 12/07/2022 1034   Assistive Device manual, lightweight at 12/07/2022 1034   Distance 500 feet at 12/07/2022 1034   [place] modified independence at 12/07/2022 1034   Time Frame long-term goal (LTG), 2 weeks at 12/07/2022 1034   Progress/Outcome goal met at 12/12/2022 1630

## 2022-12-16 NOTE — PROGRESS NOTES
Patient: Harry Alvarado  Location: [facility]  MRN: [id number]  Today's date: 12/16/2022    History of Present Illness  Harry is a 67 y.o. 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 The pt was admitted to Vascular Surgery service for a planned R BKA due to bypass thrombosis and ischemia of the R foot.   He is s/p R BKA on 12/2/22.  His hgb is stable and he has been restarted on his home Eliquis.   Tolerating a regular diet.  PM&R was consulted on 12/3 and recommends acute inpt rehab for intensive therapy as he can tolerate >3 hrs of combined PT/OT per day and at least 5 days/week with a reasonable expectation that the patient will make measurable functional improvement that only an acute rehab can provide.         Past Medical History  Harry has a past medical history of Coronary artery disease, Deep vein thrombosis (CMS/[facility]), Lupus anticoagulant disorder (CMS/[facility]), Lyme disease, MTHFR gene mutation, Myocardial infarction (CMS/[facility]), and PAD (peripheral artery disease) (CMS/[facility]).      OT Vitals    Date/Time Pulse HR Source BP BP Location BP Method Pt Position [facility]   12/16/22 1059 72 Monitor 141/73 Left upper arm Automatic Sitting BRANDT      OT Pain    Date/Time Pain Type Side/Orientation Location Rating: Rest Description Interventions [facility]   12/16/22 1007 Pain Assessment right residual limb 4 - moderate pain phantom premedicated for activity BRANDT   12/16/22 1059 Pain Reassessment right residual limb 4 - moderate pain phantom premedicated for activity BRANDT          Prior Living Environment    Flowsheet Row Most Recent Value   People in Home child(no), adult, spouse   Current Living Arrangements home   Home Accessibility stairs to enter home (Group), stairs within home (Group)   Living Environment Comment Pt was living in [place] with his  wife. However, after discharge, his wife and him plan to move into son's house in [place] with first floor setup and 1 YOSEF. After prosthetic training, pt plans to move back to [place].   Number of Stairs, Main Entrance 1   Location, Patient Bedroom first (main) floor   Location, Bathroom first (main) floor   Bathroom Access Comment per PT confirmed with family stall shower with 6 inch step to enter and seat within shower however pt does have a shower door that swings open, recommending temporary curtain and extended bench          Prior Level of Function    Flowsheet Row Most Recent Value   Dominant Hand right   Ambulation assistive equipment   Transferring assistive equipment   Toileting independent   Bathing assistive person   Dressing independent   Eating independent   Prior Level of Function Comment Pt was previously I with all ADLs, only requiring assist with bathing from his wife as function decreased prior to hospital admission. Pt was ambulating with a SPC for recent 2 months. + , retired. Pt enjoys skiing and playing pickleball with his wife.   Assistive Device Currently Used at Home cane, straight  [reports brief use of RW in past]          Occupational Profile    Flowsheet Row Most Recent Value   Occupational History/Life Experiences +. 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, also enjoys hiking,   Environmental Supports and Barriers Pt is interested in learning about return to driving, plans to purchase a new vehicle.  Recommend arranging Chirag Eaton from the [employer] to speak with pt.  Pt lives in [place] and plans on staying in [place] with son until he returns for prosthetic training and is able to return to [place].  Initially wanted to drive home.  Discussed potentially purchasing and adapting a vehicle during his stay in [place].  Recommend arranging for pt to view adapted vehicle.  "  Patient Goals \"Be able to get around better, take care of myself and play with my grandkids.\" And also \"Get a prosthesis and walk around like I used too.\"  [PSFS reassessed 12/16, pt reported: Walking/hiking 4/10, Pickleball 0/10, Bathing 7/10, Playing with grandkids 5/10 score 16/40 0.40.]           IRF OT Evaluation and Treatment - 12/16/22 1001        OT Time Calculation    Start Time 1000     Stop Time 1100     Time Calculation (min) 60 min        Session Details    Document Type daily treatment/progress note     Mode of Treatment occupational therapy;individual therapy        General Information    General Observations of Patient Pt received in WC in main gym, pleasant and agreeable to OT session to meet c  rehab consult this session to prep for driving rehab/adaptations needed. 2nd half of session for family training c wife in [employer].        Caregiver Training    Caregiver(s) to be Trained spouse/significant other     Comment, Caregiver Training Plan Family training completed c wife. OT provided handouts on the following- amputee education packet, projected timeline process, DME info. Pt's wife purchased and already placed raised toilet seat c hand rails and shower chair in bathroom at son's home in prep for D/C. Majority of session for problem solving shower transfer. OT demo 2 techniques from WC level and then hopping c RW to shower chair. Assist from wife initally and pt to trial dry transfers until comfortable c best transfer technique. OT suggest use of gripper sock on L LE for increased safety c transfer. Pt demo mod I toilet transfer and OT reviewed ADL status and suggestion of majority of tasks WC level for reduced risk for falls. Education on desensitization and skin inspection importance provided, as well as review of driving information from meeting with driving specialist provided to wife with written notes from meeting. Handicap placard form completed as wife brought in license. " Education on safety awarness c healing process provided. All questions answered during session. No additional concerns identified.        Toilet Transfer    Transfer Technique low pivot     [place], Toilet Transfer modified independence     Assistive Device raised toilet seat;wheelchair     Comment low pivot transfer to/from WC to/from raised toilet seat c hand rails demo during family training c wife present. OT emphasized 45 degree angle, WC placement, and safety awareness to wife.        Therapeutic Interventions    Comment, Therapeutic Intervention Meeting c  rehab specialist completed this session. Review of safety, need for automatic car vs manual, L foot pedals vs hand controls reviewed and discussed, license process, safety c prosthesis, [place]  rehab program required 2* out of state license. Pt educated on waiting until return to [place] to begin process. Information on [place] options provided for contacting specialist in area and options for car modification companys near pt provided. OT wrote all info down and provided to pt and wife during family training. Pt unable to begin process while in [place] at this time, pt and fmaily understand and have information to begin process once in [place] after prosthetic training completed.        Daily Progress Summary (OT)    Daily Outcome Statement OT session c focus on driving rehab specialist meeting and family training. Review of all information provided to pt and wife on D/C information including CLOF, driving info, functional transfers c DME, problem solving home setup, and safety. Plan for perofrmance day tomorrow for pt to discharge home c wife and family to son's house at mod I WC level and plan to return for prosthetic training post healing process. Continue OT per POC.                      Education Documentation  Home Safety, taught by Alayna Lara, NAI at 12/16/2022 11:52 AM.  Learner: Significant Other, Patient  Readiness: Acceptance  Method:  Explanation, Demonstration, Handout  Response: Verbalizes Understanding, Demonstrated Understanding  Comment: OT educated on ADL CLOF c transfer status based on home setup, driving info, limb care/safety, DME    Skin Care, taught by Alayna Lara OT at 12/16/2022 11:52 AM.  Learner: Significant Other, Patient  Readiness: Acceptance  Method: Explanation, Demonstration, Handout  Response: Verbalizes Understanding, Demonstrated Understanding  Comment: OT educated on ADL CLOF c transfer status based on home setup, driving info, limb care/safety, DME    Safe ADL Techniques, taught by Alayna Lara OT at 12/16/2022 11:52 AM.  Learner: Significant Other, Patient  Readiness: Acceptance  Method: Explanation, Demonstration, Handout  Response: Verbalizes Understanding, Demonstrated Understanding  Comment: OT educated on ADL CLOF c transfer status based on home setup, driving info, limb care/safety, DME    Residual Limb Management, taught by Alayna Lara OT at 12/16/2022 11:52 AM.  Learner: Significant Other, Patient  Readiness: Acceptance  Method: Explanation, Demonstration, Handout  Response: Verbalizes Understanding, Demonstrated Understanding  Comment: OT educated on ADL CLOF c transfer status based on home setup, driving info, limb care/safety, DME    Driving, taught by Alayna Lara OT at 12/16/2022 11:52 AM.  Learner: Significant Other, Patient  Readiness: Acceptance  Method: Explanation, Demonstration, Handout  Response: Verbalizes Understanding, Demonstrated Understanding  Comment: OT educated on ADL CLOF c transfer status based on home setup, driving info, limb care/safety, DME          IRF OT Goals    Flowsheet Row Most Recent Value   Transfer Goal 1    Activity/Assistive Device toilet at 12/07/2022 0720   [place] supervision required at 12/07/2022 0720   Time Frame short-term goal (STG), 1 week at 12/13/2022 0650   Progress/Outcome goal ongoing at 12/13/2022 0650    Transfer Goal 2    Activity/Assistive Device toilet at 12/07/2022 0720   [place] modified independence at 12/07/2022 0720   Time Frame long-term goal (LTG), 14 days or less at 12/07/2022 0720   Progress/Outcome goal ongoing at 12/13/2022 0650   Transfer Goal 3    Activity/Assistive Device shower at 12/07/2022 0720   [place] other (see comments)  [Cl S] at 12/13/2022 0650   Time Frame short-term goal (STG), 1 week at 12/13/2022 0650   Progress/Outcome goal met, goal revised this date at 12/13/2022 0650   Transfer Goal 4    Activity/Assistive Device shower at 12/07/2022 0720   [place] modified independence at 12/07/2022 0720   Time Frame long-term goal (LTG) at 12/07/2022 0720   Progress/Outcome goal ongoing at 12/13/2022 0650   Bathing Goal 1    [place] supervision required at 12/13/2022 0650   Time Frame short-term goal (STG), 1 week at 12/13/2022 0650   Progress/Outcome goal met, goal revised this date at 12/13/2022 0650   Bathing Goal 2    [place] modified independence at 12/07/2022 0720   Time Frame long-term goal (LTG), 14 days or less at 12/07/2022 0720   Progress/Outcome goal ongoing at 12/13/2022 0650   UB Dressing Goal 1    [place] modified independence at 12/13/2022 0650   Time Frame short-term goal (STG), 1 week at 12/13/2022 0650   Progress/Outcome goal met, goal revised this date at 12/13/2022 0650   UB Dressing Goal 2    [place] modified independence at 12/07/2022 0720   Time Frame long-term goal (LTG), 14 days or less at 12/07/2022 0720   Progress/Outcome goal ongoing at 12/13/2022 0650   LB Dressing Goal 1    [place] other (see comments)  [Cl S] at 12/13/2022 0650   Time Frame short-term goal (STG), 1 week at 12/13/2022 0650   Progress/Outcome goal met, goal revised this date at 12/13/2022 0650   LB Dressing Goal 2    [place] modified independence at 12/07/2022 0720   Time Frame long-term goal (LTG), 14 days or less at 12/07/2022 0720   Progress/Outcome  goal ongoing at 12/13/2022 0650   Grooming Goal 1    [place] set-up required at 12/07/2022 0720   Time Frame short-term goal (STG), 1 week at 12/13/2022 0650   Progress/Outcome goal ongoing at 12/13/2022 0650   Grooming Goal 2    [place] modified independence at 12/07/2022 0720   Time Frame long-term goal (LTG), 14 days or less at 12/07/2022 0720   Progress/Outcome goal ongoing at 12/13/2022 0650   Toileting Goal 1    [place] supervision required at 12/07/2022 0720   Time Frame short-term goal (STG), 1 week at 12/13/2022 0650   Progress/Outcome goal ongoing at 12/13/2022 0650   Toileting Goal 2    [place] modified independence at 12/07/2022 0720   Time Frame long-term goal (LTG), 14 days or less at 12/07/2022 0720   Progress/Outcome goal ongoing at 12/13/2022 0650

## 2022-12-16 NOTE — PLAN OF CARE
Problem: Rehabilitation (IRF) Plan of Care  Goal: Plan of Care Review  Outcome: Progressing  Flowsheets (Taken 12/15/2022 2031)  Progress: improving  Plan of Care Reviewed With: patient  Outcome Summary: RLE dressing CDI, pain overnight plan discussed. Pleasant and cooperative.   Plan of Care Review  Plan of Care Reviewed With: patient  Progress: improving  Outcome Summary: RLE dressing CDI, pain overnight plan discussed. Pleasant and cooperative.

## 2022-12-17 ENCOUNTER — APPOINTMENT (INPATIENT)
Dept: OCCUPATIONAL THERAPY | Facility: REHABILITATION | Age: 67
DRG: 560 | End: 2022-12-17
Payer: MEDICARE

## 2022-12-17 ENCOUNTER — APPOINTMENT (INPATIENT)
Dept: PHYSICAL THERAPY | Facility: REHABILITATION | Age: 67
DRG: 560 | End: 2022-12-17
Payer: MEDICARE

## 2022-12-17 PROCEDURE — 63700000 HC SELF-ADMINISTRABLE DRUG: Performed by: HOSPITALIST

## 2022-12-17 PROCEDURE — 97116 GAIT TRAINING THERAPY: CPT | Mod: GP

## 2022-12-17 PROCEDURE — 97535 SELF CARE MNGMENT TRAINING: CPT | Mod: GO,CO

## 2022-12-17 PROCEDURE — 97530 THERAPEUTIC ACTIVITIES: CPT | Mod: GP

## 2022-12-17 PROCEDURE — 63700000 HC SELF-ADMINISTRABLE DRUG: Performed by: PHYSICAL MEDICINE & REHABILITATION

## 2022-12-17 PROCEDURE — 12800000 HC ROOM AND CARE SEMIPRIVATE REHAB

## 2022-12-17 RX ORDER — NORTRIPTYLINE HYDROCHLORIDE 10 MG/1
10 CAPSULE ORAL NIGHTLY
Status: DISCONTINUED | OUTPATIENT
Start: 2022-12-17 | End: 2022-12-18 | Stop reason: HOSPADM

## 2022-12-17 RX ORDER — NORTRIPTYLINE HYDROCHLORIDE 10 MG/1
10 CAPSULE ORAL NIGHTLY
Qty: 30 CAPSULE | Refills: 0 | Status: SHIPPED | OUTPATIENT
Start: 2022-12-17 | End: 2022-12-17 | Stop reason: SDUPTHER

## 2022-12-17 RX ORDER — NORTRIPTYLINE HYDROCHLORIDE 10 MG/1
10 CAPSULE ORAL NIGHTLY
Qty: 30 CAPSULE | Refills: 0 | OUTPATIENT
Start: 2022-12-17 | End: 2023-03-18 | Stop reason: HOSPADM

## 2022-12-17 RX ADMIN — APIXABAN 5 MG: 5 TABLET, FILM COATED ORAL at 20:11

## 2022-12-17 RX ADMIN — APIXABAN 5 MG: 5 TABLET, FILM COATED ORAL at 07:44

## 2022-12-17 RX ADMIN — PREGABALIN 50 MG: 50 CAPSULE ORAL at 07:44

## 2022-12-17 RX ADMIN — PANTOPRAZOLE SODIUM 40 MG: 40 TABLET, DELAYED RELEASE ORAL at 07:44

## 2022-12-17 RX ADMIN — HYDROCODONE BITARTRATE AND ACETAMINOPHEN 1.5 TABLET: 5; 325 TABLET ORAL at 16:15

## 2022-12-17 RX ADMIN — HYDROCODONE BITARTRATE AND ACETAMINOPHEN 1.5 TABLET: 5; 325 TABLET ORAL at 20:31

## 2022-12-17 RX ADMIN — NORTRIPTYLINE HYDROCHLORIDE 10 MG: 10 CAPSULE ORAL at 20:11

## 2022-12-17 RX ADMIN — PREGABALIN 50 MG: 50 CAPSULE ORAL at 20:11

## 2022-12-17 RX ADMIN — Medication 3 MG: at 20:12

## 2022-12-17 RX ADMIN — HYDROCODONE BITARTRATE AND ACETAMINOPHEN 1.5 TABLET: 5; 325 TABLET ORAL at 07:43

## 2022-12-17 RX ADMIN — PREGABALIN 50 MG: 50 CAPSULE ORAL at 16:18

## 2022-12-17 RX ADMIN — ASPIRIN 81 MG: 81 TABLET, COATED ORAL at 07:44

## 2022-12-17 ASSESSMENT — PATIENT HEALTH QUESTIONNAIRE - PHQ9: SUM OF ALL RESPONSES TO PHQ9 QUESTIONS 1 & 2: 0

## 2022-12-17 NOTE — PLAN OF CARE
Plan of Care Review  Plan of Care Reviewed With: patient  Progress: improving  Outcome Summary: Repositions self in bed,Ambien 10mg per request,side rails up and call bell in reach

## 2022-12-17 NOTE — ASSESSMENT & PLAN NOTE
Harry Alvarado is a 67 y.o. male who presents with the following chronic medical problems including MTHFR gene mutation, coronary artery disease with prior myocardial infarction 1993, peripheral artery disease with multiple bypass procedures in the past most recent with tPA bolus and angioplasty in 2020.  The patient admitted to [facility] on November 30, 2022 to the vascular surgery service for planned BKA due to bypass thrombosis.  At the time of admission the patient had cyanosis and was beginning to develop thanh gangrene of the foot.  Patient had reported over the last 1 to 2 months worsening pain of the right foot and that he knew the graft was occluded.  Patient has a history of 15+ surgeries of the extremities and understood that his revascularization options were limited.  He had severe 10 out of 10 pain of the foot relieved with elevation of the leg.  He was still able to ambulate but limited by pain.  Under the care of Bowen Serrato MD of vascular surgery he underwent right transtibial amputation on December 2, 2022.  Postoperative course unremarkable with advancement of diet and pain control.  ESTUARDO drain removed on December 4.  For postoperative pain control he was treated with morphine.  On discharge prescribed Tylenol and low-dose Lyrica.  Lyrica started prior by his PCP for neuropathic pain.  He was restarted on his home Eliquis.  He required min assist for bed mobility, transfers and ambulation.  Vascular surgery wants follow-up in 1 month.  Patient is now appropriate for acute inpatient rehabilitation.    Comprehensive inpatient rehabilitation program status post right transtibial amputation.  Goal is for the patient to reach a modified independent level with bed mobility, transfers and ambulation.  Ambulation with hopping short distances using a rolling walker.  Also to become modified independent with elevations.  Preprosthetic training.  Ongoing pain control.    For pain control continue  low-dose Lyrica and consider increasing dose if the patient has ongoing phantom pain.  Continue around-the-clock Tylenol.  Continue tramadol.  If needed adjust dose.    Monitor incision closely and continue incisional and stump care.  Pressure injury over the tibial tuberosity.  Stable.  Continue to monitor.  Pressure-relief over area.    The patient with ongoing residual stump pain.  Also has phantom pain but postsurgical pain more significant.  Worsens at nighttime.  Will discontinue Tylenol and tramadol.  Begin hydrocodone.  Change Lyrica to 50 mg 3 times daily.    The patient doing well and progressing with transfers approaching modified independent level with transfers and improving with using rolling walker for standing transferring and hopping.    Pain persist but better controlled with hydrocodone.    Sleep continues to be a problem and tolerated the trazodone and melatonin.  Will increase trazodone.

## 2022-12-17 NOTE — DISCHARGE SUMMARY
Inpatient Discharge Summary    BRIEF OVERVIEW  Admitting Provider: Richard Angulo MD  Discharge Provider: Richard Angulo MD  Primary Care Physician at Discharge: Unable, To Obtain Pcp None     Admission Date: 12/6/2022     Discharge Date: 12/18/2022    Hospital Course    Status post below knee amputation of right lower extremity (CMS/[facility])  Harry Alvarado is a 67 y.o. male who presents with the following chronic medical problems including MTHFR gene mutation, coronary artery disease with prior myocardial infarction 1993, peripheral artery disease with multiple bypass procedures in the past most recent with tPA bolus and angioplasty in 2020.  The patient admitted to [facility] on November 30, 2022 to the vascular surgery service for planned BKA due to bypass thrombosis.  At the time of admission the patient had cyanosis and was beginning to develop thanh gangrene of the foot.  Patient had reported over the last 1 to 2 months worsening pain of the right foot and that he knew the graft was occluded.  Patient has a history of 15+ surgeries of the extremities and understood that his revascularization options were limited.  He had severe 10 out of 10 pain of the foot relieved with elevation of the leg.  He was still able to ambulate but limited by pain.  Under the care of Bowen Serrato MD of vascular surgery he underwent right transtibial amputation on December 2, 2022.  Postoperative course unremarkable with advancement of diet and pain control.  ESTUARDO drain removed on December 4.  For postoperative pain control he was treated with morphine.  On discharge prescribed Tylenol and low-dose Lyrica.  Lyrica started prior by his PCP for neuropathic pain.  He was restarted on his home Eliquis.  He required min assist for bed mobility, transfers and ambulation.  Vascular surgery wants follow-up in 1 month.  Patient is now appropriate for acute inpatient rehabilitation.    The patient is completing a  comprehensive inpatient rehabilitation program progressing to a modified independent level with bed mobility, transfers and standing with rolling walker with limited hopping.  Completing preprosthetic training.    Pain control stable with medication change to hydrocodone/acetaminophen.  Continue Lyrica increased dose 50 mg 3 times a day.    Patient having ongoing sleep dysfunction with neuropathic pain.  We will discontinue trazodone.  Begin nortriptyline starting at 10 mg at night.  Titrate dose on outpatient basis.    Incision overall doing well with some mild skin breakdown mid incision with some mild yellow slough.  We discussed the importance of maintaining no pressure over the incision line including with sleeping.  Patient will comply with this.  Follow-up with vascular surgery for ongoing incisional management and removal of sutures.    Patient will follow with me for medication management prior to returning home to [place].    Patient staying locally with patient's son while he is recovering from transtibial amputation.    Discharge medication instructions reviewed with the patient.    Follow-up with PCP.    Lupus anticoagulant disorder (CMS/HCC)  Continue Eliquis.    Deep vein thrombosis (CMS/HCC)  Continue Eliquis.    Coronary artery disease  Cardiac precautions.  Continue Eliquis.    Anemia  Postoperative anemia.  Monitor hemoglobin levels.  Hemoglobin 12.1.  Mild iron deficiency and consider iron replacement.          Discharge Disposition  Home   Code Status at Discharge: Full Code    Discharge Medications     Medication List      START taking these medications    aspirin 81 mg enteric coated tablet  Take 1 tablet (81 mg total) by mouth daily Indications: peripheral vascular disease.  Dose: 81 mg  Replaces: aspirin 81 mg chewable tablet     HYDROcodone-acetaminophen 5-325 mg per tablet  Commonly known as: NORCO  Take 1 tablet by mouth every 4 (four) hours as needed for pain (moderate pain) for up to 5  days Indications: pain.  Dose: 1 tablet     melatonin ODT  Take 1 tablet (3 mg total) by mouth nightly.  Dose: 3 mg     nortriptyline 10 mg capsule  Commonly known as: PAMELOR  Take 1 capsule (10 mg total) by mouth nightly.  Dose: 10 mg        CHANGE how you take these medications    pregabalin 50 mg capsule  Commonly known as: LYRICA  Take 1 capsule (50 mg total) by mouth 3 (three) times a day.  Dose: 50 mg  What changed:   · medication strength  · how much to take  · when to take this        CONTINUE taking these medications    apixaban 5 mg tablet  Commonly known as: ELIQUIS  Take 1 tablet (5 mg total) by mouth 2 (two) times a day Indications: increased risk of blood clotting, prevention of venous thromboembolism recurrence.  Dose: 5 mg     zolpidem 10 mg tablet  Commonly known as: AMBIEN  Take 1 tablet (10 mg total) by mouth nightly as needed for sleep.  Dose: 10 mg        STOP taking these medications    aspirin 81 mg chewable tablet  Replaced by: aspirin 81 mg enteric coated tablet               Outpatient Follow-Up            In 1 month DO Jason [facility]

## 2022-12-17 NOTE — PROGRESS NOTES
Patient: Harry Alvarado  Location: [facility] [address]  MRN: [id number]  Today's date: 12/17/2022    History of Present Illness  Harry is a 67 y.o. 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 Principal problem is   Status post below knee amputation of right lower extremity (CMS/Formerly [facility]). The pt was admitted to Vascular Surgery service for a planned R BKA due to bypass thrombosis and ischemia of the R foot.   He is s/p R BKA on 12/2/22.  His hgb is stable and he has been restarted on his home Eliquis.   Tolerating a regular diet.  PM&R was consulted on 12/3 and recommends acute inpt rehab for intensive therapy as he can tolerate >3 hrs of combined PT/OT per day and at least 5 days/week with a reasonable expectation that the patient will make measurable functional improvement that only an acute rehab can provide.         Past Medical History  Harry has a past medical history of Coronary artery disease, Deep vein thrombosis (CMS/[facility]), Lupus anticoagulant disorder (CMS/[facility]ly [facility]), Lyme disease, MTHFR gene mutation, Myocardial infarction (CMS/[facility]), and PAD (peripheral artery disease) (CMS/[facility]ly [facility]).      PT Vitals    Date/Time Pulse BP BP Location BP Method Pt Position [facility]   12/17/22 1001 72 130/75 Left upper arm Automatic Sitting DW   12/17/22 1057 70 127/73 Left upper arm Automatic Sitting DW      PT Pain    Date/Time Pain Type Location Rating: Rest Description Interventions [facility]   12/17/22 1001 Pain Assessment residual limb 6 phantom diversional activity provided    12/17/22 1057 Post Activity residual limb 7 phantom position adjusted DW          Prior Living Environment    Flowsheet Row Most Recent Value   People in Home child(no), adult, spouse   Current Living Arrangements home   Home Accessibility stairs to enter home (Group), stairs within home (Group)   Living Environment Comment Pt was living in [place] with his wife. However, after  discharge, his wife and him plan to move into son's house in [place] with first floor setup and 1 YOSEF. After prosthetic training, pt plans to move back to [place].   Number of Stairs, Main Entrance 1   Location, Patient Bedroom first (main) floor   Location, Bathroom first (main) floor   Bathroom Access Comment per PT confirmed with family stall shower with 6 inch step to enter and seat within shower however pt does have a shower door that swings open, recommending temporary curtain and extended bench          Prior Level of Function    Flowsheet Row Most Recent Value   Dominant Hand right   Ambulation assistive equipment   Transferring assistive equipment   Toileting independent   Bathing assistive person   Dressing independent   Eating independent   Prior Level of Function Comment Pt was previously I with all ADLs, only requiring assist with bathing from his wife as function decreased prior to hospital admission. Pt was ambulating with a SPC for recent 2 months. + , retired. Pt enjoys skiing and playing pickleball with his wife.   Assistive Device Currently Used at Home cane, straight  [reports brief use of RW in past]           IRF PT Evaluation and Treatment - 12/17/22 0810        PT Time Calculation    Start Time 1000     Stop Time 1100     Time Calculation (min) 60 min        Session Details    Document Type discharge evaluation     Mode of Treatment individual therapy;physical therapy        General Information    Patient Profile Reviewed yes     General Observations of Patient Pt received in bed, agreeable for therapy     Existing Precautions/Restrictions cardiac;fall;weight bearing        Weight-bearing Status    Right LE Weight-Bearing Status non weight-bearing (NWB)        Cognition/Psychosocial    Comment, Cognition A&Ox4. Able to follow simple and multi-step commands        Sensory Assessment (Somatosensory)    Left LE Sensory Assessment light touch awareness;light touch  localization;proprioception;intact   5/5 at great toe    Right LE Sensory Assessment light touch awareness;light touch localization;proprioception;intact   5/5 proprioception at knee       Range of Motion (ROM)    Left Lower Extremity (ROM) left LE ROM is WFL     Right Lower Extremity (ROM) right LE ROM is WFL        Strength (Manual Muscle Testing)    Hip, Left (Strength) 5/5 all planes     Knee, Left (Strength) 5/5 all planes     Ankle, Left (Strength) 5/5 all planes     Hip, Right (Strength) 4+/5 all planes     Knee, Right (Strength) 4+/5 all planes     Ankle, Right (Strength) R BKA        Bed Mobility    [place], Roll Left modified independence     [place], Roll Right modified independence     [place], Supine to Sit modified independence     [place], Sit to Supine modified independence     Comment (Bed Mobility) Completed in flat hospital bed with no rail use        Transfers    Comment gait belt donned for all mobility        Bed to Chair Transfer    [place], Bed to Chair modified independence     Assistive Device wheelchair     Comment via LPT        Chair to Bed Transfer    [place], Chair to Bed modified independence     Assistive Device wheelchair     Comment via LPT        Sit to Stand Transfer    [place], Sit to Stand Transfer modified independence     Assistive Device walker, front-wheeled     Comment from w/c and EOM        Stand to Sit Transfer    [place], Stand to Sit Transfer modified independence     Assistive Device walker, front-wheeled     Comment from w/c and EOM        Low Pivot Transfer    [place], Low Pivot Transfer modified independence     Assistive Device wheelchair     Comment EOB<>w/c<>EOM        Stand Pivot Transfer    [place], Stand Pivot/Stand Step Transfer modified independence     Assistive Device walker, front-wheeled     Comment w/c<>EOM        Car Transfer    Transfer Technique low pivot     [place], Car Transfer modified  "independence     Assistive Device walker, front-wheeled     Comment LPT to mock car        Gait Training    [place], Gait modified independence     Assistive Device walker, front-wheeled     Distance in Feet 50 feet     Pattern (Gait) other (see comments)   hop-to pattern    Deviations/Abnormal Patterns (Gait) gait speed decreased     [place], Picking Up Object modified independence   using reacher and RW    Comment (Gait/Stairs) 50'x1 with RW, hop-to pattern. Mod I        Curb Negotiation    [place] modified independence     Assistive Device walker, front-wheeled     Curb Height 6 inches     Comment backward ascension technique up 6\" curb step, mod I        Rough/Uneven Surface Gait Skills    [place] unable to assess;safety considerations     Comment Not assessed as w/c will be primary mobility while awaiting prosthetic training        Stairs Training    [place], Stairs unable to assess;safety considerations     Comment Unable to assess as pt will be D bumping up stairs upon d/c home        Wheelchair Mobility/Management    Method of Locomotion bimanual (upper extremity) propulsion     Wheelchair Type manual, lightweight     [place], Forward Propulsion modified independence     [place], Backward Propulsion modified independence     [place], Steering Activities modified independence     [place], Stopping Activities modified independence     [place], Turning Activities modified independence     [place], Doorway Navigation modified independence     Distance Propelled in Feet 500 feet     Comment, Wheelchair Mobility mod I for all mobility, [facility]        Safety Issues, Functional Mobility    Impairments Affecting Function (Mobility) balance;pain;strength        Balance    Static Sitting Balance WFL     Dynamic Sitting Balance WFL     Static Standing Balance mild impairment;supported     Dynamic Standing Balance mild " impairment;supported     Five Times to Sit to Stand (FTSTS) 14.3        Timed Up and Go Test    Trial One: Timed Up and Go Test 18.4     Trial Two: Timed Up and Go Test 16.3     Trial Three: Timed Up and Go Test 14.6     Mean of 3 Trials: Timed Up and Go Test [id number]     Comment, Timed Up and Go Test Completed with RW, mod I     Results, Timed Up and Go Test (Balance) Pt is above cutoff (>13.5s) indicating increased risk of falling        Motor Skills    Coordination WFL;bilateral;lower extremity     Functional Endurance good: Tolerated all activities with no fatigue noted     Muscle Tone WNL        Postural Deviations    Head and Neck forward head     Shoulder left shoulder forward;right shoulder forward;left shoulder elevation     Low Back flattened;lordosis     Pelvis posterior pelvic tilt        Discharge Summary (PT)    Outcomes Achieved/Progress Made Upon Discharge (PT) all goals met within established timeframes     Transfer to Another Level of Care or Facility (PT) recommend therapy via home health     Discharge Summary Statement (PT) Patient is a 67 yr old male with PMHx of MTHFR gene mutation, CAD s/p MI 1993, PAD s/p R Fem to PT bypass with arm vein in 1993, revision in 2011, s/p R SFA to peroneal bypass graft s/p revision on 7/30/18, s/p tPA bolus and angioplasty in 2020. He was admitted to Vascular Surgery for a planned R BKA on 12/2/22 due to bypass thrombosis and ischemia of the R foot.  Pt was transferred to [facility] for inpatient skilled therapy 12/6/2022. Current functional status as follows; mod I sit<>supine, mod I sit<>stand/stand-pivot with use of the RW, mod I via LPT,  Mod I amb up to 50 feet with RW with use of the RW via unipedal approach, and Mod I WC propulsion in lightweight MWC over smooth/level surfaces.  Patient to complete negotiation on 1 + 1 YOSEF home via dep WC bumping and patient's wife was educated on this technique.  Patient requires an average of 16.4 seconds to  complete TUG test on 12/17, which is greater than the norm of 14 seconds, indicating impaired balance and mobility deficit. Pt requires 14.3 sec for completion of the FTSTS on 12/17 which is < norm of 16 sec indicating no fall risk/mobility deficit. Pt and his wife completed amputee education on 12/12. Ongoing limitations including pain, impaired RLE strength, impaired static/dynamic standing balance, and decreased endurance affecting his functional mobility and safety. D/C planned and appropriate for home w/ spouse and son on 12/18/22.  Ongoing HC PT services recommend. HEP issued and review. MWC provided via [employer], patient/family to acquire RW.                           IRF PT Goals    Flowsheet Row Most Recent Value   Bed Mobility Goal 1    Activity/Assistive Device bed mobility activities, all at 12/07/2022 1034   [place] supervision required at 12/07/2022 1034   Time Frame short-term goal (STG), 1 week at 12/07/2022 1034   Progress/Outcome goal met at 12/12/2022 1630   Bed Mobility Goal 2    Activity/Assistive Device bed mobility activities, all at 12/07/2022 1034   [place] modified independence at 12/07/2022 1034   Time Frame long-term goal (LTG), 14 days or less at 12/12/2022 1630   Progress/Outcome goal met at 12/16/2022 1111   Transfer Goal 1    Activity/Assistive Device sit-to-stand/stand-to-sit, low pivot, stand pivot at 12/07/2022 1034   [place] supervision required at 12/07/2022 1034   Time Frame short-term goal (STG), 2-3 days at 12/12/2022 1630   Progress/Outcome goal met at 12/16/2022 1111   Transfer Goal 2    Activity/Assistive Device sit-to-stand/stand-to-sit, low pivot, stand pivot at 12/07/2022 1034   [place] modified independence at 12/07/2022 1034   Time Frame long-term goal (LTG), 14 days or less at 12/12/2022 1630   Progress/Outcome goal met at 12/17/2022 0810   Gait/Walking Locomotion Goal 1    Activity/Assistive Device gait (walking locomotion) at 12/12/2022 1630    Distance 20 feet at 12/12/2022 1630   [place] other (see comments)  [steadying assist] at 12/12/2022 1630   Time Frame short-term goal (STG), 2-3 days at 12/12/2022 1630   Progress/Outcome goal met at 12/12/2022 1630   Gait/Walking Locomotion Goal 2    Activity/Assistive Device gait (walking locomotion) at 12/12/2022 1630   Distance 20 feet at 12/12/2022 1630   [place] supervision required at 12/12/2022 1630   Time Frame long-term goal (LTG), 14 days or less at 12/12/2022 1630   Progress/Outcome goal met at 12/17/2022 0810   Wheelchair Locomotion Goal 1    Activity wheelchair mobility skills, all at 12/07/2022 1034   Assistive Device manual, lightweight at 12/07/2022 1034   Distance 250 feet at 12/07/2022 1034   [place] modified independence at 12/07/2022 1034   Time Frame short-term goal (STG), 1 week at 12/07/2022 1034   Progress/Outcome goal met at 12/12/2022 1630   Wheelchair Locomotion Goal 2    Activity wheelchair mobility skills, all at 12/07/2022 1034   Assistive Device manual, lightweight at 12/07/2022 1034   Distance 500 feet at 12/07/2022 1034   [place] modified independence at 12/07/2022 1034   Time Frame long-term goal (LTG), 2 weeks at 12/07/2022 1034   Progress/Outcome goal met at 12/12/2022 1630

## 2022-12-17 NOTE — SUBJECTIVE & OBJECTIVE
" Patient was seen and examined.   Attestation Notes: Face to face encounter completed    Subjective    The patient progressing nicely with therapies.  He is reaching modified dependent level with bed mobility and transfers and use of rolling walker for standing and hopping.  Pain control adequate with use of hydrocodone and Lyrica.  Still not sleeping well.  Having ongoing phantom pain and we discussed changing from trazodone to nortriptyline prior to discharge.  Patient for discharge home tomorrow after reaching modified independent level with mobility.  Patient planning to stay locally before returning home to [place].  We will follow-up with me in regards to temporary care for pain management and medication management.  Objective     Visit Vitals  /79 (BP Location: Left upper arm, Patient Position: Sitting)   Pulse 71   Temp 36.4 °C (97.5 °F) (Oral)   Resp 16   Ht 1.854 m (6' 1\")   Wt 69.4 kg (153 lb)   SpO2 97%   BMI 20.19 kg/m²       Review of Systems:  Pertinent items are noted in HPI.  Denies chest pain and shortness of breath.  Review of systems otherwise negative.    Labs     Results from last 7 days   Lab Units 12/12/22  0749   WBC K/uL 12.83*   HEMOGLOBIN g/dL 12.1*   HEMATOCRIT % 38.3*   PLATELETS K/uL 534*     Results from last 7 days   Lab Units 12/12/22  0702   SODIUM mEQ/L 135*   POTASSIUM mEQ/L 5.0   CHLORIDE mEQ/L 99   CO2 mEQ/L 27   BUN mg/dL 18   CREATININE mg/dL 0.7*   CALCIUM mg/dL 9.8   ALBUMIN g/dL 2.9*   BILIRUBIN TOTAL mg/dL 0.2*   ALK PHOS IU/L 140*   ALT IU/L 42   AST IU/L 29   GLUCOSE mg/dL 85       Full Code    Physical Exam  General      Alert, cooperative, no distress, appears stated age.   Head:    Normocephalic, without obvious abnormality, atraumatic.   Eyes:    PERRL, conjunctiva/corneas clear, EOM's intact.        Nose:   Nares normal, septum midline, mucosa normal, no drainage or            sinus tenderness.   Throat:   Lips, mucosa, and tongue normal.    Neck:   Supple, " symmetrical, trachea midline.    Back:     Symmetric, no curvature.   Lungs:     Clear to auscultation bilaterally, respirations unlabored.   Chest wall:    No tenderness or deformity.   Heart:    Regular rate and rhythm, S1 and S2 normal.   Abdomen:     Soft, non-tender, bowel sounds active all four quadrants,     no masses, no organomegaly.   Extremities:  Musculoskeletal: Decreasing stump edema.  Right transtibial amputation.   Pulses:   1+ and symmetric all extremities.   Skin: Incision intact with sutures.  Mid incision with mild yellow slough but without drainage.  Tibial tuberosity with decreased redness/bruising.   Neurologic:          Behavior/  Emotional:  CNII-XII intact.  Alert and oriented ×3.  Motor exam intact.  Sensory exam intact.  Reflexes stable decreased reflexes.      Appropriate, cooperative           Plan of care was discussed with patient

## 2022-12-17 NOTE — ASSESSMENT & PLAN NOTE
Harry Alvarado is a 67 y.o. male who presents with the following chronic medical problems including MTHFR gene mutation, coronary artery disease with prior myocardial infarction 1993, peripheral artery disease with multiple bypass procedures in the past most recent with tPA bolus and angioplasty in 2020.  The patient admitted to [facility] on November 30, 2022 to the vascular surgery service for planned BKA due to bypass thrombosis.  At the time of admission the patient had cyanosis and was beginning to develop thanh gangrene of the foot.  Patient had reported over the last 1 to 2 months worsening pain of the right foot and that he knew the graft was occluded.  Patient has a history of 15+ surgeries of the extremities and understood that his revascularization options were limited.  He had severe 10 out of 10 pain of the foot relieved with elevation of the leg.  He was still able to ambulate but limited by pain.  Under the care of Bowen Serrato MD of vascular surgery he underwent right transtibial amputation on December 2, 2022.  Postoperative course unremarkable with advancement of diet and pain control.  ESTUARDO drain removed on December 4.  For postoperative pain control he was treated with morphine.  On discharge prescribed Tylenol and low-dose Lyrica.  Lyrica started prior by his PCP for neuropathic pain.  He was restarted on his home Eliquis.  He required min assist for bed mobility, transfers and ambulation.  Vascular surgery wants follow-up in 1 month.  Patient is now appropriate for acute inpatient rehabilitation.    The patient is completing a comprehensive inpatient rehabilitation program progressing to a modified independent level with bed mobility, transfers and standing with rolling walker with limited hopping.  Completing preprosthetic training.    Pain control stable with medication change to hydrocodone/acetaminophen.  Continue Lyrica increased dose 50 mg 3 times a day.    Patient having ongoing  sleep dysfunction with neuropathic pain.  We will discontinue trazodone.  Begin nortriptyline starting at 10 mg at night.  Titrate dose on outpatient basis.    Incision overall doing well with some mild skin breakdown mid incision with some mild yellow slough.  We discussed the importance of maintaining no pressure over the incision line including with sleeping.  Patient will comply with this.  Follow-up with vascular surgery for ongoing incisional management and removal of sutures.    Patient will follow with me for medication management prior to returning home to [place].    Patient staying locally with patient's son while he is recovering from transtibial amputation.    Discharge medication instructions reviewed with the patient.    Follow-up with PCP.   retired

## 2022-12-17 NOTE — PROGRESS NOTES
"Daily Progress Note     Patient was seen and examined.   Attestation Notes: Face to face encounter completed    Subjective    The patient progressing nicely with therapies.  He is reaching modified dependent level with bed mobility and transfers and use of rolling walker for standing and hopping.  Pain control adequate with use of hydrocodone and Lyrica.  Still not sleeping well.  Having ongoing phantom pain and we discussed changing from trazodone to nortriptyline prior to discharge.  Patient for discharge home tomorrow after reaching modified independent level with mobility.  Patient planning to stay locally before returning home to [place].  We will follow-up with me in regards to temporary care for pain management and medication management.  Objective     Visit Vitals  /79 (BP Location: Left upper arm, Patient Position: Sitting)   Pulse 71   Temp 36.4 °C (97.5 °F) (Oral)   Resp 16   Ht 1.854 m (6' 1\")   Wt 69.4 kg (153 lb)   SpO2 97%   BMI 20.19 kg/m²       Review of Systems:  Pertinent items are noted in HPI.  Denies chest pain and shortness of breath.  Review of systems otherwise negative.    Labs     Results from last 7 days   Lab Units 12/12/22  0749   WBC K/uL 12.83*   HEMOGLOBIN g/dL 12.1*   HEMATOCRIT % 38.3*   PLATELETS K/uL 534*     Results from last 7 days   Lab Units 12/12/22  0702   SODIUM mEQ/L 135*   POTASSIUM mEQ/L 5.0   CHLORIDE mEQ/L 99   CO2 mEQ/L 27   BUN mg/dL 18   CREATININE mg/dL 0.7*   CALCIUM mg/dL 9.8   ALBUMIN g/dL 2.9*   BILIRUBIN TOTAL mg/dL 0.2*   ALK PHOS IU/L 140*   ALT IU/L 42   AST IU/L 29   GLUCOSE mg/dL 85       Full Code    Physical Exam  General      Alert, cooperative, no distress, appears stated age.   Head:    Normocephalic, without obvious abnormality, atraumatic.   Eyes:    PERRL, conjunctiva/corneas clear, EOM's intact.        Nose:   Nares normal, septum midline, mucosa normal, no drainage or            sinus tenderness.   Throat:   Lips, mucosa, and tongue " normal.    Neck:   Supple, symmetrical, trachea midline.    Back:     Symmetric, no curvature.   Lungs:     Clear to auscultation bilaterally, respirations unlabored.   Chest wall:    No tenderness or deformity.   Heart:    Regular rate and rhythm, S1 and S2 normal.   Abdomen:     Soft, non-tender, bowel sounds active all four quadrants,     no masses, no organomegaly.   Extremities:  Musculoskeletal: Decreasing stump edema.  Right transtibial amputation.   Pulses:   1+ and symmetric all extremities.   Skin: Incision intact with sutures.  Mid incision with mild yellow slough but without drainage.  Tibial tuberosity with decreased redness/bruising.   Neurologic:          Behavior/  Emotional:  CNII-XII intact.  Alert and oriented ×3.  Motor exam intact.  Sensory exam intact.  Reflexes stable decreased reflexes.      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 male who presents with the following chronic medical problems including MTHFR gene mutation, coronary artery disease with prior myocardial infarction 1993, peripheral artery disease with multiple bypass procedures in the past most recent with tPA bolus and angioplasty in 2020.  The patient admitted to [facility] on November 30, 2022 to the vascular surgery service for planned BKA due to bypass thrombosis.  At the time of admission the patient had cyanosis and was beginning to develop thanh gangrene of the foot.  Patient had reported over the last 1 to 2 months worsening pain of the right foot and that he knew the graft was occluded.  Patient has a history of 15+ surgeries of the extremities and understood that his revascularization options were limited.  He had severe 10 out of 10 pain of the foot relieved with elevation of the leg.  He was still able to ambulate but limited by  pain.  Under the care of Bowen Serrato MD of vascular surgery he underwent right transtibial amputation on December 2, 2022.  Postoperative course unremarkable with advancement of diet and pain control.  ESTUARDO drain removed on December 4.  For postoperative pain control he was treated with morphine.  On discharge prescribed Tylenol and low-dose Lyrica.  Lyrica started prior by his PCP for neuropathic pain.  He was restarted on his home Eliquis.  He required min assist for bed mobility, transfers and ambulation.  Vascular surgery wants follow-up in 1 month.  Patient is now appropriate for acute inpatient rehabilitation.    The patient is completing a comprehensive inpatient rehabilitation program progressing to a modified independent level with bed mobility, transfers and standing with rolling walker with limited hopping.  Completing preprosthetic training.    Pain control stable with medication change to hydrocodone/acetaminophen.  Continue Lyrica increased dose 50 mg 3 times a day.    Patient having ongoing sleep dysfunction with neuropathic pain.  We will discontinue trazodone.  Begin nortriptyline starting at 10 mg at night.  Titrate dose on outpatient basis.    Incision overall doing well with some mild skin breakdown mid incision with some mild yellow slough.  We discussed the importance of maintaining no pressure over the incision line including with sleeping.  Patient will comply with this.  Follow-up with vascular surgery for ongoing incisional management and removal of sutures.    Patient will follow with me for medication management prior to returning home to [place].    Patient staying locally with patient's son while he is recovering from transtibial amputation.    Discharge medication instructions reviewed with the patient.    Follow-up with PCP.    Anemia  Assessment & Plan  Postoperative anemia.  Monitor hemoglobin levels.  Hemoglobin 12.1.  Mild iron deficiency and consider iron replacement.    Lupus  anticoagulant disorder (CMS/HCC)  Assessment & Plan  Continue Eliquis.    Deep vein thrombosis (CMS/HCC)  Assessment & Plan  Continue Eliquis.    Coronary artery disease  Assessment & Plan  Cardiac precautions.  Continue Eliquis.        Expected Discharge Date:  12/18/2022

## 2022-12-17 NOTE — PROGRESS NOTES
Patient: Harry Alvarado  Location: [facility] [address]  MRN: [id number]  Today's date: 12/17/2022    History of Present Illness  Harry is a 67 y.o. male admitted on 12/6/2022 with Status post below knee amputation of right lower extremity (CMS/[facility]) [Z89.511]. Principal problem is   Status post below knee amputation of right lower extremity (CMS/[facility]). The pt was admitted to Vascular Surgery service for a planned R BKA due to bypass thrombosis and ischemia of the R foot.   He is s/p R BKA on 12/2/22.  His hgb is stable and he has been restarted on his home Eliquis.   Tolerating a regular diet.  PM&R was consulted on 12/3 and recommends acute inpt rehab for intensive therapy as he can tolerate >3 hrs of combined PT/OT per day and at least 5 days/week with a reasonable expectation that the patient will make measurable functional improvement that only an acute rehab can provide.         Past Medical History  Harry has a past medical history of Coronary artery disease, Deep vein thrombosis (CMS/[facility]), Lupus anticoagulant disorder (CMS/[facility]), Lyme disease, MTHFR gene mutation, Myocardial infarction (CMS/[facility]), and PAD (peripheral artery disease) (CMS/[facility]).      OT Vitals    Date/Time Pulse HR Source Pt Activity O2 Therapy BP BP Location BP Method Pt Position [facility]   12/17/22 0901 71 Monitor At rest None (Room air) 131/79 Left upper arm Automatic Sitting CS      OT Pain    Date/Time Pain Type Location Rating: Rest Description Interventions [facility]   12/17/22 0901 Pain Assessment residual limb 7 phantom;incisional position adjusted CS   12/17/22 0957 Pain Reassessment residual limb 6 phantom;incisional -- CS          Prior Living Environment    Flowsheet Row Most Recent Value   People in Home child(no), adult, spouse   Current Living Arrangements home   Home Accessibility stairs to enter home (Group), stairs within home (Group)   Living Environment Comment Pt was living in [place] with his wife. However, after  "discharge, his wife and him plan to move into son's house in [place] with first floor setup and 1 YOSEF. After prosthetic training, pt plans to move back to [place].   Number of Stairs, Main Entrance 1   Location, Patient Bedroom first (main) floor   Location, Bathroom first (main) floor   Bathroom Access Comment per PT confirmed with family stall shower with 6 inch step to enter and seat within shower however pt does have a shower door that swings open, recommending temporary curtain and extended bench          Prior Level of Function    Flowsheet Row Most Recent Value   Dominant Hand right   Ambulation assistive equipment   Transferring assistive equipment   Toileting independent   Bathing assistive person   Dressing independent   Eating independent   Prior Level of Function Comment Pt was previously I with all ADLs, only requiring assist with bathing from his wife as function decreased prior to hospital admission. Pt was ambulating with a SPC for recent 2 months. + , retired. Pt enjoys skiing and playing pickleball with his wife.   Assistive Device Currently Used at Home cane, straight  [reports brief use of RW in past]          Occupational Profile    Flowsheet Row Most Recent Value   Occupational History/Life Experiences +. 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, also enjoys hiking,   Environmental Supports and Barriers Pt is interested in learning about return to driving, plans to purchase a new vehicle.  Recommend arranging Chirag Eaton from the [employer] to speak with pt.  Pt lives in [place] and plans on staying in [place] with son until he returns for prosthetic training and is able to return to [place].  Initially wanted to drive home.  Discussed potentially purchasing and adapting a vehicle during his stay in [place].  Recommend arranging for pt to view adapted vehicle.   Patient Goals \"Be " "able to get around better, take care of myself and play with my grandkids.\" And also \"Get a prosthesis and walk around like I used too.\"  [PSFS reassessed 12/16, pt reported: Walking/hiking 4/10, Pickleball 0/10, Bathing 7/10, Playing with grandkids 5/10 score 16/40 0.40.]           IRF OT Evaluation and Treatment - 12/17/22 0919        OT Time Calculation    Start Time 0900     Stop Time 1000     Time Calculation (min) 60 min        Session Details    Document Type discharge evaluation     Mode of Treatment occupational therapy;individual therapy        General Information    Patient Profile Reviewed yes     General Observations of Patient Pt received supine in bed, agreeable to session.     Existing Precautions/Restrictions cardiac;fall;weight bearing        Weight-bearing Status    Right LE Weight-Bearing Status non weight-bearing (NWB)        Cognitive Pattern Assessment    Cognitive Pattern Assessment Used BIMS        Brief Interview for Mental Status (BIMS)    Repetition of Three Words (First Attempt) 3     Temporal Orientation: Year Correct     Temporal Orientation: Month Accurate within 5 days     Temporal Orientation: Day Correct     Recall: \"Sock\" Yes, no cue required     Recall: \"Blue\" Yes, no cue required     Recall: \"Bed\" Yes, no cue required     BIMS Summary Score 15        Confusion Assessment Method (CAM)    Is there evidence of an acute change in mental status from the patient's baseline? No     Inattention Behavior not present     Disorganized thinking Behavior not present     Altered level of consciousness Behavior not present        Bed Mobility    Comment (Bed Mobility) OT: supine to sitting EOB with Mod I        Transfers    Transfers low pivot transfer;shower transfer;toilet transfer        Low Pivot Transfer    [place], Low Pivot Transfer modified independence     Assistive Device wheelchair     Comment LPT EOB to w/c        Toilet Transfer    Transfer Technique low pivot     " [place], Toilet Transfer modified independence     Assistive Device commode, 3-in-1;grab bars/safety frame;wheelchair     Comment LPT w/c to/from commode over toilet with Mod I        Shower Transfer    Transfer Technique low pivot     [place], Shower Transfer modified independence     Verbal Cues safety     Assistive Device grab bars/tub rail;shower chair;wheelchair     Comment LPT w/c to/from shower chair in barrier free shower. Recommending supervision to transfer from the wheelchair to the shower chair or using his walker and hopping technique to the shower based on size of bathroom.        Bathing    Shower Provided? Yes     Self-Performance chest;left arm;right arm;abdomen;front perineal area;buttocks;left upper leg;right upper leg;left lower leg, including foot;right lower leg, including foot     [place] modified independence     Position supported sitting     Setup Assistance obtain supplies     Adaptive Equipment grab bar/tub rail;hand-held shower spray hose;shower chair     Comment Pt completed full shower seated on shower chair with Mod I. Pt using lateral lean technique for buttocks.        Upper Body Dressing    Tasks don;pull over garment     Self-Performance obtains clothes;threads left arm, shirt;threads right arm, shirt;pulls shirt over head/around back;pulls shirt down/adjusts     [place] modified independence     Position supported sitting     Adaptive Equipment none     Comment Pt completed UB dressing seated in w/c with Mod I.        Lower Body Dressing    Tasks don;pants/bottoms;socks;underwear     Self-Performance obtains clothes;threads left leg, underpants;threads right leg, underpants;pulls underpants up or down;threads left leg, pants/shorts;threads right leg, pants/shorts;pulls pants/shorts up or down;dons/doffs left sock     [place] modified independence     Position supported sitting;supported standing     Adaptive Equipment none     [place], Footwear  modified independence     Comment Pt completed LB dressing seated in w/c. Pt able to thread B/L legs through underwear and pants. Pt used grab bars for half standing to pull clothing over hips. Pt able to don L sock.        Grooming    Self-Performance washes, rinses and dries face;washes, rinses and dries hands;brushes/franco hair;oral care (brushing teeth, cleaning dentures)     [place] modified independence     Position supported sitting     Setup Assistance obtain supplies     Adaptive Equipment none     [place], Oral Hygiene modified independence     Comment Pt completed grooming tasks seated in w/c at sink.        Toileting    Comment no toileting needs this session, Mod I anticipated based on ADL completion.        Discharge Summary (OT)    Discharge Summary Statement (OT) Zhen is a 66 y/o male admitted to [facility] on 12/6/2022 s/p R BKA. During inpatient rehabilitation stay pt demo functional progress to achieving modified independent with all ADLs and functional transfers c recommendation of supervision for shower transfer only. Family training c wife and meeting c driving rehab specialist completed during stay. Pt’s wife purchased all recommended DME prior to D/C. Handicap placard information provided to be sent to [place] [employer] by pt. Driving is not recommended until completion of  rehab and car adaptations installed after return to [place]. Pt educated to continue UE therex at home to continue strength and endurance training. Plan for home health OT to assess safety at home and pt to return for prosthetic training after limb healing process complete.                      Education Documentation  Safe ADL Techniques, taught by Scowden, Christina, COTA at 12/17/2022 12:26 PM.  Learner: Patient  Readiness: Acceptance  Method: Explanation  Response: Verbalizes Understanding  Comment: Educated on ADL CLOF c transfer status based on home setup, driving info, limb care/safety, DME          IRF OT  Goals    Flowsheet Row Most Recent Value   Transfer Goal 1    Activity/Assistive Device toilet at 12/07/2022 0720   [place] supervision required at 12/07/2022 0720   Time Frame short-term goal (STG), 1 week at 12/13/2022 0650   Progress/Outcome goal met at 12/17/2022 0919   Transfer Goal 2    Activity/Assistive Device toilet at 12/07/2022 0720   [place] modified independence at 12/07/2022 0720   Time Frame long-term goal (LTG), 14 days or less at 12/07/2022 0720   Progress/Outcome goal met at 12/17/2022 0919   Transfer Goal 3    Activity/Assistive Device shower at 12/07/2022 0720   [place] other (see comments)  [Cl S] at 12/13/2022 0650   Time Frame short-term goal (STG), 1 week at 12/13/2022 0650   Progress/Outcome goal met at 12/17/2022 0919   Transfer Goal 4    Activity/Assistive Device shower at 12/07/2022 0720   [place] modified independence at 12/07/2022 0720   Time Frame long-term goal (LTG) at 12/07/2022 0720   Progress/Outcome goal met at 12/17/2022 0919   Bathing Goal 1    [place] supervision required at 12/13/2022 0650   Time Frame short-term goal (STG), 1 week at 12/13/2022 0650   Progress/Outcome goal met at 12/17/2022 0919   Bathing Goal 2    [place] modified independence at 12/07/2022 0720   Time Frame long-term goal (LTG), 14 days or less at 12/07/2022 0720   Progress/Outcome goal met at 12/17/2022 0919   UB Dressing Goal 1    [place] modified independence at 12/13/2022 0650   Time Frame short-term goal (STG), 1 week at 12/13/2022 0650   Progress/Outcome goal met at 12/17/2022 0919   UB Dressing Goal 2    [place] modified independence at 12/07/2022 0720   Time Frame long-term goal (LTG), 14 days or less at 12/07/2022 0720   Progress/Outcome goal met at 12/17/2022 0919   LB Dressing Goal 1    [place] other (see comments)  [Cl S] at 12/13/2022 0650   Time Frame short-term goal (STG), 1 week at 12/13/2022 0650   Progress/Outcome goal met at 12/17/2022 0919    LB Dressing Goal 2    [place] modified independence at 12/07/2022 0720   Time Frame long-term goal (LTG), 14 days or less at 12/07/2022 0720   Progress/Outcome goal met at 12/17/2022 0919   Grooming Goal 1    [place] set-up required at 12/07/2022 0720   Time Frame short-term goal (STG), 1 week at 12/13/2022 0650   Progress/Outcome goal met at 12/17/2022 0919   Grooming Goal 2    [place] modified independence at 12/07/2022 0720   Time Frame long-term goal (LTG), 14 days or less at 12/07/2022 0720   Progress/Outcome goal met at 12/17/2022 0919   Toileting Goal 1    [place] supervision required at 12/07/2022 0720   Time Frame short-term goal (STG), 1 week at 12/13/2022 0650   Progress/Outcome goal met at 12/17/2022 0919   Toileting Goal 2    [place] modified independence at 12/07/2022 0720   Time Frame long-term goal (LTG), 14 days or less at 12/07/2022 0720   Progress/Outcome goal met at 12/17/2022 0919

## 2022-12-17 NOTE — PROGRESS NOTES
"Daily Progress Note     Patient was seen and examined.   Attestation Notes: Face to face encounter completed    Subjective    The patient doing well and progressing with transfers approaching modified independent level with transfers and improving with using rolling walker for standing transferring and hopping.  Pain persist but better controlled with hydrocodone.  Sleep continues to be a problem and tolerated the trazodone and melatonin.  Objective     Visit Vitals  BP (!) 143/76 (BP Location: Right upper arm, Patient Position: Sitting)   Pulse 68   Temp 36.3 °C (97.3 °F) (Oral)   Resp 18   Ht 1.854 m (6' 1\")   Wt 69.4 kg (153 lb)   SpO2 96%   BMI 20.19 kg/m²       Review of Systems:  Pertinent items are noted in HPI.  Denies chest pain and shortness of breath.  Review of systems otherwise negative.    Labs     Results from last 7 days   Lab Units 12/12/22  0749   WBC K/uL 12.83*   HEMOGLOBIN g/dL 12.1*   HEMATOCRIT % 38.3*   PLATELETS K/uL 534*     Results from last 7 days   Lab Units 12/12/22  0702   SODIUM mEQ/L 135*   POTASSIUM mEQ/L 5.0   CHLORIDE mEQ/L 99   CO2 mEQ/L 27   BUN mg/dL 18   CREATININE mg/dL 0.7*   CALCIUM mg/dL 9.8   ALBUMIN g/dL 2.9*   BILIRUBIN TOTAL mg/dL 0.2*   ALK PHOS IU/L 140*   ALT IU/L 42   AST IU/L 29   GLUCOSE mg/dL 85       Full Code    Physical Exam  General      Alert, cooperative, no distress, appears stated age.   Head:    Normocephalic, without obvious abnormality, atraumatic.   Eyes:    PERRL, conjunctiva/corneas clear, EOM's intact.        Nose:   Nares normal, septum midline, mucosa normal, no drainage or            sinus tenderness.   Throat:   Lips, mucosa, and tongue normal.    Neck:   Supple, symmetrical, trachea midline.    Back:     Symmetric, no curvature.   Lungs:     Clear to auscultation bilaterally, respirations unlabored.   Chest wall:    No tenderness or deformity.   Heart:    Regular rate and rhythm, S1 and S2 normal.   Abdomen:     Soft, non-tender, bowel sounds " active all four quadrants,     no masses, no organomegaly.   Extremities:  Musculoskeletal:  Stable stump edema.  Right transtibial amputation.     Pulses:   1+ and symmetric all extremities.   Skin: Incision remains intact and improving pressure injury over tibial tuberosity.   Neurologic:          Behavior/  Emotional:  CNII-XII intact.  Alert and oriented ×3.  Motor exam intact.  Sensory exam intact.  Reflexes stable decreased reflexes.      Appropriate, cooperative           Plan of care was discussed with patient     Assessment & Plan  * Status post below knee amputation of right lower extremity (CMS/[facility])  Assessment & Plan  Harry Alvarado is a 67 y.o. male who presents with the following chronic medical problems including MTHFR gene mutation, coronary artery disease with prior myocardial infarction 1993, peripheral artery disease with multiple bypass procedures in the past most recent with tPA bolus and angioplasty in 2020.  The patient admitted to [facility] on November 30, 2022 to the vascular surgery service for planned BKA due to bypass thrombosis.  At the time of admission the patient had cyanosis and was beginning to develop thanh gangrene of the foot.  Patient had reported over the last 1 to 2 months worsening pain of the right foot and that he knew the graft was occluded.  Patient has a history of 15+ surgeries of the extremities and understood that his revascularization options were limited.  He had severe 10 out of 10 pain of the foot relieved with elevation of the leg.  He was still able to ambulate but limited by pain.  Under the care of Bowen Serrato MD of vascular surgery he underwent right transtibial amputation on December 2, 2022.  Postoperative course unremarkable with advancement of diet and pain control.  ESTUARDO drain removed on December 4.  For postoperative pain control he was treated with morphine.  On discharge prescribed Tylenol and low-dose Lyrica.  Lyrica started prior by his  PCP for neuropathic pain.  He was restarted on his home Eliquis.  He required min assist for bed mobility, transfers and ambulation.  Vascular surgery wants follow-up in 1 month.  Patient is now appropriate for acute inpatient rehabilitation.    Comprehensive inpatient rehabilitation program status post right transtibial amputation.  Goal is for the patient to reach a modified independent level with bed mobility, transfers and ambulation.  Ambulation with hopping short distances using a rolling walker.  Also to become modified independent with elevations.  Preprosthetic training.  Ongoing pain control.    For pain control continue low-dose Lyrica and consider increasing dose if the patient has ongoing phantom pain.  Continue around-the-clock Tylenol.  Continue tramadol.  If needed adjust dose.    Monitor incision closely and continue incisional and stump care.  Pressure injury over the tibial tuberosity.  Stable.  Continue to monitor.  Pressure-relief over area.    The patient with ongoing residual stump pain.  Also has phantom pain but postsurgical pain more significant.  Worsens at nighttime.  Will discontinue Tylenol and tramadol.  Begin hydrocodone.  Change Lyrica to 50 mg 3 times daily.    The patient doing well and progressing with transfers approaching modified independent level with transfers and improving with using rolling walker for standing transferring and hopping.    Pain persist but better controlled with hydrocodone.    Sleep continues to be a problem and tolerated the trazodone and melatonin.  Will increase trazodone.    Anemia  Assessment & Plan  Postoperative anemia.  Monitor hemoglobin levels.  Hemoglobin 12.1.  Mild iron deficiency and consider iron replacement.    Lupus anticoagulant disorder (CMS/HCC)  Assessment & Plan  Continue Eliquis.    Deep vein thrombosis (CMS/HCC)  Assessment & Plan  Continue Eliquis.    Coronary artery disease  Assessment & Plan  Cardiac precautions.  Continue  Eliquis.        Expected Discharge Date:  12/18/2022

## 2022-12-17 NOTE — SUBJECTIVE & OBJECTIVE
" Patient was seen and examined.   Attestation Notes: Face to face encounter completed    Subjective    The patient doing well and progressing with transfers approaching modified independent level with transfers and improving with using rolling walker for standing transferring and hopping.  Pain persist but better controlled with hydrocodone.  Sleep continues to be a problem and tolerated the trazodone and melatonin.  Objective     Visit Vitals  BP (!) 143/76 (BP Location: Right upper arm, Patient Position: Sitting)   Pulse 68   Temp 36.3 °C (97.3 °F) (Oral)   Resp 18   Ht 1.854 m (6' 1\")   Wt 69.4 kg (153 lb)   SpO2 96%   BMI 20.19 kg/m²       Review of Systems:  Pertinent items are noted in HPI.  Denies chest pain and shortness of breath.  Review of systems otherwise negative.    Labs     Results from last 7 days   Lab Units 12/12/22  0749   WBC K/uL 12.83*   HEMOGLOBIN g/dL 12.1*   HEMATOCRIT % 38.3*   PLATELETS K/uL 534*     Results from last 7 days   Lab Units 12/12/22  0702   SODIUM mEQ/L 135*   POTASSIUM mEQ/L 5.0   CHLORIDE mEQ/L 99   CO2 mEQ/L 27   BUN mg/dL 18   CREATININE mg/dL 0.7*   CALCIUM mg/dL 9.8   ALBUMIN g/dL 2.9*   BILIRUBIN TOTAL mg/dL 0.2*   ALK PHOS IU/L 140*   ALT IU/L 42   AST IU/L 29   GLUCOSE mg/dL 85       Full Code    Physical Exam  General      Alert, cooperative, no distress, appears stated age.   Head:    Normocephalic, without obvious abnormality, atraumatic.   Eyes:    PERRL, conjunctiva/corneas clear, EOM's intact.        Nose:   Nares normal, septum midline, mucosa normal, no drainage or            sinus tenderness.   Throat:   Lips, mucosa, and tongue normal.    Neck:   Supple, symmetrical, trachea midline.    Back:     Symmetric, no curvature.   Lungs:     Clear to auscultation bilaterally, respirations unlabored.   Chest wall:    No tenderness or deformity.   Heart:    Regular rate and rhythm, S1 and S2 normal.   Abdomen:     Soft, non-tender, bowel sounds active all four " quadrants,     no masses, no organomegaly.   Extremities:  Musculoskeletal:  Stable stump edema.  Right transtibial amputation.     Pulses:   1+ and symmetric all extremities.   Skin: Incision remains intact and improving pressure injury over tibial tuberosity.   Neurologic:          Behavior/  Emotional:  CNII-XII intact.  Alert and oriented ×3.  Motor exam intact.  Sensory exam intact.  Reflexes stable decreased reflexes.      Appropriate, cooperative           Plan of care was discussed with patient

## 2022-12-18 VITALS
HEIGHT: 73 IN | RESPIRATION RATE: 16 BRPM | DIASTOLIC BLOOD PRESSURE: 71 MMHG | OXYGEN SATURATION: 97 % | TEMPERATURE: 97.7 F | WEIGHT: 153 LBS | SYSTOLIC BLOOD PRESSURE: 107 MMHG | HEART RATE: 71 BPM | BODY MASS INDEX: 20.28 KG/M2

## 2022-12-18 PROCEDURE — 63700000 HC SELF-ADMINISTRABLE DRUG: Performed by: PHYSICAL MEDICINE & REHABILITATION

## 2022-12-18 PROCEDURE — 63700000 HC SELF-ADMINISTRABLE DRUG: Performed by: HOSPITALIST

## 2022-12-18 RX ADMIN — ASPIRIN 81 MG: 81 TABLET, COATED ORAL at 08:28

## 2022-12-18 RX ADMIN — PANTOPRAZOLE SODIUM 40 MG: 40 TABLET, DELAYED RELEASE ORAL at 08:28

## 2022-12-18 RX ADMIN — ZOLPIDEM TARTRATE 10 MG: 10 TABLET, COATED ORAL at 00:32

## 2022-12-18 RX ADMIN — HYDROCODONE BITARTRATE AND ACETAMINOPHEN 1.5 TABLET: 5; 325 TABLET ORAL at 00:32

## 2022-12-18 RX ADMIN — HYDROCODONE BITARTRATE AND ACETAMINOPHEN 1.5 TABLET: 5; 325 TABLET ORAL at 10:14

## 2022-12-18 RX ADMIN — APIXABAN 5 MG: 5 TABLET, FILM COATED ORAL at 08:28

## 2022-12-18 RX ADMIN — PREGABALIN 50 MG: 50 CAPSULE ORAL at 08:28

## 2022-12-18 NOTE — PLAN OF CARE
Plan of Care Review  Plan of Care Reviewed With: patient  Progress: no change  Outcome Summary: Pt. slept fair with meds and aromatherapy. Dressing dry and clean.Cont. used urinal. Used call bell for assistance.

## 2022-12-18 NOTE — NURSING NOTE
Bruising on right pretibial area improved. Instructed pt and wife to keep it covered with foam dressing and change q 3 days and as needed.

## 2022-12-28 NOTE — DISCUSSION/SUMMARY
[FreeTextEntry1] : 67 y/o M w/ MTHFR gene mutation homozygous and lupus anticoag on chronic AC therapy (Warfarin-> now Eliquis since April 2022), and significant PAD w/ RLE critical limb ischemia 2/2 R SFA to peroneal bypass occlusion, s/p tPA bolus, tPA infusion, angiox2 w/ thrombolysis, thrombectomy and angioplasty on Nov 17th-19th, 2020 with evidence of patent bypass of the SFA to the peroneal artery with peroneal runoff to the foot. Now, re occluded bypass, failed conservative measures and presented with signs of critical limb ischemia and tissue loss for which he underwent R BKA 12/2/22. \par On exam, \par

## 2023-01-03 ENCOUNTER — APPOINTMENT (OUTPATIENT)
Dept: VASCULAR SURGERY | Facility: CLINIC | Age: 68
End: 2023-01-03

## 2023-01-03 DIAGNOSIS — S81.801A UNSPECIFIED OPEN WOUND, RIGHT LOWER LEG, INITIAL ENCOUNTER: ICD-10-CM

## 2023-01-03 RX ORDER — CEPHALEXIN 500 MG/1
500 CAPSULE ORAL
Qty: 14 | Refills: 0 | Status: ACTIVE | COMMUNITY
Start: 2023-01-03 | End: 1900-01-01

## 2023-01-03 RX ORDER — MUPIROCIN 20 MG/G
2 OINTMENT TOPICAL
Qty: 1 | Refills: 1 | Status: ACTIVE | COMMUNITY
Start: 2023-01-03 | End: 1900-01-01

## 2023-01-04 ENCOUNTER — APPOINTMENT (OUTPATIENT)
Dept: VASCULAR SURGERY | Facility: CLINIC | Age: 68
End: 2023-01-04

## 2023-01-19 ENCOUNTER — HOSPITAL ENCOUNTER (OUTPATIENT)
Dept: PHYSICAL MEDICINE AND REHAB | Facility: REHABILITATION | Age: 68
Discharge: HOME | End: 2023-01-19
Payer: MEDICARE

## 2023-01-31 NOTE — PHYSICAL EXAM
[Ankle Swelling (On Exam)] : not present [Varicose Veins Of Lower Extremities] : not present [] : not present [Abdomen Tenderness] : ~T ~M No abdominal tenderness [de-identified] : Talkative, friendly usual [FreeTextEntry1] : LLE  toes are warm to touch with adequate capillary refill.  R BKA incision with sutures in place, no drainage, no signs of infection,  [de-identified] : FROMANA [de-identified] : See cardiovascular section  [Respiratory Effort] : normal respiratory effort [Normal Rate and Rhythm] : normal rate and rhythm [0] : right 0 [Alert] : alert [Oriented to Person] : oriented to person [Oriented to Place] : oriented to place [Oriented to Time] : oriented to time [Calm] : calm

## 2023-01-31 NOTE — REASON FOR VISIT
[de-identified] : Right BKA [de-identified] : 12/2/22 [de-identified] : 66 y/o M w/ PAD s/p R fem PT bypass with arm vein in 1993, redone in 2011, s/p R SFA to peroneal bypass graft s/p revision on 7/30/18, restenosis s/p angioplasty of R tibial artery graft, femoral artery graft, R ARNOL, R EIA, and R CFA 02/03/2020, c/b RLE critical limb ischemia Nov 2020 s/p tPA bolus, RLE angio and temp catheter placement for tPA infusion, repeat angiox2 s/p thrombolysis, thrombectomy and angioplasty with evidence of patent bypass of the SFA to the peroneal artery with peroneal runoff to the foot. He also has underlying MTHFR gene mutation homozygous and lupus anticoag on chronic AC therapy (Coumadin, INR goal 3-4). During the last appointment, the RLE bypass was found to be occluded (SANTA 0.40) and conservative measures were recommended to help build his collaterals as there are no options of revascularization. Unfortunately, he had to undergo R RADHA 12/2/22. He tolerated the procedure well and was discharged to rehab.\par \par He presents today accompanied by wife. He has been staying at a family member's house in [place]. He will be going back to outpt rehab next week and completed 2+ wks of inpt rehab. He has been cleaning the incision w/ peroxide daily and covering with xeroform, kerlix and figure 8 ace bandage. Denies any fever, chills, redness on stump or drainage. He has phantom pain symptoms. \par  [Follow-Up: _____] : a [unfilled] follow-up visit

## 2023-01-31 NOTE — ASSESSMENT
[Arterial/Venous Disease] : arterial/venous disease [Medication Management] : medication management [Foot care/Footwear] : foot care/footwear [Ulcer Care] : ulcer care

## 2023-02-01 ENCOUNTER — APPOINTMENT (OUTPATIENT)
Dept: VASCULAR SURGERY | Facility: CLINIC | Age: 68
End: 2023-02-01
Payer: MEDICARE

## 2023-02-01 VITALS
DIASTOLIC BLOOD PRESSURE: 88 MMHG | BODY MASS INDEX: 23.33 KG/M2 | HEIGHT: 73 IN | SYSTOLIC BLOOD PRESSURE: 144 MMHG | WEIGHT: 176 LBS | HEART RATE: 70 BPM

## 2023-02-01 DIAGNOSIS — S88.111A COMPLETE TRAUMATIC AMPUTATION AT LVL BETWEEN KNEE AND ANKLE, RIGHT LOWER LEG, INITIAL ENCOUNTER: ICD-10-CM

## 2023-02-01 DIAGNOSIS — Z87.891 PERSONAL HISTORY OF NICOTINE DEPENDENCE: ICD-10-CM

## 2023-02-01 PROCEDURE — 99024 POSTOP FOLLOW-UP VISIT: CPT

## 2023-02-02 PROBLEM — S88.111A: Status: ACTIVE | Noted: 2023-01-03

## 2023-02-02 NOTE — ASSESSMENT
[FreeTextEntry1] : 66 y/o M w/ MTHFR gene mutation homozygous and lupus anticoag on chronic AC therapy (Warfarin-> now Eliquis since April 2022), and significant PAD w/ RLE critical limb ischemia 2/2 R SFA to peroneal bypass occlusion, s/p tPA bolus, tPA infusion, angiox2 w/ thrombolysis, thrombectomy and angioplasty on Nov 17th-19th, 2020 with evidence of patent bypass of the SFA to the peroneal artery with peroneal runoff to the foot. Now, re occluded bypass, failed conservative measures and presented with signs of critical limb ischemia and tissue loss for which he underwent R BKA 12/2/22. \par On exam,  R BKA skin healthy, incisions slightly debrided.  No drainage, no signs of infection. \par C/w wound care, peroxide, bacitracin drop and keep stump wrapped with ace bandage. Patient is cleared to receive . He may f/u as needed.

## 2023-02-02 NOTE — HISTORY OF PRESENT ILLNESS
[FreeTextEntry1] : 66 y/o M w/ PAD s/p R fem PT bypass with arm vein in 1993, redone in 2011, s/p R SFA to peroneal bypass graft s/p revision on 7/30/18, restenosis s/p angioplasty of R tibial artery graft, femoral artery graft, R ARNOL, R EIA, and R CFA 02/03/2020, c/b RLE critical limb ischemia Nov 2020 s/p tPA bolus, RLE angio and temp catheter placement for tPA infusion, repeat angiox2 s/p thrombolysis, thrombectomy and angioplasty with evidence of patent bypass of the SFA to the peroneal artery with peroneal runoff to the foot. He also has underlying MTHFR gene mutation homozygous and lupus anticoag on chronic AC therapy (Coumadin, INR goal 3-4). The RLE bypass was found to be occluded (SANTA 0.40) and conservative measures were recommended to help build his collaterals as there are no options of revascularization. Unfortunately, he had to undergo R BKA 12/2/22. He tolerated the procedure well and was discharged to rehab.\par \par He presents today accompanied by wife. He has been completing daily exercises and will be going back to rehab when the  and temp prosthetic device arrives. He doing well. He has been cleaning the incision w/ peroxide daily and the site is closed again with scab over it. Denies any fever, chills, redness on stump or drainage. He has mild phantom pain symptoms. He would like to know if he is ready to receive a  now that the area is scabbed off.

## 2023-02-02 NOTE — PHYSICAL EXAM
[Ankle Swelling (On Exam)] : not present [Varicose Veins Of Lower Extremities] : not present [] : not present [Abdomen Tenderness] : ~T ~M No abdominal tenderness [de-identified] : Talkative, friendly usual [FreeTextEntry1] : LLE toes are warm to touch with adequate capillary refill. R BKA incision with small dry scab, no drainage, no signs of infection [de-identified] : FROMANA [de-identified] : See cardiovascular section

## 2023-02-02 NOTE — ADDENDUM
[FreeTextEntry1] : I, Dr. Seamus Herrera, personally performed the evaluation and management (E/M) services for this established patient who presents today with (a) new problem(s)/exacerbation of (an) existing condition(s).  That E/M includes conducting the examination, assessing all new/exacerbated conditions, and establishing a new plan of care.  Today, my ACP, Alat Lowe NP, was here to observe my evaluation and management services for this new problem/exacerbated condition to be followed going forward. \par \par The documentation for this encounter was entered by Sheree Marie acting as a scribe for Dr.Gary Herrera.\par

## 2023-02-28 ENCOUNTER — BMR PREADMISSION ASSESSMENT (INPATIENT)
Dept: PHYSICAL MEDICINE AND REHAB | Facility: REHABILITATION | Age: 68
End: 2023-02-28
Payer: MEDICARE

## 2023-02-28 DIAGNOSIS — Z89.511 HX OF RIGHT BKA (CMS/HCC): Primary | ICD-10-CM

## 2023-02-28 NOTE — HOSPITAL COURSE
This is the first clinic appointment for Mr. Alvarado following his inpatient stay for preprostatic training. He is a 67-year-old male with a history of gene mutation, CAD, history of MI, peripheral artery disease with multiple bypass procedures in the past. The patient had been admitted to [facility] on November 30 for a planned transtibial amputation due to bypass thrombosis. He had reported worse pain in the previous 1 to 2 months in the right foot and had undergone multiple surgeries prior. He underwent a right transtibial amputation on December 2, 2022. There is no significant postoperative complications. Pain was well controlled. He was admitted to [place] rehab for preprostatic training and did well and was discharged home. He continues to receive home care physical therapy. He is still following up with his surgeon. He still has small open area in the lateral aspect of the incision line with minimal amount of serous drainage noted. He offers complaints of intermittent phantom pain and sensations. He has no other complaints of headache or dizziness, chest pain or shortness of breath.  Currently he and his wife are living in his son's home everything is a 1 level. He is independent at a wheelchair level. Prior to his amputation he was completely independent and very active.    Being admitted foe prosthetic training

## 2023-02-28 NOTE — BMR PREADMISSION NOTE
[facility]  Preadmission Assessment    Patient Name:  Harry Alvarado  YOB: 1955    Referral Date:     Evaluation Date:  02/28/23  Referring Facility Admission Date:    Referring Facility: [facility]   Referring Provider: Agustín Zambrano DO     Reason for Referral: Harry Alvarado is a 67 y.o. male whose primary indication for inpatient rehabilitation is Amputation.     Pertinent History of Current Functional Problem:  This is the first clinic appointment for Mr. Alvarado following his inpatient stay for preprostatic training. He is a 67-year-old male with a history of gene mutation, CAD, history of MI, peripheral artery disease with multiple bypass procedures in the past. The patient had been admitted to [facility] on November 30 for a planned transtibial amputation due to bypass thrombosis. He had reported worse pain in the previous 1 to 2 months in the right foot and had undergone multiple surgeries prior. He underwent a right transtibial amputation on December 2, 2022. There is no significant postoperative complications. Pain was well controlled. He was admitted to [facility] for preprostatic training and did well and was discharged home. He continues to receive home care physical therapy. He is still following up with his surgeon. He still has small open area in the lateral aspect of the incision line with minimal amount of serous drainage noted. He offers complaints of intermittent phantom pain and sensations. He has no other complaints of headache or dizziness, chest pain or shortness of breath.  Currently he and his wife are living in his son's home everything is a 1 level. He is independent at a wheelchair level. Prior to his amputation he was completely independent and very active.    Being admitted foe prosthetic training       Current DVT Prophylaxis:  Risk for DVT is high.  Current DVT Prophylaxis: apixaban (Eliquis)  Current DVT Prophylaxis  Dose/Frequency/Route: 5mg BID    Active Medical Conditions:  Patient Active Problem List   Diagnosis   • Status post below knee amputation of right lower extremity (CMS/HCC)   • Coronary artery disease   • Deep vein thrombosis (CMS/HCC)   • Lupus anticoagulant disorder (CMS/HCC)   • MTHFR gene mutation   • PAD (peripheral artery disease) (CMS/HCC)   • Anemia       Active Medications:  Active Medications   Medication Sig Dispense Refill   • apixaban (ELIQUIS) 5 mg tablet Take 1 tablet (5 mg total) by mouth 2 (two) times a day Indications: increased risk of blood clotting, prevention of venous thromboembolism recurrence. 60 tablet 0   • aspirin 81 mg enteric coated tablet Take 1 tablet (81 mg total) by mouth daily Indications: peripheral vascular disease. 30 tablet 0   • melatonin ODT Take 1 tablet (3 mg total) by mouth nightly. 30 tablet 0   • nortriptyline (PAMELOR) 10 mg capsule Take 1 capsule (10 mg total) by mouth nightly. 30 capsule 0   • pregabalin (LYRICA) 50 mg capsule Take 1 capsule (50 mg total) by mouth 3 (three) times a day. 90 capsule 0   • zolpidem (AMBIEN) 10 mg tablet Take 1 tablet (10 mg total) by mouth nightly as needed for sleep. 30 tablet 0   No medication comments found.    HISTORY:    Past Medical History:   Diagnosis Date   • Coronary artery disease     s/p MI   • Deep vein thrombosis (CMS/HCC)    • Lupus anticoagulant disorder (CMS/HCC)    • Lyme disease    • MTHFR gene mutation    • Myocardial infarction (CMS/HCC)    • PAD (peripheral artery disease) (CMS/HCC)      Past Surgical History:   Procedure Laterality Date   • BELOW KNEE LEG AMPUTATION Right 12/02/2022   • FEMORAL BYPASS Right      Tobacco Use as of 2/28/2023     Smoking Status Smoking Start Date Quit Date Smoking Frequency    Never -- --     Smokeless Status Smokeless Type Smokeless Quit Date    Never -- --    Source    Provider            Alcohol Use as of 2/28/2023     Alcohol Use Drinks/Week Alcohol/Week Comments Source    Not  Currently   -- -- Provider            Socioeconomic as of 2/28/2023     Marital Status Spouse Name Number of Children Years Education Education Level Preferred Language Ethnicity Race Source     -- -- -- -- English Not , /a, or Kyr[place]z origin White --        Allergies  No Known Allergies    Premorbid Functional Status:   Ambulation: assistive equipment and person  Transferring: independent  Toileting: independent  Bathing: independent  Dressing: independent  Eating: independent  Communication: understands/communicates without difficulty  Swallowing: swallows foods/liquids without difficulty  Assistive Device/Animal Currently Used at Home: walker, front-wheeled; wheelchair    Living Environment:     TEST RESULTS:  Chemistry (Up to last 3 results from the past 720 hours)    None      Hepatic (Up to last 3 results from the past 720 hours)    None      Metabolic (Up to last 3 results from the past 720 hours)    None      Hematologic (Up to last 3 results from the past 720 hours)    None      Other (Up to last 3 results from the past 720 hours)    None             ASSESSMENT:  Vitals:       Lines/Drains/Airways:       Risk for Clinical Complications:  Falls: Low  Infection: Low  Skin Breakdown: Low    Precautions:  Existing Precautions/Restrictions: no known precautions/restrictions    Current Diet:   Diet: no diet restrictions    Current Functional Status:   Preadmission Current Function    No documentation.                 Support System:       Patient/Family Goals:       Educational Background:      RECOMMENDATIONS / PLAN:  Special Needs:       Plan:  Identified Referral Needs: assistive technology, medical consultative services, physical therapy, prosthetic services, occupational therapy  OT Frequency: 5-7 times per week  OT Intensity: 1.5 hours  PT Frequency: 5-7 times per week  PT Intensity: 1.5 hours    Therapy Intensity: Requires, can tolerate and will benefit from 3 hours of therapy at least  5 days per week  Projected Length of Stay (days): 10 days  Patient is willing to participate in rehab program: yes    Impairments to be addressed: mobility, motor dysfunction, safety, self-care  Medical Necessity Admission Criteria: other active medical conditions (see comments)    Expected Level of Function at Discharge:  Expected Functional Improvement: mobility; motor dysfunction; safety; self-care  Self-Care: Independent  Sphincter Control: Independent  Transfers: Independent  Locomotion: Independent  Communication: Independent  Social Cognition: Independent      Post-Discharge Needs:  Anticipated Discharge Disposition: home with home health  Type of Home Care Services: home PT

## 2023-03-02 ENCOUNTER — HOSPITAL ENCOUNTER (OUTPATIENT)
Dept: PHYSICAL MEDICINE AND REHAB | Facility: REHABILITATION | Age: 68
Discharge: HOME | End: 2023-03-02
Payer: MEDICARE

## 2023-03-02 ENCOUNTER — APPOINTMENT (OUTPATIENT)
Dept: LAB | Facility: HOSPITAL | Age: 68
End: 2023-03-02
Attending: PHYSICAL MEDICINE & REHABILITATION
Payer: MEDICARE

## 2023-03-02 ENCOUNTER — TRANSCRIBE ORDERS (OUTPATIENT)
Dept: SCHEDULING | Age: 68
End: 2023-03-02

## 2023-03-02 DIAGNOSIS — Z89.511 HX OF RIGHT BKA (CMS/HCC): ICD-10-CM

## 2023-03-02 DIAGNOSIS — Z01.812 ENCOUNTER FOR PREPROCEDURAL LABORATORY EXAMINATION: Primary | ICD-10-CM

## 2023-03-02 PROCEDURE — C9803 HOPD COVID-19 SPEC COLLECT: HCPCS

## 2023-03-02 PROCEDURE — U0003 INFECTIOUS AGENT DETECTION BY NUCLEIC ACID (DNA OR RNA); SEVERE ACUTE RESPIRATORY SYNDROME CORONAVIRUS 2 (SARS-COV-2) (CORONAVIRUS DISEASE [COVID-19]), AMPLIFIED PROBE TECHNIQUE, MAKING USE OF HIGH THROUGHPUT TECHNOLOGIES AS DESCRIBED BY CMS-2020-01-R: HCPCS

## 2023-03-03 LAB — SARS-COV-2 RNA RESP QL NAA+PROBE: NEGATIVE

## 2023-03-05 NOTE — BMR PREADMISSION NOTE
[facility]  Preadmission Assessment    Patient Name:  Harry Alvarado  YOB: 1955    Referral Date:     Evaluation Date:  03/05/23  Referring Facility Admission Date:    Referring Facility: [facility]   Referring Provider: Agustín Zambrano DO       Reevaluation 3/5/23:  Per patient, he is medically stable for admitting to [facility] tomorrow.  Reports no changes since last evaluated for [facility].      Reason for Referral: Harry Alvarado is a 67 y.o. male whose primary indication for inpatient rehabilitation is Amputation.     Pertinent History of Current Functional Problem:  This is the first clinic appointment for Mr. Alvarado following his inpatient stay for preprostatic training. He is a 67-year-old male with a history of gene mutation, CAD, history of MI, peripheral artery disease with multiple bypass procedures in the past. The patient had been admitted to [facility] on November 30 for a planned transtibial amputation due to bypass thrombosis. He had reported worse pain in the previous 1 to 2 months in the right foot and had undergone multiple surgeries prior. He underwent a right transtibial amputation on December 2, 2022. There is no significant postoperative complications. Pain was well controlled. He was admitted to [facility] for preprostatic training and did well and was discharged home. He continues to receive home care physical therapy. He is still following up with his surgeon. He still has small open area in the lateral aspect of the incision line with minimal amount of serous drainage noted. He offers complaints of intermittent phantom pain and sensations. He has no other complaints of headache or dizziness, chest pain or shortness of breath.  Currently he and his wife are living in his son's home everything is a 1 level. He is independent at a wheelchair level. Prior to his amputation he was completely independent and very active.    Being admitted [facility]  prosthetic training       Current DVT Prophylaxis:  Risk for DVT is high.  Current DVT Prophylaxis: apixaban (Eliquis)  Current DVT Prophylaxis Dose/Frequency/Route: 5mg BID    Active Medical Conditions:  Patient Active Problem List   Diagnosis   • Status post below knee amputation of right lower extremity (CMS/HCC)   • Coronary artery disease   • Deep vein thrombosis (CMS/HCC)   • Lupus anticoagulant disorder (CMS/HCC)   • MTHFR gene mutation   • PAD (peripheral artery disease) (CMS/HCC)   • Anemia       Active Medications:  Active Medications   Medication Sig Dispense Refill   • apixaban (ELIQUIS) 5 mg tablet Take 1 tablet (5 mg total) by mouth 2 (two) times a day Indications: increased risk of blood clotting, prevention of venous thromboembolism recurrence. 60 tablet 0   • aspirin 81 mg enteric coated tablet Take 1 tablet (81 mg total) by mouth daily Indications: peripheral vascular disease. 30 tablet 0   • melatonin ODT Take 1 tablet (3 mg total) by mouth nightly. 30 tablet 0   • nortriptyline (PAMELOR) 10 mg capsule Take 1 capsule (10 mg total) by mouth nightly. 30 capsule 0   • pregabalin (LYRICA) 50 mg capsule Take 1 capsule (50 mg total) by mouth 3 (three) times a day. 90 capsule 0   • zolpidem (AMBIEN) 10 mg tablet Take 1 tablet (10 mg total) by mouth nightly as needed for sleep. 30 tablet 0   No medication comments found.    HISTORY:    Past Medical History:   Diagnosis Date   • Coronary artery disease     s/p MI   • Deep vein thrombosis (CMS/HCC)    • Lupus anticoagulant disorder (CMS/HCC)    • Lyme disease    • MTHFR gene mutation    • Myocardial infarction (CMS/HCC)    • PAD (peripheral artery disease) (CMS/HCC)      Past Surgical History:   Procedure Laterality Date   • BELOW KNEE LEG AMPUTATION Right 12/02/2022   • FEMORAL BYPASS Right      Tobacco Use as of 2/28/2023     Smoking Status Smoking Start Date Quit Date Smoking Frequency    Never -- --     Smokeless Status Smokeless Type Smokeless Quit  Date    Never -- --    Source    Provider            Alcohol Use as of 2/28/2023     Alcohol Use Drinks/Week Alcohol/Week Comments Source    Not Currently   -- -- Provider            Socioeconomic as of 2/28/2023     Marital Status Spouse Name Number of Children Years Education Education Level Preferred Language Ethnicity Race Source     -- -- -- -- English Not , /a, or Mexican origin White --        Allergies  No Known Allergies    Premorbid Functional Status:   Ambulation: assistive equipment and person  Transferring: independent  Toileting: independent  Bathing: independent  Dressing: independent  Eating: independent  Communication: understands/communicates without difficulty  Swallowing: swallows foods/liquids without difficulty  Assistive Device/Animal Currently Used at Home: walker, front-wheeled; wheelchair    Living Environment:     TEST RESULTS:  Chemistry (Up to last 3 results from the past 720 hours)    None      Hepatic (Up to last 3 results from the past 720 hours)    None      Metabolic (Up to last 3 results from the past 720 hours)    None      Hematologic (Up to last 3 results from the past 720 hours)    None      Other (Up to last 3 results from the past 720 hours)    None             ASSESSMENT:  Vitals:       Lines/Drains/Airways:       Risk for Clinical Complications:  Falls: Low  Infection: Low  Skin Breakdown: Low    Precautions:  Existing Precautions/Restrictions: no known precautions/restrictions    Current Diet:   Diet: no diet restrictions    Current Functional Status:   Preadmission Current Function    No documentation.                 Support System:       Patient/Family Goals:       Educational Background:      RECOMMENDATIONS / PLAN:  Special Needs:       Plan:  Identified Referral Needs: assistive technology, medical consultative services, physical therapy, prosthetic services, occupational therapy  OT Frequency: 5-7 times per week  OT Intensity: 1.5 hours  PT  Frequency: 5-7 times per week  PT Intensity: 1.5 hours    Therapy Intensity: Requires, can tolerate and will benefit from 3 hours of therapy at least 5 days per week  Projected Length of Stay (days): 10 days  Patient is willing to participate in rehab program: yes    Impairments to be addressed: mobility, motor dysfunction, safety, self-care  Medical Necessity Admission Criteria: other active medical conditions (see comments)    Expected Level of Function at Discharge:  Expected Functional Improvement: mobility; motor dysfunction; safety; self-care  Self-Care: Independent  Sphincter Control: Independent  Transfers: Independent  Locomotion: Independent  Communication: Independent  Social Cognition: Independent      Post-Discharge Needs:  Anticipated Discharge Disposition: home with home health  Type of Home Care Services: home PT

## 2023-03-06 ENCOUNTER — APPOINTMENT (INPATIENT)
Dept: PHYSICAL THERAPY | Facility: REHABILITATION | Age: 68
DRG: 560 | End: 2023-03-06
Payer: MEDICARE

## 2023-03-06 ENCOUNTER — HOSPITAL ENCOUNTER (INPATIENT)
Facility: REHABILITATION | Age: 68
LOS: 12 days | Discharge: HOME | DRG: 560 | End: 2023-03-18
Attending: PHYSICAL MEDICINE & REHABILITATION | Admitting: PHYSICAL MEDICINE & REHABILITATION
Payer: MEDICARE

## 2023-03-06 DIAGNOSIS — F43.22 ADJUSTMENT DISORDER WITH ANXIETY: ICD-10-CM

## 2023-03-06 PROBLEM — Z47.89 ENCOUNTER FOR PROSTHETIC GAIT TRAINING: Status: ACTIVE | Noted: 2023-03-06

## 2023-03-06 PROCEDURE — 97162 PT EVAL MOD COMPLEX 30 MIN: CPT | Mod: GP

## 2023-03-06 PROCEDURE — 63700000 HC SELF-ADMINISTRABLE DRUG: Performed by: HOSPITALIST

## 2023-03-06 PROCEDURE — 12800000 HC ROOM AND CARE SEMIPRIVATE REHAB

## 2023-03-06 RX ORDER — ALUMINUM HYDROXIDE, MAGNESIUM HYDROXIDE, AND SIMETHICONE 1200; 120; 1200 MG/30ML; MG/30ML; MG/30ML
30 SUSPENSION ORAL EVERY 6 HOURS PRN
Status: DISCONTINUED | OUTPATIENT
Start: 2023-03-06 | End: 2023-03-18 | Stop reason: HOSPADM

## 2023-03-06 RX ORDER — AMOXICILLIN 250 MG
2 CAPSULE ORAL DAILY PRN
Status: DISCONTINUED | OUTPATIENT
Start: 2023-03-06 | End: 2023-03-18 | Stop reason: HOSPADM

## 2023-03-06 RX ORDER — ACETAMINOPHEN 325 MG/1
650 TABLET ORAL EVERY 4 HOURS PRN
Status: DISCONTINUED | OUTPATIENT
Start: 2023-03-06 | End: 2023-03-18 | Stop reason: HOSPADM

## 2023-03-06 RX ADMIN — APIXABAN 5 MG: 5 TABLET, FILM COATED ORAL at 20:37

## 2023-03-06 SDOH — ECONOMIC STABILITY: INCOME INSECURITY: IN THE LAST 12 MONTHS, WAS THERE A TIME WHEN YOU WERE NOT ABLE TO PAY THE MORTGAGE OR RENT ON TIME?: NO

## 2023-03-06 SDOH — ECONOMIC STABILITY: FOOD INSECURITY: WITHIN THE PAST 12 MONTHS, THE FOOD YOU BOUGHT JUST DIDN'T LAST AND YOU DIDN'T HAVE MONEY TO GET MORE.: NEVER TRUE

## 2023-03-06 SDOH — ECONOMIC STABILITY: FOOD INSECURITY: WITHIN THE PAST 12 MONTHS, YOU WORRIED THAT YOUR FOOD WOULD RUN OUT BEFORE YOU GOT MONEY TO BUY MORE.: NEVER TRUE

## 2023-03-06 SDOH — ECONOMIC STABILITY: TRANSPORTATION INSECURITY
IN THE PAST 12 MONTHS, HAS THE LACK OF TRANSPORTATION KEPT YOU FROM MEDICAL APPOINTMENTS OR FROM GETTING MEDICATIONS?: NO

## 2023-03-06 SDOH — ECONOMIC STABILITY: TRANSPORTATION INSECURITY
IN THE PAST 12 MONTHS, HAS LACK OF TRANSPORTATION KEPT YOU FROM MEETINGS, WORK, OR FROM GETTING THINGS NEEDED FOR DAILY LIVING?: NO

## 2023-03-06 ASSESSMENT — SOCIAL DETERMINANTS OF HEALTH (SDOH): HOW HARD IS IT FOR YOU TO PAY FOR THE VERY BASICS LIKE FOOD, HOUSING, MEDICAL CARE, AND HEATING?: NOT HARD AT ALL

## 2023-03-06 ASSESSMENT — COGNITIVE AND FUNCTIONAL STATUS - GENERAL: AFFECT: WFL

## 2023-03-06 NOTE — PLAN OF CARE
Problem: Rehabilitation (IRF) Plan of Care  Goal: Plan of Care Review  Outcome: Progressing  Flowsheets (Taken 3/6/2023 1710)  Progress: improving  Plan of Care Reviewed With: patient  Outcome Summary: PT IE completed and POC established.

## 2023-03-06 NOTE — HOSPITAL COURSE
History of Present Illness  Harry is a 67 y.o. male admitted on 3/6/2023 with Encounter for prosthetic gait training [Z47.89]. Principal problem is   Encounter for prosthetic gait training.      This is the first clinic appointment for Mr. Alvarado following his inpatient stay for preprostatic training. He is a 67-year-old male with a history of gene mutation, CAD, history of MI, peripheral artery disease with multiple bypass procedures in the past. The patient had been admitted to [facility] on November 30 for a planned transtibial amputation due to bypass thrombosis. He had reported worse pain in the previous 1 to 2 months in the right foot and had undergone multiple surgeries prior. He underwent a right transtibial amputation on December 2, 2022. There is no significant postoperative complications. Pain was well controlled. He was admitted to [facility] for preprostatic training and did well and was discharged home. He continues to receive home care physical therapy. He is still following up with his surgeon. He still has small open area in the lateral aspect of the incision line with minimal amount of serous drainage noted. He offers complaints of intermittent phantom pain and sensations. He has no other complaints of headache or dizziness, chest pain or shortness of breath. Currently he and his wife are living in his son's home everything is a 1 level. He is independent at a wheelchair level. Prior to his amputation he was completely independent and very active.    Past Medical History  Harry has a past medical history of Coronary artery disease, Deep vein thrombosis (CMS/HCC), Lupus anticoagulant disorder (CMS/HCC), Lyme disease, MTHFR gene mutation, Myocardial infarction (CMS/HCC), and PAD (peripheral artery disease) (CMS/[facility]).

## 2023-03-06 NOTE — PLAN OF CARE
Plan of Care Review  Plan of Care Reviewed With: patient  Progress: improving  Outcome Summary: Pt oriented to the [facility] and rehab program, safety protocol and use of call bell for assistance.Pain management reviewed. Supervision SPT. Incision residual limb healed.

## 2023-03-06 NOTE — PROGRESS NOTES
Patient: Harry Alvarado  Location: [facility] [address]  MRN: [id number]  Today's date: 3/6/2023    History of Present Illness  Harry is a 67 y.o. male admitted on 3/6/2023 with Encounter for prosthetic gait training [Z47.89]. Principal problem is   Encounter for prosthetic gait training.      This is the first clinic appointment for Mr. Alvarado following his inpatient stay for preprostatic training. He is a 67-year-old male with a history of gene mutation, CAD, history of MI, peripheral artery disease with multiple bypass procedures in the past. The patient had been admitted to [facility] on November 30 for a planned transtibial amputation due to bypass thrombosis. He had reported worse pain in the previous 1 to 2 months in the right foot and had undergone multiple surgeries prior. He underwent a right transtibial amputation on December 2, 2022. There is no significant postoperative complications. Pain was well controlled. He was admitted to [facility] for preprostatic training and did well and was discharged home. He continues to receive home care physical therapy. He is still following up with his surgeon. He still has small open area in the lateral aspect of the incision line with minimal amount of serous drainage noted. He offers complaints of intermittent phantom pain and sensations. He has no other complaints of headache or dizziness, chest pain or shortness of breath. Currently he and his wife are living in his son's home everything is a 1 level. He is independent at a wheelchair level. Prior to his amputation he was completely independent and very active.    Past Medical History  Harry has a past medical history of Coronary artery disease, Deep vein thrombosis (CMS/HCC), Lupus anticoagulant disorder (CMS/HCC), Lyme disease, MTHFR gene mutation, Myocardial infarction (CMS/HCC), and PAD (peripheral artery disease) (CMS/[facility]).      PT Vitals    Date/Time Pulse HR Source BP BP  Location BP Method Pt Position [facility]   03/06/23 1504 75 Monitor 127/88 Left upper arm Automatic Lying BS   03/06/23 1558 80 Monitor 157/85 Left upper arm Automatic Sitting BS      PT Pain    Date/Time Pain Type Rating: [facility]   03/06/23 1504 Pain Assessment 0 BS   03/06/23 1558 Pain Reassessment 0 BS          Prior Living Environment    Flowsheet Row Most Recent Value   People in Home spouse   Current Living Arrangements home   Home Accessibility stairs to enter home (Group), stairs within home (Group)   Living Environment Comment Pt reports he lives with wife in 1  with 6 YOSEF. Pt has loft upstairs but does not need to access. Pt reports he's very active around the home and has steep property he needs to navigate.   Number of Stairs, Main Entrance 6   Stair Railings, Main Entrance railings on both sides of stairs   Stairs Comment, Main Entrance Can't reach both rails at once   Location, Patient Bedroom first (main) floor   Location, Bathroom first (main) floor   Number of Stairs, Within Home, Primary 0          Prior Level of Function    Flowsheet Row Most Recent Value   Dominant Hand right   Ambulation assistive equipment   Transferring assistive equipment   Toileting independent   Bathing independent   Dressing independent   Eating independent   Prior Level of Function Comment Pt reports he was independent for all ADLs/mobility prior to TTA. Pt completed pre-prosthetic training at [facility] - has been Mod I at w/c level since d/c. Reports he utilized RW for transfers and gait training with HHPT.   Assistive Device Currently Used at Home walker, front-wheeled, wheelchair           IRF PT Evaluation and Treatment - 03/06/23 1502        PT Time Calculation    Start Time 1500     Stop Time 1600     Time Calculation (min) 60 min        Session Details    Document Type initial evaluation     Mode of Treatment physical therapy;individual therapy        General Information    Patient Profile Reviewed yes     General  Observations of Patient Pt received lying in bed, agreeable to session     Existing Precautions/Restrictions fall;cardiac        Cognition/Psychosocial    Affect/Mental Status (Cognition) WFL     Orientation Status (Cognition) oriented x 4        Sensory Assessment (Somatosensory)    Left LE Sensory Assessment light touch awareness;light touch localization;proprioception;intact     Right LE Sensory Assessment light touch awareness;light touch localization;intact   Proprioception not tested 2* R BKA       Range of Motion (ROM)    Left Lower Extremity (ROM) left LE ROM is WFL     Right Lower Extremity (ROM) right LE ROM is WFL        Strength (Manual Muscle Testing)    Hip, Left (Strength) Flex 5/5, Ext 4+/5, IR/ER 5/5, ABD/ADD 5/5     Knee, Left (Strength) Ext 5/5, Flex 5/5     Ankle, Left (Strength) 5/5 in all planes     Hip, Right (Strength) Flex 5/5, Ext 4+/5, IR/ER 4+/5, ABD/ADD 5/5     Knee, Right (Strength) Ext 5/5, Flex 5/5     Ankle, Right (Strength) 5/5 in all planes        Bed Mobility    [place], Roll Left independent     [place], Roll Right independent     [place], Supine to Sit independent     [place], Sit to Supine independent     Comment (Bed Mobility) completed in hospital bed        Transfers    Transfers low pivot transfer        Bed to Chair Transfer    [place], Bed to Chair minimum assist (75% or more patient effort)     Verbal Cues safety;technique     Assistive Device walker, front-wheeled     Comment SPT using RW with Min A at hips for balance and sequencing with RLE prosthesis donned        Chair to Bed Transfer    [place], Chair to Bed minimum assist (75% or more patient effort)     Verbal Cues safety;technique     Assistive Device walker, front-wheeled     Comment SPT using RW with Min A at hips for balance and sequencing with RLE prosthesis donned        Sit to Stand Transfer    [place], Sit to Stand Transfer minimum assist (75% or more patient effort)      Verbal Cues safety;technique     Assistive Device walker, front-wheeled     Comment from w/c with Min A for steadying with RLE prosthesis donned        Stand to Sit Transfer    [place], Stand to Sit Transfer minimum assist (75% or more patient effort)     Verbal Cues safety;technique     Assistive Device walker, front-wheeled     Comment to w/c with Min A for controlled descent with RLE prosthesis donned        Low Pivot Transfer    [place], Low Pivot Transfer modified independence     Verbal Cues safety     Assistive Device wheelchair     Comment Modified SPT from w/c <> EOB without prosthesis Mod I.        Stand Pivot Transfer    [place], Stand Pivot/Stand Step Transfer minimum assist (75% or more patient effort)     Verbal Cues safety;technique     Assistive Device walker, front-wheeled     Comment SPT via amb approach to w/c with Min A for balance and sequencing        Car Transfer    [place], Car Transfer unable to assess     Comment Unable to assess due to time constraints        Gait Training    [place], Gait minimum assist (75% or more patient effort)     Assistive Device walker, front-wheeled     Distance in Feet 50 feet     Pattern (Gait) step-through     Deviations/Abnormal Patterns (Gait) base of support, narrow;gait speed decreased;step length decreased;stride length decreased;weight shifting decreased     Advanced Gait Activity 10 Meter Walk Test (Self-Selected Velocity)     [place], Picking Up Object unable to assess   Unsafe to assess    Comment (Gait/Stairs) 50' x1 using RW with RLE prosthesis donned. Min A at hips for balance and R anterolateral weight-shift facilitation. Pt demonstrates LLE adduction in stance with decreased RLE stance time and R anterolateral weight-shift/pelvic drive.        Curb Negotiation    [place] unable to assess     Comment Unable to assess due to safety concerns        Rough/Uneven Surface Gait Skills    [place] unable to assess      Comment Unable to assess due to safety concerns        10 Meter Walk Test (Self-Selected Velocity)    Trial One: Ten Meter Walk Test (sec) 12.47 seconds     Trial Two: Ten Meter Walk Test (sec) 12.79 seconds     Assistive Device walker, front-wheeled     Trial One: Gait Speed (m/s) 0.48 m/s     Trial Two: Gait Speed (m/s) 0.47 m/s     Average Gait Speed (m/s): Two Trials 0.48 m/s        Stairs Training    [place], Stairs unable to assess     Comment Unable to assess due to safety concerns        Wheelchair Mobility/Management    Method of Locomotion bimanual (upper extremity) propulsion     Wheelchair Type manual, lightweight     Functional Mobility Training doorway navigation     [place], Forward Propulsion modified independence     [place], Steering Activities modified independence     [place], Turning Activities modified independence     [place], Doorway Navigation modified independence     Distance Propelled in Feet 300 feet     Comment, Wheelchair Mobility Mod I via bimanual propulsion in lightweight manual wheelchair from pt's room to main gym.        Mobility Belt    Mobility Belt Used for All Out of Bed Activity yes        Balance    Static Sitting Balance WFL;sitting, edge of bed     Dynamic Sitting Balance WFL;sitting, edge of bed     Static Standing Balance mild impairment;supported     Dynamic Standing Balance mild impairment;supported     Balance Test Results Timed Up and Go Test (TUG)        Timed Up and Go Test    Trial One: Timed Up and Go Test 22.91     Trial Two: Timed Up and Go Test 19.24     Comment, Timed Up and Go Test Using RW with Min A     Results, Timed Up and Go Test (Balance) Results indicate increased fall risk (cutoff > 13.5 seconds)        Motor Skills    Coordination bilateral;lower extremity;WFL     Functional Endurance Fair +     Muscle Tone lower extremity(s);WNL        Postural Deviations    Comment, Postural Deviations No overt postural deviations      "   LE Residual Limb Evaluation    Amputation Level transtibial, standard     Amputation Laterality right side     Incision Management incision line adequately closed for scar management     Shape residual limb shape is cylindrical     Sensation Issues phantom limb sensation;phantom limb pain   Pt reports improved since prior [facility] stay    Skin Assessment other (see comments)   Blanchable redness noted at patellar tendon - pt reports history of \"blister\" during pre-prosthetic training, which has healed       Previous Prosthesis History (Lower Extremity)    Prosthesis Type (Previous LE Transfemoral Prosthesis) suction suspension     Transtibial Prosthesis Type total surface-bearing socket        Prosthetic Orientation (Lower Extremity)    Fit Assessment fit/function of prosthesis are appropriate     Comment, Fit Assessment Suction suspension total surface bearing socket with flexible insert. Pt donned liner + prosthetis with Min A. Sock ply = 1, however likely could increase with looser fit of prosthesis noted at end of session when doffing. Pt donns moisture-wicking sock under liner. Doffed at end of session with Min A. Total wear time ~ 20 min. Skin check post revealed blanchable redness at patella and patellar tendon. No other areas of concern noted.     Specific Gait Deviations uneven timing, short stance phase involved side;other (see comments)   LLE adduction       Gait/Walking Locomotion Goal 1    Distance 150 feet        Gait/Walking Locomotion Goal 2    Distance 150 feet        Therapy Assessment/Plan (PT)    Rehab Potential/Prognosis (PT) good, to achieve stated therapy goals     Frequency of Treatment (PT) 5-7 times per week;60-90 minutes per day     Estimated Duration of Therapy (PT) 2 weeks     Problem List (PT) problems related to;balance;mobility;strength     Activity Limitations Related to Problem List unable to ambulate safely;unable to transfer safely     Planned Therapy Interventions balance " training;gait training;home exercise program;neuromuscular re-education;patient/family education;prosthetic fitting/training;stair training;strengthening;stretching;transfer training     Comment, Therapy Assessment/Plan (PT) Pt is a 66 y/o male admitted for prosthetic training. Pt has PMH of gene mutation, CAD, history of MI, peripheral artery disease with multiple bypass procedures in the past. He underwent a right transtibial amputation on December 2, 2022 and completed preprosthetic training at [facility] prior to d/c to son's home. He now returns to [facility] for comprehensive rehabilitation for prosthetic training. Pt reports he was Mod I at w/c level PTA and completed transfer/gait training with use of RW. He currently presents with impairments in standing balance, endurance and mild LE strength deficits. He is completing bed mobility independently. He completed STS and SPT using RW with RLE prosthesis with Min A. He required Min A during gait training using RW with RLE prosthesis donned - demonstrates decreased RLE weight-shift/acceptance and pelvic drive. Unsafe to assess elevations at this time. His gait speed was 0.48 m/s which is below age/gender matched norms. He completed TUG in 21.1 seconds indicating increased fall risk. Pt would benefit from intensive, skilled PT in IRF setting to address impairments, learn prosthetic management and maximize functional independence. PRATIMA pending first interdisciplinary team meeting.                      Education Documentation  Treatment Plan, taught by Shawn Munroe, PT at 3/6/2023  5:07 PM.  Learner: Patient  Readiness: Acceptance  Method: Explanation  Response: Verbalizes Understanding  Comment: role of PT, PT goals/POC, therapy schedule          IRF PT Goals    Flowsheet Row Most Recent Value   Transfer Goal 1    Activity/Assistive Device sit-to-stand/stand-to-sit, stand pivot  [LRAD] at 03/06/2023 1502   [place] supervision required at 03/06/2023 1502   Time Frame  short-term goal (STG), 1 week at 03/06/2023 1502   Transfer Goal 2    Activity/Assistive Device sit-to-stand/stand-to-sit, stand pivot  [LRAD] at 03/06/2023 1502   [place] modified independence at 03/06/2023 1502   Time Frame long-term goal (LTG), 14 days or less at 03/06/2023 1502   Gait/Walking Locomotion Goal 1    Activity/Assistive Device gait (walking locomotion)  [LRAD] at 03/06/2023 1502   Distance 150 feet at 03/06/2023 1502   [place] supervision required at 03/06/2023 1502   Time Frame short-term goal (STG), 1 week at 03/06/2023 1502   Gait/Walking Locomotion Goal 2    Activity/Assistive Device gait (walking locomotion)  [LRAD] at 03/06/2023 1502   Distance 150 feet at 03/06/2023 1502   [place] modified independence at 03/06/2023 1502   Time Frame long-term goal (LTG), 14 days or less at 03/06/2023 1502

## 2023-03-06 NOTE — CONSULTS
[facility] Internal Medicine  Consult Note    Subjective     Harry Alvarado is a 67 y.o. male who was admitted for Encounter for prosthetic gait training [Z47.89]. Patient was referred by Richard Angulo MD for medical assessment and management     CC: Encounter for prosthetic gait training [Z47.89]    HPI: Harry Alvarado is a 67 y.o. male with MTHFR gene mutation, CAD s/p MI 1993, and PAD s/p R Fem to PT bypass with arm vein in 1993, revision in 2011, s/p R SFA to peroneal bypass graft s/p revision on 7/30/18, s/p tPA bolus and angioplasty in 2020 admitted to [facility] 11/30/22 with RLE bypass thrombosis and chronic right foot ischemia/gangrene and underwent right BKA by vascular surgeon, Dr. Bowen Serrato 12/2/22. Post op he had anemia but did not require transfusion. He was restarted on his home Eliquis.       The patient was evaluated by PM&R and found to have residual deficits in ambulation and ADLs due to Status post below knee amputation of right lower extremity (CMS/HCC) [Z89.511] and came to [facility] on 12/6/2022 for acute inpatient rehab.      He participated in therapy. The patient has steadily improved in therapy and was for discharged home 12/18/2022.    The patient was seen by Dr. Agustín Zambrano in [facility] and was felt to be a good candidate for prosthetic ambulation and came to [facility] 3/6/2023 for acute inpatient prosthetic training      SUBJECTIVE:  Patient interviewed and examined    Looking forward to prosthetic training.     No pain complaints, moving bowels and bladder, no abdominal pain or nausea, no dysuria, no chest pain, palpitations, dyspnea or fevers      ROS: as above, otherwise noncontributory  Current meds and allergies reviewed    Outside records reviewed. Pertinent radiology results reviewed. Pertinent lab results reviewed.    Medical History:   Past Medical History:   Diagnosis Date   • Coronary artery disease     s/p MI   • Deep vein thrombosis (CMS/HCC)    • Lupus  anticoagulant disorder (CMS/HCC)    • Lyme disease    • MTHFR gene mutation    • Myocardial infarction (CMS/HCC)    • PAD (peripheral artery disease) (CMS/HCC)        Surgical History:   Past Surgical History:   Procedure Laterality Date   • BELOW KNEE LEG AMPUTATION Right 12/02/2022   • FEMORAL BYPASS Right        Allergies: Patient has no known allergies.      Current Facility-Administered Medications   Medication Dose Route Frequency   • acetaminophen  650 mg oral q4h PRN   • alum-mag hydroxide-simeth  30 mL oral q6h PRN   • apixaban  5 mg oral BID   • sennosides-docusate sodium  2 tablet oral Daily PRN         Social History:   Social History     Socioeconomic History   • Marital status:      Spouse name: None   • Number of children: None   • Years of education: None   • Highest education level: None   Tobacco Use   • Smoking status: Never   • Smokeless tobacco: Never   Substance and Sexual Activity   • Alcohol use: Not Currently     Social Determinants of Health     Financial Resource Strain: Low Risk  (12/14/2022)    Overall Financial Resource Strain (CARDIA)    • Difficulty of Paying Living Expenses: Not hard at all   Food Insecurity: No Food Insecurity (12/14/2022)    Hunger Vital Sign    • Worried About Running Out of Food in the Last Year: Never true    • Ran Out of Food in the Last Year: Never true   Transportation Needs: No Transportation Needs (12/14/2022)    PRAPARE - Transportation    • Lack of Transportation (Medical): No    • Lack of Transportation (Non-Medical): No   Housing Stability: Unknown (12/14/2022)    Housing Stability Vital Sign    • Unable to Pay for Housing in the Last Year: No    • Unstable Housing in the Last Year: No       Family History: History reviewed. No pertinent family history.    Review of Systems    Constitutional: negative for fevers  Eyes: negative for visual disturbance  Ears, nose, mouth, throat, and face: negative for nasal congestion  Respiratory: negative for  cough, dyspnea on exertion and wheezing  Cardiovascular: negative for chest pain and palpitations  Gastrointestinal: moving bowels, negative for abdominal pain, nausea and vomiting  Genitourinary:negative for decreased stream and painful urination  Integument/breast: negative for rash and itching  Musculoskeletal: no acute pain complaints  Neurological: no focal weakness  Behavioral/Psych: mood stable        Vital signs in last 24 hours:     Vital signs reviewed 03/06/23 12:35 PM    Objective     Physical Exam    General appearance: alert, appears stated age and cooperative  Head: normocephalic, without obvious abnormality, atraumatic  Eyes: conjunctivae clear. PERRL, EOM's intact.  Ears: normal external ear  Nose: Nares normal. Septum midline. Mucosa normal.  Throat: normal oropharynx  Neck: no JVD, no adenopathy, no carotid bruit, supple, symmetrical, trachea midline and thyroid not enlarged, symmetric, no tenderness/mass/nodules  Lungs: clear to auscultation bilaterally  Heart: regular rate and rhythm, S1, S2 normal, no murmur, click, rub or gallop  Abdomen: soft, non-tender; bowel sounds normal; no masses, no organomegaly  Extremities: s/p right BKA  Pulses: 2+ and symmetric all four extremities  Skin: no rashes  Lymph nodes: Cervical and supraclavicular nodes normal.  Neurologic: Alert and oriented X 3; no focal weakness        Labs  I have reviewed the patient's pertinent labs.  Significant abnormals are leukocytosis, anemia, hyponatremia.  Lab Results   Component Value Date    WBC 12.83 (H) 12/12/2022    HGB 12.1 (L) 12/12/2022    HCT 38.3 (L) 12/12/2022    MCV 94.1 12/12/2022     (H) 12/12/2022     Lab Results   Component Value Date    GLUCOSE 85 12/12/2022    CALCIUM 9.8 12/12/2022     (L) 12/12/2022    K 5.0 12/12/2022    CO2 27 12/12/2022    CL 99 12/12/2022    BUN 18 12/12/2022    CREATININE 0.7 (L) 12/12/2022       Imaging  Radiology reports reviewed.       ECG/Telemetry  Cardiology reports  reviewed.         Assessment/Plan   ASSESSMENT/PLAN  67 y.o. male with MTHFR gene mutation, CAD s/p MI 1993, and PAD s/p R Fem to PT bypass with arm vein in 1993, revision in 2011, s/p R SFA to peroneal bypass graft s/p revision on 7/30/18, s/p tPA bolus and angioplasty in 2020 admitted to [facility] 11/30/22 with RLE bypass thrombosis and chronic right foot ischemia/gangrene and underwent right BKA by vascular surgeon, Dr. Bowen Serrato 12/2/22; pre-prosthetic training [facility] 12/6/22-12/18/22; returns to [facility] from home 3/6/23 for inpatient prosthetic training.     1. Vasc:  -PAD s/p failed RLE bypass with right foot ischemia/gangrene  -s/p right BKA 12/2/22  -inpatient prosthetic training  -Tylenol for pain  -remains on Eliquis for hypercoagulable condition     2. Heme:  -post op anemia due to chronic blood loss, does not need iron  -leukocytosis reactive due to surgery  -hx of MTHFR gene mutation  -check CBC     3. Renal:  -increased risk of dehydration and electrolyte changes  -check CMP, Mg     4. Gi:  -Senokot-S as needed for bowels     5. Gu:  -increased risk of retention, check PVRs     6. Cardiac:  -hx of CAD  -watch for orthostatic hypotension  -use cardiac precautions in therapy     7. Pulm:  -incentive spirometry for atelectasis     8. Derm:  -consulted [employer] for skin assessment     9. Nutrition:  -consulted Nutrition for assessment and education     10. Psych:  -consulted Psychology for support     plan discussed with patient, nurse, case management and Richard Angulo MD     Orders personally entered in CPOE     I have queried the state Prescription Drug Monitoring Program [PDMP] and found no suspicious PDMP activity        Emerson Bradley MD  3/6/2023  12:35 PM

## 2023-03-06 NOTE — PROGRESS NOTES
03/06/23 1600   General Information   Preferred Language eng   Referring Facility Type other (see comments)  (from OP for prosthetic gait training)   Advance Directives (for Healthcare)   Does patient have advance directive? No   Patient does not have Advance Directive [facility] Advance Care Planning brochure provided   Living Environment   Current Living Arrangements home   Home Accessibility stairs to enter home (Group);stairs within home (Group)   People in Home spouse   Name(s) of People in Home Dave   Primary Care Provided by self   Living Environment   Living Arrangement Comments 1 SH w loft. 10 steps up to loft. bedroom and bath on 1st floor. 5-6STE home w 2 handrails   Employment/   Employment Status retired   Current or Previous Occupation other (see comments)  (writer, )   Employment/ Comments pt is not a    Financial/Legal   Source of Income social security;pension/FCI   Values/Beliefs   Spiritual, Cultural Beliefs, Bahai Practices, Values that Affect Care no   Discharge Needs Assessment   Concerns to be Addressed adjustment to diagnosis/illness;care coordination/care conferences;caregiver training;discharge planning   Patient/Family Anticipates Transition to home with family   Patient/Family Anticipated Services at Transition outpatient care;home health care   Transportation Concerns car, none   Transportation Anticipated family or friend will provide   Outpatient/Agency/Support Group Needs homecare agency   Team Conference   Next Team Conference Date 03/09/23

## 2023-03-07 ENCOUNTER — APPOINTMENT (INPATIENT)
Dept: OCCUPATIONAL THERAPY | Facility: REHABILITATION | Age: 68
DRG: 560 | End: 2023-03-07
Payer: MEDICARE

## 2023-03-07 ENCOUNTER — APPOINTMENT (INPATIENT)
Dept: PHYSICAL THERAPY | Facility: REHABILITATION | Age: 68
DRG: 560 | End: 2023-03-07
Payer: MEDICARE

## 2023-03-07 ENCOUNTER — APPOINTMENT (OUTPATIENT)
Dept: PSYCHOLOGY | Facility: CLINIC | Age: 68
End: 2023-03-07
Attending: HOSPITALIST
Payer: MEDICARE

## 2023-03-07 LAB
ALBUMIN SERPL-MCNC: 3.4 G/DL (ref 3.4–5)
ALP SERPL-CCNC: 77 IU/L (ref 35–126)
ALT SERPL-CCNC: 17 IU/L (ref 16–63)
ANION GAP SERPL CALC-SCNC: 8 MEQ/L (ref 3–15)
AST SERPL-CCNC: 15 IU/L (ref 15–41)
BASOPHILS # BLD: 0.05 K/UL (ref 0.01–0.1)
BASOPHILS NFR BLD: 0.6 %
BILIRUB SERPL-MCNC: 0.6 MG/DL (ref 0.3–1.2)
BUN SERPL-MCNC: 19 MG/DL (ref 8–20)
CALCIUM SERPL-MCNC: 9.4 MG/DL (ref 8.9–10.3)
CHLORIDE SERPL-SCNC: 104 MEQ/L (ref 98–109)
CO2 SERPL-SCNC: 24 MEQ/L (ref 22–32)
CREAT SERPL-MCNC: 0.7 MG/DL (ref 0.8–1.3)
DIFFERENTIAL METHOD BLD: ABNORMAL
EOSINOPHIL # BLD: 0.34 K/UL (ref 0.04–0.54)
EOSINOPHIL NFR BLD: 4.2 %
ERYTHROCYTE [DISTWIDTH] IN BLOOD BY AUTOMATED COUNT: 12.6 % (ref 11.6–14.4)
GFR SERPL CREATININE-BSD FRML MDRD: >60 ML/MIN/1.73M*2
GLUCOSE SERPL-MCNC: 87 MG/DL (ref 70–99)
HCT VFR BLDCO AUTO: 41.7 % (ref 40.1–51)
HGB BLD-MCNC: 13.3 G/DL (ref 13.7–17.5)
IMM GRANULOCYTES # BLD AUTO: 0.03 K/UL (ref 0–0.08)
IMM GRANULOCYTES NFR BLD AUTO: 0.4 %
LYMPHOCYTES # BLD: 2.23 K/UL (ref 1.2–3.5)
LYMPHOCYTES NFR BLD: 27.8 %
MAGNESIUM SERPL-MCNC: 1.9 MG/DL (ref 1.8–2.5)
MCH RBC QN AUTO: 29.2 PG (ref 28–33.2)
MCHC RBC AUTO-ENTMCNC: 31.9 G/DL (ref 32.2–36.5)
MCV RBC AUTO: 91.6 FL (ref 83–98)
MONOCYTES # BLD: 0.81 K/UL (ref 0.3–1)
MONOCYTES NFR BLD: 10.1 %
NEUTROPHILS # BLD: 4.55 K/UL (ref 1.7–7)
NEUTS SEG NFR BLD: 56.9 %
NRBC BLD-RTO: 0 %
PDW BLD AUTO: 10.2 FL (ref 9.4–12.4)
PLATELET # BLD AUTO: 284 K/UL (ref 150–350)
POTASSIUM SERPL-SCNC: 4.4 MEQ/L (ref 3.6–5.1)
PROT SERPL-MCNC: 6.4 G/DL (ref 6–8.2)
RBC # BLD AUTO: 4.55 M/UL (ref 4.5–5.8)
SODIUM SERPL-SCNC: 136 MEQ/L (ref 136–144)
WBC # BLD AUTO: 8.01 K/UL (ref 3.8–10.5)

## 2023-03-07 PROCEDURE — 86803 HEPATITIS C AB TEST: CPT | Performed by: HOSPITALIST

## 2023-03-07 PROCEDURE — 63700000 HC SELF-ADMINISTRABLE DRUG: Performed by: HOSPITALIST

## 2023-03-07 PROCEDURE — 97116 GAIT TRAINING THERAPY: CPT | Mod: GP

## 2023-03-07 PROCEDURE — 85025 COMPLETE CBC W/AUTO DIFF WBC: CPT | Performed by: HOSPITALIST

## 2023-03-07 PROCEDURE — 97530 THERAPEUTIC ACTIVITIES: CPT | Mod: GP

## 2023-03-07 PROCEDURE — 97763 ORTHC/PROSTC MGMT SBSQ ENC: CPT | Mod: GP

## 2023-03-07 PROCEDURE — 36415 COLL VENOUS BLD VENIPUNCTURE: CPT | Performed by: HOSPITALIST

## 2023-03-07 PROCEDURE — 80053 COMPREHEN METABOLIC PANEL: CPT | Performed by: HOSPITALIST

## 2023-03-07 PROCEDURE — 12800000 HC ROOM AND CARE SEMIPRIVATE REHAB

## 2023-03-07 PROCEDURE — 83735 ASSAY OF MAGNESIUM: CPT | Performed by: HOSPITALIST

## 2023-03-07 PROCEDURE — 97167 OT EVAL HIGH COMPLEX 60 MIN: CPT | Mod: GO

## 2023-03-07 PROCEDURE — 90791 PSYCH DIAGNOSTIC EVALUATION: CPT | Performed by: PSYCHOLOGIST

## 2023-03-07 RX ADMIN — APIXABAN 5 MG: 5 TABLET, FILM COATED ORAL at 09:34

## 2023-03-07 RX ADMIN — APIXABAN 5 MG: 5 TABLET, FILM COATED ORAL at 19:39

## 2023-03-07 ASSESSMENT — COGNITIVE AND FUNCTIONAL STATUS - GENERAL
SLEEP_WAKE_CYCLE: NO CHANGE
CONCENTRATION: WNL
DELUSIONS: NONE OR AGE APPROPRIATE
APPETITE: NO CHANGE
THOUGHT_PROCESS: WNL
EYE_CONTACT: WNL
THOUGHT_CONTENT: APPROPRIATE
MOOD: MOTIVATED;HOPEFUL
IMPULSE CONTROL: INTACT
ORIENTATION: FULLY ORIENTED
ATTENTION: WNL
EST. PREMORBID INTELLIGENCE: ABOVE AVERAGE
APPEARANCE: WELL GROOMED
REMOTE MEMORY: WNL
PSYCHOMOTOR FUNCTIONING: WNL
SPEECH: REGULAR
AROUSAL LEVEL: ATTENTIVE
INSIGHT: AWARE OF PHYSICAL LIMITATIONS;AWARE OF IMPACT ON FUNCTIONING;INTACT
AFFECT: FULL RANGE
RECENT MEMORY: WNL

## 2023-03-07 NOTE — PROGRESS NOTES
Outpatient Psychology Initial Intake    Duration:  1 hour    Harry Alvarado, : 1955, a 67 y.o. male, for initial evaluation visit to discuss Adjustment to Disability.    HPI: Harry Alvarado is a 67 y.o. male who presents with the following chronic medical problems including MTHFR gene mutation, coronary artery disease with prior myocardial infarction , peripheral artery disease with multiple bypass procedures in the past most recent with tPA bolus and angioplasty in .  The patient admitted to [facility] on 2022 to the vascular surgery service for planned BKA due to bypass thrombosis.  At the time of admission the patient had cyanosis and was beginning to develop thanh gangrene of the foot.  Patient had reported over the last 1 to 2 months worsening pain of the right foot and that he knew the graft was occluded.  Patient has a history of 15+ surgeries of the extremities and understood that his revascularization options were limited.  He had severe 10 out of 10 pain of the foot relieved with elevation of the leg.  He was still able to ambulate but limited by pain.  Under the care of Bowen Serrato MD of vascular surgery he underwent right transtibial amputation on 2022.  Postoperative course unremarkable with advancement of diet and pain control.  ESTUARDO drain removed on .  For postoperative pain control he was treated with morphine.  On discharge prescribed Tylenol and low-dose Lyrica.  Lyrica started prior by his PCP for neuropathic pain.  He was restarted on his home Eliquis.    Was at [facility] 2022.         Past Medical History:   Diagnosis Date   • Coronary artery disease     s/p MI   • Deep vein thrombosis (CMS/HCC)    • Lupus anticoagulant disorder (CMS/HCC)    • Lyme disease    • MTHFR gene mutation    • Myocardial infarction (CMS/HCC)    • PAD (peripheral artery disease) (CMS/HCC)      Past Surgical History:   Procedure Laterality Date   • BELOW KNEE LEG  AMPUTATION Right 12/02/2022   • FEMORAL BYPASS Right      History reviewed. No pertinent family history.  Social History     Socioeconomic History   • Marital status:      Spouse name: None   • Number of children: None   • Years of education: None   • Highest education level: None   Tobacco Use   • Smoking status: Never   • Smokeless tobacco: Never   Substance and Sexual Activity   • Alcohol use: Not Currently     Social Determinants of Health     Financial Resource Strain: Low Risk  (3/6/2023)    Overall Financial Resource Strain (CARDIA)    • Difficulty of Paying Living Expenses: Not hard at all   Food Insecurity: No Food Insecurity (3/6/2023)    Hunger Vital Sign    • Worried About Running Out of Food in the Last Year: Never true    • Ran Out of Food in the Last Year: Never true   Transportation Needs: No Transportation Needs (3/6/2023)    PRAPARE - Transportation    • Lack of Transportation (Medical): No    • Lack of Transportation (Non-Medical): No   Stress: No Stress Concern Present (3/6/2023)    Citizen of [place] [employer] - Occupational Stress Questionnaire    • Feeling of Stress : Not at all   Housing Stability: Low Risk  (3/6/2023)    Housing Stability Vital Sign    • Unable to Pay for Housing in the Last Year: No    • Number of Places Lived in the Last Year: 1    • Unstable Housing in the Last Year: No       Previous Mental Health History:   Previous Mental Health Treatment:  N/A  Previous Suicidal Behavior:  N/A  Previous Self-Injurious Behavior:  N/A  Previous Homicidal Behavior:  N/A  Previous Substance Abuse Treatment: N/A    Current Facility-Administered Medications   Medication Dose Route Frequency Provider Last Rate Last Admin   • acetaminophen (TYLENOL) tablet 650 mg  650 mg oral q4h PRN Emerson Bradley MD       • alum-mag hydroxide-simeth (MAALOX) 200-200-20 mg/5 mL suspension 30 mL  30 mL oral q6h PRN Emerson Bradley MD       • apixaban (ELIQUIS) tablet 5 mg  5 mg  oral BID Emerson Bradley MD   5 mg at 03/09/23 0826   • sennosides-docusate sodium (SENOKOT-S) 8.6-50 mg per tablet 2 tablet  2 tablet oral Daily PRN Emerson Bradley MD           Current Evaluation:   Mental Status Exam:  Arousal Level: Attentive  Appearance: Well Groomed  Speech: Regular  Psychomotor Functioning: WNL  Eye Contact: WNL  Est. Premorbid Intelligence: Above average  Orientation: Fully oriented  Attention: WNL  Concentration: WNL  Recent Memory: WNL  Remote Memory: WNL  Thought Content: Appropriate  Thought Process: WNL  Insight: Aware of physical limitations, Aware of impact on functioning, Intact  Perceptual Function: Other:, Visual impairment (glasses. Has phantom limb syndrome)  Delusions: None or age appropriate  Sleeping: No Change (related to use of hospital bed mattress softness)  Appetite: No Change  Affect: Full Range  Mood: Motivated, Hopeful    Assessments done this visit:     [place] Suicide Severity Rating Scale:  Done today      [place] Suicide Severity Rating Scale  1. Within the past month, have you wished you were dead or wished you could go to sleep and not wake up?: No  2. Within the past month, have you actually had any thoughts of killing yourself?: No  6. Have you ever done anything, started to do anything, or prepared to do anything to end your life?: No    Safe-T Assessment:  Not indicated        Comments:     Risk Assessment:   Suicidal Ideation: Based on [place] Suicide Screen and current clinical assessment, patient is determined Low Risk.  Self Injurious Behavior:  Not Present  Irritability:  Not Present  Homicidal Behavior: Not Present  Estimate of Risk:  None  If risk identified have suicide precautions been implemented? No     Goals Addressed    None         Interventions  Acceptance & Adjustment  Goal Setting  Monitoring of Symptoms  Other:  Intake Assessment    Psychoeducation provided on:  Amputation and Recovery     Recommendations:      Individual  Therapy  30 minutesweekly      Visit Diagnosis:     1. Adjustment disorder with anxiety        Diagnostic Impression:      Harry Alvarado, a 67 y.o.  year old male, was seen for initial evaluation for psychology services on 03/09/23  post admission to [facility]. He was Attentive when psychologist entered. He was pleasant and willing to answer questions throughout the evaluation. He was OX4. He was able to recall the details of his injury and hospitalization. He scored **/30 on the MMSE. 2AddMissingInfo. Harry was able to find information about his care team on the wall and daily schedule. He was able to state safety concerns regarding his ambulation, use of safety belt, and correctly reported on how to call for assistance when in the room. He noted that he was in a positive overall, feeling Mood: 'fine'.  He identified goals  to obtain upon discharge to be learning how to use the prosthetic. He plans to spend no more than a day or two after his discharge from this training before going back to [place].  Socially, he reported having 2 family connections. He has a college graduate education - [employer]. He reported he did do well in school. Hedenied any specific learning disabilities or ADHD. Harry  reported no history of anxiety, depression, SI, HI, or anger. He denied a history of seeking psychotherapy or psychiatry services outside of psychology services he received as part of his treatment team at [facility]. He is agreeable to psychology following during his rehab stay.        Mesfin Crews PSY.D @ 8:54 AM

## 2023-03-07 NOTE — CONSULTS
145/145W    Clinical Course: Patient is a 67 y.o. male who was admitted on 3/6/2023 with a diagnosis of Encounter for prosthetic gait training [Z47.89].     Nutrition Interventions/Recommendations:   1. Continue current Regular diet order  2. Provided Regular alternate food list  3. Discussed need for adequate protein and calorie intake for healing and strength; recommended eating a protein food at every meal  4. Offered bedtime snack; Pt declined  5. Please weigh Pt weekly and update Epic    Nutrition Risk Level 1    Monitoring and Evaluation:  Will monitor PO intake, weight trends, labs/lytes      Past Medical History:   Diagnosis Date   • Coronary artery disease     s/p MI   • Deep vein thrombosis (CMS/HCC)    • Lupus anticoagulant disorder (CMS/HCC)    • Lyme disease    • MTHFR gene mutation    • Myocardial infarction (CMS/HCC)    • PAD (peripheral artery disease) (CMS/HCC)      Past Surgical History:   Procedure Laterality Date   • BELOW KNEE LEG AMPUTATION Right 12/02/2022   • FEMORAL BYPASS Right         Adult Nutrition Assessment - 03/07/23 1100        Charting Type    Nutrition Charting Type Nutrition Brief Assessment     Nutrition Status Classification Mild nutritional compromise     Time Spent (Minutes) 45        Reason for Assessment    Reason For Assessment physician consult        Nutrition/Diet History    Typical Food/Fluid Intake Pt states no dietary restrictions at home     Food Preferences None communicated     Meal/Snack Patterns 3 meals/day     Supplemental Drinks/Foods/Additives Home: none     Vitamin/Mineral/Herbal Supplements None     Food Allergies NKFA     Factors Affecting Nutritional Intake --   Currently none       Usual Body Weight (UBW)    Usual Body Weight 68.9 kg (152 lb)   Per Epic 12/6/22    % of Usual Body Weight Assessment not applicable        Body Mass Index (BMI)    BMI Assessment BMI 18.5-24.9: normal     Nutritional Status/Malnutrition Does not meet criteria for malnutrition         Labs/Procedures/Meds    Lab Results Reviewed reviewed, pertinent     Lab Results Comments 3/7: Creat 0.7 L, Hgb 13.3 L        Medications    Pertinent Medications Reviewed reviewed     Pertinent Medications Comments N/A        Physical Findings    Overall Physical Appearance amputee   Pt appears thin and adequately nourished    Gastrointestinal --   Pt denies N/V/D/C    Skin surgical incision        Nutrition Order    Nutrition Order meets nutritional requirements     Nutrition Order Comments Regular/Thins        PES Statement    Nutritional Needs Met? Yes                 CMP Results       03/07/23 12/12/22 12/07/22     0556 0702 0611     135 135    K 4.4 5.0 4.6    Cl 104 99 99    CO2 24 27 24    Glucose 87 85 70    BUN 19 18 20    Creatinine 0.7 0.7 0.6    Calcium 9.4 9.8 9.3    Anion Gap 8 9 12    AST 15 29 78    ALT 17 42 82    Albumin 3.4 2.9 2.9    EGFR >60.0 >60.0 >60.0        Lab Results   Component Value Date    ALT 17 03/07/2023    AST 15 03/07/2023    ALKPHOS 77 03/07/2023    BILITOT 0.6 03/07/2023     Lab Results   Component Value Date    IAMRPEIZ95 333 12/07/2022     Lab Results   Component Value Date    WBC 8.01 03/07/2023    HGB 13.3 (L) 03/07/2023    HCT 41.7 03/07/2023    MCV 91.6 03/07/2023     03/07/2023     No results found for: CHOL  No results found for: HDL  No results found for: LDLCALC  No results found for: TRIG  No results found for: CHOLHDL  Lab Results   Component Value Date    CALCIUM 9.4 03/07/2023     Glucose Results    No lab values to display.           • apixaban  5 mg oral BID              Dietary Orders   (From admission, onward)             Start     Ordered    03/06/23 1226  Adult Diet Regular; Thin Liquids  Diet effective now        References:    IDDSI Diet reference   Question Answer Comment   Diet Texture Regular    Fluid Consistency: Thin Liquids        03/06/23 1226              Wt Readings from Last 3 Encounters:   03/06/23 75.5 kg (166 lb 8 oz)    12/14/22 69.4 kg (153 lb)   12/05/22 90.7 kg (200 lb)       Weights (last 7 days)     Date/Time Weight    03/06/23 1215 75.5 kg (166 lb 8 oz)          Clinical Comments:  Consulted for assessment; Pt has gained weight since his amputation on 12/2/22 with no edema noted. Pt states that prior to the amputation, he had lost weight. He is happy to have been gaining weight. Pt is eating 100% of meals.     Date: 03/07/23  Signature: JARROD Wilson

## 2023-03-07 NOTE — ASSESSMENT & PLAN NOTE
Harry Alvarado is a 67 y.o. male who presents with the following chronic medical problems including MTHFR gene mutation, coronary artery disease with prior myocardial infarction 1993, peripheral artery disease with multiple bypass procedures in the past most recent with tPA bolus and angioplasty in 2020.  The patient admitted to [facility] on November 30, 2022 to the vascular surgery service for planned BKA due to bypass thrombosis.  At the time of admission the patient had cyanosis and was beginning to develop thanh gangrene of the foot.  Patient has a history of 15+ surgeries of the extremities and understood that his revascularization options were limited.  He had severe 10 out of 10 pain of the foot relieved with elevation of the leg.  He was still able to ambulate but limited by pain.  Under the care of Bowen Serrato MD of vascular surgery he underwent right transtibial amputation on December 2, 2022.  Postoperative course unremarkable.  For postoperative pain control he was treated with morphine.  On discharge prescribed Tylenol and low-dose Lyrica.  Lyrica started prior by his PCP for neuropathic pain.  He was restarted on his home Eliquis.  He required min assist for bed mobility, transfers and ambulation.  The patient was admitted to [facility] on December 6, 2022 for acute inpatient rehabilitation for preprosthetic training.  The patient was discharged on December 18, 2022 after  completing a comprehensive inpatient rehabilitation program progressing to a modified independent level with bed mobility, transfers and standing with rolling walker with limited hopping.  Pain control stable with medication change to hydrocodone/acetaminophen.  Continue Lyrica increased dose 50 mg 3 times a day.  The patient was also placed on nortriptyline for sleep and neuropathic pain.   The patient has follow-up outpatient with vascular surgery with improvement in wound healing.  The patient  was evaluated by Dr. Zambrano in amputee clinic and felt appropriate for acute inpatient rehabilitation for intensive prosthetic training prior to returning home where he lives in [place].  The patient's pain has improved and he is weaned off all of his medications except for his Eliquis.    Comprehensive inpatient rehabilitation program for functional deficit status post right transtibial amputation.  Goal is for modified and pedal level with ambulation and self-care activities with prosthesis.  Monitor for pain.  Monitor skin closely.  Monitor cardiovascular status.

## 2023-03-07 NOTE — SUBJECTIVE & OBJECTIVE
PMR H&P  Admitting Diagnosis: Encounter for prosthetic gait training [Z47.89]  HPI     Harry Alvarado is a 67 y.o. male who presents with the following chronic medical problems including MTHFR gene mutation, coronary artery disease with prior myocardial infarction 1993, peripheral artery disease with multiple bypass procedures in the past most recent with tPA bolus and angioplasty in 2020.  The patient admitted to [facility] on November 30, 2022 to the vascular surgery service for planned BKA due to bypass thrombosis.  At the time of admission the patient had cyanosis and was beginning to develop thanh gangrene of the foot.  Patient has a history of 15+ surgeries of the extremities and understood that his revascularization options were limited.  He had severe 10 out of 10 pain of the foot relieved with elevation of the leg.  He was still able to ambulate but limited by pain.  Under the care of Bowen Serrato MD of vascular surgery he underwent right transtibial amputation on December 2, 2022.  Postoperative course unremarkable.  For postoperative pain control he was treated with morphine.  On discharge prescribed Tylenol and low-dose Lyrica.  Lyrica started prior by his PCP for neuropathic pain.  He was restarted on his home Eliquis.  He required min assist for bed mobility, transfers and ambulation.  The patient was admitted to [facility] on December 6, 2022 for acute inpatient rehabilitation for preprosthetic training.  The patient was discharged on December 18, 2022 after  completing a comprehensive inpatient rehabilitation program progressing to a modified independent level with bed mobility, transfers and standing with rolling walker with limited hopping.  Pain control stable with medication change to hydrocodone/acetaminophen.  Continue Lyrica increased dose 50 mg 3 times a day.  The patient was also placed on nortriptyline for sleep and neuropathic pain.   The patient has  follow-up outpatient with vascular surgery with improvement in wound healing.  The patient was evaluated by Dr. Zambrano in amputee clinic and felt appropriate for acute inpatient rehabilitation for intensive prosthetic training prior to returning home where he lives in [place].  The patient's pain has improved and he is weaned off all of his medications except for his Eliquis.    Medical History:   Past Medical History:   Diagnosis Date    Coronary artery disease     s/p MI    Deep vein thrombosis (CMS/HCC)     Lupus anticoagulant disorder (CMS/HCC)     Lyme disease     MTHFR gene mutation     Myocardial infarction (CMS/HCC)     PAD (peripheral artery disease) (CMS/HCC)        Surgical History:   Past Surgical History:   Procedure Laterality Date    BELOW KNEE LEG AMPUTATION Right 12/02/2022    FEMORAL BYPASS Right        Social History:   Social History     Social History Narrative    Not on file     Prior Living Arrangements: RETS18 Living Environment  Home Accessibility: stairs to enter home (Group), stairs within home (Group)  Living Environment Comment: Pt reports he lives with wife in 1  with 6 YOSEF. Pt has loft upstairs but does not need to access. Pt reports he's very active around the home and has steep property he needs to navigate.  Prior Function Level: Prior Level of Function  Dominant Hand: right  Ambulation: assistive equipment  Transferring: assistive equipment  Toileting: independent  Bathing: independent  Dressing: independent  Eating: independent  Prior Level of Function Comment: Pt reports he was independent for all ADLs/mobility prior to TTA. Pt completed pre-prosthetic training at [facility] - has been Mod I at w/c level since d/c. Reports he utilized  for transfers and gait training with HHPT.    Family History: History reviewed. No pertinent family history.    Allergies: Patient has no known allergies.       Medication List        ASK your doctor about these medications      apixaban 5 mg  tablet  Commonly known as: ELIQUIS  Take 1 tablet (5 mg total) by mouth 2 (two) times a day Indications: increased risk of blood clotting, prevention of venous thromboembolism recurrence.  Dose: 5 mg     aspirin 81 mg enteric coated tablet  Take 1 tablet (81 mg total) by mouth daily Indications: peripheral vascular disease.  Dose: 81 mg     melatonin ODT  Take 1 tablet (3 mg total) by mouth nightly.  Dose: 3 mg     nortriptyline 10 mg capsule  Commonly known as: PAMELOR  Take 1 capsule (10 mg total) by mouth nightly.  Dose: 10 mg     pregabalin 50 mg capsule  Commonly known as: LYRICA  Take 1 capsule (50 mg total) by mouth 3 (three) times a day.  Dose: 50 mg     zolpidem 10 mg tablet  Commonly known as: AMBIEN  Take 1 tablet (10 mg total) by mouth nightly as needed for sleep.  Dose: 10 mg            Review of Systems  All other systems reviewed and negative except as noted in the HPI.Patient without significant pain.  Denies chest pain or shortness of breath.  Review of systems otherwise negative.    Objective     Vital Signs for the last 24 hours:  Temp:  [36.7 °C (98 °F)-36.8 °C (98.2 °F)] 36.8 °C (98.2 °F)  Heart Rate:  [68-80] 73  Resp:  [17] 17  BP: (116-157)/(65-90) 142/90      Current Functional Status - use FLOs from Treatment summary/Preadm assessment  RETS18 Bed-Chair-WC Transfers  Comment (Bed Mobility): supine to sit without prosthetic I  [place], Roll Left: independent  [place], Roll Right: independent  [place], Supine to Sit: independent  [place], Sit to Supine: independent  RETS18 Shower Transfer Training  [place], Shower Transfer: modified independence  Assistive Device: grab bars/tub rail, tub bench  Comment: SPT  RETS18 Toilet Transfer  [place], Toilet Transfer: minimum assist (75% or more patient effort)  Verbal Cues: safety, technique, hand placement  Assistive Device: grab bars/safety frame  Comment: with prosthetic donned, via amb tx  Car Transfer  [place],  Car Transfer: unable to assess  Comment: Unable to assess due to time constraintsGait Training  [place], Gait: close supervision  Assistive Device: walker, front-wheeled  Distance in Feet: 200 feet  Pattern (Gait): step-through  Deviations/Abnormal Patterns (Gait): antalgic, base of support, narrow, gait speed decreased, weight shifting decreased  Advanced Gait Activity: 10 Meter Walk Test (Self-Selected Velocity)  [place], Picking Up Object: unable to assess (Unsafe to assess)  Comment (Gait/Stairs): with S provided d/t impaired balance, novel prosthetic user, VCs for equal step length and decreased BUE WB on AD  Rough/Uneven Surface Gait Skills  [place]: touching/steadying assist  Assistive Device: walker, front-wheeled  Distance in Feet: 100 feet  Comment:  (with touch assist for safety, balance provided via gait belt, patient amb over bricks, small step down transition to small gravel. VCs for safety and technique with mild +impulsivity noted.)  Curb Negotiation  [place]: unable to assess  Comment: Unable to assess due to safety concernsUpper Body Dressing  Self-Performance: obtains clothes, threads left arm, shirt, threads right arm, shirt, pulls shirt over head/around back, pulls shirt down/adjusts  [place] Assistance:  (steadying A)  [place]: touching/steadying assist  Comment: Gathering clothing with min A for balance in stance with prosthetic and RW  Lower Body Dressing  Self-Performance: threads left leg, underpants, threads right leg, underpants, pulls underpants up or down, threads left leg, pants/shorts, threads right leg, pants/shorts, pulls pants/shorts up or down, dons/doffs left sock, prosthesis application, dons/doffs left shoe, shoelaces, left  [place] Assistance: prosthesis application  [place]: minimum assist (75% or more patient effort)  Position: supported sitting, unsupported standing  [place], Footwear: touching/steadying assist  Comment: Gathering clothing  with min A for balance in stance with prosthetic and RW. standing with min A to hike pants, min A to don prosthetic  Bathing  Shower Provided?:  (Plan to shower this PM)  [place]: modified independence  Comment: Per pt report mod I sitting on shower chair  Toileting  [place]: touching/steadying assist  Adaptive Equipment: accessible height toilet, grab bar/safety frame  Comment: with prosthetic dinned  Grooming  [place], Oral Hygiene: touching/steadying assist  Comment: in stance with prosthetic donned      Labs     Results from last 7 days   Lab Units 03/07/23  0556   WBC K/uL 8.01   HEMOGLOBIN g/dL 13.3*   HEMATOCRIT % 41.7   PLATELETS K/uL 284     Results from last 7 days   Lab Units 03/07/23  0556   SODIUM mEQ/L 136   POTASSIUM mEQ/L 4.4   CHLORIDE mEQ/L 104   CO2 mEQ/L 24   BUN mg/dL 19   CREATININE mg/dL 0.7*   CALCIUM mg/dL 9.4   ALBUMIN g/dL 3.4   BILIRUBIN TOTAL mg/dL 0.6   ALK PHOS IU/L 77   ALT IU/L 17   AST IU/L 15   GLUCOSE mg/dL 87       Physical Exam  General      Alert, cooperative, no distress, appears stated age.   Head:    Normocephalic, without obvious abnormality, atraumatic.   Eyes:    PERRL, conjunctiva/corneas clear, EOM's intact.        Nose:   Nares normal, septum midline, mucosa normal, no drainage or            sinus tenderness.   Throat:   Lips, mucosa, and tongue normal.    Neck:   Supple, symmetrical, trachea midline.    Back:     Symmetric, no curvature.   Lungs:     Clear to auscultation bilaterally, respirations unlabored.   Chest wall:    No tenderness or deformity.   Heart:    Regular rate and rhythm, S1 and S2 normal.   Abdomen:     Soft, non-tender, bowel sounds active all four quadrants,     no masses, no organomegaly.   Extremities:  Musculoskeletal:  No significant lower extremity edema bilaterally.   Right transtibial amputation.   Pulses:   1+ and symmetric all extremities.   Skin:   Incision line intact.  Mild residual small scabs along the incision line  without drainage.  No significant erythema.   Neurologic:          Behavior/  Emotional:  CNII-XII intact.  Alert and oriented ×3.  Motor exam intact.  Sensory exam intact.  Reflexes without hyperreflexia.      Appropriate, cooperative

## 2023-03-07 NOTE — PROGRESS NOTES
Patient: Harry Alvarado  Location: [facility] [address]  MRN: [id number]  Today's date: 3/7/2023    History of Present Illness  Harry is a 67 y.o. male admitted on 3/6/2023 with Encounter for prosthetic gait training [Z47.89]. Principal problem is   Encounter for prosthetic gait training.      This is the first clinic appointment for Mr. Alvarado following his inpatient stay for preprostatic training. He is a 67-year-old male with a history of gene mutation, CAD, history of MI, peripheral artery disease with multiple bypass procedures in the past. The patient had been admitted to [facility] on November 30 for a planned transtibial amputation due to bypass thrombosis. He had reported worse pain in the previous 1 to 2 months in the right foot and had undergone multiple surgeries prior. He underwent a right transtibial amputation on December 2, 2022. There is no significant postoperative complications. Pain was well controlled. He was admitted to [facility] for preprostatic training and did well and was discharged home. He continues to receive home care physical therapy. He is still following up with his surgeon. He still has small open area in the lateral aspect of the incision line with minimal amount of serous drainage noted. He offers complaints of intermittent phantom pain and sensations. He has no other complaints of headache or dizziness, chest pain or shortness of breath. Currently he and his wife are living in his son's home everything is a 1 level. He is independent at a wheelchair level. Prior to his amputation he was completely independent and very active.    Past Medical History  Harry has a past medical history of Coronary artery disease, Deep vein thrombosis (CMS/HCC), Lupus anticoagulant disorder (CMS/HCC), Lyme disease, MTHFR gene mutation, Myocardial infarction (CMS/HCC), and PAD (peripheral artery disease) (CMS/[facility]).      PT Vitals    Date/Time Pulse HR Source BP BP  Location BP Method Pt Position [facility]   03/07/23 1301 71 Monitor 125/76 Left upper arm Automatic Sitting RK      PT Pain    Date/Time Pain Type Rating: Rest Rating: Activity [facility]   03/07/23 1301 Pain Assessment 0 -- RK   03/07/23 1358 Pain Reassessment -- 0 RK          Prior Living Environment    Flowsheet Row Most Recent Value   People in Home spouse   Current Living Arrangements home   Home Accessibility stairs to enter home (Group), stairs within home (Group)   Living Environment Comment Pt reports he lives with wife in 1  with 6 YOSEF. Pt has loft upstairs but does not need to access. Pt reports he's very active around the home and has steep property he needs to navigate.   Number of Stairs, Main Entrance 6   Stair Railings, Main Entrance railings on both sides of stairs   Stairs Comment, Main Entrance Can't reach both rails at once   Location, Patient Bedroom first (main) floor   Location, Bathroom first (main) floor   Bathroom Access Comment walk in shower, shower chair   Number of Stairs, Within Home, Primary 0          Prior Level of Function    Flowsheet Row Most Recent Value   Dominant Hand right   Ambulation assistive equipment   Transferring assistive equipment   Toileting independent   Bathing independent   Dressing independent   Eating independent   Prior Level of Function Comment Pt reports he was independent for all ADLs/mobility prior to TTA. Pt completed pre-prosthetic training at [facility] - has been Mod I at w/c level since d/c. Reports he utilized RW for transfers and gait training with HHPT.   Assistive Device Currently Used at Home walker, front-wheeled, wheelchair, shower chair, cane, straight           IRF PT Evaluation and Treatment - 03/07/23 1304        PT Time Calculation    Start Time 1300     Stop Time 1400     Time Calculation (min) 60 min        Session Details    Document Type daily treatment/progress note     Mode of Treatment physical therapy;individual therapy        General Information  "   Patient Profile Reviewed yes     General Observations of Patient patient recieved seated in w/c; pleasant and agreeable to therapy session.        Sit to Stand Transfer    [place], Sit to Stand Transfer supervision     Verbal Cues technique     Assistive Device walker, front-wheeled     Comment multiple bouts from EOM and w/c; supervision to ensure fall prevention        Stand to Sit Transfer    [place], Stand to Sit Transfer supervision     Verbal Cues safety;technique     Assistive Device walker, front-wheeled     Comment multiple bouts to EOM and w/c; supervision to ensure fall prevention        Stand Pivot Transfer    [place], Stand Pivot/Stand Step Transfer close supervision     Verbal Cues safety;technique     Assistive Device walker, front-wheeled     Comment via ambulatory approach to w/c; closeS to ensure fall prevention.        Gait Training    [place], Gait close supervision     Assistive Device walker, front-wheeled     Distance in Feet 200 feet     Pattern (Gait) step-through     Deviations/Abnormal Patterns (Gait) antalgic;base of support, narrow;gait speed decreased;step length decreased     Advanced Gait Activity sloped surfaces     Comment (Gait/Stairs) 200'x2 with closeS to ensure fall prevention.Verbal cueing for equal step length and upright posture.        Curb Negotiation    [place] minimum assist (75% or more patient effort)     Assistive Device walker, front-wheeled     Curb Height 6 inches     Comment up/down 6\" +2\" curb with [place] for weight shifting and balance        Rough/Uneven Surface Gait Skills    [place] touching/steadying assist     Assistive Device walker, front-wheeled     Distance in Feet 100 feet     Comment over uneven outdoor surfaces including concrete; touch steadying for balance verbal cueing for upright posture and step length        Sloped Surface Gait Skills    [place] touching/steadying assist     Assistive Device walker, " front-wheeled     Distance in Feet 50 feet     Comment up/down outdoor sloped surfaces with RW and touch steadying for balance        Balance    Balance Interventions standing;supported     Comment, Balance standing at RW with mirror anterior with closeS-touch steadying; performed box taps 2 x10 reps each side.        Prosthetic Orientation (Lower Extremity)    Fit Assessment fit/function of prosthesis are appropriate     Comment, Fit Assessment supervision seated in w/c to don prosthetic including socks and gel liner. Donned initially with one sock ply and worn for total of 50 mintues this session. Doffed at end of session with supervision and skin assessment unremarkable.        Daily Progress Summary (PT)    Daily Outcome Statement Patient session focused on functional mobility, introducing curb step and outdoor sloped surfaces. Patient tolerates activities well, however requires verbal cueing for AD management on outdoor surfaces for safety. Patient would continue to benefit from pre gait activities and weight bearing on RLE.                           IRF PT Goals    Flowsheet Row Most Recent Value   Transfer Goal 1    Activity/Assistive Device sit-to-stand/stand-to-sit, stand pivot  [LRAD] at 03/06/2023 1502   [place] supervision required at 03/06/2023 1502   Time Frame short-term goal (STG), 1 week at 03/06/2023 1502   Transfer Goal 2    Activity/Assistive Device sit-to-stand/stand-to-sit, stand pivot  [LRAD] at 03/06/2023 1502   [place] modified independence at 03/06/2023 1502   Time Frame long-term goal (LTG), 14 days or less at 03/06/2023 1502   Gait/Walking Locomotion Goal 1    Activity/Assistive Device gait (walking locomotion)  [LRAD] at 03/06/2023 1502   Distance 150 feet at 03/06/2023 1502   [place] supervision required at 03/06/2023 1502   Time Frame short-term goal (STG), 1 week at 03/06/2023 1502   Gait/Walking Locomotion Goal 2    Activity/Assistive Device gait (walking locomotion)   [LRAD] at 03/06/2023 1502   Distance 150 feet at 03/06/2023 1502   [place] modified independence at 03/06/2023 1502   Time Frame long-term goal (LTG), 14 days or less at 03/06/2023 1502

## 2023-03-07 NOTE — PLAN OF CARE
Plan of Care Review  Progress: improving  Outcome Summary: pt oriented and pleasant. Denies pain. Remains continent via urinal and makes needs known. No issues overnight. Call light in reach.

## 2023-03-07 NOTE — PROGRESS NOTES
Patient: Harry Alvarado  Location: [facility] [address]  MRN: [id number]  Today's date: 3/7/2023    History of Present Illness  Harry is a 67 y.o. male admitted on 3/6/2023 with Encounter for prosthetic gait training [Z47.89]. Principal problem is   Encounter for prosthetic gait training.      This is the first clinic appointment for Mr. Alvarado following his inpatient stay for preprostatic training. He is a 67-year-old male with a history of gene mutation, CAD, history of MI, peripheral artery disease with multiple bypass procedures in the past. The patient had been admitted to [facility] on November 30 for a planned transtibial amputation due to bypass thrombosis. He had reported worse pain in the previous 1 to 2 months in the right foot and had undergone multiple surgeries prior. He underwent a right transtibial amputation on December 2, 2022. There is no significant postoperative complications. Pain was well controlled. He was admitted to [facility] for preprostatic training and did well and was discharged home. He continues to receive home care physical therapy. He is still following up with his surgeon. He still has small open area in the lateral aspect of the incision line with minimal amount of serous drainage noted. He offers complaints of intermittent phantom pain and sensations. He has no other complaints of headache or dizziness, chest pain or shortness of breath. Currently he and his wife are living in his son's home everything is a 1 level. He is independent at a wheelchair level. Prior to his amputation he was completely independent and very active.    Past Medical History  Harry has a past medical history of Coronary artery disease, Deep vein thrombosis (CMS/HCC), Lupus anticoagulant disorder (CMS/HCC), Lyme disease, MTHFR gene mutation, Myocardial infarction (CMS/HCC), and PAD (peripheral artery disease) (CMS/[facility]).      OT Vitals    Date/Time Pulse HR Source Resp  SpO2 Pt Activity O2 Therapy BP BP Location BP Method Pt Position Who   03/07/23 0800 68 Monitor 17 94 % At rest None (Room air) 120/65 Left upper arm Automatic Sitting TLC   03/07/23 0854 68 Monitor -- -- -- -- 126/85 -- -- -- VMS      OT Pain    Date/Time Pain Type Rating: Rest Who   03/07/23 0803 Pain Assessment 0 VMS   03/07/23 0854 Pain Assessment 0 VMS          Prior Living Environment    Flowsheet Row Most Recent Value   People in Home spouse   Current Living Arrangements home   Home Accessibility stairs to enter home (Group), stairs within home (Group)   Living Environment Comment Pt reports he lives with wife in 1  with 6 YOSEF. Pt has loft upstairs but does not need to access. Pt reports he's very active around the home and has steep property he needs to navigate.   Number of Stairs, Main Entrance 6   Stair Railings, Main Entrance railings on both sides of stairs   Stairs Comment, Main Entrance Can't reach both rails at once   Location, Patient Bedroom first (main) floor   Location, Bathroom first (main) floor   Bathroom Access Comment walk in shower, shower chair   Number of Stairs, Within Home, Primary 0          Prior Level of Function    Flowsheet Row Most Recent Value   Dominant Hand right   Ambulation assistive equipment   Transferring assistive equipment   Toileting independent   Bathing independent   Dressing independent   Eating independent   Prior Level of Function Comment Pt reports he was independent for all ADLs/mobility prior to TTA. Pt completed pre-prosthetic training [facility] - has been Mod I at w/c level since d/c. Reports he utilized RW for transfers and gait training with HHPT.   Assistive Device Currently Used at Home walker, front-wheeled, wheelchair, shower chair, cane, straight          Occupational Profile    Flowsheet Row Most Recent Value   Occupational History/Life Experiences Pt is interested in learning about return to driving, plans to purchase a new vehicle.  Recommend  "arranging Chirag Eaton from [employer] to speak with pt.  Pt lives in [place] and was staying in [place] with son since amputation, plans to return to [place].  Initially wanted to drive home.  Discussed potentially purchasing   Environmental Supports and Barriers +. 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, also enjoys hiking,           IRF OT Evaluation and Treatment - 03/07/23 0804        OT Time Calculation    Start Time 0800     Stop Time 0900     Time Calculation (min) 60 min        Session Details    Document Type initial evaluation     Mode of Treatment occupational therapy;individual therapy        General Information    Existing Precautions/Restrictions fall;cardiac        Cognition/Psychosocial    Orientation Status (Cognition) oriented x 4        Cognitive Pattern Assessment    Cognitive Pattern Assessment Used BIMS        Brief Interview for Mental Status (BIMS)    Repetition of Three Words (First Attempt) 3     Temporal Orientation: Year Correct     Temporal Orientation: Month Accurate within 5 days     Temporal Orientation: Day Correct     Recall: \"Sock\" Yes, no cue required     Recall: \"Blue\" Yes, no cue required     Recall: \"Bed\" Yes, no cue required     BIMS Summary Score 15        Confusion Assessment Method (CAM)    Is there evidence of an acute change in mental status from the patient's baseline? No     Inattention Behavior not present     Disorganized thinking Behavior not present     Altered level of consciousness Behavior not present        Vision Assessment/Intervention    Visual Impairment/Limitations corrective lenses for distance        Sensory Assessment    Left UE Sensory Assessment light touch awareness;proprioception;intact     Right UE Sensory Assessment light touch awareness;proprioception;intact        Range of Motion (ROM)    Comment: Range of Motion UE WFL during ADL        " Strength Comprehensive (MMT)    Comment UE WFL during ADL        Bed Mobility    Comment (Bed Mobility) supine to sit without prosthetic I        Sit to Stand Transfer    [place], Sit to Stand Transfer minimum assist (75% or more patient effort)     Verbal Cues safety;technique;hand placement     Assistive Device walker, front-wheeled     Comment with prosthetic donned        Stand to Sit Transfer    [place], Stand to Sit Transfer minimum assist (75% or more patient effort)     Verbal Cues safety;technique     Assistive Device walker, front-wheeled     Comment with prosthetic donned        Low Pivot Transfer    [place], Low Pivot Transfer modified independence     Assistive Device wheelchair     Comment without prosthetic donned        Stand Pivot Transfer    [place], Stand Pivot/Stand Step Transfer minimum assist (75% or more patient effort)     Verbal Cues safety;technique;hand placement     Assistive Device walker, front-wheeled     Comment with prosthetic donned via amb tx, with cues for R weight shift        Toilet Transfer    [place], Toilet Transfer minimum assist (75% or more patient effort)     Verbal Cues safety;technique;hand placement     Assistive Device grab bars/safety frame     Comment with prosthetic donned, via amb tx        Shower Transfer    [place], Shower Transfer modified independence     Assistive Device grab bars/tub rail;tub bench     Comment SPT        Safety Issues, Functional Mobility    Comment, Safety Issues/Impairments (Mobility) OT functional ambulaiton with RW short distance with min A with cues for R weight shift        Mobility Belt    Mobility Belt Used for All Out of Bed Activity yes        Balance    Comment, Balance min A standing balance with RW        Bathing    Shower Provided? --   Plan to shower this PM    [place] modified independence     Comment Per pt report mod I sitting on shower chair        Upper Body Dressing    Self-Performance  "obtains clothes;threads left arm, shirt;threads right arm, shirt;pulls shirt over head/around back;pulls shirt down/adjusts     [place] Assistance --   steadying A    [place] touching/steadying assist     Comment Gathering clothing with min A for balance in stance with prosthetic and RW        Lower Body Dressing    Self-Performance threads left leg, underpants;threads right leg, underpants;pulls underpants up or down;threads left leg, pants/shorts;threads right leg, pants/shorts;pulls pants/shorts up or down;dons/doffs left sock;prosthesis application;dons/doffs left shoe;shoelaces, left     [place] Assistance prosthesis application     [place] minimum assist (75% or more patient effort)     Position supported sitting;unsupported standing     [place], Footwear touching/steadying assist     Comment Gathering clothing with min A for balance in stance with prosthetic and RW. standing with min A to hike pants, min A to don prosthetic        Grooming    [place], Oral Hygiene touching/steadying assist     Comment in stance with prosthetic donned        Toileting    [place] touching/steadying assist     Adaptive Equipment accessible height toilet;grab bar/safety frame     Comment with prosthetic donned. mod I without prosthetic        LE Residual Limb Evaluation    Amputation Level transfemoral, standard     Amputation Laterality right side     Incision Management incision line adequately closed for scar management     Shape residual limb shape is cylindrical     Sensation Issues phantom limb sensation;phantom limb pain   Pt reports improved since prior [facility] stay    Skin Assessment other (see comments)   Blanchable redness noted at patellar tendon - pt reports history of \"blister\" during pre-prosthetic training, which has healed       Previous Prosthesis History (Lower Extremity)    Prosthesis Type (Previous LE Transfemoral Prosthesis) gel or elastomeric liner suspension     Transtibial Prosthesis " Type total surface-bearing socket        Prosthetic Orientation (Lower Extremity)    Fit Assessment fit/function of prosthesis are appropriate     Comment, Fit Assessment Pre skin check noted blachable redness at knee, residual scab at lateral incision. donning with min A  with wicking, gel liner, suspension sleeve with assist to adjust. with 1 sock ply noted after doffing reccoming 3 ply next donning session, doffed end of session with CS wearing for around 30 min sof session today, post skin check unremarkable. goal for 2 hour wear tomorrow        Patient/Family Goals    Patient's Goals For Discharge return home;take care of myself at home;return to all previous roles/activities        Therapy Assessment/Plan (OT)    Functional Level at Time of Evaluation (OT) Grossly min A     OT Diagnosis R TT prosthetic training     Rehab Potential/Prognosis (OT) good, to achieve stated therapy goals     Frequency of Treatment (OT) 5-7 times per week;60-90 minutes per day     Estimated Duration of Therapy (OT) 2 weeks     Problem List (OT) problems related to;balance;coordination;mobility;motor control;range of motion (ROM);strength;postural control     Activity Limitations Related to Problem List unable to ambulate safely;unable to transfer safely;BADLs not performed adequately or safely;IADLs not performed adequately or safely;community activities not performed adequately or safely;home management activity not performed adequately or safely     Planned Therapy Interventions activity tolerance training;adaptive equipment training;BADL retraining;edema control/reduction;IADL retraining;functional balance retraining;occupation/activity based interventions;prosthetic fitting/training;patient/caregiver education/training;passive ROM/stretching;transfer/mobility retraining;strengthening exercise;ROM/therapeutic exercise     Comment, Therapy Assessment/Plan (OT) Harry is a 67 year male who presents to [facility] for R TT prosthetic  training. Occupational therapy evaluation completed. PLOF pt was I.  Pt noted to have decreased ability to safely and independently perform his ADLs and IADLS at ambulatory level with prosthetic. Pt noted with deficits in balance, safety awareness, transfers, strength, functional ambulation, endurance, decreased education on prosthetics which impact his ability to safely perform his ADLs and IADLs. Pt grossly performing ADLs with prosthetic donned at min A level. Pt would benefit from skilled occupational therapy services to increase I, decrease caregiver burden, and increase quality of life.                      Education Documentation  Orientation to Care Setting, Routine, taught by Shirley Mccauley OT at 3/7/2023  3:20 PM.  Learner: Patient  Readiness: Acceptance  Method: Explanation  Response: Verbalizes Understanding  Comment: Education on role of OT, POC and goals          IRF OT Goals    Flowsheet Row Most Recent Value   Transfer Goal 1    Activity/Assistive Device toilet at 03/07/2023 0804   [place] --  [touching A] at 03/07/2023 0804   Time Frame short-term goal (STG), 1 week at 03/07/2023 0804   Transfer Goal 2    Activity/Assistive Device toilet at 03/07/2023 0804   [place] modified independence at 03/07/2023 0804   Time Frame long-term goal (LTG), 14 days or less at 03/07/2023 0804   UB Dressing Goal 1    [place] --  [CS] at 03/07/2023 0804   Time Frame short-term goal (STG), 5 - 7 days at 03/07/2023 0804   UB Dressing Goal 2    [place] modified independence at 03/07/2023 0804   Time Frame long-term goal (LTG), 14 days or less at 03/07/2023 0804   LB Dressing Goal 1    [place] supervision required at 03/07/2023 0804   Time Frame short-term goal (STG), 5 - 7 days at 03/07/2023 0804   LB Dressing Goal 2    [place] modified independence at 03/07/2023 0804   Time Frame long-term goal (LTG), 14 days or less at 03/07/2023 0804   Grooming Goal 1    [place] supervision  required at 03/07/2023 0804   Time Frame short-term goal (STG), 5 - 7 days at 03/07/2023 0804   Grooming Goal 2    [place] modified independence at 03/07/2023 0804   Time Frame long-term goal (LTG), 14 days or less at 03/07/2023 0804   Toileting Goal 1    [place] supervision required at 03/07/2023 0804   Time Frame short-term goal (STG), 5 - 7 days at 03/07/2023 0804   Toileting Goal 2    [place] modified independence at 03/07/2023 0804   Time Frame long-term goal (LTG), 14 days or less at 03/07/2023 0804

## 2023-03-07 NOTE — PLAN OF CARE
Problem: Rehabilitation (IRF) Plan of Care  Goal: Plan of Care Review  Flowsheets (Taken 3/7/2023 1519)  Plan of Care Reviewed With: patient  Outcome Summary: OT eval performed and POC established  Goal: Patient-Specific Goal (Individualized)  Flowsheets (Taken 3/7/2023 1519)  Patient-Specific Goals (Include Timeframe): To walk out

## 2023-03-07 NOTE — H&P
History & Physical    Subjective/Objective:   PMR H&P  Admitting Diagnosis: Encounter for prosthetic gait training [Z47.89]  HPI    Harry Alvarado is a 67 y.o. male who presents with the following chronic medical problems including MTHFR gene mutation, coronary artery disease with prior myocardial infarction 1993, peripheral artery disease with multiple bypass procedures in the past most recent with tPA bolus and angioplasty in 2020.  The patient admitted to [facility] on November 30, 2022 to the vascular surgery service for planned BKA due to bypass thrombosis.  At the time of admission the patient had cyanosis and was beginning to develop thanh gangrene of the foot.  Patient has a history of 15+ surgeries of the extremities and understood that his revascularization options were limited.  He had severe 10 out of 10 pain of the foot relieved with elevation of the leg.  He was still able to ambulate but limited by pain.  Under the care of Bowen Serrato MD of vascular surgery he underwent right transtibial amputation on December 2, 2022.  Postoperative course unremarkable.  For postoperative pain control he was treated with morphine.  On discharge prescribed Tylenol and low-dose Lyrica.  Lyrica started prior by his PCP for neuropathic pain.  He was restarted on his home Eliquis.  He required min assist for bed mobility, transfers and ambulation.  The patient was admitted to [facility] on December 6, 2022 for acute inpatient rehabilitation for preprosthetic training.  The patient was discharged on December 18, 2022 after  completing a comprehensive inpatient rehabilitation program progressing to a modified independent level with bed mobility, transfers and standing with rolling walker with limited hopping.  Pain control stable with medication change to hydrocodone/acetaminophen.  Continue Lyrica increased dose 50 mg 3 times a day.  The patient was also placed on nortriptyline for sleep  and neuropathic pain.   The patient has follow-up outpatient with vascular surgery with improvement in wound healing.  The patient was evaluated by Dr. Zambrano in amputee clinic and felt appropriate for acute inpatient rehabilitation for intensive prosthetic training prior to returning home where he lives in [place].  The patient's pain has improved and he is weaned off all of his medications except for his Eliquis.    Medical History:   Past Medical History:   Diagnosis Date   • Coronary artery disease     s/p MI   • Deep vein thrombosis (CMS/HCC)    • Lupus anticoagulant disorder (CMS/HCC)    • Lyme disease    • MTHFR gene mutation    • Myocardial infarction (CMS/HCC)    • PAD (peripheral artery disease) (CMS/HCC)        Surgical History:   Past Surgical History:   Procedure Laterality Date   • BELOW KNEE LEG AMPUTATION Right 12/02/2022   • FEMORAL BYPASS Right        Social History:   Social History     Social History Narrative   • Not on file     Prior Living Arrangements: RETS18 Living Environment  Home Accessibility: stairs to enter home (Group), stairs within home (Group)  Living Environment Comment: Pt reports he lives with wife in 1  with 6 YOSEF. Pt has loft upstairs but does not need to access. Pt reports he's very active around the home and has steep property he needs to navigate.  Prior Function Level: Prior Level of Function  Dominant Hand: right  Ambulation: assistive equipment  Transferring: assistive equipment  Toileting: independent  Bathing: independent  Dressing: independent  Eating: independent  Prior Level of Function Comment: Pt reports he was independent for all ADLs/mobility prior to TTA. Pt completed pre-prosthetic training at [facility] - has been Mod I at w/c level since d/c. Reports he utilized  for transfers and gait training with HHPT.    Family History: History reviewed. No pertinent family history.    Allergies: Patient has no known allergies.       Medication List        ASK your  doctor about these medications      apixaban 5 mg tablet  Commonly known as: ELIQUIS  Take 1 tablet (5 mg total) by mouth 2 (two) times a day Indications: increased risk of blood clotting, prevention of venous thromboembolism recurrence.  Dose: 5 mg     aspirin 81 mg enteric coated tablet  Take 1 tablet (81 mg total) by mouth daily Indications: peripheral vascular disease.  Dose: 81 mg     melatonin ODT  Take 1 tablet (3 mg total) by mouth nightly.  Dose: 3 mg     nortriptyline 10 mg capsule  Commonly known as: PAMELOR  Take 1 capsule (10 mg total) by mouth nightly.  Dose: 10 mg     pregabalin 50 mg capsule  Commonly known as: LYRICA  Take 1 capsule (50 mg total) by mouth 3 (three) times a day.  Dose: 50 mg     zolpidem 10 mg tablet  Commonly known as: AMBIEN  Take 1 tablet (10 mg total) by mouth nightly as needed for sleep.  Dose: 10 mg            Review of Systems  All other systems reviewed and negative except as noted in the HPI.Patient without significant pain.  Denies chest pain or shortness of breath.  Review of systems otherwise negative.    Objective    Vital Signs for the last 24 hours:  Temp:  [36.7 °C (98 °F)-36.8 °C (98.2 °F)] 36.8 °C (98.2 °F)  Heart Rate:  [68-80] 73  Resp:  [17] 17  BP: (116-157)/(65-90) 142/90      Current Functional Status - use FLOs from Treatment summary/Preadm assessment  RETS18 Bed-Chair-WC Transfers  Comment (Bed Mobility): supine to sit without prosthetic I  [place], Roll Left: independent  [place], Roll Right: independent  [place], Supine to Sit: independent  [place], Sit to Supine: independent  RETS18 Shower Transfer Training  [place], Shower Transfer: modified independence  Assistive Device: grab bars/tub rail, tub bench  Comment: SPT  RETS18 Toilet Transfer  [place], Toilet Transfer: minimum assist (75% or more patient effort)  Verbal Cues: safety, technique, hand placement  Assistive Device: grab bars/safety frame  Comment: with prosthetic  donned, via amb tx  Car Transfer  [place], Car Transfer: unable to assess  Comment: Unable to assess due to time constraintsGait Training  [place], Gait: close supervision  Assistive Device: walker, front-wheeled  Distance in Feet: 200 feet  Pattern (Gait): step-through  Deviations/Abnormal Patterns (Gait): antalgic, base of support, narrow, gait speed decreased, weight shifting decreased  Advanced Gait Activity: 10 Meter Walk Test (Self-Selected Velocity)  [place], Picking Up Object: unable to assess (Unsafe to assess)  Comment (Gait/Stairs): with S provided d/t impaired balance, novel prosthetic user, VCs for equal step length and decreased BUE WB on AD  Rough/Uneven Surface Gait Skills  [place]: touching/steadying assist  Assistive Device: walker, front-wheeled  Distance in Feet: 100 feet  Comment:  (with touch assist for safety, balance provided via gait belt, patient amb over bricks, small step down transition to small gravel. VCs for safety and technique with mild +impulsivity noted.)  Curb Negotiation  [place]: unable to assess  Comment: Unable to assess due to safety concernsUpper Body Dressing  Self-Performance: obtains clothes, threads left arm, shirt, threads right arm, shirt, pulls shirt over head/around back, pulls shirt down/adjusts  [place] Assistance:  (steadying A)  [place]: touching/steadying assist  Comment: Gathering clothing with min A for balance in stance with prosthetic and RW  Lower Body Dressing  Self-Performance: threads left leg, underpants, threads right leg, underpants, pulls underpants up or down, threads left leg, pants/shorts, threads right leg, pants/shorts, pulls pants/shorts up or down, dons/doffs left sock, prosthesis application, dons/doffs left shoe, shoelaces, left  [place] Assistance: prosthesis application  [place]: minimum assist (75% or more patient effort)  Position: supported sitting, unsupported standing  [place], Footwear:  touching/steadying assist  Comment: Gathering clothing with min A for balance in stance with prosthetic and RW. standing with min A to hike pants, min A to don prosthetic  Bathing  Shower Provided?:  (Plan to shower this PM)  [place]: modified independence  Comment: Per pt report mod I sitting on shower chair  Toileting  [place]: touching/steadying assist  Adaptive Equipment: accessible height toilet, grab bar/safety frame  Comment: with prosthetic dinned  Grooming  [place], Oral Hygiene: touching/steadying assist  Comment: in stance with prosthetic donned      Labs     Results from last 7 days   Lab Units 03/07/23  0556   WBC K/uL 8.01   HEMOGLOBIN g/dL 13.3*   HEMATOCRIT % 41.7   PLATELETS K/uL 284     Results from last 7 days   Lab Units 03/07/23  0556   SODIUM mEQ/L 136   POTASSIUM mEQ/L 4.4   CHLORIDE mEQ/L 104   CO2 mEQ/L 24   BUN mg/dL 19   CREATININE mg/dL 0.7*   CALCIUM mg/dL 9.4   ALBUMIN g/dL 3.4   BILIRUBIN TOTAL mg/dL 0.6   ALK PHOS IU/L 77   ALT IU/L 17   AST IU/L 15   GLUCOSE mg/dL 87       Physical Exam  General      Alert, cooperative, no distress, appears stated age.   Head:    Normocephalic, without obvious abnormality, atraumatic.   Eyes:    PERRL, conjunctiva/corneas clear, EOM's intact.        Nose:   Nares normal, septum midline, mucosa normal, no drainage or            sinus tenderness.   Throat:   Lips, mucosa, and tongue normal.    Neck:   Supple, symmetrical, trachea midline.    Back:     Symmetric, no curvature.   Lungs:     Clear to auscultation bilaterally, respirations unlabored.   Chest wall:    No tenderness or deformity.   Heart:    Regular rate and rhythm, S1 and S2 normal.   Abdomen:     Soft, non-tender, bowel sounds active all four quadrants,     no masses, no organomegaly.   Extremities:  Musculoskeletal:  No significant lower extremity edema bilaterally.   Right transtibial amputation.   Pulses:   1+ and symmetric all extremities.   Skin:   Incision line intact.   Mild residual small scabs along the incision line without drainage.  No significant erythema.   Neurologic:          Behavior/  Emotional:  CNII-XII intact.  Alert and oriented ×3.  Motor exam intact.  Sensory exam intact.  Reflexes without hyperreflexia.      Appropriate, cooperative          Assessment/Plan:  Status post below knee amputation of right lower extremity (CMS/HCC)  Assessment & Plan  Harry Alvarado is a 67 y.o. male who presents with the following chronic medical problems including MTHFR gene mutation, coronary artery disease with prior myocardial infarction 1993, peripheral artery disease with multiple bypass procedures in the past most recent with tPA bolus and angioplasty in 2020.  The patient admitted to [facility] on November 30, 2022 to the vascular surgery service for planned BKA due to bypass thrombosis.  At the time of admission the patient had cyanosis and was beginning to develop thanh gangrene of the foot.  Patient has a history of 15+ surgeries of the extremities and understood that his revascularization options were limited.  He had severe 10 out of 10 pain of the foot relieved with elevation of the leg.  He was still able to ambulate but limited by pain.  Under the care of Bowen Serrato MD of vascular surgery he underwent right transtibial amputation on December 2, 2022.  Postoperative course unremarkable.  For postoperative pain control he was treated with morphine.  On discharge prescribed Tylenol and low-dose Lyrica.  Lyrica started prior by his PCP for neuropathic pain.  He was restarted on his home Eliquis.  He required min assist for bed mobility, transfers and ambulation.  The patient was admitted to [facility] on December 6, 2022 for acute inpatient rehabilitation for preprosthetic training.  The patient was discharged on December 18, 2022 after  completing a comprehensive inpatient rehabilitation program progressing to a modified independent  level with bed mobility, transfers and standing with rolling walker with limited hopping.  Pain control stable with medication change to hydrocodone/acetaminophen.  Continue Lyrica increased dose 50 mg 3 times a day.  The patient was also placed on nortriptyline for sleep and neuropathic pain.   The patient has follow-up outpatient with vascular surgery with improvement in wound healing.  The patient was evaluated by Dr. Zambrano in amputee clinic and felt appropriate for acute inpatient rehabilitation for intensive prosthetic training prior to returning home where he lives in [place].  The patient's pain has improved and he is weaned off all of his medications except for his Eliquis.    Comprehensive inpatient rehabilitation program for functional deficit status post right transtibial amputation.  Goal is for modified and pedal level with ambulation and self-care activities with prosthesis.  Monitor for pain.  Monitor skin closely.  Monitor cardiovascular status.    Lupus anticoagulant disorder (CMS/HCC)  Assessment & Plan  Continue Eliquis.    Deep vein thrombosis (CMS/HCC)  Assessment & Plan  Continue Eliquis.    Coronary artery disease  Assessment & Plan  Currently asymptomatic.  Monitor cardiac status during stay.  Cardiac precautions.        Code Status: Full Code  Estimated discharge date:   Post Admission Physician Evaluation    Harry Alvarado is admitted to [facility] for comprehensive inpatient rehabilitation for Amputation with functional deficits in mobility; motor dysfunction; safety; self-care. Patient is receiving the following services: assistive technology; medical consultative services; physical therapy; prosthetic services; occupational therapy.    Active medical management is required for  Patient Active Problem List   Diagnosis   • Status post below knee amputation of right lower extremity (CMS/HCC)   • Coronary artery disease   • Deep vein thrombosis (CMS/HCC)   • Lupus  anticoagulant disorder (CMS/HCC)   • MTHFR gene mutation   • PAD (peripheral artery disease) (CMS/HCC)   • Anemia   • Encounter for prosthetic gait training       Premorbid Function  Dominant Hand: right  Ambulation: assistive equipment  Transferring: assistive equipment  Toileting: independent  Bathing: independent  Dressing: independent  Eating: independent  Communication: understands/communicates without difficulty  Swallowing: swallows foods/liquids without difficulty  Assistive Device/Animal Currently Used at Home: walker, front-wheeled; wheelchair; shower chair; cane, straight  Prior Level of Function Comment: Pt reports he was independent for all ADLs/mobility prior to TTA. Pt completed pre-prosthetic training at [facility] - has been Mod I at w/c level since d/c. Reports he utilized RW for transfers and gait training with HHPT.      Current Function  Gait  [place], Gait: close supervision  Assistive Device: walker, front-wheeled  Comment (Gait/Stairs): with S provided d/t impaired balance, novel prosthetic user, VCs for equal step length and decreased BUE WB on AD    Stairs  [place], Stairs: touching/steadying assist  Assistive Device: railing  Handrail Location (Stairs): both sides  Number of Stairs: 12  Stair Height: 7 inches  Comment: VCs to maintain step -to pattern for technique, novel prosthetic user, patient attempting step-over-step with ascent, provided with touch assist for safety and balance    Wheelchair  Method of Locomotion: bimanual (upper extremity) propulsion  Functional Mobility Training: doorway navigation  [place], Forward Propulsion: modified independence  [place], Steering Activities: modified independence  [place], Turning Activities: modified independence  [place], Doorway Navigation: modified independence  Distance Propelled in Feet: 200 feet  Comment, Wheelchair Mobility:  license issued, reviewed safety including wearing seatbelt, unit notebook signout and  taking cell phone w/ him, patient verbalized understanding    Transfers  [place], Roll Left: independent  [place], Roll Right: independent  [place], Supine to Sit: independent  [place], Sit to Supine: independent  Comment (Bed Mobility): supine to sit without prosthetic I  [place], Bed to Chair: minimum assist (75% or more patient effort)  Verbal Cues: safety; technique  Assistive Device: walker, front-wheeled  Comment: SPT using RW with Min A at hips for balance and sequencing with RLE prosthesis donned  [place], Chair to Bed: minimum assist (75% or more patient effort)  Verbal Cues: safety; technique  Assistive Device: walker, front-wheeled  Comment: SPT using RW with Min A at hips for balance and sequencing with RLE prosthesis donned  [place], Sit to Stand Transfer: supervision  Verbal Cues: technique  Assistive Device: walker, front-wheeled  Comment: with S provided d/t impaired balance, novel prosthetic user  [place], Stand to Sit Transfer: supervision  Verbal Cues: technique  Assistive Device: walker, front-wheeled  Comment: With min/mod A from seated at EOB with bed at lowest height without UE assist (as a balance/warm up activity)  [place], Low Pivot Transfer: modified independence  Verbal Cues: safety  Assistive Device: wheelchair  Comment: WC>>EOM at start of PT session  [place], Stand Pivot/Stand Step Transfer: close supervision  Verbal Cues: technique  Assistive Device: walker, front-wheeled  Comment: via amb approach with Cl S provided d/t impaired balance, novel prosthetic user    [place], Toilet Transfer: minimum assist (75% or more patient effort)  Verbal Cues: safety; technique; hand placement  Assistive Device: grab bars/safety frame  Comment: with prosthetic donned, via amb tx  [place], Shower Transfer: modified independence  Assistive Device: grab bars/tub rail; tub bench  Comment: SPT    Self Care  Self-Performance: obtains clothes;  threads left arm, shirt; threads right arm, shirt; pulls shirt over head/around back; pulls shirt down/adjusts  [place] Assistance: -- (steadying A)  Comment: Gathering clothing with min A for balance in stance with prosthetic and RW  Self-Performance: threads left leg, underpants; threads right leg, underpants; pulls underpants up or down; threads left leg, pants/shorts; threads right leg, pants/shorts; pulls pants/shorts up or down; dons/doffs left sock; prosthesis application; dons/doffs left shoe; shoelaces, left  [place] Assistance: prosthesis application  [place]: minimum assist (75% or more patient effort)  Comment: Gathering clothing with min A for balance in stance with prosthetic and RW. standing with min A to hike pants, min A to don prosthetic  Comment: Per pt report mod I sitting on shower chair  [place]: touching/steadying assist  Adaptive Equipment: accessible height toilet; grab bar/safety frame  Comment: with prosthetic dinned  Comment: in stance with prosthetic donned    Cognition  Affect/Mental Status (Cognition): WFL  Orientation Status (Cognition): oriented x 4    Communication     Swallow       Risk for Complications  Falls: Low  Infection: Low  Skin Breakdown: Low      Expected Level of Function  Expected Functional Improvement: mobility; motor dysfunction; safety; self-care  Self-Care: Independent  [place] Control: Independent  Transfers: Independent  Locomotion: Independent  Communication: Independent  Social Cognition: Independent      Anticipated Discharge Plan  home with home health    I have reviewed the pre-admission screening and there are no relevant changes.    Expected length of stay: 10 days

## 2023-03-07 NOTE — PROGRESS NOTES
Patient: Harry Alvarado  Location: [facility] [address]  MRN: [id number]  Today's date: 3/7/2023    History of Present Illness  Harry is a 67 y.o. male admitted on 3/6/2023 with Encounter for prosthetic gait training [Z47.89]. Principal problem is   Encounter for prosthetic gait training.      This is the first clinic appointment for Mr. Alvarado following his inpatient stay for preprostatic training. He is a 67-year-old male with a history of gene mutation, CAD, history of MI, peripheral artery disease with multiple bypass procedures in the past. The patient had been admitted to [facility] on November 30 for a planned transtibial amputation due to bypass thrombosis. He had reported worse pain in the previous 1 to 2 months in the right foot and had undergone multiple surgeries prior. He underwent a right transtibial amputation on December 2, 2022. There is no significant postoperative complications. Pain was well controlled. He was admitted to [place] re[facility] for preprostatic training and did well and was discharged home. He continues to receive home care physical therapy. He is still following up with his surgeon. He still has small open area in the lateral aspect of the incision line with minimal amount of serous drainage noted. He offers complaints of intermittent phantom pain and sensations. He has no other complaints of headache or dizziness, chest pain or shortness of breath. Currently he and his wife are living in his son's home everything is a 1 level. He is independent at a wheelchair level. Prior to his amputation he was completely independent and very active.    Past Medical History  Harry has a past medical history of Coronary artery disease, Deep vein thrombosis (CMS/HCC), Lupus anticoagulant disorder (CMS/HCC), Lyme disease, MTHFR gene mutation, Myocardial infarction (CMS/HCC), and PAD (peripheral artery disease) (CMS/[facility]).      PT Vitals    Date/Time Pulse HR Source Pt  Activity BP MAP BP Method Pt Position [facility]   03/07/23 1002 73 Monitor At rest 142/90 111 mmHg Automatic Sitting SJM      PT Pain    Date/Time Pain Type Rating: Rest Rating: Activity [facility]   03/07/23 1002 Pain Assessment 0 -- SJM   03/07/23 1100 Pain Reassessment;Post Activity -- 0 SJM          Prior Living Environment    Flowsheet Row Most Recent Value   People in Home spouse   Current Living Arrangements home   Home Accessibility stairs to enter home (Group), stairs within home (Group)   Living Environment Comment Pt reports he lives with wife in 1  with 6 YOSEF. Pt has loft upstairs but does not need to access. Pt reports he's very active around the home and has steep property he needs to navigate.   Number of Stairs, Main Entrance 6   Stair Railings, Main Entrance railings on both sides of stairs   Stairs Comment, Main Entrance Can't reach both rails at once   Location, Patient Bedroom first (main) floor   Location, Bathroom first (main) floor   Bathroom Access Comment walk in shower, shower chair   Number of Stairs, Within Home, Primary 0          Prior Level of Function    Flowsheet Row Most Recent Value   Dominant Hand right   Ambulation assistive equipment   Transferring assistive equipment   Toileting independent   Bathing independent   Dressing independent   Eating independent   Prior Level of Function Comment Pt reports he was independent for all ADLs/mobility prior to TTA. Pt completed pre-prosthetic training at [facility] - has been Mod I at w/c level since d/c. Reports he utilized RW for transfers and gait training with HHPT.   Assistive Device Currently Used at Home walker, front-wheeled, wheelchair, shower chair, cane, straight           IRF PT Evaluation and Treatment - 03/07/23 1003        PT Time Calculation    Start Time 1000     Stop Time 1100     Time Calculation (min) 60 min        Session Details    Document Type daily treatment/progress note     Mode of Treatment individual therapy;physical therapy         General Information    Patient Profile Reviewed yes     Existing Precautions/Restrictions cardiac;fall        Sit to Stand Transfer    [place], Sit to Stand Transfer supervision     Verbal Cues technique     Assistive Device walker, front-wheeled     Comment with S provided d/t impaired balance, novel prosthetic user        Stand to Sit Transfer    [place], Stand to Sit Transfer supervision     Verbal Cues technique     Assistive Device walker, front-wheeled     Comment With min/mod A from seated at EOB with bed at lowest height without UE assist (as a balance/warm up activity)        Low Pivot Transfer    [place], Low Pivot Transfer modified independence     Assistive Device wheelchair     Comment WC>>EOM at start of PT session        Stand Pivot Transfer    [place], Stand Pivot/Stand Step Transfer close supervision     Verbal Cues technique     Assistive Device walker, front-wheeled     Comment via amb approach with Cl S provided d/t impaired balance, novel prosthetic user        Gait Training    [place], Gait close supervision     Assistive Device walker, front-wheeled     Distance in Feet 200 feet     Pattern (Gait) step-through     Deviations/Abnormal Patterns (Gait) antalgic;base of support, narrow;gait speed decreased;weight shifting decreased     Comment (Gait/Stairs) with S provided d/t impaired balance, novel prosthetic user, VCs for equal step length and decreased BUE WB on AD        Rough/Uneven Surface Gait Skills    [place] touching/steadying assist     Assistive Device walker, front-wheeled     Distance in Feet 100 feet     Comment --   with touch assist for safety, balance provided via gait belt, patient amb over bricks, small step down transition to small gravel. VCs for safety and technique with mild +impulsivity noted.       Stairs Training    [place], Stairs touching/steadying assist     Assistive Device railing     Handrail Location (Stairs) both sides      Number of Stairs 12     Stair Height 7 inches     Ascending Stairs Technique step-to-step   leading with LLE    Descending Stairs Technique step-to-step   leading with RLE    Comment VCs to maintain step -to pattern for technique, novel prosthetic user, patient attempting step-over-step with ascent, provided with touch assist for safety and balance        Wheelchair Mobility/Management    Method of Locomotion bimanual (upper extremity) propulsion     Wheelchair Type manual, lightweight     [place], Forward Propulsion modified independence     [place], Steering Activities modified independence     [place], Turning Activities modified independence     [place], Doorway Navigation modified independence     Distance Propelled in Feet 200 feet     Comment, Wheelchair Mobility WC license issued, reviewed safety including wearing seatbelt, unit notebook signout and taking cell phone w/ him, patient verbalized understanding        Mobility Belt    Mobility Belt Used for All Out of Bed Activity yes        Balance    Balance Interventions standing;supported;dynamic;moderate challenge;motor strategy training;multisensory training;weight shifting activity     Comment, Balance Standing pre-gait completed at RW, initiated from sitting at EOM including: equal static stance L/R, lateral weight shift x 20, LLE tap to single half stepping stone, x 20, progress to LLE tap to 2 consecutive stepping stones, x20; LLE box taps to 6” block x 20; single UE PNF extension diagonals w/ blue theraband x 20 each UE; 2# cuff weight around each wrist for concurrent BUE lift/shoulder ABD from RW x 20 each UE. Dynamic balance training completed with Cl S for safety, occ min MCs for tactile cues for increased RLE WB/hip extension.        Prosthetic Orientation (Lower Extremity)    Fit Assessment fit/function of prosthesis are appropriate     Comment, Fit Assessment S at EOM with increased time, VCs for technique to daryl gel  liner, 1 sock ply and prosthesis. Patient wearing prosthesis within PT session only at this time. Skin check completed w/ PT post-PT session and was unremarkable. Goal to initiate progressive wearing schedule for 2.0 consecutive hours on 3/8/23.        Daily Progress Summary (PT)    Daily Outcome Statement Reviewed importance of deliberate prosthetic training for RLE to strengthening R hip and develop good gait mechanics. Patient requires VCs for decreased impulsivity and overall slower pace to improve R LE weightbearing and stance control. Patient presents w/ heavy support needed from BUE during pre-gait and gait training activities. Patient issued WC license this date and safety procedures reviewed. Stair training initiated and goals established.                           IRF PT Goals    Flowsheet Row Most Recent Value   Transfer Goal 1    Activity/Assistive Device sit-to-stand/stand-to-sit, stand pivot  [LRAD] at 03/06/2023 1502   [place] supervision required at 03/06/2023 1502   Time Frame short-term goal (STG), 1 week at 03/06/2023 1502   Transfer Goal 2    Activity/Assistive Device sit-to-stand/stand-to-sit, stand pivot  [LRAD] at 03/06/2023 1502   [place] modified independence at 03/06/2023 1502   Time Frame long-term goal (LTG), 14 days or less at 03/06/2023 1502   Gait/Walking Locomotion Goal 1    Activity/Assistive Device gait (walking locomotion)  [LRAD] at 03/06/2023 1502   Distance 150 feet at 03/06/2023 1502   [place] supervision required at 03/06/2023 1502   Time Frame short-term goal (STG), 1 week at 03/06/2023 1502   Gait/Walking Locomotion Goal 2    Activity/Assistive Device gait (walking locomotion)  [LRAD] at 03/06/2023 1502   Distance 150 feet at 03/06/2023 1502   [place] modified independence at 03/06/2023 1502   Time Frame long-term goal (LTG), 14 days or less at 03/06/2023 1502

## 2023-03-08 ENCOUNTER — APPOINTMENT (INPATIENT)
Dept: OCCUPATIONAL THERAPY | Facility: REHABILITATION | Age: 68
DRG: 560 | End: 2023-03-08
Payer: MEDICARE

## 2023-03-08 ENCOUNTER — APPOINTMENT (OUTPATIENT)
Dept: PSYCHOLOGY | Facility: CLINIC | Age: 68
End: 2023-03-08
Payer: MEDICARE

## 2023-03-08 ENCOUNTER — APPOINTMENT (INPATIENT)
Dept: PHYSICAL THERAPY | Facility: REHABILITATION | Age: 68
DRG: 560 | End: 2023-03-08
Payer: MEDICARE

## 2023-03-08 LAB — HCV AB SER QL: NONREACTIVE

## 2023-03-08 PROCEDURE — 97112 NEUROMUSCULAR REEDUCATION: CPT | Mod: GP

## 2023-03-08 PROCEDURE — 12800000 HC ROOM AND CARE SEMIPRIVATE REHAB

## 2023-03-08 PROCEDURE — 90853 GROUP PSYCHOTHERAPY: CPT | Performed by: PSYCHOLOGIST

## 2023-03-08 PROCEDURE — 97530 THERAPEUTIC ACTIVITIES: CPT | Mod: GP

## 2023-03-08 PROCEDURE — 63700000 HC SELF-ADMINISTRABLE DRUG: Performed by: HOSPITALIST

## 2023-03-08 PROCEDURE — 97530 THERAPEUTIC ACTIVITIES: CPT | Mod: GO

## 2023-03-08 PROCEDURE — 97110 THERAPEUTIC EXERCISES: CPT | Mod: GO

## 2023-03-08 PROCEDURE — 97116 GAIT TRAINING THERAPY: CPT | Mod: GP

## 2023-03-08 RX ADMIN — APIXABAN 5 MG: 5 TABLET, FILM COATED ORAL at 19:48

## 2023-03-08 RX ADMIN — APIXABAN 5 MG: 5 TABLET, FILM COATED ORAL at 08:55

## 2023-03-08 NOTE — PROGRESS NOTES
[facility] Progress Note          Patient was seen and examined.   Attestation Notes: Face to face encounter completed    Harry Alvarado is a 67 y.o. male who was admitted for Encounter for prosthetic gait training [Z47.89]. Patient was referred by Richard Angulo MD for medical assessment and management      CC: Encounter for prosthetic gait training [Z47.89]     HPI: Harry Alvarado is a 67 y.o. male with MTHFR gene mutation, CAD s/p MI 1993, and PAD s/p R Fem to PT bypass with arm vein in 1993, revision in 2011, s/p R SFA to peroneal bypass graft s/p revision on 7/30/18, s/p tPA bolus and angioplasty in 2020 admitted to [facility] 11/30/22 with RLE bypass thrombosis and chronic right foot ischemia/gangrene and underwent right BKA by vascular surgeon, Dr. Bowen Serrato 12/2/22. Post op he had anemia but did not require transfusion. He was restarted on his home Eliquis.       The patient was evaluated by PM&R and found to have residual deficits in ambulation and ADLs due to Status post below knee amputation of right lower extremity (CMS/[facility]) [Z89.511] and came to [facility] on 12/6/2022 for acute inpatient rehab.      He participated in therapy. The patient has steadily improved in therapy and was for discharged home 12/18/2022.     The patient was seen by Dr. Agustín Zambrano in [facility] and was felt to be a good candidate for prosthetic ambulation and came to [facility] 3/6/2023 for acute inpatient prosthetic training.    He participated in therapy.         SUBJECTIVE:  Patient interviewed and examined     He feels prosthetic training is going well.      No pain complaints, moving bowels and bladder, no abdominal pain or nausea, no dysuria, no chest pain, palpitations, dyspnea or fevers    Review of Systems:  All other systems reviewed and negative except as noted in the HPI.    Current meds and allergies reviewed    Past Medical History:   Diagnosis Date   • Coronary artery disease      s/p MI   • Deep vein thrombosis (CMS/HCC)    • Lupus anticoagulant disorder (CMS/HCC)    • Lyme disease    • MTHFR gene mutation    • Myocardial infarction (CMS/HCC)    • PAD (peripheral artery disease) (CMS/HCC)      Past Surgical History:   Procedure Laterality Date   • BELOW KNEE LEG AMPUTATION Right 12/02/2022   • FEMORAL BYPASS Right      Social History     Tobacco Use   • Smoking status: Never   • Smokeless tobacco: Never   Substance Use Topics   • Alcohol use: Not Currently      History reviewed. No pertinent family history.    Vital signs in last 24 hours:  Temp:  [36.6 °C (97.8 °F)-36.7 °C (98.1 °F)] 36.6 °C (97.8 °F)  Heart Rate:  [68-80] 80  Resp:  [16-18] 18  BP: (111-166)/(69-84) 111/79  Vital signs reviewed 03/08/23 4:33 PM    Physical Exam  Vitals and nursing note reviewed.   Constitutional:       Appearance: Normal appearance.   HENT:      Head: Normocephalic and atraumatic.      Right Ear: External ear normal.      Left Ear: External ear normal.      Nose: Nose normal.      Mouth/Throat:      Mouth: Mucous membranes are moist.      Pharynx: Oropharynx is clear.   Eyes:      Conjunctiva/sclera: Conjunctivae normal.      Pupils: Pupils are equal, round, and reactive to light.   Cardiovascular:      Rate and Rhythm: Normal rate and regular rhythm.      Pulses: Normal pulses.      Heart sounds: Normal heart sounds.   Pulmonary:      Effort: Pulmonary effort is normal.      Breath sounds: Normal breath sounds.   Abdominal:      General: Abdomen is flat. Bowel sounds are normal.      Palpations: Abdomen is soft.   Musculoskeletal:         General: Deformity (s/p right BKA) present.      Cervical back: Normal range of motion and neck supple.   Skin:     General: Skin is warm and dry.   Neurological:      General: No focal deficit present.      Mental Status: He is alert and oriented to person, place, and time.   Psychiatric:         Mood and Affect: Mood normal.         Behavior: Behavior normal.                  Objective    Labs:  I have reviewed the patient's labs.  Current labs are within normal limits.  Results from last 7 days   Lab Units 03/07/23  0556   WBC K/uL 8.01   HEMOGLOBIN g/dL 13.3*   HEMATOCRIT % 41.7   PLATELETS K/uL 284     Results from last 7 days   Lab Units 03/07/23  0556   SODIUM mEQ/L 136   POTASSIUM mEQ/L 4.4   CHLORIDE mEQ/L 104   CO2 mEQ/L 24   BUN mg/dL 19   CREATININE mg/dL 0.7*   CALCIUM mg/dL 9.4   ALBUMIN g/dL 3.4   BILIRUBIN TOTAL mg/dL 0.6   ALK PHOS IU/L 77   ALT IU/L 17   AST IU/L 15   GLUCOSE mg/dL 87     Results from last 7 days   Lab Units 03/07/23  0556   MAGNESIUM mg/dL 1.9        Imaging:  Not applicable        ASSESSMENT/PLAN:    67 y.o. male with MTHFR gene mutation, CAD s/p MI 1993, and PAD s/p R Fem to PT bypass with arm vein in 1993, revision in 2011, s/p R SFA to peroneal bypass graft s/p revision on 7/30/18, s/p tPA bolus and angioplasty in 2020 admitted to [facility] 11/30/22 with RLE bypass thrombosis and chronic right foot ischemia/gangrene and underwent right BKA by vascular surgeon, Dr. Bowen Serrato 12/2/22; pre-prosthetic training [facility] 12/6/22-12/18/22; returns to [facility] from home 3/6/23 for inpatient prosthetic training.      1. Vasc:  -PAD s/p failed RLE bypass with right foot ischemia/gangrene  -s/p right BKA 12/2/22  -inpatient prosthetic training  -Tylenol for pain  -remains on Eliquis for hypercoagulable condition     2. Heme:  -post op anemia due to chronic blood loss, does not need iron  -leukocytosis reactive due to surgery  -hx of MTHFR gene mutation  -normal CBC     3. Renal:  -increased risk of dehydration and electrolyte changes  -normal CMP, Mg     4. Gi:  -Senokot-S as needed for bowels     5. Gu:  -no urine retention     6. Cardiac:  -hx of CAD  -watch for orthostatic hypotension  -use cardiac precautions in therapy     7. Pulm:  -incentive spirometry for atelectasis     8. Derm:  -skin care per nursing     9. Nutrition:  -seen by  Nutrition for assessment and education     10. Psych:  -seen by Psychology for support     plan discussed with patient, nurse, case management and  Richard Angulo MD Scott Sapperstein, MD  3/8/2023  4:33 PM

## 2023-03-08 NOTE — PLAN OF CARE
Plan of Care Review  Progress: improving  Outcome Summary: pt oriented and maked needs known.Uses call light appropriately. Remains continent.Sleeping this shift. No issues overnight.

## 2023-03-08 NOTE — PROGRESS NOTES
A&Ox4. Room air, denies SOB or CP. Independent in room with walker. Regular diet, tolerating well. BG checks before meals with sliding scale coverage. One large, loose BM this evening. Voiding without difficulty. Dressing change completed per POC. Denies pain at rest. Pt to discharge this evening.    Pt left unit at 1800 via wheelchair with hospital transport. All discharge medications and paperwork were reviewed with patient before he left the unit, and all discharge medications were provided to the patient. Pt also left with all personal belongings. Pt is discharging to his home and is being driven home by his son.      Patient: Harry Alvarado  Location: [facility] Unit [address]  MRN: [id number]  Today's date: 3/8/2023    History of Present Illness  Harry is a 67 y.o. male admitted on 3/6/2023 with Encounter for prosthetic gait training [Z47.89]. Principal problem is   Encounter for prosthetic gait training.      This is the first clinic appointment for Mr. Alvarado following his inpatient stay for preprostatic training. He is a 67-year-old male with a history of gene mutation, CAD, history of MI, peripheral artery disease with multiple bypass procedures in the past. The patient had been admitted to [facility] on November 30 for a planned transtibial amputation due to bypass thrombosis. He had reported worse pain in the previous 1 to 2 months in the right foot and had undergone multiple surgeries prior. He underwent a right transtibial amputation on December 2, 2022. There is no significant postoperative complications. Pain was well controlled. He was admitted to [facility] for preprostatic training and did well and was discharged home. He continues to receive home care physical therapy. He is still following up with his surgeon. He still has small open area in the lateral aspect of the incision line with minimal amount of serous drainage noted. He offers complaints of intermittent phantom pain and sensations. He has no other complaints of headache or dizziness, chest pain or shortness of breath. Currently he and his wife are living in his son's home everything is a 1 level. He is independent at a wheelchair level. Prior to his amputation he was completely independent and very active.    Past Medical History  Harry has a past medical history of Coronary artery disease, Deep vein thrombosis (CMS/HCC), Lupus anticoagulant disorder (CMS/HCC), Lyme disease, MTHFR gene mutation, Myocardial infarction (CMS/HCC), and PAD (peripheral artery disease) (CMS/[facility]).      PT Vitals    Date/Time Pulse HR Source BP BP  Location BP Method Pt Position [facility]   03/08/23 1101 72 Monitor 166/84 Right upper arm Automatic Sitting RK      PT Pain    Date/Time Pain Type Rating: Rest Rating: Activity [facility]   03/08/23 1101 Pain Assessment 0 -- RK   03/08/23 1158 Pain Reassessment -- 0 RK          Prior Living Environment    Flowsheet Row Most Recent Value   People in Home spouse   Current Living Arrangements home   Home Accessibility stairs to enter home (Group), stairs within home (Group)   Living Environment Comment Pt reports he lives with wife in 1  with 6 YOSEF. Pt has loft upstairs but does not need to access. Pt reports he's very active around the home and has steep property he needs to navigate.   Number of Stairs, Main Entrance 6   Stair Railings, Main Entrance railings on both sides of stairs   Stairs Comment, Main Entrance Can't reach both rails at once   Location, Patient Bedroom first (main) floor   Location, Bathroom first (main) floor   Bathroom Access Comment walk in shower, shower chair   Number of Stairs, Within Home, Primary 0          Prior Level of Function    Flowsheet Row Most Recent Value   Dominant Hand right   Ambulation assistive equipment   Transferring assistive equipment   Toileting independent   Bathing independent   Dressing independent   Eating independent   Prior Level of Function Comment Pt reports he was independent for all ADLs/mobility prior to TTA. Pt completed pre-prosthetic training at [facility] - has been Mod I at w/c level since d/c. Reports he utilized RW for transfers and gait training with HHPT.   Assistive Device Currently Used at Home walker, front-wheeled, wheelchair, shower chair, cane, straight           IRF PT Evaluation and Treatment - 03/08/23 1103        PT Time Calculation    Start Time 1100     Stop Time 1200     Time Calculation (min) 60 min        Session Details    Document Type daily treatment/progress note     Mode of Treatment physical therapy;individual therapy        General Information     Patient Profile Reviewed yes     General Observations of Patient patient recieved in w/c with prosthetic donned        Transfers    Transfers stand pivot transfer     Comment mobility belt donned for all transfers and ambulation; doffed at end of session        Sit to Stand Transfer    [place], Sit to Stand Transfer supervision     Verbal Cues technique     Assistive Device walker, front-wheeled     Comment from w/c and EOM; supervision to ensure fall prevention        Stand to Sit Transfer    [place], Stand to Sit Transfer supervision     Verbal Cues technique     Assistive Device walker, front-wheeled     Comment to w/c and EOM: supervision to ensure fall prevention        Low Pivot Transfer    [place], Low Pivot Transfer modified independence     Assistive Device wheelchair     Comment w/c <> EOM        Stand Pivot Transfer    [place], Stand Pivot/Stand Step Transfer close supervision     Verbal Cues technique     Assistive Device walker, front-wheeled     Comment via ambulatory approach to w/c; closeS to ensure fall prevention        Gait Training    [place], Gait close supervision     Assistive Device walker, front-wheeled     Distance in Feet 200 feet     Pattern (Gait) step-through     Deviations/Abnormal Patterns (Gait) antalgic;base of support, narrow;gait speed decreased     Comment (Gait/Stairs) 200'x3; closeSto ensure fall prevention; verbal cues for weight shfiting to R and increasing step length        Sloped Surface Gait Skills    [place] close supervision     Assistive Device walker, front-wheeled     Distance in Feet 20 feet     Comment up/down 20' ramp x2 bouts with closeS to ensure fall prevention        Stairs Training    [place], Stairs close supervision     Assistive Device railing     Handrail Location (Stairs) both sides     Number of Stairs 12     Stair Height 7 inches     Ascending Stairs Technique step-to-step;step-over-step   leading with LLE     "Descending Stairs Technique step-to-step   leading with RLE    Comment up/down 12 steps x2 bouts; first bout with step to step ascending then second bout trialed step over step with increased assistance        Balance    Balance Interventions standing;supported     Comment, Balance 1.) standing at RW performed target tapping to cones anteriorly; x10 reps each LE. 2.) standing at RW performed UE PNF D2 extension with orange TB; 3 x 10 reps each side. 3.) standing at RW with L foot on 6\" block and intermittent UE support focused on weight bearing through RLE. Able to maintain up to 30\" bouts.        Prosthetic Orientation (Lower Extremity)    Fit Assessment fit/function of prosthesis are appropriate     Comment, Fit Assessment Patient arrived to PT session with prosthetic donned. Donned at 0930 this AM. Doffed at 1200 for total of 2.5 hours. Donned with 1 sock ply. Patient doffed with supervision. Skin assessed with mild blanchable redness at distal tibia.        Daily Progress Summary (PT)    Daily Outcome Statement Patient session focused on functional mobility and pre gait activities. Patient continues to demonstrate decreased stance on RLE and decreased proximal hip strength. Continue to progress standing tolerance/balance with increasing R weight bearing.                           IRF PT Goals    Flowsheet Row Most Recent Value   Transfer Goal 1    Activity/Assistive Device sit-to-stand/stand-to-sit, stand pivot  [LRAD] at 03/06/2023 1502   [place] supervision required at 03/06/2023 1502   Time Frame short-term goal (STG), 1 week at 03/06/2023 1502   Transfer Goal 2    Activity/Assistive Device sit-to-stand/stand-to-sit, stand pivot  [LRAD] at 03/06/2023 1502   [place] modified independence at 03/06/2023 1502   Time Frame long-term goal (LTG), 14 days or less at 03/06/2023 1502   Gait/Walking Locomotion Goal 1    Activity/Assistive Device gait (walking locomotion)  [LRAD] at 03/06/2023 1502   Distance " 150 feet at 03/06/2023 1502   [place] supervision required at 03/06/2023 1502   Time Frame short-term goal (STG), 1 week at 03/06/2023 1502   Gait/Walking Locomotion Goal 2    Activity/Assistive Device gait (walking locomotion)  [LRAD] at 03/06/2023 1502   Distance 150 feet at 03/06/2023 1502   [place] modified independence at 03/06/2023 1502   Time Frame long-term goal (LTG), 14 days or less at 03/06/2023 1502

## 2023-03-08 NOTE — ASSESSMENT & PLAN NOTE
Harry Alvarado is a 67 y.o. male who presents with the following chronic medical problems including MTHFR gene mutation, coronary artery disease with prior myocardial infarction 1993, peripheral artery disease with multiple bypass procedures in the past most recent with tPA bolus and angioplasty in 2020.  The patient admitted to [facility] on November 30, 2022 to the vascular surgery service for planned BKA due to bypass thrombosis.  At the time of admission the patient had cyanosis and was beginning to develop thanh gangrene of the foot.  Patient has a history of 15+ surgeries of the extremities and understood that his revascularization options were limited.  He had severe 10 out of 10 pain of the foot relieved with elevation of the leg.  He was still able to ambulate but limited by pain.  Under the care of Bowen Serrato MD of vascular surgery he underwent right transtibial amputation on December 2, 2022.  Postoperative course unremarkable.  For postoperative pain control he was treated with morphine.  On discharge prescribed Tylenol and low-dose Lyrica.  Lyrica started prior by his PCP for neuropathic pain.  He was restarted on his home Eliquis.  He required min assist for bed mobility, transfers and ambulation.  The patient was admitted to [facility] on December 6, 2022 for acute inpatient rehabilitation for preprosthetic training.  The patient was discharged on December 18, 2022 after  completing a comprehensive inpatient rehabilitation program progressing to a modified independent level with bed mobility, transfers and standing with rolling walker with limited hopping.  Pain control stable with medication change to hydrocodone/acetaminophen.  Continue Lyrica increased dose 50 mg 3 times a day.  The patient was also placed on nortriptyline for sleep and neuropathic pain.   The patient has follow-up outpatient with vascular surgery with improvement in wound healing.  The patient  was evaluated by Dr. Zambrano in amputee clinic and felt appropriate for acute inpatient rehabilitation for intensive prosthetic training prior to returning home where he lives in [place].  The patient's pain has improved and he is weaned off all of his medications except for his Eliquis.    Comprehensive inpatient rehabilitation program for functional deficit status post right transtibial amputation.  Goal is for modified and pedal level with ambulation and self-care activities with prosthesis.  Monitor for pain.  Monitor skin closely.  Monitor cardiovascular status.    The patient feels well.  Tolerating initial therapies.  Supervision with transfers and ambulation, min assist with elevations.  Pain well controlled.

## 2023-03-08 NOTE — PROGRESS NOTES
[facility] Progress Note          Patient was seen and examined.   Attestation Notes: Face to face encounter completed    Harry Alvarado is a 67 y.o. male who was admitted for Encounter for prosthetic gait training [Z47.89]. Patient was referred by Richard Angulo MD for medical assessment and management      CC: Encounter for prosthetic gait training [Z47.89]     HPI: Harry Alvarado is a 67 y.o. male with MTHFR gene mutation, CAD s/p MI 1993, and PAD s/p R Fem to PT bypass with arm vein in 1993, revision in 2011, s/p R SFA to peroneal bypass graft s/p revision on 7/30/18, s/p tPA bolus and angioplasty in 2020 admitted to [facility] 11/30/22 with RLE bypass thrombosis and chronic right foot ischemia/gangrene and underwent right BKA by vascular surgeon, Dr. Bowen Serrato 12/2/22. Post op he had anemia but did not require transfusion. He was restarted on his home Eliquis.       The patient was evaluated by PM&R and found to have residual deficits in ambulation and ADLs due to Status post below knee amputation of right lower extremity (CMS/formerly [facility]) [Z89.511] and came to [facility] on 12/6/2022 for acute inpatient rehab.      He participated in therapy. The patient has steadily improved in therapy and was for discharged home 12/18/2022.     The patient was seen by Dr. Agustín Zambrano in [facility] and was felt to be a good candidate for prosthetic ambulation and came to [facility] 3/6/2023 for acute inpatient prosthetic training.    He participated in therapy.         SUBJECTIVE:  Patient interviewed and examined     He feels prosthetic training is going well.      No pain complaints, moving bowels and bladder, no abdominal pain or nausea, no dysuria, no chest pain, palpitations, dyspnea or fevers    Review of Systems:  All other systems reviewed and negative except as noted in the HPI.    Current meds and allergies reviewed    Past Medical History:   Diagnosis Date   • Coronary artery disease      s/p MI   • Deep vein thrombosis (CMS/HCC)    • Lupus anticoagulant disorder (CMS/HCC)    • Lyme disease    • MTHFR gene mutation    • Myocardial infarction (CMS/HCC)    • PAD (peripheral artery disease) (CMS/HCC)      Past Surgical History:   Procedure Laterality Date   • BELOW KNEE LEG AMPUTATION Right 12/02/2022   • FEMORAL BYPASS Right      Social History     Tobacco Use   • Smoking status: Never   • Smokeless tobacco: Never   Substance Use Topics   • Alcohol use: Not Currently      History reviewed. No pertinent family history.    Vital signs in last 24 hours:  Temp:  [36.6 °C (97.9 °F)-36.8 °C (98.2 °F)] 36.6 °C (97.9 °F)  Heart Rate:  [68-73] 69  Resp:  [17] 17  BP: (116-142)/(65-90) 121/68  Vital signs reviewed 03/07/23 7:17 PM    Physical Exam  Vitals and nursing note reviewed.   Constitutional:       Appearance: Normal appearance.   HENT:      Head: Normocephalic and atraumatic.      Right Ear: External ear normal.      Left Ear: External ear normal.      Nose: Nose normal.      Mouth/Throat:      Mouth: Mucous membranes are moist.      Pharynx: Oropharynx is clear.   Eyes:      Conjunctiva/sclera: Conjunctivae normal.      Pupils: Pupils are equal, round, and reactive to light.   Cardiovascular:      Rate and Rhythm: Normal rate and regular rhythm.      Pulses: Normal pulses.      Heart sounds: Normal heart sounds.   Pulmonary:      Effort: Pulmonary effort is normal.      Breath sounds: Normal breath sounds.   Abdominal:      General: Abdomen is flat. Bowel sounds are normal.      Palpations: Abdomen is soft.   Musculoskeletal:         General: Deformity (s/p right BKA) present.      Cervical back: Normal range of motion and neck supple.   Skin:     General: Skin is warm and dry.   Neurological:      General: No focal deficit present.      Mental Status: He is alert and oriented to person, place, and time.   Psychiatric:         Mood and Affect: Mood normal.         Behavior: Behavior normal.                  Objective    Labs:  I have reviewed the patient's labs.  Current labs are within normal limits.  Results from last 7 days   Lab Units 03/07/23  0556   WBC K/uL 8.01   HEMOGLOBIN g/dL 13.3*   HEMATOCRIT % 41.7   PLATELETS K/uL 284     Results from last 7 days   Lab Units 03/07/23  0556   SODIUM mEQ/L 136   POTASSIUM mEQ/L 4.4   CHLORIDE mEQ/L 104   CO2 mEQ/L 24   BUN mg/dL 19   CREATININE mg/dL 0.7*   CALCIUM mg/dL 9.4   ALBUMIN g/dL 3.4   BILIRUBIN TOTAL mg/dL 0.6   ALK PHOS IU/L 77   ALT IU/L 17   AST IU/L 15   GLUCOSE mg/dL 87     Results from last 7 days   Lab Units 03/07/23  0556   MAGNESIUM mg/dL 1.9        Imaging:  Not applicable        ASSESSMENT/PLAN:    67 y.o. male with MTHFR gene mutation, CAD s/p MI 1993, and PAD s/p R Fem to PT bypass with arm vein in 1993, revision in 2011, s/p R SFA to peroneal bypass graft s/p revision on 7/30/18, s/p tPA bolus and angioplasty in 2020 admitted to [facility] 11/30/22 with RLE bypass thrombosis and chronic right foot ischemia/gangrene and underwent right BKA by vascular surgeon, Dr. Bowen Serrato 12/2/22; pre-prosthetic training [facility] 12/6/22-12/18/22; returns to [facility] from home 3/6/23 for inpatient prosthetic training.      1. Vasc:  -PAD s/p failed RLE bypass with right foot ischemia/gangrene  -s/p right BKA 12/2/22  -inpatient prosthetic training  -Tylenol for pain  -remains on Eliquis for hypercoagulable condition     2. Heme:  -post op anemia due to chronic blood loss, does not need iron  -leukocytosis reactive due to surgery  -hx of MTHFR gene mutation  -normal CBC     3. Renal:  -increased risk of dehydration and electrolyte changes  -normal CMP, Mg     4. Gi:  -Senokot-S as needed for bowels     5. Gu:  -no urine retention     6. Cardiac:  -hx of CAD  -watch for orthostatic hypotension  -use cardiac precautions in therapy     7. Pulm:  -incentive spirometry for atelectasis     8. Derm:  -skin care per nursing     9. Nutrition:  -seen by  Nutrition for assessment and education     10. Psych:  -seen by Psychology for support     plan discussed with patient, nurse, case management and  Richard Angulo MD Scott Sapperstein, MD  3/7/2023  7:17 PM

## 2023-03-08 NOTE — SUBJECTIVE & OBJECTIVE
" Patient was seen and examined.   Attestation Notes: Face to face encounter completed    Subjective    The patient feels well.  Tolerating initial therapies.  Supervision with transfers and ambulation, min assist with elevations.  Pain well controlled.  Objective     Visit Vitals  /69 (BP Location: Right upper arm, Patient Position: Sitting)   Pulse 68   Temp 36.7 °C (98.1 °F) (Oral)   Resp 18   Ht 1.854 m (6' 1\")   Wt 75.5 kg (166 lb 8 oz)   SpO2 94%   BMI 21.97 kg/m²       Review of Systems:  Pertinent items are noted in HPI.  Denies chest pain and shortness of breath.  Review of systems otherwise negative.    Labs     Results from last 7 days   Lab Units 03/07/23  0556   WBC K/uL 8.01   HEMOGLOBIN g/dL 13.3*   HEMATOCRIT % 41.7   PLATELETS K/uL 284     Results from last 7 days   Lab Units 03/07/23  0556   SODIUM mEQ/L 136   POTASSIUM mEQ/L 4.4   CHLORIDE mEQ/L 104   CO2 mEQ/L 24   BUN mg/dL 19   CREATININE mg/dL 0.7*   CALCIUM mg/dL 9.4   ALBUMIN g/dL 3.4   BILIRUBIN TOTAL mg/dL 0.6   ALK PHOS IU/L 77   ALT IU/L 17   AST IU/L 15   GLUCOSE mg/dL 87       Full Code    Physical Exam  General      Alert, cooperative, no distress, appears stated age.   Head:    Normocephalic, without obvious abnormality, atraumatic.   Eyes:    PERRL, conjunctiva/corneas clear, EOM's intact.        Nose:   Nares normal, septum midline, mucosa normal, no drainage or            sinus tenderness.   Throat:   Lips, mucosa, and tongue normal.    Neck:   Supple, symmetrical, trachea midline.    Back:     Symmetric, no curvature.   Lungs:     Clear to auscultation bilaterally, respirations unlabored.   Chest wall:    No tenderness or deformity.   Heart:    Regular rate and rhythm, S1 and S2 normal.   Abdomen:     Soft, non-tender, bowel sounds active all four quadrants,     no masses, no organomegaly.   Extremities:  Musculoskeletal: No significant edema.  Right transtibial amputation.      Pulses:   1+ and symmetric all extremities. "   Skin:   Skin color, texture, turgor normal, no rashes or lesions   Neurologic:          Behavior/  Emotional:  CNII-XII intact.  Alert and oriented ×3.  Motor exam intact.  Sensory exam intact.  Reflexes stable decreased reflexes.      Appropriate, cooperative           Plan of care was discussed with patient

## 2023-03-08 NOTE — PROGRESS NOTES
Patient: Harry Alvarado  Location: [facility] [address]  MRN: [id number]  Today's date: 3/8/2023    History of Present Illness  Harry is a 67 y.o. male admitted on 3/6/2023 with Encounter for prosthetic gait training [Z47.89]. Principal problem is   Encounter for prosthetic gait training.      This is the first clinic appointment for Mr. Alvarado following his inpatient stay for preprostatic training. He is a 67-year-old male with a history of gene mutation, CAD, history of MI, peripheral artery disease with multiple bypass procedures in the past. The patient had been admitted to [facility] on November 30 for a planned transtibial amputation due to bypass thrombosis. He had reported worse pain in the previous 1 to 2 months in the right foot and had undergone multiple surgeries prior. He underwent a right transtibial amputation on December 2, 2022. There is no significant postoperative complications. Pain was well controlled. He was admitted to [place] rehab for preprostatic training and did well and was discharged home. He continues to receive home care physical therapy. He is still following up with his surgeon. He still has small open area in the lateral aspect of the incision line with minimal amount of serous drainage noted. He offers complaints of intermittent phantom pain and sensations. He has no other complaints of headache or dizziness, chest pain or shortness of breath. Currently he and his wife are living in his son's home everything is a 1 level. He is independent at a wheelchair level. Prior to his amputation he was completely independent and very active.    Past Medical History  Harry has a past medical history of Coronary artery disease, Deep vein thrombosis (CMS/HCC), Lupus anticoagulant disorder (CMS/HCC), Lyme disease, MTHFR gene mutation, Myocardial infarction (CMS/HCC), and PAD (peripheral artery disease) (CMS/[facility]).      PT Vitals    Date/Time Pulse HR Source BP BP  Location BP Method Pt Position [facility]   03/08/23 1402 80 Monitor 134/76 Right upper arm Automatic Sitting RK      PT Pain    Date/Time Pain Type Rating: Rest Rating: Activity [facility]   03/08/23 1402 Pain Assessment 0 -- RK   03/08/23 1451 Pain Reassessment -- 0 RK          Prior Living Environment    Flowsheet Row Most Recent Value   People in Home spouse   Current Living Arrangements home   Home Accessibility stairs to enter home (Group), stairs within home (Group)   Living Environment Comment Pt reports he lives with wife in 1  with 6 YOSEF. Pt has loft upstairs but does not need to access. Pt reports he's very active around the home and has steep property he needs to navigate.   Number of Stairs, Main Entrance 6   Stair Railings, Main Entrance railings on both sides of stairs   Stairs Comment, Main Entrance Can't reach both rails at once   Location, Patient Bedroom first (main) floor   Location, Bathroom first (main) floor   Bathroom Access Comment walk in shower, shower chair   Number of Stairs, Within Home, Primary 0          Prior Level of Function    Flowsheet Row Most Recent Value   Dominant Hand right   Ambulation assistive equipment   Transferring assistive equipment   Toileting independent   Bathing independent   Dressing independent   Eating independent   Prior Level of Function Comment Pt reports he was independent for all ADLs/mobility prior to TTA. Pt completed pre-prosthetic training at [facility] - has been Mod I at w/c level since d/c. Reports he utilized RW for transfers and gait training with HHPT.   Assistive Device Currently Used at Home walker, front-wheeled, wheelchair, shower chair, cane, straight           IRF PT Evaluation and Treatment - 03/08/23 1404        PT Time Calculation    Start Time 1400     Stop Time 1500     Time Calculation (min) 60 min        Session Details    Document Type daily treatment/progress note     Mode of Treatment physical therapy;individual therapy        General Information  "   Patient Profile Reviewed yes     General Observations of Patient        Transfers    Transfers stand pivot transfer     Comment mobility belt donned for all transfers and ambulation; doffed at end of session        Sit to Stand Transfer    [place], Sit to Stand Transfer supervision     Verbal Cues technique     Assistive Device walker, front-wheeled     Comment from w/c; supervision to ensure fall prevention        Stand to Sit Transfer    [place], Stand to Sit Transfer supervision     Verbal Cues technique     Assistive Device walker, front-wheeled     Comment to w/c; supervision to ensure fall prevention        Gait Training    [place], Gait close supervision     Assistive Device walker, front-wheeled     Distance in Feet 200 feet     Pattern (Gait) step-through     Deviations/Abnormal Patterns (Gait) antalgic;base of support, narrow;gait speed decreased;step length decreased     Comment (Gait/Stairs) 200'x2 with closeS to ensure fall prevention; focusing on R weight shifting and R stance time.        Balance    Balance Interventions standing;supported     Comment, Balance 1.) box taps to 4\" block x10 reps with L side only and closeS. 2.) standing without UE support performed ball toss x3 minutes, then performed with L foot on 4\" block without UE support x2 minutes. Performed with Sandra for weight shift to R side.        Prosthetic Orientation (Lower Extremity)    Fit Assessment fit/function of prosthesis are appropriate     Comment, Fit Assessment Donned and doffed with supervision, one sock ply and total wear time of 55 minutes. Skin assessment performed at end and unremarkable. Plan to progress to 3 hour wear time tomorrow.        Daily Progress Summary (PT)    Symptoms Noted During/After Treatment none     Daily Outcome Statement Patient session focused on pre gait activities with increased focus on R weight shifting, R knee extension and R hip drive. Patient demonstrates minimal carryover to " ambulation with pre gait activities. Patient would continue to benefit from R hip and knee extension exercises and R weight shifting activities and balance.                           IRF PT Goals    Flowsheet Row Most Recent Value   Transfer Goal 1    Activity/Assistive Device sit-to-stand/stand-to-sit, stand pivot  [LRAD] at 03/06/2023 1502   [place] supervision required at 03/06/2023 1502   Time Frame short-term goal (STG), 1 week at 03/06/2023 1502   Transfer Goal 2    Activity/Assistive Device sit-to-stand/stand-to-sit, stand pivot  [LRAD] at 03/06/2023 1502   [place] modified independence at 03/06/2023 1502   Time Frame long-term goal (LTG), 14 days or less at 03/06/2023 1502   Gait/Walking Locomotion Goal 1    Activity/Assistive Device gait (walking locomotion)  [LRAD] at 03/06/2023 1502   Distance 150 feet at 03/06/2023 1502   [place] supervision required at 03/06/2023 1502   Time Frame short-term goal (STG), 1 week at 03/06/2023 1502   Gait/Walking Locomotion Goal 2    Activity/Assistive Device gait (walking locomotion)  [LRAD] at 03/06/2023 1502   Distance 150 feet at 03/06/2023 1502   [place] modified independence at 03/06/2023 1502   Time Frame long-term goal (LTG), 14 days or less at 03/06/2023 1502

## 2023-03-08 NOTE — PROGRESS NOTES
"Daily Progress Note     Patient was seen and examined.   Attestation Notes: Face to face encounter completed    Subjective    The patient feels well.  Tolerating initial therapies.  Supervision with transfers and ambulation, min assist with elevations.  Pain well controlled.  Objective     Visit Vitals  /69 (BP Location: Right upper arm, Patient Position: Sitting)   Pulse 68   Temp 36.7 °C (98.1 °F) (Oral)   Resp 18   Ht 1.854 m (6' 1\")   Wt 75.5 kg (166 lb 8 oz)   SpO2 94%   BMI 21.97 kg/m²       Review of Systems:  Pertinent items are noted in HPI.  Denies chest pain and shortness of breath.  Review of systems otherwise negative.    Labs     Results from last 7 days   Lab Units 03/07/23  0556   WBC K/uL 8.01   HEMOGLOBIN g/dL 13.3*   HEMATOCRIT % 41.7   PLATELETS K/uL 284     Results from last 7 days   Lab Units 03/07/23  0556   SODIUM mEQ/L 136   POTASSIUM mEQ/L 4.4   CHLORIDE mEQ/L 104   CO2 mEQ/L 24   BUN mg/dL 19   CREATININE mg/dL 0.7*   CALCIUM mg/dL 9.4   ALBUMIN g/dL 3.4   BILIRUBIN TOTAL mg/dL 0.6   ALK PHOS IU/L 77   ALT IU/L 17   AST IU/L 15   GLUCOSE mg/dL 87       Full Code    Physical Exam  General      Alert, cooperative, no distress, appears stated age.   Head:    Normocephalic, without obvious abnormality, atraumatic.   Eyes:    PERRL, conjunctiva/corneas clear, EOM's intact.        Nose:   Nares normal, septum midline, mucosa normal, no drainage or            sinus tenderness.   Throat:   Lips, mucosa, and tongue normal.    Neck:   Supple, symmetrical, trachea midline.    Back:     Symmetric, no curvature.   Lungs:     Clear to auscultation bilaterally, respirations unlabored.   Chest wall:    No tenderness or deformity.   Heart:    Regular rate and rhythm, S1 and S2 normal.   Abdomen:     Soft, non-tender, bowel sounds active all four quadrants,     no masses, no organomegaly.   Extremities:  Musculoskeletal: No significant edema.  Right transtibial amputation.      Pulses:   1+ and " symmetric all extremities.   Skin:   Skin color, texture, turgor normal, no rashes or lesions   Neurologic:          Behavior/  Emotional:  CNII-XII intact.  Alert and oriented ×3.  Motor exam intact.  Sensory exam intact.  Reflexes stable decreased reflexes.      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 male who presents with the following chronic medical problems including MTHFR gene mutation, coronary artery disease with prior myocardial infarction 1993, peripheral artery disease with multiple bypass procedures in the past most recent with tPA bolus and angioplasty in 2020.  The patient admitted to [facility] on November 30, 2022 to the vascular surgery service for planned BKA due to bypass thrombosis.  At the time of admission the patient had cyanosis and was beginning to develop thanh gangrene of the foot.  Patient has a history of 15+ surgeries of the extremities and understood that his revascularization options were limited.  He had severe 10 out of 10 pain of the foot relieved with elevation of the leg.  He was still able to ambulate but limited by pain.  Under the care of Bowen Serrato MD of vascular surgery he underwent right transtibial amputation on December 2, 2022.  Postoperative course unremarkable.  For postoperative pain control he was treated with morphine.  On discharge prescribed Tylenol and low-dose Lyrica.  Lyrica started prior by his PCP for neuropathic pain.  He was restarted on his home Eliquis.  He required min assist for bed mobility, transfers and ambulation.  The patient was admitted to [facility] on December 6, 2022 for acute inpatient rehabilitation for preprosthetic training.  The patient was discharged on December 18, 2022 after  completing a comprehensive inpatient rehabilitation  program progressing to a modified independent level with bed mobility, transfers and standing with rolling walker with limited hopping.  Pain control stable with medication change to hydrocodone/acetaminophen.  Continue Lyrica increased dose 50 mg 3 times a day.  The patient was also placed on nortriptyline for sleep and neuropathic pain.   The patient has follow-up outpatient with vascular surgery with improvement in wound healing.  The patient was evaluated by Dr. Zambrano in amputee clinic and felt appropriate for acute inpatient rehabilitation for intensive prosthetic training prior to returning home where he lives in [place].  The patient's pain has improved and he is weaned off all of his medications except for his Eliquis.    Comprehensive inpatient rehabilitation program for functional deficit status post right transtibial amputation.  Goal is for modified and pedal level with ambulation and self-care activities with prosthesis.  Monitor for pain.  Monitor skin closely.  Monitor cardiovascular status.    The patient feels well.  Tolerating initial therapies.  Supervision with transfers and ambulation, min assist with elevations.  Pain well controlled.    Lupus anticoagulant disorder (CMS/HCC)  Assessment & Plan  Continue Eliquis.    Deep vein thrombosis (CMS/[facility])  Assessment & Plan  Continue Eliquis.    Coronary artery disease  Assessment & Plan  Currently asymptomatic.  Monitor cardiac status during stay.  Cardiac precautions.        Expected Discharge Date:    Individualized Plan of Care    Harry Alvarado is admitted to [facility] for comprehensive inpatient rehabilitation for Amputation with functional deficits in mobility; motor dysfunction; safety; self-care. Patient is receiving the following services: assistive technology; medical consultative services; physical therapy; prosthetic services; occupational therapy.    Frequency of Treatment (OT): 5-7 times per week, 60-90 minutes  per day    Frequency of Treatment (PT): 5-7 times per week, 60-90 minutes per day           Active medical management is required for  Patient Active Problem List   Diagnosis   • Status post below knee amputation of right lower extremity (CMS/HCC)   • Coronary artery disease   • Deep vein thrombosis (CMS/HCC)   • Lupus anticoagulant disorder (CMS/HCC)   • MTHFR gene mutation   • PAD (peripheral artery disease) (CMS/HCC)   • Anemia   • Encounter for prosthetic gait training       Risk for Complications  Falls: Low  Infection: Low  Skin Breakdown: Low      The patient's medical prognosis is good to achieve the stated goals below.    Expected Level of Function  Expected Functional Improvement: mobility; motor dysfunction; safety; self-care  Self-Care: Independent  Sphincter Control: Independent  Transfers: Independent  Locomotion: Independent  Communication: Independent  Social Cognition: Independent      Goals  IRF PT Goals    Flowsheet Row Most Recent Value   Transfer Goal 1    Activity/Assistive Device sit-to-stand/stand-to-sit, stand pivot  [LRAD] at 03/06/2023 1502   [place] supervision required at 03/06/2023 1502   Time Frame short-term goal (STG), 1 week at 03/06/2023 1502   Transfer Goal 2    Activity/Assistive Device sit-to-stand/stand-to-sit, stand pivot  [LRAD] at 03/06/2023 1502   [place] modified independence at 03/06/2023 1502   Time Frame long-term goal (LTG), 14 days or less at 03/06/2023 1502   Gait/Walking Locomotion Goal 1    Activity/Assistive Device gait (walking locomotion)  [LRAD] at 03/06/2023 1502   Distance 150 feet at 03/06/2023 1502   [place] supervision required at 03/06/2023 1502   Time Frame short-term goal (STG), 1 week at 03/06/2023 1502   Gait/Walking Locomotion Goal 2    Activity/Assistive Device gait (walking locomotion)  [LRAD] at 03/06/2023 1502   Distance 150 feet at 03/06/2023 1502   [place] modified independence at 03/06/2023 1502   Time Frame long-term goal  (LTG), 14 days or less at 03/06/2023 1502        IRF OT Goals    Flowsheet Row Most Recent Value   Transfer Goal 1    Activity/Assistive Device toilet at 03/07/2023 0804   [place] --  [touching A] at 03/07/2023 0804   Time Frame short-term goal (STG), 1 week at 03/07/2023 0804   Transfer Goal 2    Activity/Assistive Device toilet at 03/07/2023 0804   [place] modified independence at 03/07/2023 0804   Time Frame long-term goal (LTG), 14 days or less at 03/07/2023 0804   UB Dressing Goal 1    [place] --  [CS] at 03/07/2023 0804   Time Frame short-term goal (STG), 5 - 7 days at 03/07/2023 0804   UB Dressing Goal 2    [place] modified independence at 03/07/2023 0804   Time Frame long-term goal (LTG), 14 days or less at 03/07/2023 0804   LB Dressing Goal 1    [place] supervision required at 03/07/2023 0804   Time Frame short-term goal (STG), 5 - 7 days at 03/07/2023 0804   LB Dressing Goal 2    [place] modified independence at 03/07/2023 0804   Time Frame long-term goal (LTG), 14 days or less at 03/07/2023 0804   [place] Goal 1    [place] supervision required at 03/07/2023 0804   Time Frame short-term goal (STG), 5 - 7 days at 03/07/2023 0804   Grooming Goal 2    [place] modified independence at 03/07/2023 0804   Time Frame long-term goal (LTG), 14 days or less at 03/07/2023 0804   Toileting Goal 1    [place] supervision required at 03/07/2023 0804   Time Frame short-term goal (STG), 5 - 7 days at 03/07/2023 0804   Toileting Goal 2    [place] modified independence at 03/07/2023 0804   Time Frame long-term goal (LTG), 14 days or less at 03/07/2023 0804          Anticipated Discharge Plan      Expected length of stay: 10 days

## 2023-03-08 NOTE — GROUP NOTE
Date: 3/8/2023  Start Time:  1530   End Time:  1600    Group Type:  Amputee Group        Harry Alvarado, YOB: 1955,  was an active group participant.    Group Therapy    Summary of group:  The focus of today's session was Limb Loss  Psychoeducation and support was provided on Limb Loss  Patients were provided resources on Limb Loss      Group consists of 2  pre prosthetic trainers and 5 prosthetic trainers.   encourages discussion on progress, coping and discharge.  Several group members reported that their ambulation has improved.  1 member talked about learning to trust himself using the prosthetic.  Issues covered include driving and modifications to drive and expectations and concerns about discharge.  One member expresses concerns about physical demands of his environment.  Members discussed the importance of this developing endurance.  In addition aftercare options are discussed.      He reports that he has not been home in 3 months and he is very much looking forward to learning to use his prosthesis.  Does express some concern about environmental issues after discharge.  He reports he is a very long driveway which is very steep.  He does discuss endurance and driving with other group members.  He reports he is coping well.        He did share information regarding progress.    He did provide support to others in the group.    Visit Diagnosis:  Adjustment disorder with Anxiety

## 2023-03-08 NOTE — GROUP NOTE
Date: 3/9/2023  Start Time:  1530   End Time:  1600    Group Type:  Amputee Group        Harry Alvarado, YOB: 1955,  was an active group participant.    Group Therapy    Summary of group:  The focus of today's session was Limb Loss  Psychoeducation and support was provided on Limb Loss  Patients were provided resources on Limb Loss      Group consists of 2  pre prosthetic trainers and 5 prosthetic trainers.   encourages discussion on progress, coping and discharge.  Several group members reported that their ambulation has improved.  1 member talked about learning to trust himself using the prosthetic.  Issues covered include driving and modifications to drive and expectations and concerns about discharge.  One member expresses concerns about physical demands of his environment.  Members discussed the importance of this developing endurance.  In addition aftercare options are discussed.        He did share information regarding progress.    He did provide support to others in the group.    Visit Diagnosis:  No diagnosis found.

## 2023-03-08 NOTE — PLAN OF CARE
Problem: Rehabilitation (IRF) Plan of Care  Goal: Plan of Care Review  Outcome: Progressing  Flowsheets (Taken 3/8/2023 1802)  Progress: improving  Plan of Care Reviewed With: patient  Outcome Summary: Patient is mod I at wheelchair level. No c/o pain.  Makes needs known.   Plan of Care Review  Plan of Care Reviewed With: patient  Progress: improving  Outcome Summary: Patient is mod I at wheelchair level. No c/o pain.  Makes needs known.

## 2023-03-08 NOTE — PROGRESS NOTES
Patient: Harry Alvarado  Location: [facility]  MRN: [id number]  Today's date: 3/8/2023    History of Present Illness  Harry is a 67 y.o. male admitted on 3/6/2023 with Encounter for prosthetic gait training [Z47.89]. Principal problem is   Encounter for prosthetic gait training.      This is the first clinic appointment for Mr. Alvarado following his inpatient stay for preprostatic training. He is a 67-year-old male with a history of gene mutation, CAD, history of MI, peripheral artery disease with multiple bypass procedures in the past. The patient had been admitted to [facility] on November 30 for a planned transtibial amputation due to bypass thrombosis. He had reported worse pain in the previous 1 to 2 months in the right foot and had undergone multiple surgeries prior. He underwent a right transtibial amputation on December 2, 2022. There is no significant postoperative complications. Pain was well controlled. He was admitted to [facility] [facility] for preprostatic training and did well and was discharged home. He continues to receive home care physical therapy. He is still following up with his surgeon. He still has small open area in the lateral aspect of the incision line with minimal amount of serous drainage noted. He offers complaints of intermittent phantom pain and sensations. He has no other complaints of headache or dizziness, chest pain or shortness of breath. Currently he and his wife are living in his son's home everything is a 1 level. He is independent at a wheelchair level. Prior to his amputation he was completely independent and very active.    Past Medical History  Harry has a past medical history of Coronary artery disease, Deep vein thrombosis (CMS/HCC), Lupus anticoagulant disorder (CMS/HCC), Lyme disease, MTHFR gene mutation, Myocardial infarction (CMS/HCC), and PAD (peripheral artery disease) (CMS/[facility]).      OT Vitals    Date/Time Pulse HR Source BP BP  Location BP Method Pt Position [facility]   03/08/23 0932 71 Monitor 150/83 Right upper arm Automatic Sitting VMS      OT Pain    Date/Time Pain Type Rating: Rest Who   03/08/23 0932 Pain Assessment 0 VMS   03/08/23 1030 Post Activity 0 VMS          Prior Living Environment    Flowsheet Row Most Recent Value   People in Home spouse   Current Living Arrangements home   Home Accessibility stairs to enter home (Group), stairs within home (Group)   Living Environment Comment Pt reports he lives with wife in 1  with 6 YOSEF. Pt has loft upstairs but does not need to access. Pt reports he's very active around the home and has steep property he needs to navigate.   Number of Stairs, Main Entrance 6   Stair Railings, Main Entrance railings on both sides of stairs   Stairs Comment, Main Entrance Can't reach both rails at once   Location, Patient Bedroom first (main) floor   Location, Bathroom first (main) floor   Bathroom Access Comment walk in shower, shower chair   Number of Stairs, Within Home, Primary 0          Prior Level of Function    Flowsheet Row Most Recent Value   Dominant Hand right   Ambulation assistive equipment   Transferring assistive equipment   Toileting independent   Bathing independent   Dressing independent   Eating independent   Prior Level of Function Comment Pt reports he was independent for all ADLs/mobility prior to TTA. Pt completed pre-prosthetic training at [facility] - has been Mod I at w/c level since d/c. Reports he utilized RW for transfers and gait training with HHPT.   Assistive Device Currently Used at Home walker, front-wheeled, wheelchair, shower chair, cane, straight          Occupational Profile    Flowsheet Row Most Recent Value   Occupational History/Life Experiences Pt is interested in learning about return to driving, plans to purchase a new vehicle.  Recommend arranging Chirag Eaton from [facility] [employer] to speak with pt.  Pt lives in [place] and was staying in [place] with son  since amputation, plans to return to [place].  Initially wanted to drive home.  Discussed potentially purchasing   Environmental Supports and Barriers +. 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, also enjoys hiking,           IRF OT Evaluation and Treatment - 03/08/23 0934        OT Time Calculation    Start Time 0930     Stop Time 1030     Time Calculation (min) 60 min        Session Details    Document Type daily treatment/progress note     Mode of Treatment occupational therapy;individual therapy        Sensory Interventions    Comment, Sensory Intervention Introduction to mirror therapy with 5 min trial        Sit to Stand Transfer    [place], Sit to Stand Transfer supervision     Verbal Cues technique     Assistive Device walker, front-wheeled        Stand to Sit Transfer    [place], Stand to Sit Transfer supervision     Verbal Cues safety;technique     Assistive Device walker, front-wheeled        Stand Pivot Transfer    [place], Stand Pivot/Stand Step Transfer close supervision     Verbal Cues safety;technique     Assistive Device walker, front-wheeled     Comment via amb tx        Safety Issues, Functional Mobility    Comment, Safety Issues/Impairments (Mobility) OT functional ambulation with RW with cues for L weight shift        Mobility Belt    Mobility Belt Used for All Out of Bed Activity yes        Balance    Comment, Balance standing with RW with mirror to anterior performing weight shifts, and midline training. To half dome toe taps with cues for upright posture and coming back to midline after tap and decreacing too far of a weight shift to L. 10x 2 each side. split step forward weight shifts 10 x each trial b/l sides        Upper Extremity (Therapeutic Exercise)    Comment In stance for core engement and midline stance with gree band rows and shoulder extension with touching A focus on core  engement. standing in RW with cues for midline stance for diagonal reaching        Prosthetic Orientation (Lower Extremity)    Comment, Fit Assessment S for donning prosthetic with 1 ply. Pre and post skin check unremarkable. goal for 2.5 hours today        Daily Progress Summary (OT)    Daily Outcome Statement Pt with good participation during session. Continued decreased R weight shift and hip extension and hip control in stance on RLE. Pt would continue to benefit from skilled OT services to increase indep, decrease caregiver burden and increase quality of life. Focus on R weight acceptance, stance, balance, endurance, functional transfers. Continue with POC                           IRF OT Goals    Flowsheet Row Most Recent Value   Transfer Goal 1    Activity/Assistive Device toilet at 03/07/2023 0804   [place] --  [touching A] at 03/07/2023 0804   Time Frame short-term goal (STG), 1 week at 03/07/2023 0804   Transfer Goal 2    Activity/Assistive Device toilet at 03/07/2023 0804   [place] modified independence at 03/07/2023 0804   Time Frame long-term goal (LTG), 14 days or less at 03/07/2023 0804   UB Dressing Goal 1    [place] --  [CS] at 03/07/2023 0804   Time Frame short-term goal (STG), 5 - 7 days at 03/07/2023 0804   UB Dressing Goal 2    [place] modified independence at 03/07/2023 0804   Time Frame long-term goal (LTG), 14 days or less at 03/07/2023 0804   LB Dressing Goal 1    [place] supervision required at 03/07/2023 0804   Time Frame short-term goal (STG), 5 - 7 days at 03/07/2023 0804   LB Dressing Goal 2    [place] modified independence at 03/07/2023 0804   Time Frame long-term goal (LTG), 14 days or less at 03/07/2023 0804   Grooming Goal 1    [place] supervision required at 03/07/2023 0804   Time Frame short-term goal (STG), 5 - 7 days at 03/07/2023 0804   Grooming Goal 2    [place] modified independence at 03/07/2023 0804   Time Frame long-term goal (LTG),  14 days or less at 03/07/2023 0804   Toileting Goal 1    [place] supervision required at 03/07/2023 0804   Time Frame short-term goal (STG), 5 - 7 days at 03/07/2023 0804   Toileting Goal 2    [place] modified independence at 03/07/2023 0804   Time Frame long-term goal (LTG), 14 days or less at 03/07/2023 0804

## 2023-03-09 ENCOUNTER — APPOINTMENT (INPATIENT)
Dept: PHYSICAL THERAPY | Facility: REHABILITATION | Age: 68
DRG: 560 | End: 2023-03-09
Payer: MEDICARE

## 2023-03-09 ENCOUNTER — APPOINTMENT (INPATIENT)
Dept: OCCUPATIONAL THERAPY | Facility: REHABILITATION | Age: 68
DRG: 560 | End: 2023-03-09
Payer: MEDICARE

## 2023-03-09 PROCEDURE — 97112 NEUROMUSCULAR REEDUCATION: CPT | Mod: GP

## 2023-03-09 PROCEDURE — 97530 THERAPEUTIC ACTIVITIES: CPT | Mod: GP

## 2023-03-09 PROCEDURE — 12800000 HC ROOM AND CARE SEMIPRIVATE REHAB

## 2023-03-09 PROCEDURE — 97530 THERAPEUTIC ACTIVITIES: CPT | Mod: GO

## 2023-03-09 PROCEDURE — 97110 THERAPEUTIC EXERCISES: CPT | Mod: GP

## 2023-03-09 PROCEDURE — 97116 GAIT TRAINING THERAPY: CPT | Mod: GP

## 2023-03-09 PROCEDURE — 63700000 HC SELF-ADMINISTRABLE DRUG: Performed by: HOSPITALIST

## 2023-03-09 RX ADMIN — APIXABAN 5 MG: 5 TABLET, FILM COATED ORAL at 19:58

## 2023-03-09 RX ADMIN — APIXABAN 5 MG: 5 TABLET, FILM COATED ORAL at 08:26

## 2023-03-09 NOTE — SUBJECTIVE & OBJECTIVE
" Patient was seen and examined.   Attestation Notes: Face to face encounter completed    Subjective    The patient feels well.  Doing well with therapies.  With prosthesis supervision sit to stand and, stand pivot transfer with rolling walker.  Close supervision ambulating 200 feet with rolling walker.  Close supervision walking ramp.  Close supervision walking steps with 2 rails.  Tolerating wearing prosthesis for 2 and half hours.  Mod I donning and doffing prosthesis.  Objective     Visit Vitals  /76 (BP Location: Left upper arm, Patient Position: Lying)   Pulse 78   Temp 36.7 °C (98 °F) (Oral)   Resp 18   Ht 1.854 m (6' 1\")   Wt 75.8 kg (167 lb 3.2 oz)   SpO2 93%   BMI 22.06 kg/m²       Review of Systems:  Pertinent items are noted in HPI.  Denies chest pain and shortness of breath.  Review of systems otherwise negative.    Labs     Results from last 7 days   Lab Units 03/07/23  0556   WBC K/uL 8.01   HEMOGLOBIN g/dL 13.3*   HEMATOCRIT % 41.7   PLATELETS K/uL 284     Results from last 7 days   Lab Units 03/07/23  0556   SODIUM mEQ/L 136   POTASSIUM mEQ/L 4.4   CHLORIDE mEQ/L 104   CO2 mEQ/L 24   BUN mg/dL 19   CREATININE mg/dL 0.7*   CALCIUM mg/dL 9.4   ALBUMIN g/dL 3.4   BILIRUBIN TOTAL mg/dL 0.6   ALK PHOS IU/L 77   ALT IU/L 17   AST IU/L 15   GLUCOSE mg/dL 87       Full Code    Physical Exam  General      Alert, cooperative, no distress, appears stated age.   Head:    Normocephalic, without obvious abnormality, atraumatic.   Eyes:    PERRL, conjunctiva/corneas clear, EOM's intact.        Nose:   Nares normal, septum midline, mucosa normal, no drainage or            sinus tenderness.   Throat:   Lips, mucosa, and tongue normal.    Neck:   Supple, symmetrical, trachea midline.    Back:     Symmetric, no curvature.   Lungs:     Clear to auscultation bilaterally, respirations unlabored.   Chest wall:    No tenderness or deformity.   Heart:    Regular rate and rhythm, S1 and S2 normal.   Abdomen:     Soft, " non-tender, bowel sounds active all four quadrants,     no masses, no organomegaly.   Extremities:  Musculoskeletal: No significant edema.  Right transtibial amputation.   Pulses:   1+ and symmetric all extremities.   Skin:   Skin color, texture, turgor normal, no rashes or lesions   Neurologic:          Behavior/  Emotional:  CNII-XII intact.  Alert and oriented ×3.  Motor exam intact.  Sensory exam intact.  Reflexes stable decreased reflexes.      Appropriate, cooperative           Plan of care was discussed with patient

## 2023-03-09 NOTE — PROGRESS NOTES
Patient: Harry Alvarado  Location: [facility] Unit [address]  MRN: [id number]  Today's date: 3/9/2023    History of Present Illness  Harry is a 67 y.o. male admitted on 3/6/2023 with Encounter for prosthetic gait training [Z47.89]. Principal problem is   Encounter for prosthetic gait training.      This is the first clinic appointment for Mr. Alvarado following his inpatient stay for preprostatic training. He is a 67-year-old male with a history of gene mutation, CAD, history of MI, peripheral artery disease with multiple bypass procedures in the past. The patient had been admitted to [facility] on November 30 for a planned transtibial amputation due to bypass thrombosis. He had reported worse pain in the previous 1 to 2 months in the right foot and had undergone multiple surgeries prior. He underwent a right transtibial amputation on December 2, 2022. There is no significant postoperative complications. Pain was well controlled. He was admitted to [facility] for preprostatic training and did well and was discharged home. He continues to receive home care physical therapy. He is still following up with his surgeon. He still has small open area in the lateral aspect of the incision line with minimal amount of serous drainage noted. He offers complaints of intermittent phantom pain and sensations. He has no other complaints of headache or dizziness, chest pain or shortness of breath. Currently he and his wife are living in his son's home everything is a 1 level. He is independent at a wheelchair level. Prior to his amputation he was completely independent and very active.    Past Medical History  Harry has a past medical history of Coronary artery disease, Deep vein thrombosis (CMS/HCC), Lupus anticoagulant disorder (CMS/HCC), Lyme disease, MTHFR gene mutation, Myocardial infarction (CMS/HCC), and PAD (peripheral artery disease) (CMS/[facility]).      OT Vitals    Date/Time Pulse HR Source O2  Therapy BP BP Location BP Method Pt Position [facility]   03/09/23 1304 81 Monitor -- 127/80 Left upper arm Automatic Sitting S   03/09/23 1354 -- -- None (Room air) -- -- -- -- MSM      OT Pain    Date/Time Pain Type Rating: Rest Rating: Rest Sleep/Rest/Relaxation [facility]   03/09/23 1304 Pain Assessment 0 -- -- VMS   03/09/23 1354 Pain Assessment -- 0 - no pain no problem identified MSM          Prior Living Environment    Flowsheet Row Most Recent Value   People in Home spouse   Current Living Arrangements home   Home Accessibility stairs to enter home (Group), stairs within home (Group)   Living Environment Comment Pt reports he lives with wife in 1  with 6 YOSEF. Pt has loft upstairs but does not need to access. Pt reports he's very active around the home and has steep property he needs to navigate.   Number of Stairs, Main Entrance 6   Stair Railings, Main Entrance railings on both sides of stairs   Stairs Comment, Main Entrance Can't reach both rails at once   Location, Patient Bedroom first (main) floor   Location, Bathroom first (main) floor   Bathroom Access Comment walk in shower, shower chair   Number of Stairs, Within Home, Primary 0          Prior Level of Function    Flowsheet Row Most Recent Value   Dominant Hand right   Ambulation assistive equipment   Transferring assistive equipment   Toileting independent   Bathing independent   Dressing independent   Eating independent   Prior Level of Function Comment Pt reports he was independent for all ADLs/mobility prior to TTA. Pt completed pre-prosthetic training at [facility] - has been Mod I at w/c level since d/c. Reports he utilized RW for transfers and gait training with HHPT.   Assistive Device Currently Used at Home walker, front-wheeled, wheelchair, shower chair, cane, straight          Occupational Profile    Flowsheet Row Most Recent Value   Occupational History/Life Experiences Pt is interested in learning about return to driving, plans to purchase a new  vehicle.  Recommend arranging Chirag Eaton from the [employer] to speak with pt.  Pt lives in [place] and was staying in [place] with son since amputation, plans to return to [place].  Initially wanted to drive home.  Discussed potentially purchasing   Environmental Supports and Barriers +. 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, also enjoys hiking,           IRF OT Evaluation and Treatment - 03/09/23 1304        OT Time Calculation    Start Time 1300     Stop Time 1400     Time Calculation (min) 60 min        Session Details    Document Type daily treatment/progress note     Mode of Treatment occupational therapy;individual therapy        Sit to Stand Transfer    [place], Sit to Stand Transfer supervision     Verbal Cues technique     Assistive Device walker, front-wheeled;parallel bars        Stand to Sit Transfer    [place], Stand to Sit Transfer supervision     Verbal Cues safety;technique     Assistive Device walker, front-wheeled;parallel bars        Stand Pivot Transfer    [place], Stand Pivot/Stand Step Transfer close supervision     Verbal Cues safety;technique     Assistive Device walker, front-wheeled     Comment and // bars via amb tx        Safety Issues, Functional Mobility    Comment, Safety Issues/Impairments (Mobility) OT functional ambulation with RW and in // bars with CS with cues for R weight shift. [place] of 4 point gait in // bars with tactile cues for R weight shifts        Mobility Belt    Mobility Belt Used for All Out of Bed Activity yes        Balance    Comment, Balance In stance 6 inch block LLE toe taps with noted improved hip engagement after purposely doing it incorrectly and engaging hip for increased hip extension/ hip hike. Then trial of toe taps to 6 in block with improved hip engagement. Static stance with L foot on 6 inch block with maintaining and engagement of  RLE. Performing stepping in // bars with focus on decreasing L foot at midline. Side stepping in // bars with cues for R weight shifts. Standing at midline for midline training.        Prosthetic Orientation (Lower Extremity)    Comment, Fit Assessment Donned and doffed with supervision, one sock ply and gel liner as well as liner sock under gel liner. increased to 3 sock ply  Total wear time of 60 minutes this session. Skin assessment unremarkable for redness or irritation. Plan to don at 1300 and wear until 1600 for 3 hour total wear time.        Daily Progress Summary (OT)    Daily Outcome Statement Pt with good participation during session. Initially 1 sock ply with progression to 3. Focus on R weight shift and R weight acceptance. Continue to focus on these areas and integrating functional ambulation into activitites. Pt would continue to benefit from skilled OT services to increase indep, decrease caregiver burden and increase quality of life. Continue with POC                           IRF OT Goals    Flowsheet Row Most Recent Value   Transfer Goal 1    Activity/Assistive Device toilet at 03/07/2023 0804   [place] supervision required at 03/08/2023 1445   Time Frame short-term goal (STG), 1 week at 03/08/2023 1445   Progress/Outcome goal revised this date at 03/08/2023 1445   Transfer Goal 2    Activity/Assistive Device toilet at 03/07/2023 0804   [place] modified independence at 03/07/2023 0804   Time Frame long-term goal (LTG), 14 days or less at 03/07/2023 0804   Progress/Outcome goal ongoing at 03/08/2023 1445   UB Dressing Goal 1    [place] supervision required at 03/08/2023 1445   Time Frame short-term goal (STG), 1 week at 03/08/2023 1445   Progress/Outcome goal revised this date at 03/08/2023 1445   UB Dressing Goal 2    [place] modified independence at 03/07/2023 0804   Time Frame long-term goal (LTG), 14 days or less at 03/07/2023 0804   Progress/Outcome goal ongoing at  03/08/2023 1445   LB Dressing Goal 1    [place] supervision required at 03/07/2023 0804   Time Frame short-term goal (STG), 1 week at 03/08/2023 1445   Progress/Outcome goal revised this date at 03/08/2023 1445   LB Dressing Goal 2    [place] modified independence at 03/07/2023 0804   Time Frame long-term goal (LTG), 14 days or less at 03/07/2023 0804   Progress/Outcome goal ongoing at 03/08/2023 1445   Grooming Goal 1    [place] supervision required at 03/07/2023 0804   Time Frame short-term goal (STG), 1 week at 03/08/2023 1445   Progress/Outcome goal ongoing at 03/08/2023 1445   Grooming Goal 2    [place] modified independence at 03/07/2023 0804   Time Frame long-term goal (LTG), 14 days or less at 03/07/2023 0804   Progress/Outcome goal ongoing at 03/08/2023 1445   Toileting Goal 1    [place] supervision required at 03/07/2023 0804   Time Frame short-term goal (STG), 5 - 7 days at 03/07/2023 0804   Progress/Outcome goal ongoing at 03/08/2023 1445   Toileting Goal 2    [place] modified independence at 03/07/2023 0804   Time Frame long-term goal (LTG), 14 days or less at 03/07/2023 0804   Progress/Outcome goal ongoing at 03/08/2023 1445

## 2023-03-09 NOTE — PLAN OF CARE
Plan of Care Review  Progress: improving  Outcome Summary: pt pleaasant, appropriate. NO issues overnight. Denies pain. Sleeping well this shift. CAll light in reach.

## 2023-03-09 NOTE — PLAN OF CARE
Problem: Rehabilitation (IRF) Plan of Care  Goal: Plan of Care Review  Outcome: Progressing  Flowsheets (Taken 3/9/2023 1321)  Progress: no change  Plan of Care Reviewed With:   patient   spouse   CM met w pt at bedside to provide updates from treatment team, ELOS 3/18, and goals. Pt in agreement w plan. CM spoke w pts spouse Dave to provide updates. Pts spouse reports that they have an OP PT arranged for pt and will provide CM w contact name and number to help coordinate OP PT upon dc. Pt plans to dc home to [place]. CM will continue to follow.

## 2023-03-09 NOTE — ASSESSMENT & PLAN NOTE
Harry Alvarado is a 67 y.o. male who presents with the following chronic medical problems including MTHFR gene mutation, coronary artery disease with prior myocardial infarction 1993, peripheral artery disease with multiple bypass procedures in the past most recent with tPA bolus and angioplasty in 2020.  The patient admitted to [facility] on November 30, 2022 to the vascular surgery service for planned BKA due to bypass thrombosis.  At the time of admission the patient had cyanosis and was beginning to develop thanh gangrene of the foot.  Patient has a history of 15+ surgeries of the extremities and understood that his revascularization options were limited.  He had severe 10 out of 10 pain of the foot relieved with elevation of the leg.  He was still able to ambulate but limited by pain.  Under the care of Bowen Serrato MD of vascular surgery he underwent right transtibial amputation on December 2, 2022.  Postoperative course unremarkable.  For postoperative pain control he was treated with morphine.  On discharge prescribed Tylenol and low-dose Lyrica.  Lyrica started prior by his PCP for neuropathic pain.  He was restarted on his home Eliquis.  He required min assist for bed mobility, transfers and ambulation.  The patient was admitted to [facility] on December 6, 2022 for acute inpatient rehabilitation for preprosthetic training.  The patient was discharged on December 18, 2022 after  completing a comprehensive inpatient rehabilitation program progressing to a modified independent level with bed mobility, transfers and standing with rolling walker with limited hopping.  Pain control stable with medication change to hydrocodone/acetaminophen.  Continue Lyrica increased dose 50 mg 3 times a day.  The patient was also placed on nortriptyline for sleep and neuropathic pain.   The patient has follow-up outpatient with vascular surgery with improvement in wound healing.  The patient  was evaluated by Dr. Zambrano in amputee clinic and felt appropriate for acute inpatient rehabilitation for intensive prosthetic training prior to returning home where he lives in [place].  The patient's pain has improved and he is weaned off all of his medications except for his Eliquis.    Comprehensive inpatient rehabilitation program for functional deficit status post right transtibial amputation.  Goal is for modified and pedal level with ambulation and self-care activities with prosthesis.  Monitor for pain.  Monitor skin closely.  Monitor cardiovascular status.    The patient feels well.  Doing well with therapies.  With prosthesis supervision sit to stand and, stand pivot transfer with rolling walker.  Close supervision ambulating 200 feet with rolling walker.  Close supervision walking ramp.  Close supervision walking steps with 2 rails.  Tolerating wearing prosthesis for 2 and half hours.  Mod I donning and doffing prosthesis.    Discussed with the patient's occupational physical therapist and  and reviewed care with nursing.

## 2023-03-09 NOTE — PATIENT CARE CONFERENCE
Inpatient Rehabilitation Team Conference Note  Date: 3/9/2023  Time: 9:01 AM       Patient Name: Harry Alvarado     Medical Record Number: [id number]   YOB: 1955  Sex: Male      Room/Bed: 145/145W  Payor Info: Payor: MEDICARE / Plan: MEDICARE PART A & B / Product Type: Medicare /     Admitting Diagnosis: Encounter for prosthetic gait training [Z47.89]   Admit Date/Time: 3/6/2023 12:04 PM  Admission Comments: No comment available     Primary Diagnosis: Encounter for prosthetic gait training  Principal Problem: Encounter for prosthetic gait training    Patient Active Problem List    Diagnosis Date Noted   • Adjustment disorder with anxiety    • Encounter for prosthetic gait training 03/06/2023   • Anemia 12/07/2022   • Coronary artery disease    • Deep vein thrombosis (CMS/[facility])    • Lupus anticoagulant disorder (CMS/[facility])    • MTHFR gene mutation    • PAD (peripheral artery disease) (CMS/[facility])    • Status post below knee amputation of right lower extremity (CMS/[facility]) 12/05/2022       Premorbid Status:  Dominant Hand: right  Ambulation: assistive equipment  Transferring: assistive equipment  Toileting: independent  Bathing: independent  Dressing: independent  Eating: independent  Communication: understands/communicates without difficulty  Swallowing: swallows foods/liquids without difficulty  Assistive Device/Animal Currently Used at Home: walker, front-wheeled; wheelchair; shower chair; cane, straight  Prior Level of Function Comment: Pt reports he was independent for all ADLs/mobility prior to TTA. Pt completed pre-prosthetic training at [facility] - has been Mod I at w/c level since d/c. Reports he utilized RW for transfers and gait training with HHPT.      Current Functional Status:  Mobility  Gait  [place], Gait: close supervision  Assistive Device: walker, front-wheeled  Comment (Gait/Stairs): 200'x2 with closeS to ensure fall prevention; focusing on R weight shifting and R stance  time.    Stairs  [place], Stairs: close supervision  Assistive Device: railing  Handrail Location (Stairs): both sides  Number of Stairs: 12  Stair Height: 7 inches  Comment: up/down 12 steps x2 bouts; first bout with step to step ascending then second bout trialed step over step with increased assistance    Wheelchair  Method of Locomotion: bimanual (upper extremity) propulsion  Functional Mobility Training: doorway navigation  [place], Forward Propulsion: modified independence  [place], Steering Activities: modified independence  [place], Turning Activities: modified independence  [place], Doorway Navigation: modified independence  Distance Propelled in Feet: 200 feet  Comment, Wheelchair Mobility: WC license issued, reviewed safety including wearing seatbelt, unit notebook signout and taking cell phone w/ him, patient verbalized understanding    Transfers  [place], Roll Left: independent  [place], Roll Right: independent  [place], Supine to Sit: independent  [place], Sit to Supine: independent  Comment (Bed Mobility): supine to sit without prosthetic I  Comment: mobility belt donned for all transfers and ambulation; doffed at end of session  [place], Bed to Chair: minimum assist (75% or more patient effort)  Verbal Cues: safety; technique  Assistive Device: walker, front-wheeled  Comment: SPT using RW with Min A at hips for balance and sequencing with RLE prosthesis donned  [place], Chair to Bed: minimum assist (75% or more patient effort)  Verbal Cues: safety; technique  Assistive Device: walker, front-wheeled  Comment: SPT using RW with Min A at hips for balance and sequencing with RLE prosthesis donned  [place], Sit to Stand Transfer: supervision  Verbal Cues: technique  Assistive Device: walker, front-wheeled  Comment: from w/c; supervision to ensure fall prevention  [place], Stand to Sit Transfer: supervision  Verbal Cues: technique  Assistive  Device: walker, front-wheeled  Comment: to w/c; supervision to ensure fall prevention  [place], Low Pivot Transfer: modified independence  Verbal Cues: safety  Assistive Device: wheelchair  Comment: w/c <> EOM  [place], Stand Pivot/Stand Step Transfer: close supervision  Verbal Cues: technique  Assistive Device: walker, front-wheeled  Comment: via ambulatory approach to w/c; closeS to ensure fall prevention    [place], Toilet Transfer: minimum assist (75% or more patient effort)  Verbal Cues: safety; technique; hand placement  Assistive Device: grab bars/safety frame  Comment: with prosthetic donned, via amb tx  [place], Shower Transfer: modified independence  Assistive Device: grab bars/tub rail; tub bench  Comment: SPT      Self Care  Self-Performance: obtains clothes; threads left arm, shirt; threads right arm, shirt; pulls shirt over head/around back; pulls shirt down/adjusts  [place] Assistance: -- (steadying A)  Comment: Gathering clothing with min A for balance in stance with prosthetic and RW  Self-Performance: threads left leg, underpants; threads right leg, underpants; pulls underpants up or down; threads left leg, pants/shorts; threads right leg, pants/shorts; pulls pants/shorts up or down; dons/doffs left sock; prosthesis application; dons/doffs left shoe; shoelaces, left  [place] Assistance: prosthesis application  [place]: close supervision  Comment: Gathering clothing with min A for balance in stance with prosthetic and RW. standing with min A to hike pants, min A to don prosthetic  Comment: Per pt report mod I sitting on shower chair  [place]: touching/steadying assist  Adaptive Equipment: accessible height toilet; grab bar/safety frame  Comment: with prosthetic donned. mod I without prosthetic  Comment: in stance with prosthetic donned    Cognition  Affect/Mental Status (Cognition): WFL  Orientation Status (Cognition): oriented x 4    Communication       Frequency of  Treatment (OT): 5-7 times per week, 60-90 minutes per day  Frequency of Treatment (PT): 5-7 times per week, 60-90 minutes per day    Weekly Outcome Summaries:  Weekly Progress Summary (PT)  Progress Toward Functional Goals (PT): progressing toward functional goals as expected  Weekly Outcome Statement: Pt is a 68 y/o male admitted for prosthetic training. Pt has PMH of gene mutation, CAD, history of MI, peripheral artery disease with multiple bypass procedures in the past. He underwent a right transtibial amputation on December 2, 2022 and completed preprosthetic training at [facility] prior to d/c to son's home. He now returns to [facility] for comprehensive rehabilitation for prosthetic training. Pt reports he was Mod I at w/c level PTA and completed transfer/gait training with use of RW. He currently presents with impairments in standing balance, endurance and mild LE strength deficits. He is completing bed mobility independently. He completed STS and SPT using RW with RLE prosthesis with closeS. He requires closeS during gait training using RW with RLE prosthesis donned up to 200' - demonstrates decreased RLE weight-shift/acceptance and pelvic drive. Able to negotiate 12 steps with B handrails and closeS. His gait speed was 0.48 m/s which is below age/gender matched norms. He completed TUG in 21.1 seconds indicating increased fall risk. Pt would benefit from intensive, skilled PT in IRF setting to address impairments, learn prosthetic management and maximize functional independence. PRATIMA pending first interdisciplinary team meeting.  Impairments Continuing to Limit Function: decreased flexibility, decreased ROM, decreased strength, impaired balance  Recommendations: Patient would continue to benefit from skilled inpatient rehab services 5-7 days/week and 60-90 minutes/day.  Weekly Progress Summary (OT)  Progress Toward Functional Goals (OT): progressing toward functional goals as expected  Weekly Outcome Statement: Harry is  a 67 year male who presents to [facility] for R TT prosthetic training. Pt noted to have decreased ability to safely and independently perform his ADLs and IADLS at ambulatory level with prosthetic. Pt noted with deficits in balance, safety awareness, transfers, strength, functional ambulation, endurance, decreased education on prosthetics which impact his ability to safely perform his ADLs and IADLs. Pt grossly performing ADLs with prosthetic donned at CS level. Pt with noted R decreased hip strength and R weight acceptance. Pt would continue to benefit from skilled occupational therapy services to increase I, decrease caregiver burden, and increase quality of life.  Impairments Continuing to Limit Function: decreased flexibility, decreased ROM, decreased strength, impaired coordination, impaired safety awareness, impaired sensation, transtibial lower extremity amputation  Recommendations: Continue with skilled OT services 5-7 days per week 60-90 mins per day    Problem Resolution:  Coping/Stress/Tolerance  Do you feel stress - tense, restless, nervous, or anxious, or unable to sleep at night because your mind is troubled all the time - these days?: Not at all  Major Change/Loss/Stressor/Fears: denies, medical condition, self Self-Care Promotion: independence encouraged, safe use of adaptive equipment encouraged  Identified Problems & Impairments: (not recorded)  Comment, Identified Problems & Impairments: (not recorded)  Resolved Problems and Impairments: (not recorded)  Comment, Resolved Problems & Impairments: (not recorded)     Team Weekly Outcome Statement: medically stable, on eliquis. RN says denies pain, tolerating diet, makes needs known. PT says supervision sit to stand, use of RW, walking 200 feet w RW, close sup on and down ramp, 12 steps w 2 rails in step 2 pattern, wearing prostesis, appt today w prostetsis,OT says deficits w balance, completing ADLs close sup, mod I for bathing w/o use of prostesis, goals  are mod I. Psych says still feels phantom limb, no change in mood, denies any anxiety or depression, looking forward to going home to [place]. (03/09/23 0854)        Goals/Support System:  Patient/Family Goals  Patient's Goals For Discharge: return home, take care of myself at home, return to all previous roles/activities  Family/Support System  Caregiver Engagement: supports patient in making care decisions  Family/Support System Care: support provided, self-care encouraged    IRF PT Goals    Flowsheet Row Most Recent Value   Transfer Goal 1    Activity/Assistive Device sit-to-stand/stand-to-sit, stand pivot  [LRAD] at 03/06/2023 1502   [place] supervision required at 03/06/2023 1502   Time Frame short-term goal (STG), 1 week at 03/06/2023 1502   Progress/Outcome goal met at 03/08/2023 1454   Transfer Goal 2    Activity/Assistive Device sit-to-stand/stand-to-sit, stand pivot  [LRAD] at 03/06/2023 1502   [place] modified independence at 03/06/2023 1502   Time Frame 1 week at 03/08/2023 1454   Progress/Outcome continuing progress toward goal, goal ongoing at 03/08/2023 1454   Gait/Walking Locomotion Goal 1    Activity/Assistive Device gait (walking locomotion)  [LRAD] at 03/06/2023 1502   Distance 150 feet at 03/06/2023 1502   [place] supervision required at 03/06/2023 1502   Time Frame short-term goal (STG), 1 week at 03/06/2023 1502   Progress/Outcome goal met at 03/08/2023 1454   Gait/Walking Locomotion Goal 2    Activity/Assistive Device gait (walking locomotion)  [LRAD] at 03/06/2023 1502   Distance 150 feet at 03/06/2023 1502   [place] modified independence at 03/06/2023 1502   Time Frame 1 week at 03/08/2023 1454   Progress/Outcome continuing progress toward goal, goal ongoing at 03/08/2023 1454   Stairs Goal 1    Activity/Assistive Device stairs, all skills at 03/08/2023 1454   Number of Stairs 12 at 03/08/2023 1454   [place] modified independence at 03/08/2023 1454   Time Frame long-term  goal (LTG), 1 week at 03/08/2023 1454        IRF OT Goals    Flowsheet Row Most Recent Value   Transfer Goal 1    Activity/Assistive Device toilet at 03/07/2023 0804   [place] supervision required at 03/08/2023 1445   Time Frame short-term goal (STG), 1 week at 03/08/2023 1445   Progress/Outcome goal revised this date at 03/08/2023 1445   Transfer Goal 2    Activity/Assistive Device toilet at 03/07/2023 0804   [place] modified independence at 03/07/2023 0804   Time Frame long-term goal (LTG), 14 days or less at 03/07/2023 0804   Progress/Outcome goal ongoing at 03/08/2023 1445   UB Dressing Goal 1    [place] supervision required at 03/08/2023 1445   Time Frame short-term goal (STG), 1 week at 03/08/2023 1445   Progress/Outcome goal revised this date at 03/08/2023 1445   UB Dressing Goal 2    [place] modified independence at 03/07/2023 0804   Time Frame long-term goal (LTG), 14 days or less at 03/07/2023 0804   Progress/Outcome goal ongoing at 03/08/2023 1445   LB Dressing Goal 1    [place] supervision required at 03/07/2023 0804   Time Frame short-term goal (STG), 1 week at 03/08/2023 1445   Progress/Outcome goal revised this date at 03/08/2023 1445   LB Dressing Goal 2    [place] modified independence at 03/07/2023 0804   Time Frame long-term goal (LTG), 14 days or less at 03/07/2023 0804   Progress/Outcome goal ongoing at 03/08/2023 1445   Grooming Goal 1    [place] supervision required at 03/07/2023 0804   Time Frame short-term goal (STG), 1 week at 03/08/2023 1445   Progress/Outcome goal ongoing at 03/08/2023 1445   Grooming Goal 2    [place] modified independence at 03/07/2023 0804   Time Frame long-term goal (LTG), 14 days or less at 03/07/2023 0804   Progress/Outcome goal ongoing at 03/08/2023 1445   Toileting Goal 1    [place] supervision required at 03/07/2023 0804   Time Frame short-term goal (STG), 5 - 7 days at 03/07/2023 0804   Progress/Outcome goal ongoing  at 03/08/2023 1445   Toileting Goal 2    [place] modified independence at 03/07/2023 0804   Time Frame long-term goal (LTG), 14 days or less at 03/07/2023 0804   Progress/Outcome goal ongoing at 03/08/2023 1445          Risk for Complications  Falls: Low  Infection: Low  Skin Breakdown: Low      The patient's medical prognosis is good to achieve the stated goals below.    Expected Level of Function  Expected Functional Improvement: mobility; motor dysfunction; safety; self-care  Self-Care: Independent  Sphincter Control: Independent  Transfers: Independent  Locomotion: Independent  Communication: Independent  Social Cognition: Independent       Discharge Planning:  Anticipated Discharge Disposition: home with outpatient services  Type of Home Care Services: home PT    Equipment/Device Needs at Discharge  Assistive Device/Animal Currently Used at Home: walker, front-wheeled, wheelchair, shower chair, cane, straight    Anticipated Discharge Date: 3/18/2023    Needs Identified:       Team Members Present:     Rehab Attending Present:  Richard Angulo MD    Care Coordinator Present:  Tea Jay LSW    Nurse Present:  Storm Estrada RN    OT Present:  Ratna Sher OT    PT Present:  Anna Antonio PT    Psychologist Present:  Mesfin Crews PSY.D        Next Team Conference Date: 03/16/23

## 2023-03-09 NOTE — PROGRESS NOTES
[facility] Progress Note          Patient was seen and examined.   Attestation Notes: Face to face encounter completed    Harry Alvarado is a 67 y.o. male who was admitted for Encounter for prosthetic gait training [Z47.89]. Patient was referred by Richard Angulo MD for medical assessment and management      CC: Encounter for prosthetic gait training [Z47.89]     HPI: Harry Alvarado is a 67 y.o. male with MTHFR gene mutation, CAD s/p MI 1993, and PAD s/p R Fem to PT bypass with arm vein in 1993, revision in 2011, s/p R SFA to peroneal bypass graft s/p revision on 7/30/18, s/p tPA bolus and angioplasty in 2020 admitted to [facility] 11/30/22 with RLE bypass thrombosis and chronic right foot ischemia/gangrene and underwent right BKA by vascular surgeon, Dr. Bowen Serrato 12/2/22. Post op he had anemia but did not require transfusion. He was restarted on his home Eliquis.       The patient was evaluated by PM&R and found to have residual deficits in ambulation and ADLs due to Status post below knee amputation of right lower extremity (CMS/[facility]) [Z89.511] and came to [facility] on 12/6/2022 for acute inpatient rehab.      He participated in therapy. The patient has steadily improved in therapy and was for discharged home 12/18/2022.     The patient was seen by Dr. Agustín Zambrano [facility] and was felt to be a good candidate for prosthetic ambulation and came to [facility] 3/6/2023 for acute inpatient prosthetic training.    He participated in therapy.         SUBJECTIVE:  Patient interviewed and examined     He feels prosthetic training is going well.      No pain complaints, moving bowels and bladder, no abdominal pain or nausea, no dysuria, no chest pain, palpitations, dyspnea or fevers    Review of Systems:  All other systems reviewed and negative except as noted in the HPI.    Current meds and allergies reviewed    Past Medical History:   Diagnosis Date   • Coronary artery disease      s/p MI   • Deep vein thrombosis (CMS/HCC)    • Lupus anticoagulant disorder (CMS/HCC)    • Lyme disease    • MTHFR gene mutation    • Myocardial infarction (CMS/HCC)    • PAD (peripheral artery disease) (CMS/HCC)      Past Surgical History:   Procedure Laterality Date   • BELOW KNEE LEG AMPUTATION Right 12/02/2022   • FEMORAL BYPASS Right      Social History     Tobacco Use   • Smoking status: Never   • Smokeless tobacco: Never   Substance Use Topics   • Alcohol use: Not Currently      History reviewed. No pertinent family history.    Vital signs in last 24 hours:  Temp:  [36.5 °C (97.7 °F)-36.7 °C (98 °F)] 36.7 °C (98 °F)  Heart Rate:  [78-85] 81  Resp:  [18] 18  BP: (122-128)/(74-84) 128/77  Vital signs reviewed 03/09/23 3:37 PM    Physical Exam  Vitals and nursing note reviewed.   Constitutional:       Appearance: Normal appearance.   HENT:      Head: Normocephalic and atraumatic.      Right Ear: External ear normal.      Left Ear: External ear normal.      Nose: Nose normal.      Mouth/Throat:      Mouth: Mucous membranes are moist.      Pharynx: Oropharynx is clear.   Eyes:      Conjunctiva/sclera: Conjunctivae normal.      Pupils: Pupils are equal, round, and reactive to light.   Cardiovascular:      Rate and Rhythm: Normal rate and regular rhythm.      Pulses: Normal pulses.      Heart sounds: Normal heart sounds.   Pulmonary:      Effort: Pulmonary effort is normal.      Breath sounds: Normal breath sounds.   Abdominal:      General: Abdomen is flat. Bowel sounds are normal.      Palpations: Abdomen is soft.   Musculoskeletal:         General: Deformity (s/p right BKA) present.      Cervical back: Normal range of motion and neck supple.   Skin:     General: Skin is warm and dry.   Neurological:      General: No focal deficit present.      Mental Status: He is alert and oriented to person, place, and time.   Psychiatric:         Mood and Affect: Mood normal.         Behavior: Behavior normal.                  Objective    Labs:  I have reviewed the patient's labs.  Current labs are within normal limits.  Results from last 7 days   Lab Units 03/07/23  0556   WBC K/uL 8.01   HEMOGLOBIN g/dL 13.3*   HEMATOCRIT % 41.7   PLATELETS K/uL 284     Results from last 7 days   Lab Units 03/07/23  0556   SODIUM mEQ/L 136   POTASSIUM mEQ/L 4.4   CHLORIDE mEQ/L 104   CO2 mEQ/L 24   BUN mg/dL 19   CREATININE mg/dL 0.7*   CALCIUM mg/dL 9.4   ALBUMIN g/dL 3.4   BILIRUBIN TOTAL mg/dL 0.6   ALK PHOS IU/L 77   ALT IU/L 17   AST IU/L 15   GLUCOSE mg/dL 87     Results from last 7 days   Lab Units 03/07/23  0556   MAGNESIUM mg/dL 1.9        Imaging:  Not applicable        ASSESSMENT/PLAN:    67 y.o. male with MTHFR gene mutation, CAD s/p MI 1993, and PAD s/p R Fem to PT bypass with arm vein in 1993, revision in 2011, s/p R SFA to peroneal bypass graft s/p revision on 7/30/18, s/p tPA bolus and angioplasty in 2020 admitted to [facility] 11/30/22 with RLE bypass thrombosis and chronic right foot ischemia/gangrene and underwent right BKA by vascular surgeon, Dr. Bowen Serrato 12/2/22; pre-prosthetic training [facility] 12/6/22-12/18/22; returns to [facility] from home 3/6/23 for inpatient prosthetic training.      1. Vasc:  -PAD s/p failed RLE bypass with right foot ischemia/gangrene  -s/p right BKA 12/2/22  -inpatient prosthetic training  -Tylenol for pain  -remains on Eliquis for hypercoagulable condition     2. Heme:  -post op anemia due to chronic blood loss, does not need iron  -leukocytosis reactive due to surgery  -hx of MTHFR gene mutation  -normal CBC     3. Renal:  -increased risk of dehydration and electrolyte changes  -normal CMP, Mg     4. Gi:  -Senokot-S as needed for bowels     5. Gu:  -no urine retention     6. Cardiac:  -hx of CAD  -watch for orthostatic hypotension  -use cardiac precautions in therapy     7. Pulm:  -incentive spirometry for atelectasis     8. Derm:  -skin care per nursing     9. Nutrition:  -seen by Nutrition for  assessment and education     10. Psych:  -seen by Psychology for support    11. Dispo:  -Expected discharge date 3/18/2023         plan discussed with patient, nurse, case management and  Richard Angulo MD Scott Sapperstein, MD  3/9/2023  3:37 PM

## 2023-03-09 NOTE — PLAN OF CARE
Plan of Care Review  Plan of Care Reviewed With: patient  Progress: improving  Outcome Summary: Pt pleasant and cooperative, denies pain.  On Eliquis. Mod I with wheelchair.

## 2023-03-09 NOTE — PROGRESS NOTES
"Daily Progress Note     Patient was seen and examined.   Attestation Notes: Face to face encounter completed    Subjective    The patient feels well.  Doing well with therapies.  With prosthesis supervision sit to stand and, stand pivot transfer with rolling walker.  Close supervision ambulating 200 feet with rolling walker.  Close supervision walking ramp.  Close supervision walking steps with 2 rails.  Tolerating wearing prosthesis for 2 and half hours.  Mod I donning and doffing prosthesis.  Objective     Visit Vitals  /76 (BP Location: Left upper arm, Patient Position: Lying)   Pulse 78   Temp 36.7 °C (98 °F) (Oral)   Resp 18   Ht 1.854 m (6' 1\")   Wt 75.8 kg (167 lb 3.2 oz)   SpO2 93%   BMI 22.06 kg/m²       Review of Systems:  Pertinent items are noted in HPI.  Denies chest pain and shortness of breath.  Review of systems otherwise negative.    Labs     Results from last 7 days   Lab Units 03/07/23  0556   WBC K/uL 8.01   HEMOGLOBIN g/dL 13.3*   HEMATOCRIT % 41.7   PLATELETS K/uL 284     Results from last 7 days   Lab Units 03/07/23  0556   SODIUM mEQ/L 136   POTASSIUM mEQ/L 4.4   CHLORIDE mEQ/L 104   CO2 mEQ/L 24   BUN mg/dL 19   CREATININE mg/dL 0.7*   CALCIUM mg/dL 9.4   ALBUMIN g/dL 3.4   BILIRUBIN TOTAL mg/dL 0.6   ALK PHOS IU/L 77   ALT IU/L 17   AST IU/L 15   GLUCOSE mg/dL 87       Full Code    Physical Exam  General      Alert, cooperative, no distress, appears stated age.   Head:    Normocephalic, without obvious abnormality, atraumatic.   Eyes:    PERRL, conjunctiva/corneas clear, EOM's intact.        Nose:   Nares normal, septum midline, mucosa normal, no drainage or            sinus tenderness.   Throat:   Lips, mucosa, and tongue normal.    Neck:   Supple, symmetrical, trachea midline.    Back:     Symmetric, no curvature.   Lungs:     Clear to auscultation bilaterally, respirations unlabored.   Chest wall:    No tenderness or deformity.   Heart:    Regular rate and rhythm, S1 and S2 normal. "   Abdomen:     Soft, non-tender, bowel sounds active all four quadrants,     no masses, no organomegaly.   Extremities:  Musculoskeletal: No significant edema.  Right transtibial amputation.   Pulses:   1+ and symmetric all extremities.   Skin:   Skin color, texture, turgor normal, no rashes or lesions   Neurologic:          Behavior/  Emotional:  CNII-XII intact.  Alert and oriented ×3.  Motor exam intact.  Sensory exam intact.  Reflexes stable decreased reflexes.      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 male who presents with the following chronic medical problems including MTHFR gene mutation, coronary artery disease with prior myocardial infarction 1993, peripheral artery disease with multiple bypass procedures in the past most recent with tPA bolus and angioplasty in 2020.  The patient admitted to [facility] on November 30, 2022 to the vascular surgery service for planned BKA due to bypass thrombosis.  At the time of admission the patient had cyanosis and was beginning to develop thanh gangrene of the foot.  Patient has a history of 15+ surgeries of the extremities and understood that his revascularization options were limited.  He had severe 10 out of 10 pain of the foot relieved with elevation of the leg.  He was still able to ambulate but limited by pain.  Under the care of Bowen Serrato MD of vascular surgery he underwent right transtibial amputation on December 2, 2022.  Postoperative course unremarkable.  For postoperative pain control he was treated with morphine.  On discharge prescribed Tylenol and low-dose Lyrica.  Lyrica started prior by his PCP for neuropathic pain.  He was restarted on his home Eliquis.  He required min assist for bed mobility, transfers and ambulation.  The patient was admitted to [facility] on December 6, 2022 for acute inpatient rehabilitation for preprosthetic training.  The patient was discharged on December 18, 2022 after  completing a comprehensive inpatient rehabilitation program progressing to a modified independent level with bed mobility, transfers and standing with rolling walker with limited hopping.  Pain control stable with medication change to hydrocodone/acetaminophen.  Continue Lyrica increased dose 50 mg 3 times a day.  The patient was also placed on nortriptyline for sleep and neuropathic pain.   The patient has follow-up outpatient with vascular surgery with improvement in wound healing.  The patient was evaluated by Dr. Zambrano in amputee clinic and felt appropriate for acute inpatient rehabilitation for intensive prosthetic training prior to returning home where he lives in [place].  The patient's pain has improved and he is weaned off all of his medications except for his Eliquis.    Comprehensive inpatient rehabilitation program for functional deficit status post right transtibial amputation.  Goal is for modified and pedal level with ambulation and self-care activities with prosthesis.  Monitor for pain.  Monitor skin closely.  Monitor cardiovascular status.    The patient feels well.  Doing well with therapies.  With prosthesis supervision sit to stand and, stand pivot transfer with rolling walker.  Close supervision ambulating 200 feet with rolling walker.  Close supervision walking ramp.  Close supervision walking steps with 2 rails.  Tolerating wearing prosthesis for 2 and half hours.  Mod I donning and doffing prosthesis.    Discussed with the patient's occupational physical therapist and  and reviewed care with nursing.    Lupus anticoagulant disorder (CMS/HCC)  Assessment & Plan  Continue Eliquis.    Deep vein thrombosis (CMS/[facility])  Assessment & Plan  Continue Eliquis.    Coronary artery disease  Assessment & Plan  Currently  asymptomatic.  Monitor cardiac status during stay.  Cardiac precautions.        Expected Discharge Date:

## 2023-03-09 NOTE — PROGRESS NOTES
Patient: Harry Alvarado  Location: [facility] [address]  MRN: [id number]  Today's date: 3/9/2023    History of Present Illness  Harry is a 67 y.o. male admitted on 3/6/2023 with Encounter for prosthetic gait training [Z47.89]. Principal problem is   Encounter for prosthetic gait training.      This is the first clinic appointment for Mr. Alvarado following his inpatient stay for preprostatic training. He is a 67-year-old male with a history of gene mutation, CAD, history of MI, peripheral artery disease with multiple bypass procedures in the past. The patient had been admitted to [facility] on November 30 for a planned transtibial amputation due to bypass thrombosis. He had reported worse pain in the previous 1 to 2 months in the right foot and had undergone multiple surgeries prior. He underwent a right transtibial amputation on December 2, 2022. There is no significant postoperative complications. Pain was well controlled. He was admitted to [facility] for preprostatic training and did well and was discharged home. He continues to receive home care physical therapy. He is still following up with his surgeon. He still has small open area in the lateral aspect of the incision line with minimal amount of serous drainage noted. He offers complaints of intermittent phantom pain and sensations. He has no other complaints of headache or dizziness, chest pain or shortness of breath. Currently he and his wife are living in his son's home everything is a 1 level. He is independent at a wheelchair level. Prior to his amputation he was completely independent and very active.    Past Medical History  Harry has a past medical history of Coronary artery disease, Deep vein thrombosis (CMS/HCC), Lupus anticoagulant disorder (CMS/HCC), Lyme disease, MTHFR gene mutation, Myocardial infarction (CMS/HCC), and PAD (peripheral artery disease) (CMS/[facility]).      PT Vitals    Date/Time Pulse HR Source BP BP  Location BP Method Pt Position [facility]   03/09/23 0903 79 Monitor 125/84 Left upper arm Automatic Sitting RK      PT Pain    Date/Time Pain Type Rating: Rest Rating: Activity [facility]   03/09/23 0903 Pain Assessment 0 -- RK   03/09/23 0958 Pain Reassessment -- 0 RK          Prior Living Environment    Flowsheet Row Most Recent Value   People in Home spouse   Current Living Arrangements home   Home Accessibility stairs to enter home (Group), stairs within home (Group)   Living Environment Comment Pt reports he lives with wife in 1  with 6 YOSEF. Pt has loft upstairs but does not need to access. Pt reports he's very active around the home and has steep property he needs to navigate.   Number of Stairs, Main Entrance 6   Stair Railings, Main Entrance railings on both sides of stairs   Stairs Comment, Main Entrance Can't reach both rails at once   Location, Patient Bedroom first (main) floor   Location, Bathroom first (main) floor   Bathroom Access Comment walk in shower, shower chair   Number of Stairs, Within Home, Primary 0          Prior Level of Function    Flowsheet Row Most Recent Value   Dominant Hand right   Ambulation assistive equipment   Transferring assistive equipment   Toileting independent   Bathing independent   Dressing independent   Eating independent   Prior Level of Function Comment Pt reports he was independent for all ADLs/mobility prior to TTA. Pt completed pre-prosthetic training at [facility] - has been Mod I at w/c level since d/c. Reports he utilized RW for transfers and gait training with HHPT.   Assistive Device Currently Used at Home walker, front-wheeled, wheelchair, shower chair, cane, straight           IRF PT Evaluation and Treatment - 03/09/23 0902        PT Time Calculation    Start Time 0900     Stop Time 1000     Time Calculation (min) 60 min        Session Details    Document Type daily treatment/progress note     Mode of Treatment physical therapy;individual therapy        General Information     Patient Profile Reviewed yes        Transfers    Transfers stand pivot transfer     Comment mobility belt donned for all transfers and ambulation; doffed at end of session.        Sit to Stand Transfer    [place], Sit to Stand Transfer supervision     Verbal Cues technique     Assistive Device walker, front-wheeled     Comment multiple bouts from w/c; supervision to ensure fall prevention        Stand to Sit Transfer    [place], Stand to Sit Transfer supervision     Verbal Cues technique     Assistive Device walker, front-wheeled     Comment to w/c; supervision to ensure fall prevention        Stand Pivot Transfer    [place], Stand Pivot/Stand Step Transfer close supervision     Verbal Cues technique     Assistive Device walker, front-wheeled     Comment via ambulatory approach to w/c; closeS to ensure fall prevention        Gait Training    [place], Gait close supervision     Assistive Device walker, front-wheeled     Distance in Feet 200 feet     Pattern (Gait) step-through     Deviations/Abnormal Patterns (Gait) antalgic;base of support, narrow;gait speed decreased;weight shifting decreased     Comment (Gait/Stairs) 200'x1, 50'x2, 100'x3 with RW and closeS to ensure fall prevention        Balance    Balance Interventions standing;supported;dynamic     Comment, Balance 1.) standing at RW performed forward backward stepping over TB with mirror anterior for visual feedback; x20 reps with verbal cueing for R weight shifting and increasing ATUL. 2.) standing at bottom of steps with BUE support performed target tapping to second step; 2 x 10 reps each side focusing on R weight shifting and balance.        Prosthetic Orientation (Lower Extremity)    Fit Assessment fit/function of prosthesis are appropriate     Comment, Fit Assessment Donned and doffed with supervision, one sock ply and gel liner as well as liner sock under gel liner. Total wear time of 60 minutes this session. Skin assessment  unremarkable for redness or irritation. Plan to don at 1300 and wear until 1600 for 3 hour total wear time.        Daily Progress Summary (PT)    Symptoms Noted During/After Treatment none     Daily Outcome Statement Binu of from Ability present at start of session for prosthetic evaluation. Adjustments made to prosthetic ankle joint to increased plantarflexion angle. Patient with slight improved gait pattern with adjustments, however continues to demonstrate significant decreased R weight shift and weight bearing with increased reliance on UE support. Continue to focus on pregait and ambualtion activities as tolerated.                           IRF PT Goals    Flowsheet Row Most Recent Value   Transfer Goal 1    Activity/Assistive Device sit-to-stand/stand-to-sit, stand pivot  [LRAD] at 03/06/2023 1502   [place] supervision required at 03/06/2023 1502   Time Frame short-term goal (STG), 1 week at 03/06/2023 1502   Progress/Outcome goal met at 03/08/2023 1454   Transfer Goal 2    Activity/Assistive Device sit-to-stand/stand-to-sit, stand pivot  [LRAD] at 03/06/2023 1502   [place] modified independence at 03/06/2023 1502   Time Frame 1 week at 03/08/2023 1454   Progress/Outcome continuing progress toward goal, goal ongoing at 03/08/2023 1454   Gait/Walking Locomotion Goal 1    Activity/Assistive Device gait (walking locomotion)  [LRAD] at 03/06/2023 1502   Distance 150 feet at 03/06/2023 1502   [place] supervision required at 03/06/2023 1502   Time Frame short-term goal (STG), 1 week at 03/06/2023 1502   Progress/Outcome goal met at 03/08/2023 1454   Gait/Walking Locomotion Goal 2    Activity/Assistive Device gait (walking locomotion)  [LRAD] at 03/06/2023 1502   Distance 150 feet at 03/06/2023 1502   [place] modified independence at 03/06/2023 1502   Time Frame 1 week at 03/08/2023 1454   Progress/Outcome continuing progress toward goal, goal ongoing at 03/08/2023 1454   Stairs Goal 1     Activity/Assistive Device stairs, all skills at 03/08/2023 1454   Number of Stairs 12 at 03/08/2023 1454   [place] modified independence at 03/08/2023 1454   Time Frame long-term goal (LTG), 1 week at 03/08/2023 1454

## 2023-03-09 NOTE — PROGRESS NOTES
Patient: Harry Alvarado  Location: [facility] [address]  MRN: [id number]  Today's date: 3/9/2023    History of Present Illness  Harry is a 67 y.o. male admitted on 3/6/2023 with Encounter for prosthetic gait training [Z47.89]. Principal problem is   Encounter for prosthetic gait training.      This is the first clinic appointment for Mr. Alvarado following his inpatient stay for preprostatic training. He is a 67-year-old male with a history of gene mutation, CAD, history of MI, peripheral artery disease with multiple bypass procedures in the past. The patient had been admitted to [facility] on November 30 for a planned transtibial amputation due to bypass thrombosis. He had reported worse pain in the previous 1 to 2 months in the right foot and had undergone multiple surgeries prior. He underwent a right transtibial amputation on December 2, 2022. There is no significant postoperative complications. Pain was well controlled. He was admitted to [facility] for preprostatic training and did well and was discharged home. He continues to receive home care physical therapy. He is still following up with his surgeon. He still has small open area in the lateral aspect of the incision line with minimal amount of serous drainage noted. He offers complaints of intermittent phantom pain and sensations. He has no other complaints of headache or dizziness, chest pain or shortness of breath. Currently he and his wife are living in his son's home everything is a 1 level. He is independent at a wheelchair level. Prior to his amputation he was completely independent and very active.    Past Medical History  Harry has a past medical history of Coronary artery disease, Deep vein thrombosis (CMS/HCC), Lupus anticoagulant disorder (CMS/HCC), Lyme disease, MTHFR gene mutation, Myocardial infarction (CMS/HCC), and PAD (peripheral artery disease) (CMS/[facility]).      PT Vitals    Date/Time BP BP Location BP  Method Pt Position [facility]   03/09/23 1433 128/77 Right upper arm Automatic Sitting RK      PT Pain    Date/Time Pain Type Rating: Rest Rating: Activity [facility]   03/09/23 1433 Pain Assessment 0 -- RK   03/09/23 1527 Pain Reassessment -- 0 RK          Prior Living Environment    Flowsheet Row Most Recent Value   People in Home spouse   Current Living Arrangements home   Home Accessibility stairs to enter home (Group), stairs within home (Group)   Living Environment Comment Pt reports he lives with wife in 1  with 6 YOSEF. Pt has loft upstairs but does not need to access. Pt reports he's very active around the home and has steep property he needs to navigate.   Number of Stairs, Main Entrance 6   Stair Railings, Main Entrance railings on both sides of stairs   Stairs Comment, Main Entrance Can't reach both rails at once   Location, Patient Bedroom first (main) floor   Location, Bathroom first (main) floor   Bathroom Access Comment walk in shower, shower chair   Number of Stairs, Within Home, Primary 0          Prior Level of Function    Flowsheet Row Most Recent Value   Dominant Hand right   Ambulation assistive equipment   Transferring assistive equipment   Toileting independent   Bathing independent   Dressing independent   Eating independent   Prior Level of Function Comment Pt reports he was independent for all ADLs/mobility prior to TTA. Pt completed pre-prosthetic training at [facility] - has been Mod I at w/c level since d/c. Reports he utilized RW for transfers and gait training with HHPT.   Assistive Device Currently Used at Home walker, front-wheeled, wheelchair, shower chair, cane, straight           IRF PT Evaluation and Treatment - 03/09/23 1435        PT Time Calculation    Start Time 1430     Stop Time 1530     Time Calculation (min) 60 min        Session Details    Document Type daily treatment/progress note     Mode of Treatment physical therapy;individual therapy        General Information    Patient Profile  "Reviewed yes     General Observations of Patient patient recieved seated in w/c with prosthetic donned        Transfers    Transfers stand pivot transfer     Comment mobility belt donned for all transfers and ambulation; doffed at end of session.        Sit to Stand Transfer    [place], Sit to Stand Transfer supervision     Verbal Cues technique     Assistive Device walker, front-wheeled     Comment from w/c; supervision to ensure fall prevention        Stand to Sit Transfer    [place], Stand to Sit Transfer supervision     Verbal Cues technique     Assistive Device walker, front-wheeled     Comment to w/c; supervision to ensure fall prevention        Stand Pivot Transfer    [place], Stand Pivot/Stand Step Transfer supervision     Verbal Cues technique     Assistive Device walker, front-wheeled     Comment via ambulatory approach to w/c; supervision to ensure fall prevention        Gait Training    [place], Gait close supervision     Assistive Device walker, front-wheeled     Distance in Feet 200 feet     Pattern (Gait) step-through     Deviations/Abnormal Patterns (Gait) antalgic;base of support, narrow;gait speed decreased     Comment (Gait/Stairs) 200'x2; closeS to ensure fall prevention. Responded well with verbal cues to increase ATUL by using lines on tiles.        Balance    Balance Interventions standing;supported     Comment, Balance standing at RW with L foot on 6\" block performed unilateral R hip hike x20 reps and perfromed box taps to 6\" block x20 reps.        Lower Extremity (Therapeutic Exercise)    Exercise Position/Type standing     Reps and Sets 2-3 sets x 10 reps each     Comment standing at RW and railing; performed R TKE with TB, hip extension and hip abduction.        Prosthetic Orientation (Lower Extremity)    Fit Assessment fit/function of prosthesis are appropriate     Comment, Fit Assessment arrived to session with prosthetic donned, increased to 3 sock ply this afternoon " and plan to doff at 1600, PT to perform skin assessment at that time.        Daily Progress Summary (PT)    Symptoms Noted During/After Treatment none     Daily Outcome Statement Patient session focused on pre gait, LE strengthening and functional mobility. Patient responds well to hip hiking exercises for carry over to ambulation and weight shifting. Patient challenged with standing balance activities. Patient would continue to benefit from strengthening and pre gait activities.                           IRF PT Goals    Flowsheet Row Most Recent Value   Transfer Goal 1    Activity/Assistive Device sit-to-stand/stand-to-sit, stand pivot  [LRAD] at 03/06/2023 1502   [place] supervision required at 03/06/2023 1502   Time Frame short-term goal (STG), 1 week at 03/06/2023 1502   Progress/Outcome goal met at 03/08/2023 1454   Transfer Goal 2    Activity/Assistive Device sit-to-stand/stand-to-sit, stand pivot  [LRAD] at 03/06/2023 1502   [place] modified independence at 03/06/2023 1502   Time Frame 1 week at 03/08/2023 1454   Progress/Outcome continuing progress toward goal, goal ongoing at 03/08/2023 1454   Gait/Walking Locomotion Goal 1    Activity/Assistive Device gait (walking locomotion)  [LRAD] at 03/06/2023 1502   Distance 150 feet at 03/06/2023 1502   [place] supervision required at 03/06/2023 1502   Time Frame short-term goal (STG), 1 week at 03/06/2023 1502   Progress/Outcome goal met at 03/08/2023 1454   Gait/Walking Locomotion Goal 2    Activity/Assistive Device gait (walking locomotion)  [LRAD] at 03/06/2023 1502   Distance 150 feet at 03/06/2023 1502   [place] modified independence at 03/06/2023 1502   Time Frame 1 week at 03/08/2023 1454   Progress/Outcome continuing progress toward goal, goal ongoing at 03/08/2023 1454   Stairs Goal 1    Activity/Assistive Device stairs, all skills at 03/08/2023 1454   Number of Stairs 12 at 03/08/2023 1454   [place] modified independence at  03/08/2023 1454   Time Frame long-term goal (LTG), 1 week at 03/08/2023 145

## 2023-03-10 ENCOUNTER — APPOINTMENT (INPATIENT)
Dept: PHYSICAL THERAPY | Facility: REHABILITATION | Age: 68
DRG: 560 | End: 2023-03-10
Payer: MEDICARE

## 2023-03-10 ENCOUNTER — APPOINTMENT (INPATIENT)
Dept: OCCUPATIONAL THERAPY | Facility: REHABILITATION | Age: 68
DRG: 560 | End: 2023-03-10
Payer: MEDICARE

## 2023-03-10 PROCEDURE — 63700000 HC SELF-ADMINISTRABLE DRUG: Performed by: HOSPITALIST

## 2023-03-10 PROCEDURE — 97110 THERAPEUTIC EXERCISES: CPT | Mod: GO

## 2023-03-10 PROCEDURE — 97530 THERAPEUTIC ACTIVITIES: CPT | Mod: GO

## 2023-03-10 PROCEDURE — 12800000 HC ROOM AND CARE SEMIPRIVATE REHAB

## 2023-03-10 PROCEDURE — 97763 ORTHC/PROSTC MGMT SBSQ ENC: CPT | Mod: GP

## 2023-03-10 PROCEDURE — 97116 GAIT TRAINING THERAPY: CPT | Mod: GP

## 2023-03-10 PROCEDURE — 97110 THERAPEUTIC EXERCISES: CPT | Mod: GP

## 2023-03-10 PROCEDURE — 97112 NEUROMUSCULAR REEDUCATION: CPT | Mod: GP

## 2023-03-10 RX ADMIN — APIXABAN 5 MG: 5 TABLET, FILM COATED ORAL at 08:04

## 2023-03-10 RX ADMIN — APIXABAN 5 MG: 5 TABLET, FILM COATED ORAL at 21:32

## 2023-03-10 NOTE — PROGRESS NOTES
Patient: Harry Alvarado  Location: [facility] [address]  MRN: [id number]  Today's date: 3/10/2023    History of Present Illness  Harry is a 67 y.o. male admitted on 3/6/2023 with Encounter for prosthetic gait training [Z47.89]. Principal problem is   Encounter for prosthetic gait training.      This is the first clinic appointment for Mr. Alvarado following his inpatient stay for preprostatic training. He is a 67-year-old male with a history of gene mutation, CAD, history of MI, peripheral artery disease with multiple bypass procedures in the past. The patient had been admitted to [facility] on November 30 for a planned transtibial amputation due to bypass thrombosis. He had reported worse pain in the previous 1 to 2 months in the right foot and had undergone multiple surgeries prior. He underwent a right transtibial amputation on December 2, 2022. There is no significant postoperative complications. Pain was well controlled. He was admitted to [facility] for preprostatic training and did well and was discharged home. He continues to receive home care physical therapy. He is still following up with his surgeon. He still has small open area in the lateral aspect of the incision line with minimal amount of serous drainage noted. He offers complaints of intermittent phantom pain and sensations. He has no other complaints of headache or dizziness, chest pain or shortness of breath. Currently he and his wife are living in his son's home everything is a 1 level. He is independent at a wheelchair level. Prior to his amputation he was completely independent and very active.    Past Medical History  Harry has a past medical history of Coronary artery disease, Deep vein thrombosis (CMS/HCC), Lupus anticoagulant disorder (CMS/HCC), Lyme disease, MTHFR gene mutation, Myocardial infarction (CMS/HCC), and PAD (peripheral artery disease) (CMS/[facility]).      PT Vitals    Date/Time Pulse HR Source BP  BP Location BP Method Pt Position Who   03/10/23 1100 82 Monitor 125/90 Right upper arm Automatic Sitting AW          Prior Living Environment    Flowsheet Row Most Recent Value   People in Home spouse   Current Living Arrangements home   Home Accessibility stairs to enter home (Group), stairs within home (Group)   Living Environment Comment Pt reports he lives with wife in 1  with 6 YOSEF. Pt has loft upstairs but does not need to access. Pt reports he's very active around the home and has steep property he needs to navigate.   Number of Stairs, Main Entrance 6   Stair Railings, Main Entrance railings on both sides of stairs   Stairs Comment, Main Entrance Can't reach both rails at once   Location, Patient Bedroom first (main) floor   Location, Bathroom first (main) floor   Bathroom Access Comment walk in shower, shower chair   Number of Stairs, Within Home, Primary 0          Prior Level of Function    Flowsheet Row Most Recent Value   Dominant Hand right   Ambulation assistive equipment   Transferring assistive equipment   Toileting independent   Bathing independent   Dressing independent   Eating independent   Prior Level of Function Comment Pt reports he was independent for all ADLs/mobility prior to TTA. Pt completed pre-prosthetic training at [facility] - has been Mod I at w/c level since d/c. Reports he utilized RW for transfers and gait training with HHPT.   Assistive Device Currently Used at Home walker, front-wheeled, wheelchair, shower chair, cane, straight           IRF PT Evaluation and Treatment - 03/10/23 1100        PT Time Calculation    Start Time 1100     Stop Time 1200     Time Calculation (min) 60 min        Session Details    Document Type daily treatment/progress note     Mode of Treatment individual therapy;physical therapy        Sit to Stand Transfer    [place], Sit to Stand Transfer supervision     Verbal Cues technique     Assistive Device walker, front-wheeled;parallel bars     Comment S  "for balance impairment        Stand to Sit Transfer    [place], Stand to Sit Transfer supervision     Verbal Cues technique     Assistive Device walker, front-wheeled     Comment S for balance impairment        Stand Pivot Transfer    [place], Stand Pivot/Stand Step Transfer supervision     Verbal Cues technique     Assistive Device walker, front-wheeled     Comment amb approach, min cues for increased R weight shift + knee/hip ext in stance during pivot        Gait Training    [place], Gait close supervision     Assistive Device walker, front-wheeled     Distance in Feet 200 feet     Pattern (Gait) step-through     Deviations/Abnormal Patterns (Gait) base of support, narrow;shaila decreased;gait speed decreased;weight shifting decreased     Comment (Gait/Stairs) Initially: Significant early R heel rise in stance, dec R glute/knee ext in stance, forward trunk movement over prosthesis in stance, dec R WB.  With education provided and VC to \"stand as tall as possible over the prosthesis\" pt able to activate R glutes/quads better in stance and achive greater weight shift, significantly reducing (but not fully eliminating) early heel rise.  Also practiced 10'x6 in // bars using mirror for visual feedback, focus remaining on increasing R knee/hip ext in stance and maintaining through midstance        Stairs Training    [place], Stairs touching/steadying assist     Assistive Device railing     Handrail Location (Stairs) both sides;right side (ascending);right side (descending)     Number of Stairs 12     Stair Height 6 inches     Ascending Stairs Technique step-to-step   LLE leading    Descending Stairs Technique step-to-step   RLE leading    Comment Pt requested to trial step-over step, PT educated pt on increased pressures through socket increasing risk of skin breakdown in acute training phase.  Practiced 12 steps with BHR, step to pattern and cues for inc R knee/hip ext in stance, Cl S..  Also " practiced 8 steps wtih R sided HR and SPC on L side, steadying A for balance.        Wheelchair Mobility/Management    [place], Forward Propulsion modified independence     [place], Steering Activities modified independence     [place], Turning Activities modified independence     [place], Doorway Navigation modified independence     Distance Propelled in Feet 200 feet        Mobility Belt    Mobility Belt Used for All Out of Bed Activity yes        Balance    Comment, Balance in // bars: Education provided on changes after amputation in regards to ATUL under COG on LLE only and developing habit of lack of R weight shift.  Used invisible juany line reference and anterior mirror to identify midline with COG centered over ATUL without UE support, then attempting to achieve R weight shift past midline without UE support- significant difficulty with minimal excursion into R weight shift, 5min with Cl S.  With RUE support on // bar, reaching across body to R side with LUE for inc R weight shift, pt compensating with L hip excursion, 10x.  With LUE light support on // bar, RUE reaching to R side for inc weight shift, able to achieve min R weight shift, 10x.  Lateral stepping in // bars 2x10 BL, mod cues to inc R weight shift and maintain R knee/hip extension in stance.  Lateeral mirror helpful for pt to focus on maintaining hip ext, Cl S.        Prosthetic Orientation (Lower Extremity)    Fit Assessment fit/function of prosthesis are appropriate     Comment, Fit Assessment Pt with prosthesis donned since 9:30am with 3 sock ply.  Adjusted suspension sleeve partway through session due to mild wrinkling. Primary PT to complete skin check at 1:00.     Specific Gait Deviations uneven heel rise;uneven timing, short stance phase involved side        Daily Progress Summary (PT)    Daily Outcome Statement Pt with significantly early heel rise to start session, identified cause of poor R quad/glute activation in  stance.  Much improved with focused practice and pre-gait activities.  Encouraged pt to use full length mirror at home to work on R weight shiting beyond midline.                           IRF PT Goals    Flowsheet Row Most Recent Value   Transfer Goal 1    Activity/Assistive Device sit-to-stand/stand-to-sit, stand pivot  [LRAD] at 03/06/2023 1502   [place] supervision required at 03/06/2023 1502   Time Frame short-term goal (STG), 1 week at 03/06/2023 1502   Progress/Outcome goal met at 03/08/2023 1454   Transfer Goal 2    Activity/Assistive Device sit-to-stand/stand-to-sit, stand pivot  [LRAD] at 03/06/2023 1502   [place] modified independence at 03/06/2023 1502   Time Frame 1 week at 03/08/2023 1454   Progress/Outcome continuing progress toward goal, goal ongoing at 03/08/2023 1454   Gait/Walking Locomotion Goal 1    Activity/Assistive Device gait (walking locomotion)  [LRAD] at 03/06/2023 1502   Distance 150 feet at 03/06/2023 1502   [place] supervision required at 03/06/2023 1502   Time Frame short-term goal (STG), 1 week at 03/06/2023 1502   Progress/Outcome goal met at 03/08/2023 1454   Gait/Walking Locomotion Goal 2    Activity/Assistive Device gait (walking locomotion)  [LRAD] at 03/06/2023 1502   Distance 150 feet at 03/06/2023 1502   [place] modified independence at 03/06/2023 1502   Time Frame 1 week at 03/08/2023 1454   Progress/Outcome continuing progress toward goal, goal ongoing at 03/08/2023 1454   Stairs Goal 1    Activity/Assistive Device stairs, all skills at 03/08/2023 1454   Number of Stairs 12 at 03/08/2023 1454   [place] modified independence at 03/08/2023 1454   Time Frame long-term goal (LTG), 1 week at 03/08/2023 1454

## 2023-03-10 NOTE — ASSESSMENT & PLAN NOTE
Harry Alvarado is a 67 y.o. male who presents with the following chronic medical problems including MTHFR gene mutation, coronary artery disease with prior myocardial infarction 1993, peripheral artery disease with multiple bypass procedures in the past most recent with tPA bolus and angioplasty in 2020.  The patient admitted to [facility] on November 30, 2022 to the vascular surgery service for planned BKA due to bypass thrombosis.  At the time of admission the patient had cyanosis and was beginning to develop thanh gangrene of the foot.  Patient has a history of 15+ surgeries of the extremities and understood that his revascularization options were limited.  He had severe 10 out of 10 pain of the foot relieved with elevation of the leg.  He was still able to ambulate but limited by pain.  Under the care of Bowen Serrato MD of vascular surgery he underwent right transtibial amputation on December 2, 2022.  Postoperative course unremarkable.  For postoperative pain control he was treated with morphine.  On discharge prescribed Tylenol and low-dose Lyrica.  Lyrica started prior by his PCP for neuropathic pain.  He was restarted on his home Eliquis.  He required min assist for bed mobility, transfers and ambulation.  The patient was admitted to [facility] on December 6, 2022 for acute inpatient rehabilitation for preprosthetic training.  The patient was discharged on December 18, 2022 after  completing a comprehensive inpatient rehabilitation program progressing to a modified independent level with bed mobility, transfers and standing with rolling walker with limited hopping.  Pain control stable with medication change to hydrocodone/acetaminophen.  Continue Lyrica increased dose 50 mg 3 times a day.  The patient was also placed on nortriptyline for sleep and neuropathic pain.   The patient has follow-up outpatient with vascular surgery with improvement in wound healing.  The patient  was evaluated by Dr. Zambrano in amputee clinic and felt appropriate for acute inpatient rehabilitation for intensive prosthetic training prior to returning home where he lives in [place].  The patient's pain has improved and he is weaned off all of his medications except for his Eliquis.    Comprehensive inpatient rehabilitation program for functional deficit status post right transtibial amputation.  Goal is for modified and pedal level with ambulation and self-care activities with prosthesis.  Monitor for pain.  Monitor skin closely.  Monitor cardiovascular status.    Patient feels well continues to progress in therapies close supervision with ambulation and transfers with his prosthesis.  Improving tolerance for wear time.

## 2023-03-10 NOTE — PLAN OF CARE
Problem: Rehabilitation (IRF) Plan of Care  Goal: Plan of Care Review  Outcome: Progressing  Flowsheets (Taken 3/10/2023 1421)  Progress: improving  Plan of Care Reviewed With: patient  Outcome Summary: Patient is mod I in wheelchair. Denies pain. Continent.  Appetite is good.  Makes needs known.   Plan of Care Review  Plan of Care Reviewed With: patient  Progress: improving  Outcome Summary: Patient is mod I in wheelchair. Denies pain. Continent.  Appetite is good.  Makes needs known.

## 2023-03-10 NOTE — SUBJECTIVE & OBJECTIVE
" Patient was seen and examined.   Attestation Notes: Face to face encounter completed    Subjective    Patient feels well continues to progress in therapies close supervision with ambulation and transfers with his prosthesis.  Improving tolerance for wear time.  Objective     Visit Vitals  /74 (BP Location: Right upper arm, Patient Position: Lying)   Pulse 88   Temp 36.5 °C (97.7 °F) (Oral)   Resp 16   Ht 1.854 m (6' 1\")   Wt 75.8 kg (167 lb 3.2 oz)   SpO2 93%   BMI 22.06 kg/m²       Review of Systems:  Pertinent items are noted in HPI.  Denies chest pain and shortness of breath.  Review of systems otherwise negative.    Labs     Results from last 7 days   Lab Units 03/07/23  0556   WBC K/uL 8.01   HEMOGLOBIN g/dL 13.3*   HEMATOCRIT % 41.7   PLATELETS K/uL 284     Results from last 7 days   Lab Units 03/07/23  0556   SODIUM mEQ/L 136   POTASSIUM mEQ/L 4.4   CHLORIDE mEQ/L 104   CO2 mEQ/L 24   BUN mg/dL 19   CREATININE mg/dL 0.7*   CALCIUM mg/dL 9.4   ALBUMIN g/dL 3.4   BILIRUBIN TOTAL mg/dL 0.6   ALK PHOS IU/L 77   ALT IU/L 17   AST IU/L 15   GLUCOSE mg/dL 87       Full Code    Physical Exam  General      Alert, cooperative, no distress, appears stated age.   Head:    Normocephalic, without obvious abnormality, atraumatic.   Eyes:    PERRL, conjunctiva/corneas clear, EOM's intact.        Nose:   Nares normal, septum midline, mucosa normal, no drainage or            sinus tenderness.   Throat:   Lips, mucosa, and tongue normal.    Neck:   Supple, symmetrical, trachea midline.    Back:     Symmetric, no curvature.   Lungs:     Clear to auscultation bilaterally, respirations unlabored.   Chest wall:    No tenderness or deformity.   Heart:    Regular rate and rhythm, S1 and S2 normal.   Abdomen:     Soft, non-tender, bowel sounds active all four quadrants,     no masses, no organomegaly.   Extremities:  Musculoskeletal:  No significant edema.  Right transtibial amputation.   Pulses:   1+ and symmetric all " extremities.   Skin:   Skin color, texture, turgor normal, no rashes or lesions   Neurologic:          Behavior/  Emotional:  CNII-XII intact.  Alert and oriented ×3.  Motor exam intact.  Sensory exam intact.  Reflexes stable decreased reflexes.      Appropriate, cooperative           Plan of care was discussed with patient

## 2023-03-10 NOTE — PLAN OF CARE
Plan of Care Review  Progress: improving  Outcome Summary: pt appropriate. Pleasant. Denies pain. Sleeping through the night. No issues. Call light in reach.

## 2023-03-10 NOTE — PROGRESS NOTES
[facility] Progress Note          Patient was seen and examined.   Attestation Notes: Face to face encounter completed    Harry Alvarado is a 67 y.o. male who was admitted for Encounter for prosthetic gait training [Z47.89]. Patient was referred by Richard Angulo MD for medical assessment and management      CC: Encounter for prosthetic gait training [Z47.89]     HPI: Harry Alvarado is a 67 y.o. male with MTHFR gene mutation, CAD s/p MI 1993, and PAD s/p R Fem to PT bypass with arm vein in 1993, revision in 2011, s/p R SFA to peroneal bypass graft s/p revision on 7/30/18, s/p tPA bolus and angioplasty in 2020 admitted to [facility] 11/30/22 with RLE bypass thrombosis and chronic right foot ischemia/gangrene and underwent right BKA by vascular surgeon, Dr. Bowen Serrato 12/2/22. Post op he had anemia but did not require transfusion. He was restarted on his home Eliquis.       The patient was evaluated by PM&R and found to have residual deficits in ambulation and ADLs due to Status post below knee amputation of right lower extremity (CMS/[facility]) [Z89.511] and came to [facility] on 12/6/2022 for acute inpatient rehab.      He participated in therapy. The patient has steadily improved in therapy and was for discharged home 12/18/2022.     The patient was seen by Dr. Agustín Zambrano in [facility]putee Clinic and was felt to be a good candidate for prosthetic ambulation and came to [facility] 3/6/2023 for acute inpatient prosthetic training.    He participated in therapy.         SUBJECTIVE:  Patient interviewed and examined.     He feels prosthetic training is going well.      No pain complaints, moving bowels and bladder, no abdominal pain or nausea, no dysuria, no chest pain, palpitations, dyspnea or fevers    Review of Systems:  All other systems reviewed and negative except as noted in the HPI.    Current meds and allergies reviewed    Past Medical History:   Diagnosis Date   • Coronary artery disease      s/p MI   • Deep vein thrombosis (CMS/HCC)    • Lupus anticoagulant disorder (CMS/HCC)    • Lyme disease    • MTHFR gene mutation    • Myocardial infarction (CMS/HCC)    • PAD (peripheral artery disease) (CMS/HCC)      Past Surgical History:   Procedure Laterality Date   • BELOW KNEE LEG AMPUTATION Right 12/02/2022   • FEMORAL BYPASS Right      Social History     Tobacco Use   • Smoking status: Never   • Smokeless tobacco: Never   Substance Use Topics   • Alcohol use: Not Currently      History reviewed. No pertinent family history.    Vital signs in last 24 hours:  Temp:  [36.4 °C (97.6 °F)] 36.4 °C (97.6 °F)  Heart Rate:  [78-86] 84  Resp:  [18] 18  BP: (125-131)/(70-90) 125/76  Vital signs reviewed 03/10/23 4:24 PM    Physical Exam  Vitals and nursing note reviewed.   Constitutional:       Appearance: Normal appearance.   HENT:      Head: Normocephalic and atraumatic.      Right Ear: External ear normal.      Left Ear: External ear normal.      Nose: Nose normal.      Mouth/Throat:      Mouth: Mucous membranes are moist.      Pharynx: Oropharynx is clear.   Eyes:      Conjunctiva/sclera: Conjunctivae normal.      Pupils: Pupils are equal, round, and reactive to light.   Cardiovascular:      Rate and Rhythm: Normal rate and regular rhythm.      Pulses: Normal pulses.      Heart sounds: Normal heart sounds.   Pulmonary:      Effort: Pulmonary effort is normal.      Breath sounds: Normal breath sounds.   Abdominal:      General: Abdomen is flat. Bowel sounds are normal.      Palpations: Abdomen is soft.   Musculoskeletal:         General: Deformity (s/p right BKA) present.      Cervical back: Normal range of motion and neck supple.   Skin:     General: Skin is warm and dry.   Neurological:      General: No focal deficit present.      Mental Status: He is alert and oriented to person, place, and time.   Psychiatric:         Mood and Affect: Mood normal.         Behavior: Behavior normal.                 Objective     Labs:  I have reviewed the patient's labs.  Current labs are within normal limits.  Results from last 7 days   Lab Units 03/07/23  0556   WBC K/uL 8.01   HEMOGLOBIN g/dL 13.3*   HEMATOCRIT % 41.7   PLATELETS K/uL 284     Results from last 7 days   Lab Units 03/07/23  0556   SODIUM mEQ/L 136   POTASSIUM mEQ/L 4.4   CHLORIDE mEQ/L 104   CO2 mEQ/L 24   BUN mg/dL 19   CREATININE mg/dL 0.7*   CALCIUM mg/dL 9.4   ALBUMIN g/dL 3.4   BILIRUBIN TOTAL mg/dL 0.6   ALK PHOS IU/L 77   ALT IU/L 17   AST IU/L 15   GLUCOSE mg/dL 87     Results from last 7 days   Lab Units 03/07/23  0556   MAGNESIUM mg/dL 1.9        Imaging:  Not applicable        ASSESSMENT/PLAN:    67 y.o. male with MTHFR gene mutation, CAD s/p MI 1993, and PAD s/p R Fem to PT bypass with arm vein in 1993, revision in 2011, s/p R SFA to peroneal bypass graft s/p revision on 7/30/18, s/p tPA bolus and angioplasty in 2020 admitted to [facility] 11/30/22 with RLE bypass thrombosis and chronic right foot ischemia/gangrene and underwent right BKA by vascular surgeon, Dr. Bowen Serrato 12/2/22; pre-prosthetic training [facility] 12/6/22-12/18/22; returns to [facility] from home 3/6/23 for inpatient prosthetic training.      1. Vasc:  -PAD s/p failed RLE bypass with right foot ischemia/gangrene  -s/p right BKA 12/2/22  -inpatient prosthetic training  -Tylenol for pain  -remains on Eliquis for hypercoagulable condition     2. Heme:  -post op anemia due to chronic blood loss, does not need iron  -leukocytosis reactive due to surgery  -hx of MTHFR gene mutation  -normal CBC     3. Renal:  -increased risk of dehydration and electrolyte changes  -normal CMP, Mg     4. Gi:  -Senokot-S as needed for bowels     5. Gu:  -no urine retention     6. Cardiac:  -hx of CAD  -watch for orthostatic hypotension  -use cardiac precautions in therapy     7. Pulm:  -incentive spirometry for atelectasis     8. Derm:  -skin care per nursing     9. Nutrition:  -seen by Nutrition for assessment  and education     10. Psych:  -seen by Psychology for support    11. Dispo:  -Expected discharge date 3/18/2023         plan discussed with patient, nurse, case management and  Richard Angulo MD Scott Sapperstein, MD  3/10/2023  4:24 PM

## 2023-03-10 NOTE — PROGRESS NOTES
Patient: Harry Alvarado  Location: [facility] [address]  MRN: [id number]  Today's date: 3/10/2023    History of Present Illness  Harry is a 67 y.o. male admitted on 3/6/2023 with Encounter for prosthetic gait training [Z47.89]. Principal problem is   Encounter for prosthetic gait training.      This is the first clinic appointment for Mr. Alvarado following his inpatient stay for preprostatic training. He is a 67-year-old male with a history of gene mutation, CAD, history of MI, peripheral artery disease with multiple bypass procedures in the past. The patient had been admitted to [facility] on November 30 for a planned transtibial amputation due to bypass thrombosis. He had reported worse pain in the previous 1 to 2 months in the right foot and had undergone multiple surgeries prior. He underwent a right transtibial amputation on December 2, 2022. There is no significant postoperative complications. Pain was well controlled. He was admitted to [facility] for preprostatic training and did well and was discharged home. He continues to receive home care physical therapy. He is still following up with his surgeon. He still has small open area in the lateral aspect of the incision line with minimal amount of serous drainage noted. He offers complaints of intermittent phantom pain and sensations. He has no other complaints of headache or dizziness, chest pain or shortness of breath. Currently he and his wife are living in his son's home everything is a 1 level. He is independent at a wheelchair level. Prior to his amputation he was completely independent and very active.    Past Medical History  Harry has a past medical history of Coronary artery disease, Deep vein thrombosis (CMS/HCC), Lupus anticoagulant disorder (CMS/HCC), Lyme disease, MTHFR gene mutation, Myocardial infarction (CMS/HCC), and PAD (peripheral artery disease) (CMS/[facility]).      PT Vitals    Date/Time Pulse HR Source BP  BP Location BP Method Pt Position [facility]   03/10/23 1302 84 Monitor 125/76 Right upper arm Automatic Sitting RK      PT Pain    Date/Time Pain Type Rating: Rest Rating: Activity [facility]   03/10/23 1302 Pain Assessment 0 -- RK   03/10/23 1358 Pain Reassessment -- 0 RK          Prior Living Environment    Flowsheet Row Most Recent Value   People in Home spouse   Current Living Arrangements home   Home Accessibility stairs to enter home (Group), stairs within home (Group)   Living Environment Comment Pt reports he lives with wife in 1  with 6 YOSEF. Pt has loft upstairs but does not need to access. Pt reports he's very active around the home and has steep property he needs to navigate.   Number of Stairs, Main Entrance 6   Stair Railings, Main Entrance railings on both sides of stairs   Stairs Comment, Main Entrance Can't reach both rails at once   Location, Patient Bedroom first (main) floor   Location, Bathroom first (main) floor   Bathroom Access Comment walk in shower, shower chair   Number of Stairs, Within Home, Primary 0          Prior Level of Function    Flowsheet Row Most Recent Value   Dominant Hand right   Ambulation assistive equipment   Transferring assistive equipment   Toileting independent   Bathing independent   Dressing independent   Eating independent   Prior Level of Function Comment Pt reports he was independent for all ADLs/mobility prior to TTA. Pt completed pre[facility]prosthetic training [facility] - has been Mod I at w/c level since d/c. Reports he utilized RW for transfers and gait training with HHPT.   Assistive Device Currently Used at Home walker, front-wheeled, wheelchair, shower chair, cane, straight           IRF PT Evaluation and Treatment - 03/10/23 1304        PT Time Calculation    Start Time 1300     Stop Time 1400     Time Calculation (min) 60 min        Session Details    Document Type daily treatment/progress note     Mode of Treatment physical therapy;individual therapy        General  "Information    Patient Profile Reviewed yes        Bed Mobility    [place], Roll Left independent     [place], Roll Right independent     [place], Supine to Sit independent     [place], Sit to Supine independent     Comment (Bed Mobility) performed on flat mat table        Transfers    Transfers stand pivot transfer     Comment mobility belt donned for all transfers and ambulation; doffed at end of session.        Sit to Stand Transfer    [place], Sit to Stand Transfer supervision     Verbal Cues technique     Assistive Device walker, front-wheeled     Comment supervision to ensure fall prevention        Stand to Sit Transfer    [place], Stand to Sit Transfer supervision     Verbal Cues technique     Assistive Device walker, front-wheeled     Comment supervision to ensure fall prevention        Low Pivot Transfer    [place], Low Pivot Transfer modified independence     Verbal Cues safety     Assistive Device wheelchair     Comment EOM > w/c        Stand Pivot Transfer    [place], Stand Pivot/Stand Step Transfer supervision     Verbal Cues technique     Assistive Device walker, front-wheeled     Comment via ambulatory approach; supervision f/t balance impairments        Gait Training    [place], Gait close supervision     Assistive Device walker, front-wheeled     Distance in Feet 200 feet     Pattern (Gait) step-through     Deviations/Abnormal Patterns (Gait) base of support, narrow;gait speed decreased;step length decreased     Comment (Gait/Stairs) closeS d/t balance defiicts; improved weight shift this afternoon compared to previous trials however increased cueing required with increased distance and fatigue. Patient continues to demonstrate increased UE dependence with L swing phase        Balance    Balance Interventions standing;dynamic     Comment, Balance 1.) standing weight shifting without UE support x20 reps. 2.) standing at RW performed box taps to 6\" block 2 " x 10 reps each with mirror anterior for visual feedback.        Lower Extremity (Therapeutic Exercise)    Exercise Position/Type side lying;prone;supine     Reps and Sets 2-3 sets x 10 reps each     Comment PRONE: performed R hip extension. SIDE-LYING: performed R hip abduction. SUPINE: R SLR, SL glute bridge        Prosthetic Orientation (Lower Extremity)    Fit Assessment fit/function of prosthesis are appropriate     Comment, Fit Assessment Patient recieved with prosthesis donned; per patient donned with 3 sock ply. Patient donned at 930 this AM and doffed at 1330 for total of 4 hours wear time. Patient able to doff with supervision. Skin assessment unremarkable for any redness or irritation.        Daily Progress Summary (PT)    Symptoms Noted During/After Treatment fatigue     Daily Outcome Statement Patient session focused on functional moblity with slight improvement in R weight shift and hip extension. Latter half of session focused on LE strengthening, specifically glute strengthening. Patient would continue to benefit from significant pre-gait activities prior to ambulation trials.                           IRF PT Goals    Flowsheet Row Most Recent Value   Transfer Goal 1    Activity/Assistive Device sit-to-stand/stand-to-sit, stand pivot  [LRAD] at 03/06/2023 1502   [place] supervision required at 03/06/2023 1502   Time Frame short-term goal (STG), 1 week at 03/06/2023 1502   Progress/Outcome goal met at 03/08/2023 1454   Transfer Goal 2    Activity/Assistive Device sit-to-stand/stand-to-sit, stand pivot  [LRAD] at 03/06/2023 1502   [place] modified independence at 03/06/2023 1502   Time Frame 1 week at 03/08/2023 1454   Progress/Outcome continuing progress toward goal, goal ongoing at 03/08/2023 1454   Gait/Walking Locomotion Goal 1    Activity/Assistive Device gait (walking locomotion)  [LRAD] at 03/06/2023 1502   Distance 150 feet at 03/06/2023 1502   [place] supervision required at  03/06/2023 1502   Time Frame short-term goal (STG), 1 week at 03/06/2023 1502   Progress/Outcome goal met at 03/08/2023 1454   Gait/Walking Locomotion Goal 2    Activity/Assistive Device gait (walking locomotion)  [LRAD] at 03/06/2023 1502   Distance 150 feet at 03/06/2023 1502   [place] modified independence at 03/06/2023 1502   Time Frame 1 week at 03/08/2023 1454   Progress/Outcome continuing progress toward goal, goal ongoing at 03/08/2023 1454   Stairs Goal 1    Activity/Assistive Device stairs, all skills at 03/08/2023 1454   Number of Stairs 12 at 03/08/2023 1454   [place] modified independence at 03/08/2023 1454   Time Frame long-term goal (LTG), 1 week at 03/08/2023 1454

## 2023-03-10 NOTE — PROGRESS NOTES
"Daily Progress Note     Patient was seen and examined.   Attestation Notes: Face to face encounter completed    Subjective    Patient feels well continues to progress in therapies close supervision with ambulation and transfers with his prosthesis.  Improving tolerance for wear time.  Objective     Visit Vitals  /74 (BP Location: Right upper arm, Patient Position: Lying)   Pulse 88   Temp 36.5 °C (97.7 °F) (Oral)   Resp 16   Ht 1.854 m (6' 1\")   Wt 75.8 kg (167 lb 3.2 oz)   SpO2 93%   BMI 22.06 kg/m²       Review of Systems:  Pertinent items are noted in HPI.  Denies chest pain and shortness of breath.  Review of systems otherwise negative.    Labs     Results from last 7 days   Lab Units 03/07/23  0556   WBC K/uL 8.01   HEMOGLOBIN g/dL 13.3*   HEMATOCRIT % 41.7   PLATELETS K/uL 284     Results from last 7 days   Lab Units 03/07/23  0556   SODIUM mEQ/L 136   POTASSIUM mEQ/L 4.4   CHLORIDE mEQ/L 104   CO2 mEQ/L 24   BUN mg/dL 19   CREATININE mg/dL 0.7*   CALCIUM mg/dL 9.4   ALBUMIN g/dL 3.4   BILIRUBIN TOTAL mg/dL 0.6   ALK PHOS IU/L 77   ALT IU/L 17   AST IU/L 15   GLUCOSE mg/dL 87       Full Code    Physical Exam  General      Alert, cooperative, no distress, appears stated age.   Head:    Normocephalic, without obvious abnormality, atraumatic.   Eyes:    PERRL, conjunctiva/corneas clear, EOM's intact.        Nose:   Nares normal, septum midline, mucosa normal, no drainage or            sinus tenderness.   Throat:   Lips, mucosa, and tongue normal.    Neck:   Supple, symmetrical, trachea midline.    Back:     Symmetric, no curvature.   Lungs:     Clear to auscultation bilaterally, respirations unlabored.   Chest wall:    No tenderness or deformity.   Heart:    Regular rate and rhythm, S1 and S2 normal.   Abdomen:     Soft, non-tender, bowel sounds active all four quadrants,     no masses, no organomegaly.   Extremities:  Musculoskeletal:  No significant edema.  Right transtibial amputation.   Pulses:   1+ and " symmetric all extremities.   Skin:   Skin color, texture, turgor normal, no rashes or lesions   Neurologic:          Behavior/  Emotional:  CNII-XII intact.  Alert and oriented ×3.  Motor exam intact.  Sensory exam intact.  Reflexes stable decreased reflexes.      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 male who presents with the following chronic medical problems including MTHFR gene mutation, coronary artery disease with prior myocardial infarction 1993, peripheral artery disease with multiple bypass procedures in the past most recent with tPA bolus and angioplasty in 2020.  The patient admitted to [facility] on November 30, 2022 to the vascular surgery service for planned BKA due to bypass thrombosis.  At the time of admission the patient had cyanosis and was beginning to develop thanh gangrene of the foot.  Patient has a history of 15+ surgeries of the extremities and understood that his revascularization options were limited.  He had severe 10 out of 10 pain of the foot relieved with elevation of the leg.  He was still able to ambulate but limited by pain.  Under the care of Bowen Serrato MD of vascular surgery he underwent right transtibial amputation on December 2, 2022.  Postoperative course unremarkable.  For postoperative pain control he was treated with morphine.  On discharge prescribed Tylenol and low-dose Lyrica.  Lyrica started prior by his PCP for neuropathic pain.  He was restarted on his home Eliquis.  He required min assist for bed mobility, transfers and ambulation.  The patient was admitted to [facility] on December 6, 2022 for acute inpatient rehabilitation for preprosthetic training.  The patient was discharged on December 18, 2022 after  completing a comprehensive inpatient rehabilitation  program progressing to a modified independent level with bed mobility, transfers and standing with rolling walker with limited hopping.  Pain control stable with medication change to hydrocodone/acetaminophen.  Continue Lyrica increased dose 50 mg 3 times a day.  The patient was also placed on nortriptyline for sleep and neuropathic pain.   The patient has follow-up outpatient with vascular surgery with improvement in wound healing.  The patient was evaluated by Dr. Zambrano in amputee clinic and felt appropriate for acute inpatient rehabilitation for intensive prosthetic training prior to returning home where he lives in [place].  The patient's pain has improved and he is weaned off all of his medications except for his Eliquis.    Comprehensive inpatient rehabilitation program for functional deficit status post right transtibial amputation.  Goal is for modified and pedal level with ambulation and self-care activities with prosthesis.  Monitor for pain.  Monitor skin closely.  Monitor cardiovascular status.    Patient feels well continues to progress in therapies close supervision with ambulation and transfers with his prosthesis.  Improving tolerance for wear time.    Lupus anticoagulant disorder (CMS/HCC)  Assessment & Plan  Continue Eliquis.    Deep vein thrombosis (CMS/[facility])  Assessment & Plan  Continue Eliquis.    Coronary artery disease  Assessment & Plan  Currently asymptomatic.  Monitor cardiac status during stay.  Cardiac precautions.        Expected Discharge Date:  3/18/2023

## 2023-03-10 NOTE — PROGRESS NOTES
Patient: Harry Alvarado  Location: [facility] [address]  MRN: [id number]  Today's date: 3/10/2023    History of Present Illness  Harry is a 67 y.o. male admitted on 3/6/2023 with Encounter for prosthetic gait training [Z47.89]. Principal problem is   Encounter for prosthetic gait training.      This is the first clinic appointment for Mr. Alvarado following his inpatient stay for preprostatic training. He is a 67-year-old male with a history of gene mutation, CAD, history of MI, peripheral artery disease with multiple bypass procedures in the past. The patient had been admitted to [facility] on November 30 for a planned transtibial amputation due to bypass thrombosis. He had reported worse pain in the previous 1 to 2 months in the right foot and had undergone multiple surgeries prior. He underwent a right transtibial amputation on December 2, 2022. There is no significant postoperative complications. Pain was well controlled. He was admitted to [place] rehab for preprostatic training and did well and was discharged home. He continues to receive home care physical therapy. He is still following up with his surgeon. He still has small open area in the lateral aspect of the incision line with minimal amount of serous drainage noted. He offers complaints of intermittent phantom pain and sensations. He has no other complaints of headache or dizziness, chest pain or shortness of breath. Currently he and his wife are living in his son's home everything is a 1 level. He is independent at a wheelchair level. Prior to his amputation he was completely independent and very active.    Past Medical History  Harry has a past medical history of Coronary artery disease, Deep vein thrombosis (CMS/HCC), Lupus anticoagulant disorder (CMS/HCC), Lyme disease, MTHFR gene mutation, Myocardial infarction (CMS/HCC), and PAD (peripheral artery disease) (CMS/[facility]).      OT Vitals    Date/Time Pulse HR Source BP  BP Location BP Method Pt Position [facility]   03/10/23 0932 86 Monitor 131/90 Left upper arm Automatic Sitting LM   03/10/23 1000 83 Monitor 127/70 Right upper arm Automatic Sitting LM      OT Pain    Date/Time Pain Type Rating: Rest Rating: Activity [facility]   03/10/23 0932 Pain Assessment 0 - no pain 0 - no pain LM   03/10/23 1000 Pain Reassessment 0 - no pain 0 - no pain LM   03/10/23 1028 Pain Reassessment 0 - no pain 0 - no pain LM          Prior Living Environment    Flowsheet Row Most Recent Value   People in Home spouse   Current Living Arrangements home   Home Accessibility stairs to enter home (Group), stairs within home (Group)   Living Environment Comment Pt reports he lives with wife in 1  with 6 YOSEF. Pt has loft upstairs but does not need to access. Pt reports he's very active around the home and has steep property he needs to navigate.   Number of Stairs, Main Entrance 6   Stair Railings, Main Entrance railings on both sides of stairs   Stairs Comment, Main Entrance Can't reach both rails at once   Location, Patient Bedroom first (main) floor   Location, Bathroom first (main) floor   Bathroom Access Comment walk in shower, shower chair   Number of Stairs, Within Home, Primary 0          Prior Level of Function    Flowsheet Row Most Recent Value   Dominant Hand right   Ambulation assistive equipment   Transferring assistive equipment   Toileting independent   Bathing independent   Dressing independent   Eating independent   Prior Level of Function Comment Pt reports he was independent for all ADLs/mobility prior to TTA. Pt completed pre-prosthetic training at [facility] - has been Mod I at w/c level since d/c. Reports he utilized RW for transfers and gait training with HHPT.   Assistive Device Currently Used at Home walker, front-wheeled, wheelchair, shower chair, cane, straight          Occupational Profile    Flowsheet Row Most Recent Value   Occupational History/Life Experiences Pt is interested in learning about  return to driving, plans to purchase a new vehicle.  Recommend arranging Chirag Eaton from [employer] to speak with pt.  Pt lives in [place] and was staying in [place] with son since amputation, plans to return to [place].  Initially wanted to drive home.  Discussed potentially purchasing   Environmental Supports and Barriers +. 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, also enjoys hiking,           IRF OT Evaluation and Treatment - 03/10/23 0932        OT Time Calculation    Start Time 0930     Stop Time 1030     Time Calculation (min) 60 min        Session Details    Document Type daily treatment/progress note     Mode of Treatment occupational therapy;individual therapy     Unable to Perform --        General Information    Patient Profile Reviewed yes     General Observations of Patient Pleasant and cooperative        Sit to Stand Transfer    [place], Sit to Stand Transfer supervision     Verbal Cues safety;technique     Assistive Device parallel bars     Comment Multiple performed in session. w/c to // bars        Stand to Sit Transfer    [place], Stand to Sit Transfer supervision     Verbal Cues safety;technique     Assistive Device parallel bars;wheelchair     Comment Multiple performed in session // bars to w/c        Mobility Belt    Mobility Belt Used for All Out of Bed Activity yes        Balance    Comment, Balance 1) Pt engaged in 2x5 reps of lateral side stepping in // bars with cues for optimal R foot step width and foot orientation. 2) unsupported standing involving taking R step forwards to retrieve object, step back, weightshifting onto RLE to place object on board. Therapist providing mod cues for R step technique and up to mod A to facillitate lateral weight shifting. Completed x60 reps with standing rest as needed        Core Strength (Therapeutic Exercise)    Core Strength,  "Position standing     Reps and Sets 2x15     Comment With use of 6# weighted ball Pt engaged in the following: D1/D2, lateral trunk twists, \"around the world\". Rest breaks after each set        Prosthetic Orientation (Lower Extremity)    Comment, Fit Assessment Donned R prosthetic at S level with 3 ply. Remained on at end of session for PT to check skin during their session at 1100.        Daily Progress Summary (OT)    Daily Outcome Statement Session focusing on core strength, pre-gait, R weightshifting. Continues with decrease RLE weight acceptance. Continue POC                           IRF OT Goals    Flowsheet Row Most Recent Value   Transfer Goal 1    Activity/Assistive Device toilet at 03/07/2023 0804   [place] supervision required at 03/08/2023 1445   Time Frame short-term goal (STG), 1 week at 03/08/2023 1445   Progress/Outcome goal revised this date at 03/08/2023 1445   Transfer Goal 2    Activity/Assistive Device toilet at 03/07/2023 0804   [place] modified independence at 03/07/2023 0804   Time Frame long-term goal (LTG), 14 days or less at 03/07/2023 0804   Progress/Outcome goal ongoing at 03/08/2023 1445   UB Dressing Goal 1    [place] supervision required at 03/08/2023 1445   Time Frame short-term goal (STG), 1 week at 03/08/2023 1445   Progress/Outcome goal revised this date at 03/08/2023 1445   UB Dressing Goal 2    [place] modified independence at 03/07/2023 0804   Time Frame long-term goal (LTG), 14 days or less at 03/07/2023 0804   Progress/Outcome goal ongoing at 03/08/2023 1445   LB Dressing Goal 1    [place] supervision required at 03/07/2023 0804   Time Frame short-term goal (STG), 1 week at 03/08/2023 1445   Progress/Outcome goal revised this date at 03/08/2023 1445   LB Dressing Goal 2    [place] modified independence at 03/07/2023 0804   Time Frame long-term goal (LTG), 14 days or less at 03/07/2023 0804   Progress/Outcome goal ongoing at 03/08/2023 1445 "   Grooming Goal 1    [place] supervision required at 03/07/2023 0804   Time Frame short-term goal (STG), 1 week at 03/08/2023 1445   Progress/Outcome goal ongoing at 03/08/2023 1445   Grooming Goal 2    [place] modified independence at 03/07/2023 0804   Time Frame long-term goal (LTG), 14 days or less at 03/07/2023 0804   Progress/Outcome goal ongoing at 03/08/2023 1445   Toileting Goal 1    [place] supervision required at 03/07/2023 0804   Time Frame short-term goal (STG), 5 - 7 days at 03/07/2023 0804   Progress/Outcome goal ongoing at 03/08/2023 1445   Toileting Goal 2    [place] modified independence at 03/07/2023 0804   Time Frame long-term goal (LTG), 14 days or less at 03/07/2023 0804   Progress/Outcome goal ongoing at 03/08/2023 1445

## 2023-03-11 ENCOUNTER — APPOINTMENT (INPATIENT)
Dept: PHYSICAL THERAPY | Facility: REHABILITATION | Age: 68
DRG: 560 | End: 2023-03-11
Payer: MEDICARE

## 2023-03-11 ENCOUNTER — APPOINTMENT (INPATIENT)
Dept: OCCUPATIONAL THERAPY | Facility: REHABILITATION | Age: 68
DRG: 560 | End: 2023-03-11
Payer: MEDICARE

## 2023-03-11 PROCEDURE — 97530 THERAPEUTIC ACTIVITIES: CPT | Mod: GP

## 2023-03-11 PROCEDURE — 97530 THERAPEUTIC ACTIVITIES: CPT | Mod: GO

## 2023-03-11 PROCEDURE — 97112 NEUROMUSCULAR REEDUCATION: CPT | Mod: GP

## 2023-03-11 PROCEDURE — 63700000 HC SELF-ADMINISTRABLE DRUG: Performed by: HOSPITALIST

## 2023-03-11 PROCEDURE — 97110 THERAPEUTIC EXERCISES: CPT | Mod: GP

## 2023-03-11 PROCEDURE — 97116 GAIT TRAINING THERAPY: CPT | Mod: GP

## 2023-03-11 PROCEDURE — 12800000 HC ROOM AND CARE SEMIPRIVATE REHAB

## 2023-03-11 RX ADMIN — APIXABAN 5 MG: 5 TABLET, FILM COATED ORAL at 08:58

## 2023-03-11 RX ADMIN — APIXABAN 5 MG: 5 TABLET, FILM COATED ORAL at 20:32

## 2023-03-11 NOTE — ASSESSMENT & PLAN NOTE
Harry Alvarado is a 67 y.o. male who presents with the following chronic medical problems including MTHFR gene mutation, coronary artery disease with prior myocardial infarction 1993, peripheral artery disease with multiple bypass procedures in the past most recent with tPA bolus and angioplasty in 2020.  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  At the time of admission the patient had cyanosis and was beginning to develop thanh gangrene of the foot.  Patient has a history of 15+ surgeries of the extremities and understood that his revascularization options were limited.  He had severe 10 out of 10 pain of the foot relieved with elevation of the leg.  He was still able to ambulate but limited by pain.  Under the care of Bowen Serrato MD of vascular surgery he underwent right transtibial amputation on December 2, 2022.  Postoperative course unremarkable.  For postoperative pain control he was treated with morphine.  On discharge prescribed Tylenol and low-dose Lyrica.  Lyrica started prior by his PCP for neuropathic pain.  He was restarted on his home Eliquis.  He required min assist for bed mobility, transfers and ambulation.  The patient was admitted to [facility] on December 6, 2022 for acute inpatient rehabilitation for preprosthetic training.  The patient was discharged on December 18, 2022 after  completing a comprehensive inpatient rehabilitation program progressing to a modified independent level with bed mobility, transfers and standing with rolling walker with limited hopping.  Pain control stable with medication change to hydrocodone/acetaminophen.  Continue Lyrica increased dose 50 mg 3 times a day.  The patient was also placed on nortriptyline for sleep and neuropathic pain.   The patient has follow-up outpatient with vascular surgery with improvement in wound healing.  The patient  was evaluated by Dr. Zambrano in amputee clinic and felt appropriate for acute inpatient rehabilitation for intensive prosthetic training prior to returning home where he lives in [place].  The patient's pain has improved and he is weaned off all of his medications except for his Eliquis.    Comprehensive inpatient rehabilitation program for functional deficit status post right transtibial amputation.  Goal is for modified and pedal level with ambulation and self-care activities with prosthesis.  Monitor for pain.  Monitor skin closely.  Monitor cardiovascular status.    Continues to progress in therapies.  Improving tolerance for prosthetic wear time.  Close supervision with ambulation and transfers with prosthesis.  Pain controlled.

## 2023-03-11 NOTE — PROGRESS NOTES
Patient: Harry Alvarado  Location: [facility] [address]  MRN: [id number]  Today's date: 3/11/2023    History of Present Illness  Harry is a 67 y.o. male admitted on 3/6/2023 with Encounter for prosthetic gait training [Z47.89]. Principal problem is   Encounter for prosthetic gait training.      This is the first clinic appointment for Mr. Alvarado following his inpatient stay for preprostatic training. He is a 67-year-old male with a history of gene mutation, CAD, history of MI, peripheral artery disease with multiple bypass procedures in the past. The patient had been admitted to [facility] on November 30 for a planned transtibial amputation due to bypass thrombosis. He had reported worse pain in the previous 1 to 2 months in the right foot and had undergone multiple surgeries prior. He underwent a right transtibial amputation on December 2, 2022. There is no significant postoperative complications. Pain was well controlled. He was admitted to [place] rehab for preprostatic training and did well and was discharged home. He continues to receive home care physical therapy. He is still following up with his surgeon. He still has small open area in the lateral aspect of the incision line with minimal amount of serous drainage noted. He offers complaints of intermittent phantom pain and sensations. He has no other complaints of headache or dizziness, chest pain or shortness of breath. Currently he and his wife are living in his son's home everything is a 1 level. He is independent at a wheelchair level. Prior to his amputation he was completely independent and very active.    Past Medical History  Harry has a past medical history of Coronary artery disease, Deep vein thrombosis (CMS/HCC), Lupus anticoagulant disorder (CMS/HCC), Lyme disease, MTHFR gene mutation, Myocardial infarction (CMS/HCC), and PAD (peripheral artery disease) (CMS/[facility]).      PT Vitals    Date/Time Pulse HR Source BP  BP Location BP Method Pt Position [facility]   03/11/23 0905 71 Monitor 132/83 Right upper arm Automatic Sitting CB      PT Pain    Date/Time Pain Type Rating: Rest Who   03/11/23 0905 Pain Assessment 0 - no pain CB   03/11/23 0959 Pain Reassessment 0 - no pain CB          Prior Living Environment    Flowsheet Row Most Recent Value   People in Home spouse   Current Living Arrangements home   Home Accessibility stairs to enter home (Group), stairs within home (Group)   Living Environment Comment Pt reports he lives with wife in 1  with 6 YOSEF. Pt has loft upstairs but does not need to access. Pt reports he's very active around the home and has steep property he needs to navigate.   Number of Stairs, Main Entrance 6   Stair Railings, Main Entrance railings on both sides of stairs   Stairs Comment, Main Entrance Can't reach both rails at once   Location, Patient Bedroom first (main) floor   Location, Bathroom first (main) floor   Bathroom Access Comment walk in shower, shower chair   Number of Stairs, Within Home, Primary 0          Prior Level of Function    Flowsheet Row Most Recent Value   Dominant Hand right   Ambulation assistive equipment   Transferring assistive equipment   Toileting independent   Bathing independent   Dressing independent   Eating independent   Prior Level of Function Comment Pt reports he was independent for all ADLs/mobility prior to TTA. Pt completed pre-prosthetic training at [facility] - has been Mod I at w/c level since d/c. Reports he utilized RW for transfers and gait training with HHPT.   Assistive Device Currently Used at Home walker, front-wheeled, wheelchair, shower chair, cane, straight           IRF PT Evaluation and Treatment - 03/11/23 0900        PT Time Calculation    Start Time 0900     Stop Time 1000     Time Calculation (min) 60 min        Session Details    Document Type daily treatment/progress note     Mode of Treatment physical therapy;individual therapy        General Information  "   General Observations of Patient pleasant and willing to participate        Sit to Stand Transfer    [place], Sit to Stand Transfer supervision     Verbal Cues technique     Assistive Device walker, front-wheeled     Comment S for safety w/ balance during transition        Stand to Sit Transfer    [place], Stand to Sit Transfer supervision     Verbal Cues technique     Assistive Device walker, front-wheeled     Comment S for safety w/ reaching back to WC        Stand Pivot Transfer    [place], Stand Pivot/Stand Step Transfer supervision     Verbal Cues technique     Assistive Device walker, front-wheeled     Comment via ambulatory approach, S for balance during turns d/t NBOS        Gait Training    [place], Gait close supervision     Assistive Device walker, front-wheeled     Distance in Feet 200 feet     Pattern (Gait) step-through     Deviations/Abnormal Patterns (Gait) base of support, narrow;step length decreased;gait speed decreased     Comment (Gait/Stairs) 200' x 2 (back to back), close S for balance, pt cont to demo premature heel off and slightly decreased R WS, however w/ increased distance able to demo improved technqiue and WS        Stairs Training    [place], Stairs touching/steadying assist     Assistive Device railing     Handrail Location (Stairs) both sides     Number of Stairs 12     Stair Height 6 inches     Ascending Stairs Technique step-to-step     Descending Stairs Technique step-to-step     Comment 4 x 3 6\" steps, leading w/ LLE during ascent, leading w/ RLE during descent, vc's for wider ATUL for improved stability        Mobility Belt    Mobility Belt Used for All Out of Bed Activity yes        Balance    Comment, Balance 1) forward stepping w/ LLE to improve RLE WS and heel off for pre-gait; at RW w/ tactile cues at glut/knee for improved extension, ~20 reps; 2) WS while LLE on 4\" block for increased RLE WB tolerance, x15 standing at RW for UE, vc's for improved " posture and shifting over RLE for increased shift/WB; 3) static stand, pt impulsively stopping after stairs to talk w/ other pt in gym, able to stand at RW w/ occasional no UE support during conversation, encouraged pt to place equal WB thru BLE vs. dependently standing on LLE during conversation, ~7 min        Prosthetic Orientation (Lower Extremity)    Comment, Fit Assessment pt able to don prosthetic at S for safey, wearing 3 ply, and recommending pt wear until OT session and doff at 11:30 am for a total of 3.5 hours of wear time        Daily Progress Summary (PT)    Daily Outcome Statement session focused on RLE WS and increased WB tolerance. pt cont to report slight pain at distal end of residual limb when not performing complete glut/knee ext. cont to provide tactile/verbal cues for correct posture and positioning during stance. PM session will focus on strengthening and balance.                           IRF PT Goals    Flowsheet Row Most Recent Value   Transfer Goal 1    Activity/Assistive Device sit-to-stand/stand-to-sit, stand pivot  [LRAD] at 03/06/2023 1502   [place] supervision required at 03/06/2023 1502   Time Frame short-term goal (STG), 1 week at 03/06/2023 1502   Progress/Outcome goal met at 03/08/2023 1454   Transfer Goal 2    Activity/Assistive Device sit-to-stand/stand-to-sit, stand pivot  [LRAD] at 03/06/2023 1502   [place] modified independence at 03/06/2023 1502   Time Frame 1 week at 03/08/2023 1454   Progress/Outcome continuing progress toward goal, goal ongoing at 03/08/2023 1454   Gait/Walking Locomotion Goal 1    Activity/Assistive Device gait (walking locomotion)  [LRAD] at 03/06/2023 1502   Distance 150 feet at 03/06/2023 1502   [place] supervision required at 03/06/2023 1502   Time Frame short-term goal (STG), 1 week at 03/06/2023 1502   Progress/Outcome goal met at 03/08/2023 1454   Gait/Walking Locomotion Goal 2    Activity/Assistive Device gait (walking locomotion)   [LRAD] at 03/06/2023 1502   Distance 150 feet at 03/06/2023 1502   [place] modified independence at 03/06/2023 1502   Time Frame 1 week at 03/08/2023 1454   Progress/Outcome continuing progress toward goal, goal ongoing at 03/08/2023 1454   Stairs Goal 1    Activity/Assistive Device stairs, all skills at 03/08/2023 1454   Number of Stairs 12 at 03/08/2023 1454   [place] modified independence at 03/08/2023 1454   Time Frame long-term goal (LTG), 1 week at 03/08/2023 1454

## 2023-03-11 NOTE — PROGRESS NOTES
Patient: Harry Alvarado  Location: [facility] [address]  MRN: [id number]  Today's date: 3/11/2023    History of Present Illness  Harry is a 67 y.o. male admitted on 3/6/2023 with Encounter for prosthetic gait training [Z47.89]. Principal problem is   Encounter for prosthetic gait training.      This is the first clinic appointment for Mr. Alvarado following his inpatient stay for preprostatic training. He is a 67-year-old male with a history of gene mutation, CAD, history of MI, peripheral artery disease with multiple bypass procedures in the past. The patient had been admitted to [facility] on November 30 for a planned transtibial amputation due to bypass thrombosis. He had reported worse pain in the previous 1 to 2 months in the right foot and had undergone multiple surgeries prior. He underwent a right transtibial amputation on December 2, 2022. There is no significant postoperative complications. Pain was well controlled. He was admitted to [place] rehab for preprostatic training and did well and was discharged home. He continues to receive home care physical therapy. He is still following up with his surgeon. He still has small open area in the lateral aspect of the incision line with minimal amount of serous drainage noted. He offers complaints of intermittent phantom pain and sensations. He has no other complaints of headache or dizziness, chest pain or shortness of breath. Currently he and his wife are living in his son's home everything is a 1 level. He is independent at a wheelchair level. Prior to his amputation he was completely independent and very active.    Past Medical History  Harry has a past medical history of Coronary artery disease, Deep vein thrombosis (CMS/HCC), Lupus anticoagulant disorder (CMS/HCC), Lyme disease, MTHFR gene mutation, Myocardial infarction (CMS/HCC), and PAD (peripheral artery disease) (CMS/[facility]).      OT Vitals    Date/Time BP [facility]   03/11/23  1033 120/82 VMS      OT Pain    Date/Time Pain Type Rating: Rest Who   03/11/23 1033 Pain Assessment 0 - no pain VMS   03/11/23 1130 Post Activity 0 - no pain VMS          Prior Living Environment    Flowsheet Row Most Recent Value   People in Home spouse   Current Living Arrangements home   Home Accessibility stairs to enter home (Group), stairs within home (Group)   Living Environment Comment Pt reports he lives with wife in 1  with 6 YOSEF. Pt has loft upstairs but does not need to access. Pt reports he's very active around the home and has steep property he needs to navigate.   Number of Stairs, Main Entrance 6   Stair Railings, Main Entrance railings on both sides of stairs   Stairs Comment, Main Entrance Can't reach both rails at once   Location, Patient Bedroom first (main) floor   Location, Bathroom first (main) floor   Bathroom Access Comment walk in shower, shower chair   Number of Stairs, Within Home, Primary 0          Prior Level of Function    Flowsheet Row Most Recent Value   Dominant Hand right   Ambulation assistive equipment   Transferring assistive equipment   Toileting independent   Bathing independent   Dressing independent   Eating independent   Prior Level of Function Comment Pt reports he was independent for all ADLs/mobility prior to TTA. Pt completed pre[facility]prosthetic training at [facility] - has been Mod I at w/c level since d/c. Reports he utilized RW for transfers and gait training with HHPT.   Assistive Device Currently Used at Home walker, front-wheeled, wheelchair, shower chair, cane, straight          Occupational Profile    Flowsheet Row Most Recent Value   Occupational History/Life Experiences Pt is interested in learning about return to driving, plans to purchase a new vehicle.  Recommend arranging Chirag Eaton from [employer] to speak with pt.  Pt lives in [place] and was staying in [place] with son since amputation, plans to return to [place].  Initially wanted  "to drive home.  Discussed potentially purchasing   Environmental Supports and Barriers +. 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, also enjoys hiking,           IRF OT Evaluation and Treatment - 03/11/23 1033        OT Time Calculation    Start Time 1030     Stop Time 1130     Time Calculation (min) 60 min        Session Details    Document Type daily treatment/progress note     Mode of Treatment occupational therapy;individual therapy        Sit to Stand Transfer    [place], Sit to Stand Transfer supervision     Verbal Cues technique     Assistive Device walker, front-wheeled;parallel bars        Stand to Sit Transfer    [place], Stand to Sit Transfer supervision     Verbal Cues technique     Assistive Device walker, front-wheeled        Stand Pivot Transfer    [place], Stand Pivot/Stand Step Transfer supervision     Verbal Cues safety;technique     Assistive Device walker, front-wheeled     Comment and in // bars with cues for R weight shift, via amb tx        Safety Issues, Functional Mobility    Comment, Safety Issues/Impairments (Mobility) OT functional ambulation with RW and in // bars with noted improved weight shift to R, pt reported \" When I do it right it feels better\" CS        Mobility Belt    Mobility Belt Used for All Out of Bed Activity yes        Balance    Comment, Balance 6 inch block  LLE toe taps with noted improved support and weight acceptance RLE. Trial of R LE on 6 inch block satic stance on RLE. R weight shift for reaching for horsesshoes and performing 15 in static stance. Then performing R superior lateral reach with Le step through with weight shift over R for throwing, in  // bars        Prosthetic Orientation (Lower Extremity)    Comment, Fit Assessment Pt wearing 3 sock ply, goal for 5 hours of wear, pt reported pre skin check unremarkable. increased to 4 ply. goal for 5 hours        Daily " "Progress Summary (OT)    Daily Outcome Statement Pt with improving weight shift to R. Continues to require cues. Improving during functional reaching activitites and functional ambulation. Pt reported \" this is harder than I thoughout\" but was able to state areas of improvements. Pt would continue to benefit from skilled OT services. Continue with POC. WEar time goal of 5 hours today.                           IRF OT Goals    Flowsheet Row Most Recent Value   Transfer Goal 1    Activity/Assistive Device toilet at 03/07/2023 0804   [place] supervision required at 03/08/2023 1445   Time Frame short-term goal (STG), 1 week at 03/08/2023 1445   Progress/Outcome goal revised this date at 03/08/2023 1445   Transfer Goal 2    Activity/Assistive Device toilet at 03/07/2023 0804   [place] modified independence at 03/07/2023 0804   Time Frame long-term goal (LTG), 14 days or less at 03/07/2023 0804   Progress/Outcome goal ongoing at 03/08/2023 1445   UB Dressing Goal 1    [place] supervision required at 03/08/2023 1445   Time Frame short-term goal (STG), 1 week at 03/08/2023 1445   Progress/Outcome goal revised this date at 03/08/2023 1445   UB Dressing Goal 2    [place] modified independence at 03/07/2023 0804   Time Frame long-term goal (LTG), 14 days or less at 03/07/2023 0804   Progress/Outcome goal ongoing at 03/08/2023 1445   LB Dressing Goal 1    [place] supervision required at 03/07/2023 0804   Time Frame short-term goal (STG), 1 week at 03/08/2023 1445   Progress/Outcome goal revised this date at 03/08/2023 1445   LB Dressing Goal 2    [place] modified independence at 03/07/2023 0804   Time Frame long-term goal (LTG), 14 days or less at 03/07/2023 0804   Progress/Outcome goal ongoing at 03/08/2023 1445   Grooming Goal 1    [place] supervision required at 03/07/2023 0804   Time Frame short-term goal (STG), 1 week at 03/08/2023 1445   Progress/Outcome goal ongoing at 03/08/2023 1445 "   Grooming Goal 2    [place] modified independence at 03/07/2023 0804   Time Frame long-term goal (LTG), 14 days or less at 03/07/2023 0804   Progress/Outcome goal ongoing at 03/08/2023 1445   Toileting Goal 1    [place] supervision required at 03/07/2023 0804   Time Frame short-term goal (STG), 5 - 7 days at 03/07/2023 0804   Progress/Outcome goal ongoing at 03/08/2023 1445   Toileting Goal 2    [place] modified independence at 03/07/2023 0804   Time Frame long-term goal (LTG), 14 days or less at 03/07/2023 0804   Progress/Outcome goal ongoing at 03/08/2023 1445

## 2023-03-11 NOTE — PROGRESS NOTES
Patient: Harry Alvarado  Location: [facility] [address]  MRN: [id number]  Today's date: 3/11/2023    History of Present Illness  Harry is a 67 y.o. male admitted on 3/6/2023 with Encounter for prosthetic gait training [Z47.89]. Principal problem is   Encounter for prosthetic gait training.      This is the first clinic appointment for Mr. Alvarado following his inpatient stay for preprostatic training. He is a 67-year-old male with a history of gene mutation, CAD, history of MI, peripheral artery disease with multiple bypass procedures in the past. The patient had been admitted to [facility] on November 30 for a planned transtibial amputation due to bypass thrombosis. He had reported worse pain in the previous 1 to 2 months in the right foot and had undergone multiple surgeries prior. He underwent a right transtibial amputation on December 2, 2022. There is no significant postoperative complications. Pain was well controlled. He was admitted to [facility] rehab for preprostatic training and did well and was discharged home. He continues to receive home care physical therapy. He is still following up with his surgeon. He still has small open area in the lateral aspect of the incision line with minimal amount of serous drainage noted. He offers complaints of intermittent phantom pain and sensations. He has no other complaints of headache or dizziness, chest pain or shortness of breath. Currently he and his wife are living in his son's home everything is a 1 level. He is independent at a wheelchair level. Prior to his amputation he was completely independent and very active.    Past Medical History  Harry has a past medical history of Coronary artery disease, Deep vein thrombosis (CMS/HCC), Lupus anticoagulant disorder (CMS/HCC), Lyme disease, MTHFR gene mutation, Myocardial infarction (CMS/HCC), and PAD (peripheral artery disease) (CMS/[facility]).      PT Vitals    Date/Time Pulse HR Source BP  BP Location BP Method Pt Position [facility]   03/11/23 1505 70 Monitor 124/80 Right upper arm Automatic Sitting CB      PT Pain    Date/Time Pain Type Rating: Rest [facility]   03/11/23 1505 Pain Assessment 0 - no pain CB   03/11/23 1555 Pain Reassessment 0 - no pain CB          Prior Living Environment    Flowsheet Row Most Recent Value   People in Home spouse   Current Living Arrangements home   Home Accessibility stairs to enter home (Group), stairs within home (Group)   Living Environment Comment Pt reports he lives with wife in 1  with 6 YOSEF. Pt has loft upstairs but does not need to access. Pt reports he's very active around the home and has steep property he needs to navigate.   Number of Stairs, Main Entrance 6   Stair Railings, Main Entrance railings on both sides of stairs   Stairs Comment, Main Entrance Can't reach both rails at once   Location, Patient Bedroom first (main) floor   Location, Bathroom first (main) floor   Bathroom Access Comment walk in shower, shower chair   Number of Stairs, Within Home, Primary 0          Prior Level of Function    Flowsheet Row Most Recent Value   Dominant Hand right   Ambulation assistive equipment   Transferring assistive equipment   Toileting independent   Bathing independent   Dressing independent   Eating independent   Prior Level of Function Comment Pt reports he was independent for all ADLs/mobility prior to TTA. Pt completed pre-prosthetic training at [facility] - has been Mod I at w/c level since d/c. Reports he utilized RW for transfers and gait training with HHPT.   Assistive Device Currently Used at Home walker, front-wheeled, wheelchair, shower chair, cane, straight           IRF PT Evaluation and Treatment - 03/11/23 1600        PT Time Calculation    Start Time 1500     Stop Time 1600     Time Calculation (min) 60 min        Session Details    Document Type daily treatment/progress note     Mode of Treatment physical therapy;individual therapy        General Information  "   General Observations of Patient pt's wife present at beginning of session wishing to see pt ambulate and complete stairs        Sit to Stand Transfer    [place], Sit to Stand Transfer supervision     Verbal Cues technique     Assistive Device walker, front-wheeled     Comment S for safety w/ balance during transition        Stand to Sit Transfer    [place], Stand to Sit Transfer supervision     Verbal Cues technique     Assistive Device walker, front-wheeled     Comment S for safety w/ reaching back to WC        Stand Pivot Transfer    [place], Stand Pivot/Stand Step Transfer supervision     Verbal Cues technique     Assistive Device walker, front-wheeled     Comment via ambulatory approach, S for balance during turns d/t NBOS        Gait Training    [place], Gait close supervision     Assistive Device walker, front-wheeled     Distance in Feet 200 feet     Pattern (Gait) step-through     Deviations/Abnormal Patterns (Gait) base of support, narrow;weight shifting decreased;gait speed decreased     Comment (Gait/Stairs) 200' in [facility], close S for safety w/ stability of newly donned prosthetic, improved timing of heel off and full shift onto RLE during stance, educating wife on impaired gait as well as improvements noted during session        Curb Negotiation    [place] minimum assist (75% or more patient effort)     Assistive Device walker, front-wheeled     Curb Height 6 inches     Comment up/down 6\" curb x2 w/ RW, min A for pelvic stability and cues for RW management and sequencing of task        Stairs Training    [place], Stairs touching/steadying assist     Assistive Device railing     Handrail Location (Stairs) both sides;right side (ascending)     Number of Stairs 12     Stair Height 6 inches     Ascending Stairs Technique step-to-step     Descending Stairs Technique step-to-step     Comment 12 x 2 w/ BHR; 12 x 1 w/ RHR during ascent/LHR during descent; pt demo improved " glut/ext during ascent of admin stairwell; edu both pt and wife on sidestepping method per home setup w/ wider set railings; slightly increased hands-on needed for stability d/t NBOS, constant cues for RLE glut/knee ext for improved stability        Wheelchair Mobility/Management    Method of Locomotion bimanual (upper extremity) propulsion     Wheelchair Type manual, lightweight     [place], Forward Propulsion modified independence     [place], Steering Activities modified independence     [place], Turning Activities modified independence     [place], Doorway Navigation modified independence     Distance Propelled in Feet 300 feet     Comment, Wheelchair Mobility pt able to propel [place]ch <>admin stairs<>main gym to reach destinations of task        Mobility Belt    Mobility Belt Used for All Out of Bed Activity yes        Aerobic Exercise    Type recumbent elliptical      Time Performed 8:30     Comment first trial of NuStep for aerobic training as well as BLE strengthening, L7 per pt's request, vc's to reach full R TKE, pt tolerated well and wishes to cont using for therapy        Prosthetic Orientation (Lower Extremity)    Comment, Fit Assessment pt tolerated 5 hours of wear time from 9-2 starting at 3 ply > 4 ply; pt donning prosthetic after 1 hour of off time at beginning of session w/ 4 ply; after session, pt doffed prosthetic to show minor scabbing on incision site, ~1.5 cm in length; recommended to discuss w/ MD in re: to orders for cleaning independently        Daily Progress Summary (PT)    Daily Outcome Statement pt's wife was present during beginning of session and thrilled to see pt's progress. education provided on gait and stairs/elevations, however pt's wife asking to return for formal training this week prior to d/c. pt tolerating increased wear time of prosthetic up to 5 hours. can continue to benefit from increasing wear time as well as RLE stance activities for improved  tolerance to full WB.                      Education Documentation  Guarding Techniques, Assist With Transfers/Ambulation, taught by Miriam Graham, PT at 3/11/2023  4:29 PM.  Learner: Significant Other, Patient  Readiness: Eager  Method: Explanation, Demonstration  Response: Verbalizes Understanding  Comment: pt's wife present for session and educated briefly on ambulation and guarding for stairs; can benefit from formal family training prior to d/c for caregiver/pt safety and carryover          IRF PT Goals    Flowsheet Row Most Recent Value   Transfer Goal 1    Activity/Assistive Device sit-to-stand/stand-to-sit, stand pivot  [LRAD] at 03/06/2023 1502   [place] supervision required at 03/06/2023 1502   Time Frame short-term goal (STG), 1 week at 03/06/2023 1502   Progress/Outcome goal met at 03/08/2023 1454   Transfer Goal 2    Activity/Assistive Device sit-to-stand/stand-to-sit, stand pivot  [LRAD] at 03/06/2023 1502   [place] modified independence at 03/06/2023 1502   Time Frame 1 week at 03/08/2023 1454   Progress/Outcome continuing progress toward goal, goal ongoing at 03/08/2023 1454   Gait/Walking Locomotion Goal 1    Activity/Assistive Device gait (walking locomotion)  [LRAD] at 03/06/2023 1502   Distance 150 feet at 03/06/2023 1502   [place] supervision required at 03/06/2023 1502   Time Frame short-term goal (STG), 1 week at 03/06/2023 1502   Progress/Outcome goal met at 03/08/2023 1454   Gait/Walking Locomotion Goal 2    Activity/Assistive Device gait (walking locomotion)  [LRAD] at 03/06/2023 1502   Distance 150 feet at 03/06/2023 1502   [place] modified independence at 03/06/2023 1502   Time Frame 1 week at 03/08/2023 1454   Progress/Outcome continuing progress toward goal, goal ongoing at 03/08/2023 1454   Stairs Goal 1    Activity/Assistive Device stairs, all skills at 03/08/2023 1454   Number of Stairs 12 at 03/08/2023 1454   [place] modified independence at 03/08/2023 1454    Time Frame long-term goal (LTG), 1 week at 03/08/2023 1668

## 2023-03-11 NOTE — PLAN OF CARE
Plan of Care Review  Plan of Care Reviewed With: patient  Progress: improving  Outcome Summary: denies pain, verbalizes needs. mod-I in room with no issues.

## 2023-03-11 NOTE — SUBJECTIVE & OBJECTIVE
" Patient was seen and examined.   Attestation Notes: Face to face encounter completed    Subjective    Continues to progress in therapies.  Improving tolerance for prosthetic wear time.  Close supervision with ambulation and transfers with prosthesis.  Pain controlled.  Objective     Visit Vitals  /83 (BP Location: Right upper arm, Patient Position: Sitting)   Pulse 71   Temp 36.8 °C (98.3 °F) (Oral)   Resp 18   Ht 1.854 m (6' 1\")   Wt 75.8 kg (167 lb 3.2 oz)   SpO2 95%   BMI 22.06 kg/m²       Review of Systems:  Pertinent items are noted in HPI.  Denies chest pain and shortness of breath.  Review of systems otherwise negative.    Labs     Results from last 7 days   Lab Units 03/07/23  0556   WBC K/uL 8.01   HEMOGLOBIN g/dL 13.3*   HEMATOCRIT % 41.7   PLATELETS K/uL 284     Results from last 7 days   Lab Units 03/07/23  0556   SODIUM mEQ/L 136   POTASSIUM mEQ/L 4.4   CHLORIDE mEQ/L 104   CO2 mEQ/L 24   BUN mg/dL 19   CREATININE mg/dL 0.7*   CALCIUM mg/dL 9.4   ALBUMIN g/dL 3.4   BILIRUBIN TOTAL mg/dL 0.6   ALK PHOS IU/L 77   ALT IU/L 17   AST IU/L 15   GLUCOSE mg/dL 87       Full Code    Physical Exam  General      Alert, cooperative, no distress, appears stated age.   Head:    Normocephalic, without obvious abnormality, atraumatic.   Eyes:    PERRL, conjunctiva/corneas clear, EOM's intact.        Nose:   Nares normal, septum midline, mucosa normal, no drainage or            sinus tenderness.   Throat:   Lips, mucosa, and tongue normal.    Neck:   Supple, symmetrical, trachea midline.    Back:     Symmetric, no curvature.   Lungs:     Clear to auscultation bilaterally, respirations unlabored.   Chest wall:    No tenderness or deformity.   Heart:    Regular rate and rhythm, S1 and S2 normal.   Abdomen:     Soft, non-tender, bowel sounds active all four quadrants,     no masses, no organomegaly.   Extremities:  Musculoskeletal: Trace stump edema.  Right transtibial amputation.   Pulses:   1+ and symmetric all " extremities.   Skin:   Skin color, texture, turgor normal, no rashes or lesions   Neurologic:          Behavior/  Emotional:  CNII-XII intact.  Alert and oriented ×3.  Motor exam intact.  Sensory exam intact.  Reflexes stable decreased reflexes.      Appropriate, cooperative           Plan of care was discussed with patient

## 2023-03-11 NOTE — PROGRESS NOTES
"Daily Progress Note     Patient was seen and examined.   Attestation Notes: Face to face encounter completed    Subjective    Continues to progress in therapies.  Improving tolerance for prosthetic wear time.  Close supervision with ambulation and transfers with prosthesis.  Pain controlled.  Objective     Visit Vitals  /83 (BP Location: Right upper arm, Patient Position: Sitting)   Pulse 71   Temp 36.8 °C (98.3 °F) (Oral)   Resp 18   Ht 1.854 m (6' 1\")   Wt 75.8 kg (167 lb 3.2 oz)   SpO2 95%   BMI 22.06 kg/m²       Review of Systems:  Pertinent items are noted in HPI.  Denies chest pain and shortness of breath.  Review of systems otherwise negative.    Labs     Results from last 7 days   Lab Units 03/07/23  0556   WBC K/uL 8.01   HEMOGLOBIN g/dL 13.3*   HEMATOCRIT % 41.7   PLATELETS K/uL 284     Results from last 7 days   Lab Units 03/07/23  0556   SODIUM mEQ/L 136   POTASSIUM mEQ/L 4.4   CHLORIDE mEQ/L 104   CO2 mEQ/L 24   BUN mg/dL 19   CREATININE mg/dL 0.7*   CALCIUM mg/dL 9.4   ALBUMIN g/dL 3.4   BILIRUBIN TOTAL mg/dL 0.6   ALK PHOS IU/L 77   ALT IU/L 17   AST IU/L 15   GLUCOSE mg/dL 87       Full Code    Physical Exam  General      Alert, cooperative, no distress, appears stated age.   Head:    Normocephalic, without obvious abnormality, atraumatic.   Eyes:    PERRL, conjunctiva/corneas clear, EOM's intact.        Nose:   Nares normal, septum midline, mucosa normal, no drainage or            sinus tenderness.   Throat:   Lips, mucosa, and tongue normal.    Neck:   Supple, symmetrical, trachea midline.    Back:     Symmetric, no curvature.   Lungs:     Clear to auscultation bilaterally, respirations unlabored.   Chest wall:    No tenderness or deformity.   Heart:    Regular rate and rhythm, S1 and S2 normal.   Abdomen:     Soft, non-tender, bowel sounds active all four quadrants,     no masses, no organomegaly.   Extremities:  Musculoskeletal: Trace stump edema.  Right transtibial amputation.   Pulses:   " 1+ and symmetric all extremities.   Skin:   Skin color, texture, turgor normal, no rashes or lesions   Neurologic:          Behavior/  Emotional:  CNII-XII intact.  Alert and oriented ×3.  Motor exam intact.  Sensory exam intact.  Reflexes stable decreased reflexes.      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 male who presents with the following chronic medical problems including MTHFR gene mutation, coronary artery disease with prior myocardial infarction 1993, peripheral artery disease with multiple bypass procedures in the past most recent with tPA bolus and angioplasty in 2020.  The patient [facility] to [facility] on November 30, 2022 to the vascular surgery service for planned BKA due to bypass thrombosis.  At the time of admission the patient had cyanosis and was beginning to develop thanh gangrene of the foot.  Patient has a history of 15+ surgeries of the extremities and understood that his revascularization options were limited.  He had severe 10 out of 10 pain of the foot relieved with elevation of the leg.  He was still able to ambulate but limited by pain.  Under the care of Bowen Serrato MD of vascular surgery he underwent right transtibial amputation on December 2, 2022.  Postoperative course unremarkable.  For postoperative pain control he was treated with morphine.  On discharge prescribed Tylenol and low-dose Lyrica.  Lyrica started prior by his PCP for neuropathic pain.  He was restarted on his home Eliquis.  He required min assist for bed mobility, transfers and ambulation.  The patient was admitted to [facility] on December 6, 2022 for acute inpatient rehabilitation for preprosthetic training.  The patient was discharged on December 18, 2022 after  completing a comprehensive inpatient  rehabilitation program progressing to a modified independent level with bed mobility, transfers and standing with rolling walker with limited hopping.  Pain control stable with medication change to hydrocodone/acetaminophen.  Continue Lyrica increased dose 50 mg 3 times a day.  The patient was also placed on nortriptyline for sleep and neuropathic pain.   The patient has follow-up outpatient with vascular surgery with improvement in wound healing.  The patient was evaluated by Dr. Zambrano in amputee clinic and felt appropriate for acute inpatient rehabilitation for intensive prosthetic training prior to returning home where he lives in [place].  The patient's pain has improved and he is weaned off all of his medications except for his Eliquis.    Comprehensive inpatient rehabilitation program for functional deficit status post right transtibial amputation.  Goal is for modified and pedal level with ambulation and self-care activities with prosthesis.  Monitor for pain.  Monitor skin closely.  Monitor cardiovascular status.    Continues to progress in therapies.  Improving tolerance for prosthetic wear time.  Close supervision with ambulation and transfers with prosthesis.  Pain controlled.    Lupus anticoagulant disorder (CMS/HCC)  Assessment & Plan  Continue Eliquis.    Deep vein thrombosis (CMS/[facility])  Assessment & Plan  Continue Eliquis.    Coronary artery disease  Assessment & Plan  Currently asymptomatic.  Monitor cardiac status during stay.  Cardiac precautions.        Expected Discharge Date:  3/18/2023

## 2023-03-12 ENCOUNTER — APPOINTMENT (INPATIENT)
Dept: PHYSICAL THERAPY | Facility: REHABILITATION | Age: 68
DRG: 560 | End: 2023-03-12
Payer: MEDICARE

## 2023-03-12 ENCOUNTER — APPOINTMENT (INPATIENT)
Dept: OCCUPATIONAL THERAPY | Facility: REHABILITATION | Age: 68
DRG: 560 | End: 2023-03-12
Payer: MEDICARE

## 2023-03-12 PROCEDURE — 97110 THERAPEUTIC EXERCISES: CPT | Mod: GO

## 2023-03-12 PROCEDURE — 97116 GAIT TRAINING THERAPY: CPT | Mod: GP

## 2023-03-12 PROCEDURE — 97530 THERAPEUTIC ACTIVITIES: CPT | Mod: GO

## 2023-03-12 PROCEDURE — 63700000 HC SELF-ADMINISTRABLE DRUG: Performed by: HOSPITALIST

## 2023-03-12 PROCEDURE — 12800000 HC ROOM AND CARE SEMIPRIVATE REHAB

## 2023-03-12 PROCEDURE — 97530 THERAPEUTIC ACTIVITIES: CPT | Mod: GP

## 2023-03-12 PROCEDURE — 97112 NEUROMUSCULAR REEDUCATION: CPT | Mod: GP

## 2023-03-12 RX ADMIN — ACETAMINOPHEN 650 MG: 325 TABLET, FILM COATED ORAL at 20:27

## 2023-03-12 RX ADMIN — APIXABAN 5 MG: 5 TABLET, FILM COATED ORAL at 20:25

## 2023-03-12 RX ADMIN — APIXABAN 5 MG: 5 TABLET, FILM COATED ORAL at 08:27

## 2023-03-12 NOTE — PROGRESS NOTES
Patient: Harry Alvarado  Location: [facility] [address]  MRN: [id number]  Today's date: 3/12/2023    History of Present Illness  Harry is a 67 y.o. male admitted on 3/6/2023 with Encounter for prosthetic gait training [Z47.89]. Principal problem is   Encounter for prosthetic gait training.      This is the first clinic appointment for Mr. Alvarado following his inpatient stay for preprostatic training. He is a 67-year-old male with a history of gene mutation, CAD, history of MI, peripheral artery disease with multiple bypass procedures in the past. The patient had been admitted to [facility] on November 30 for a planned transtibial amputation due to bypass thrombosis. He had reported worse pain in the previous 1 to 2 months in the right foot and had undergone multiple surgeries prior. He underwent a right transtibial amputation on December 2, 2022. There is no significant postoperative complications. Pain was well controlled. He was admitted to [place] [facility] for preprostatic training and did well and was discharged home. He continues to receive home care physical therapy. He is still following up with his surgeon. He still has small open area in the lateral aspect of the incision line with minimal amount of serous drainage noted. He offers complaints of intermittent phantom pain and sensations. He has no other complaints of headache or dizziness, chest pain or shortness of breath. Currently he and his wife are living in his son's home everything is a 1 level. He is independent at a wheelchair level. Prior to his amputation he was completely independent and very active.    Past Medical History  Harry has a past medical history of Coronary artery disease, Deep vein thrombosis (CMS/HCC), Lupus anticoagulant disorder (CMS/HCC), Lyme disease, MTHFR gene mutation, Myocardial infarction (CMS/HCC), and PAD (peripheral artery disease) (CMS/[facility]).      PT Vitals    Date/Time Pulse HR Source Pt  Activity BP BP Location BP Method Pt Position [facility]   03/12/23 1305 77 Monitor At rest 132/79 Right upper arm Automatic Sitting [facility]      PT Pain    Date/Time Pain Type Rating: Rest Rating: Activity [facility]   03/12/23 1305 Pain Assessment 0 0 [facility]   03/12/23 1355 Pain Reassessment 0 0 [facility]          Prior Living Environment    Flowsheet Row Most Recent Value   People in Home spouse   Current Living Arrangements home   Home Accessibility stairs to enter home (Group), stairs within home (Group)   Living Environment Comment Pt reports he lives with wife in 1  with 6 YOSEF. Pt has loft upstairs but does not need to access. Pt reports he's very active around the home and has steep property he needs to navigate.   Number of Stairs, Main Entrance 6   Stair Railings, Main Entrance railings on both sides of stairs   Stairs Comment, Main Entrance Can't reach both rails at once   Location, Patient Bedroom first (main) floor   Location, Bathroom first (main) floor   Bathroom Access Comment walk in shower, shower chair   Number of Stairs, Within Home, Primary 0          Prior Level of Function    Flowsheet Row Most Recent Value   Dominant Hand right   Ambulation assistive equipment   Transferring assistive equipment   Toileting independent   Bathing independent   Dressing independent   Eating independent   Prior Level of Function Comment Pt reports he was independent for all ADLs/mobility prior to TTA. Pt completed pre-prosthetic training at [facility] - has been Mod I at w/c level since d/c. Reports he utilized RW for transfers and gait training with HHPT.   Assistive Device Currently Used at Home walker, front-wheeled, wheelchair, shower chair, cane, straight           IRF PT Evaluation and Treatment - 03/12/23 1305        PT Time Calculation    Start Time 1300     Stop Time 1400     Time Calculation (min) 60 min        Session Details    Document Type daily treatment/progress note     Mode of Treatment individual therapy;physical therapy      "   General Information    Patient Profile Reviewed yes     General Observations of Patient Received in gym ready for PT session prosthesis donned since 10 AM        Transfers    Comment mobility belt donned throughout session        Sit to Stand Transfer    [place], Sit to Stand Transfer touching/steadying assist;verbal cues     Verbal Cues safety;technique     Assistive Device walker, front-wheeled     Comment stand to RW        Stand to Sit Transfer    [place], Stand to Sit Transfer touching/steadying assist     Verbal Cues technique     Assistive Device walker, front-wheeled     Comment sit to WC        Stand Pivot Transfer    [place], Stand Pivot/Stand Step Transfer touching/steadying assist     Verbal Cues technique;proper use of assistive device     Assistive Device walker, front-wheeled        Gait Training    [place], Gait close supervision     Assistive Device walker, front-wheeled     Distance in Feet 200 feet     Pattern (Gait) step-through     Deviations/Abnormal Patterns (Gait) base of support, narrow;gait speed decreased;step length decreased;weight shifting decreased     Comment (Gait/Stairs) 200' x 2 with RW + prosthesis Cl S with Vc's for gati mechanics to increased knee extension through midstance R prosthesis        Mobility Belt    Mobility Belt Used for All Out of Bed Activity yes        Motor Skills    Motor Control/Coordination Interventions neuro-muscular re-education        Neuromuscular Re-education    Interventions weight bearing;weight shifting     Positioning standing     Equipment Used parallel bars     Comment Fwd walking in // bars 60' x 3 with focus on stance control mechanics to improve TKE through midstance; Lat stepping pink TB ankles 10' x 6, Fwd + lat tap to 6\" box LLE 10 x 2, Fwd + lat ER tap to 6\" box 10 x 2, Basketball under LLE fwd/ back and L/R 20x each direction        Prosthetic Orientation (Lower Extremity)    Fit Assessment fit/function of prosthesis " are appropriate     Comment, Fit Assessment Skin assessment: intact at end of wearing time / end of session; Sock Ply 4 py, Don / Doff Cl S        Daily Progress Summary (PT)    Daily Outcome Statement Pt with improving gait mechanics with use of prosthesis with improved TKE through R midstance with focused practice.  Has difficulty maintain RW smooth movement with ambulation; frequently starting and stopping RW.  Will benefit from continued pre-gait NMRE to improve prosthetic trust / weight shifting.                           IRF PT Goals    Flowsheet Row Most Recent Value   Transfer Goal 1    Activity/Assistive Device sit-to-stand/stand-to-sit, stand pivot  [LRAD] at 03/06/2023 1502   [place] supervision required at 03/06/2023 1502   Time Frame short-term goal (STG), 1 week at 03/06/2023 1502   Progress/Outcome goal met at 03/08/2023 1454   Transfer Goal 2    Activity/Assistive Device sit-to-stand/stand-to-sit, stand pivot  [LRAD] at 03/06/2023 1502   [place] modified independence at 03/06/2023 1502   Time Frame 1 week at 03/08/2023 1454   Progress/Outcome continuing progress toward goal, goal ongoing at 03/08/2023 1454   Gait/Walking Locomotion Goal 1    Activity/Assistive Device gait (walking locomotion)  [LRAD] at 03/06/2023 1502   Distance 150 feet at 03/06/2023 1502   [place] supervision required at 03/06/2023 1502   Time Frame short-term goal (STG), 1 week at 03/06/2023 1502   Progress/Outcome goal met at 03/08/2023 1454   Gait/Walking Locomotion Goal 2    Activity/Assistive Device gait (walking locomotion)  [LRAD] at 03/06/2023 1502   Distance 150 feet at 03/06/2023 1502   [place] modified independence at 03/06/2023 1502   Time Frame 1 week at 03/08/2023 1454   Progress/Outcome continuing progress toward goal, goal ongoing at 03/08/2023 1454   Stairs Goal 1    Activity/Assistive Device stairs, all skills at 03/08/2023 1454   Number of Stairs 12 at 03/08/2023 1454   [place] modified  independence at 03/08/2023 1458   Time Frame long-term goal (LTG), 1 week at 03/08/2023 1452

## 2023-03-12 NOTE — PLAN OF CARE
Plan of Care Review  Plan of Care Reviewed With: patient  Progress: no change  Outcome Summary: Pt. slept well. Cont. Denied pain. Used call bell for assistance.Refused bed alarm bed setting.

## 2023-03-12 NOTE — PLAN OF CARE
pt has intermittent phantom pain but denies need for pain medication. mod-I in room with no issues. continent of bowel/bladder. makes needs known. Pt likes to be independent, encouraged patient to ask for help as needed.

## 2023-03-12 NOTE — PROGRESS NOTES
Patient: Harry Alvarado  Location: [facility] [address]  MRN: [id number]  Today's date: 3/12/2023    History of Present Illness  Harry is a 67 y.o. male admitted on 3/6/2023 with Encounter for prosthetic gait training [Z47.89]. Principal problem is   Encounter for prosthetic gait training.      This is the first clinic appointment for Mr. Alvarado following his inpatient stay for preprostatic training. He is a 67-year-old male with a history of gene mutation, CAD, history of MI, peripheral artery disease with multiple bypass procedures in the past. The patient had been admitted to [facility] on November 30 for a planned transtibial amputation due to bypass thrombosis. He had reported worse pain in the previous 1 to 2 months in the right foot and had undergone multiple surgeries prior. He underwent a right transtibial amputation on December 2, 2022. There is no significant postoperative complications. Pain was well controlled. He was admitted to [place] [facility] for preprostatic training and did well and was discharged home. He continues to receive home care physical therapy. He is still following up with his surgeon. He still has small open area in the lateral aspect of the incision line with minimal amount of serous drainage noted. He offers complaints of intermittent phantom pain and sensations. He has no other complaints of headache or dizziness, chest pain or shortness of breath. Currently he and his wife are living in his son's home everything is a 1 level. He is independent at a wheelchair level. Prior to his amputation he was completely independent and very active.    Past Medical History  Harry has a past medical history of Coronary artery disease, Deep vein thrombosis (CMS/HCC), Lupus anticoagulant disorder (CMS/HCC), Lyme disease, MTHFR gene mutation, Myocardial infarction (CMS/HCC), and PAD (peripheral artery disease) (CMS/[facility]).      OT Vitals    Date/Time Pulse SpO2 BP BP  Location BP Method Pt Position [facility]   03/12/23 1003 92 94 % 133/87 Right upper arm Automatic Sitting [facility]      OT Pain    Date/Time Pain Type Rating: Rest Rating: Activity [facility]   03/12/23 1003 Pain Assessment 0 -- [facility]   03/12/23 1054 Pain Reassessment -- 0 [facility]          Prior Living Environment    Flowsheet Row Most Recent Value   People in Home spouse   Current Living Arrangements home   Home Accessibility stairs to enter home (Group), stairs within home (Group)   Living Environment Comment Pt reports he lives with wife in 1  with 6 YOSEF. Pt has loft upstairs but does not need to access. Pt reports he's very active around the home and has steep property he needs to navigate.   Number of Stairs, Main Entrance 6   Stair Railings, Main Entrance railings on both sides of stairs   Stairs Comment, Main Entrance Can't reach both rails at once   Location, Patient Bedroom first (main) floor   Location, Bathroom first (main) floor   Bathroom Access Comment walk in shower, shower chair   Number of Stairs, Within Home, Primary 0          Prior Level of Function    Flowsheet Row Most Recent Value   Dominant Hand right   Ambulation assistive equipment   Transferring assistive equipment   Toileting independent   Bathing independent   Dressing independent   Eating independent   Prior Level of Function Comment Pt reports he was independent for all ADLs/mobility prior to TTA. Pt completed pre-prosthetic training at [facility] - has been Mod I at w/c level since d/c. Reports he utilized RW for transfers and gait training with HHPT.   Assistive Device Currently Used at Home walker, front-wheeled, wheelchair, shower chair, cane, straight          Occupational Profile    Flowsheet Row Most Recent Value   Occupational History/Life Experiences Pt is interested in learning about return to driving, plans to purchase a new vehicle.  Recommend arranging Chirag Eaton from [employer] to speak with pt.  Pt lives in [place] and was staying in  [place] with son since amputation, plans to return to [place].  Initially wanted to drive home.  Discussed potentially purchasing   Environmental Supports and Barriers +. 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, also enjoys hiking,           IRF OT Evaluation and Treatment - 03/12/23 0959        OT Time Calculation    Start Time 1000     Stop Time 1100     Time Calculation (min) 60 min        Session Details    Document Type daily treatment/progress note     Mode of Treatment individual therapy;occupational therapy        General Information    General Observations of Patient Pt. pleasant and cooperative.        Sit to Stand Transfer    [place], Sit to Stand Transfer verbal cues;touching/steadying assist;increased time to complete;safety considerations;1 person assist     Verbal Cues safety;technique     Assistive Device walker, front-wheeled;wheelchair     Comment Steady A x 1 sit to stand with walker with cues. R prosthesis donned.        Stand to Sit Transfer    [place], Stand to Sit Transfer verbal cues;touching/steadying assist;increased time to complete;safety considerations;1 person assist     Verbal Cues safety;technique     Assistive Device walker, front-wheeled;wheelchair     Comment Steady A x 1 stand to sit with walker with cues. R prostheis donned.        Stand Pivot Transfer    [place], Stand Pivot/Stand Step Transfer verbal cues;increased time to complete;safety considerations;1 person assist;minimum assist (75% or more patient effort)     Verbal Cues safety;technique     Assistive Device walker, front-wheeled;wheelchair     Comment Min A x 1 SPT with walker with cues.        Safety Issues, Functional Mobility    Comment, Safety Issues/Impairments (Mobility) Min A x 1 with walker with R prosthesis donned for short household mobility.        Mobility Belt    Mobility Belt Used for All Out of Bed  Activity yes        Balance    Balance Assessment sitting static balance;standing static balance     Static Sitting Balance WFL;supported;sitting in chair     Static Standing Balance mild impairment;supported;standing     Comment, Balance CLS x 1 with R prosthesis donned with walker while completing b/l UE AROM (bean bag tosses) while addressing dynamic balance. 1st trial 4 out of 14 able to get into bucket, 2nd trial: 8 out of 14 in bucket, trial #3: 7 out of 14 in bucket. Pt. enjoyed activity, and enjoys competition.        Motor Skills    Motor Skills coordination   b/l UE WFL       Aerobic Exercise    Type arm bike     Time Performed 10     Comment 5 minutes forward propulsion/5minutes backward propulsion. CLS x 1 static standing with walker while completing b/l UE arm bike.        LE Residual Limb Evaluation    Amputation Level transtibial, standard     Amputation Laterality right side     Incision Management incision line adequately closed for scar management     Sensation Issues phantom limb sensation        Prosthetic Orientation (Lower Extremity)    Comment, Fit Assessment CL S x 1 donning R prosthesis. 4 sock ply donned. Pt. noted to have scab on lateral side of incision line. Red spot noted on tibia below patella region, but blancheable and no concerns noted. Pt. reported prior blister to area and that it has been red post healing and no issues to date. Recommend ongoing skin monitoring. Therapist had pt. leave R prosthesis donned until PT session at 1pm. Pt. agreeable. Nursing and techn notified.        Daily Progress Summary (OT)    Daily Outcome Statement OT session focused on b/l UE AROM endurance exercise (arm bike) while standing with walker and R prosthesis donned with CLS x 1. Session also focused on short household mobility with walker with Min A x 1 and R prosthesis donned, dynamic balance during therapeutic activity. Pt. would continue to benefit from education for walker safety (not picking up  walker during SPT) etc. Cont. with POC.                      Education Documentation  Treatment Plan, taught by Caroline Walters OT at 3/12/2023 12:29 PM.  Learner: Patient  Readiness: Acceptance  Method: Explanation, Demonstration  Response: Verbalizes Understanding, Demonstrated Understanding, Needs Reinforcement  Comment: Pt. educated on walker safety during short functional mobility (not picking up walker when completing SPT). Ongoing education recommended to increase pt. safety.    Tobacco Use, Smoke Exposure, taught by Caroline Walters OT at 3/12/2023 12:29 PM.  Learner: Patient  Readiness: Acceptance  Method: Explanation, Demonstration  Response: Verbalizes Understanding, Demonstrated Understanding, Needs Reinforcement  Comment: Pt. educated on walker safety during short functional mobility (not picking up walker when completing SPT). Ongoing education recommended to increase pt. safety.    Safe Medication Disposal, taught by Caroline Walters OT at 3/12/2023 12:29 PM.  Learner: Patient  Readiness: Acceptance  Method: Explanation, Demonstration  Response: Verbalizes Understanding, Demonstrated Understanding, Needs Reinforcement  Comment: Pt. educated on walker safety during short functional mobility (not picking up walker when completing SPT). Ongoing education recommended to increase pt. safety.    Pain Assessment Process, taught by Caroline Walters OT at 3/12/2023 12:29 PM.  Learner: Patient  Readiness: Acceptance  Method: Explanation, Demonstration  Response: Verbalizes Understanding, Demonstrated Understanding, Needs Reinforcement  Comment: Pt. educated on walker safety during short functional mobility (not picking up walker when completing SPT). Ongoing education recommended to increase pt. safety.    Medication Management, taught by Caroline Walters OT at 3/12/2023 12:29 PM.  Learner: Patient  Readiness: Acceptance  Method: Explanation, Demonstration  Response: Verbalizes Understanding,  Demonstrated Understanding, Needs Reinforcement  Comment: Pt. educated on walker safety during short functional mobility (not picking up walker when completing SPT). Ongoing education recommended to increase pt. safety.    Oral Health, taught by Caroline Walters OT at 3/12/2023 12:29 PM.  Learner: Patient  Readiness: Acceptance  Method: Explanation, Demonstration  Response: Verbalizes Understanding, Demonstrated Understanding, Needs Reinforcement  Comment: Pt. educated on walker safety during short functional mobility (not picking up walker when completing SPT). Ongoing education recommended to increase pt. safety.    Opioid Medication Management, taught by Caroline Walters OT at 3/12/2023 12:29 PM.  Learner: Patient  Readiness: Acceptance  Method: Explanation, Demonstration  Response: Verbalizes Understanding, Demonstrated Understanding, Needs Reinforcement  Comment: Pt. educated on walker safety during short functional mobility (not picking up walker when completing SPT). Ongoing education recommended to increase pt. safety.    Diet Modification, taught by Caroline Walters OT at 3/12/2023 12:29 PM.  Learner: Patient  Readiness: Acceptance  Method: Explanation, Demonstration  Response: Verbalizes Understanding, Demonstrated Understanding, Needs Reinforcement  Comment: Pt. educated on walker safety during short functional mobility (not picking up walker when completing SPT). Ongoing education recommended to increase pt. safety.    Diagnostic Tests/Procedures, taught by Caroline Walters OT at 3/12/2023 12:29 PM.  Learner: Patient  Readiness: Acceptance  Method: Explanation, Demonstration  Response: Verbalizes Understanding, Demonstrated Understanding, Needs Reinforcement  Comment: Pt. educated on walker safety during short functional mobility (not picking up walker when completing SPT). Ongoing education recommended to increase pt. safety.    Advance Care Planning, taught by Caroline Walters OT at  3/12/2023 12:29 PM.  Learner: Patient  Readiness: Acceptance  Method: Explanation, Demonstration  Response: Verbalizes Understanding, Demonstrated Understanding, Needs Reinforcement  Comment: Pt. educated on walker safety during short functional mobility (not picking up walker when completing SPT). Ongoing education recommended to increase pt. safety.    Orientation to Care Setting, Routine, taught by Caroline Walters OT at 3/12/2023 12:29 PM.  Learner: Patient  Readiness: Acceptance  Method: Explanation, Demonstration  Response: Verbalizes Understanding, Demonstrated Understanding, Needs Reinforcement  Comment: Pt. educated on walker safety during short functional mobility (not picking up walker when completing SPT). Ongoing education recommended to increase pt. safety.    Admission, Transition of Care, taught by Caroline Walters OT at 3/12/2023 12:29 PM.  Learner: Patient  Readiness: Acceptance  Method: Explanation, Demonstration  Response: Verbalizes Understanding, Demonstrated Understanding, Needs Reinforcement  Comment: Pt. educated on walker safety during short functional mobility (not picking up walker when completing SPT). Ongoing education recommended to increase pt. safety.          IRF OT Goals    Flowsheet Row Most Recent Value   Transfer Goal 1    Activity/Assistive Device toilet at 03/07/2023 0804   [place] supervision required at 03/08/2023 1445   Time Frame short-term goal (STG), 1 week at 03/08/2023 1445   Progress/Outcome goal revised this date at 03/08/2023 1445   Transfer Goal 2    Activity/Assistive Device toilet at 03/07/2023 0804   [place] modified independence at 03/07/2023 0804   Time Frame long-term goal (LTG), 14 days or less at 03/07/2023 0804   Progress/Outcome goal ongoing at 03/08/2023 1445   UB Dressing Goal 1    [place] supervision required at 03/08/2023 1445   Time Frame short-term goal (STG), 1 week at 03/08/2023 1445   Progress/Outcome goal revised this date  at 03/08/2023 1445   UB Dressing Goal 2    [place] modified independence at 03/07/2023 0804   Time Frame long-term goal (LTG), 14 days or less at 03/07/2023 0804   Progress/Outcome goal ongoing at 03/08/2023 1445   LB Dressing Goal 1    [place] supervision required at 03/07/2023 0804   Time Frame short-term goal (STG), 1 week at 03/08/2023 1445   Progress/Outcome goal revised this date at 03/08/2023 1445   LB Dressing Goal 2    [place] modified independence at 03/07/2023 0804   Time Frame long-term goal (LTG), 14 days or less at 03/07/2023 0804   Progress/Outcome goal ongoing at 03/08/2023 1445   Grooming Goal 1    [place] supervision required at 03/07/2023 0804   Time Frame short-term goal (STG), 1 week at 03/08/2023 1445   Progress/Outcome goal ongoing at 03/08/2023 1445   Grooming Goal 2    [place] modified independence at 03/07/2023 0804   Time Frame long-term goal (LTG), 14 days or less at 03/07/2023 0804   Progress/Outcome goal ongoing at 03/08/2023 1445   Toileting Goal 1    [place] supervision required at 03/07/2023 0804   Time Frame short-term goal (STG), 5 - 7 days at 03/07/2023 0804   Progress/Outcome goal ongoing at 03/08/2023 1445   Toileting Goal 2    [place] modified independence at 03/07/2023 0804   Time Frame long-term goal (LTG), 14 days or less at 03/07/2023 0804   Progress/Outcome goal ongoing at 03/08/2023 1445

## 2023-03-13 ENCOUNTER — APPOINTMENT (INPATIENT)
Dept: OTHER | Facility: REHABILITATION | Age: 68
DRG: 560 | End: 2023-03-13
Payer: MEDICARE

## 2023-03-13 ENCOUNTER — APPOINTMENT (INPATIENT)
Dept: PHYSICAL THERAPY | Facility: REHABILITATION | Age: 68
DRG: 560 | End: 2023-03-13
Payer: MEDICARE

## 2023-03-13 ENCOUNTER — APPOINTMENT (INPATIENT)
Dept: OCCUPATIONAL THERAPY | Facility: REHABILITATION | Age: 68
DRG: 560 | End: 2023-03-13
Payer: MEDICARE

## 2023-03-13 PROCEDURE — 97110 THERAPEUTIC EXERCISES: CPT | Mod: GO

## 2023-03-13 PROCEDURE — 97110 THERAPEUTIC EXERCISES: CPT | Mod: GP

## 2023-03-13 PROCEDURE — 97112 NEUROMUSCULAR REEDUCATION: CPT | Mod: GP

## 2023-03-13 PROCEDURE — 63700000 HC SELF-ADMINISTRABLE DRUG: Performed by: HOSPITALIST

## 2023-03-13 PROCEDURE — 97799 UNLISTED PHYSCL MED/REHAB PX: CPT

## 2023-03-13 PROCEDURE — 97530 THERAPEUTIC ACTIVITIES: CPT | Mod: GP

## 2023-03-13 PROCEDURE — 12800000 HC ROOM AND CARE SEMIPRIVATE REHAB

## 2023-03-13 PROCEDURE — 97763 ORTHC/PROSTC MGMT SBSQ ENC: CPT | Mod: GP

## 2023-03-13 PROCEDURE — 97116 GAIT TRAINING THERAPY: CPT | Mod: GP

## 2023-03-13 PROCEDURE — 97530 THERAPEUTIC ACTIVITIES: CPT | Mod: GO

## 2023-03-13 RX ADMIN — APIXABAN 5 MG: 5 TABLET, FILM COATED ORAL at 20:05

## 2023-03-13 RX ADMIN — APIXABAN 5 MG: 5 TABLET, FILM COATED ORAL at 07:54

## 2023-03-13 NOTE — PLAN OF CARE
Plan of Care Review  Plan of Care Reviewed With: patient  Progress: no change  Outcome Summary: Pt. slept well with aromatherapy. Pain managed with Tylenol. refused middle setting bed alarm. Cont. of bladder. Used call bell for assistance.

## 2023-03-13 NOTE — PROGRESS NOTES
Patient: Harry Alvarado  Location: [facility] [address]  MRN: [id number]  Today's date: 3/13/2023    History of Present Illness  Harry is a 67 y.o. male admitted on 3/6/2023 with Encounter for prosthetic gait training [Z47.89]. Principal problem is   Encounter for prosthetic gait training.      This is the first clinic appointment for Mr. Alvarado following his inpatient stay for preprostatic training. He is a 67-year-old male with a history of gene mutation, CAD, history of MI, peripheral artery disease with multiple bypass procedures in the past. The patient had been admitted to [facility] on November 30 for a planned transtibial amputation due to bypass thrombosis. He had reported worse pain in the previous 1 to 2 months in the right foot and had undergone multiple surgeries prior. He underwent a right transtibial amputation on December 2, 2022. There is no significant postoperative complications. Pain was well controlled. He was admitted to [facility] for preprostatic training and did well and was discharged home. He continues to receive home care physical therapy. He is still following up with his surgeon. He still has small open area in the lateral aspect of the incision line with minimal amount of serous drainage noted. He offers complaints of intermittent phantom pain and sensations. He has no other complaints of headache or dizziness, chest pain or shortness of breath. Currently he and his wife are living in his son's home everything is a 1 level. He is independent at a wheelchair level. Prior to his amputation he was completely independent and very active.    Past Medical History  Harry has a past medical history of Coronary artery disease, Deep vein thrombosis (CMS/HCC), Lupus anticoagulant disorder (CMS/HCC), Lyme disease, MTHFR gene mutation, Myocardial infarction (CMS/HCC), and PAD (peripheral artery disease) (CMS/[facility]).      PT Vitals    Date/Time Pulse HR Source BP  BP Location BP Method Pt Position [facility]   03/13/23 1501 74 Monitor 134/77 Right upper arm Automatic Sitting PJ      PT Pain    Date/Time Pain Type Rating: Rest Rating: Activity [facility]   03/13/23 1501 Pain Assessment 0 0 PJ   03/13/23 1559 Pain Reassessment 0 0 PJ          Prior Living Environment    Flowsheet Row Most Recent Value   People in Home spouse   Current Living Arrangements home   Home Accessibility stairs to enter home (Group), stairs within home (Group)   Living Environment Comment Pt reports he lives with wife in 1  with 6 YOSEF. Pt has loft upstairs but does not need to access. Pt reports he's very active around the home and has steep property he needs to navigate.   Number of Stairs, Main Entrance 6   Stair Railings, Main Entrance railings on both sides of stairs   Stairs Comment, Main Entrance Can't reach both rails at once   Location, Patient Bedroom first (main) floor   Location, Bathroom first (main) floor   Bathroom Access Comment walk in shower, shower chair   Number of Stairs, Within Home, Primary 0          Prior Level of Function    Flowsheet Row Most Recent Value   Dominant Hand right   Ambulation assistive equipment   Transferring assistive equipment   Toileting independent   Bathing independent   Dressing independent   Eating independent   Prior Level of Function Comment Pt reports he was independent for all ADLs/mobility prior to TTA. Pt completed pre-prosthetic training at [facility] - has been Mod I at w/c level since d/c. Reports he utilized RW for transfers and gait training with HHPT.   Assistive Device Currently Used at Home walker, front-wheeled, wheelchair, shower chair, cane, straight           IRF PT Evaluation and Treatment - 03/13/23 1501        PT Time Calculation    Start Time 1500     Stop Time 1600     Time Calculation (min) 60 min        Session Details    Document Type daily treatment/progress note     Mode of Treatment physical therapy;individual therapy        General Information  "   Patient Profile Reviewed yes     General Observations of Patient Pt received in w/c in room     Existing Precautions/Restrictions cardiac;fall        Transfers    Transfers stand pivot transfer     Comment gait belt donned t/o session        Sit to Stand Transfer    [place], Sit to Stand Transfer touching/steadying assist     Verbal Cues technique     Assistive Device walker, front-wheeled     Comment w/c to stance w steadying assist for balance        Stand to Sit Transfer    [place], Stand to Sit Transfer touching/steadying assist     Verbal Cues technique     Assistive Device walker, front-wheeled     Comment stance to w/c w steadying assist for controlled descent        Stand Pivot Transfer    [place], Stand Pivot/Stand Step Transfer touching/steadying assist     Verbal Cues safety;technique     Assistive Device walker, front-wheeled     Comment via amb appoach w steadying assist for balance        Gait Training    [place], Gait close supervision     Assistive Device walker, front-wheeled     Distance in Feet 200 feet     Pattern (Gait) step-through     Deviations/Abnormal Patterns (Gait) base of support, narrow;gait speed decreased;step length decreased;weight shifting decreased     Comment (Gait/Stairs) 200ft x 1 w RW and close supervision for balance and VCs for hip/knee extension        Curb Negotiation    [place] minimum assist (75% or more patient effort)     Assistive Device walker, front-wheeled     Curb Height 6 inches     Comment up/down 6\" curb w RW and min Ax1 for balance and VCs for leading LE        Sloped Surface Gait Skills    [place] close supervision     Assistive Device walker, front-wheeled     Distance in Feet 25 feet     Comment up/down 25ft ADA ramp w RW and close supervision for balance        Mobility Belt    Mobility Belt Used for All Out of Bed Activity yes        Neuromuscular Re-education    Interventions weight shifting;weight bearing     Positioning " "standing     Equipment Used parallel bars     Comment pre-gait activities: 1) 6\" forward/lateral LLE toe taps 2x10 2) RLE airex step ups w contralateral hip march 2x10 3) LLE stepping forward toward target on ground 2x10 4) side stepping 2x15ft R/L        Aerobic Exercise    Type recumbent stationary bike     Time Performed 10     Comment NuStep x 10 min on level 1        Prosthetic Orientation (Lower Extremity)    Fit Assessment fit/function of prosthesis are appropriate     Comment, Fit Assessment Skin checks: pre/ppost skin checks unremarkable. Donning/doffing: performs both donning and doffing at S level. 5 ply t/o session. Total wear time = 1 hour during session        Daily Progress Summary (PT)    Daily Outcome Statement Session focused on pre-gait/weightbearing activities. Utilized parallel bars for multiple interventions focusing on hip./knee extension and improved stance phase for RLE. Rest fo session spent on functional mobility. Ended on NuStep per patient preference. Cont POC.                         IRF PT Goals    Flowsheet Row Most Recent Value   Transfer Goal 1    Activity/Assistive Device sit-to-stand/stand-to-sit, stand pivot  [LRAD] at 03/06/2023 1502   [place] supervision required at 03/06/2023 1502   Time Frame short-term goal (STG), 1 week at 03/06/2023 1502   Progress/Outcome goal met at 03/08/2023 1454   Transfer Goal 2    Activity/Assistive Device sit-to-stand/stand-to-sit, stand pivot  [LRAD] at 03/06/2023 1502   [place] modified independence at 03/06/2023 1502   Time Frame 1 week at 03/08/2023 1454   Progress/Outcome continuing progress toward goal, goal ongoing at 03/08/2023 1454   Gait/Walking Locomotion Goal 1    Activity/Assistive Device gait (walking locomotion)  [LRAD] at 03/06/2023 1502   Distance 150 feet at 03/06/2023 1502   [place] supervision required at 03/06/2023 1502   Time Frame short-term goal (STG), 1 week at 03/06/2023 1502   Progress/Outcome goal met at " 03/08/2023 1454   Gait/Walking Locomotion Goal 2    Activity/Assistive Device gait (walking locomotion)  [LRAD] at 03/06/2023 1502   Distance 150 feet at 03/06/2023 1502   [place] modified independence at 03/06/2023 1502   Time Frame 1 week at 03/08/2023 1454   Progress/Outcome continuing progress toward goal, goal ongoing at 03/08/2023 1454   Stairs Goal 1    Activity/Assistive Device stairs, all skills at 03/08/2023 1454   Number of Stairs 12 at 03/08/2023 1454   [place] modified independence at 03/08/2023 1454   Time Frame long-term goal (LTG), 1 week at 03/08/2023 1457

## 2023-03-13 NOTE — PLAN OF CARE
Problem: Rehabilitation (IRF) Plan of Care  Goal: Plan of Care Review  Outcome: Progressing  Flowsheets (Taken 3/13/2023 8593)  Progress: improving  Plan of Care Reviewed With: patient  Outcome Summary: Patient is cooperative and pleasant. Denies pain. Mod I in room in wheelchair. Makes needs known.   Plan of Care Review  Plan of Care Reviewed With: patient  Progress: improving  Outcome Summary: Patient is cooperative and pleasant. Denies pain. Mod I in room in wheelchair. Makes needs known.

## 2023-03-13 NOTE — PLAN OF CARE
Problem: Rehabilitation (IRF) Plan of Care  Goal: Plan of Care Review  Flowsheets (Taken 3/13/2023 0531)  Plan of Care Reviewed With: patient  Outcome Summary:   Pt eval'd for RT tx services   Pt agreeable to RT POC.

## 2023-03-13 NOTE — ASSESSMENT & PLAN NOTE
Harry Alvarado is a 67 y.o. male who presents with the following chronic medical problems including MTHFR gene mutation, coronary artery disease with prior myocardial infarction 1993, peripheral artery disease with multiple bypass procedures in the past most recent with tPA bolus and angioplasty in 2020.  The patient admitted to [facility] on November 30, 2022 to the vascular surgery service for planned BKA due to bypass thrombosis.  At the time of admission the patient had cyanosis and was beginning to develop thanh gangrene of the foot.  Patient has a history of 15+ surgeries of the extremities and understood that his revascularization options were limited.  He had severe 10 out of 10 pain of the foot relieved with elevation of the leg.  He was still able to ambulate but limited by pain.  Under the care of Bowen Serrato MD of vascular surgery he underwent right transtibial amputation on December 2, 2022.  Postoperative course unremarkable.  For postoperative pain control he was treated with morphine.  On discharge prescribed Tylenol and low-dose Lyrica.  Lyrica started prior by his PCP for neuropathic pain.  He was restarted on his home Eliquis.  He required min assist for bed mobility, transfers and ambulation.  The patient was admitted to [facility] on December 6, 2022 for acute inpatient rehabilitation for preprosthetic training.  The patient was discharged on December 18, 2022 after  completing a comprehensive inpatient rehabilitation program progressing to a modified independent level with bed mobility, transfers and standing with rolling walker with limited hopping.  Pain control stable with medication change to hydrocodone/acetaminophen.  Continue Lyrica increased dose 50 mg 3 times a day.  The patient was also placed on nortriptyline for sleep and neuropathic pain.   The patient has follow-up outpatient with vascular surgery with improvement in wound healing.  The patient  was evaluated by Dr. Zambrano in amputee clinic and felt appropriate for acute inpatient rehabilitation for intensive prosthetic training prior to returning home where he lives in [place].  The patient's pain has improved and he is weaned off all of his medications except for his Eliquis.    Comprehensive inpatient rehabilitation program for functional deficit status post right transtibial amputation.  Goal is for modified and pedal level with ambulation and self-care activities with prosthesis.  Monitor for pain.  Monitor skin closely.  Monitor cardiovascular status.    Progressing well with therapies.  Increased wear time of prosthesis.  Touch assist with transfers and close supervision with ambulation with a rolling walker.  No significant pain.

## 2023-03-13 NOTE — SUBJECTIVE & OBJECTIVE
" Patient was seen and examined.   Attestation Notes: Face to face encounter completed    Subjective    The patient progressing in therapies.  Touch assist with transfers close supervision with ambulation.  Pain well controlled.  Objective     Visit Vitals  /62 (BP Location: Right upper arm, Patient Position: Sitting)   Pulse 88   Temp 36.5 °C (97.7 °F) (Oral)   Resp 17   Ht 1.854 m (6' 1\")   Wt 75.8 kg (167 lb 3.2 oz)   SpO2 95%   BMI 22.06 kg/m²       Review of Systems:  Pertinent items are noted in HPI.  Denies chest pain and shortness of breath.  Review of systems otherwise negative.    Labs     Results from last 7 days   Lab Units 03/07/23  0556   WBC K/uL 8.01   HEMOGLOBIN g/dL 13.3*   HEMATOCRIT % 41.7   PLATELETS K/uL 284     Results from last 7 days   Lab Units 03/07/23  0556   SODIUM mEQ/L 136   POTASSIUM mEQ/L 4.4   CHLORIDE mEQ/L 104   CO2 mEQ/L 24   BUN mg/dL 19   CREATININE mg/dL 0.7*   CALCIUM mg/dL 9.4   ALBUMIN g/dL 3.4   BILIRUBIN TOTAL mg/dL 0.6   ALK PHOS IU/L 77   ALT IU/L 17   AST IU/L 15   GLUCOSE mg/dL 87       Full Code    Physical Exam  General      Alert, cooperative, no distress, appears stated age.   Head:    Normocephalic, without obvious abnormality, atraumatic.   Eyes:    PERRL, conjunctiva/corneas clear, EOM's intact.        Nose:   Nares normal, septum midline, mucosa normal, no drainage or            sinus tenderness.   Throat:   Lips, mucosa, and tongue normal.    Neck:   Supple, symmetrical, trachea midline.    Back:     Symmetric, no curvature.   Lungs:     Clear to auscultation bilaterally, respirations unlabored.   Chest wall:    No tenderness or deformity.   Heart:    Regular rate and rhythm, S1 and S2 normal.   Abdomen:     Soft, non-tender, bowel sounds active all four quadrants,     no masses, no organomegaly.   Extremities:  Musculoskeletal: Trace stump edema  Right transtibial amputation.   Pulses:   1+ and symmetric all extremities.   Skin:   Skin color, texture, " turgor normal, no rashes or lesions   Neurologic:          Behavior/  Emotional:  CNII-XII intact.  Alert and oriented ×3.  Motor exam intact.  Sensory exam intact.  Reflexes stable decreased reflexes.      Appropriate, cooperative           Plan of care was discussed with patient

## 2023-03-13 NOTE — PROGRESS NOTES
Patient: Harry Alvarado  Location: [facility] [address]  MRN: [id number]  Today's date: 3/13/2023    Hospital Course  History of Present Illness  Harry is a 67 y.o. male admitted on 3/6/2023 with Encounter for prosthetic gait training [Z47.89]. Principal problem is   Encounter for prosthetic gait training.      This is the first clinic appointment for Mr. Alvarado following his inpatient stay for preprostatic training. He is a 67-year-old male with a history of gene mutation, CAD, history of MI, peripheral artery disease with multiple bypass procedures in the past. The patient had been admitted to [facility] on November 30 for a planned transtibial amputation due to bypass thrombosis. He had reported worse pain in the previous 1 to 2 months in the right foot and had undergone multiple surgeries prior. He underwent a right transtibial amputation on December 2, 2022. There is no significant postoperative complications. Pain was well controlled. He was admitted to [facility] for preprostatic training and did well and was discharged home. He continues to receive home care physical therapy. He is still following up with his surgeon. He still has small open area in the lateral aspect of the incision line with minimal amount of serous drainage noted. He offers complaints of intermittent phantom pain and sensations. He has no other complaints of headache or dizziness, chest pain or shortness of breath. Currently he and his wife are living in his son's home everything is a 1 level. He is independent at a wheelchair level. Prior to his amputation he was completely independent and very active.    Past Medical History  Harry has a past medical history of Coronary artery disease, Deep vein thrombosis (CMS/HCC), Lupus anticoagulant disorder (CMS/HCC), Lyme disease, MTHFR gene mutation, Myocardial infarction (CMS/HCC), and PAD (peripheral artery disease) (CMS/[facility]).          Prior Living  Environment    Flowsheet Row Most Recent Value   People in Home spouse   Current Living Arrangements home   Home Accessibility stairs to enter home (Group), stairs within home (Group)   Living Environment Comment Pt reports he lives with wife in 1  with 6 YOSEF. Pt has loft upstairs but does not need to access. Pt reports he's very active around the home and has steep property he needs to navigate.   Number of Stairs, Main Entrance 6   Stair Railings, Main Entrance railings on both sides of stairs   Stairs Comment, Main Entrance Can't reach both rails at once   Location, Patient Bedroom first (main) floor   Location, Bathroom first (main) floor   Bathroom Access Comment walk in shower, shower chair   Number of Stairs, Within Home, Primary 0          Prior Level of Function    Flowsheet Row Most Recent Value   Dominant Hand right   Ambulation assistive equipment   Transferring assistive equipment   Toileting independent   Bathing independent   Dressing independent   Eating independent   Prior Level of Function Comment Pt reports he was independent for all ADLs/mobility prior to TTA. Pt completed pre-prosthetic training at [facility] - has been Mod I at w/c level since d/c. Reports he utilized RW for transfers and gait training with HHPT.   Assistive Device Currently Used at Home walker, front-wheeled, wheelchair, shower chair, cane, straight           Recreational Therapy Evaluation and Treatment - 03/13/23 1303        Session Details    Document Type initial evaluation     Mode of Treatment recreational therapy;individual therapy        Rec Time Calculation    Start Time 1300     Stop Time 1400     Time Calculation (min) 60 min        General Information    Patient Profile Reviewed yes     General Observations of Patient RT greeted pt seated upright in MWC in pt rm; Pt agreeable to RT tx EVAL.        Recreational History and Interests    Current Hobbies/Interests arts/crafts;interaction with pets;care of  plants;exercise/fitness;family functions;gardening;games;home improvement;music;outdoor activities;group/social activities;reading;spectator events;sports/team sports;travel;television/movies/videos     Art/Crafts woodworking/carving;drawing     Exercise/Fitness walking/running     Games card games     Music listening to music     Outdoor Activities bicycling;camping;fishing;hiking;gardening     Group/Social Activities dining out;family functions     Reading books     Spectator Events concerts;movies at theatre;sporting events;theatrical plays     Sports/Team Sports basketball;skiing;walking/running;other (see comments)   pickleball       Barriers To Leisure Activity    Barriers to Leisure Activity mobility limitations        Coping/Community Reintegration    Observed Emotional State calm;cooperative;pleasant     Verbalized Emotional State acceptance        Coping Strategies    Trust Relationship/Rapport care explained;choices provided;empathic listening provided;questions answered;questions encouraged;reassurance provided;thoughts/feelings acknowledged     Cultural Practices/Rituals Pt identifies as a [place].     Quality of Life Promotion balance between activity/rest encouraged;expression of thoughts about present/future encouraged;independence in all possible areas promoted;social interaction/involvement encouraged;wellness behaviors promoted        Community Reintegration (Rec Therapy)    Facilitators planning occurs related to ongoing needs     Barriers type of disability     Recreational Activities concerts;movies;museums;outdoor activities;performing arts;restaurants;shopping;spectator events;travel;visiting friends/family        Recreational Therapy Assessment/Plan    Recreational Therapy Goals/Objectives improve;sustain;balance;adjustment to disability/illness;coping skills/strategies;coping through leisure/recreation interventions;functional leisure skills;health promotion;health maintenance;leisure  awareness/education;leisure skills/knowledge;leisure participation;physical skills;observable affect;relaxation response;strength and endurance     Orientation to Recreational Therapy patient;received explanation of recreation therapy programs;completed therapeutic recreation assessment;therapeutic recreation program initiated     Recreational Therapy Plan leisure exploration;structured leisure participation;independent leisure participation        Rec Therapy Clinical Impression    Patient's Goals for Discharge return to all previous roles/activities     Therapy Plan Review (Rec Therapy) patient voices agreement with Recreational Therapy plan for care;Recreational Therapy plan for care discussed with patient;patient feedback incorporated in Recreational Therapy plan for care     Functional Limitations in Following Categories community/leisure     Frequency of Treatment (Rec Therapy) 3-5 times per week;60 minutes per session;60-90 minutes per session;30 minutes per session     Activity Limitations Related to Problem List ambulation not performed safely;functional mobility not performed adequately or safely for household activity;functional mobility not performed adequately or safely for community activity;home management activity not performed adequately or safely     Daily Outcome Statement Pt eval'd for RT tx services; Pt agreeable to RT POC.                      Recreational Therapy Goals    Flowsheet Row Most Recent Value   Community Goal 1    Activity: Community Pt to receive education re community accessibility. at 03/13/2023 1303   [place] set-up required at 03/13/2023 1303   Time Frame short-term goal (STG), 3 - 5 days at 03/13/2023 1303   Leisure Goal 1    Activity: Leisure Pt to receive education re accessible travel. at 03/13/2023 1303   [place] set-up required at 03/13/2023 1303   Time Frame short-term goal (STG), 3 - 5 days at 03/13/2023 1303   Leisure Goal 2    Activity: Leisure Pt to  independently at least 3 strategies for securing assistance in an airport at 03/13/2023 1303   [place] independent at 03/13/2023 1303   Time Frame short-term goal (STG), long-term goal (LTG), 3 - 5 days, 2-3 days at 03/13/2023 1303   Additional Goal 1    Activity: Additional Goal Pt to be education [employer]. at 03/13/2023 1303   [place] set-up required at 03/13/2023 1303   Time Frame 2-3 days at 03/13/2023 1303   Additional Goal 2    Activity: Additional Goal Pt to independently sign up for [employer] magazine + support group at 03/13/2023 1303   [place] independent at 03/13/2023 1303   Time Frame long-term goal (LTG), 5 days, short-term goal (STG) at 03/13/2023 1303

## 2023-03-13 NOTE — PROGRESS NOTES
Patient: Harry Alvarado  Location: [facility] [address]  MRN: [id number]  Today's date: 3/13/2023    History of Present Illness  Harry is a 67 y.o. male admitted on 3/6/2023 with Encounter for prosthetic gait training [Z47.89]. Principal problem is   Encounter for prosthetic gait training.      This is the first clinic appointment for Mr. Alvarado following his inpatient stay for preprostatic training. He is a 67-year-old male with a history of gene mutation, CAD, history of MI, peripheral artery disease with multiple bypass procedures in the past. The patient had been admitted to [facility] on November 30 for a planned transtibial amputation due to bypass thrombosis. He had reported worse pain in the previous 1 to 2 months in the right foot and had undergone multiple surgeries prior. He underwent a right transtibial amputation on December 2, 2022. There is no significant postoperative complications. Pain was well controlled. He was admitted to [place] [facility] for preprostatic training and did well and was discharged home. He continues to receive home care physical therapy. He is still following up with his surgeon. He still has small open area in the lateral aspect of the incision line with minimal amount of serous drainage noted. He offers complaints of intermittent phantom pain and sensations. He has no other complaints of headache or dizziness, chest pain or shortness of breath. Currently he and his wife are living in his son's home everything is a 1 level. He is independent at a wheelchair level. Prior to his amputation he was completely independent and very active.    Past Medical History  Harry has a past medical history of Coronary artery disease, Deep vein thrombosis (CMS/HCC), Lupus anticoagulant disorder (CMS/HCC), Lyme disease, MTHFR gene mutation, Myocardial infarction (CMS/HCC), and PAD (peripheral artery disease) (CMS/[facility]).      OT Vitals    Date/Time Pulse HR Source BP  BP Location BP Method Pt Position [facility]   03/13/23 1003 76 Monitor 137/85 Right upper arm Automatic Sitting VMS      OT Pain    Date/Time Pain Type Rating: Activity [facility]   03/13/23 1003 Pain Assessment 0 VMS   03/13/23 1057 Post Activity 0 VMS          Prior Living Environment    Flowsheet Row Most Recent Value   People in Home spouse   Current Living Arrangements home   Home Accessibility stairs to enter home (Group), stairs within home (Group)   Living Environment Comment Pt reports he lives with wife in 1  with 6 YOSEF. Pt has loft upstairs but does not need to access. Pt reports he's very active around the home and has steep property he needs to navigate.   Number of Stairs, Main Entrance 6   Stair Railings, Main Entrance railings on both sides of stairs   Stairs Comment, Main Entrance Can't reach both rails at once   Location, Patient Bedroom first (main) floor   Location, Bathroom first (main) floor   Bathroom Access Comment walk in shower, shower chair   Number of Stairs, Within Home, Primary 0          Prior Level of Function    Flowsheet Row Most Recent Value   Dominant Hand right   Ambulation assistive equipment   Transferring assistive equipment   Toileting independent   Bathing independent   Dressing independent   Eating independent   Prior Level of Function Comment Pt reports he was independent for all ADLs/mobility prior to TTA. Pt completed pre-prosthetic training at [facility] - has been Mod I at w/c level since d/c. Reports he utilized RW for transfers and gait training with HHPT.   Assistive Device Currently Used at Home walker, front-wheeled, wheelchair, shower chair, cane, straight          Occupational Profile    Flowsheet Row Most Recent Value   Occupational History/Life Experiences Pt is interested in learning about return to driving, plans to purchase a new vehicle.  Recommend arranging Chirag Eaton from the [employer] to speak with pt.  Pt lives in [place] and was staying in [place]  with son since amputation, plans to return to [place].  Initially wanted to drive home.  Discussed potentially purchasing   Environmental Supports and Barriers +. 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, also enjoys hiking,           IRF OT Evaluation and Treatment - 03/13/23 1004        OT Time Calculation    Start Time 1000     Stop Time 1100     Time Calculation (min) 60 min        Session Details    Document Type daily treatment/progress note     Mode of Treatment occupational therapy;individual therapy        Sensory Interventions    Comment, Sensory Intervention Mirror therapy for 5 mins for phantom sensation/ pain, pt reported minimal improvements        Sit to Stand Transfer    [place], Sit to Stand Transfer touching/steadying assist     Verbal Cues technique     Assistive Device walker, front-wheeled        Stand to Sit Transfer    [place], Stand to Sit Transfer touching/steadying assist     Verbal Cues technique     Assistive Device walker, front-wheeled        Stand Pivot Transfer    [place], Stand Pivot/Stand Step Transfer touching/steadying assist     Verbal Cues safety;technique     Assistive Device walker, front-wheeled     Comment via amb tx        Safety Issues, Functional Mobility    Comment, Safety Issues/Impairments (Mobility) touching A functional ambulation with RW cues for balance cues for R weight shift. Placing target on walker for L foot placement and noted improvment with weight shift and b/l foot placement        Mobility Belt    Mobility Belt Used for All Out of Bed Activity yes        Balance    Comment, Balance standing with purple therapy band diagonal reaching with core engement. 7# weight stance chest press and 10x 2 and around the [place] 5 x 2 each direction, standing with purple therapy bands rows, sitting perched sit EOM sit/ stand with hands on knees with focus on b/l weight  with increased R weight acceptance. Standing at table and performing side stepping with cues for R lateral weight shift        Lower Extremity (Therapeutic Exercise)    Comment supine stretch, modified nestor stretch        Prosthetic Orientation (Lower Extremity)    Comment, Fit Assessment Direct handoff from PT session,4 ply and increased to 5 ply, goal for 5.5 hours today.        Daily Progress Summary (OT)    Daily Outcome Statement Pt with good particiapiton during therapy session with noted improved carryover of R weight shift, continues to require cues. Noted muscle fatigue R hip end of session with noted decreased form in stance and with ambulation. goal for 5.5 hours wear today. Pt would continue to benefit from skilled OT services to increase indep, decrease caregiver burden and increase quality of life. Continue with POC                           IRF OT Goals    Flowsheet Row Most Recent Value   Transfer Goal 1    Activity/Assistive Device toilet at 03/07/2023 0804   [place] supervision required at 03/08/2023 1445   Time Frame short-term goal (STG), 1 week at 03/08/2023 1445   Progress/Outcome goal revised this date at 03/08/2023 1445   Transfer Goal 2    Activity/Assistive Device toilet at 03/07/2023 0804   [place] modified independence at 03/07/2023 0804   Time Frame long-term goal (LTG), 14 days or less at 03/07/2023 0804   Progress/Outcome goal ongoing at 03/08/2023 1445   UB Dressing Goal 1    [place] supervision required at 03/08/2023 1445   Time Frame short-term goal (STG), 1 week at 03/08/2023 1445   Progress/Outcome goal revised this date at 03/08/2023 1445   UB Dressing Goal 2    [place] modified independence at 03/07/2023 0804   Time Frame long-term goal (LTG), 14 days or less at 03/07/2023 0804   Progress/Outcome goal ongoing at 03/08/2023 1445   LB Dressing Goal 1    [place] supervision required at 03/07/2023 0804   Time Frame short-term goal (STG), 1 week at  03/08/2023 1445   Progress/Outcome goal revised this date at 03/08/2023 1445   LB Dressing Goal 2    [place] modified independence at 03/07/2023 0804   Time Frame long-term goal (LTG), 14 days or less at 03/07/2023 0804   Progress/Outcome goal ongoing at 03/08/2023 1445   Grooming Goal 1    [place] supervision required at 03/07/2023 0804   Time Frame short-term goal (STG), 1 week at 03/08/2023 1445   Progress/Outcome goal ongoing at 03/08/2023 1445   Grooming Goal 2    [place] modified independence at 03/07/2023 0804   Time Frame long-term goal (LTG), 14 days or less at 03/07/2023 0804   Progress/Outcome goal ongoing at 03/08/2023 1445   Toileting Goal 1    [place] supervision required at 03/07/2023 0804   Time Frame short-term goal (STG), 5 - 7 days at 03/07/2023 0804   Progress/Outcome goal ongoing at 03/08/2023 1445   Toileting Goal 2    [place] modified independence at 03/07/2023 0804   Time Frame long-term goal (LTG), 14 days or less at 03/07/2023 0804   Progress/Outcome goal ongoing at 03/08/2023 1445

## 2023-03-13 NOTE — PROGRESS NOTES
Patient: Harry Alvarado  Location: [facility] [address]  MRN: [id number]  Today's date: 3/13/2023    History of Present Illness  Harry is a 67 y.o. male admitted on 3/6/2023 with Encounter for prosthetic gait training [Z47.89]. Principal problem is   Encounter for prosthetic gait training.      This is the first clinic appointment for Mr. Alvarado following his inpatient stay for preprostatic training. He is a 67-year-old male with a history of gene mutation, CAD, history of MI, peripheral artery disease with multiple bypass procedures in the past. The patient had been admitted to [facility] on November 30 for a planned transtibial amputation due to bypass thrombosis. He had reported worse pain in the previous 1 to 2 months in the right foot and had undergone multiple surgeries prior. He underwent a right transtibial amputation on December 2, 2022. There is no significant postoperative complications. Pain was well controlled. He was admitted to [place] [facility] for preprostatic training and did well and was discharged home. He continues to receive home care physical therapy. He is still following up with his surgeon. He still has small open area in the lateral aspect of the incision line with minimal amount of serous drainage noted. He offers complaints of intermittent phantom pain and sensations. He has no other complaints of headache or dizziness, chest pain or shortness of breath. Currently he and his wife are living in his son's home everything is a 1 level. He is independent at a wheelchair level. Prior to his amputation he was completely independent and very active.    Past Medical History  Harry has a past medical history of Coronary artery disease, Deep vein thrombosis (CMS/HCC), Lupus anticoagulant disorder (CMS/HCC), Lyme disease, MTHFR gene mutation, Myocardial infarction (CMS/HCC), and PAD (peripheral artery disease) (CMS/[facility]).      PT Vitals    Date/Time Pulse HR Source BP  BP Location BP Method Pt Position [facility]   03/13/23 0906 72 Monitor 120/77 Left upper arm Automatic Sitting RK      PT Pain    Date/Time Pain Type Rating: Rest Rating: Activity [facility]   03/13/23 0906 Pain Assessment 0 -- RK   03/13/23 0955 Pain Reassessment -- 0 RK          Prior Living Environment    Flowsheet Row Most Recent Value   People in Home spouse   Current Living Arrangements home   Home Accessibility stairs to enter home (Group), stairs within home (Group)   Living Environment Comment Pt reports he lives with wife in 1  with 6 YOSEF. Pt has loft upstairs but does not need to access. Pt reports he's very active around the home and has steep property he needs to navigate.   Number of Stairs, Main Entrance 6   Stair Railings, Main Entrance railings on both sides of stairs   Stairs Comment, Main Entrance Can't reach both rails at once   Location, Patient Bedroom first (main) floor   Location, Bathroom first (main) floor   Bathroom Access Comment walk in shower, shower chair   Number of Stairs, Within Home, Primary 0          Prior Level of Function    Flowsheet Row Most Recent Value   Dominant Hand right   Ambulation assistive equipment   Transferring assistive equipment   Toileting independent   Bathing independent   Dressing independent   Eating independent   Prior Level of Function Comment Pt reports he was independent for all ADLs/mobility prior to TTA. Pt completed pre-prosthetic training at [facility] - has been Mod I at w/c level since d/c. Reports he utilized RW for transfers and gait training with HHPT.   Assistive Device Currently Used at Home walker, front-wheeled, wheelchair, shower chair, cane, straight           IRF PT Evaluation and Treatment - 03/13/23 0907        PT Time Calculation    Start Time 0900     Stop Time 1000     Time Calculation (min) 60 min        Session Details    Document Type daily treatment/progress note     Mode of Treatment physical therapy;individual therapy        General  Information    Patient Profile Reviewed yes        Transfers    Transfers stand pivot transfer     Comment mobility belt donned for all transfers and ambulation; doffed at end of session        Sit to Stand Transfer    [place], Sit to Stand Transfer touching/steadying assist     Verbal Cues technique     Assistive Device walker, front-wheeled     Comment multiple bouts from w/c; touch steadying for balance        Stand to Sit Transfer    [place], Stand to Sit Transfer touching/steadying assist     Verbal Cues technique     Assistive Device walker, front-wheeled     Comment multiple bouts to w/c; touch steadying for balance        Stand Pivot Transfer    [place], Stand Pivot/Stand Step Transfer touching/steadying assist     Verbal Cues technique;safety     Assistive Device walker, front-wheeled     Comment via ambulatory approach to w/cl touch steadying for balance        Gait Training    [place], Gait close supervision     Assistive Device walker, front-wheeled     Distance in Feet 200 feet     Pattern (Gait) step-through     Deviations/Abnormal Patterns (Gait) base of support, narrow;gait speed decreased;step length decreased;weight shifting decreased     Comment (Gait/Stairs) 200'x2 with touch closeS for safety and to ensure fall prevention        Balance    Balance Interventions dynamic;standing     Comment, Balance 1.) standing at RW performed box taps with LLE only focusing on R weight bearing and weight shift; x20 reps with closeS. 2.) standing with L foot on block performing ball toss without UE support and Sandra to maintain balance and weight shift; x3 minutes. 3.) standing at steps with BUE support performed step ups x10 reps each with Sandra for R step ups for balance.        Timed Up and Go Test    Trial One: Timed Up and Go Test 25.09     Trial Two: Timed Up and Go Test 22.61     Trial Three: Timed Up and Go Test 20.01     Mean of 3 Trials: Timed Up and Go Test 22.57     Comment, Timed  "Up and Go Test using RW with closeS     Results, Timed Up and Go Test (Balance) Patient requires greater than the norm of 13.5\" indicating functional mobility and balance deficits.        Prosthetic Orientation (Lower Extremity)    Fit Assessment fit/function of prosthesis are appropriate     Comment, Fit Assessment Patient donned prosthetic with 4 sock ply this AM with supervision. Direct handoff to OT at end of session. Goal for total wear time of 5.5 hours today, will perform skin assessment at 1430.        Daily Progress Summary (PT)    Symptoms Noted During/After Treatment fatigue     Daily Outcome Statement Patient with improving gait pattern this session demonstrating improvement in weight shifting and stance time on R. Patient completed TUG this session with minimal improvement in time, however demonstrates improved gait pattern and decreased assistance. Patient with improved dynamic balance this session as well. Continue to progress functional mobility, balance and LE strength as tolerated.                           IRF PT Goals    Flowsheet Row Most Recent Value   Transfer Goal 1    Activity/Assistive Device sit-to-stand/stand-to-sit, stand pivot  [LRAD] at 03/06/2023 1502   [place] supervision required at 03/06/2023 1502   Time Frame short-term goal (STG), 1 week at 03/06/2023 1502   Progress/Outcome goal met at 03/08/2023 1454   Transfer Goal 2    Activity/Assistive Device sit-to-stand/stand-to-sit, stand pivot  [LRAD] at 03/06/2023 1502   [place] modified independence at 03/06/2023 1502   Time Frame 1 week at 03/08/2023 1454   Progress/Outcome continuing progress toward goal, goal ongoing at 03/08/2023 1454   Gait/Walking Locomotion Goal 1    Activity/Assistive Device gait (walking locomotion)  [LRAD] at 03/06/2023 1502   Distance 150 feet at 03/06/2023 1502   [place] supervision required at 03/06/2023 1502   Time Frame short-term goal (STG), 1 week at 03/06/2023 1502   Progress/Outcome " goal met at 03/08/2023 1454   Gait/Walking Locomotion Goal 2    Activity/Assistive Device gait (walking locomotion)  [LRAD] at 03/06/2023 1502   Distance 150 feet at 03/06/2023 1502   [place] modified independence at 03/06/2023 1502   Time Frame 1 week at 03/08/2023 1454   Progress/Outcome continuing progress toward goal, goal ongoing at 03/08/2023 1454   Stairs Goal 1    Activity/Assistive Device stairs, all skills at 03/08/2023 1454   Number of Stairs 12 at 03/08/2023 1454   [place] modified independence at 03/08/2023 1454   Time Frame long-term goal (LTG), 1 week at 03/08/2023 1459

## 2023-03-13 NOTE — PROGRESS NOTES
[facility] Progress Note          Patient was seen and examined.   Attestation Notes: Face to face encounter completed    Harry Alvarado is a 67 y.o. male who was admitted for Encounter for prosthetic gait training [Z47.89]. Patient was referred by Richard Angulo MD for medical assessment and management      CC: Encounter for prosthetic gait training [Z47.89]     HPI: Harry Alvarado is a 67 y.o. male with MTHFR gene mutation, CAD s/p MI 1993, and PAD s/p R Fem to PT bypass with arm vein in 1993, revision in 2011, s/p R SFA to peroneal bypass graft s/p revision on 7/30/18, s/p tPA bolus and angioplasty in 2020 admitted to [facility] 11/30/22 with RLE bypass thrombosis and chronic right foot ischemia/gangrene and underwent right BKA by vascular surgeon, Dr. Bowen Serrato 12/2/22. Post op he had anemia but did not require transfusion. He was restarted on his home Eliquis.       The patient was evaluated by PM&R and found to have residual deficits in ambulation and ADLs due to Status post below knee amputation of right lower extremity (CMS/[facility]) [Z89.511] and came to [facility] on 12/6/2022 for acute inpatient rehab.      He participated in therapy. The patient has steadily improved in therapy and was for discharged home 12/18/2022.     The patient was seen by Dr. Agustín Zambrano in [facility] and was felt to be a good candidate for prosthetic ambulation and came to [facility] 3/6/2023 for acute inpatient prosthetic training.    He participated in therapy.         SUBJECTIVE:  Patient interviewed and examined.     He feels prosthetic training is going well.      No pain complaints, moving bowels and bladder, no abdominal pain or nausea, no dysuria, no chest pain, palpitations, dyspnea or fevers    Review of Systems:  All other systems reviewed and negative except as noted in the HPI.    Current meds and allergies reviewed    Past Medical History:   Diagnosis Date   • Coronary artery disease      s/p MI   • Deep vein thrombosis (CMS/HCC)    • Lupus anticoagulant disorder (CMS/HCC)    • Lyme disease    • MTHFR gene mutation    • Myocardial infarction (CMS/HCC)    • PAD (peripheral artery disease) (CMS/HCC)      Past Surgical History:   Procedure Laterality Date   • BELOW KNEE LEG AMPUTATION Right 12/02/2022   • FEMORAL BYPASS Right      Social History     Tobacco Use   • Smoking status: Never   • Smokeless tobacco: Never   Substance Use Topics   • Alcohol use: Not Currently      History reviewed. No pertinent family history.    Vital signs in last 24 hours:  Temp:  [36.4 °C (97.6 °F)-36.5 °C (97.7 °F)] 36.4 °C (97.6 °F)  Heart Rate:  [72-88] 77  BP: (119-137)/(62-85) 119/72  Vital signs reviewed 03/13/23 7:55 PM    Physical Exam  Vitals and nursing note reviewed.   Constitutional:       Appearance: Normal appearance.   HENT:      Head: Normocephalic and atraumatic.      Right Ear: External ear normal.      Left Ear: External ear normal.      Nose: Nose normal.      Mouth/Throat:      Mouth: Mucous membranes are moist.      Pharynx: Oropharynx is clear.   Eyes:      Conjunctiva/sclera: Conjunctivae normal.      Pupils: Pupils are equal, round, and reactive to light.   Cardiovascular:      Rate and Rhythm: Normal rate and regular rhythm.      Pulses: Normal pulses.      Heart sounds: Normal heart sounds.   Pulmonary:      Effort: Pulmonary effort is normal.      Breath sounds: Normal breath sounds.   Abdominal:      General: Abdomen is flat. Bowel sounds are normal.      Palpations: Abdomen is soft.   Musculoskeletal:         General: Deformity (s/p right BKA) present.      Cervical back: Normal range of motion and neck supple.   Skin:     General: Skin is warm and dry.   Neurological:      General: No focal deficit present.      Mental Status: He is alert and oriented to person, place, and time.   Psychiatric:         Mood and Affect: Mood normal.         Behavior: Behavior normal.                 Objective     Labs:  I have reviewed the patient's labs.  Current labs are within normal limits.  Results from last 7 days   Lab Units 03/07/23  0556   WBC K/uL 8.01   HEMOGLOBIN g/dL 13.3*   HEMATOCRIT % 41.7   PLATELETS K/uL 284     Results from last 7 days   Lab Units 03/07/23  0556   SODIUM mEQ/L 136   POTASSIUM mEQ/L 4.4   CHLORIDE mEQ/L 104   CO2 mEQ/L 24   BUN mg/dL 19   CREATININE mg/dL 0.7*   CALCIUM mg/dL 9.4   ALBUMIN g/dL 3.4   BILIRUBIN TOTAL mg/dL 0.6   ALK PHOS IU/L 77   ALT IU/L 17   AST IU/L 15   GLUCOSE mg/dL 87     Results from last 7 days   Lab Units 03/07/23  0556   MAGNESIUM mg/dL 1.9        Imaging:  Not applicable        ASSESSMENT/PLAN:    67 y.o. male with MTHFR gene mutation, CAD s/p MI 1993, and PAD s/p R Fem to PT bypass with arm vein in 1993, revision in 2011, s/p R SFA to peroneal bypass graft s/p revision on 7/30/18, s/p tPA bolus and angioplasty in 2020 admitted to [facility] 11/30/22 with RLE bypass thrombosis and chronic right foot ischemia/gangrene and underwent right BKA by vascular surgeon, Dr. Bowen Serrato 12/2/22; pre-prosthetic training [facility] 12/6/22-12/18/22; returns to [facility] from home 3/6/23 for inpatient prosthetic training.      1. Vasc:  -PAD s/p failed RLE bypass with right foot ischemia/gangrene  -s/p right BKA 12/2/22  -inpatient prosthetic training  -Tylenol for pain  -remains on Eliquis for hypercoagulable condition     2. Heme:  -post op anemia due to chronic blood loss, does not need iron  -leukocytosis reactive due to surgery  -hx of MTHFR gene mutation  -normal CBC     3. Renal:  -increased risk of dehydration and electrolyte changes  -normal CMP, Mg     4. Gi:  -Senokot-S as needed for bowels     5. Gu:  -no urine retention     6. Cardiac:  -hx of CAD  -watch for orthostatic hypotension  -use cardiac precautions in therapy     7. Pulm:  -incentive spirometry for atelectasis     8. Derm:  -skin care per nursing     9. Nutrition:  -seen by Nutrition for assessment  and education     10. Psych:  -seen by Psychology for support    11. Dispo:  -Expected discharge date 3/18/2023         plan discussed with patient, nurse, case management and  Richard Angulo MD Scott Sapperstein, MD  3/13/2023  7:55 PM

## 2023-03-13 NOTE — PROGRESS NOTES
"Daily Progress Note     Patient was seen and examined.   Attestation Notes: Face to face encounter completed    Subjective    The patient progressing in therapies.  Touch assist with transfers close supervision with ambulation.  Pain well controlled.  Objective     Visit Vitals  /62 (BP Location: Right upper arm, Patient Position: Sitting)   Pulse 88   Temp 36.5 °C (97.7 °F) (Oral)   Resp 17   Ht 1.854 m (6' 1\")   Wt 75.8 kg (167 lb 3.2 oz)   SpO2 95%   BMI 22.06 kg/m²       Review of Systems:  Pertinent items are noted in HPI.  Denies chest pain and shortness of breath.  Review of systems otherwise negative.    Labs     Results from last 7 days   Lab Units 03/07/23  0556   WBC K/uL 8.01   HEMOGLOBIN g/dL 13.3*   HEMATOCRIT % 41.7   PLATELETS K/uL 284     Results from last 7 days   Lab Units 03/07/23  0556   SODIUM mEQ/L 136   POTASSIUM mEQ/L 4.4   CHLORIDE mEQ/L 104   CO2 mEQ/L 24   BUN mg/dL 19   CREATININE mg/dL 0.7*   CALCIUM mg/dL 9.4   ALBUMIN g/dL 3.4   BILIRUBIN TOTAL mg/dL 0.6   ALK PHOS IU/L 77   ALT IU/L 17   AST IU/L 15   GLUCOSE mg/dL 87       Full Code    Physical Exam  General      Alert, cooperative, no distress, appears stated age.   Head:    Normocephalic, without obvious abnormality, atraumatic.   Eyes:    PERRL, conjunctiva/corneas clear, EOM's intact.        Nose:   Nares normal, septum midline, mucosa normal, no drainage or            sinus tenderness.   Throat:   Lips, mucosa, and tongue normal.    Neck:   Supple, symmetrical, trachea midline.    Back:     Symmetric, no curvature.   Lungs:     Clear to auscultation bilaterally, respirations unlabored.   Chest wall:    No tenderness or deformity.   Heart:    Regular rate and rhythm, S1 and S2 normal.   Abdomen:     Soft, non-tender, bowel sounds active all four quadrants,     no masses, no organomegaly.   Extremities:  Musculoskeletal: Trace stump edema  Right transtibial amputation.   Pulses:   1+ and symmetric all extremities.   Skin:   " Skin color, texture, turgor normal, no rashes or lesions   Neurologic:          Behavior/  Emotional:  CNII-XII intact.  Alert and oriented ×3.  Motor exam intact.  Sensory exam intact.  Reflexes stable decreased reflexes.      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 male who presents with the following chronic medical problems including MTHFR gene mutation, coronary artery disease with prior myocardial infarction 1993, peripheral artery disease with multiple bypass procedures in the past most recent with tPA bolus and angioplasty in 2020.  The patient admitted to [facility] on November 30, 2022 to the vascular surgery service for planned BKA due to bypass thrombosis.  At the time of admission the patient had cyanosis and was beginning to develop thanh gangrene of the foot.  Patient has a history of 15+ surgeries of the extremities and understood that his revascularization options were limited.  He had severe 10 out of 10 pain of the foot relieved with elevation of the leg.  He was still able to ambulate but limited by pain.  Under the care of Bowen Serrato MD of vascular surgery he underwent right transtibial amputation on December 2, 2022.  Postoperative course unremarkable.  For postoperative pain control he was treated with morphine.  On discharge prescribed Tylenol and low-dose Lyrica.  Lyrica started prior by his PCP for neuropathic pain.  He was restarted on his home Eliquis.  He required min assist for bed mobility, transfers and ambulation.  The patient was admitted to [facility] on December 6, 2022 for acute inpatient rehabilitation for preprosthetic training.  The patient was discharged on December 18, 2022 after  completing a comprehensive inpatient rehabilitation program progressing to a modified  independent level with bed mobility, transfers and standing with rolling walker with limited hopping.  Pain control stable with medication change to hydrocodone/acetaminophen.  Continue Lyrica increased dose 50 mg 3 times a day.  The patient was also placed on nortriptyline for sleep and neuropathic pain.   The patient has follow-up outpatient with vascular surgery with improvement in wound healing.  The patient was evaluated by Dr. Zambrano in amputee clinic and felt appropriate for acute inpatient rehabilitation for intensive prosthetic training prior to returning home where he lives in [place].  The patient's pain has improved and he is weaned off all of his medications except for his Eliquis.    Comprehensive inpatient rehabilitation program for functional deficit status post right transtibial amputation.  Goal is for modified and pedal level with ambulation and self-care activities with prosthesis.  Monitor for pain.  Monitor skin closely.  Monitor cardiovascular status.    Progressing well with therapies.  Increased wear time of prosthesis.  Touch assist with transfers and close supervision with ambulation with a rolling walker.  No significant pain.    Lupus anticoagulant disorder (CMS/HCC)  Assessment & Plan  Continue Eliquis.    Deep vein thrombosis (CMS/[facility])  Assessment & Plan  Continue Eliquis.    Coronary artery disease  Assessment & Plan  Currently asymptomatic.  Monitor cardiac status during stay.  Cardiac precautions.        Expected Discharge Date:  3/18/2023

## 2023-03-14 ENCOUNTER — APPOINTMENT (INPATIENT)
Dept: PHYSICAL THERAPY | Facility: REHABILITATION | Age: 68
DRG: 560 | End: 2023-03-14
Payer: MEDICARE

## 2023-03-14 ENCOUNTER — APPOINTMENT (INPATIENT)
Dept: OTHER | Facility: REHABILITATION | Age: 68
DRG: 560 | End: 2023-03-14
Payer: MEDICARE

## 2023-03-14 ENCOUNTER — APPOINTMENT (INPATIENT)
Dept: OCCUPATIONAL THERAPY | Facility: REHABILITATION | Age: 68
DRG: 560 | End: 2023-03-14
Payer: MEDICARE

## 2023-03-14 ENCOUNTER — APPOINTMENT (OUTPATIENT)
Dept: PSYCHOLOGY | Facility: CLINIC | Age: 68
End: 2023-03-14
Payer: MEDICARE

## 2023-03-14 PROCEDURE — 97116 GAIT TRAINING THERAPY: CPT | Mod: GP

## 2023-03-14 PROCEDURE — 97530 THERAPEUTIC ACTIVITIES: CPT | Mod: GP

## 2023-03-14 PROCEDURE — 97530 THERAPEUTIC ACTIVITIES: CPT | Mod: GO

## 2023-03-14 PROCEDURE — 63700000 HC SELF-ADMINISTRABLE DRUG: Performed by: HOSPITALIST

## 2023-03-14 PROCEDURE — 97112 NEUROMUSCULAR REEDUCATION: CPT | Mod: GP

## 2023-03-14 PROCEDURE — 12800000 HC ROOM AND CARE SEMIPRIVATE REHAB

## 2023-03-14 PROCEDURE — 90832 PSYTX W PT 30 MINUTES: CPT | Performed by: PSYCHOLOGIST

## 2023-03-14 PROCEDURE — 97530 THERAPEUTIC ACTIVITIES: CPT

## 2023-03-14 PROCEDURE — 97110 THERAPEUTIC EXERCISES: CPT | Mod: GP

## 2023-03-14 RX ADMIN — APIXABAN 5 MG: 5 TABLET, FILM COATED ORAL at 19:39

## 2023-03-14 RX ADMIN — APIXABAN 5 MG: 5 TABLET, FILM COATED ORAL at 08:00

## 2023-03-14 ASSESSMENT — COGNITIVE AND FUNCTIONAL STATUS - GENERAL
EST. PREMORBID INTELLIGENCE: ABOVE AVERAGE
THOUGHT_CONTENT: APPROPRIATE
IMPULSE CONTROL: INTACT
EYE_CONTACT: WNL
INSIGHT: AWARE OF PHYSICAL LIMITATIONS;AWARE OF IMPACT ON FUNCTIONING;INTACT
THOUGHT_PROCESS: WNL
APPEARANCE: WELL GROOMED
MOOD: MOTIVATED;HOPEFUL
RECENT MEMORY: WNL
PSYCHOMOTOR FUNCTIONING: WNL
SPEECH: REGULAR
APPETITE: NO CHANGE
SLEEP_WAKE_CYCLE: NO CHANGE
AROUSAL LEVEL: ATTENTIVE
AFFECT: FULL RANGE
REMOTE MEMORY: WNL
ORIENTATION: FULLY ORIENTED
CONCENTRATION: WNL
DELUSIONS: NONE OR AGE APPROPRIATE
ATTENTION: WNL

## 2023-03-14 NOTE — PROGRESS NOTES
Patient: Harry Alvarado  Location: [facility] [address]  MRN: [id number]  Today's date: 3/14/2023    History of Present Illness  Harry is a 67 y.o. male admitted on 3/6/2023 with Encounter for prosthetic gait training [Z47.89]. Principal problem is   Encounter for prosthetic gait training.      This is the first clinic appointment for Mr. Alvarado following his inpatient stay for preprostatic training. He is a 67-year-old male with a history of gene mutation, CAD, history of MI, peripheral artery disease with multiple bypass procedures in the past. The patient had been admitted to [facility] on November 30 for a planned transtibial amputation due to bypass thrombosis. He had reported worse pain in the previous 1 to 2 months in the right foot and had undergone multiple surgeries prior. He underwent a right transtibial amputation on December 2, 2022. There is no significant postoperative complications. Pain was well controlled. He was admitted to [facility] for preprostatic training and did well and was discharged home. He continues to receive home care physical therapy. He is still following up with his surgeon. He still has small open area in the lateral aspect of the incision line with minimal amount of serous drainage noted. He offers complaints of intermittent phantom pain and sensations. He has no other complaints of headache or dizziness, chest pain or shortness of breath. Currently he and his wife are living in his son's home everything is a 1 level. He is independent at a wheelchair level. Prior to his amputation he was completely independent and very active.    Past Medical History  Harry has a past medical history of Coronary artery disease, Deep vein thrombosis (CMS/HCC), Lupus anticoagulant disorder (CMS/HCC), Lyme disease, MTHFR gene mutation, Myocardial infarction (CMS/HCC), and PAD (peripheral artery disease) (CMS/[facility]).      PT Pain    Date/Time Pain Type Rating: Rest  Rating: Activity [facility]   03/14/23 1405 Pain Assessment 0 -- RK   03/14/23 1458 Pain Reassessment -- 0 RK          Prior Living Environment    Flowsheet Row Most Recent Value   People in Home spouse   Current Living Arrangements home   Home Accessibility stairs to enter home (Group), stairs within home (Group)   Living Environment Comment Pt reports he lives with wife in 1  with 6 YOSEF. Pt has loft upstairs but does not need to access. Pt reports he's very active around the home and has steep property he needs to navigate.   Number of Stairs, Main Entrance 6   Stair Railings, Main Entrance railings on both sides of stairs   Stairs Comment, Main Entrance Can't reach both rails at once   Location, Patient Bedroom first (main) floor   Location, Bathroom first (main) floor   Bathroom Access Comment walk in shower, shower chair   Number of Stairs, Within Home, Primary 0          Prior Level of Function    Flowsheet Row Most Recent Value   Dominant Hand right   Ambulation assistive equipment   Transferring assistive equipment   Toileting independent   Bathing independent   Dressing independent   Eating independent   Prior Level of Function Comment Pt reports he was independent for all ADLs/mobility prior to TTA. Pt completed pre-prosthetic training at [facility] - has been Mod I at w/c level since d/c. Reports he utilized RW for transfers and gait training with HHPT.   Assistive Device Currently Used at Home walker, front-wheeled, wheelchair, shower chair, cane, straight           IRF PT Evaluation and Treatment - 03/14/23 1427        PT Time Calculation    Start Time 1400     Stop Time 1500     Time Calculation (min) 60 min        Session Details    Document Type daily treatment/progress note     Mode of Treatment physical therapy;individual therapy        General Information    Patient Profile Reviewed yes     General Observations of Patient Patient recieved via direct handoff from OT session.        Transfers    Transfers  stand pivot transfer     Comment mobility belt donned for all transfers and ambulation; doffed at end of session        Sit to Stand Transfer    Smith, Sit to Stand Transfer touching/steadying assist     Verbal Cues technique     Assistive Device walker, front-wheeled     Comment touch steadying for balance        Stand to Sit Transfer    Smith, Stand to Sit Transfer touching/steadying assist     Verbal Cues safety     Assistive Device walker, front-wheeled     Comment touch steadying for balance        Stand Pivot Transfer    Smith, Stand Pivot/Stand Step Transfer touching/steadying assist     Verbal Cues technique     Assistive Device walker, front-wheeled     Comment via ambulatory approach; touch steadying for balance        Gait Training    Smith, Gait close supervision     Assistive Device walker, front-wheeled     Distance in Feet 200 feet     Pattern (Gait) step-through     Deviations/Abnormal Patterns (Gait) base of support, narrow;gait speed decreased;step length decreased;weight shifting decreased     Comment (Gait/Stairs) 200', 50'x2; closeS d/t balance deficits        Rough/Uneven Surface Gait Skills    Smith close supervision     Assistive Device walker, front-wheeled     Distance in Feet 100 feet     Comment over low pile carpet; closeS d/t balance deficits        Balance    Balance Interventions standing     Comment, Balance Reviewed how to break down lightweight w/c and how patient can assist wife with placning w/c in trunk of vehicle with her. Practiced x3 trials with touch steadying for balance. Performed with patient lifting one end and aide lifting at other end on to mat table to mimic placing into trunk of car.        Lower Extremity (Therapeutic Exercise)    Exercise Position/Type standing     General Exercise bilateral     Reps and Sets 20-30 reps each     Comment standing at railing performed hip abduction and hip extension with BUE support        Aerobic  Exercise    Type recumbent elliptical      Time Performed 14     Comment NuStep x14 minutes on level 10 with BLE and BUE        Prosthetic Orientation (Lower Extremity)    Fit Assessment fit/function of prosthesis are appropriate     Comment, Fit Assessment Increased to 6 ply during prior OT session, plan ro reassess skin and doff at 1600.        Daily Progress Summary (PT)    Symptoms Noted During/After Treatment fatigue     Daily Outcome Statement Patient session focused on functional mobility, proximal hip strengthening and LE endurance. Patient would continue to benefit from stnaidng balance, core strengthening, and R weight shifting. Progress as tolerated per POC.                           IRF PT Goals    Flowsheet Row Most Recent Value   Transfer Goal 1    Activity/Assistive Device sit-to-stand/stand-to-sit, stand pivot  [LRAD] at 03/06/2023 1502   [place] supervision required at 03/06/2023 1502   Time Frame short-term goal (STG), 1 week at 03/06/2023 1502   Progress/Outcome goal met at 03/08/2023 1454   Transfer Goal 2    Activity/Assistive Device sit-to-stand/stand-to-sit, stand pivot  [LRAD] at 03/06/2023 1502   [place] modified independence at 03/06/2023 1502   Time Frame 1 week at 03/08/2023 1454   Progress/Outcome continuing progress toward goal, goal ongoing at 03/08/2023 1454   Gait/Walking Locomotion Goal 1    Activity/Assistive Device gait (walking locomotion)  [LRAD] at 03/06/2023 1502   Distance 150 feet at 03/06/2023 1502   [place] supervision required at 03/06/2023 1502   Time Frame short-term goal (STG), 1 week at 03/06/2023 1502   Progress/Outcome goal met at 03/08/2023 1454   Gait/Walking Locomotion Goal 2    Activity/Assistive Device gait (walking locomotion)  [LRAD] at 03/06/2023 1502   Distance 150 feet at 03/06/2023 1502   [place] modified independence at 03/06/2023 1502   Time Frame 1 week at 03/08/2023 1454   Progress/Outcome continuing progress toward goal,  goal ongoing at 03/08/2023 1454   Stairs Goal 1    Activity/Assistive Device stairs, all skills at 03/08/2023 1454   Number of Stairs 12 at 03/08/2023 1454   [place] modified independence at 03/08/2023 1454   Time Frame long-term goal (LTG), 1 week at 03/08/2023 1459

## 2023-03-14 NOTE — PROGRESS NOTES
"Daily Progress Note     Patient was seen and examined.   Attestation Notes: Face to face encounter completed    Subjective    The patient touch assist to min assist with transfers and ambulation with rolling walker with prosthesis.  Continues to progress well and improved with therapies.  No new problems.  Pain controlled without medication.  Objective     Visit Vitals  BP (!) 146/81 (BP Location: Right upper arm, Patient Position: Sitting)   Pulse 81   Temp 36.7 °C (98.1 °F) (Oral)   Resp 18   Ht 1.854 m (6' 1\")   Wt 75.8 kg (167 lb 3.2 oz)   SpO2 93%   BMI 22.06 kg/m²       Review of Systems:  Pertinent items are noted in HPI.  Denies chest pain and shortness of breath.  Review of systems otherwise negative.    Full Code    Physical Exam  General      Alert, cooperative, no distress, appears stated age.   Head:    Normocephalic, without obvious abnormality, atraumatic.   Eyes:    PERRL, conjunctiva/corneas clear, EOM's intact.        Nose:   Nares normal, septum midline, mucosa normal, no drainage or            sinus tenderness.   Throat:   Lips, mucosa, and tongue normal.    Neck:   Supple, symmetrical, trachea midline.    Back:     Symmetric, no curvature.   Lungs:     Clear to auscultation bilaterally, respirations unlabored.   Chest wall:    No tenderness or deformity.   Heart:    Regular rate and rhythm, S1 and S2 normal.   Abdomen:     Soft, non-tender, bowel sounds active all four quadrants,     no masses, no organomegaly.   Extremities:  Musculoskeletal:  Trace stump edema.  Right transtibial amputation.   Pulses:   1+ and symmetric all extremities.   Skin:   Skin color, texture, turgor normal, no rashes or lesions   Neurologic:          Behavior/  Emotional:  CNII-XII intact.  Alert and oriented ×3.  Motor exam intact.  Sensory exam intact.  Reflexes stable decreased reflexes.      Appropriate, cooperative           Plan of care was discussed with patient     Assessment & Plan  Status post below knee " amputation of right lower extremity (CMS/[facility])  Assessment & Plan  Harry Alvarado is a 67 y.o. male who presents with the following chronic medical problems including MTHFR gene mutation, coronary artery disease with prior myocardial infarction 1993, peripheral artery disease with multiple bypass procedures in the past most recent with tPA bolus and angioplasty in 2020.  The patient admitted to [facility] on November 30, 2022 to the vascular surgery service for planned BKA due to bypass thrombosis.  At the time of admission the patient had cyanosis and was beginning to develop thanh gangrene of the foot.  Patient has a history of 15+ surgeries of the extremities and understood that his revascularization options were limited.  He had severe 10 out of 10 pain of the foot relieved with elevation of the leg.  He was still able to ambulate but limited by pain.  Under the care of Bowen Serrato MD of vascular surgery he underwent right transtibial amputation on December 2, 2022.  Postoperative course unremarkable.  For postoperative pain control he was treated with morphine.  On discharge prescribed Tylenol and low-dose Lyrica.  Lyrica started prior by his PCP for neuropathic pain.  He was restarted on his home Eliquis.  He required min assist for bed mobility, transfers and ambulation.  The patient was admitted to [facility] on December 6, 2022 for acute inpatient rehabilitation for preprosthetic training.  The patient was discharged on December 18, 2022 after  completing a comprehensive inpatient rehabilitation program progressing to a modified independent level with bed mobility, transfers and standing with rolling walker with limited hopping.  Pain control stable with medication change to hydrocodone/acetaminophen.  Continue Lyrica increased dose 50 mg 3 times a day.  The patient was also placed on nortriptyline for sleep and neuropathic pain.   The patient has follow-up outpatient with  vascular surgery with improvement in wound healing.  The patient was evaluated by Dr. Zambrano in amputee clinic and felt appropriate for acute inpatient rehabilitation for intensive prosthetic training prior to returning home where he lives in [place].  The patient's pain has improved and he is weaned off all of his medications except for his Eliquis.    Comprehensive inpatient rehabilitation program for functional deficit status post right transtibial amputation.  Goal is for modified and pedal level with ambulation and self-care activities with prosthesis.  Monitor for pain.  Monitor skin closely.  Monitor cardiovascular status.    The patient touch assist to min assist with transfers and ambulation with rolling walker with prosthesis.  Continues to progress well and improved with therapies.  No new problems.  Pain controlled without medication.    Lupus anticoagulant disorder (CMS/HCC)  Assessment & Plan  Continue Eliquis.    Deep vein thrombosis (CMS/HCC)  Assessment & Plan  Continue Eliquis.    Coronary artery disease  Assessment & Plan  Currently asymptomatic.  Monitor cardiac status during stay.  Cardiac precautions.        Expected Discharge Date:  3/18/2023

## 2023-03-14 NOTE — PROGRESS NOTES
Patient: Harry Alvarado  Location: [facility] [address]  MRN: [id number]  Today's date: 3/14/2023    History of Present Illness  Harry is a 67 y.o. male admitted on 3/6/2023 with Encounter for prosthetic gait training [Z47.89]. Principal problem is   Encounter for prosthetic gait training.      This is the first clinic appointment for Mr. Alvarado following his inpatient stay for preprostatic training. He is a 67-year-old male with a history of gene mutation, CAD, history of MI, peripheral artery disease with multiple bypass procedures in the past. The patient had been admitted to [facility] on November 30 for a planned transtibial amputation due to bypass thrombosis. He had reported worse pain in the previous 1 to 2 months in the right foot and had undergone multiple surgeries prior. He underwent a right transtibial amputation on December 2, 2022. There is no significant postoperative complications. Pain was well controlled. He was admitted to [facility] for preprostatic training and did well and was discharged home. He continues to receive home care physical therapy. He is still following up with his surgeon. He still has small open area in the lateral aspect of the incision line with minimal amount of serous drainage noted. He offers complaints of intermittent phantom pain and sensations. He has no other complaints of headache or dizziness, chest pain or shortness of breath. Currently he and his wife are living in his son's home everything is a 1 level. He is independent at a wheelchair level. Prior to his amputation he was completely independent and very active.    Past Medical History  Harry has a past medical history of Coronary artery disease, Deep vein thrombosis (CMS/HCC), Lupus anticoagulant disorder (CMS/HCC), Lyme disease, MTHFR gene mutation, Myocardial infarction (CMS/HCC), and PAD (peripheral artery disease) (CMS/[facility]).      OT Vitals    Date/Time Pulse HR Source BP  BP Location BP Method Pt Position Who   03/14/23 1305 81 Monitor 146/81 Right upper arm Automatic Sitting VMS      OT Pain    Date/Time Pain Type Rating: Rest Who   03/14/23 1305 Pain Assessment 0 VMS   03/14/23 1358 Post Activity 0 VMS          Prior Living Environment    Flowsheet Row Most Recent Value   People in Home spouse   Current Living Arrangements home   Home Accessibility stairs to enter home (Group), stairs within home (Group)   Living Environment Comment Pt reports he lives with wife in 1  with 6 YOSEF. Pt has loft upstairs but does not need to access. Pt reports he's very active around the home and has steep property he needs to navigate.   Number of Stairs, Main Entrance 6   Stair Railings, Main Entrance railings on both sides of stairs   Stairs Comment, Main Entrance Can't reach both rails at once   Location, Patient Bedroom first (main) floor   Location, Bathroom first (main) floor   Bathroom Access Comment walk in shower, shower chair   Number of Stairs, Within Home, Primary 0          Prior Level of Function    Flowsheet Row Most Recent Value   Dominant Hand right   Ambulation assistive equipment   Transferring assistive equipment   Toileting independent   Bathing independent   Dressing independent   Eating independent   Prior Level of Function Comment Pt reports he was independent for all ADLs/mobility prior to TTA. Pt completed pre-prosthetic training at [facility] - has been Mod I at w/c level since d/c. Reports he utilized RW for transfers and gait training with HHPT.   Assistive Device Currently Used at Home walker, front-wheeled, wheelchair, shower chair, cane, straight          Occupational Profile    Flowsheet Row Most Recent Value   Occupational History/Life Experiences Pt is interested in learning about return to driving, plans to purchase a new vehicle.  Recommend arranging Chirag Eaton from [employer] to speak with pt.  Pt lives in [place] and was staying in [place] with  son since amputation, plans to return to [place].  Initially wanted to drive home.  Discussed potentially purchasing   Environmental Supports and Barriers +. 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, also enjoys hiking,           IRF OT Evaluation and Treatment - 03/14/23 1305        OT Time Calculation    Start Time 1300     Stop Time 1400     Time Calculation (min) 60 min        Session Details    Document Type daily treatment/progress note     Mode of Treatment occupational therapy;individual therapy        General Information    General Observations of Patient Parking placard initiated        Mobility Belt    Mobility Belt Used for All Out of Bed Activity yes        Sit to Stand Transfer    [place], Sit to Stand Transfer touching/steadying assist     Verbal Cues technique     Assistive Device walker, front-wheeled        Stand to Sit Transfer    [place], Stand to Sit Transfer touching/steadying assist     Verbal Cues safety     Assistive Device walker, front-wheeled        Stand Pivot Transfer    [place], Stand Pivot/Stand Step Transfer touching/steadying assist     Verbal Cues technique     Assistive Device walker, front-wheeled        Safety Issues, Functional Mobility    Comment, Safety Issues/Impairments (Mobility) OT functional ambulation with touching A with cues for R weight shift and L foot placement        Balance    Comment, Balance Standing with RW for midline training and L toe taps. Trial of stance with LLE on six in block with noted increased weigh ton LLE with cues for R weight shift. Then trail of LLE on ball. Then maintaining RLE weight sift LLE march with cues for decreased hip flexion. standing ball toss chest passes and bouse passes. additionally trial maintaining R hip extension and upright posture LLE march. Side stepping at rail with 2-1 UE support with improved upright posture         Prosthetic Orientation (Lower Extremity)    Comment, Fit Assessment 5 ply, increased to 6 ply,  goal for 6 hours wear        Daily Progress Summary (OT)    Daily Outcome Statement Pt with good participation during session, improving weight shift to R and R weight acceptance. Pt improving carryover. Improving functional transfers and reaching ouside of base of support. Pt would coninue to benefit from skilled OT services to increase indep, decrease caregiver burden and increase quality of life. Continue with POC                           IRF OT Goals    Flowsheet Row Most Recent Value   Transfer Goal 1    Activity/Assistive Device toilet at 03/07/2023 0804   [place] supervision required at 03/08/2023 1445   Time Frame short-term goal (STG), 1 week at 03/08/2023 1445   Progress/Outcome goal revised this date at 03/08/2023 1445   Transfer Goal 2    Activity/Assistive Device toilet at 03/07/2023 0804   [place] modified independence at 03/07/2023 0804   Time Frame long-term goal (LTG), 14 days or less at 03/07/2023 0804   Progress/Outcome goal ongoing at 03/08/2023 1445   UB Dressing Goal 1    [place] supervision required at 03/08/2023 1445   Time Frame short-term goal (STG), 1 week at 03/08/2023 1445   Progress/Outcome goal revised this date at 03/08/2023 1445   UB Dressing Goal 2    [place] modified independence at 03/07/2023 0804   Time Frame long-term goal (LTG), 14 days or less at 03/07/2023 0804   Progress/Outcome goal ongoing at 03/08/2023 1445   LB Dressing Goal 1    [place] supervision required at 03/07/2023 0804   Time Frame short-term goal (STG), 1 week at 03/08/2023 1445   Progress/Outcome goal revised this date at 03/08/2023 1445   LB Dressing Goal 2    [place] modified independence at 03/07/2023 0804   Time Frame long-term goal (LTG), 14 days or less at 03/07/2023 0804   Progress/Outcome goal ongoing at 03/08/2023 1445   Grooming Goal 1    [place] supervision required at  03/07/2023 0804   Time Frame short-term goal (STG), 1 week at 03/08/2023 1445   Progress/Outcome goal ongoing at 03/08/2023 1445   Grooming Goal 2    [place] modified independence at 03/07/2023 0804   Time Frame long-term goal (LTG), 14 days or less at 03/07/2023 0804   Progress/Outcome goal ongoing at 03/08/2023 1445   Toileting Goal 1    [place] supervision required at 03/07/2023 0804   Time Frame short-term goal (STG), 5 - 7 days at 03/07/2023 0804   Progress/Outcome goal ongoing at 03/08/2023 1445   Toileting Goal 2    [place] modified independence at 03/07/2023 0804   Time Frame long-term goal (LTG), 14 days or less at 03/07/2023 0804   Progress/Outcome goal ongoing at 03/08/2023 1445

## 2023-03-14 NOTE — PROGRESS NOTES
Inpatient Psychology Progress Note    Duration: 30 minutes  Harry Alvarado : 1955, a 67 y.o. male, for follow up visit.  Reason:  He has been experiencing adjustment to disability.    HPI: See initial psych note.     Current Evaluation:     Mental Status Evaluation:  Arousal Level: Attentive  Appearance: Well Groomed  Speech: Regular  Psychomotor Functioning: WNL  Eye Contact: WNL  Est. Premorbid Intelligence: Above average  Orientation: Fully oriented  Attention: WNL  Concentration: WNL  Recent Memory: WNL  Remote Memory: WNL  Thought Content: Appropriate  Thought Process: WNL  Insight: Aware of physical limitations, Aware of impact on functioning, Intact  Perceptual Function: Other:, Visual impairment (glasses. Has phantom limb syndrome)  Delusions: None or age appropriate  Sleeping: No Change (related to use of hospital bed mattress softness)  Appetite: No Change  Affect: Full Range  Mood: Motivated, Hopeful    Additional Assessments done this visit:     Arthur Suicide Severity Rating Scale:  Not indicated          Safe-T Assessment:  Not indicated        Session Summary:   Psychologist met w/ Harry in his room, he was attentive, focused, in wheelchair. He reported that sleep and appetite are both at baseline. Harry stated he is able to see progress being made, but that he wishes it was faster so he can be home. He reported that he had positive meeting with   about where he will go for continued prosthetic training  when back home. At this time he feels comfortable with the leg and team he is working with. He [facility] last week and stated that it went fine for him, he felt he was able to participate and learn from other members. Denied any concerns with level of activities or future. Improving sense of independence. Harry expressed positivity regarding making goals. He noted he has been working on improving acceptance of changing expectations - had thought he'd be further along  than he is which he noted was due to not knowing fully all the rehab involved. He remains motivated for rehab. He continues to benefit from psychology following during his rehab stay.      Goals Addressed                 This Visit's Progress    • Increase adjustment to rehabilitation facility.   On track    • Maximize adjustment and acceptance of limitations   On track          Interventions  Acceptance & Adjustment  Monitoring of Symptoms    Psychoeducation provided on:  Other: n/a     Recommendations:      Individual Therapy  30 minutesweekly      Visit Diagnosis:     1. Adjustment disorder with anxiety        Diagnostic Impression:   Weekly Progress Summary: progressing toward goals as expected  Weekly Outcome Statement: Psychologist met w/ Harry in his room, he was attentive, focused, in wheelchair. He reported that sleep and appetite are both at baseline. Harry stated he is able to see progress being made, but that he wishes it was faster so he can be home. He reported that he had positive meeting with   about where he will go for continued prosthetic training  when back home. At this time he feels comfortable with the leg and team he is working with. He [facility] last week and stated that it went fine for him, he felt he was able to participate and learn from other members. Denied any concerns with level of activities or future. Improving sense of independence. Harry expressed positivity regarding making goals. He noted he has been working on improving acceptance of changing expectations - had thought he'd be further along than he is which he noted was due to not knowing fully all the rehab involved. He remains motivated for rehab. He continues to benefit from psychology following during his rehab stay.    Psychological condition is generally improving.       CHIQUIS Godoy.MARY @ 12:47 PM

## 2023-03-14 NOTE — PROGRESS NOTES
[facility] Progress Note          Patient was seen and examined.   Attestation Notes: Face to face encounter completed    Harry Alvarado is a 67 y.o. male who was admitted for Encounter for prosthetic gait training [Z47.89]. Patient was referred by Richard Angulo MD for medical assessment and management      CC: Encounter for prosthetic gait training [Z47.89]     HPI: Harry Alvarado is a 67 y.o. male with MTHFR gene mutation, CAD s/p MI 1993, and PAD s/p R Fem to PT bypass with arm vein in 1993, revision in 2011, s/p R SFA to peroneal bypass graft s/p revision on 7/30/18, s/p tPA bolus and angioplasty in 2020 admitted to [facility] 11/30/22 with RLE bypass thrombosis and chronic right foot ischemia/gangrene and underwent right BKA by vascular surgeon, Dr. Bowen Serrato 12/2/22. Post op he had anemia but did not require transfusion. He was restarted on his home Eliquis.       The patient was evaluated by PM&R and found to have residual deficits in ambulation and ADLs due to Status post below knee amputation of right lower extremity (CMS/[facility]) [Z89.511] and came to [facility] on 12/6/2022 for acute inpatient rehab.      He participated in therapy. The patient has steadily improved in therapy and was for discharged home 12/18/2022.     The patient was seen by Dr. Agustín Zambrano in [facility] and was felt to be a good candidate for prosthetic ambulation and came to [facility] 3/6/2023 for acute inpatient prosthetic training.    He participated in therapy.         SUBJECTIVE:  Patient interviewed and examined.     He feels prosthetic training is going well.      No pain complaints, moving bowels and bladder, no abdominal pain or nausea, no dysuria, no chest pain, palpitations, dyspnea or fevers    Review of Systems:  All other systems reviewed and negative except as noted in the HPI.    Current meds and allergies reviewed    Past Medical History:   Diagnosis Date   • Coronary artery disease      s/p MI   • Deep vein thrombosis (CMS/HCC)    • Lupus anticoagulant disorder (CMS/HCC)    • Lyme disease    • MTHFR gene mutation    • Myocardial infarction (CMS/HCC)    • PAD (peripheral artery disease) (CMS/HCC)      Past Surgical History:   Procedure Laterality Date   • BELOW KNEE LEG AMPUTATION Right 12/02/2022   • FEMORAL BYPASS Right      Social History     Tobacco Use   • Smoking status: Never   • Smokeless tobacco: Never   Substance Use Topics   • Alcohol use: Not Currently      History reviewed. No pertinent family history.    Vital signs in last 24 hours:  Temp:  [36.4 °C (97.6 °F)-36.7 °C (98.1 °F)] 36.7 °C (98.1 °F)  Heart Rate:  [73-81] 81  Resp:  [18] 18  BP: (119-156)/(64-90) 146/81  Vital signs reviewed 03/14/23 6:11 PM    Physical Exam  Vitals and nursing note reviewed.   Constitutional:       Appearance: Normal appearance.   HENT:      Head: Normocephalic and atraumatic.      Right Ear: External ear normal.      Left Ear: External ear normal.      Nose: Nose normal.      Mouth/Throat:      Mouth: Mucous membranes are moist.      Pharynx: Oropharynx is clear.   Eyes:      Conjunctiva/sclera: Conjunctivae normal.      Pupils: Pupils are equal, round, and reactive to light.   Cardiovascular:      Rate and Rhythm: Normal rate and regular rhythm.      Pulses: Normal pulses.      Heart sounds: Normal heart sounds.   Pulmonary:      Effort: Pulmonary effort is normal.      Breath sounds: Normal breath sounds.   Abdominal:      General: Abdomen is flat. Bowel sounds are normal.      Palpations: Abdomen is soft.   Musculoskeletal:         General: Deformity (s/p right BKA) present.      Cervical back: Normal range of motion and neck supple.   Skin:     General: Skin is warm and dry.   Neurological:      General: No focal deficit present.      Mental Status: He is alert and oriented to person, place, and time.   Psychiatric:         Mood and Affect: Mood normal.         Behavior: Behavior normal.                  Objective    Labs:  I have reviewed the patient's labs.  Current labs are within normal limits.                 Imaging:  Not applicable        ASSESSMENT/PLAN:    67 y.o. male with MTHFR gene mutation, CAD s/p MI 1993, and PAD s/p R Fem to PT bypass with arm vein in 1993, revision in 2011, s/p R SFA to peroneal bypass graft s/p revision on 7/30/18, s/p tPA bolus and angioplasty in 2020 admitted to [facility] 11/30/22 with RLE bypass thrombosis and chronic right foot ischemia/gangrene and underwent right BKA by vascular surgeon, Dr. Bowen Serrato 12/2/22; pre-prosthetic training [facility] 12/6/22-12/18/22; returns to [facility] from home 3/6/23 for inpatient prosthetic training.      1. Vasc:  -PAD s/p failed RLE bypass with right foot ischemia/gangrene  -s/p right BKA 12/2/22  -inpatient prosthetic training  -Tylenol for pain  -remains on Eliquis for hypercoagulable condition     2. Heme:  -post op anemia due to chronic blood loss, does not need iron  -leukocytosis reactive due to surgery  -hx of MTHFR gene mutation  -normal CBC     3. Renal:  -increased risk of dehydration and electrolyte changes  -normal CMP, Mg     4. Gi:  -Senokot-S as needed for bowels     5. Gu:  -no urine retention     6. Cardiac:  -hx of CAD  -watch for orthostatic hypotension  -use cardiac precautions in therapy     7. Pulm:  -incentive spirometry for atelectasis     8. Derm:  -skin care per nursing     9. Nutrition:  -seen by Nutrition for assessment and education     10. Psych:  -seen by Psychology for support    11. Dispo:  -Expected discharge date 3/18/2023         plan discussed with patient, nurse, case management and  Richard Angulo MD Scott Sapperstein, MD  3/14/2023  6:11 PM

## 2023-03-14 NOTE — PLAN OF CARE
Plan of Care Review  Progress: improving  Outcome Summary: No issues overnight. pt sleeping this shift. Denies pain.Remains continent. Uses call light to make needs known.

## 2023-03-14 NOTE — SUBJECTIVE & OBJECTIVE
" Patient was seen and examined.   Attestation Notes: Face to face encounter completed    Subjective    The patient touch assist to min assist with transfers and ambulation with rolling walker with prosthesis.  Continues to progress well and improved with therapies.  No new problems.  Pain controlled without medication.  Objective     Visit Vitals  BP (!) 146/81 (BP Location: Right upper arm, Patient Position: Sitting)   Pulse 81   Temp 36.7 °C (98.1 °F) (Oral)   Resp 18   Ht 1.854 m (6' 1\")   Wt 75.8 kg (167 lb 3.2 oz)   SpO2 93%   BMI 22.06 kg/m²       Review of Systems:  Pertinent items are noted in HPI.  Denies chest pain and shortness of breath.  Review of systems otherwise negative.    Full Code    Physical Exam  General      Alert, cooperative, no distress, appears stated age.   Head:    Normocephalic, without obvious abnormality, atraumatic.   Eyes:    PERRL, conjunctiva/corneas clear, EOM's intact.        Nose:   Nares normal, septum midline, mucosa normal, no drainage or            sinus tenderness.   Throat:   Lips, mucosa, and tongue normal.    Neck:   Supple, symmetrical, trachea midline.    Back:     Symmetric, no curvature.   Lungs:     Clear to auscultation bilaterally, respirations unlabored.   Chest wall:    No tenderness or deformity.   Heart:    Regular rate and rhythm, S1 and S2 normal.   Abdomen:     Soft, non-tender, bowel sounds active all four quadrants,     no masses, no organomegaly.   Extremities:  Musculoskeletal:  Trace stump edema.  Right transtibial amputation.   Pulses:   1+ and symmetric all extremities.   Skin:   Skin color, texture, turgor normal, no rashes or lesions   Neurologic:          Behavior/  Emotional:  CNII-XII intact.  Alert and oriented ×3.  Motor exam intact.  Sensory exam intact.  Reflexes stable decreased reflexes.      Appropriate, cooperative           Plan of care was discussed with patient   "

## 2023-03-14 NOTE — PROGRESS NOTES
Spoke w pts spouse who reports OP PT will be patrick LUNA/ [phone] with appt on 3/22 at 10AM. CM called Elise who requests OP script and d/c summary be faxed to [phone]. CM to send script and dc summary once available. CM will continue to follow.

## 2023-03-14 NOTE — PROGRESS NOTES
Patient: Harry Alvarado  Location: [facility] [address]  MRN: [id number]  Today's date: 3/14/2023    History of Present Illness  Harry is a 67 y.o. male admitted on 3/6/2023 with Encounter for prosthetic gait training [Z47.89]. Principal problem is   Encounter for prosthetic gait training.      This is the first clinic appointment for Mr. Alvarado following his inpatient stay for preprostatic training. He is a 67-year-old male with a history of gene mutation, CAD, history of MI, peripheral artery disease with multiple bypass procedures in the past. The patient had been admitted to [facility] on November 30 for a planned transtibial amputation due to bypass thrombosis. He had reported worse pain in the previous 1 to 2 months in the right foot and had undergone multiple surgeries prior. He underwent a right transtibial amputation on December 2, 2022. There is no significant postoperative complications. Pain was well controlled. He was admitted to [place] re[facility] for preprostatic training and did well and was discharged home. He continues to receive home care physical therapy. He is still following up with his surgeon. He still has small open area in the lateral aspect of the incision line with minimal amount of serous drainage noted. He offers complaints of intermittent phantom pain and sensations. He has no other complaints of headache or dizziness, chest pain or shortness of breath. Currently he and his wife are living in his son's home everything is a 1 level. He is independent at a wheelchair level. Prior to his amputation he was completely independent and very active.    Past Medical History  Harry has a past medical history of Coronary artery disease, Deep vein thrombosis (CMS/HCC), Lupus anticoagulant disorder (CMS/HCC), Lyme disease, MTHFR gene mutation, Myocardial infarction (CMS/HCC), and PAD (peripheral artery disease) (CMS/[facility]).      PT Vitals    Date/Time Pulse HR Source BP  BP Location BP Method Pt Position [facility]   03/14/23 1002 75 Monitor 156/90 Right upper arm Automatic Sitting RK      PT Pain    Date/Time Pain Type Rating: Rest Rating: Activity [facility]   03/14/23 1002 Pain Assessment 0 -- RK   03/14/23 1055 Pain Reassessment -- 0 RK          Prior Living Environment    Flowsheet Row Most Recent Value   People in Home spouse   Current Living Arrangements home   Home Accessibility stairs to enter home (Group), stairs within home (Group)   Living Environment Comment Pt reports he lives with wife in 1  with 6 YOSEF. Pt has loft upstairs but does not need to access. Pt reports he's very active around the home and has steep property he needs to navigate.   Number of Stairs, Main Entrance 6   Stair Railings, Main Entrance railings on both sides of stairs   Stairs Comment, Main Entrance Can't reach both rails at once   Location, Patient Bedroom first (main) floor   Location, Bathroom first (main) floor   Bathroom Access Comment walk in shower, shower chair   Number of Stairs, Within Home, Primary 0          Prior Level of Function    Flowsheet Row Most Recent Value   Dominant Hand right   Ambulation assistive equipment   Transferring assistive equipment   Toileting independent   Bathing independent   Dressing independent   Eating independent   Prior Level of Function Comment Pt reports he was independent for all ADLs/mobility prior to TTA. Pt completed pre-prosthetic training at [facility] - has been Mod I at w/c level since d/c. Reports he utilized RW for transfers and gait training with HHPT.   Assistive Device Currently Used at Home walker, front-wheeled, wheelchair, shower chair, cane, straight           IRF PT Evaluation and Treatment - 03/14/23 1002        PT Time Calculation    Start Time 1000     Stop Time 1100     Time Calculation (min) 60 min        Session Details    Document Type daily treatment/progress note     Mode of Treatment physical therapy;individual therapy        General  Information    Patient Profile Reviewed yes     General Observations of Patient Patient recieved seated in w/c in therapy gym; pleasant and agreeable to therapy session.        Transfers    Transfers stand pivot transfer     Comment mobility belt donned for entire session; doffed at end of session.        Sit to Stand Transfer    [place], Sit to Stand Transfer touching/steadying assist     Verbal Cues technique     Assistive Device walker, front-wheeled     Comment touch steadying d/t balance deficits        Stand to Sit Transfer    [place], Stand to Sit Transfer touching/steadying assist     Verbal Cues technique     Assistive Device walker, front-wheeled     Comment touch steadying for balance deficits        Stand Pivot Transfer    [place], Stand Pivot/Stand Step Transfer touching/steadying assist     Verbal Cues technique     Assistive Device walker, front-wheeled     Comment via ambulatory approach; touch steadying for balance        Car Transfer    Transfer Technique stand pivot     Hazen, Car Transfer close supervision     Verbal Cues safety     Assistive Device walker, front-wheeled     Comment SPT w/c > passenger seat of simulator car with closeS d/t balance deficits.        Gait Training    [place], Gait close supervision     Assistive Device walker, front-wheeled     Distance in Feet 200 feet     Pattern (Gait) step-through     Deviations/Abnormal Patterns (Gait) base of support, narrow;gait speed decreased;step length decreased;weight shifting decreased     Comment (Gait/Stairs) 200' x 2 with closeS d/t balance deficits.        Stairs Training    [place]az[place], Stairs close supervision     Assistive Device railing     Handrail Location (Stairs) both sides     Number of Stairs 12     Stair Height 6 inches     Ascending Stairs Technique step-to-step   leading with LLE    Descending Stairs Technique step-to-step   leading wiht RLE    Comment up/down 12 steps x 2 consecutive bouts  with closeS for balance        Balance    Balance Interventions dynamic;standing     Comment, Balance 1.) standing while holding 7# DB with BUE performed chest press x30 reps and closeS d/t balance. 2.) standing performed rotation twists while holding 7# DB with BUE; x30 reps with closeS for balance. 3.) standing performed PNF D2 flexion with 5# DB with BUE; and closeS for balance; 3x10 reps each side.        Prosthetic Orientation (Lower Extremity)    Fit Assessment fit/function of prosthesis are appropriate     Comment, Fit Assessment patient donned prosthetic with supervision with 5 sock ply and gel liner. Goal to wear prosthetic for total 6 hours and plan to doff at 1600 and perform skin assessment.        Daily Progress Summary (PT)    Symptoms Noted During/After Treatment fatigue     Daily Outcome Statement Patient session focused on functional mobility and balance. Initiated car transfer this session and progressed to closeS on the stairs. Patient would continue to benefit from functional moblity and ambulationtrials. Plan to assess w/c management in PM session.                           IRF PT Goals    Flowsheet Row Most Recent Value   Transfer Goal 1    Activity/Assistive Device sit-to-stand/stand-to-sit, stand pivot  [LRAD] at 03/06/2023 1502   [place] supervision required at 03/06/2023 1502   Time Frame short-term goal (STG), 1 week at 03/06/2023 1502   Progress/Outcome goal met at 03/08/2023 1454   Transfer Goal 2    Activity/Assistive Device sit-to-stand/stand-to-sit, stand pivot  [LRAD] at 03/06/2023 1502   [place] modified independence at 03/06/2023 1502   Time Frame 1 week at 03/08/2023 1454   Progress/Outcome continuing progress toward goal, goal ongoing at 03/08/2023 1454   Gait/Walking Locomotion Goal 1    Activity/Assistive Device gait (walking locomotion)  [LRAD] at 03/06/2023 1502   Distance 150 feet at 03/06/2023 1502   [place] supervision required at 03/06/2023 1502   Time  Frame short-term goal (STG), 1 week at 03/06/2023 1502   Progress/Outcome goal met at 03/08/2023 1454   Gait/Walking Locomotion Goal 2    Activity/Assistive Device gait (walking locomotion)  [LRAD] at 03/06/2023 1502   Distance 150 feet at 03/06/2023 1502   [place] modified independence at 03/06/2023 1502   Time Frame 1 week at 03/08/2023 1454   Progress/Outcome continuing progress toward goal, goal ongoing at 03/08/2023 1454   Stairs Goal 1    Activity/Assistive Device stairs, all skills at 03/08/2023 1454   Number of Stairs 12 at 03/08/2023 1454   [place] modified independence at 03/08/2023 1454   Time Frame long-term goal (LTG), 1 week at 03/08/2023 1454

## 2023-03-14 NOTE — PLAN OF CARE
Problem: Rehabilitation (IRF) Plan of Care  Goal: Plan of Care Review  Outcome: Progressing  Flowsheets (Taken 3/14/2023 1619)  Progress: improving  Plan of Care Reviewed With: patient  Outcome Summary: Patient is pleasant and cooperative Makes needs known.  Denies pain.  Mod I in room at wheelchair level. Good safety awareness.   Plan of Care Review  Plan of Care Reviewed With: patient  Progress: improving  Outcome Summary: Patient is pleasant and cooperative Makes needs known.  Denies pain.  Mod I in room at wheelchair level. Good safety awareness.

## 2023-03-14 NOTE — PROGRESS NOTES
Patient: Harry Alvarado  Location: [facility] [address]  MRN: [id number]  Today's date: 3/14/2023    Hospital Course  History of Present Illness  Harry is a 67 y.o. male admitted on 3/6/2023 with Encounter for prosthetic gait training [Z47.89]. Principal problem is   Encounter for prosthetic gait training.      This is the first clinic appointment for Mr. Alvarado following his inpatient stay for preprostatic training. He is a 67-year-old male with a history of gene mutation, CAD, history of MI, peripheral artery disease with multiple bypass procedures in the past. The patient had been admitted to [facility] on November 30 for a planned transtibial amputation due to bypass thrombosis. He had reported worse pain in the previous 1 to 2 months in the right foot and had undergone multiple surgeries prior. He underwent a right transtibial amputation on December 2, 2022. There is no significant postoperative complications. Pain was well controlled. He was admitted to [place] [facility] for preprostatic training and did well and was discharged home. He continues to receive home care physical therapy. He is still following up with his surgeon. He still has small open area in the lateral aspect of the incision line with minimal amount of serous drainage noted. He offers complaints of intermittent phantom pain and sensations. He has no other complaints of headache or dizziness, chest pain or shortness of breath. Currently he and his wife are living in his son's home everything is a 1 level. He is independent at a wheelchair level. Prior to his amputation he was completely independent and very active.    Past Medical History  Harry has a past medical history of Coronary artery disease, Deep vein thrombosis (CMS/HCC), Lupus anticoagulant disorder (CMS/HCC), Lyme disease, MTHFR gene mutation, Myocardial infarction (CMS/HCC), and PAD (peripheral artery disease) (CMS/[facility]).          Prior Living  Environment    Flowsheet Row Most Recent Value   People in Home spouse   Current Living Arrangements home   Home Accessibility stairs to enter home (Group), stairs within home (Group)   Living Environment Comment Pt reports he lives with wife in 1  with 6 YOSEF. Pt has loft upstairs but does not need to access. Pt reports he's very active around the home and has steep property he needs to navigate.   Number of Stairs, Main Entrance 6   Stair Railings, Main Entrance railings on both sides of stairs   Stairs Comment, Main Entrance Can't reach both rails at once   Location, Patient Bedroom first (main) floor   Location, Bathroom first (main) floor   Bathroom Access Comment walk in shower, shower chair   Number of Stairs, Within Home, Primary 0          Prior Level of Function    Flowsheet Row Most Recent Value   Dominant Hand right   Ambulation assistive equipment   Transferring assistive equipment   Toileting independent   Bathing independent   Dressing independent   Eating independent   Prior Level of Function Comment Pt reports he was independent for all ADLs/mobility prior to TTA. Pt completed pre-prosthetic training at [facility] - has been Mod I at w/c level since d/c. Reports he utilized RW for transfers and gait training with HHPT.   Assistive Device Currently Used at Home walker, front-wheeled, wheelchair, shower chair, cane, straight           Recreational Therapy Evaluation and Treatment - 03/14/23 1503        Session Details    Document Type daily treatment/progress note     Mode of Treatment recreational therapy;individual therapy        Rec Time Calculation    Start Time 1500     Stop Time 1545     Time Calculation (min) 45 min        General Information    Patient Profile Reviewed yes     General Observations of Patient RT greeted pt seated upright in MWC in pt rm. Pt agreeable to RT tx session.        Coping/Community Reintegration    Observed Emotional State calm;cooperative;pleasant     Verbalized Emotional  State acceptance        Coping Strategies    Comment Pt was oriented to [facility] for sense of normalization from hospital environment.     Trust Relationship/Rapport care explained;choices provided;empathic listening provided;questions answered;questions encouraged;reassurance provided;thoughts/feelings acknowledged     Quality of Life Promotion balance between activity/rest encouraged;community involvement promoted;expression of thoughts about present/future encouraged;independence in all possible areas promoted;social interaction/involvement encouraged;wellness behaviors promoted        Recreational Therapy Participation    Comment, Recreation Participation Pt propelled bimanual/unilateral MWC short community distances x2  to/ from [facility] unit to [facility] over smooth indoor surfaces + uneven carpeting c mod I. RT + pt explored [facility] smelling scented plants as pt expressed re eval garending + care of plants as leisure interests. In remaning time, pt was oriented to [employer] website + the opportunity of signing up for bi-monthly magazines + peer support groups. RT educated pt where to locate sign up of peer support groups, magazines + benefits they provide. Pt was agreeable to magazine sign up + wanted to consider sign up for peer support groups at another time. RT ended RT tx session early 2* pt fatigue from thrpy.        Rec Therapy Clinical Impression    Daily Outcome Statement Pt seen for 45’ RT tx session c focus on education on [employer] resource, relaxation, obserable affect + sense of self.                      Recreational Therapy Goals    Flowsheet Row Most Recent Value   Community Goal 1    Activity: Community Pt to receive education re community accessibility. at 03/13/2023 1303   [place] set-up required at 03/13/2023 1303   Time Frame short-term goal (STG), 3 - 5 days at 03/13/2023 1303   Leisure Goal 1    Activity: Leisure Pt to receive education re accessible travel.  at 03/13/2023 1303   [place] set-up required at 03/13/2023 1303   Time Frame short-term goal (STG), 3 - 5 days at 03/13/2023 1303   Leisure Goal 2    Activity: Leisure Pt to independently at least 3 strategies for securing assistance in an airport at 03/13/2023 1303   [place] independent at 03/13/2023 1303   Time Frame short-term goal (STG), long-term goal (LTG), 3 - 5 days, 2-3 days at 03/13/2023 1303   Additional Goal 1    Activity: Additional Goal Pt to be education [employer]. at 03/13/2023 1303   [place] set-up required at 03/13/2023 1303   Time Frame 2-3 days at 03/13/2023 1303   Additional Goal 2    Activity: Additional Goal Pt to independently sign up for [employer] magazine + support group at 03/13/2023 1303   [place] independent at 03/13/2023 1303   Time Frame long-term goal (LTG), 5 days, short-term goal (STG) at 03/13/2023 1303

## 2023-03-15 ENCOUNTER — APPOINTMENT (INPATIENT)
Dept: PHYSICAL THERAPY | Facility: REHABILITATION | Age: 68
DRG: 560 | End: 2023-03-15
Payer: MEDICARE

## 2023-03-15 ENCOUNTER — APPOINTMENT (INPATIENT)
Dept: OCCUPATIONAL THERAPY | Facility: REHABILITATION | Age: 68
DRG: 560 | End: 2023-03-15
Payer: MEDICARE

## 2023-03-15 ENCOUNTER — APPOINTMENT (OUTPATIENT)
Dept: PSYCHOLOGY | Facility: CLINIC | Age: 68
End: 2023-03-15
Payer: MEDICARE

## 2023-03-15 ENCOUNTER — APPOINTMENT (INPATIENT)
Dept: OTHER | Facility: REHABILITATION | Age: 68
DRG: 560 | End: 2023-03-15
Payer: MEDICARE

## 2023-03-15 PROCEDURE — 12800000 HC ROOM AND CARE SEMIPRIVATE REHAB

## 2023-03-15 PROCEDURE — 97535 SELF CARE MNGMENT TRAINING: CPT | Mod: GO

## 2023-03-15 PROCEDURE — 97530 THERAPEUTIC ACTIVITIES: CPT | Mod: GP

## 2023-03-15 PROCEDURE — 97763 ORTHC/PROSTC MGMT SBSQ ENC: CPT | Mod: GP

## 2023-03-15 PROCEDURE — 97116 GAIT TRAINING THERAPY: CPT | Mod: GP

## 2023-03-15 PROCEDURE — 97530 THERAPEUTIC ACTIVITIES: CPT

## 2023-03-15 PROCEDURE — 90853 GROUP PSYCHOTHERAPY: CPT | Performed by: PSYCHOLOGIST

## 2023-03-15 PROCEDURE — 63700000 HC SELF-ADMINISTRABLE DRUG: Performed by: HOSPITALIST

## 2023-03-15 PROCEDURE — 97530 THERAPEUTIC ACTIVITIES: CPT | Mod: GO

## 2023-03-15 RX ORDER — ACETAMINOPHEN 325 MG/1
650 TABLET ORAL EVERY 4 HOURS PRN
COMMUNITY
Start: 2023-03-15 | End: 2023-04-14

## 2023-03-15 RX ADMIN — APIXABAN 5 MG: 5 TABLET, FILM COATED ORAL at 20:37

## 2023-03-15 RX ADMIN — APIXABAN 5 MG: 5 TABLET, FILM COATED ORAL at 07:36

## 2023-03-15 NOTE — PROGRESS NOTES
Patient: Harry Alvarado  Location: [facility] [address]  MRN: [id number]  Today's date: 3/15/2023    Hospital Course  History of Present Illness  Harry is a 67 y.o. male admitted on 3/6/2023 with Encounter for prosthetic gait training [Z47.89]. Principal problem is   Encounter for prosthetic gait training.      This is the first clinic appointment for Mr. Alvarado following his inpatient stay for preprostatic training. He is a 67-year-old male with a history of gene mutation, CAD, history of MI, peripheral artery disease with multiple bypass procedures in the past. The patient had been admitted to [facility] on November 30 for a planned transtibial amputation due to bypass thrombosis. He had reported worse pain in the previous 1 to 2 months in the right foot and had undergone multiple surgeries prior. He underwent a right transtibial amputation on December 2, 2022. There is no significant postoperative complications. Pain was well controlled. He was admitted to [place] [facility] for preprostatic training and did well and was discharged home. He continues to receive home care physical therapy. He is still following up with his surgeon. He still has small open area in the lateral aspect of the incision line with minimal amount of serous drainage noted. He offers complaints of intermittent phantom pain and sensations. He has no other complaints of headache or dizziness, chest pain or shortness of breath. Currently he and his wife are living in his son's home everything is a 1 level. He is independent at a wheelchair level. Prior to his amputation he was completely independent and very active.    Past Medical History  Harry has a past medical history of Coronary artery disease, Deep vein thrombosis (CMS/HCC), Lupus anticoagulant disorder (CMS/HCC), Lyme disease, MTHFR gene mutation, Myocardial infarction (CMS/HCC), and PAD (peripheral artery disease) (CMS/[facility]).          Prior Living  Environment    Flowsheet Row Most Recent Value   People in Home spouse   Current Living Arrangements home   Home Accessibility stairs to enter home (Group), stairs within home (Group)   Living Environment Comment Pt reports he lives with wife in 1  with 6 YOSEF. Pt has loft upstairs but does not need to access. Pt reports he's very active around the home and has steep property he needs to navigate.   Number of Stairs, Main Entrance 6   Stair Railings, Main Entrance railings on both sides of stairs   Stairs Comment, Main Entrance Can't reach both rails at once   Location, Patient Bedroom first (main) floor   Location, Bathroom first (main) floor   Bathroom Access Comment walk in shower, shower chair   Number of Stairs, Within Home, Primary 0          Prior Level of Function    Flowsheet Row Most Recent Value   Dominant Hand right   Ambulation assistive equipment   Transferring assistive equipment   Toileting independent   Bathing independent   Dressing independent   Eating independent   Prior Level of Function Comment Pt reports he was independent for all ADLs/mobility prior to TTA. Pt completed pre-prosthetic training at [facility] - has been Mod I at w/c level since d/c. Reports he utilized RW for transfers and gait training with HHPT.   Assistive Device Currently Used at Home walker, front-wheeled, wheelchair, shower chair, cane, straight           Recreational Therapy Evaluation and Treatment - 03/15/23 1403        Session Details    Document Type daily treatment/progress note     Mode of Treatment recreational therapy;individual therapy        Rec Time Calculation    Start Time 1400     Stop Time 1500     Time Calculation (min) 60 min        General Information    Patient Profile Reviewed yes     General Observations of Patient RT greeted pt seated upright in MWC in pt rm; Pt agreeable to RT tx services.        Coping/Community Reintegration    Observed Emotional State calm;cooperative;pleasant     Verbalized  Emotional State acceptance        Coping Strategies    Trust Relationship/Rapport care explained;choices provided;empathic listening provided;questions answered;questions encouraged;reassurance provided;thoughts/feelings acknowledged     Quality of Life Promotion balance between activity/rest encouraged;expression of thoughts about present/future encouraged;independence in all possible areas promoted;social interaction/involvement encouraged;wellness behaviors promoted        Recreational Therapy Participation    Comment, Recreation Participation RT educated pt on accessible travel. RT discussed disability rights, arranging needed items ahead of time, being informed/prepared, being able to call for help when needed, slide of contacts for TSA Cares + mobility assistance contacts. RT provided accessible travel handout. RT researched pt’s preferred airport [place] [employer] to be oriented to website for future reference. RT educated pt on [place] local adapted skiing facilities + possible locations in [place]. In the remaining time RT + pt watched videos of adapted skiing.        Rec Therapy Clinical Impression    Daily Outcome Statement Pt seen for 60’ RT tx session c focus on education on accessible travel + adapted sports.                      Recreational Therapy Goals    Flowsheet Row Most Recent Value   Community Goal 1    Activity: Community Pt to receive education re community accessibility. at 03/13/2023 1303   [place] set-up required at 03/13/2023 1303   Time Frame short-term goal (STG), 3 - 5 days at 03/13/2023 1303   Leisure Goal 1    Activity: Leisure Pt to receive education re accessible travel. at 03/13/2023 1303   [place] set-up required at 03/13/2023 1303   Time Frame short-term goal (STG), 3 - 5 days at 03/13/2023 1303   Leisure Goal 2    Activity: Leisure Pt to independently at least 3 strategies for securing assistance in an airport at 03/13/2023 1303   [place] independent at  03/13/2023 1303   Time Frame short-term goal (STG), long-term goal (LTG), 3 - 5 days, 2-3 days at 03/13/2023 1303   Additional Goal 1    Activity: Additional Goal Pt to be education [employer]. at 03/13/2023 1303   [place] set-up required at 03/13/2023 1303   Time Frame 2-3 days at 03/13/2023 1303   Additional Goal 2    Activity: Additional Goal Pt to independently sign up for [employer] magazine + support group at 03/13/2023 1303   [place] independent at 03/13/2023 1303   Time Frame long-term goal (LTG), 5 days, short-term goal (STG) at 03/13/2023 1303

## 2023-03-15 NOTE — PROGRESS NOTES
Patient: Harry Alvarado  Location: [facility] [address]  MRN: [id number]  Today's date: 3/15/2023    History of Present Illness  Harry is a 67 y.o. male admitted on 3/6/2023 with Encounter for prosthetic gait training [Z47.89]. Principal problem is   Encounter for prosthetic gait training.      This is the first clinic appointment for Mr. Alvarado following his inpatient stay for preprostatic training. He is a 67-year-old male with a history of gene mutation, CAD, history of MI, peripheral artery disease with multiple bypass procedures in the past. The patient had been admitted to [facility] on November 30 for a planned transtibial amputation due to bypass thrombosis. He had reported worse pain in the previous 1 to 2 months in the right foot and had undergone multiple surgeries prior. He underwent a right transtibial amputation on December 2, 2022. There is no significant postoperative complications. Pain was well controlled. He was admitted to [place] re[facility] for preprostatic training and did well and was discharged home. He continues to receive home care physical therapy. He is still following up with his surgeon. He still has small open area in the lateral aspect of the incision line with minimal amount of serous drainage noted. He offers complaints of intermittent phantom pain and sensations. He has no other complaints of headache or dizziness, chest pain or shortness of breath. Currently he and his wife are living in his son's home everything is a 1 level. He is independent at a wheelchair level. Prior to his amputation he was completely independent and very active.    Past Medical History  Harry has a past medical history of Coronary artery disease, Deep vein thrombosis (CMS/HCC), Lupus anticoagulant disorder (CMS/HCC), Lyme disease, MTHFR gene mutation, Myocardial infarction (CMS/HCC), and PAD (peripheral artery disease) (CMS/[facility]).      OT Vitals    Date/Time Pulse HR Source BP  BP Location BP Method Pt Position Who   03/15/23 1033 75 Monitor 127/79 Left upper arm Automatic Sitting VMS      OT Pain    Date/Time Pain Type Rating: Rest Who   03/15/23 1033 Pain Assessment 0 VMS   03/15/23 1129 Post Activity 0 VMS          Prior Living Environment    Flowsheet Row Most Recent Value   People in Home spouse   Current Living Arrangements home   Home Accessibility stairs to enter home (Group), stairs within home (Group)   Living Environment Comment Pt reports he lives with wife in 1  with 6 YOSEF. Pt has loft upstairs but does not need to access. Pt reports he's very active around the home and has steep property he needs to navigate.   Number of Stairs, Main Entrance 6   Stair Railings, Main Entrance railings on both sides of stairs   Stairs Comment, Main Entrance Can't reach both rails at once   Location, Patient Bedroom first (main) floor   Location, Bathroom first (main) floor   Bathroom Access Comment walk in shower, shower chair   Number of Stairs, Within Home, Primary 0          Prior Level of Function    Flowsheet Row Most Recent Value   Dominant Hand right   Ambulation assistive equipment   Transferring assistive equipment   Toileting independent   Bathing independent   Dressing independent   Eating independent   Prior Level of Function Comment Pt reports he was independent for all ADLs/mobility prior to TTA. Pt completed pre-prosthetic training at [facility] - has been Mod I at w/c level since d/c. Reports he utilized RW for transfers and gait training with HHPT.   Assistive Device Currently Used at Home walker, front-wheeled, wheelchair, shower chair, cane, straight          Occupational Profile    Flowsheet Row Most Recent Value   Occupational History/Life Experiences Pt is interested in learning about return to driving, plans to purchase a new vehicle.  Recommend arranging Chirag Eaton from [employer] to speak with pt.  Pt lives in [place] and was staying in [place] with son  since amputation, plans to return to [place].  Initially wanted to drive home.  Discussed potentially purchasing   Environmental Supports and Barriers +. 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, also enjoys hiking,           IRF OT Evaluation and Treatment - 03/15/23 1033        OT Time Calculation    Start Time 1030     Stop Time 1130     Time Calculation (min) 60 min        Session Details    Document Type daily treatment/progress note     Mode of Treatment occupational therapy;individual therapy        Mobility Belt    Mobility Belt Used for All Out of Bed Activity yes        Sit to Stand Transfer    [place], Sit to Stand Transfer supervision     Verbal Cues safety     Assistive Device walker, front-wheeled        Stand to Sit Transfer    [place], Stand to Sit Transfer supervision     Verbal Cues safety     Assistive Device walker, front-wheeled        Stand Pivot Transfer    [place], Stand Pivot/Stand Step Transfer supervision     Verbal Cues safety     Assistive Device walker, front-wheeled     Comment via amb tx        Toilet Transfer    [place], Toilet Transfer supervision     Verbal Cues safety     Assistive Device walker, front-wheeled        Shower Transfer    [place], Shower Transfer modified independence     Assistive Device grab bars/tub rail;shower chair     Comment SPT per pt report        Safety Issues, Functional Mobility    Comment, Safety Issues/Impairments (Mobility) OT functional ambulation with S with cues for R weight shift, noted improved pelvic drive after pelvic tilt activitites        Balance    Comment, Balance in stance with mirror to anterior midline training with focus on  R weight shift. with RW LLE toe taps to 4 inch block. Then trial with bobath staff with min- mod A with also requiring R hand on RW for support for LLE toe taps due to decreased weight acceptance RLE.  after performing pelvic tilt improved pelvic drive and maintaining hip stability while performing LLE toe taps        Core Strength (Therapeutic Exercise)    Comment supine pelvic tilts with focus on lower core engement        Bathing    [place] modified independence     Comment per pt report        Upper Body Dressing    Self-Performance don;obtains clothes;threads left arm, shirt;threads right arm, shirt;pulls shirt over head/around back;pulls shirt down/adjusts     [place] supervision     Comment gathering clothing amb level        Lower Body Dressing    [place] supervision     [place], Footwear modified independence     Comment gathering clothing,  amb level. review long pants with plastic bag as needed. Pt able to don and doff shoe and sock on prosthetic and tie        Grooming    [place], Oral Hygiene supervision        Prosthetic Orientation (Lower Extremity)    Comment, Fit Assessment Pt initially in 5 ply,  increased to 6 ply, goal for 6 hours wear today        Daily Progress Summary (OT)    Daily Outcome Statement Pt with good participation during therapy session with improved R weight shift and control in stance. Pt continues to have decreased weight acceptance RLE, improving ADL and functional activitites at ambulatory session. Pt would continue to benefit from skilled OT services to increase indep, decrease caregiver burden and increase quality of life. Continue with POC                           IRF OT Goals    Flowsheet Row Most Recent Value   Transfer Goal 1    Activity/Assistive Device toilet at 03/07/2023 0804   [place] supervision required at 03/08/2023 1445   Time Frame short-term goal (STG), 1 week at 03/08/2023 1445   Progress/Outcome goal revised this date at 03/08/2023 1445   Transfer Goal 2    Activity/Assistive Device toilet at 03/07/2023 0804   [place] modified independence at 03/07/2023 0804   Time Frame long-term goal (LTG), 14 days or less at  03/07/2023 0804   Progress/Outcome goal ongoing at 03/08/2023 1445   UB Dressing Goal 1    [place] supervision required at 03/08/2023 1445   Time Frame short-term goal (STG), 1 week at 03/08/2023 1445   Progress/Outcome goal revised this date at 03/08/2023 1445   UB Dressing Goal 2    [place] modified independence at 03/07/2023 0804   Time Frame long-term goal (LTG), 14 days or less at 03/07/2023 0804   Progress/Outcome goal ongoing at 03/08/2023 1445   LB Dressing Goal 1    [place] supervision required at 03/07/2023 0804   Time Frame short-term goal (STG), 1 week at 03/08/2023 1445   Progress/Outcome goal revised this date at 03/08/2023 1445   LB Dressing Goal 2    [place] modified independence at 03/07/2023 0804   Time Frame long-term goal (LTG), 14 days or less at 03/07/2023 0804   Progress/Outcome goal ongoing at 03/08/2023 1445   Grooming Goal 1    [place] supervision required at 03/07/2023 0804   Time Frame short-term goal (STG), 1 week at 03/08/2023 1445   Progress/Outcome goal ongoing at 03/08/2023 1445   Grooming Goal 2    [place] modified independence at 03/07/2023 0804   Time Frame long-term goal (LTG), 14 days or less at 03/07/2023 0804   Progress/Outcome goal ongoing at 03/08/2023 1445   Toileting Goal 1    [place] supervision required at 03/07/2023 0804   Time Frame short-term goal (STG), 5 - 7 days at 03/07/2023 0804   Progress/Outcome goal ongoing at 03/08/2023 1445   Toileting Goal 2    [place] modified independence at 03/07/2023 0804   Time Frame long-term goal (LTG), 14 days or less at 03/07/2023 0804   Progress/Outcome goal ongoing at 03/08/2023 1445

## 2023-03-15 NOTE — PROGRESS NOTES
Patient: Harry Alvarado  Location: [facility] [address]  MRN: [id number]  Today's date: 3/15/2023    History of Present Illness  Harry is a 67 y.o. male admitted on 3/6/2023 with Encounter for prosthetic gait training [Z47.89]. Principal problem is   Encounter for prosthetic gait training.      This is the first clinic appointment for Mr. Alvarado following his inpatient stay for preprostatic training. He is a 67-year-old male with a history of gene mutation, CAD, history of MI, peripheral artery disease with multiple bypass procedures in the past. The patient had been admitted to [facility] on November 30 for a planned transtibial amputation due to bypass thrombosis. He had reported worse pain in the previous 1 to 2 months in the right foot and had undergone multiple surgeries prior. He underwent a right transtibial amputation on December 2, 2022. There is no significant postoperative complications. Pain was well controlled. He was admitted to [place] [facility] for preprostatic training and did well and was discharged home. He continues to receive home care physical therapy. He is still following up with his surgeon. He still has small open area in the lateral aspect of the incision line with minimal amount of serous drainage noted. He offers complaints of intermittent phantom pain and sensations. He has no other complaints of headache or dizziness, chest pain or shortness of breath. Currently he and his wife are living in his son's home everything is a 1 level. He is independent at a wheelchair level. Prior to his amputation he was completely independent and very active.    Past Medical History  Harry has a past medical history of Coronary artery disease, Deep vein thrombosis (CMS/HCC), Lupus anticoagulant disorder (CMS/HCC), Lyme disease, MTHFR gene mutation, Myocardial infarction (CMS/HCC), and PAD (peripheral artery disease) (CMS/[facility]).      PT Vitals    Date/Time Pulse HR Source BP  BP Location BP Method Pt Position [facility]   03/15/23 1305 78 Monitor 125/67 Left upper arm Automatic Sitting BDC      PT Pain    Date/Time Pain Type Rating: Rest [facility]   03/15/23 1305 Pain Assessment 0 BDC   03/15/23 1357 Pain Reassessment 0 BDC          Prior Living Environment    Flowsheet Row Most Recent Value   People in Home spouse   Current Living Arrangements home   Home Accessibility stairs to enter home (Group), stairs within home (Group)   Living Environment Comment Pt reports he lives with wife in 1  with 6 YOSEF. Pt has loft upstairs but does not need to access. Pt reports he's very active around the home and has steep property he needs to navigate.   Number of Stairs, Main Entrance 6   Stair Railings, Main Entrance railings on both sides of stairs   Stairs Comment, Main Entrance Can't reach both rails at once   Location, Patient Bedroom first (main) floor   Location, Bathroom first (main) floor   Bathroom Access Comment walk in shower, shower chair   Number of Stairs, Within Home, Primary 0          Prior Level of Function    Flowsheet Row Most Recent Value   Dominant Hand right   Ambulation assistive equipment   Transferring assistive equipment   Toileting independent   Bathing independent   Dressing independent   Eating independent   Prior Level of Function Comment Pt reports he was independent for all ADLs/mobility prior to TTA. Pt completed pre-prosthetic training at [facility] - has been Mod I at w/c level since d/c. Reports he utilized RW for transfers and gait training with HHPT.   Assistive Device Currently Used at Home walker, front-wheeled, wheelchair, shower chair, cane, straight           IRF PT Evaluation and Treatment - 03/15/23 1305        PT Time Calculation    Start Time 1300     Stop Time 1400     Time Calculation (min) 60 min        Session Details    Document Type daily treatment/progress note     Mode of Treatment individual therapy;physical therapy        Mobility Belt    Mobility Belt Used  for All Out of Bed Activity yes        Transfers    Comment gait belt donned during session        Sit to Stand Transfer    [place], Sit to Stand Transfer supervision     Verbal Cues safety     Assistive Device walker, front-wheeled     Comment S for safety d/t impaired balance.        Stand to Sit Transfer    [place], Stand to Sit Transfer supervision     Verbal Cues safety     Assistive Device walker, front-wheeled     Comment S for safety d/t impaired balance.        Stand Pivot Transfer    [place], Stand Pivot/Stand Step Transfer supervision     Verbal Cues safety     Assistive Device walker, front-wheeled     Comment via amb approach. S for safety d/t impaired balance.        Gait Training    [place], Gait supervision     Assistive Device walker, front-wheeled     Distance in Feet 200 feet     Pattern (Gait) step-through     Deviations/Abnormal Patterns (Gait) base of support, narrow;gait speed decreased     Comment (Gait/Stairs) S for safety d/t impaired balance. Min vebal cues for increased R heel strike and increased R stance time during L swing.        Rough/Uneven Surface Gait Skills    [place] supervision     Assistive Device walker, front-wheeled     Distance in Feet 100 feet     Comment over low pile carpet. S d/t impaired balance.        Stairs Training    [place], Stairs supervision     Assistive Device railing     Handrail Location (Stairs) both sides     Number of Stairs 12     Stair Height 7 inches     Ascending Stairs Technique step-to-step   leading with LLE    Descending Stairs Technique step-to-step   leading with RLE    Comment Completed at Administration steps. S for safety d/t impaired balance.        Balance    Balance Assessment standing static balance;standing dynamic balance     Comment, Balance 1) in // bars; walking back/forth with focus on reciprocal UE/LE pattern, x4 trials, lateral side-stepping L and R, 12' x2 trials each direction. Stepping over 1/2  bolster with LLE and tapping to target then back with focus on R lateral weight shifting and stance stability, x10 reps. Standing on air-ex mat with feet apart and eyes open x1' then eyes closed x30 sec without UE support, increased postural sway with eyes closed however no overt LOB. 2) resisted ambulation with RW and blue t-band across hips with focus on anterior pelvic drive, x50'.        Lower Extremity (Therapeutic Exercise)    Exercise Position/Type seated;static stretching     Comment Modified nestor stretch seated EOM with wedge + 2 pillows posterior, x3 min        Prosthetic Orientation (Lower Extremity)    Fit Assessment fit/function of prosthesis are appropriate     Comment, Fit Assessment Pt arrived with prosthesis donned since 0900. Pt progressed to 6 ply sock during OT session. Pt doffied prosthesis at 1500 with skin check completed with PT and skin check unremarkable. Wear time = 6 hours. Pt to daryl .        Daily Progress Summary (PT)    Daily Outcome Statement Focus of session on pre-gait activities promoting R stance stability and R anteiror pelvic drive which pt tolerated well. Pt reporting good comfort of prosthesis following session. Pt doffed prosthesis at 1500 with skin check unremarkable and wear time of 6 hours completed. Plan to continue to progress functional mobility, balance, and prosthetic independence per pt's tolerance.                           IRF PT Goals    Flowsheet Row Most Recent Value   Transfer Goal 1    Activity/Assistive Device sit-to-stand/stand-to-sit, stand pivot  [LRAD] at 03/06/2023 1502   [place] supervision required at 03/06/2023 1502   Time Frame short-term goal (STG), 1 week at 03/06/2023 1502   Progress/Outcome goal met at 03/08/2023 1454   Transfer Goal 2    Activity/Assistive Device sit-to-stand/stand-to-sit, stand pivot  [LRAD] at 03/06/2023 1502   [place] modified independence at 03/06/2023 1502   Time Frame 1 week at 03/08/2023 1450    Progress/Outcome continuing progress toward goal, goal ongoing at 03/08/2023 1454   Gait/Walking Locomotion Goal 1    Activity/Assistive Device gait (walking locomotion)  [LRAD] at 03/06/2023 1502   Distance 150 feet at 03/06/2023 1502   [place] supervision required at 03/06/2023 1502   Time Frame short-term goal (STG), 1 week at 03/06/2023 1502   Progress/Outcome goal met at 03/08/2023 1454   Gait/Walking Locomotion Goal 2    Activity/Assistive Device gait (walking locomotion)  [LRAD] at 03/06/2023 1502   Distance 150 feet at 03/06/2023 1502   [place] modified independence at 03/06/2023 1502   Time Frame 1 week at 03/08/2023 1454   Progress/Outcome continuing progress toward goal, goal ongoing at 03/08/2023 1454   Stairs Goal 1    Activity/Assistive Device stairs, all skills at 03/08/2023 1454   Number of Stairs 12 at 03/08/2023 1454   [place] modified independence at 03/08/2023 1454   Time Frame long-term goal (LTG), 1 week at 03/08/2023 1454

## 2023-03-15 NOTE — PROGRESS NOTES
[facility] Progress Note          Patient was seen and examined.   Attestation Notes: Face to face encounter completed    Harry Alvarado is a 67 y.o. male who was admitted for Encounter for prosthetic gait training [Z47.89]. Patient was referred by Richard Angulo MD for medical assessment and management      CC: Encounter for prosthetic gait training [Z47.89]     HPI: Harry Alvarado is a 67 y.o. male with MTHFR gene mutation, CAD s/p MI 1993, and PAD s/p R Fem to PT bypass with arm vein in 1993, revision in 2011, s/p R SFA to peroneal bypass graft s/p revision on 7/30/18, s/p tPA bolus and angioplasty in 2020 admitted to [facility] 11/30/22 with RLE bypass thrombosis and chronic right foot ischemia/gangrene and underwent right BKA by vascular surgeon, Dr. Bowen Serrato 12/2/22. Post op he had anemia but did not require transfusion. He was restarted on his home Eliquis.       The patient was evaluated by PM&R and found to have residual deficits in ambulation and ADLs due to Status post below knee amputation of right lower extremity (CMS/[facility]) [Z89.511] and came to [facility] on 12/6/2022 for acute inpatient rehab.      He participated in therapy. The patient has steadily improved in therapy and was for discharged home 12/18/2022.     The patient was seen by Dr. Agustín Zambrano [facility] and was felt to be a good candidate for prosthetic ambulation and came to [facility] 3/6/2023 for acute inpatient prosthetic training.    He participated in therapy.         SUBJECTIVE:  Patient interviewed and examined.     He feels prosthetic training is going well.      No pain complaints, moving bowels and bladder, no abdominal pain or nausea, no dysuria, no chest pain, palpitations, dyspnea or fevers    Review of Systems:  All other systems reviewed and negative except as noted in the HPI.    Current meds and allergies reviewed    Past Medical History:   Diagnosis Date   • Coronary artery disease      s/p MI   • Deep vein thrombosis (CMS/HCC)    • Lupus anticoagulant disorder (CMS/HCC)    • Lyme disease    • MTHFR gene mutation    • Myocardial infarction (CMS/HCC)    • PAD (peripheral artery disease) (CMS/HCC)      Past Surgical History:   Procedure Laterality Date   • BELOW KNEE LEG AMPUTATION Right 12/02/2022   • FEMORAL BYPASS Right      Social History     Tobacco Use   • Smoking status: Never   • Smokeless tobacco: Never   Substance Use Topics   • Alcohol use: Not Currently      History reviewed. No pertinent family history.    Vital signs in last 24 hours:  Temp:  [36.6 °C (97.9 °F)-36.8 °C (98.3 °F)] 36.8 °C (98.3 °F)  Heart Rate:  [73-84] 73  Resp:  [17-18] 17  BP: (115-131)/(65-79) 125/73  Vital signs reviewed 03/15/23 5:22 PM    Physical Exam  Vitals and nursing note reviewed.   Constitutional:       Appearance: Normal appearance.   HENT:      Head: Normocephalic and atraumatic.      Right Ear: External ear normal.      Left Ear: External ear normal.      Nose: Nose normal.      Mouth/Throat:      Mouth: Mucous membranes are moist.      Pharynx: Oropharynx is clear.   Eyes:      Conjunctiva/sclera: Conjunctivae normal.      Pupils: Pupils are equal, round, and reactive to light.   Cardiovascular:      Rate and Rhythm: Normal rate and regular rhythm.      Pulses: Normal pulses.      Heart sounds: Normal heart sounds.   Pulmonary:      Effort: Pulmonary effort is normal.      Breath sounds: Normal breath sounds.   Abdominal:      General: Abdomen is flat. Bowel sounds are normal.      Palpations: Abdomen is soft.   Musculoskeletal:         General: Deformity (s/p right BKA) present.      Cervical back: Normal range of motion and neck supple.   Skin:     General: Skin is warm and dry.   Neurological:      General: No focal deficit present.      Mental Status: He is alert and oriented to person, place, and time.   Psychiatric:         Mood and Affect: Mood normal.         Behavior: Behavior normal.                  Objective    Labs:  I have reviewed the patient's labs.  Current labs are within normal limits.                 Imaging:  Not applicable        ASSESSMENT/PLAN:    67 y.o. male with MTHFR gene mutation, CAD s/p MI 1993, and PAD s/p R Fem to PT bypass with arm vein in 1993, revision in 2011, s/p R SFA to peroneal bypass graft s/p revision on 7/30/18, s/p tPA bolus and angioplasty in 2020 admitted to [facility] 11/30/22 with RLE bypass thrombosis and chronic right foot ischemia/gangrene and underwent right BKA by vascular surgeon, Dr. Bowen Serrato 12/2/22; pre-prosthetic training [facility] 12/6/22-12/18/22; returns to [facility] from home 3/6/23 for inpatient prosthetic training.      1. Vasc:  -PAD s/p failed RLE bypass with right foot ischemia/gangrene  -s/p right BKA 12/2/22  -inpatient prosthetic training  -Tylenol for pain  -remains on Eliquis for hypercoagulable condition     2. Heme:  -post op anemia due to chronic blood loss, does not need iron  -leukocytosis reactive due to surgery  -hx of MTHFR gene mutation  -normal CBC     3. Renal:  -increased risk of dehydration and electrolyte changes  -normal CMP, Mg     4. Gi:  -Senokot-S as needed for bowels     5. Gu:  -no urine retention     6. Cardiac:  -hx of CAD  -watch for orthostatic hypotension  -use cardiac precautions in therapy     7. Pulm:  -incentive spirometry for atelectasis     8. Derm:  -skin care per nursing     9. Nutrition:  -seen by Nutrition for assessment and education     10. Psych:  -seen by Psychology for support    11. Dispo:  -Expected discharge date 3/18/2023         plan discussed with patient, nurse, case management and  Richard Angulo MD Scott Sapperstein, MD  3/15/2023  5:22 PM

## 2023-03-15 NOTE — SUBJECTIVE & OBJECTIVE
" Patient was seen and examined.   Attestation Notes: Face to face encounter completed    Subjective    The patient progressing in therapies.  Touch assist with transfers and ambulation with rolling walker with prosthesis.  Improving with all levels of mobility including elevations.  Objective     Visit Vitals  /75 (BP Location: Left upper arm, Patient Position: Lying)   Pulse 77   Temp 36.7 °C (98 °F) (Oral)   Resp 18   Ht 1.854 m (6' 1\")   Wt 75.8 kg (167 lb 3.2 oz)   SpO2 95%   BMI 22.06 kg/m²       Review of Systems:  Pertinent items are noted in HPI.  Denies chest pain and shortness of breath.  Review of systems otherwise negative.    Full Code    Physical Exam  General      Alert, cooperative, no distress, appears stated age.   Head:    Normocephalic, without obvious abnormality, atraumatic.   Eyes:    PERRL, conjunctiva/corneas clear, EOM's intact.        Nose:   Nares normal, septum midline, mucosa normal, no drainage or            sinus tenderness.   Throat:   Lips, mucosa, and tongue normal.    Neck:   Supple, symmetrical, trachea midline.    Back:     Symmetric, no curvature.   Lungs:     Clear to auscultation bilaterally, respirations unlabored.   Chest wall:    No tenderness or deformity.   Heart:    Regular rate and rhythm, S1 and S2 normal.   Abdomen:     Soft, non-tender, bowel sounds active all four quadrants,     no masses, no organomegaly.   Extremities:  Musculoskeletal: Trace stump edema.  Right transtibial amputation.   Pulses:   1+ and symmetric all extremities.   Skin:   Skin color, texture, turgor normal, no rashes or lesions   Neurologic:          Behavior/  Emotional:  CNII-XII intact.  Alert and oriented ×3.  Motor exam intact.  Sensory exam intact.  Reflexes stable decreased reflexes.      Appropriate, cooperative           Plan of care was discussed with patient   "

## 2023-03-15 NOTE — ASSESSMENT & PLAN NOTE
Harry Alvarado is a 67 y.o. male who presents with the following chronic medical problems including MTHFR gene mutation, coronary artery disease with prior myocardial infarction 1993, peripheral artery disease with multiple bypass procedures in the past most recent with tPA bolus and angioplasty in 2020.  The patient admitted to [facility] on November 30, 2022 to the vascular surgery service for planned BKA due to bypass thrombosis.  At the time of admission the patient had cyanosis and was beginning to develop thanh gangrene of the foot.  Patient has a history of 15+ surgeries of the extremities and understood that his revascularization options were limited.  He had severe 10 out of 10 pain of the foot relieved with elevation of the leg.  He was still able to ambulate but limited by pain.  Under the care of Bowen Serrato MD of vascular surgery he underwent right transtibial amputation on December 2, 2022.  Postoperative course unremarkable.  For postoperative pain control he was treated with morphine.  On discharge prescribed Tylenol and low-dose Lyrica.  Lyrica started prior by his PCP for neuropathic pain.  He was restarted on his home Eliquis.  He required min assist for bed mobility, transfers and ambulation.  The patient was admitted to [facility] on December 6, 2022 for acute inpatient rehabilitation for preprosthetic training.  The patient was discharged on December 18, 2022 after  completing a comprehensive inpatient rehabilitation program progressing to a modified independent level with bed mobility, transfers and standing with rolling walker with limited hopping.  Pain control stable with medication change to hydrocodone/acetaminophen.  Continue Lyrica increased dose 50 mg 3 times a day.  The patient was also placed on nortriptyline for sleep and neuropathic pain.   The patient has follow-up outpatient with vascular surgery with improvement in wound healing.  The patient  was evaluated by Dr. Zambrano in amputee clinic and felt appropriate for acute inpatient rehabilitation for intensive prosthetic training prior to returning home where he lives in [place].  The patient's pain has improved and he is weaned off all of his medications except for his Eliquis.    Comprehensive inpatient rehabilitation program for functional deficit status post right transtibial amputation.  Goal is for modified and pedal level with ambulation and self-care activities with prosthesis.  Monitor for pain.  Monitor skin closely.  Monitor cardiovascular status.    The patient progressing in therapies.  Touch assist with transfers and ambulation with rolling walker with prosthesis.  Improving with all levels of mobility including elevations.

## 2023-03-15 NOTE — PROGRESS NOTES
"Daily Progress Note     Patient was seen and examined.   Attestation Notes: Face to face encounter completed    Subjective    The patient progressing in therapies.  Touch assist with transfers and ambulation with rolling walker with prosthesis.  Improving with all levels of mobility including elevations.  Objective     Visit Vitals  /75 (BP Location: Left upper arm, Patient Position: Lying)   Pulse 77   Temp 36.7 °C (98 °F) (Oral)   Resp 18   Ht 1.854 m (6' 1\")   Wt 75.8 kg (167 lb 3.2 oz)   SpO2 95%   BMI 22.06 kg/m²       Review of Systems:  Pertinent items are noted in HPI.  Denies chest pain and shortness of breath.  Review of systems otherwise negative.    Full Code    Physical Exam  General      Alert, cooperative, no distress, appears stated age.   Head:    Normocephalic, without obvious abnormality, atraumatic.   Eyes:    PERRL, conjunctiva/corneas clear, EOM's intact.        Nose:   Nares normal, septum midline, mucosa normal, no drainage or            sinus tenderness.   Throat:   Lips, mucosa, and tongue normal.    Neck:   Supple, symmetrical, trachea midline.    Back:     Symmetric, no curvature.   Lungs:     Clear to auscultation bilaterally, respirations unlabored.   Chest wall:    No tenderness or deformity.   Heart:    Regular rate and rhythm, S1 and S2 normal.   Abdomen:     Soft, non-tender, bowel sounds active all four quadrants,     no masses, no organomegaly.   Extremities:  Musculoskeletal: Trace stump edema.  Right transtibial amputation.   Pulses:   1+ and symmetric all extremities.   Skin:   Skin color, texture, turgor normal, no rashes or lesions   Neurologic:          Behavior/  Emotional:  CNII-XII intact.  Alert and oriented ×3.  Motor exam intact.  Sensory exam intact.  Reflexes stable decreased reflexes.      Appropriate, cooperative           Plan of care was discussed with patient     Assessment & Plan  Status post below knee amputation of right lower extremity " (CMS/[facility])  Assessment & Plan  Harry Alvarado is a 67 y.o. male who presents with the following chronic medical problems including MTHFR gene mutation, coronary artery disease with prior myocardial infarction 1993, peripheral artery disease with multiple bypass procedures in the past most recent with tPA bolus and angioplasty in 2020.  The patient admitted to [facility] on November 30, 2022 to the vascular surgery service for planned BKA due to bypass thrombosis.  At the time of admission the patient had cyanosis and was beginning to develop thanh gangrene of the foot.  Patient has a history of 15+ surgeries of the extremities and understood that his revascularization options were limited.  He had severe 10 out of 10 pain of the foot relieved with elevation of the leg.  He was still able to ambulate but limited by pain.  Under the care of Bowen Serrato MD of vascular surgery he underwent right transtibial amputation on December 2, 2022.  Postoperative course unremarkable.  For postoperative pain control he was treated with morphine.  On discharge prescribed Tylenol and low-dose Lyrica.  Lyrica started prior by his PCP for neuropathic pain.  He was restarted on his home Eliquis.  He required min assist for bed mobility, transfers and ambulation.  The patient was admitted to [facility] on December 6, 2022 for acute inpatient rehabilitation for preprosthetic training.  The patient was discharged on December 18, 2022 after  completing a comprehensive inpatient rehabilitation program progressing to a modified independent level with bed mobility, transfers and standing with rolling walker with limited hopping.  Pain control stable with medication change to hydrocodone/acetaminophen.  Continue Lyrica increased dose 50 mg 3 times a day.  The patient was also placed on nortriptyline for sleep and neuropathic pain.   The patient has follow-up outpatient with vascular surgery with improvement in  wound healing.  The patient was evaluated by Dr. Zambrano in amputee clinic and felt appropriate for acute inpatient rehabilitation for intensive prosthetic training prior to returning home where he lives in [place].  The patient's pain has improved and he is weaned off all of his medications except for his Eliquis.    Comprehensive inpatient rehabilitation program for functional deficit status post right transtibial amputation.  Goal is for modified and pedal level with ambulation and self-care activities with prosthesis.  Monitor for pain.  Monitor skin closely.  Monitor cardiovascular status.    The patient progressing in therapies.  Touch assist with transfers and ambulation with rolling walker with prosthesis.  Improving with all levels of mobility including elevations.    Lupus anticoagulant disorder (CMS/HCC)  Assessment & Plan  Continue Eliquis.    Deep vein thrombosis (CMS/HCC)  Assessment & Plan  Continue Eliquis.    Coronary artery disease  Assessment & Plan  Currently asymptomatic.  Monitor cardiac status during stay.  Cardiac precautions.        Expected Discharge Date:  3/18/2023

## 2023-03-15 NOTE — PROGRESS NOTES
Patient: Harry Alvarado  Location: [facility] [address]  MRN: [id number]  Today's date: 3/15/2023    History of Present Illness  Harry is a 67 y.o. male admitted on 3/6/2023 with Encounter for prosthetic gait training [Z47.89]. Principal problem is   Encounter for prosthetic gait training.      This is the first clinic appointment for Mr. Alvarado following his inpatient stay for preprostatic training. He is a 67-year-old male with a history of gene mutation, CAD, history of MI, peripheral artery disease with multiple bypass procedures in the past. The patient had been admitted to [facility] on November 30 for a planned transtibial amputation due to bypass thrombosis. He had reported worse pain in the previous 1 to 2 months in the right foot and had undergone multiple surgeries prior. He underwent a right transtibial amputation on December 2, 2022. There is no significant postoperative complications. Pain was well controlled. He was admitted to [place] [facility] for preprostatic training and did well and was discharged home. He continues to receive home care physical therapy. He is still following up with his surgeon. He still has small open area in the lateral aspect of the incision line with minimal amount of serous drainage noted. He offers complaints of intermittent phantom pain and sensations. He has no other complaints of headache or dizziness, chest pain or shortness of breath. Currently he and his wife are living in his son's home everything is a 1 level. He is independent at a wheelchair level. Prior to his amputation he was completely independent and very active.    Past Medical History  Harry has a past medical history of Coronary artery disease, Deep vein thrombosis (CMS/HCC), Lupus anticoagulant disorder (CMS/HCC), Lyme disease, MTHFR gene mutation, Myocardial infarction (CMS/HCC), and PAD (peripheral artery disease) (CMS/[facility]).      PT Vitals    Date/Time Pulse HR Source BP  BP Location BP Method Pt Position [facility]   03/15/23 0910 79 Monitor 119/77 Left upper arm Automatic Sitting BDC      PT Pain    Date/Time Pain Type Rating: Rest [facility]   03/15/23 0910 Pain Assessment 0 BDC   03/15/23 0955 Pain Reassessment 0 BDC          Prior Living Environment    Flowsheet Row Most Recent Value   People in Home spouse   Current Living Arrangements home   Home Accessibility stairs to enter home (Group), stairs within home (Group)   Living Environment Comment Pt reports he lives with wife in 1  with 6 YOSEF. Pt has loft upstairs but does not need to access. Pt reports he's very active around the home and has steep property he needs to navigate.   Number of Stairs, Main Entrance 6   Stair Railings, Main Entrance railings on both sides of stairs   Stairs Comment, Main Entrance Can't reach both rails at once   Location, Patient Bedroom first (main) floor   Location, Bathroom first (main) floor   Bathroom Access Comment walk in shower, shower chair   Number of Stairs, Within Home, Primary 0          Prior Level of Function    Flowsheet Row Most Recent Value   Dominant Hand right   Ambulation assistive equipment   Transferring assistive equipment   Toileting independent   Bathing independent   Dressing independent   Eating independent   Prior Level of Function Comment Pt reports he was independent for all ADLs/mobility prior to TTA. Pt completed pre-prosthetic training at [facility] - has been Mod I at w/c level since d/c. Reports he utilized RW for transfers and gait training with HHPT.   Assistive Device Currently Used at Home walker, front-wheeled, wheelchair, shower chair, cane, straight           IRF PT Evaluation and Treatment - 03/15/23 0905        PT Time Calculation    Start Time 0900     Stop Time 1000     Time Calculation (min) 60 min        Session Details    Document Type daily treatment/progress note     Mode of Treatment individual therapy;physical therapy        General Information    Patient Profile  "Reviewed yes        Mobility Belt    Mobility Belt Used for All Out of Bed Activity yes        Sit to Stand Transfer    Blue, Sit to Stand Transfer supervision     Verbal Cues safety     Assistive Device walker, front-wheeled     Comment S for safety d/t impaired balance        Stand to Sit Transfer    Blue, Stand to Sit Transfer supervision     Verbal Cues safety     Assistive Device walker, front-wheeled     Comment S for safety d/t impaired balance.        Stand Pivot Transfer    Blue, Stand Pivot/Stand Step Transfer supervision     Verbal Cues technique;safety     Assistive Device walker, front-wheeled     Comment via amb approach. S for safety due to impaired balance        Gait Training    Blue, Gait close supervision     Assistive Device walker, front-wheeled     Distance in Feet 200 feet   200'x2 trials.    Pattern (Gait) step-through     Deviations/Abnormal Patterns (Gait) base of support, narrow;antalgic;gait speed decreased     Comment (Gait/Stairs) Cl S for safety d/t impaired balance. Min vebal cues for increased R heel strike and increased R stance time during L swing.        Curb Negotiation    Blue touching/steadying assist     Assistive Device walker, front-wheeled     Curb Height 6 inches     Comment Touch assist via gait belt for safety. Up/down 6\" curb step x2 trials. Ascends with LLE, descends with RLE        Sloped Surface Gait Skills    Blue close supervision     Assistive Device walker, front-wheeled     Distance in Feet 30 feet     Comment Cl S for safety d/t impaired balance. Up/down 30-ft indoor OP ramp.        Balance    Balance Assessment standing static balance;standing dynamic balance     Comment, Balance In //bars; walking back/forth with focus on reciprocal UE/LE pattern, x4 trials. PNF D2 pattern with green t-band x15 reps each side. Forward tap-ups to 6\" block with LLE x15 reps.        Prosthetic Orientation (Lower Extremity)    Fit " Assessment fit/function of prosthesis are appropriate     Comment, Fit Assessment Pt arrived to session with  doffed just prior to session. S to daryl prosthetic components including moisture wicking sock, liner, 5 ply sock and prosthesis. Skin check pre-tx = unremarkable. Plan to doff at 1500 for prosthetic wear time of 6hrs.     Specific Gait Deviations uneven heel rise;uneven timing, short stance phase involved side        Daily Progress Summary (PT)    Daily Outcome Statement Improved smooth gait quality noted with min verbal cues for increased R prosthetic heel strike and increased R stance phase during L swing. Plan to doff prosthesis at 1500 for wear time of 6 hours. Plan to continue to progress functional mobility, balane, and prosthetic independence per pt's tolerance.                           IRF PT Goals    Flowsheet Row Most Recent Value   Transfer Goal 1    Activity/Assistive Device sit-to-stand/stand-to-sit, stand pivot  [LRAD] at 03/06/2023 1502   [place] supervision required at 03/06/2023 1502   Time Frame short-term goal (STG), 1 week at 03/06/2023 1502   Progress/Outcome goal met at 03/08/2023 1454   Transfer Goal 2    Activity/Assistive Device sit-to-stand/stand-to-sit, stand pivot  [LRAD] at 03/06/2023 1502   [place] modified independence at 03/06/2023 1502   Time Frame 1 week at 03/08/2023 1454   Progress/Outcome continuing progress toward goal, goal ongoing at 03/08/2023 1454   Gait/Walking Locomotion Goal 1    Activity/Assistive Device gait (walking locomotion)  [LRAD] at 03/06/2023 1502   Distance 150 feet at 03/06/2023 1502   Albers supervision required at 03/06/2023 1502   Time Frame short-term goal (STG), 1 week at 03/06/2023 1502   Progress/Outcome goal met at 03/08/2023 1454   Gait/Walking Locomotion Goal 2    Activity/Assistive Device gait (walking locomotion)  [LRAD] at 03/06/2023 1502   Distance 150 feet at 03/06/2023 1502   Albers modified independence  at 03/06/2023 1502   Time Frame 1 week at 03/08/2023 1454   Progress/Outcome continuing progress toward goal, goal ongoing at 03/08/2023 1454   Stairs Goal 1    Activity/Assistive Device stairs, all skills at 03/08/2023 1454   Number of Stairs 12 at 03/08/2023 1454   [place] modified independence at 03/08/2023 1454   Time Frame long-term goal (LTG), 1 week at 03/08/2023 1453

## 2023-03-15 NOTE — PLAN OF CARE
Plan of Care Review  Progress: improving  Outcome Summary: pt appropriate. Makes needs known. Denies pain. R BKA applies . Sleeping well overnight. Call light in reach.

## 2023-03-15 NOTE — PLAN OF CARE
Problem: Rehabilitation (IRF) Plan of Care  Goal: Plan of Care Review  Outcome: Progressing  Flowsheets (Taken 3/15/2023 1137)  Progress: improving  Plan of Care Reviewed With: patient  Outcome Summary: Pt. AA&Ox4, calm and cooperative. in no acute distress. VSS. continent of bowel and bladder. denies pain. mod I in room at wheelchair level. no new nursing concerns.

## 2023-03-16 ENCOUNTER — APPOINTMENT (INPATIENT)
Dept: OCCUPATIONAL THERAPY | Facility: REHABILITATION | Age: 68
DRG: 560 | End: 2023-03-16
Payer: MEDICARE

## 2023-03-16 ENCOUNTER — APPOINTMENT (INPATIENT)
Dept: PHYSICAL THERAPY | Facility: REHABILITATION | Age: 68
DRG: 560 | End: 2023-03-16
Payer: MEDICARE

## 2023-03-16 PROCEDURE — 97763 ORTHC/PROSTC MGMT SBSQ ENC: CPT | Mod: GP

## 2023-03-16 PROCEDURE — 97530 THERAPEUTIC ACTIVITIES: CPT | Mod: GO

## 2023-03-16 PROCEDURE — 97116 GAIT TRAINING THERAPY: CPT | Mod: GP

## 2023-03-16 PROCEDURE — 63700000 HC SELF-ADMINISTRABLE DRUG: Performed by: HOSPITALIST

## 2023-03-16 PROCEDURE — 97110 THERAPEUTIC EXERCISES: CPT | Mod: GO

## 2023-03-16 PROCEDURE — 97530 THERAPEUTIC ACTIVITIES: CPT | Mod: GP

## 2023-03-16 PROCEDURE — 12800000 HC ROOM AND CARE SEMIPRIVATE REHAB

## 2023-03-16 RX ADMIN — APIXABAN 5 MG: 5 TABLET, FILM COATED ORAL at 20:16

## 2023-03-16 RX ADMIN — APIXABAN 5 MG: 5 TABLET, FILM COATED ORAL at 08:09

## 2023-03-16 NOTE — GROUP NOTE
Date: 3/15/2023  Start Time:  1532   End Time:  1602    Group Type:  Amputee Group        Harry Alvarado, YOB: 1955,  was an active group participant.    Group Therapy    Summary of group:  The focus of today's session was  Limb Loss  Psychoeducation and support was provided on Limb Loss   Patients were provided resources on Limb Loss       encourages discussion on coping, progress, and discharge. Group consists of 4 prosthetic trainers and 1 pre prosthetic trainers.  Member share their progress and frustrations,  the biggest source of frustration is related to pace of progress.  One member is distressed  about this discharge date being extended.  Group discusses the pace of progress and this is longer than they thought and expected.  Pre prosthetic  asks appropriate questions about recovery and choosing a prosthetist,         He did share information regarding progress.    He did provide support to others in the group.    Visit Diagnosis:    1. Adjustment disorder with anxiety

## 2023-03-16 NOTE — PROGRESS NOTES
Patient: Harry Alvarado  Location: [facility] [address]  MRN: [id number]  Today's date: 3/16/2023    History of Present Illness  Harry is a 67 y.o. male admitted on 3/6/2023 with Encounter for prosthetic gait training [Z47.89]. Principal problem is   Encounter for prosthetic gait training.      This is the first clinic appointment for Mr. Alvarado following his inpatient stay for preprostatic training. He is a 67-year-old male with a history of gene mutation, CAD, history of MI, peripheral artery disease with multiple bypass procedures in the past. The patient had been admitted to [facility] on November 30 for a planned transtibial amputation due to bypass thrombosis. He had reported worse pain in the previous 1 to 2 months in the right foot and had undergone multiple surgeries prior. He underwent a right transtibial amputation on December 2, 2022. There is no significant postoperative complications. Pain was well controlled. He was admitted to [facility] for preprostatic training and did well and was discharged home. He continues to receive home care physical therapy. He is still following up with his surgeon. He still has small open area in the lateral aspect of the incision line with minimal amount of serous drainage noted. He offers complaints of intermittent phantom pain and sensations. He has no other complaints of headache or dizziness, chest pain or shortness of breath. Currently he and his wife are living in his son's home everything is a 1 level. He is independent at a wheelchair level. Prior to his amputation he was completely independent and very active.    Past Medical History  Harry has a past medical history of Coronary artery disease, Deep vein thrombosis (CMS/HCC), Lupus anticoagulant disorder (CMS/HCC), Lyme disease, MTHFR gene mutation, Myocardial infarction (CMS/HCC), and PAD (peripheral artery disease) (CMS/[facility]).      OT Vitals    Date/Time Pulse HR Source BP  BP Location BP Method Pt Position [facility]   03/16/23 1004 80 Monitor 123/84 Right upper arm Automatic Sitting LM      OT Pain    Date/Time Pain Type Rating: Rest [facility]   03/16/23 1004 Pain Assessment 0 - no pain LM   03/16/23 1058 Pain Reassessment 0 - no pain LM          Prior Living Environment    Flowsheet Row Most Recent Value   People in Home spouse   Current Living Arrangements home   Home Accessibility stairs to enter home (Group), stairs within home (Group)   Living Environment Comment Pt reports he lives with wife in 1  with 6 YOSEF. Pt has loft upstairs but does not need to access. Pt reports he's very active around the home and has steep property he needs to navigate.   Number of Stairs, Main Entrance 6   Stair Railings, Main Entrance railings on both sides of stairs   Stairs Comment, Main Entrance Can't reach both rails at once   Location, Patient Bedroom first (main) floor   Location, Bathroom first (main) floor   Bathroom Access Comment walk in shower, shower chair   Number of Stairs, Within Home, Primary 0          Prior Level of Function    Flowsheet Row Most Recent Value   Dominant Hand right   Ambulation assistive equipment   Transferring assistive equipment   Toileting independent   Bathing independent   Dressing independent   Eating independent   Prior Level of Function Comment Pt reports he was independent for all ADLs/mobility prior to TTA. Pt completed pre-prosthetic training at [facility] - has been Mod I at w/c level since d/c. Reports he utilized RW for transfers and gait training with HHPT.   Assistive Device Currently Used at Home walker, front-wheeled, wheelchair, shower chair, cane, straight          Occupational Profile    Flowsheet Row Most Recent Value   Occupational History/Life Experiences Pt is interested in learning about return to driving, plans to purchase a new vehicle.  Recommend arranging Chirag Eaton from the [employer] to speak with pt.  Pt lives in [place] and was staying in  "[place] with son since amputation, plans to return to [place].  Initially wanted to drive home.  Discussed potentially purchasing   Environmental Supports and Barriers +. 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, also enjoys hiking,           IRF OT Evaluation and Treatment - 03/16/23 1001        OT Time Calculation    Start Time 1000     Stop Time 1100     Time Calculation (min) 60 min        Session Details    Document Type daily treatment/progress note     Mode of Treatment occupational therapy;individual therapy        General Information    Patient Profile Reviewed yes     General Observations of Patient Pleasant and cooperative        Mobility Belt    Mobility Belt Used for All Out of Bed Activity yes        Cognitive Pattern Assessment    Cognitive Pattern Assessment Used BIMS        Brief Interview for Mental Status (BIMS)    Repetition of Three Words (First Attempt) 3     Temporal Orientation: Year Correct     Temporal Orientation: Month Accurate within 5 days     Temporal Orientation: Day Correct     Recall: \"Sock\" Yes, no cue required     Recall: \"Blue\" Yes, no cue required     Recall: \"Bed\" Yes, no cue required     BIMS Summary Score 15        Confusion Assessment Method (CAM)    Is there evidence of an acute change in mental status from the patient's baseline? No     Inattention Behavior not present     Disorganized thinking Behavior not present     Altered level of consciousness Behavior not present        Vision Assessment/Intervention    Visual Impairment/Limitations corrective lenses for distance;WFL        Sensory Assessment    Left UE Sensory Assessment light touch awareness;proprioception;intact     Right UE Sensory Assessment light touch awareness;proprioception;intact        Range of Motion (ROM)    Left Upper Extremity (ROM) left UE ROM is WFL     Right Upper Extremity (ROM) right UE ROM is WFL        " Strength (Manual Muscle Testing)    Shoulder, Left (Strength) 4     Elbow, Left (Strength) 4     Shoulder, Right (Strength) 4     Elbow, Right (Strength) 4        Sit to Stand Transfer    [place], Sit to Stand Transfer supervision     Verbal Cues safety     Assistive Device walker, front-wheeled     Comment Multiple performed        Stand to Sit Transfer    [place], Stand to Sit Transfer supervision     Verbal Cues safety     Assistive Device walker, front-wheeled     Comment Multiple performed        Safety Issues, Functional Mobility    Comment, Safety Issues/Impairments (Mobility) OT: Pt engaged in game of stickball. Pt utilizing BUE to hit ball and ambulate with RW to 3 bases. On each base, Pt performing toe taps onto half dome. Pt amb at S level with CS during toe taps. No LOB noted. Mass reps performed >25.        Upper Extremity (Therapeutic Exercise)    Exercise Position/Type seated;resistive exercises     General Exercise left;right     Range of Motion Exercises shoulder flexion/extension;shoulder abduction/adduction;shoulder horizontal abduction/adduction;shoulder external/internal rotation;other (see comments)   rows    Weight/Resistance resistance band   Purple    Reps and Sets 2x15     Comment Issued purple theraband HEP. Handout to be issued tomorrow. Pt requiring min VCs for technique.        Prosthetic Orientation (Lower Extremity)    Comment, Fit Assessment Pt arriving to session with prosthetic donned with 5 sock ply.        Daily Progress Summary (OT)    Daily Outcome Statement Session focusing on dynamic balance, mobility, and issuing purple theraband HEP. Pt tolerating well at grossly S level with no LOB. Continue POC                      Education Documentation  Equipment/Methods, taught by Elise Page, OT at 3/16/2023 12:18 PM.  Learner: Patient  Readiness: Acceptance  Method: Explanation, Demonstration  Response: Verbalizes Understanding  Comment: Issued theraband HEP with purple  theraband          IRF OT Goals    Flowsheet Row Most Recent Value   Transfer Goal 1    Activity/Assistive Device toilet at 03/07/2023 0804   [place] supervision required at 03/08/2023 1445   Time Frame short-term goal (STG), 1 week at 03/08/2023 1445   Progress/Outcome goal met at 03/15/2023 1529   Transfer Goal 2    Activity/Assistive Device toilet at 03/07/2023 0804   [place] modified independence at 03/07/2023 0804   Time Frame long-term goal (LTG), 14 days or less at 03/07/2023 0804   Progress/Outcome goal ongoing at 03/15/2023 1529   UB Dressing Goal 1    [place] supervision required at 03/08/2023 1445   Time Frame short-term goal (STG), 1 week at 03/08/2023 1445   Progress/Outcome goal met at 03/15/2023 1529   UB Dressing Goal 2    [place] modified independence at 03/07/2023 0804   Time Frame long-term goal (LTG), 14 days or less at 03/07/2023 0804   Progress/Outcome goal ongoing at 03/15/2023 1529   LB Dressing Goal 1    [place] supervision required at 03/07/2023 0804   Time Frame short-term goal (STG), 1 week at 03/08/2023 1445   Progress/Outcome goal met at 03/15/2023 1529   LB Dressing Goal 2    [place] modified independence at 03/07/2023 0804   Time Frame long-term goal (LTG), 14 days or less at 03/07/2023 0804   Progress/Outcome goal ongoing at 03/15/2023 1529   Grooming Goal 1    [place] supervision required at 03/07/2023 0804   Time Frame short-term goal (STG), 1 week at 03/08/2023 1445   Progress/Outcome goal met at 03/15/2023 1529   Grooming Goal 2    [place] modified independence at 03/07/2023 0804   Time Frame long-term goal (LTG), 14 days or less at 03/07/2023 0804   Progress/Outcome goal ongoing at 03/15/2023 1529   Toileting Goal 1    [place] supervision required at 03/07/2023 0804   Time Frame short-term goal (STG), 5 - 7 days at 03/07/2023 0804   Progress/Outcome goal met at 03/15/2023 1529   Toileting Goal 2    [place] modified independence  at 03/07/2023 0804   Time Frame long-term goal (LTG), 14 days or less at 03/07/2023 0804   Progress/Outcome goal ongoing at 03/15/2023 152

## 2023-03-16 NOTE — PROGRESS NOTES
[facility] Progress Note          Patient was seen and examined.   Attestation Notes: Face to face encounter completed    Harry Alvarado is a 67 y.o. male who was admitted for Encounter for prosthetic gait training [Z47.89]. Patient was referred by Richard Angulo MD for medical assessment and management      CC: Encounter for prosthetic gait training [Z47.89]     HPI: Harry Alvarado is a 67 y.o. male with MTHFR gene mutation, CAD s/p MI 1993, and PAD s/p R Fem to PT bypass with arm vein in 1993, revision in 2011, s/p R SFA to peroneal bypass graft s/p revision on 7/30/18, s/p tPA bolus and angioplasty in 2020 admitted to [facility] 11/30/22 with RLE bypass thrombosis and chronic right foot ischemia/gangrene and underwent right BKA by vascular surgeon, Dr. Bowen Serrato 12/2/22. Post op he had anemia but did not require transfusion. He was restarted on his home Eliquis.       The patient was evaluated by PM&R and found to have residual deficits in ambulation and ADLs due to Status post below knee amputation of right lower extremity (CMS/[facility]) [Z89.511] and came to [facility] on 12/6/2022 for acute inpatient rehab.      He participated in therapy. The patient has steadily improved in therapy and was for discharged home 12/18/2022.     The patient was seen by Dr. Agustín Zambrano in [facility] and was felt to be a good candidate for prosthetic ambulation and came to [facility] 3/6/2023 for acute inpatient prosthetic training.    He participated in therapy.         SUBJECTIVE:  Patient interviewed and examined.     No new complaints.     He feels prosthetic training is going well.      No pain complaints, moving bowels and bladder, no abdominal pain or nausea, no dysuria, no chest pain, palpitations, dyspnea or fevers    Review of Systems:  All other systems reviewed and negative except as noted in the HPI.    Current meds and allergies reviewed    Past Medical History:   Diagnosis Date   •  Coronary artery disease     s/p MI   • Deep vein thrombosis (CMS/HCC)    • Lupus anticoagulant disorder (CMS/HCC)    • Lyme disease    • MTHFR gene mutation    • Myocardial infarction (CMS/HCC)    • PAD (peripheral artery disease) (CMS/HCC)      Past Surgical History:   Procedure Laterality Date   • BELOW KNEE LEG AMPUTATION Right 12/02/2022   • FEMORAL BYPASS Right      Social History     Tobacco Use   • Smoking status: Never   • Smokeless tobacco: Never   Substance Use Topics   • Alcohol use: Not Currently      History reviewed. No pertinent family history.    Vital signs in last 24 hours:  Temp:  [36.6 °C (97.9 °F)] 36.6 °C (97.9 °F)  Heart Rate:  [66-80] 77  Resp:  [17] 17  BP: (116-138)/(73-84) 122/73  Vital signs reviewed 03/16/23 3:50 PM    Physical Exam  Vitals and nursing note reviewed.   Constitutional:       Appearance: Normal appearance.   HENT:      Head: Normocephalic and atraumatic.      Right Ear: External ear normal.      Left Ear: External ear normal.      Nose: Nose normal.      Mouth/Throat:      Mouth: Mucous membranes are moist.      Pharynx: Oropharynx is clear.   Eyes:      Conjunctiva/sclera: Conjunctivae normal.      Pupils: Pupils are equal, round, and reactive to light.   Cardiovascular:      Rate and Rhythm: Normal rate and regular rhythm.      Pulses: Normal pulses.      Heart sounds: Normal heart sounds.   Pulmonary:      Effort: Pulmonary effort is normal.      Breath sounds: Normal breath sounds.   Abdominal:      General: Abdomen is flat. Bowel sounds are normal.      Palpations: Abdomen is soft.   Musculoskeletal:         General: Deformity (s/p right BKA) present.      Cervical back: Normal range of motion and neck supple.   Skin:     General: Skin is warm and dry.   Neurological:      General: No focal deficit present.      Mental Status: He is alert and oriented to person, place, and time.   Psychiatric:         Mood and Affect: Mood normal.         Behavior: Behavior normal.                  Objective    Labs:  I have reviewed the patient's labs.  Current labs are within normal limits.                 Imaging:  Not applicable        ASSESSMENT/PLAN:    67 y.o. male with MTHFR gene mutation, CAD s/p MI 1993, and PAD s/p R Fem to PT bypass with arm vein in 1993, revision in 2011, s/p R SFA to peroneal bypass graft s/p revision on 7/30/18, s/p tPA bolus and angioplasty in 2020 admitted to [facility] 11/30/22 with RLE bypass thrombosis and chronic right foot ischemia/gangrene and underwent right BKA by vascular surgeon, Dr. Bowen Serrato 12/2/22; pre-prosthetic training [facility] 12/6/22-12/18/22; returns to [facility] from home 3/6/23 for inpatient prosthetic training.      1. Vasc:  -PAD s/p failed RLE bypass with right foot ischemia/gangrene  -s/p right BKA 12/2/22  -inpatient prosthetic training  -Tylenol for pain  -remains on Eliquis for hypercoagulable condition     2. Heme:  -hx of MTHFR gene mutation     3. Renal:  -normal CMP, Mg     4. Gi:  -Senokot-S as needed for bowels     5. Gu:  -no urine retention     6. Cardiac:  -hx of CAD  -watch for orthostatic hypotension  -use cardiac precautions in therapy     7. Pulm:  -incentive spirometry for atelectasis     8. Derm:  -skin care per nursing     9. Nutrition:  -seen by Nutrition for assessment and education     10. Psych:  -seen by Psychology for support    11. Dispo:  -Expected discharge date 3/18/2023         plan discussed with patient, nurse, case management and  Richard Angulo MD Scott Sapperstein, MD  3/16/2023  3:50 PM

## 2023-03-16 NOTE — ASSESSMENT & PLAN NOTE
Harry Alvarado is a 67 y.o. male who presents with the following chronic medical problems including MTHFR gene mutation, coronary artery disease with prior myocardial infarction 1993, peripheral artery disease with multiple bypass procedures in the past most recent with tPA bolus and angioplasty in 2020.  The patient admitted to [facility] on November 30, 2022 to the vascular surgery service for planned BKA due to bypass thrombosis.  At the time of admission the patient had cyanosis and was beginning to develop thanh gangrene of the foot.  Patient has a history of 15+ surgeries of the extremities and understood that his revascularization options were limited.  He had severe 10 out of 10 pain of the foot relieved with elevation of the leg.  He was still able to ambulate but limited by pain.  Under the care of Bowen Serrato MD of vascular surgery he underwent right transtibial amputation on December 2, 2022.  Postoperative course unremarkable.  For postoperative pain control he was treated with morphine.  On discharge prescribed Tylenol and low-dose Lyrica.  Lyrica started prior by his PCP for neuropathic pain.  He was restarted on his home Eliquis.  He required min assist for bed mobility, transfers and ambulation.  The patient was admitted to [facility] on December 6, 2022 for acute inpatient rehabilitation for preprosthetic training.  The patient was discharged on December 18, 2022 after  completing a comprehensive inpatient rehabilitation program progressing to a modified independent level with bed mobility, transfers and standing with rolling walker with limited hopping.  Pain control stable with medication change to hydrocodone/acetaminophen.  Continue Lyrica increased dose 50 mg 3 times a day.  The patient was also placed on nortriptyline for sleep and neuropathic pain.   The patient has follow-up outpatient with vascular surgery with improvement in wound healing.  The patient  was evaluated by Dr. Zambrano in amputee clinic and felt appropriate for acute inpatient rehabilitation for intensive prosthetic training prior to returning home where he lives in [place].  The patient's pain has improved and he is weaned off all of his medications except for his Eliquis.    Comprehensive inpatient rehabilitation program for functional deficit status post right transtibial amputation.  Goal is for modified and pedal level with ambulation and self-care activities with prosthesis.  Monitor for pain.  Monitor skin closely.  Monitor cardiovascular status.    Supervision with ambulation with rolling walker 200 feet.  Supervision for ambulating 12 stairs.  Prosthetic wear time 6 hours.

## 2023-03-16 NOTE — PLAN OF CARE
Plan of Care Review  Plan of Care Reviewed With: patient  Progress: improving  Outcome Summary: alert and oriented; continent of bowel and bladder; no complaints of pain; MOD I during the day at wheelchair level, bed alarm set overnight; call bell in reach; urinal in reach; no reports of concerns at this time.

## 2023-03-16 NOTE — PATIENT CARE CONFERENCE
Inpatient Rehabilitation Team Conference Note  Date: 3/16/2023  Time: 8:47 AM       Patient Name: Harry Alvarado     Medical Record Number: [id number]   YOB: 1955  Sex: Male      Room/Bed: 145/145W  Payor Info: Payor: MEDICARE / Plan: MEDICARE PART A & B / Product Type: Medicare /     Admitting Diagnosis: Encounter for prosthetic gait training [Z47.89]   Admit Date/Time: 3/6/2023 12:04 PM  Admission Comments: No comment available     Primary Diagnosis: Encounter for prosthetic gait training  Principal Problem: Encounter for prosthetic gait training    Patient Active Problem List    Diagnosis Date Noted   • Adjustment disorder with anxiety    • Encounter for prosthetic gait training 03/06/2023   • Anemia 12/07/2022   • Coronary artery disease    • Deep vein thrombosis (CMS/[facility])    • Lupus anticoagulant disorder (CMS/[facility])    • MTHFR gene mutation    • PAD (peripheral artery disease) (CMS/[facility])    • Status post below knee amputation of right lower extremity (CMS/[facility]) 12/05/2022       Premorbid Status:  Dominant Hand: right  Ambulation: assistive equipment  Transferring: assistive equipment  Toileting: independent  Bathing: independent  Dressing: independent  Eating: independent  Communication: understands/communicates without difficulty  Swallowing: swallows foods/liquids without difficulty  Assistive Device/Animal Currently Used at Home: walker, front-wheeled; wheelchair; shower chair; cane, straight  Prior Level of Function Comment: Pt reports he was independent for all ADLs/mobility prior to TTA. Pt completed pre-prosthetic training at [facility] - has been Mod I at w/c level since d/c. Reports he utilized RW for transfers and gait training with HHPT.      Current Functional Status:  Mobility  Gait  [place], Gait: supervision  Assistive Device: walker, front-wheeled  Comment (Gait/Stairs): S for safety d/t impaired balance. Min vebal cues for increased R heel strike and increased R stance time during L  swing.    Stairs  [place], Stairs: supervision  Assistive Device: railing  Handrail Location (Stairs): both sides  Number of Stairs: 12  Stair Height: 7 inches  Comment: Completed at Administration steps. S for safety d/t impaired balance.    Wheelchair  Method of Locomotion: bimanual (upper extremity) propulsion  Functional Mobility Training: doorway navigation  [place], Forward Propulsion: modified independence  [place], Steering Activities: modified independence  [place], Turning Activities: modified independence  [place], Doorway Navigation: modified independence  Distance Propelled in Feet: 300 feet  Comment, Wheelchair Mobility: pt able to propel Birch <>admin stairs<>main gym to reach destinations of task    Transfers  [place], Roll Left: independent  [place], Roll Right: independent  [place], Supine to Sit: independent  [place], Sit to Supine: independent  Comment (Bed Mobility): performed on flat mat table  Comment: gait belt donned during session  [place], Bed to Chair: minimum assist (75% or more patient effort)  Verbal Cues: safety; technique  Assistive Device: walker, front-wheeled  Comment: SPT using RW with Min A at hips for balance and sequencing with RLE prosthesis donned  [place], Chair to Bed: minimum assist (75% or more patient effort)  Verbal Cues: safety; technique  Assistive Device: walker, front-wheeled  Comment: SPT using RW with Min A at hips for balance and sequencing with RLE prosthesis donned  [place], Sit to Stand Transfer: supervision  Verbal Cues: safety  Assistive Device: walker, front-wheeled  Comment: S for safety d/t impaired balance.  [place], Stand to Sit Transfer: supervision  Verbal Cues: safety  Assistive Device: walker, front-wheeled  Comment: S for safety d/t impaired balance.  [place], Low Pivot Transfer: modified independence  Verbal Cues: safety  Assistive Device: wheelchair  Comment: EOM >  w/c  [place], Stand Pivot/Stand Step Transfer: supervision  Verbal Cues: safety  Assistive Device: walker, front-wheeled  Comment: via amb approach. S for safety d/t impaired balance.    [place], Toilet Transfer: supervision  Verbal Cues: safety  Assistive Device: walker, front-wheeled  Comment: with prosthetic donned, via amb tx  [place], Shower Transfer: modified independence  Assistive Device: grab bars/tub rail; shower chair  Comment: SPT per pt report      Self Care  Self-Performance: don; obtains clothes; threads left arm, shirt; threads right arm, shirt; pulls shirt over head/around back; pulls shirt down/adjusts  [place] Assistance: -- (steadying A)  Comment: gathering clothing amb level  Self-Performance: threads left leg, underpants; threads right leg, underpants; pulls underpants up or down; threads left leg, pants/shorts; threads right leg, pants/shorts; pulls pants/shorts up or down; dons/doffs left sock; prosthesis application; dons/doffs left shoe; shoelaces, left  [place] Assistance: prosthesis application  [place]: supervision  Comment: gathering clothing,  amb level. review long pants with plastic bag as needed. Pt able to don and doff shoe and sock on prosthetic and tie  Comment: per pt report  [place]: touching/steadying assist  Adaptive Equipment: accessible height toilet; grab bar/safety frame  Comment: with prosthetic donned. mod I without prosthetic  Comment: in stance with prosthetic donned    Cognition  Affect/Mental Status (Cognition): WFL  Orientation Status (Cognition): oriented x 4    Communication       Frequency of Treatment (OT): 5-7 times per week, 60-90 minutes per day  Frequency of Treatment (PT): 5-7 times per week, 60-90 minutes per day  Frequency of Treatment (Rec Therapy): 3-5 times per week, 60 minutes per session, 60-90 minutes per session, 30 minutes per session    Weekly Outcome Summaries:  Weekly Progress Summary (PT)  Progress Toward Functional  Goals (PT): progressing toward functional goals as expected  Weekly Outcome Statement: Saran is a 66 y/o male admitted for prosthetic training. Pt has PMH of gene mutation, CAD, history of MI, peripheral artery disease with multiple bypass procedures in the past. He underwent a right transtibial amputation on December 2, 2022 and completed preprosthetic training at [facility] prior to d/c to son's home. He now returns to [facility] for comprehensive rehabilitation for prosthetic training. Pt reports he was Mod I at w/c level PTA and completed transfer/gait training with use of RW. He currently presents with impairments in standing balance, endurance and mild LE strength deficits. Current functional status as follows; S sit<>stand/stand-pivot transfers with use of the R + R TT prosthesis, S amb x200-ft with use of the RW + R TT prosthesis, and S up/down 12 steps with use of 2 rails + R TT prosthesis. Pt’s TUG continues > norm of 13.5 sec indicating fall risk/mobility deficit (21 sec on 3/6 and 23 sec on 3/13). 10MWT completed on 3/6 with gait speed of 0.48 m/s which is below age/gender matched norms indicating mobility deficit. Pt will continue to benefit from skilled PT services to address his above-mentioned impairments and learn the skills of prosthetic management to maximize his functional independence.  Impairments Continuing to Limit Function: decreased strength, impaired balance, transtibial lower extremity amputation  Recommendations: Patient would continue to benefit from skilled inpatient rehab services 5-7 days/week and 60-90 minutes/day.  Weekly Progress Summary (OT)  Progress Toward Functional Goals (OT): progressing toward functional goals as expected  Weekly Outcome Statement: Harry is a 67 year male who presents to [facility] for R TT prosthetic training. Pt noted to have decreased ability to safely and independently perform his ADLs and IADLS at ambulatory level with prosthetic. Pt noted with deficits in balance,  transfers, strength, functional ambulation, endurance, R weight acceptance decreased education on prosthetics which impact his ability to safely perform his ADLs and IADLs, all areas improving. Pt grossly performing ADLs with prosthetic donned at S level. Bathing mod I without prosthetic. Initiated handicap parking placard. Pt planning on doing ’s rehab at d/c in [place]. Pt would continue to benefit from skilled occupational therapy services to increase I, decrease caregiver burden, and increase quality of life.  Impairments Continuing to Limit Function: decreased flexibility, decreased ROM, decreased strength, impaired postural control, transtibial lower extremity amputation  Recommendations: Continue with skilled OT services 5-7 days per week 60-90 mins per day    Problem Resolution:  Coping/Stress/Tolerance  Do you feel stress - tense, restless, nervous, or anxious, or unable to sleep at night because your mind is troubled all the time - these days?: Not at all  Major Change/Loss/Stressor/Fears: denies, medical condition, selfFacilitators: planning occurs related to ongoing needsSelf-Care Promotion: independence encouraged, safe use of adaptive equipment encouraged  Identified Problems & Impairments: (not recorded)  Comment, Identified Problems & Impairments: (not recorded)  Resolved Problems and Impairments: (not recorded)  Comment, Resolved Problems & Impairments: (not recorded)     Team Weekly Outcome Statement: medically stable, no issues. RN says on Eliquis, jan. PT says mod I in and out of bed, mod I low pivot transfer to and from wheelchair, supervision sit to stand, 200 feet w RW, supervision w bilateral handrails 12 stairs, wearing prostesis 6 hours w 6 sock ply, approaching mod I, OT says bathing is mod I, handicap placert issued, looking into drivers rehab, mod I, psych says looking forward to getting home, engaging in group, (03/16/23 4009)        Goals/Support System:  Patient/Family  Goals  Patient's Goals For Discharge: return home, take care of myself at home, return to all previous roles/activities  Family/Support System  Caregiver Engagement: supports patient in making care decisions  Family/Support System Care: support provided, self-care encouraged    IRF PT Goals    Flowsheet Row Most Recent Value   Transfer Goal 1    Activity/Assistive Device sit-to-stand/stand-to-sit, stand pivot  [LRAD] at 03/06/2023 1502   [place] supervision required at 03/06/2023 1502   Time Frame short-term goal (STG), 1 week at 03/06/2023 1502   Progress/Outcome goal met at 03/08/2023 1454   Transfer Goal 2    Activity/Assistive Device sit-to-stand/stand-to-sit, stand pivot  [LRAD] at 03/06/2023 1502   [place] modified independence at 03/06/2023 1502   Time Frame long-term goal (LTG), 2-3 days at 03/15/2023 1630   Progress/Outcome goal ongoing at 03/15/2023 1630   Gait/Walking Locomotion Goal 1    Activity/Assistive Device gait (walking locomotion)  [LRAD] at 03/06/2023 1502   Distance 150 feet at 03/06/2023 1502   [place] supervision required at 03/06/2023 1502   Time Frame short-term goal (STG), 1 week at 03/06/2023 1502   Progress/Outcome goal met at 03/08/2023 1454   Gait/Walking Locomotion Goal 2    Activity/Assistive Device gait (walking locomotion)  [LRAD] at 03/06/2023 1502   Distance 150 feet at 03/06/2023 1502   [place] modified independence at 03/06/2023 1502   Time Frame long-term goal (LTG), 2-3 days at 03/15/2023 1630   Progress/Outcome goal ongoing at 03/15/2023 1630   Stairs Goal 1    Activity/Assistive Device stairs, all skills at 03/08/2023 1454   Number of Stairs 12 at 03/08/2023 1454   [place] modified independence at 03/08/2023 1454   Time Frame long-term goal (LTG), 2-3 days at 03/15/2023 1630   Progress/Outcome goal ongoing at 03/15/2023 1630        IRF OT Goals    Flowsheet Row Most Recent Value   Transfer Goal 1    Activity/Assistive Device toilet at 03/07/2023  0804   [place] supervision required at 03/08/2023 1445   Time Frame short-term goal (STG), 1 week at 03/08/2023 1445   Progress/Outcome goal met at 03/15/2023 1529   Transfer Goal 2    Activity/Assistive Device toilet at 03/07/2023 0804   [place] modified independence at 03/07/2023 0804   Time Frame long-term goal (LTG), 14 days or less at 03/07/2023 0804   Progress/Outcome goal ongoing at 03/15/2023 1529   UB Dressing Goal 1    [place] supervision required at 03/08/2023 1445   Time Frame short-term goal (STG), 1 week at 03/08/2023 1445   Progress/Outcome goal met at 03/15/2023 1529   UB Dressing Goal 2    [place] modified independence at 03/07/2023 0804   Time Frame long-term goal (LTG), 14 days or less at 03/07/2023 0804   Progress/Outcome goal ongoing at 03/15/2023 1529   LB Dressing Goal 1    [place] supervision required at 03/07/2023 0804   Time Frame short-term goal (STG), 1 week at 03/08/2023 1445   Progress/Outcome goal met at 03/15/2023 1529   LB Dressing Goal 2    [place] modified independence at 03/07/2023 0804   Time Frame long-term goal (LTG), 14 days or less at 03/07/2023 0804   Progress/Outcome goal ongoing at 03/15/2023 1529   Grooming Goal 1    [place] supervision required at 03/07/2023 0804   Time Frame short-term goal (STG), 1 week at 03/08/2023 1445   Progress/Outcome goal met at 03/15/2023 1529   Grooming Goal 2    [place] modified independence at 03/07/2023 0804   Time Frame long-term goal (LTG), 14 days or less at 03/07/2023 0804   Progress/Outcome goal ongoing at 03/15/2023 1529   Toileting Goal 1    [place] supervision required at 03/07/2023 0804   Time Frame short-term goal (STG), 5 - 7 days at 03/07/2023 0804   Progress/Outcome goal met at 03/15/2023 1529   Toileting Goal 2    [place] modified independence at 03/07/2023 0804   Time Frame long-term goal (LTG), 14 days or less at 03/07/2023 0804   Progress/Outcome goal ongoing at 03/15/2023  1529          Risk for Complications  Falls: Low  Infection: Low  Skin Breakdown: Low      The patient's medical prognosis is good to achieve the stated goals below.    Expected Level of Function  Expected Functional Improvement: mobility; motor dysfunction; safety; self-care  Self-Care: Independent  Sphincter Control: Independent  Transfers: Independent  Locomotion: Independent  Communication: Independent  Social Cognition: Independent       Discharge Planning:  Anticipated Discharge Disposition: home with outpatient services  Type of Home Care Services: home PT    Equipment/Device Needs at Discharge  Assistive Device/Animal Currently Used at Home: walker, front-wheeled, wheelchair, shower chair, cane, straight    Anticipated Discharge Date: 3/18/2023    Needs Identified:  Community Reintegration  Facilitators: planning occurs related to ongoing needs  Barriers: type of disability    Team Members Present:     Rehab Attending Present:  Richard Angulo MD    Care Coordinator Present:  Tea Jay LSW    Nurse Present:  Storm Estrada RN    OT Present:  Ratna Sher OT    PT Present:  Anna Antonio PT    Psychologist Present:  Mesfin Crews PSY.D        Next Team Conference Date: 03/23/23

## 2023-03-16 NOTE — PROGRESS NOTES
Patient: Harry Alvarado  Location: [facility] [address]  MRN: [id number]  Today's date: 3/16/2023    History of Present Illness  Harry is a 67 y.o. male admitted on 3/6/2023 with Encounter for prosthetic gait training [Z47.89]. Principal problem is   Encounter for prosthetic gait training.      This is the first clinic appointment for Mr. Alvarado following his inpatient stay for preprostatic training. He is a 67-year-old male with a history of gene mutation, CAD, history of MI, peripheral artery disease with multiple bypass procedures in the past. The patient had been admitted to [facility] on November 30 for a planned transtibial amputation due to bypass thrombosis. He had reported worse pain in the previous 1 to 2 months in the right foot and had undergone multiple surgeries prior. He underwent a right transtibial amputation on December 2, 2022. There is no significant postoperative complications. Pain was well controlled. He was admitted to [facility] for preprostatic training and did well and was discharged home. He continues to receive home care physical therapy. He is still following up with his surgeon. He still has small open area in the lateral aspect of the incision line with minimal amount of serous drainage noted. He offers complaints of intermittent phantom pain and sensations. He has no other complaints of headache or dizziness, chest pain or shortness of breath. Currently he and his wife are living in his son's home everything is a 1 level. He is independent at a wheelchair level. Prior to his amputation he was completely independent and very active.    Past Medical History  Harry has a past medical history of Coronary artery disease, Deep vein thrombosis (CMS/HCC), Lupus anticoagulant disorder (CMS/HCC), Lyme disease, MTHFR gene mutation, Myocardial infarction (CMS/HCC), and PAD (peripheral artery disease) (CMS/[facility]).      PT Vitals    Date/Time Pulse HR Source BP  BP Location BP Method Pt Position [facility]   03/16/23 1405 77 Monitor 122/73 Right upper arm Automatic Sitting BDC      PT Pain    Date/Time Pain Type Rating: [facility]   03/16/23 1405 Pain Assessment 0 BDC   03/16/23 1455 Pain Reassessment 0 BDC          Prior Living Environment    Flowsheet Row Most Recent Value   People in Home spouse   Current Living Arrangements home   Home Accessibility stairs to enter home (Group), stairs within home (Group)   Living Environment Comment Pt reports he lives with wife in 1  with 6 YOSEF. Pt has loft upstairs but does not need to access. Pt reports he's very active around the home and has steep property he needs to navigate.   Number of Stairs, Main Entrance 6   Stair Railings, Main Entrance railings on both sides of stairs   Stairs Comment, Main Entrance Can't reach both rails at once   Location, Patient Bedroom first (main) floor   Location, Bathroom first (main) floor   Bathroom Access Comment walk in shower, shower chair   Number of Stairs, Within Home, Primary 0          Prior Level of Function    Flowsheet Row Most Recent Value   Dominant Hand right   Ambulation assistive equipment   Transferring assistive equipment   Toileting independent   Bathing independent   Dressing independent   Eating independent   Prior Level of Function Comment Pt reports he was independent for all ADLs/mobility prior to TTA. Pt completed pre-prosthetic training at [facility] - has been Mod I at w/c level since d/c. Reports he utilized RW for transfers and gait training with HHPT.   Assistive Device Currently Used at Home walker, front-wheeled, wheelchair, shower chair, cane, straight           IRF PT Evaluation and Treatment - 03/16/23 1405        PT Time Calculation    Start Time 1400     Stop Time 1500     Time Calculation (min) 60 min        Session Details    Document Type daily treatment/progress note     Mode of Treatment individual therapy;physical therapy        Transfers    Comment gait belt donned  during session.        Sit to Stand Transfer    [place], Sit to Stand Transfer supervision     Verbal Cues safety     Assistive Device walker, front-wheeled     Comment S for safety d/t impaired balance        Stand to Sit Transfer    [place], Stand to Sit Transfer supervision     Verbal Cues safety     Assistive Device walker, front-wheeled     Comment S for safety d/t impaired balance        Stand Pivot Transfer    [place], Stand Pivot/Stand Step Transfer supervision     Verbal Cues safety     Assistive Device walker, front-wheeled     Comment S for safety d/t impaired balance        Gait Training    [place], Gait supervision     Assistive Device walker, front-wheeled     Distance in Feet 200 feet     Pattern (Gait) step-through     Deviations/Abnormal Patterns (Gait) base of support, narrow;gait speed decreased     Comment (Gait/Stairs) S for safety d/t impaired balance.        Rough/Uneven Surface Gait Skills    [place] close supervision     Assistive Device walker, front-wheeled     Distance in Feet 150 feet     Comment Across outdoor uneven terrain including concrete, asphalt, and inclines/declines. Min manual cues at R quad when descending decline for increased R quad activation and increased R stance time during L swing.        Stairs Training    [place], Stairs supervision     Assistive Device railing     Handrail Location (Stairs) both sides     Number of Stairs 12     Stair Height 7 inches     Ascending Stairs Technique step-to-step   leading with LLE    Descending Stairs Technique step-to-step   leading with LLE    Comment Completed at Administration steps. S for safety d/t impaired balance.        Balance    Balance Assessment standing dynamic balance     Comment, Balance Resisted amb with RW with blue t-band across hips with focus on anterior pelvic drive, 1 trial x100-ft.        Lower Extremity (Therapeutic Exercise)    Exercise Position/Type seated;static stretching      Comment Modified nestor stretch seated EOM with wedge + 2 pillows posterior, x5 min at start of session.        Aerobic Exercise    Type recumbent elliptical      Time Performed 7     Comment Nustep L8 with BUE/BLE        Prosthetic Orientation (Lower Extremity)    Fit Assessment fit/function of prosthesis are appropriate     Comment, Fit Assessment Pt arrived to session with R TT prosthesis donned since 0900 and reporting good comfort. Pt progressing to 6 ply sock which he donned following OT session. Pt reporting good comfort of prosthesis post-tx with continued plan to doff at 1600 for wear time of 7 hrs. (Pt doffed at 1600 with PT with skin check unremarkable. Wear time = 7 hrs.)        LE Prosthetic Management    Skin Care demonstrates proper skin assessment skills        Daily Progress Summary (PT)    Daily Outcome Statement Pt requiring Min manual cues at R quad during outdoor amb particularly descending decline for increased R quad activiation and increased R stance time during left swing. Pt doffed prosthesis at 1600 with skin check unremarkable and progressed to 7 hrs prosthetic wear time. Plan to continue to progress functional mobility, balance, and prosthetic indepedence.                           IRF PT Goals    Flowsheet Row Most Recent Value   Transfer Goal 1    Activity/Assistive Device sit-to-stand/stand-to-sit, stand pivot  [LRAD] at 03/06/2023 1502   [place] supervision required at 03/06/2023 1502   Time Frame short-term goal (STG), 1 week at 03/06/2023 1502   Progress/Outcome goal met at 03/08/2023 1454   Transfer Goal 2    Activity/Assistive Device sit-to-stand/stand-to-sit, stand pivot  [LRAD] at 03/06/2023 1502   [place] modified independence at 03/06/2023 1502   Time Frame long-term goal (LTG), 2-3 days at 03/15/2023 1630   Progress/Outcome goal ongoing at 03/15/2023 1630   Gait/Walking Locomotion Goal 1    Activity/Assistive Device gait (walking locomotion)  [LRAD] at  03/06/2023 1502   Distance 150 feet at 03/06/2023 1502   [place] supervision required at 03/06/2023 1502   Time Frame short-term goal (STG), 1 week at 03/06/2023 1502   Progress/Outcome goal met at 03/08/2023 1454   Gait/Walking Locomotion Goal 2    Activity/Assistive Device gait (walking locomotion)  [LRAD] at 03/06/2023 1502   Distance 150 feet at 03/06/2023 1502   [place] modified independence at 03/06/2023 1502   Time Frame long-term goal (LTG), 2-3 days at 03/15/2023 1630   Progress/Outcome goal ongoing at 03/15/2023 1630   Stairs Goal 1    Activity/Assistive Device stairs, all skills at 03/08/2023 1454   Number of Stairs 12 at 03/08/2023 1454   [place] modified independence at 03/08/2023 1454   Time Frame long-term goal (LTG), 2-3 days at 03/15/2023 1630   Progress/Outcome goal ongoing at 03/15/2023 1630

## 2023-03-16 NOTE — PROGRESS NOTES
Patient: Harry Alvarado  Location: [facility] [address]  MRN: [id number]  Today's date: 3/16/2023    History of Present Illness  Harry is a 67 y.o. male admitted on 3/6/2023 with Encounter for prosthetic gait training [Z47.89]. Principal problem is   Encounter for prosthetic gait training.      This is the first clinic appointment for Mr. Alvarado following his inpatient stay for preprostatic training. He is a 67-year-old male with a history of gene mutation, CAD, history of MI, peripheral artery disease with multiple bypass procedures in the past. The patient had been admitted to [facility] on November 30 for a planned transtibial amputation due to bypass thrombosis. He had reported worse pain in the previous 1 to 2 months in the right foot and had undergone multiple surgeries prior. He underwent a right transtibial amputation on December 2, 2022. There is no significant postoperative complications. Pain was well controlled. He was admitted to [facility] for preprostatic training and did well and was discharged home. He continues to receive home care physical therapy. He is still following up with his surgeon. He still has small open area in the lateral aspect of the incision line with minimal amount of serous drainage noted. He offers complaints of intermittent phantom pain and sensations. He has no other complaints of headache or dizziness, chest pain or shortness of breath. Currently he and his wife are living in his son's home everything is a 1 level. He is independent at a wheelchair level. Prior to his amputation he was completely independent and very active.    Past Medical History  Harry has a past medical history of Coronary artery disease, Deep vein thrombosis (CMS/HCC), Lupus anticoagulant disorder (CMS/HCC), Lyme disease, MTHFR gene mutation, Myocardial infarction (CMS/HCC), and PAD (peripheral artery disease) (CMS/[facility]).      PT Vitals    Date/Time Pulse HR Source BP  BP Location BP Method Pt Position [facility]   03/16/23 0905 75 Monitor 138/80 Right upper arm Automatic Sitting BDC      PT Pain    Date/Time Pain Type Rating: Rest [facility]   03/16/23 0905 Pain Assessment 0 BDC   03/16/23 0955 Pain Reassessment 0 BDC          Prior Living Environment    Flowsheet Row Most Recent Value   People in Home spouse   Current Living Arrangements home   Home Accessibility stairs to enter home (Group), stairs within home (Group)   Living Environment Comment Pt reports he lives with wife in 1  with 6 YOSEF. Pt has loft upstairs but does not need to access. Pt reports he's very active around the home and has steep property he needs to navigate.   Number of Stairs, Main Entrance 6   Stair Railings, Main Entrance railings on both sides of stairs   Stairs Comment, Main Entrance Can't reach both rails at once   Location, Patient Bedroom first (main) floor   Location, Bathroom first (main) floor   Bathroom Access Comment walk in shower, shower chair   Number of Stairs, Within Home, Primary 0          Prior Level of Function    Flowsheet Row Most Recent Value   Dominant Hand right   Ambulation assistive equipment   Transferring assistive equipment   Toileting independent   Bathing independent   Dressing independent   Eating independent   Prior Level of Function Comment Pt reports he was independent for all ADLs/mobility prior to TTA. Pt completed pre-prosthetic training at [facility] - has been Mod I at w/c level since d/c. Reports he utilized RW for transfers and gait training with HHPT.   Assistive Device Currently Used at Home walker, front-wheeled, wheelchair, shower chair, cane, straight           IRF PT Evaluation and Treatment - 03/16/23 0905        PT Time Calculation    Start Time 0900     Stop Time 1000     Time Calculation (min) 60 min        Session Details    Document Type daily treatment/progress note     Mode of Treatment individual therapy;physical therapy        Mobility Belt    Mobility Belt Used  for All Out of Bed Activity yes        Transfers    Comment gait belt donned during session.        Sit to Stand Transfer    [place], Sit to Stand Transfer supervision     Verbal Cues safety     Assistive Device walker, front-wheeled     Comment S for safety d/t impaired balance        Stand to Sit Transfer    [place], Stand to Sit Transfer supervision     Verbal Cues safety     Assistive Device walker, front-wheeled     Comment S for safety d/t impaired balance        Stand Pivot Transfer    [place], Stand Pivot/Stand Step Transfer supervision     Verbal Cues safety     Assistive Device walker, front-wheeled     Comment S for safety d/t impaired balance        Gait Training    [place], Gait supervision     Assistive Device walker, front-wheeled     Distance in Feet 200 feet     Pattern (Gait) step-through     Deviations/Abnormal Patterns (Gait) base of support, narrow;gait speed decreased     Advanced Gait Activity 10 Meter Walk Test (Self-Selected Velocity)   Pt demonstrates mild improvement in the 10MWT progressing from initial speed of 0.48m/s on 3/6/23 to 0.55m/s on 3/16/23, however continues below age/gender matched norms of 1.36m/s for comfortable walking speed indicating continued mobility deficit.    Comment (Gait/Stairs) S for safety d/t impaired balance.  Min vebal cues for increased R heel strike and increased R stance time during L swing.        10 Meter Walk Test (Self-Selected Velocity)    Trial One: Ten Meter Walk Test (sec) 11.24 seconds     Trial Two: Ten Meter Walk Test (sec) 10.42 seconds     Trial Three: Ten Meter Walk Test (sec) 11.21 seconds     Assistive Device walker, front-wheeled     Trial One: Gait Speed (m/s) 0.53 m/s     Trial Two: Gait Speed (m/s) 0.58 m/s     Trial Three: Gait Speed (m/s) 0.54 m/s     Average Gait Speed (m/s): Three Trials 0.55 m/s        Balance    Balance Assessment standing static balance;standing dynamic balance     Comment, Balance Standing at  "RW performing forward tap-ups to 6\" then 12\" box with LLE x10 reps each with focus on R prosthetic stance stability. Holding 5# hand weight in BUEs performing chest press x10 reps then up/down from waist to chest x10 reps. PNF D2 pattern holding 5# handweight x10 reps each side. Standing terminal knee extension with blue t-band on R, 2 sets of 10 reps. Static standing on blue foam with feet apart/eyes open x1' then eyes closed x30 sec, progressed to forward tap-ups to 6\" block from air-ex mat to 6\" block with LLE x10 reps. Standing on air-ex mat performing ball toss/catch x10 reps then bounce pass x10 reps.        Prosthetic Orientation (Lower Extremity)    Fit Assessment fit/function of prosthesis are appropriate     Comment, Fit Assessment Skin check pre-tx = unremarkable. Mod I to don moisture wicking sock, liner, 5-ply sock and prosthesis. Plan to doff at 1600 for wear time of 7 hours as able/tolerated, pt demonstrating understanding.        LE Prosthetic Management    Skin Care demonstrates proper skin assessment skills        Daily Progress Summary (PT)    Daily Outcome Statement Session focused on static/dynamic standing balance activities and pre-gait with focus on R stance stability and R lateral weight shifting. Pt demonstrates mild improvement in the 10MWT progressing from initial speed of 0.48m/s to 0.55m/s, however continues below age/gender matched norms of 1.36m/s for comfortable walking speed. Pt demonstrating ability to don prosthetic components at Mod I level. Plan to doff prosthesis at 1600 for 7 hrs wear time this day. Plan to continue to progress functional mobility, balance, and prosthetic independence per pt's tolerance.                           IRF PT Goals    Flowsheet Row Most Recent Value   Transfer Goal 1    Activity/Assistive Device sit-to-stand/stand-to-sit, stand pivot  [LRAD] at 03/06/2023 1502   [place] supervision required at 03/06/2023 1502   Time Frame short-term goal (STG), " 1 week at 03/06/2023 1502   Progress/Outcome goal met at 03/08/2023 1454   Transfer Goal 2    Activity/Assistive Device sit-to-stand/stand-to-sit, stand pivot  [LRAD] at 03/06/2023 1502   [place] modified independence at 03/06/2023 1502   Time Frame long-term goal (LTG), 2-3 days at 03/15/2023 1630   Progress/Outcome goal ongoing at 03/15/2023 1630   Gait/Walking Locomotion Goal 1    Activity/Assistive Device gait (walking locomotion)  [LRAD] at 03/06/2023 1502   Distance 150 feet at 03/06/2023 1502   [place] supervision required at 03/06/2023 1502   Time Frame short-term goal (STG), 1 week at 03/06/2023 1502   Progress/Outcome goal met at 03/08/2023 1454   Gait/Walking Locomotion Goal 2    Activity/Assistive Device gait (walking locomotion)  [LRAD] at 03/06/2023 1502   Distance 150 feet at 03/06/2023 1502   [place] modified independence at 03/06/2023 1502   Time Frame long-term goal (LTG), 2-3 days at 03/15/2023 1630   Progress/Outcome goal ongoing at 03/15/2023 1630   Stairs Goal 1    Activity/Assistive Device stairs, all skills at 03/08/2023 1454   Number of Stairs 12 at 03/08/2023 1454   [place] modified independence at 03/08/2023 1454   Time Frame long-term goal (LTG), 2-3 days at 03/15/2023 1630   Progress/Outcome goal ongoing at 03/15/2023 1630

## 2023-03-16 NOTE — PLAN OF CARE
Problem: Rehabilitation (IRF) Plan of Care  Goal: Plan of Care Review  Outcome: Progressing  Flowsheets (Taken 3/16/2023 1259)  Progress: no change  Plan of Care Reviewed With:   patient   spouse   CM met w pt at bedside to provide updates from treatment team, discuss goals and ELOS 3/18. Pt in agreement w plan. CM provided pt w OP script and will fax copy of script and AVS to Elise LUNA Pts OP therapist in [place]. CM spoke w pts spouse Dave who is in agreement w plan and will be picking pt up upon dc. CM will continue to follow.

## 2023-03-16 NOTE — SUBJECTIVE & OBJECTIVE
" Patient was seen and examined.   Attestation Notes: Face to face encounter completed    Subjective    Supervision with ambulation with rolling walker 200 feet.  Supervision for ambulating 12 stairs.  Prosthetic wear time 6 hours.  Objective     Visit Vitals  /75 (BP Location: Left upper arm, Patient Position: Lying)   Pulse 66   Temp 36.6 °C (97.9 °F) (Oral)   Resp 17   Ht 1.854 m (6' 1\")   Wt 75.8 kg (167 lb 3.2 oz)   SpO2 95%   BMI 22.06 kg/m²       Review of Systems:  Pertinent items are noted in HPI.  Denies chest pain and shortness of breath.  Review of systems otherwise negative.    Full Code    Physical Exam  General      Alert, cooperative, no distress, appears stated age.   Head:    Normocephalic, without obvious abnormality, atraumatic.   Eyes:    PERRL, conjunctiva/corneas clear, EOM's intact.        Nose:   Nares normal, septum midline, mucosa normal, no drainage or            sinus tenderness.   Throat:   Lips, mucosa, and tongue normal.    Neck:   Supple, symmetrical, trachea midline.    Back:     Symmetric, no curvature.   Lungs:     Clear to auscultation bilaterally, respirations unlabored.   Chest wall:    No tenderness or deformity.   Heart:    Regular rate and rhythm, S1 and S2 normal.   Abdomen:     Soft, non-tender, bowel sounds active all four quadrants,     no masses, no organomegaly.   Extremities:  Musculoskeletal: Trace stump edema.  Right transtibial amputation.      Pulses:   1+ and symmetric all extremities.   Skin:   Skin color, texture, turgor normal, no rashes or lesions   Neurologic:          Behavior/  Emotional:  CNII-XII intact.  Alert and oriented ×3.  Motor exam intact.  Sensory exam intact.  Reflexes stable decreased reflexes.      Appropriate, cooperative           Plan of care was discussed with patient   "

## 2023-03-16 NOTE — PROGRESS NOTES
"Daily Progress Note     Patient was seen and examined.   Attestation Notes: Face to face encounter completed    Subjective    Supervision with ambulation with rolling walker 200 feet.  Supervision for ambulating 12 stairs.  Prosthetic wear time 6 hours.  Objective     Visit Vitals  /75 (BP Location: Left upper arm, Patient Position: Lying)   Pulse 66   Temp 36.6 °C (97.9 °F) (Oral)   Resp 17   Ht 1.854 m (6' 1\")   Wt 75.8 kg (167 lb 3.2 oz)   SpO2 95%   BMI 22.06 kg/m²       Review of Systems:  Pertinent items are noted in HPI.  Denies chest pain and shortness of breath.  Review of systems otherwise negative.    Full Code    Physical Exam  General      Alert, cooperative, no distress, appears stated age.   Head:    Normocephalic, without obvious abnormality, atraumatic.   Eyes:    PERRL, conjunctiva/corneas clear, EOM's intact.        Nose:   Nares normal, septum midline, mucosa normal, no drainage or            sinus tenderness.   Throat:   Lips, mucosa, and tongue normal.    Neck:   Supple, symmetrical, trachea midline.    Back:     Symmetric, no curvature.   Lungs:     Clear to auscultation bilaterally, respirations unlabored.   Chest wall:    No tenderness or deformity.   Heart:    Regular rate and rhythm, S1 and S2 normal.   Abdomen:     Soft, non-tender, bowel sounds active all four quadrants,     no masses, no organomegaly.   Extremities:  Musculoskeletal: Trace stump edema.  Right transtibial amputation.      Pulses:   1+ and symmetric all extremities.   Skin:   Skin color, texture, turgor normal, no rashes or lesions   Neurologic:          Behavior/  Emotional:  CNII-XII intact.  Alert and oriented ×3.  Motor exam intact.  Sensory exam intact.  Reflexes stable decreased reflexes.      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 male " who presents with the following chronic medical problems including MTHFR gene mutation, coronary artery disease with prior myocardial infarction 1993, peripheral artery disease with multiple bypass procedures in the past most recent with tPA bolus and angioplasty in 2020.  The patient admitted to [facility] on November 30, 2022 to the vascular surgery service for planned BKA due to bypass thrombosis.  At the time of admission the patient had cyanosis and was beginning to develop thanh gangrene of the foot.  Patient has a history of 15+ surgeries of the extremities and understood that his revascularization options were limited.  He had severe 10 out of 10 pain of the foot relieved with elevation of the leg.  He was still able to ambulate but limited by pain.  Under the care of Bowen Serrato MD of vascular surgery he underwent right transtibial amputation on December 2, 2022.  Postoperative course unremarkable.  For postoperative pain control he was treated with morphine.  On discharge prescribed Tylenol and low-dose Lyrica.  Lyrica started prior by his PCP for neuropathic pain.  He was restarted on his home Eliquis.  He required min assist for bed mobility, transfers and ambulation.  The patient was admitted to [facility] on December 6, 2022 for acute inpatient rehabilitation for preprosthetic training.  The patient was discharged on December 18, 2022 after  completing a comprehensive inpatient rehabilitation program progressing to a modified independent level with bed mobility, transfers and standing with rolling walker with limited hopping.  Pain control stable with medication change to hydrocodone/acetaminophen.  Continue Lyrica increased dose 50 mg 3 times a day.  The patient was also placed on nortriptyline for sleep and neuropathic pain.   The patient has follow-up outpatient with vascular surgery with improvement in wound healing.  The patient was evaluated by Dr. Zambrano in  amputee clinic and felt appropriate for acute inpatient rehabilitation for intensive prosthetic training prior to returning home where he lives in [place].  The patient's pain has improved and he is weaned off all of his medications except for his Eliquis.    Comprehensive inpatient rehabilitation program for functional deficit status post right transtibial amputation.  Goal is for modified and pedal level with ambulation and self-care activities with prosthesis.  Monitor for pain.  Monitor skin closely.  Monitor cardiovascular status.    Supervision with ambulation with rolling walker 200 feet.  Supervision for ambulating 12 stairs.  Prosthetic wear time 6 hours.    Lupus anticoagulant disorder (CMS/HCC)  Assessment & Plan  Continue Eliquis.    Deep vein thrombosis (CMS/HCC)  Assessment & Plan  Continue Eliquis.    Coronary artery disease  Assessment & Plan  Currently asymptomatic.  Monitor cardiac status during stay.  Cardiac precautions.        Expected Discharge Date:  3/18/2023

## 2023-03-17 ENCOUNTER — APPOINTMENT (INPATIENT)
Dept: OCCUPATIONAL THERAPY | Facility: REHABILITATION | Age: 68
DRG: 560 | End: 2023-03-17
Payer: MEDICARE

## 2023-03-17 ENCOUNTER — APPOINTMENT (INPATIENT)
Dept: PHYSICAL THERAPY | Facility: REHABILITATION | Age: 68
DRG: 560 | End: 2023-03-17
Payer: MEDICARE

## 2023-03-17 PROCEDURE — 97116 GAIT TRAINING THERAPY: CPT | Mod: GP

## 2023-03-17 PROCEDURE — 97530 THERAPEUTIC ACTIVITIES: CPT | Mod: GO

## 2023-03-17 PROCEDURE — 97535 SELF CARE MNGMENT TRAINING: CPT | Mod: GO

## 2023-03-17 PROCEDURE — 97763 ORTHC/PROSTC MGMT SBSQ ENC: CPT | Mod: GP

## 2023-03-17 PROCEDURE — 97530 THERAPEUTIC ACTIVITIES: CPT | Mod: GP

## 2023-03-17 PROCEDURE — 63700000 HC SELF-ADMINISTRABLE DRUG: Performed by: HOSPITALIST

## 2023-03-17 PROCEDURE — 12800000 HC ROOM AND CARE SEMIPRIVATE REHAB

## 2023-03-17 RX ADMIN — APIXABAN 5 MG: 5 TABLET, FILM COATED ORAL at 20:01

## 2023-03-17 RX ADMIN — APIXABAN 5 MG: 5 TABLET, FILM COATED ORAL at 08:08

## 2023-03-17 ASSESSMENT — PATIENT HEALTH QUESTIONNAIRE - PHQ9: SUM OF ALL RESPONSES TO PHQ9 QUESTIONS 1 & 2: 0

## 2023-03-17 NOTE — PROGRESS NOTES
"Daily Progress Note     Patient was seen and examined.   Attestation Notes: Face to face encounter completed    Subjective    The patient feels well.  Has progressed nicely with therapies.  Has progressed to an independent level with ambulation and transfers with his prosthesis and a rolling walker.  Ready for discharge home tomorrow.  Objective     Visit Vitals  /76 (BP Location: Right upper arm, Patient Position: Sitting)   Pulse 81   Temp 36.6 °C (97.9 °F) (Oral)   Resp 18   Ht 1.854 m (6' 1\")   Wt 75.9 kg (167 lb 4.8 oz)   SpO2 95%   BMI 22.07 kg/m²       Review of Systems:  Pertinent items are noted in HPI.  Denies chest pain and shortness of breath.  Review of systems otherwise negative.    Full Code    Physical Exam  General      Alert, cooperative, no distress, appears stated age.   Head:    Normocephalic, without obvious abnormality, atraumatic.   Eyes:    PERRL, conjunctiva/corneas clear, EOM's intact.        Nose:   Nares normal, septum midline, mucosa normal, no drainage or            sinus tenderness.   Throat:   Lips, mucosa, and tongue normal.    Neck:   Supple, symmetrical, trachea midline.    Back:     Symmetric, no curvature.   Lungs:     Clear to auscultation bilaterally, respirations unlabored.   Chest wall:    No tenderness or deformity.   Heart:    Regular rate and rhythm, S1 and S2 normal.   Abdomen:     Soft, non-tender, bowel sounds active all four quadrants,     no masses, no organomegaly.   Extremities:  Musculoskeletal: Trace stump edema.  Right transtibial amputation.   Pulses:   1+ and symmetric all extremities.   Skin:   Skin color, texture, turgor normal, no rashes or lesions   Neurologic:          Behavior/  Emotional:  CNII-XII intact.  Alert and oriented ×3.  Motor exam intact.  Sensory exam intact.  Reflexes stable decreased reflexes.      Appropriate, cooperative           Plan of care was discussed with patient     Assessment & Plan  Status post below knee amputation of " right lower extremity (CMS/[facility])  Assessment & Plan  Harry Alvarado is a 67 y.o. male who presents with the following chronic medical problems including MTHFR gene mutation, coronary artery disease with prior myocardial infarction 1993, peripheral artery disease with multiple bypass procedures in the past most recent with tPA bolus and angioplasty in 2020.  The patient admitted to [facility] on November 30, 2022 to the vascular surgery service for planned BKA due to bypass thrombosis.  At the time of admission the patient had cyanosis and was beginning to develop thanh gangrene of the foot.  Patient has a history of 15+ surgeries of the extremities and understood that his revascularization options were limited.  He had severe 10 out of 10 pain of the foot relieved with elevation of the leg.  He was still able to ambulate but limited by pain.  Under the care of Bowen Serrato MD of vascular surgery he underwent right transtibial amputation on December 2, 2022.  Postoperative course unremarkable.  For postoperative pain control he was treated with morphine.  On discharge prescribed Tylenol and low-dose Lyrica.  Lyrica started prior by his PCP for neuropathic pain.  He was restarted on his home Eliquis.  He required min assist for bed mobility, transfers and ambulation.  The patient was admitted to [facility] on December 6, 2022 for acute inpatient rehabilitation for preprosthetic training.  The patient was discharged on December 18, 2022 after  completing a comprehensive inpatient rehabilitation program progressing to a modified independent level with bed mobility, transfers and standing with rolling walker with limited hopping.  Pain control stable with medication change to hydrocodone/acetaminophen.  Continue Lyrica increased dose 50 mg 3 times a day.  The patient was also placed on nortriptyline for sleep and neuropathic pain.   The patient has follow-up outpatient with vascular  surgery with improvement in wound healing.  The patient was evaluated by Dr. Zambrano in amputee clinic and felt appropriate for acute inpatient rehabilitation for intensive prosthetic training prior to returning home where he lives in [place].  The patient's pain has improved and he is weaned off all of his medications except for his Eliquis.    Comprehensive inpatient rehabilitation program for functional deficit status post right transtibial amputation.  Goal is for modified and pedal level with ambulation and self-care activities with prosthesis.  Monitor for pain.  Monitor skin closely.  Monitor cardiovascular status.    The patient feels well.  Has progressed nicely with therapies.  Has progressed to an independent level with ambulation and transfers with his prosthesis and a rolling walker.  Ready for discharge home tomorrow.    Lupus anticoagulant disorder (CMS/HCC)  Assessment & Plan  Continue Eliquis.    Deep vein thrombosis (CMS/HCC)  Assessment & Plan  Continue Eliquis.    Coronary artery disease  Assessment & Plan  Currently asymptomatic.  Monitor cardiac status during stay.  Cardiac precautions.        Expected Discharge Date:  3/18/2023

## 2023-03-17 NOTE — PLAN OF CARE
Plan of Care Review  Plan of Care Reviewed With: patient  Progress: improving  Outcome Summary: No c/o pain/discomfort at this time. Continent of bowel and bladder. Sleeping well. Fall precaution maintained. Call bell within reach.

## 2023-03-17 NOTE — SUBJECTIVE & OBJECTIVE
" Patient was seen and examined.   Attestation Notes: Face to face encounter completed    Subjective    The patient feels well.  Has progressed nicely with therapies.  Has progressed to an independent level with ambulation and transfers with his prosthesis and a rolling walker.  Ready for discharge home tomorrow.  Objective     Visit Vitals  /76 (BP Location: Right upper arm, Patient Position: Sitting)   Pulse 81   Temp 36.6 °C (97.9 °F) (Oral)   Resp 18   Ht 1.854 m (6' 1\")   Wt 75.9 kg (167 lb 4.8 oz)   SpO2 95%   BMI 22.07 kg/m²       Review of Systems:  Pertinent items are noted in HPI.  Denies chest pain and shortness of breath.  Review of systems otherwise negative.    Full Code    Physical Exam  General      Alert, cooperative, no distress, appears stated age.   Head:    Normocephalic, without obvious abnormality, atraumatic.   Eyes:    PERRL, conjunctiva/corneas clear, EOM's intact.        Nose:   Nares normal, septum midline, mucosa normal, no drainage or            sinus tenderness.   Throat:   Lips, mucosa, and tongue normal.    Neck:   Supple, symmetrical, trachea midline.    Back:     Symmetric, no curvature.   Lungs:     Clear to auscultation bilaterally, respirations unlabored.   Chest wall:    No tenderness or deformity.   Heart:    Regular rate and rhythm, S1 and S2 normal.   Abdomen:     Soft, non-tender, bowel sounds active all four quadrants,     no masses, no organomegaly.   Extremities:  Musculoskeletal: Trace stump edema.  Right transtibial amputation.   Pulses:   1+ and symmetric all extremities.   Skin:   Skin color, texture, turgor normal, no rashes or lesions   Neurologic:          Behavior/  Emotional:  CNII-XII intact.  Alert and oriented ×3.  Motor exam intact.  Sensory exam intact.  Reflexes stable decreased reflexes.      Appropriate, cooperative           Plan of care was discussed with patient   "

## 2023-03-17 NOTE — PROGRESS NOTES
Patient: Harry Alvarado  Location: [facility] [address]  MRN: [id number]  Today's date: 3/17/2023    History of Present Illness  Harry is a 67 y.o. male admitted on 3/6/2023 with Encounter for prosthetic gait training [Z47.89]. Principal problem is   Encounter for prosthetic gait training.      This is the first clinic appointment for Mr. Alvarado following his inpatient stay for preprostatic training. He is a 67-year-old male with a history of gene mutation, CAD, history of MI, peripheral artery disease with multiple bypass procedures in the past. The patient had been admitted to [facility] on November 30 for a planned transtibial amputation due to bypass thrombosis. He had reported worse pain in the previous 1 to 2 months in the right foot and had undergone multiple surgeries prior. He underwent a right transtibial amputation on December 2, 2022. There is no significant postoperative complications. Pain was well controlled. He was admitted to [facility] for preprostatic training and did well and was discharged home. He continues to receive home care physical therapy. He is still following up with his surgeon. He still has small open area in the lateral aspect of the incision line with minimal amount of serous drainage noted. He offers complaints of intermittent phantom pain and sensations. He has no other complaints of headache or dizziness, chest pain or shortness of breath. Currently he and his wife are living in his son's home everything is a 1 level. He is independent at a wheelchair level. Prior to his amputation he was completely independent and very active.    Past Medical History  Harry has a past medical history of Coronary artery disease, Deep vein thrombosis (CMS/HCC), Lupus anticoagulant disorder (CMS/HCC), Lyme disease, MTHFR gene mutation, Myocardial infarction (CMS/HCC), and PAD (peripheral artery disease) (CMS/[facility]).      PT Vitals    Date/Time Pulse HR Source BP  BP Location BP Method Pt Position [facility]   03/17/23 1505 81 Monitor 128/76 Right upper arm Automatic Sitting BDC      PT Pain    Date/Time Pain Type Rating: Rest [facility]   03/17/23 1505 Pain Assessment 0 BDC   03/17/23 1555 Pain Reassessment 0 BDC          Prior Living Environment    Flowsheet Row Most Recent Value   People in Home spouse   Current Living Arrangements home   Home Accessibility stairs to enter home (Group), stairs within home (Group)   Living Environment Comment Pt reports he lives with wife in 1  with 6 YOSEF. Pt has loft upstairs but does not need to access. Pt reports he's very active around the home and has steep property he needs to navigate.   Number of Stairs, Main Entrance 6   Stair Railings, Main Entrance railings on both sides of stairs   Stairs Comment, Main Entrance Can't reach both rails at once   Location, Patient Bedroom first (main) floor   Location, Bathroom first (main) floor   Bathroom Access Comment walk in shower, shower chair   Number of Stairs, Within Home, Primary 0          Prior Level of Function    Flowsheet Row Most Recent Value   Dominant Hand right   Ambulation assistive equipment   Transferring assistive equipment   Toileting independent   Bathing independent   Dressing independent   Eating independent   Prior Level of Function Comment Pt reports he was independent for all ADLs/mobility prior to TTA. Pt completed pre-prosthetic training at [facility] - has been Mod I at w/c level since d/c. Reports he utilized RW for transfers and gait training with HHPT.   Assistive Device Currently Used at Home walker, front-wheeled, wheelchair, shower chair, cane, straight           IRF PT Evaluation and Treatment - 03/17/23 1505        PT Time Calculation    Start Time 1500     Stop Time 1600     Time Calculation (min) 60 min        Session Details    Document Type discharge evaluation     Mode of Treatment individual therapy;physical therapy        Mobility Belt    Mobility Belt Used for All  "Out of Bed Activity yes        Transfers    Comment gait belt donned during session        Sit to Stand Transfer    [place], Sit to Stand Transfer modified independence     Assistive Device walker, front-wheeled        Stand to Sit Transfer    [place], Stand to Sit Transfer modified independence     Assistive Device none        Stand Pivot Transfer    [place], Stand Pivot/Stand Step Transfer modified independence     Assistive Device walker, front-wheeled        Gait Training    [place], Gait modified independence     Assistive Device walker, front-wheeled     Distance in Feet 200 feet     Pattern (Gait) step-through     Deviations/Abnormal Patterns (Gait) base of support, narrow;gait speed decreased        Balance    Balance Assessment standing static balance;standing dynamic balance     Comment, Balance In // bars; standing on air-ex mat with feet apart/eyes open x1' without UE support progressing to eyes closed x30 sec with increase in postrual sway however no LOB with focus on symmetric BLE WBing. Standing on air-ex mat holding 4# med ball in BUEs first with elbows extended and bringing up/down from waist to chest height x10 reps, then preforming chest press x10 reps. Standing on air-ex performing forward tap-up to 8\" block with LLE x15 reps with focus on R stance stability.        Lower Extremity (Therapeutic Exercise)    Exercise Position/Type seated;static stretching     Comment Modified nestor stretch seated EOM with wedge + 2 pillows posterior, x5 min at start of session.        Aerobic Exercise    Type recumbent elliptical      Time Performed 9     Comment Nustep L8 with BUE/BLE        Prosthetic Orientation (Lower Extremity)    Fit Assessment fit/function of prosthesis are appropriate     Comment, Fit Assessment Pt arriving to session with R TT prothesis donned since 0800. Pt reporting continued comfort. 5 ply sock. Doffed at 1600 with skin check unremarkable. Wear time= 8 hrs.  "       LE Prosthetic Management    Skin Care demonstrates proper skin assessment skills        Daily Progress Summary (PT)    Daily Outcome Statement Doffed prosthesis at 1600 for completion of prosthetic wear time of 8 hrs without issue. Pt appropriate as planned for d/c to home at Mod I level 3/18/23.                           IRF PT Goals    Flowsheet Row Most Recent Value   Transfer Goal 1    Activity/Assistive Device sit-to-stand/stand-to-sit, stand pivot  [LRAD] at 03/06/2023 1502   [place] supervision required at 03/06/2023 1502   Time Frame short-term goal (STG), 1 week at 03/06/2023 1502   Progress/Outcome goal met at 03/08/2023 1454   Transfer Goal 2    Activity/Assistive Device sit-to-stand/stand-to-sit, stand pivot  [LRAD] at 03/06/2023 1502   [place] modified independence at 03/06/2023 1502   Time Frame long-term goal (LTG), 2-3 days at 03/15/2023 1630   Progress/Outcome goal met at 03/17/2023 0901   Gait/Walking Locomotion Goal 1    Activity/Assistive Device gait (walking locomotion)  [LRAD] at 03/06/2023 1502   Distance 150 feet at 03/06/2023 1502   [place] supervision required at 03/06/2023 1502   Time Frame short-term goal (STG), 1 week at 03/06/2023 1502   Progress/Outcome goal met at 03/08/2023 1454   Gait/Walking Locomotion Goal 2    Activity/Assistive Device gait (walking locomotion)  [LRAD] at 03/06/2023 1502   Distance 150 feet at 03/06/2023 1502   [place] modified independence at 03/06/2023 1502   Time Frame long-term goal (LTG), 2-3 days at 03/15/2023 1630   Progress/Outcome goal met at 03/17/2023 0901   Stairs Goal 1    Activity/Assistive Device stairs, all skills at 03/08/2023 1454   Number of Stairs 12 at 03/08/2023 1454   [place] modified independence at 03/08/2023 1454   Time Frame long-term goal (LTG), 2-3 days at 03/15/2023 1630   Progress/Outcome goal met at 03/17/2023 0901

## 2023-03-17 NOTE — PROGRESS NOTES
[facility] Progress Note          Patient was seen and examined.   Attestation Notes: Face to face encounter completed    Harry Alvarado is a 67 y.o. male who was admitted for Encounter for prosthetic gait training [Z47.89]. Patient was referred by Richard Angulo MD for medical assessment and management      CC: Encounter for prosthetic gait training [Z47.89]     HPI: Harry Alvarado is a 67 y.o. male with MTHFR gene mutation, CAD s/p MI 1993, and PAD s/p R Fem to PT bypass with arm vein in 1993, revision in 2011, s/p R SFA to peroneal bypass graft s/p revision on 7/30/18, s/p tPA bolus and angioplasty in 2020 admitted to [facility] 11/30/22 with RLE bypass thrombosis and chronic right foot ischemia/gangrene and underwent right BKA by vascular surgeon, Dr. Bowen Serrato 12/2/22. Post op he had anemia but did not require transfusion. He was restarted on his home Eliquis.       The patient was evaluated by PM&R and found to have residual deficits in ambulation and ADLs due to Status post below knee amputation of right lower extremity (CMS/[facility]) [Z89.511] and came to [facility] on 12/6/2022 for acute inpatient rehab.      He participated in therapy. The patient has steadily improved in therapy and was for discharged home 12/18/2022.     The patient was seen by Dr. Agustín Zambrano in [facility] and was felt to be a good candidate for prosthetic ambulation and came to [facility] 3/6/2023 for acute inpatient prosthetic training.    He participated in therapy.      The patient has steadily improved in therapy and is for discharge home 3/18/2023          SUBJECTIVE:  Patient interviewed and examined.    He feels prosthetic training is going well and feels ready for discharge home tomorrow.     No pain complaints, moving bowels and bladder, no abdominal pain or nausea, no dysuria, no chest pain, palpitations, dyspnea or fevers    Review of Systems:  All other systems reviewed and negative except as  noted in the HPI.    Current meds and allergies reviewed    Past Medical History:   Diagnosis Date   • Coronary artery disease     s/p MI   • Deep vein thrombosis (CMS/HCC)    • Lupus anticoagulant disorder (CMS/HCC)    • Lyme disease    • MTHFR gene mutation    • Myocardial infarction (CMS/HCC)    • PAD (peripheral artery disease) (CMS/HCC)      Past Surgical History:   Procedure Laterality Date   • BELOW KNEE LEG AMPUTATION Right 12/02/2022   • FEMORAL BYPASS Right      Social History     Tobacco Use   • Smoking status: Never   • Smokeless tobacco: Never   Substance Use Topics   • Alcohol use: Not Currently      History reviewed. No pertinent family history.    Vital signs in last 24 hours:  Temp:  [36.4 °C (97.6 °F)-36.6 °C (97.9 °F)] 36.6 °C (97.9 °F)  Heart Rate:  [68-84] 68  Resp:  [18] 18  BP: (111-126)/(73-80) 116/75  Vital signs reviewed 03/17/23 12:24 PM    Physical Exam  Vitals and nursing note reviewed.   Constitutional:       Appearance: Normal appearance.   HENT:      Head: Normocephalic and atraumatic.      Right Ear: External ear normal.      Left Ear: External ear normal.      Nose: Nose normal.      Mouth/Throat:      Mouth: Mucous membranes are moist.      Pharynx: Oropharynx is clear.   Eyes:      Conjunctiva/sclera: Conjunctivae normal.      Pupils: Pupils are equal, round, and reactive to light.   Cardiovascular:      Rate and Rhythm: Normal rate and regular rhythm.      Pulses: Normal pulses.      Heart sounds: Normal heart sounds.   Pulmonary:      Effort: Pulmonary effort is normal.      Breath sounds: Normal breath sounds.   Abdominal:      General: Abdomen is flat. Bowel sounds are normal.      Palpations: Abdomen is soft.   Musculoskeletal:         General: Deformity (s/p right BKA) present.      Cervical back: Normal range of motion and neck supple.   Skin:     General: Skin is warm and dry.   Neurological:      General: No focal deficit present.      Mental Status: He is alert and  oriented to person, place, and time.   Psychiatric:         Mood and Affect: Mood normal.         Behavior: Behavior normal.                 Objective    Labs:  I have reviewed the patient's labs.  Current labs are within normal limits.                 Imaging:  Not applicable        ASSESSMENT/PLAN:    67 y.o. male with MTHFR gene mutation, CAD s/p MI 1993, and PAD s/p R Fem to PT bypass with arm vein in 1993, revision in 2011, s/p R SFA to peroneal bypass graft s/p revision on 7/30/18, s/p tPA bolus and angioplasty in 2020 admitted to [facility] 11/30/22 with RLE bypass thrombosis and chronic right foot ischemia/gangrene and underwent right BKA by vascular surgeon, Dr. Bowen Serrato 12/2/22; pre-prosthetic training [facility] 12/6/22-12/18/22; returns to [facility] from home 3/6/23 for inpatient prosthetic training.      1. Vasc:  -PAD s/p failed RLE bypass with right foot ischemia/gangrene  -s/p right BKA 12/2/22  -inpatient prosthetic training  -Tylenol for pain  -remains on Eliquis for hypercoagulable condition     2. Heme:  -hx of MTHFR gene mutation     3. Renal:  -normal CMP, Mg     4. Gi:  -Senokot-S as needed for bowels     5. Gu:  -no urine retention     6. Cardiac:  -hx of CAD  -watch for orthostatic hypotension  -use cardiac precautions in therapy     7. Pulm:  -incentive spirometry for atelectasis     8. Derm:  -skin care per nursing     9. Nutrition:  -seen by Nutrition for assessment and education     10. Psych:  -seen by Psychology for support    11. Dispo:  -Expected discharge date 3/18/2023  -to home  -Reviewed chart and meds  -Completed discharge documentation and wrote scripts as needed         plan discussed with patient, nurse, case management and  Richard Angulo MD Scott Sapperstein, MD  3/17/2023  12:24 PM

## 2023-03-17 NOTE — PROGRESS NOTES
Patient: Harry Alvarado  Location: [facility] [address]  MRN: [id number]  Today's date: 3/17/2023    History of Present Illness  Harry is a 67 y.o. male admitted on 3/6/2023 with Encounter for prosthetic gait training [Z47.89]. Principal problem is   Encounter for prosthetic gait training.      This is the first clinic appointment for Mr. Alvarado following his inpatient stay for preprostatic training. He is a 67-year-old male with a history of gene mutation, CAD, history of MI, peripheral artery disease with multiple bypass procedures in the past. The patient had been admitted to [facility] on November 30 for a planned transtibial amputation due to bypass thrombosis. He had reported worse pain in the previous 1 to 2 months in the right foot and had undergone multiple surgeries prior. He underwent a right transtibial amputation on December 2, 2022. There is no significant postoperative complications. Pain was well controlled. He was admitted to [facility] for preprostatic training and did well and was discharged home. He continues to receive home care physical therapy. He is still following up with his surgeon. He still has small open area in the lateral aspect of the incision line with minimal amount of serous drainage noted. He offers complaints of intermittent phantom pain and sensations. He has no other complaints of headache or dizziness, chest pain or shortness of breath. Currently he and his wife are living in his son's home everything is a 1 level. He is independent at a wheelchair level. Prior to his amputation he was completely independent and very active.    Past Medical History  Harry has a past medical history of Coronary artery disease, Deep vein thrombosis (CMS/HCC), Lupus anticoagulant disorder (CMS/HCC), Lyme disease, MTHFR gene mutation, Myocardial infarction (CMS/HCC), and PAD (peripheral artery disease) (CMS/[facility]).      OT Vitals    Date/Time Pulse HR Source BP  BP Location BP Method Pt Position [facility]   03/17/23 1111 68 Monitor 116/75 Right upper arm Automatic Sitting LM      OT Pain    Date/Time Pain Type Rating: Rest Rating: Activity [facility]   03/17/23 1111 Pain Assessment 0 - no pain 0 - no pain LM   03/17/23 1156 Pain Reassessment 0 - no pain 0 - no pain LM          Prior Living Environment    Flowsheet Row Most Recent Value   People in Home spouse   Current Living Arrangements home   Home Accessibility stairs to enter home (Group), stairs within home (Group)   Living Environment Comment Pt reports he lives with wife in 1  with 6 YOSEF. Pt has loft upstairs but does not need to access. Pt reports he's very active around the home and has steep property he needs to navigate.   Number of Stairs, Main Entrance 6   Stair Railings, Main Entrance railings on both sides of stairs   Stairs Comment, Main Entrance Can't reach both rails at once   Location, Patient Bedroom first (main) floor   Location, Bathroom first (main) floor   Bathroom Access Comment walk in shower, shower chair   Number of Stairs, Within Home, Primary 0          Prior Level of Function    Flowsheet Row Most Recent Value   Dominant Hand right   Ambulation assistive equipment   Transferring assistive equipment   Toileting independent   Bathing independent   Dressing independent   Eating independent   Prior Level of Function Comment Pt reports he was independent for all ADLs/mobility prior to TTA. Pt completed pre-prosthetic training at [facility] - has been Mod I at w/c level since d/c. Reports he utilized RW for transfers and gait training with HHPT.   Assistive Device Currently Used at Home walker, front-wheeled, wheelchair, shower chair, cane, straight          Occupational Profile    Flowsheet Row Most Recent Value   Occupational History/Life Experiences Pt is interested in learning about return to driving, plans to purchase a new vehicle.  Recommend arranging Chirag Eaton from the [employer] to speak with pt.  Pt  lives in [place] and was staying in [place] with son since amputation, plans to return to [place].  Initially wanted to drive home.  Discussed potentially purchasing   Environmental Supports and Barriers +. 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, also enjoys hiking,           IRF OT Evaluation and Treatment - 03/17/23 1104        OT Time Calculation    Start Time 1100     Stop Time 1200     Time Calculation (min) 60 min        Session Details    Document Type discharge evaluation     Mode of Treatment occupational therapy;individual therapy        General Information    Patient Profile Reviewed yes     General Observations of Patient Pleasant and cooperative        Mobility Belt    Mobility Belt Used for All Out of Bed Activity yes        Sit to Stand Transfer    [place], Sit to Stand Transfer modified independence     Assistive Device walker, front-wheeled        Stand to Sit Transfer    [place], Stand to Sit Transfer modified independence     Assistive Device walker, front-wheeled        Toilet Transfer    Transfer Technique stand pivot     [place], Toilet Transfer modified independence     Assistive Device grab bars/safety frame;raised toilet seat;walker, front-wheeled     Comment Amb approach        Shower Transfer    Transfer Technique stand pivot     [place], Shower Transfer modified independence     Assistive Device grab bars/tub rail;tub bench     Comment Per Pt report        Safety Issues, Functional Mobility    Comment, Safety Issues/Impairments (Mobility) OT: DEYA FAITH        Balance    Comment, Balance Pt engaged in outdoors game of baseball - alternating positions between batter, catcher, and pitcher. Pt able to vary degree of ATUL to hit ball, perform semi-squats, amb over uneven surfaces, reach mod-max outside ATUL to catch ball, retrieve items from ground level, and perform toe taps  up on cone. Utilized RW for balance/mobility. Grossly min A throughout for safety. Tolerating approx 30 min of game play with x1 seated rest break        Motor Skills    Coordination WFL;upper extremity        Upper Extremity (Therapeutic Exercise)    Comment Verbal review of theraband HEP. Issued handout        Bathing    [place] modified independence     Comment Per Pt report. Not goal of this therapy stay for prosthetic training        Upper Body Dressing    [place] modified independence     Comment Including item retrieval with prosthetic donned        Lower Body Dressing    [place] modified independence     [place], Footwear modified independence     Comment Per Pt report with prosthetic donned        Grooming    Self-Performance washes, rinses and dries face;washes, rinses and dries hands;brushes/franco hair;oral care (brushing teeth, cleaning dentures)     [place] modified independence     Position unsupported standing     Setup Assistance obtain supplies     Adaptive Equipment none     [place], Oral Hygiene modified independence     Comment In stance with prosthetic donned        Toileting    [place] modified independence     Position supported sitting;supported standing     Setup Assistance adaptive equipment setup     Adaptive Equipment accessible height toilet;grab bar/safety frame     Comment with prosthetic donned        Prosthetic Orientation (Lower Extremity)    Comment, Fit Assessment Pt wearing R TT prosthesis throughout session with 5 sock ply        Discharge Summary (OT)    Outcomes Achieved Upon Discharge (OT) all goals met within established timeframes     Discharge Summary Statement (OT) Pt is a 67 year old male presenting to [facility] for R TT prosthetic training. Pt has progressed to [place] for all ADLs and functional transfers with use of R TT prosthetic. Pt issued handicap parking placard application for [place] state. Is not a  PTA. Issued purple theraband HEP.  Pt preparing for d/c home with homecare OT to improve performance with IADLs, higher level balance.     Transfer to Another Level of Care or Facility (OT) recommend continued therapy following discharge;recommend therapy via home health                           IRF OT Goals    Flowsheet Row Most Recent Value   Transfer Goal 1    Activity/Assistive Device toilet at 03/07/2023 0804   [place] supervision required at 03/08/2023 1445   Time Frame short-term goal (STG), 1 week at 03/08/2023 1445   Progress/Outcome goal met at 03/17/2023 1104   Transfer Goal 2    Activity/Assistive Device toilet at 03/07/2023 0804   [place] modified independence at 03/07/2023 0804   Time Frame long-term goal (LTG), 14 days or less at 03/07/2023 0804   Progress/Outcome goal met at 03/17/2023 1104   UB Dressing Goal 1    [place] supervision required at 03/08/2023 1445   Time Frame short-term goal (STG), 1 week at 03/08/2023 1445   Progress/Outcome goal met at 03/17/2023 1104   UB Dressing Goal 2    [place] modified independence at 03/07/2023 0804   Time Frame long-term goal (LTG), 14 days or less at 03/07/2023 0804   Progress/Outcome goal met at 03/17/2023 1104   LB Dressing Goal 1    [place] supervision required at 03/07/2023 0804   Time Frame short-term goal (STG), 1 week at 03/08/2023 1445   Progress/Outcome goal met at 03/17/2023 1104   LB Dressing Goal 2    [place] modified independence at 03/07/2023 0804   Time Frame long-term goal (LTG), 14 days or less at 03/07/2023 0804   Progress/Outcome goal met at 03/17/2023 1104   Grooming Goal 1    [place] supervision required at 03/07/2023 0804   Time Frame short-term goal (STG), 1 week at 03/08/2023 1445   Progress/Outcome goal met at 03/17/2023 1104   Grooming Goal 2    [place] modified independence at 03/07/2023 0804   Time Frame long-term goal (LTG), 14 days or less at 03/07/2023 0804   Progress/Outcome goal met at 03/17/2023 1104   Toileting Goal 1     [place] supervision required at 03/07/2023 0804   Time Frame short-term goal (STG), 5 - 7 days at 03/07/2023 0804   Progress/Outcome goal met at 03/17/2023 1104   Toileting Goal 2    [place] modified independence at 03/07/2023 0804   Time Frame long-term goal (LTG), 14 days or less at 03/07/2023 0804   Progress/Outcome goal met at 03/17/2023 1104

## 2023-03-17 NOTE — ASSESSMENT & PLAN NOTE
Harry Alvarado is a 67 y.o. male who presents with the following chronic medical problems including MTHFR gene mutation, coronary artery disease with prior myocardial infarction 1993, peripheral artery disease with multiple bypass procedures in the past most recent with tPA bolus and angioplasty in 2020.  The patient admitted to [facility] on November 30, 2022 to the vascular surgery service for planned BKA due to bypass thrombosis.  At the time of admission the patient had cyanosis and was beginning to develop thanh gangrene of the foot.  Patient has a history of 15+ surgeries of the extremities and understood that his revascularization options were limited.  He had severe 10 out of 10 pain of the foot relieved with elevation of the leg.  He was still able to ambulate but limited by pain.  Under the care of Bowen Serrato MD of vascular surgery he underwent right transtibial amputation on December 2, 2022.  Postoperative course unremarkable.  For postoperative pain control he was treated with morphine.  On discharge prescribed Tylenol and low-dose Lyrica.  Lyrica started prior by his PCP for neuropathic pain.  He was restarted on his home Eliquis.  He required min assist for bed mobility, transfers and ambulation.  The patient was admitted to [facility] on December 6, 2022 for acute inpatient rehabilitation for preprosthetic training.  The patient was discharged on December 18, 2022 after  completing a comprehensive inpatient rehabilitation program progressing to a modified independent level with bed mobility, transfers and standing with rolling walker with limited hopping.  Pain control stable with medication change to hydrocodone/acetaminophen.  Continue Lyrica increased dose 50 mg 3 times a day.  The patient was also placed on nortriptyline for sleep and neuropathic pain.   The patient has follow-up outpatient with vascular surgery with improvement in wound healing.  The patient  was evaluated by Dr. Zambrano in amputee clinic and felt appropriate for acute inpatient rehabilitation for intensive prosthetic training prior to returning home where he lives in [place].  The patient's pain has improved and he is weaned off all of his medications except for his Eliquis.    Comprehensive inpatient rehabilitation program for functional deficit status post right transtibial amputation.  Goal is for modified and pedal level with ambulation and self-care activities with prosthesis.  Monitor for pain.  Monitor skin closely.  Monitor cardiovascular status.    The patient feels well.  Has progressed nicely with therapies.  Has progressed to an independent level with ambulation and transfers with his prosthesis and a rolling walker.  Ready for discharge home tomorrow.

## 2023-03-17 NOTE — PROGRESS NOTES
Patient: Harry Alvarado  Location: [facility] [address]  MRN: [id number]  Today's date: 3/17/2023    History of Present Illness  Harry is a 67 y.o. male admitted on 3/6/2023 with Encounter for prosthetic gait training [Z47.89]. Principal problem is   Encounter for prosthetic gait training.      This is the first clinic appointment for Mr. Alvarado following his inpatient stay for preprostatic training. He is a 67-year-old male with a history of gene mutation, CAD, history of MI, peripheral artery disease with multiple bypass procedures in the past. The patient had been admitted to [facility] on November 30 for a planned transtibial amputation due to bypass thrombosis. He had reported worse pain in the previous 1 to 2 months in the right foot and had undergone multiple surgeries prior. He underwent a right transtibial amputation on December 2, 2022. There is no significant postoperative complications. Pain was well controlled. He was admitted to [place] [facility] for preprostatic training and did well and was discharged home. He continues to receive home care physical therapy. He is still following up with his surgeon. He still has small open area in the lateral aspect of the incision line with minimal amount of serous drainage noted. He offers complaints of intermittent phantom pain and sensations. He has no other complaints of headache or dizziness, chest pain or shortness of breath. Currently he and his wife are living in his son's home everything is a 1 level. He is independent at a wheelchair level. Prior to his amputation he was completely independent and very active.    Past Medical History  Harry has a past medical history of Coronary artery disease, Deep vein thrombosis (CMS/HCC), Lupus anticoagulant disorder (CMS/HCC), Lyme disease, MTHFR gene mutation, Myocardial infarction (CMS/HCC), and PAD (peripheral artery disease) (CMS/[facility]).      PT Vitals    Date/Time Pulse HR Source BP  BP Location BP Method Pt Position [facility]   03/17/23 0901 74 Monitor 120/79 Right upper arm Automatic Sitting BDC      PT Pain    Date/Time Pain Type Rating: Rest [facility]   03/17/23 0901 Pain Assessment 0 BDC   03/17/23 0955 Pain Reassessment 0 BDC          Prior Living Environment    Flowsheet Row Most Recent Value   People in Home spouse   Current Living Arrangements home   Home Accessibility stairs to enter home (Group), stairs within home (Group)   Living Environment Comment Pt reports he lives with wife in 1  with 6 YOSEF. Pt has loft upstairs but does not need to access. Pt reports he's very active around the home and has steep property he needs to navigate.   Number of Stairs, Main Entrance 6   Stair Railings, Main Entrance railings on both sides of stairs   Stairs Comment, Main Entrance Can't reach both rails at once   Location, Patient Bedroom first (main) floor   Location, Bathroom first (main) floor   Bathroom Access Comment walk in shower, shower chair   Number of Stairs, Within Home, Primary 0          Prior Level of Function    Flowsheet Row Most Recent Value   Dominant Hand right   Ambulation assistive equipment   Transferring assistive equipment   Toileting independent   Bathing independent   Dressing independent   Eating independent   Prior Level of Function Comment Pt reports he was independent for all ADLs/mobility prior to TTA. Pt completed pre-prosthetic training at [facility] - has been Mod I at w/c level since d/c. Reports he utilized RW for transfers and gait training with HHPT.   Assistive Device Currently Used at Home walker, front-wheeled, wheelchair, shower chair, cane, straight           IRF PT Evaluation and Treatment - 03/17/23 0901        PT Time Calculation    Start Time 0900     Stop Time 1000     Time Calculation (min) 60 min        Session Details    Document Type discharge evaluation     Mode of Treatment individual therapy;physical therapy        General Information    Existing  Precautions/Restrictions cardiac;fall        Mobility Belt    Mobility Belt Used for All Out of Bed Activity yes        Cognition/Psychosocial    Orientation Status (Cognition) oriented x 4;oriented to;person;place;situation;time        Sensory Assessment (Somatosensory)    Left LE Sensory Assessment light touch awareness;light touch localization;proprioception;intact   LT intact L2-S1. Proprioception intact great toe, 5/5.    Right LE Sensory Assessment light touch awareness;light touch localization;proprioception;intact   LT intact throughout RLE. Proprioceptioni intact knee, 5/5.       Range of Motion (ROM)    Left Lower Extremity (ROM) left LE ROM is WFL     Right Lower Extremity (ROM) right LE ROM is WFL        Strength (Manual Muscle Testing)    Left Lower Extremity Strength left LE strength is WFL     Hip, Right (Strength) WFL throughout all planes     Knee, Right (Strength) WFL throughout all planes     Ankle, Right (Strength) N/A 2/2 R TTA        Bed Mobility    [place], Roll Left independent     Gray, Roll Right independent     Gray, Supine to Sit independent     Gray, Sit to Supine independent        Transfers    Transfers floor transfer     Comment gait belt donned during session.        Sit to Stand Transfer    [place], Sit to Stand Transfer modified independence     Assistive Device walker, front-wheeled        Stand to Sit Transfer    [place], Stand to Sit Transfer modified independence     Assistive Device walker, front-wheeled        Stand Pivot Transfer    [place], Stand Pivot/Stand Step Transfer modified independence     Assistive Device walker, front-wheeled        Car Transfer    Transfer Technique stand pivot     [place], Car Transfer modified independence     Verbal Cues safety;technique     Assistive Device walker, front-wheeled     Comment via amb approach to/from simulated car        Floor Transfer    Transfer Technique lying to kneeling to  "sit and stand     [place], Floor Transfer supervision     Verbal Cues technique     Assistive Device wheelchair;none     Comment Pt practicing lowering from mat surface onto floor mat into supine. Pt transitioning from supine to prone then coming into kneeling then advancing LLE out into half-kneel, pt pressing up from floor to mat surface x2 trials. Then pt practicining transitioning from floor to stand at RW x1 trial via kneeling to half-kneel to stand.        Gait Training    [place], Gait modified independence     Assistive Device walker, front-wheeled     Distance in Feet 200 feet     Pattern (Gait) step-through     Deviations/Abnormal Patterns (Gait) base of support, narrow;gait speed decreased     [place], Picking Up Object modified independence   From standing with or without use of reacher.    Comment (Gait/Stairs) Pt progressed to Mod I level in room for txs/amb with use of the RW + R TT prosthesis during current prosthetic wear time schedule. Whiteboard updated and NSG made aware.        Curb Negotiation    [place] modified independence     Assistive Device walker, front-wheeled     Curb Height 6 inches   6\" + 2\" curb steps    Comment Ascends with LLE. Descends with LLE.        Rough/Uneven Surface Gait Skills    [place] modified independence     Assistive Device walker, front-wheeled     Distance in Feet 100 feet     Comment Across low pile carpet.        Sloped Surface Gait Skills    [place] modified independence     Assistive Device walker, front-wheeled     Distance in Feet 30 feet     Comment Up/down 30-ft indoor OP ramp.        Stairs Training    [place], Stairs modified independence     Assistive Device railing     Handrail Location (Stairs) both sides     Number of Stairs 12     Stair Height 7 inches     Ascending Stairs Technique step-to-step   leading with LLE    Descending Stairs Technique step-to-step   leading with RLE    Comment Completed at Administration " steps        Wheelchair Mobility/Management    Method of Locomotion bimanual (upper extremity) propulsion     Wheelchair Type manual, lightweight     [place], Forward Propulsion modified independence     [place], Steering Activities modified independence     [place], Turning Activities modified independence     [place], Doorway Navigation modified independence     Distance Propelled in Feet 200 feet        Balance    Balance Assessment sitting static balance;sitting dynamic balance;standing static balance;standing dynamic balance     Static Sitting Balance WFL     Dynamic Sitting Balance WFL     Static Standing Balance WFL   with RW    Dynamic Standing Balance mild impairment   Decreased dynamic standing balance as noted by ongoing below norms for 10MWT and TUG       Motor Skills    Motor Skills coordination;functional endurance;muscle tone;postural deviations     Coordination WFL;bilateral;lower extremity     Functional Endurance Good.     Muscle Tone WNL;bilateral;lower extremity(s)        Postural Deviations    Comment, Postural Deviations No overt postural deviations        Prosthetic Orientation (Lower Extremity)    Fit Assessment fit/function of prosthesis are appropriate     Comment, Fit Assessment Pt arrived to session with R TT prosthesis donned including moisture wicking sock, liner, and 5 ply sock. Pt donned at 0800 and reporting skin check unremarkable. Plan to doff at 1600 for wear time of 8 hrs. Pt reporting good comfort of prosthesis post-tx.        Discharge Summary (PT)    Outcomes Achieved/Progress Made Upon Discharge (PT) all goals met within established timeframes     Transfer to Another Level of Care or Facility (PT) recommend therapy via outpatient clinic     Discharge Summary Statement (PT) Pt is a 67 -year-old male admitted for prosthetic training. Pt has PMH of gene mutation, CAD, history of MI, peripheral artery disease with multiple bypass procedures in the past. He  underwent a right transtibial amputation on December 2, 2022 and completed preprosthetic training at [facility] prior to d/c to son's home. He now returns to [facility] for comprehensive rehabilitation for prosthetic training. Pt has made good progress and achieved Mod I level with use of the R TT prosthesis for sit<>stand/stand-pivot transfers with use of the RW, ambulation x200-ft with use of the RW, and Mod I up/down 12 steps with 2 rails. Pt owns a RW and wheelchair. Pt with planned progression for completion of prosthetic wear time of 8hrs by end of day 3/17. Pt’s TUG continues > norm of 13.5 sec indicating fall risk/mobility deficit (21 sec on 3/6 and 23 sec on 3/13). Pt demonstrates slight improvement in the 10MWT progressing from initial speed of 0.48m/s on 3/6/23 to 0.55m/s on 3/16/23, however continues below age/gender matched norms of 1.36m/s for comfortable walking speed indicating continued mobility deficit. Pt continues to be limited by gait deviations typical of a novel prosthetic user (dec R lateral weight shift, dec R stance time, and early R heel rise), impaired dynamic standing balance and decreased endurance affecting his functional mobility and safety. Pt will benefit from ongoing OP PT services upon d/c to home with his wife to continue to address his above-mentioned deficits to maximize his functional mobility and safety.                           IRF PT Goals    Flowsheet Row Most Recent Value   Transfer Goal 1    Activity/Assistive Device sit-to-stand/stand-to-sit, stand pivot  [LRAD] at 03/06/2023 1502   [place] supervision required at 03/06/2023 1502   Time Frame short-term goal (STG), 1 week at 03/06/2023 1502   Progress/Outcome goal met at 03/08/2023 1454   Transfer Goal 2    Activity/Assistive Device sit-to-stand/stand-to-sit, stand pivot  [LRAD] at 03/06/2023 1502   [place] modified independence at 03/06/2023 1502   Time Frame long-term goal (LTG), 2-3 days at 03/15/2023 1630    Progress/Outcome goal met at 03/17/2023 0901   Gait/Walking Locomotion Goal 1    Activity/Assistive Device gait (walking locomotion)  [LRAD] at 03/06/2023 1502   Distance 150 feet at 03/06/2023 1502   [place] supervision required at 03/06/2023 1502   Time Frame short-term goal (STG), 1 week at 03/06/2023 1502   Progress/Outcome goal met at 03/08/2023 1454   Gait/Walking Locomotion Goal 2    Activity/Assistive Device gait (walking locomotion)  [LRAD] at 03/06/2023 1502   Distance 150 feet at 03/06/2023 1502   [place] modified independence at 03/06/2023 1502   Time Frame long-term goal (LTG), 2-3 days at 03/15/2023 1630   Progress/Outcome goal met at 03/17/2023 0901   Stairs Goal 1    Activity/Assistive Device stairs, all skills at 03/08/2023 1454   Number of Stairs 12 at 03/08/2023 1454   [place] modified independence at 03/08/2023 1454   Time Frame long-term goal (LTG), 2-3 days at 03/15/2023 1630   Progress/Outcome goal met at 03/17/2023 0901

## 2023-03-17 NOTE — PROGRESS NOTES
03/17/23 0900   Discharge Transportation   Does the patient need discharge transport arranged? No   Has discharge transport been arranged? Yes   Discharge Transportation Vendor other (see comment)   Type of Transportation   (private vehicle, pts spouse)   What day is the transport expected? 03/18/23   What time is the transport expected? 1000   Plan   Plan dc home w spouse and OP PT   Patient/Family in Agreement with Plan yes   Discharge Review for Designated Lay Caregiver Designated Lay Caregiver available   Team Conference   Next Team Conference Date 03/23/23

## 2023-03-17 NOTE — DISCHARGE INSTR - ACTIVITY
Occupational Therapy:    Toilet Transfers: Independent with use of rolling walker and right transtibial prosthesis    Shower/Tub Transfers: Independent    Upper Body Dressing: Independent    Lower Body Dressing: Independent    Bathing: Independent    Toileting: Independent    Grooming: Independent     Household Mobility/Household Activity: Independent with use of rolling walker and right transtibial prosthesis    Driving: Handicap Placard initiated.    Upper Extremity Management: Issued purple theraband arm exercise program with handout provided.    Entered by: DEVIN Salgado, OTR/L on: 3/17/2023      Physical Therapy:      Bed mobility: Independent for bed mobility     Transfers: Modified Independent sit to/from stand and stand-pivot transfers with use of the Rolling Walker and right transtibial prosthesis.      Ambulation: Modified Independent x200-ft with use of the Rolling Walker and right transtibial prosthesis.      Elevations: Modified Independent up/down 12 steps with two railings.   Ascend leading with the LEFT leg.   Descend leading with the RIGHT leg.      Wheelchair Mobility: Modified Independent x200-ft with use of bilateral upper extremities.       Entered by: Shawn Soares, PT  on: 3/17/2023       Additional:       Braces/Prosthesis:  Prosthetic fabricated by [employer].       Durable Medical Equipment: Patient owns a rolling walker and lightweight manual wheelchair.     Home Exercise Program: Please continue to perform your prior PT Home Exercise Program and prone lying (without prosthesis donned) 1 to 2x/day.     Notes:   Donning/[place]/Skin Checks: Independent.    Check your skin before and after EVERY time you wear the prosthesis. Continue your wearing schedule wearing for 6 to 8 hours as able.  Contact your prosthetist with ANY skin issues or fit issues.  DO NOT continue to wear the leg if it is creating pain or discomfort. Advance your activity level SLOWLY as not to place too much prolonged  pressure through your limb and risk causing a bone bruise.

## 2023-03-18 VITALS
DIASTOLIC BLOOD PRESSURE: 71 MMHG | HEIGHT: 73 IN | WEIGHT: 167.3 LBS | RESPIRATION RATE: 17 BRPM | TEMPERATURE: 97.9 F | BODY MASS INDEX: 22.17 KG/M2 | OXYGEN SATURATION: 94 % | HEART RATE: 82 BPM | SYSTOLIC BLOOD PRESSURE: 110 MMHG

## 2023-03-18 PROCEDURE — 63700000 HC SELF-ADMINISTRABLE DRUG: Performed by: HOSPITALIST

## 2023-03-18 RX ADMIN — APIXABAN 5 MG: 5 TABLET, FILM COATED ORAL at 08:17

## 2023-03-18 NOTE — ASSESSMENT & PLAN NOTE
Harry Alvarado is a 67 y.o. male who presents with the following chronic medical problems including MTHFR gene mutation, coronary artery disease with prior myocardial infarction 1993, peripheral artery disease with multiple bypass procedures in the past most recent with tPA bolus and angioplasty in 2020.  The patient admitted to [facility] on November 30, 2022 to the vascular surgery service for planned BKA due to bypass thrombosis.  At the time of admission the patient had cyanosis and was beginning to develop thanh gangrene of the foot.  Patient has a history of 15+ surgeries of the extremities and understood that his revascularization options were limited.  He had severe 10 out of 10 pain of the foot relieved with elevation of the leg.  He was still able to ambulate but limited by pain.  Under the care of Bowen Serrato MD of vascular surgery he underwent right transtibial amputation on December 2, 2022.  Postoperative course unremarkable.  For postoperative pain control he was treated with morphine.  On discharge prescribed Tylenol and low-dose Lyrica.  Lyrica started prior by his PCP for neuropathic pain.  He was restarted on his home Eliquis.  He required min assist for bed mobility, transfers and ambulation.  The patient was admitted to [facility] on December 6, 2022 for acute inpatient rehabilitation for preprosthetic training.  The patient was discharged on December 18, 2022 after  completing a comprehensive inpatient rehabilitation program progressing to a modified independent level with bed mobility, transfers and standing with rolling walker with limited hopping.  Pain control stable with medication change to hydrocodone/acetaminophen.  Continue Lyrica increased dose 50 mg 3 times a day.  The patient was also placed on nortriptyline for sleep and neuropathic pain.   The patient has follow-up outpatient with vascular surgery with improvement in wound healing.  The patient  was evaluated by Dr. Zambrano in amputee clinic and felt appropriate for acute inpatient rehabilitation for intensive prosthetic training prior to returning home where he lives in [place].  The patient's pain has improved and he is weaned off all of his medications except for his Eliquis.    The patient has completed a comprehensive inpatient rehabilitation program progressing to a modified independent level with bed mobility, transfers and ambulation.  Transfers and ambulation with a rolling walker with prosthesis on.  Patient independent with donning and doffing.  Skin stable.  No significant pain.    The patient is returning home today and driving to [place] where he lives with his wife tomorrow.  We will follow-up with prosthetists near his home and therapies.

## 2023-03-18 NOTE — SUBJECTIVE & OBJECTIVE
" Patient was seen and examined.   Attestation Notes: Face to face encounter completed    Subjective    The patient feels well.  Feels ready for discharge home today.  Objective     Visit Vitals  /71 (BP Location: Right upper arm, Patient Position: Lying)   Pulse 82   Temp 36.6 °C (97.9 °F) (Oral)   Resp 17   Ht 1.854 m (6' 1\")   Wt 75.9 kg (167 lb 4.8 oz)   SpO2 94%   BMI 22.07 kg/m²       Review of Systems:  Pertinent items are noted in HPI.  Denies chest pain and shortness of breath.  Review of systems otherwise negative.      Full Code    Physical Exam  General      Alert, cooperative, no distress, appears stated age.   Head:    Normocephalic, without obvious abnormality, atraumatic.   Eyes:    PERRL, conjunctiva/corneas clear, EOM's intact.        Nose:   Nares normal, septum midline, mucosa normal, no drainage or            sinus tenderness.   Throat:   Lips, mucosa, and tongue normal.    Neck:   Supple, symmetrical, trachea midline.    Back:     Symmetric, no curvature.   Lungs:     Clear to auscultation bilaterally, respirations unlabored.   Chest wall:    No tenderness or deformity.   Heart:    Regular rate and rhythm, S1 and S2 normal.   Abdomen:     Soft, non-tender, bowel sounds active all four quadrants,     no masses, no organomegaly.   Extremities:  Musculoskeletal: Trace stump edema.  Right transtibial amputation.      Pulses:   1+ and symmetric all extremities.   Skin:   Skin color, texture, turgor normal, no rashes or lesions   Neurologic:          Behavior/  Emotional:  CNII-XII intact.  Alert and oriented ×3.  Motor exam intact.  Sensory exam intact.  Reflexes stable decreased reflexes.      Appropriate, cooperative           Plan of care was discussed with patient   "

## 2023-03-18 NOTE — PROGRESS NOTES
"Daily Progress Note     Patient was seen and examined.   Attestation Notes: Face to face encounter completed    Subjective    The patient feels well.  Feels ready for discharge home today.  Objective     Visit Vitals  /71 (BP Location: Right upper arm, Patient Position: Lying)   Pulse 82   Temp 36.6 °C (97.9 °F) (Oral)   Resp 17   Ht 1.854 m (6' 1\")   Wt 75.9 kg (167 lb 4.8 oz)   SpO2 94%   BMI 22.07 kg/m²       Review of Systems:  Pertinent items are noted in HPI.  Denies chest pain and shortness of breath.  Review of systems otherwise negative.      Full Code    Physical Exam  General      Alert, cooperative, no distress, appears stated age.   Head:    Normocephalic, without obvious abnormality, atraumatic.   Eyes:    PERRL, conjunctiva/corneas clear, EOM's intact.        Nose:   Nares normal, septum midline, mucosa normal, no drainage or            sinus tenderness.   Throat:   Lips, mucosa, and tongue normal.    Neck:   Supple, symmetrical, trachea midline.    Back:     Symmetric, no curvature.   Lungs:     Clear to auscultation bilaterally, respirations unlabored.   Chest wall:    No tenderness or deformity.   Heart:    Regular rate and rhythm, S1 and S2 normal.   Abdomen:     Soft, non-tender, bowel sounds active all four quadrants,     no masses, no organomegaly.   Extremities:  Musculoskeletal: Trace stump edema.  Right transtibial amputation.      Pulses:   1+ and symmetric all extremities.   Skin:   Skin color, texture, turgor normal, no rashes or lesions   Neurologic:          Behavior/  Emotional:  CNII-XII intact.  Alert and oriented ×3.  Motor exam intact.  Sensory exam intact.  Reflexes stable decreased reflexes.      Appropriate, cooperative           Plan of care was discussed with patient     Assessment & Plan  Status post below knee amputation of right lower extremity (CMS/[facility])  Assessment & Plan  Harry Alvarado is a 67 y.o. male who presents with the following chronic medical problems " including MTHFR gene mutation, coronary artery disease with prior myocardial infarction 1993, peripheral artery disease with multiple bypass procedures in the past most recent with tPA bolus and angioplasty in 2020.  The patient admitted to [facility] on November 30, 2022 to the vascular surgery service for planned BKA due to bypass thrombosis.  At the time of admission the patient had cyanosis and was beginning to develop thanh gangrene of the foot.  Patient has a history of 15+ surgeries of the extremities and understood that his revascularization options were limited.  He had severe 10 out of 10 pain of the foot relieved with elevation of the leg.  He was still able to ambulate but limited by pain.  Under the care of Bowen Serrato MD of vascular surgery he underwent right transtibial amputation on December 2, 2022.  Postoperative course unremarkable.  For postoperative pain control he was treated with morphine.  On discharge prescribed Tylenol and low-dose Lyrica.  Lyrica started prior by his PCP for neuropathic pain.  He was restarted on his home Eliquis.  He required min assist for bed mobility, transfers and ambulation.  The patient was admitted to [facility] on December 6, 2022 for acute inpatient rehabilitation for preprosthetic training.  The patient was discharged on December 18, 2022 after  completing a comprehensive inpatient rehabilitation program progressing to a modified independent level with bed mobility, transfers and standing with rolling walker with limited hopping.  Pain control stable with medication change to hydrocodone/acetaminophen.  Continue Lyrica increased dose 50 mg 3 times a day.  The patient was also placed on nortriptyline for sleep and neuropathic pain.   The patient has follow-up outpatient with vascular surgery with improvement in wound healing.  The patient was evaluated by Dr. Zambrano in amputee clinic and felt appropriate for acute inpatient  rehabilitation for intensive prosthetic training prior to returning home where he lives in [place].  The patient's pain has improved and he is weaned off all of his medications except for his Eliquis.    The patient has completed a comprehensive inpatient rehabilitation program progressing to a modified independent level with bed mobility, transfers and ambulation.  Transfers and ambulation with a rolling walker with prosthesis on.  Patient independent with donning and doffing.  Skin stable.  No significant pain.    The patient is returning home today and driving to [place] where he lives with his wife tomorrow.  We will follow-up with prosthetists near his home and therapies.    Lupus anticoagulant disorder (CMS/HCC)  Assessment & Plan  Continue Eliquis.    Deep vein thrombosis (CMS/HCC)  Assessment & Plan  Continue Eliquis.    Coronary artery disease  Assessment & Plan  Currently asymptomatic.  Monitor cardiac status during stay.  Cardiac precautions.        Expected Discharge Date:  3/18/2023

## 2023-03-18 NOTE — NURSING NOTE
Reviewed discharge instructions with patient and spouse. Answered all questions to their satisfaction. Personal belongings packed and in patient's possession. DC to home with wife providing transportation.

## 2023-03-18 NOTE — PLAN OF CARE
Problem: Rehabilitation (IRF) Plan of Care  Goal: Plan of Care Review  Outcome: Met  Flowsheets (Taken 3/18/2023 0957)  Progress: improving  Plan of Care Reviewed With: patient  Outcome Summary: Patient denies pain.  Excited for pending DC. Call bell in reach.   Plan of Care Review  Plan of Care Reviewed With: patient  Progress: improving  Outcome Summary: Patient denies pain.  Excited for pending DC. Call bell in reach.

## 2023-03-18 NOTE — DISCHARGE SUMMARY
Inpatient Discharge Summary    BRIEF OVERVIEW  Admitting Provider: Richard Angulo MD  Discharge Provider: No att. providers found  Primary Care Physician at Discharge: Unable, To Obtain Pcp None     Admission Date: 3/6/2023     Discharge Date: 3/18/2023    Hospital Course    Status post below knee amputation of right lower extremity (CMS/[facility])  Harry Alvarado is a 67 y.o. male who presents with the following chronic medical problems including MTHFR gene mutation, coronary artery disease with prior myocardial infarction 1993, peripheral artery disease with multiple bypass procedures in the past most recent with tPA bolus and angioplasty in 2020.  The patient admitted to [facility] on November 30, 2022 to the vascular surgery service for planned BKA due to bypass thrombosis.  At the time of admission the patient had cyanosis and was beginning to develop thanh gangrene of the foot.  Patient has a history of 15+ surgeries of the extremities and understood that his revascularization options were limited.  He had severe 10 out of 10 pain of the foot relieved with elevation of the leg.  He was still able to ambulate but limited by pain.  Under the care of Bowen Serrato MD of vascular surgery he underwent right transtibial amputation on December 2, 2022.  Postoperative course unremarkable.  For postoperative pain control he was treated with morphine.  On discharge prescribed Tylenol and low-dose Lyrica.  Lyrica started prior by his PCP for neuropathic pain.  He was restarted on his home Eliquis.  He required min assist for bed mobility, transfers and ambulation.  The patient was admitted to [facility] on December 6, 2022 for acute inpatient rehabilitation for preprosthetic training.  The patient was discharged on December 18, 2022 after  completing a comprehensive inpatient rehabilitation program progressing to a modified independent level with bed mobility, transfers and standing  with rolling walker with limited hopping.  Pain control stable with medication change to hydrocodone/acetaminophen.  Continue Lyrica increased dose 50 mg 3 times a day.  The patient was also placed on nortriptyline for sleep and neuropathic pain.   The patient has follow-up outpatient with vascular surgery with improvement in wound healing.  The patient was evaluated by Dr. Zambrano in amputee clinic and felt appropriate for acute inpatient rehabilitation for intensive prosthetic training prior to returning home where he lives in [place].  The patient's pain has improved and he is weaned off all of his medications except for his Eliquis.    The patient has completed a comprehensive inpatient rehabilitation program progressing to a modified independent level with bed mobility, transfers and ambulation.  Transfers and ambulation with a rolling walker with prosthesis on.  Patient independent with donning and doffing.  Skin stable.  No significant pain.    The patient is returning home today and driving to [place] where he lives with his wife tomorrow.  We will follow-up with prosthetists near his home and therapies.    Lupus anticoagulant disorder (CMS/HCC)  Continue Eliquis.    Deep vein thrombosis (CMS/[facility])  Continue Eliquis.    Coronary artery disease  Currently asymptomatic.  Monitor cardiac status during stay.  Cardiac precautions.          Discharge Disposition  Home   Code Status at Discharge: Full Code    Discharge Medications     Medication List      START taking these medications    acetaminophen 325 mg tablet  Commonly known as: TYLENOL  Take 2 tablets (650 mg total) by mouth every 4 (four) hours as needed for pain.  Dose: 650 mg        CONTINUE taking these medications    apixaban 5 mg tablet  Commonly known as: ELIQUIS  Take 1 tablet (5 mg total) by mouth 2 (two) times a day Indications: increased risk of blood clotting, prevention of venous thromboembolism recurrence.  Dose: 5 mg        STOP  taking these medications    aspirin 81 mg enteric coated tablet     melatonin ODT     nortriptyline 10 mg capsule  Commonly known as: PAMELOR     pregabalin 50 mg capsule  Commonly known as: LYRICA     zolpidem 10 mg tablet  Commonly known as: AMBIEN               Outpatient Follow-Up  Encounter Information    This patient does not currently have any appointments scheduled.

## 2024-06-07 NOTE — DISCHARGE NOTE NURSING/CASE MANAGEMENT/SOCIAL WORK - NSDCPEPTCOWACOMP_GEN_ALL_CORE
Lidocaine-epinephrine 1-1:892441 % injection   1mL once for one use, starting 6/7/2024 ending 6/7/2024,  2mL disp, R-0, injection  Injected by Dr. Desai     The patient has received written discharge instructions for Warfarin/Coumadin, including the Warfarin/Coumadin discharge booklet, which contains all of the information listed below. Warfarin/Coumadin is used to prevent new blood clots and keep existing ones from getting bigger. Never skip a dose of Warfarin/Coumadin. If you forget to take your Warfarin/Coumadin, DO NOT take an extra pill to 'catch up'. NEVER TAKE A DOUBLE DOSE. Notify your doctor that you missed a dose. Take Warfarin/Coumadin in the evening at the same time. Warfarin/Coumadin may be taken with other medications or food.

## 2024-08-13 NOTE — PLAN OF CARE
Problem: Rehabilitation (IRF) Plan of Care  Goal: Plan of Care Review  Flowsheets (Taken 3/11/2023 0101)  Progress: no change  Outcome Summary: pt slept good all night , mod I in room ,no new complaints   Plan of Care Review  Progress: no change  Outcome Summary: pt slept good all night , mod I in room ,no new complaints   Pregnancy

## 2024-10-24 NOTE — PATIENT PROFILE ADULT - DO YOU LACK THE NECESSARY SUPPORT TO HELP YOU COPE WITH LIFE CHALLENGES?
I called the patient and advised. Patient verbalized understanding. Patient has been given the number to Lang for scheduling when she is ready.   yes

## 2025-02-23 NOTE — ASU PREOP CHECKLIST - TEMPERATURE IN CELSIUS (DEGREES C)
[facility]      Hospitalist - History & Physical      PCP: No primary care provider on file.    Date of Admission: 2/22/2025    Date of Service: 2/22/2025    Chief Complaint:  Altered mental status    History Of Present Illness:   The patient is a 86 y.o. male with HTN, dementia, thyroid disease comes to ED for evaluation of altered mental status.  Patient's wife is his caregiver who called EMS after he fell today at home.  Wife states that he woke up, took his medication, got up to go to the bathroom and was very unbalanced with an unsteady gait.  He ended up falling and wife was unable to get him out of the floor so she called EMS.  Apparently on arrival to the ED, he did not have a verbal response, appears lethargic and was having some strokelike symptoms.  Per ED report he had a gaze that was deviated to the right, he was leaning to the right and he was noted to have a right sided facial droop and left-sided weakness.  Wife is at bedside at the time of my assessment and states that patient has not had any recent illness.    In ED: CXR with no acute cardiopulmonary process; CT head with atrophy, white matter attenuation changes compatible with chronic small vessel ischemia; CT brain perfusion normal; CTA head neck cavernous internal carotid arteries demonstrate calcific atherosclerotic disease no suggestion of flow restricting stenosis, anterior and middle cerebral arteries appear adequately patent, upper right vertebral artery is hypoplastic and patent, upper left vertebral artery, the basilar artery and the posterior cerebral arteries are within normal limits and patent, the common internal and external carotid arteries are adequately patent with no significant atherosclerotic disease; UA unremarkable for UTI; PCR panel negative; WBC 6.3, H&H 12.4/37.3, creatinine 1.9, BUN 27, GFR 34.  Patient will be placed observation to the hospitalist.      Past Medical History:        Diagnosis Date     36.4

## 2025-05-05 NOTE — ED ADULT TRIAGE NOTE - IDEAL BODY WEIGHT(KG)
PRE-OPERATIVE NOTE     Chief Complaint had concerns including Office Visit and Pre-Op Exam.  Surgery Date: 05/14/2025  Planned Surgery: LUMBAR 4-LUMBAR 5 LAMINECTOMY, POSTERIOR APPRAOCH  Type of Anesthesia: General  Pre-op Diagnosis: Lumbar spondylosis [M47.816]; Spinal stenosis of lumbar region, unspecified whether neurogenic claudication present [M48.061]  Requesting Surgeon: Nomi Wilks MD    Subjective   BRIEF HISTORY LEADING to SURGERY: Benito Burris is a 66 year old male here for pre-operative anesthesia consult for surgery as requested by the surgeon.  Patient scheduled for procedure as stated above.  Patient has had no previous issues with anesthesia.    Patient's chronic conditions are controlled with medications.    Review of Systems   Constitutional:  Negative for chills, diaphoresis, fatigue and fever.   HENT:  Negative for congestion, ear discharge, ear pain, rhinorrhea, sinus pressure, sinus pain, sneezing and sore throat.    Eyes:  Negative for photophobia, pain, discharge and redness.   Respiratory:  Negative for cough, chest tightness, shortness of breath and wheezing.    Cardiovascular:  Negative for chest pain and palpitations.   Gastrointestinal:  Negative for abdominal pain, blood in stool, constipation, diarrhea, nausea and vomiting.   Genitourinary:  Negative for dysuria, flank pain, genital sores, hematuria, penile discharge, penile pain, scrotal swelling, testicular pain and urgency.   Musculoskeletal:  Positive for arthralgias, back pain, neck pain and neck stiffness. Negative for gait problem and myalgias.   Neurological:  Negative for dizziness, seizures, syncope, facial asymmetry, weakness, light-headedness, numbness and headaches.   All other systems reviewed and are negative.    As documented above.    Objective   PHYSICAL EXAM  Vitals:    05/05/25 1148   BP: 123/69   Pulse: (!) 59   Resp: 18   Temp: 97.7 °F (36.5 °C)   TempSrc: Oral   SpO2: 97%   Weight: 89.4 kg (197 lb)    Height: 5' 11\" (1.803 m)   PainSc:  0   BMI (Calculated): 27.48     Physical Exam  Vitals and nursing note reviewed.   Constitutional:       General: He is not in acute distress.     Appearance: Normal appearance. He is normal weight. He is not ill-appearing, toxic-appearing or diaphoretic.   HENT:      Head: Normocephalic and atraumatic.      Right Ear: Tympanic membrane, ear canal and external ear normal. There is no impacted cerumen.      Left Ear: Tympanic membrane, ear canal and external ear normal. There is no impacted cerumen.      Nose: Nose normal. No congestion or rhinorrhea.      Mouth/Throat:      Mouth: Mucous membranes are moist.      Pharynx: Oropharynx is clear. No oropharyngeal exudate or posterior oropharyngeal erythema.      Neck: Neck supple. No rigidity, spasms or tenderness. No pain with movement. Decreased range of motion.   Eyes:      General: No scleral icterus.        Right eye: No discharge.         Left eye: No discharge.      Extraocular Movements: Extraocular movements intact.      Conjunctiva/sclera: Conjunctivae normal.      Pupils: Pupils are equal, round, and reactive to light.   Neck:      Vascular: No carotid bruit.   Cardiovascular:      Rate and Rhythm: Normal rate and regular rhythm.      Pulses: Normal pulses.      Heart sounds: Normal heart sounds. No murmur heard.     No friction rub. No gallop.   Pulmonary:      Effort: Pulmonary effort is normal. No respiratory distress.      Breath sounds: Normal breath sounds. No stridor. No wheezing, rhonchi or rales.   Chest:      Chest wall: No tenderness.   Abdominal:      General: Bowel sounds are normal. There is no distension.      Palpations: Abdomen is soft. There is no mass.      Tenderness: There is no abdominal tenderness. There is no right CVA tenderness, left CVA tenderness, guarding or rebound.      Hernia: No hernia is present.   Musculoskeletal:         General: No swelling, deformity or signs of injury.      Lumbar  back: Spasms and tenderness present. Decreased range of motion. Positive right straight leg raise test.   Lymphadenopathy:      Cervical: No cervical adenopathy.   Skin:     General: Skin is warm.      Coloration: Skin is not jaundiced or pale.   Neurological:      General: No focal deficit present.      Mental Status: He is alert and oriented to person, place, and time. Mental status is at baseline.      Cranial Nerves: No cranial nerve deficit.      Sensory: No sensory deficit.   Psychiatric:         Mood and Affect: Mood normal.         Behavior: Behavior normal.       EKG-sinus rhythm; no ST-T changes; no T wave inversion.    RESULTS REVIEW         Most Recent Na+  142 (4/21/2025)  K+  4.8 (4/21/2025)   Glucose  83 (5/2/2025)  GFR  39 (4/21/2025)   The last eGFR was abnormal at 39 (4/21/2025).    Most Recent WBC  6.4 (4/21/2025)  HGB  11.4 (4/21/2025)     Platelet  226 (4/21/2025)   INR  0.9 (4/21/2025)      The last Hemoglobin was abnormal at 11.4 (4/21/2025) (Normal 13 - 17 g/dL).       Medications, allergies, past medical, surgical, social and family histories were reviewed and updated as appropriate, see encounter summary. Pertinent surgical risks and history are below.    SURGICAL RISK AND SCREENINGS     Family History Risks  Adverse Reaction to Anesthesia: No  Bleeding Problems: No  Malignant Hyperthermia: No    Personal Surgical History  Anesthetic Complications: No  Bleeding Problems: No  Problems with Surgery: No       Malnutrition Screening Tool Current Weight 197 lbs  Have you recently lost weight without trying? No  Have you been eating poorly because of a decreased appetite? No  Score = 0, Not at Risk       Functional Capacity  Are you able to do light housework, dusting, wash dishes, climb a flight of stairs or walk up a hill without chest pain or problems breathing (>4 METS)? Yes    Revised Cardiac Risk Index  High Risk Surgery  (Intraperitoneal; intrathoracic; suprainguinal vascular) Yes    History of Ischemic Heart Disease or Cardiac Chest Pain Yes; Coronary Artery Calcification Seen On Cat Scan   Congestive Heart Failure No; Ejection Fraction 60 (12/19/2023)   History of Stroke or TIA No   Last Creatinine >2 No; 1.88 g/dL (04/21/2025)   Prescribed Insulin No   Estimated Risk of a Major Cardiac Complication 2 risk factors = 10.1%       Acute Kidney Injury Prevention:  VERNA Risk is High based on criteria below:  High Risk GFR <= 45. The last eGFR was abnormal at 39 (4/21/2025).  Chronic Kidney Disease  Diabetes  Recommendations  - 24 hours before surgery hold ACE-I/ARB, Diuretics and Potassium   - 5 days before surgery hold NSAIDs  - Surgery is Low Risk with Local, MAC, or Regional <60 minutes - Nephrology Consult is not Indicated    Advance care planning documents on file - no     ASSESSMENT AND PLAN        1. Preop examination-benign except as noted-patient is optimized for procedure stated above in HPI.  -     XR CHEST PA AND LATERAL 2 VIEWS; Future  -     Electrocardiogram 12-Lead; Future    2. Lumbar stenosis with neurogenic claudication-chronic-patient scheduled for procedure stated above in HPI.    3. Type 2 DM with CKD stage 3 and hypertension  (CMD)-chronic-monitor clinically; CPM otherwise.    4. Preop general physical exam    Preop Optimization  - OPTIMIZED for SURGERY: YES patient is medically optimized for surgery.  - Workup reviewed and/or ordered: See Orders  - Pre-Op management of pacemaker, dialysis or steroids: not needed.  - Post-Op DVT prophylaxis: per surgical team  - Smoking Status: Every Day Smoker  . Tobacco cessation counseling given.    Pertinent Conditions  None  #Type 2 Diabetes Mellitus With Diabetic Nephropathy (Cmd) the last A1c was 5.3 (04/28/2025), this is trending down compared to  5.8 (10/22/2024).    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    #Multinodular Goiter the last TSH was 0.46 (4/28/2025) in comparison the second to last result was 0.249 (6/10/2024)    #Coronary Artery Calcification Seen On Cat Scan    #Mild Intermittent Asthma Without Complication (Cmd)        Before Surgery/Procedure Hold (Do Not Take) these Medications  Medications and Supplements:  - Morning of surgery hold any Vitamins AND for 2 weeks prior hold any Vitamin E or Herbals   - Morning of surgery hold (tiZANidine (ZANAFLEX) 4 MG tablet [[id number]])    Anticoagulation: none on med list    Antidiabetic:    - 3 days before surgery hold (Jardiance 25 MG tablet [[id number]])  - 7 days before surgery do not take (dulaglutide (Trulicity) 1.5 MG/0.5ML pen-injector [[id number]]). This is to avoid delayed gastric emptying and a possible full stomach.    VERNA Risk (Diuretics, ACE-I/ARB, NSAIDs):    - 24 hours before surgery hold (lisinopril (ZESTRIL) 40 MG tablet [[id number]])      Continue all other medications unless an alternative plan was advised with the surgeon and/or specialist.          Electronically signed by: Duran Olivo MD  Copy sent to: Nomi Wilks MD            80

## (undated) DEVICE — SUT VICRYL 2-0 27" CT-1 UNDYED

## (undated) DEVICE — DRSG KERLIX ROLL 4.5"

## (undated) DEVICE — PACK UPPER BODY

## (undated) DEVICE — DRSG ACE BANDAGE 4"

## (undated) DEVICE — SUT VICRYL 0 18" CT-1 UNDYED (POP-OFF)

## (undated) DEVICE — SUT ETHIBOND 2-0 30" CT-1

## (undated) DEVICE — FORCEP RADIAL JAW 4 W NDL 2.2MM 2.8MM 240CM ORANGE DISP

## (undated) DEVICE — PROBE FIAPC DIA 2.3MM/7FR LNTH 220CM/7.2FT

## (undated) DEVICE — SNARE CAPTIVATOR RND COLD STIFF 10X2.8MM

## (undated) DEVICE — MARKING PEN W RULER

## (undated) DEVICE — SUT VICRYL PLUS 2-0 18" CT-1 (POP-OFF)

## (undated) DEVICE — STAPLER SKIN PROXIMATE

## (undated) DEVICE — SAW BLADE STRYKER SAGITTAL WIDE MED

## (undated) DEVICE — WARMING BLANKET UPPER ADULT

## (undated) DEVICE — GLV 7.5 PROTEXIS (WHITE)

## (undated) DEVICE — DRSG ACE BANDAGE 6"

## (undated) DEVICE — SUT ETHIBOND 2-0 18" TIES